# Patient Record
Sex: FEMALE | Race: OTHER | HISPANIC OR LATINO | Employment: OTHER | URBAN - METROPOLITAN AREA
[De-identification: names, ages, dates, MRNs, and addresses within clinical notes are randomized per-mention and may not be internally consistent; named-entity substitution may affect disease eponyms.]

---

## 2018-06-02 LAB
ABSOL LYMPHOCYTES (HISTORICAL): 2.2 K/UL (ref 0.5–4)
ALBUMIN SERPL BCP-MCNC: 4.1 G/DL (ref 3–5.2)
ALP SERPL-CCNC: 70 U/L (ref 43–122)
ALT SERPL W P-5'-P-CCNC: 28 U/L (ref 9–52)
ANION GAP SERPL CALCULATED.3IONS-SCNC: 10 MMOL/L (ref 5–14)
AST SERPL W P-5'-P-CCNC: 17 U/L (ref 14–36)
BASOPHILS # BLD AUTO: 0.1 K/UL (ref 0–0.1)
BASOPHILS # BLD AUTO: 1 % (ref 0–1)
BILIRUB SERPL-MCNC: 0.4 MG/DL
BILIRUBIN DIRECT (HISTORICAL): 0.2 MG/DL
BUN SERPL-MCNC: 11 MG/DL (ref 5–25)
CALCIUM SERPL-MCNC: 9.8 MG/DL (ref 8.4–10.2)
CHLORIDE SERPL-SCNC: 98 MEQ/L (ref 97–108)
CHOLEST SERPL-MCNC: 254 MG/DL
CHOLEST/HDLC SERPL: 6.7 {RATIO}
CO2 SERPL-SCNC: 31 MMOL/L (ref 22–30)
CREATINE, SERUM (HISTORICAL): 0.86 MG/DL (ref 0.6–1.2)
DEPRECATED RDW RBC AUTO: 16 %
EGFR (HISTORICAL): >60 ML/MIN/1.73 M2
EOSINOPHIL # BLD AUTO: 0.3 K/UL (ref 0–0.4)
EOSINOPHIL NFR BLD AUTO: 4 % (ref 0–6)
GLUCOSE FASTING (HISTORICAL): 120 MG/DL (ref 70–99)
HCT VFR BLD AUTO: 40.6 % (ref 36–46)
HDLC SERPL-MCNC: 38 MG/DL
HGB BLD-MCNC: 12.9 G/DL (ref 12–16)
LDL/HDL RATIO (HISTORICAL): 4.8
LDLC SERPL CALC-MCNC: 181 MG/DL
LYMPHOCYTES NFR BLD AUTO: 27 % (ref 25–45)
MAGNESIUM SERPL-MCNC: 1.7 MG/DL (ref 1.6–2.3)
MCH RBC QN AUTO: 26.4 PG (ref 26–34)
MCHC RBC AUTO-ENTMCNC: 31.8 % (ref 31–36)
MCV RBC AUTO: 83 FL (ref 80–100)
MONOCYTES # BLD AUTO: 0.5 K/UL (ref 0.2–0.9)
MONOCYTES NFR BLD AUTO: 7 % (ref 1–10)
NEUTROPHILS ABS COUNT (HISTORICAL): 5 K/UL (ref 1.8–7.8)
NEUTS SEG NFR BLD AUTO: 61 % (ref 45–65)
PLATELET # BLD AUTO: 387 K/MCL (ref 150–450)
POTASSIUM SERPL-SCNC: 5 MEQ/L (ref 3.6–5)
RBC # BLD AUTO: 4.89 M/MCL (ref 4–5.2)
SODIUM SERPL-SCNC: 139 MEQ/L (ref 137–147)
TOTAL PROTEIN (HISTORICAL): 7.6 G/DL (ref 5.9–8.4)
TRIGL SERPL-MCNC: 173 MG/DL
TSH SERPL DL<=0.05 MIU/L-ACNC: 2.44 UIU/ML (ref 0.47–4.68)
VIT B12 SERPL-MCNC: 535 PG/ML (ref 239–931)
VITAMIN D25-HYDROXY (HISTORICAL): 28 NG/ML (ref 30–100)
VLDLC SERPL CALC-MCNC: 35 MG/DL (ref 0–40)
WBC # BLD AUTO: 8.1 K/MCL (ref 4.5–11)

## 2018-06-03 LAB
EST. AVERAGE GLUCOSE BLD GHB EST-MCNC: 140 MG/DL
HBA1C MFR BLD HPLC: 6.5 %

## 2018-07-19 DIAGNOSIS — I10 BENIGN HYPERTENSION: ICD-10-CM

## 2018-07-19 DIAGNOSIS — J45.909 MODERATE ASTHMA, UNSPECIFIED WHETHER COMPLICATED, UNSPECIFIED WHETHER PERSISTENT: Primary | ICD-10-CM

## 2018-07-19 RX ORDER — BISOPROLOL FUMARATE 10 MG/1
10 TABLET ORAL EVERY 24 HOURS
COMMUNITY
Start: 2017-12-21 | End: 2018-07-19 | Stop reason: SDUPTHER

## 2018-07-19 RX ORDER — ALBUTEROL SULFATE 90 UG/1
2 AEROSOL, METERED RESPIRATORY (INHALATION) 2 TIMES DAILY PRN
COMMUNITY
Start: 2017-12-21 | End: 2018-07-19 | Stop reason: SDUPTHER

## 2018-07-20 RX ORDER — ALBUTEROL SULFATE 90 UG/1
2 AEROSOL, METERED RESPIRATORY (INHALATION) EVERY 4 HOURS PRN
Qty: 3 INHALER | Refills: 3 | Status: SHIPPED | OUTPATIENT
Start: 2018-07-20 | End: 2019-02-01 | Stop reason: SDUPTHER

## 2018-07-20 RX ORDER — BISOPROLOL FUMARATE 10 MG/1
10 TABLET ORAL EVERY 24 HOURS
Qty: 90 TABLET | Refills: 1 | Status: SHIPPED | OUTPATIENT
Start: 2018-07-20 | End: 2019-01-11 | Stop reason: SDUPTHER

## 2018-08-20 RX ORDER — ALPRAZOLAM 0.25 MG/1
TABLET ORAL
COMMUNITY
Start: 2018-05-03 | End: 2018-09-18 | Stop reason: SDUPTHER

## 2018-08-20 RX ORDER — ALBUTEROL SULFATE 1.25 MG/3ML
SOLUTION RESPIRATORY (INHALATION)
COMMUNITY
Start: 2017-12-21 | End: 2018-11-01 | Stop reason: SDUPTHER

## 2018-08-20 RX ORDER — EZETIMIBE 10 MG/1
TABLET ORAL EVERY 24 HOURS
COMMUNITY
Start: 2017-12-21 | End: 2018-08-23

## 2018-08-20 RX ORDER — NITROGLYCERIN 0.4 MG/1
TABLET SUBLINGUAL
COMMUNITY
Start: 2017-04-26 | End: 2018-08-23 | Stop reason: SDUPTHER

## 2018-08-20 RX ORDER — CHOLECALCIFEROL (VITAMIN D3) 1250 MCG
CAPSULE ORAL
COMMUNITY
Start: 2018-03-01 | End: 2018-09-18 | Stop reason: SDUPTHER

## 2018-08-20 RX ORDER — GLIPIZIDE 2.5 MG/1
TABLET, EXTENDED RELEASE ORAL
COMMUNITY
Start: 2018-03-01 | End: 2018-08-23 | Stop reason: ALTCHOICE

## 2018-08-20 RX ORDER — AMLODIPINE BESYLATE 10 MG/1
TABLET ORAL EVERY 12 HOURS
COMMUNITY
Start: 2017-12-21 | End: 2018-08-23 | Stop reason: SDUPTHER

## 2018-08-20 RX ORDER — CHLORTHALIDONE 25 MG/1
TABLET ORAL
COMMUNITY
Start: 2017-03-30 | End: 2020-09-28

## 2018-08-20 RX ORDER — CLOPIDOGREL BISULFATE 75 MG/1
TABLET ORAL EVERY 24 HOURS
COMMUNITY
Start: 2017-12-21 | End: 2019-01-11 | Stop reason: SDUPTHER

## 2018-08-20 RX ORDER — ROSUVASTATIN CALCIUM 10 MG/1
TABLET, COATED ORAL
COMMUNITY
Start: 2017-12-21 | End: 2018-08-23 | Stop reason: DRUGHIGH

## 2018-08-20 RX ORDER — PANTOPRAZOLE SODIUM 40 MG/1
TABLET, DELAYED RELEASE ORAL EVERY 12 HOURS
COMMUNITY
Start: 2018-03-01 | End: 2018-08-23 | Stop reason: ALTCHOICE

## 2018-08-23 ENCOUNTER — OFFICE VISIT (OUTPATIENT)
Dept: CARDIOLOGY CLINIC | Facility: CLINIC | Age: 73
End: 2018-08-23
Payer: COMMERCIAL

## 2018-08-23 VITALS
WEIGHT: 135.2 LBS | OXYGEN SATURATION: 96 % | BODY MASS INDEX: 24.88 KG/M2 | DIASTOLIC BLOOD PRESSURE: 70 MMHG | SYSTOLIC BLOOD PRESSURE: 160 MMHG | HEIGHT: 62 IN | HEART RATE: 55 BPM

## 2018-08-23 DIAGNOSIS — E78.00 PURE HYPERCHOLESTEROLEMIA: Primary | ICD-10-CM

## 2018-08-23 DIAGNOSIS — I25.10 CHRONIC CORONARY ARTERY DISEASE: ICD-10-CM

## 2018-08-23 DIAGNOSIS — Z95.5 HX OF HEART ARTERY STENT: ICD-10-CM

## 2018-08-23 DIAGNOSIS — Z95.1 HX OF CABG: ICD-10-CM

## 2018-08-23 DIAGNOSIS — I10 ESSENTIAL HYPERTENSION: ICD-10-CM

## 2018-08-23 PROCEDURE — 99214 OFFICE O/P EST MOD 30 MIN: CPT | Performed by: INTERNAL MEDICINE

## 2018-08-23 PROCEDURE — 93000 ELECTROCARDIOGRAM COMPLETE: CPT | Performed by: INTERNAL MEDICINE

## 2018-08-23 RX ORDER — ROSUVASTATIN CALCIUM 10 MG/1
40 TABLET, COATED ORAL DAILY
Qty: 30 TABLET | Refills: 5 | Status: SHIPPED | OUTPATIENT
Start: 2018-08-23 | End: 2018-10-08 | Stop reason: SDUPTHER

## 2018-08-23 RX ORDER — NITROGLYCERIN 0.4 MG/1
0.4 TABLET SUBLINGUAL
Qty: 90 TABLET | Refills: 2 | Status: SHIPPED | OUTPATIENT
Start: 2018-08-23 | End: 2019-11-15 | Stop reason: HOSPADM

## 2018-08-23 RX ORDER — OMEPRAZOLE 20 MG/1
20 CAPSULE, DELAYED RELEASE ORAL DAILY
COMMUNITY
End: 2018-09-18 | Stop reason: SDUPTHER

## 2018-08-23 RX ORDER — AMLODIPINE BESYLATE 10 MG/1
10 TABLET ORAL 2 TIMES DAILY
Qty: 60 TABLET | Refills: 5 | Status: SHIPPED | OUTPATIENT
Start: 2018-08-23 | End: 2018-09-19 | Stop reason: SDUPTHER

## 2018-08-23 RX ORDER — BISOPROLOL FUMARATE 10 MG/1
TABLET ORAL EVERY 24 HOURS
COMMUNITY
Start: 2017-12-21 | End: 2018-08-23 | Stop reason: SDUPTHER

## 2018-08-23 NOTE — PROGRESS NOTES
Cardiology Follow Up    Juan Mchugh  1945  [id number]  [address] [facility]  [address] [place] [id number] [phone]    1  Pure hypercholesterolemia  rosuvastatin (CRESTOR) 10 MG tablet    LDL cholesterol, direct    Triglycerides   2  Hx of CABG  nitroglycerin (NITROSTAT) 0 4 mg SL tablet   3  Hx stent of SVG to the RCA     4  Essential hypertension  POCT ECG    amLODIPine (NORVASC) 10 mg tablet   5  Chronic coronary artery disease         Patient was 1st seen on 05/30/2013 for preoperative evaluation for right total hip replacement  She had a history of coronary artery bypass surgery on December 6, 2010 with subsequent stenting of the saphenous vein graft to the right coronary artery 3 months later  Patient had a Persantine nuclear stress test which was normal with an LVEF of 72%  An echocardiogram demonstrated grade 1 diastolic dysfunction with mild LVH, aortic sclerosis and mild left atrial enlargement  Patient also had a history of hypertension, diabetes mellitus, hyperlipidemia and asthma  04/07/2017 echocardiogram: Normal LV systolic function, grade 1 diastolic dysfunction, mildly elevated left ventricular filling pressures, mild pulmonary hypertension 35 mmHg, right ventricular enlargement with hypokinesis  06/2/2017 FLP: Cholesterol 254, triglycerides 173, HDL 38, LDL calculated 181  Interval History:   Patient returns  She has no cardiac complaints  She denies chest discomfort, shortness of breath, palpitations or lightheadedness  She had to stop her Zetia due to a rash  Subsequently her cholesterol has risen significantly from a prior value of 175 to now 254 with increases in triglycerides from 119-173 and LDL from 101 to 181        Patient Active Problem List   Diagnosis    Asthma    Chronic coronary artery disease    Diabetes ([address] )    Osteoarthritis    Glaucoma    Hiatal hernia    Hyperlipidemia    Hypertension    Hx of CABG    Hx stent of SVG to the RCA     Past Medical History:   Diagnosis Date    Asthma     [facility] [address] [facility]     HTN (hypertension)     Hyperlipidemia     Hyponatremia      Social History     Social History    Marital status: Single     Spouse name: N/A    Number of children: N/A    Years of education: N/A     Occupational History    Not on file       Social History Main Topics    Smoking status: Former Smoker     Packs/day: 0 50     Years: 13 00     Types: Cigarettes     Quit date: 2000    Smokeless tobacco: Never Used      Comment: no passive smoke exposure    Alcohol use Yes      Comment: socially    Drug use: Unknown    Sexual activity: Not on file     Other Topics Concern    Not on file     Social History Narrative    No narrative on file      Family History   Problem Relation Age of Onset    Stroke Mother     Hypertension Mother     Heart disease Mother     Colon cancer Father     Heart attack Brother     Heart attack Brother      Past Surgical History:   Procedure Laterality Date    CARDIAC CATHETERIZATION  2010    CORONARY ARTERY BYPASS GRAFT  12/06/2010    with subsequent stent to the saphenous veingraft to the RCA 3 months later    KNEE ARTHROSCOPY      POLYPECTOMY      laryngeal    TOTAL HIP ARTHROPLASTY      TUBAL LIGATION         Current Outpatient Prescriptions:     albuterol (ACCUNEB) 1 25 MG/3ML nebulizer solution, inhale 6 milliliter by inhalation route 3- 4 times every day via nebulizer as needed, Disp: , Rfl:     albuterol (VENTOLIN HFA) 90 mcg/act inhaler, Inhale 2 puffs every 4 (four) hours as needed (every 4 to 6 hours as neede), Disp: 3 Inhaler, Rfl: 3    ALPRAZolam (XANAX) 0 25 mg tablet, take 1 Tablet by Oral route once a day, Disp: , Rfl:     amLODIPine (NORVASC) 10 mg tablet, Take 1 tablet (10 mg total) by mouth 2 (two) times a day, Disp: 60 tablet, Rfl: 5    bisoprolol (ZEBETA) 10 MG tablet, Take 1 tablet (10 mg total) by mouth every 24 hours, Disp: 90 tablet, Rfl: 1    chlorthalidone 25 mg tablet, take 1/2 tablet (or 12 5mg) by oral route  every day, Disp: , Rfl:     Cholecalciferol (VITAMIN D3) 55395 units CAPS, take 1 capsule by oral route  every week, Disp: , Rfl:     clopidogrel (PLAVIX) 75 mg tablet, every 24 hours, Disp: , Rfl:     glucose blood test strip, test by Misc  (Non-Drug; Combo Route) route twice a day, Disp: , Rfl:     ipratropium (ATROVENT HFA) 17 mcg/act inhaler, Every 6 hours, Disp: , Rfl:     nitroglycerin (NITROSTAT) 0 4 mg SL tablet, Place 1 tablet (0 4 mg total) under the tongue every 5 (five) minutes as needed for chest pain, Disp: 90 tablet, Rfl: 2    omeprazole (PriLOSEC) 20 mg delayed release capsule, Take 20 mg by mouth daily, Disp: , Rfl:     rosuvastatin (CRESTOR) 10 MG tablet, Take 4 tablets (40 mg total) by mouth daily, Disp: 30 tablet, Rfl: 5  Allergies   Allergen Reactions    Ezetimibe Rash    Ace Inhibitors     Lisinopril     Losartan     Olmesartan        Results for orders placed or performed in visit on 08/23/18   POCT ECG    Narrative    Sinus bradycardia at a rate of 55 beats per minute  Normal tracing           Lab Results   Component Value Date    CHOL 254 (H) 06/02/2018     Lab Results   Component Value Date    HDL 38 (L) 06/02/2018     Lab Results   Component Value Date    LDLCALC 181 (H) 06/02/2018     Lab Results   Component Value Date    TRIG 173 (H) 06/02/2018     No components found for: [place], [place]  Lab Results   Component Value Date    CALCIUM 9 8 06/02/2018     06/02/2018    K 5 0 06/02/2018    CO2 31 (H) 06/02/2018    CL 98 06/02/2018    BUN 11 06/02/2018     Lab Results   Component Value Date     06/02/2018    K 5 0 06/02/2018    CL 98 06/02/2018    CO2 31 (H) 06/02/2018    ANIONGAP 10 06/02/2018    BUN 11 06/02/2018    GLUF 120 (H) 06/02/2018    CALCIUM 9 8 06/02/2018    AST 17 06/02/2018    ALT 28 06/02/2018    ALKPHOS 70 06/02/2018    PROT 7 6 06/02/2018 BILITOT 0 4 06/02/2018     Lab Results   Component Value Date    WBC 8 1 06/02/2018    HGB 12 9 06/02/2018    HCT 40 6 06/02/2018    MCV 83 06/02/2018     06/02/2018     Lab Results   Component Value Date    DRP5MKUCBNTU 2 440 06/02/2018         Review of Systems:  Review of Systems   Constitutional: Negative  HENT: Negative  Respiratory: Negative for chest tightness and shortness of breath  Cardiovascular: Negative for chest pain and leg swelling  Gastrointestinal: Negative  Endocrine: Negative  Genitourinary: Negative  Musculoskeletal: Negative  Skin: Negative  Allergic/Immunologic: Negative  Neurological: Negative  Hematological: Negative  Psychiatric/Behavioral: Negative  Physical Exam:  /70 (BP Location: Left arm, Patient Position: Sitting, Cuff Size: Large)   Pulse 55   Ht 5' 2" (1 575 m)   Wt 61 3 kg (135 lb 3 2 oz)   SpO2 96%   BMI 24 73 kg/m²   Physical Exam   Constitutional: She is oriented to person, place, and time  She appears well-developed and well-nourished  HENT:   Head: Normocephalic and atraumatic  Eyes: Conjunctivae and EOM are normal  Pupils are equal, round, and reactive to light  Neck: Normal range of motion  Neck supple  No JVD present  No tracheal deviation present  No thyromegaly present  Cardiovascular: Normal rate, regular rhythm, normal heart sounds and intact distal pulses  Exam reveals no gallop and no friction rub  No murmur heard  Pulmonary/Chest: Effort normal  No respiratory distress  She has no rales  Abdominal: Soft  Bowel sounds are normal    Musculoskeletal: Normal range of motion  She exhibits no edema  Neurological: She is alert and oriented to person, place, and time  Skin: Skin is warm and dry  Psychiatric: She has a normal mood and affect  Her behavior is normal        Discussion/Summary:  1  Coronary artery disease status post CABG and stenting of the saphenous vein graft to the RCA  2  Hyperlipidemia, poorly controlled  3  Hypertension    Plan:  1  Increase Crestor to 40 mg daily  2  Return in 3 months  3  Obtain fasting direct LDL and triglycerides 1 or 2 weeks before return

## 2018-09-18 DIAGNOSIS — K21.9 GASTROESOPHAGEAL REFLUX DISEASE WITHOUT ESOPHAGITIS: ICD-10-CM

## 2018-09-18 DIAGNOSIS — E55.9 VITAMIN D DEFICIENCY: ICD-10-CM

## 2018-09-18 DIAGNOSIS — F41.9 ANXIETY: Primary | ICD-10-CM

## 2018-09-18 RX ORDER — OMEPRAZOLE 20 MG/1
20 CAPSULE, DELAYED RELEASE ORAL DAILY
Qty: 30 CAPSULE | Refills: 3 | Status: SHIPPED | OUTPATIENT
Start: 2018-09-18 | End: 2018-11-14 | Stop reason: SDUPTHER

## 2018-09-18 RX ORDER — ALPRAZOLAM 0.25 MG/1
0.25 TABLET ORAL DAILY PRN
Qty: 30 TABLET | Refills: 1 | Status: SHIPPED | OUTPATIENT
Start: 2018-09-18 | End: 2018-11-14 | Stop reason: SDUPTHER

## 2018-09-18 RX ORDER — CHOLECALCIFEROL (VITAMIN D3) 1250 MCG
1 CAPSULE ORAL WEEKLY
Qty: 4 CAPSULE | Refills: 1 | Status: SHIPPED | OUTPATIENT
Start: 2018-09-18 | End: 2019-07-12 | Stop reason: SDUPTHER

## 2018-09-19 DIAGNOSIS — I10 ESSENTIAL HYPERTENSION: ICD-10-CM

## 2018-09-19 RX ORDER — AMLODIPINE BESYLATE 10 MG/1
10 TABLET ORAL 2 TIMES DAILY
Qty: 180 TABLET | Refills: 3 | Status: SHIPPED | OUTPATIENT
Start: 2018-09-19 | End: 2019-07-12

## 2018-09-29 ENCOUNTER — TRANSCRIBE ORDERS (OUTPATIENT)
Dept: ADMINISTRATIVE | Facility: HOSPITAL | Age: 73
End: 2018-09-29

## 2018-09-29 ENCOUNTER — APPOINTMENT (OUTPATIENT)
Dept: LAB | Facility: HOSPITAL | Age: 73
End: 2018-09-29
Payer: COMMERCIAL

## 2018-09-29 DIAGNOSIS — E78.00 PURE HYPERCHOLESTEROLEMIA: ICD-10-CM

## 2018-09-29 LAB
LDLC SERPL DIRECT ASSAY-MCNC: 64.69 MG/DL (ref 0–100)
TRIGL SERPL-MCNC: 91 MG/DL

## 2018-09-29 PROCEDURE — 36415 COLL VENOUS BLD VENIPUNCTURE: CPT

## 2018-09-29 PROCEDURE — 84478 ASSAY OF TRIGLYCERIDES: CPT

## 2018-09-29 PROCEDURE — 83721 ASSAY OF BLOOD LIPOPROTEIN: CPT

## 2018-10-08 ENCOUNTER — OFFICE VISIT (OUTPATIENT)
Dept: FAMILY MEDICINE CLINIC | Facility: CLINIC | Age: 73
End: 2018-10-08
Payer: COMMERCIAL

## 2018-10-08 VITALS
HEIGHT: 62 IN | DIASTOLIC BLOOD PRESSURE: 84 MMHG | WEIGHT: 137.9 LBS | RESPIRATION RATE: 14 BRPM | TEMPERATURE: 98.7 F | BODY MASS INDEX: 25.38 KG/M2 | HEART RATE: 64 BPM | OXYGEN SATURATION: 96 % | SYSTOLIC BLOOD PRESSURE: 148 MMHG

## 2018-10-08 DIAGNOSIS — I10 ESSENTIAL HYPERTENSION: ICD-10-CM

## 2018-10-08 DIAGNOSIS — F41.9 ANXIETY: ICD-10-CM

## 2018-10-08 DIAGNOSIS — E78.49 OTHER HYPERLIPIDEMIA: Primary | ICD-10-CM

## 2018-10-08 DIAGNOSIS — Z23 NEED FOR VACCINATION: ICD-10-CM

## 2018-10-08 PROCEDURE — G0008 ADMIN INFLUENZA VIRUS VAC: HCPCS

## 2018-10-08 PROCEDURE — 1160F RVW MEDS BY RX/DR IN RCRD: CPT

## 2018-10-08 PROCEDURE — 90662 IIV NO PRSV INCREASED AG IM: CPT

## 2018-10-08 PROCEDURE — 1036F TOBACCO NON-USER: CPT | Performed by: INTERNAL MEDICINE

## 2018-10-08 PROCEDURE — 99214 OFFICE O/P EST MOD 30 MIN: CPT | Performed by: INTERNAL MEDICINE

## 2018-10-08 PROCEDURE — 4040F PNEUMOC VAC/ADMIN/RCVD: CPT

## 2018-10-08 PROCEDURE — 1160F RVW MEDS BY RX/DR IN RCRD: CPT | Performed by: INTERNAL MEDICINE

## 2018-10-08 RX ORDER — ROSUVASTATIN CALCIUM 20 MG/1
20 TABLET, COATED ORAL DAILY
Qty: 90 TABLET | Refills: 3 | Status: SHIPPED | OUTPATIENT
Start: 2018-10-08 | End: 2019-07-12

## 2018-10-08 RX ORDER — ROSUVASTATIN CALCIUM 40 MG/1
40 TABLET, COATED ORAL DAILY
COMMUNITY
End: 2018-10-08 | Stop reason: ALTCHOICE

## 2018-10-08 NOTE — PROGRESS NOTES
Assessment/Plan:         Diagnoses and all orders for this visit:    Other hyperlipidemia: Due to major side effects : will reduce Dose To ;  -     rosuvastatin (CRESTOR) 20 MG tablet; Take 1 tablet (20 mg total) by mouth daily  RTC in 2-3 mos w Bloodw ork  -     influenza vaccine, 3874-2000, high-dose, PF 0 5 mL, for patients 65 yr+ (FLUZONE HIGH-DOSE)    Essential hypertension: stable  Continue same  RTC in 3mos w blood w ork  -     Basic metabolic panel; Future  -     CBC and differential; Future  -     Lipid panel; Future  -     Hepatic function panel; Future  -     Urinalysis with reflex to microscopic  -     influenza vaccine, 5959-3845, high-dose, PF 0 5 mL, for patients 65 yr+ (FLUZONE HIGH-DOSE)    Anxiety; stable    Need for vaccination    Other orders  -     Discontinue: rosuvastatin (CRESTOR) 40 MG tablet; Take 40 mg by mouth daily        Subjective:      Patient ID: Amanda Echols is a 68 y o  female  [address] with H/O CAD,Hyperipidemia,HTN,  Is here for regular check up    Only Muscle ache Joint stiffness since dose of crestor increased    No recent blood work    Med list reviewed in detail    The following portions of the patient's history were reviewed and updated as appropriate: allergies, current medications, past family history, past medical history, past social history, past surgical history and problem list     Review of Systems   Constitutional: Negative for chills, fatigue and fever  HENT: Negative for congestion, facial swelling, sore throat, trouble swallowing and voice change  Eyes: Negative for pain, discharge and visual disturbance  Respiratory: Negative for cough, shortness of breath and wheezing  Cardiovascular: Negative for chest pain, palpitations and leg swelling  Gastrointestinal: Negative for abdominal pain, blood in stool, constipation, diarrhea and nausea  Endocrine: Negative for polydipsia, polyphagia and polyuria     Genitourinary: Negative for difficulty urinating, hematuria and urgency  Musculoskeletal: Positive for arthralgias and myalgias  Negative for joint swelling  Since cardiology raised the dose of Crestor to 40 mg she started with increased Muscle ache and joint pain and stiffness      Skin: Negative for rash  Neurological: Negative for dizziness, tremors, weakness and headaches  Hematological: Negative for adenopathy  Does not bruise/bleed easily  Psychiatric/Behavioral: Negative for dysphoric mood, sleep disturbance and suicidal ideas  Objective:      /84 (BP Location: Left arm, Patient Position: Sitting, Cuff Size: Standard)   Pulse 64   Temp 98 7 °F (37 1 °C) (Oral)   Resp 14   Ht 5' 2" (1 575 m)   Wt 62 6 kg (137 lb 14 4 oz)   SpO2 96%   BMI 25 22 kg/m²          Physical Exam   Constitutional: She is oriented to person, place, and time  She appears well-nourished  No distress  HENT:   Head: Normocephalic  Mouth/Throat: Oropharynx is clear and moist  No oropharyngeal exudate  Eyes: Pupils are equal, round, and reactive to light  Conjunctivae are normal  No scleral icterus  Neck: Neck supple  No thyromegaly present  Cardiovascular: Normal rate, regular rhythm and normal heart sounds  No murmur heard  Pulmonary/Chest: Effort normal and breath sounds normal  No respiratory distress  She has no wheezes  She has no rales  Abdominal: Soft  Bowel sounds are normal  She exhibits no distension  There is no tenderness  There is no rebound and no guarding  Musculoskeletal: She exhibits tenderness  She exhibits no edema  She uses a cane to support gait   Lymphadenopathy:     She has no cervical adenopathy  Neurological: She is alert and oriented to person, place, and time  No cranial nerve deficit  Coordination normal    Skin: No rash noted  No erythema  No pallor  Psychiatric: She has a normal mood and affect

## 2018-11-01 DIAGNOSIS — J44.9 CHRONIC OBSTRUCTIVE PULMONARY DISEASE, UNSPECIFIED COPD TYPE (HCC): Primary | ICD-10-CM

## 2018-11-01 RX ORDER — ALBUTEROL SULFATE 1.25 MG/3ML
1 SOLUTION RESPIRATORY (INHALATION) EVERY 6 HOURS PRN
Qty: 75 ML | Refills: 0 | Status: SHIPPED | OUTPATIENT
Start: 2018-11-01 | End: 2018-11-14 | Stop reason: SDUPTHER

## 2018-11-14 DIAGNOSIS — J44.9 CHRONIC OBSTRUCTIVE PULMONARY DISEASE, UNSPECIFIED COPD TYPE (HCC): ICD-10-CM

## 2018-11-14 DIAGNOSIS — K21.9 GASTROESOPHAGEAL REFLUX DISEASE WITHOUT ESOPHAGITIS: ICD-10-CM

## 2018-11-14 DIAGNOSIS — F41.9 ANXIETY: ICD-10-CM

## 2018-11-15 RX ORDER — ALBUTEROL SULFATE 1.25 MG/3ML
1 SOLUTION RESPIRATORY (INHALATION) EVERY 6 HOURS PRN
Qty: 75 ML | Refills: 1 | Status: SHIPPED | OUTPATIENT
Start: 2018-11-15 | End: 2019-01-11 | Stop reason: SDUPTHER

## 2018-11-15 RX ORDER — OMEPRAZOLE 20 MG/1
20 CAPSULE, DELAYED RELEASE ORAL 2 TIMES DAILY
Qty: 60 CAPSULE | Refills: 0 | Status: SHIPPED | OUTPATIENT
Start: 2018-11-15 | End: 2019-01-11 | Stop reason: SDUPTHER

## 2018-11-15 RX ORDER — ALPRAZOLAM 0.25 MG/1
0.25 TABLET ORAL DAILY PRN
Qty: 30 TABLET | Refills: 0 | Status: SHIPPED | OUTPATIENT
Start: 2018-11-15 | End: 2018-12-13 | Stop reason: SDUPTHER

## 2018-12-08 ENCOUNTER — TRANSCRIBE ORDERS (OUTPATIENT)
Dept: ADMINISTRATIVE | Facility: HOSPITAL | Age: 73
End: 2018-12-08

## 2018-12-08 ENCOUNTER — APPOINTMENT (OUTPATIENT)
Dept: LAB | Facility: HOSPITAL | Age: 73
End: 2018-12-08
Payer: COMMERCIAL

## 2018-12-08 DIAGNOSIS — I10 ESSENTIAL HYPERTENSION, MALIGNANT: Primary | ICD-10-CM

## 2018-12-08 DIAGNOSIS — I10 ESSENTIAL HYPERTENSION, MALIGNANT: ICD-10-CM

## 2018-12-08 DIAGNOSIS — E78.00 PURE HYPERCHOLESTEROLEMIA: Primary | ICD-10-CM

## 2018-12-08 DIAGNOSIS — E78.00 PURE HYPERCHOLESTEROLEMIA: ICD-10-CM

## 2018-12-08 LAB
ALBUMIN SERPL BCP-MCNC: 4.4 G/DL (ref 3–5.2)
ALP SERPL-CCNC: 61 U/L (ref 43–122)
ALT SERPL W P-5'-P-CCNC: 27 U/L (ref 9–52)
ANION GAP SERPL CALCULATED.3IONS-SCNC: 8 MMOL/L (ref 5–14)
AST SERPL W P-5'-P-CCNC: 17 U/L (ref 14–36)
BACTERIA UR QL AUTO: ABNORMAL /HPF
BASOPHILS # BLD AUTO: 0.1 THOUSANDS/ΜL (ref 0–0.1)
BASOPHILS NFR BLD AUTO: 2 % (ref 0–1)
BILIRUB DIRECT SERPL-MCNC: 0.1 MG/DL
BILIRUB SERPL-MCNC: 0.3 MG/DL
BILIRUB UR QL STRIP: NEGATIVE
BUN SERPL-MCNC: 14 MG/DL (ref 5–25)
CALCIUM SERPL-MCNC: 9.5 MG/DL (ref 8.4–10.2)
CHLORIDE SERPL-SCNC: 99 MMOL/L (ref 97–108)
CHOLEST SERPL-MCNC: 190 MG/DL
CLARITY UR: CLEAR
CO2 SERPL-SCNC: 33 MMOL/L (ref 22–30)
COLOR UR: ABNORMAL
CREAT SERPL-MCNC: 1.02 MG/DL (ref 0.6–1.2)
EOSINOPHIL # BLD AUTO: 0.5 THOUSAND/ΜL (ref 0–0.4)
EOSINOPHIL NFR BLD AUTO: 6 % (ref 0–6)
ERYTHROCYTE [DISTWIDTH] IN BLOOD BY AUTOMATED COUNT: 15.7 %
GFR SERPL CREATININE-BSD FRML MDRD: 55 ML/MIN/1.73SQ M
GLUCOSE P FAST SERPL-MCNC: 120 MG/DL (ref 70–99)
GLUCOSE UR STRIP-MCNC: NEGATIVE MG/DL
HCT VFR BLD AUTO: 40.1 % (ref 36–46)
HDLC SERPL-MCNC: 46 MG/DL (ref 40–59)
HGB BLD-MCNC: 12.6 G/DL (ref 12–16)
HGB UR QL STRIP.AUTO: 25
KETONES UR STRIP-MCNC: NEGATIVE MG/DL
LDLC SERPL CALC-MCNC: 119 MG/DL
LDLC SERPL DIRECT ASSAY-MCNC: 107.01 MG/DL (ref 0–100)
LEUKOCYTE ESTERASE UR QL STRIP: 500
LYMPHOCYTES # BLD AUTO: 2.5 THOUSANDS/ΜL (ref 0.5–4)
LYMPHOCYTES NFR BLD AUTO: 29 % (ref 25–45)
MCH RBC QN AUTO: 26.3 PG (ref 26–34)
MCHC RBC AUTO-ENTMCNC: 31.5 G/DL (ref 31–36)
MCV RBC AUTO: 84 FL (ref 80–100)
MONOCYTES # BLD AUTO: 0.7 THOUSAND/ΜL (ref 0.2–0.9)
MONOCYTES NFR BLD AUTO: 8 % (ref 1–10)
NEUTROPHILS # BLD AUTO: 4.9 THOUSANDS/ΜL (ref 1.8–7.8)
NEUTS SEG NFR BLD AUTO: 56 % (ref 45–65)
NITRITE UR QL STRIP: NEGATIVE
NON-SQ EPI CELLS URNS QL MICRO: ABNORMAL /HPF
NONHDLC SERPL-MCNC: 144 MG/DL
PH UR STRIP.AUTO: 6 [PH] (ref 4.5–8)
PLATELET # BLD AUTO: 292 THOUSANDS/UL (ref 150–450)
PMV BLD AUTO: 8.6 FL (ref 8.9–12.7)
POTASSIUM SERPL-SCNC: 4.7 MMOL/L (ref 3.6–5)
PROT SERPL-MCNC: 8 G/DL (ref 5.9–8.4)
PROT UR STRIP-MCNC: ABNORMAL MG/DL
RBC # BLD AUTO: 4.8 MILLION/UL (ref 4–5.2)
RBC #/AREA URNS AUTO: ABNORMAL /HPF
SODIUM SERPL-SCNC: 140 MMOL/L (ref 137–147)
SP GR UR STRIP.AUTO: 1.01 (ref 1–1.04)
TRIGL SERPL-MCNC: 127 MG/DL
UROBILINOGEN UA: NEGATIVE MG/DL
WBC # BLD AUTO: 8.8 THOUSAND/UL (ref 4.5–11)
WBC #/AREA URNS AUTO: ABNORMAL /HPF

## 2018-12-08 PROCEDURE — 80076 HEPATIC FUNCTION PANEL: CPT

## 2018-12-08 PROCEDURE — 81001 URINALYSIS AUTO W/SCOPE: CPT | Performed by: INTERNAL MEDICINE

## 2018-12-08 PROCEDURE — 80048 BASIC METABOLIC PNL TOTAL CA: CPT

## 2018-12-08 PROCEDURE — 85025 COMPLETE CBC W/AUTO DIFF WBC: CPT

## 2018-12-08 PROCEDURE — 83721 ASSAY OF BLOOD LIPOPROTEIN: CPT

## 2018-12-08 PROCEDURE — 80061 LIPID PANEL: CPT

## 2018-12-08 PROCEDURE — 36415 COLL VENOUS BLD VENIPUNCTURE: CPT

## 2018-12-13 DIAGNOSIS — F41.9 ANXIETY: ICD-10-CM

## 2018-12-14 RX ORDER — ALPRAZOLAM 0.25 MG/1
0.25 TABLET ORAL DAILY PRN
Qty: 30 TABLET | Refills: 1 | Status: SHIPPED | OUTPATIENT
Start: 2018-12-14 | End: 2019-02-20 | Stop reason: SDUPTHER

## 2018-12-20 ENCOUNTER — TELEPHONE (OUTPATIENT)
Dept: CARDIOLOGY CLINIC | Facility: CLINIC | Age: 73
End: 2018-12-20

## 2018-12-20 NOTE — TELEPHONE ENCOUNTER
Pt called as per Dr Cece Ivy    [facility] Cholesterol level too high for patient with previous heart surgery, recommended dietary counseling which she declined due to transportation issues  Mailed patient info on what to avoid and what to eat  She states she will watch diet more closely

## 2019-01-11 DIAGNOSIS — I10 BENIGN HYPERTENSION: ICD-10-CM

## 2019-01-11 DIAGNOSIS — I25.83 CORONARY ARTERY DISEASE DUE TO LIPID RICH PLAQUE: Primary | ICD-10-CM

## 2019-01-11 DIAGNOSIS — J44.9 CHRONIC OBSTRUCTIVE PULMONARY DISEASE, UNSPECIFIED COPD TYPE (HCC): ICD-10-CM

## 2019-01-11 DIAGNOSIS — J45.909 MODERATE ASTHMA, UNSPECIFIED WHETHER COMPLICATED, UNSPECIFIED WHETHER PERSISTENT: ICD-10-CM

## 2019-01-11 DIAGNOSIS — K21.9 GASTROESOPHAGEAL REFLUX DISEASE WITHOUT ESOPHAGITIS: ICD-10-CM

## 2019-01-11 DIAGNOSIS — I25.10 CORONARY ARTERY DISEASE DUE TO LIPID RICH PLAQUE: Primary | ICD-10-CM

## 2019-01-11 RX ORDER — BISOPROLOL FUMARATE 10 MG/1
10 TABLET ORAL EVERY 24 HOURS
Qty: 90 TABLET | Refills: 1 | Status: SHIPPED | OUTPATIENT
Start: 2019-01-11 | End: 2019-07-12 | Stop reason: SDUPTHER

## 2019-01-11 RX ORDER — ALBUTEROL SULFATE 1.25 MG/3ML
1 SOLUTION RESPIRATORY (INHALATION) EVERY 6 HOURS PRN
Qty: 75 ML | Refills: 1 | Status: SHIPPED | OUTPATIENT
Start: 2019-01-11 | End: 2019-02-01 | Stop reason: SDUPTHER

## 2019-01-11 RX ORDER — CLOPIDOGREL BISULFATE 75 MG/1
75 TABLET ORAL DAILY
Qty: 90 TABLET | Refills: 1 | Status: SHIPPED | OUTPATIENT
Start: 2019-01-11 | End: 2019-12-24 | Stop reason: SDUPTHER

## 2019-01-11 RX ORDER — OMEPRAZOLE 20 MG/1
20 CAPSULE, DELAYED RELEASE ORAL 2 TIMES DAILY
Qty: 180 CAPSULE | Refills: 1 | Status: SHIPPED | OUTPATIENT
Start: 2019-01-11 | End: 2019-07-12 | Stop reason: SDUPTHER

## 2019-02-01 ENCOUNTER — OFFICE VISIT (OUTPATIENT)
Dept: FAMILY MEDICINE CLINIC | Facility: CLINIC | Age: 74
End: 2019-02-01
Payer: COMMERCIAL

## 2019-02-01 VITALS
SYSTOLIC BLOOD PRESSURE: 130 MMHG | RESPIRATION RATE: 12 BRPM | WEIGHT: 136 LBS | HEIGHT: 62 IN | TEMPERATURE: 97.6 F | BODY MASS INDEX: 25.03 KG/M2 | HEART RATE: 58 BPM | DIASTOLIC BLOOD PRESSURE: 68 MMHG | OXYGEN SATURATION: 95 %

## 2019-02-01 DIAGNOSIS — E11.69 HYPERLIPIDEMIA ASSOCIATED WITH TYPE 2 DIABETES MELLITUS (HCC): ICD-10-CM

## 2019-02-01 DIAGNOSIS — J44.9 CHRONIC OBSTRUCTIVE PULMONARY DISEASE, UNSPECIFIED COPD TYPE (HCC): ICD-10-CM

## 2019-02-01 DIAGNOSIS — Z23 NEED FOR TDAP VACCINATION: ICD-10-CM

## 2019-02-01 DIAGNOSIS — R31.9 HEMATURIA, UNSPECIFIED TYPE: ICD-10-CM

## 2019-02-01 DIAGNOSIS — E78.5 HYPERLIPIDEMIA ASSOCIATED WITH TYPE 2 DIABETES MELLITUS (HCC): ICD-10-CM

## 2019-02-01 DIAGNOSIS — Z00.01 ENCOUNTER FOR GENERAL ADULT MEDICAL EXAMINATION WITH ABNORMAL FINDINGS: Primary | ICD-10-CM

## 2019-02-01 DIAGNOSIS — J45.909 MODERATE ASTHMA, UNSPECIFIED WHETHER COMPLICATED, UNSPECIFIED WHETHER PERSISTENT: ICD-10-CM

## 2019-02-01 DIAGNOSIS — E11.8 TYPE 2 DIABETES MELLITUS WITH COMPLICATION, WITHOUT LONG-TERM CURRENT USE OF INSULIN (HCC): ICD-10-CM

## 2019-02-01 PROCEDURE — 1160F RVW MEDS BY RX/DR IN RCRD: CPT | Performed by: INTERNAL MEDICINE

## 2019-02-01 PROCEDURE — 3008F BODY MASS INDEX DOCD: CPT | Performed by: INTERNAL MEDICINE

## 2019-02-01 PROCEDURE — 99214 OFFICE O/P EST MOD 30 MIN: CPT | Performed by: INTERNAL MEDICINE

## 2019-02-01 PROCEDURE — 90715 TDAP VACCINE 7 YRS/> IM: CPT

## 2019-02-01 PROCEDURE — 1036F TOBACCO NON-USER: CPT | Performed by: INTERNAL MEDICINE

## 2019-02-01 PROCEDURE — 1125F AMNT PAIN NOTED PAIN PRSNT: CPT | Performed by: INTERNAL MEDICINE

## 2019-02-01 PROCEDURE — 90471 IMMUNIZATION ADMIN: CPT

## 2019-02-01 PROCEDURE — G0439 PPPS, SUBSEQ VISIT: HCPCS | Performed by: INTERNAL MEDICINE

## 2019-02-01 PROCEDURE — 1170F FXNL STATUS ASSESSED: CPT | Performed by: INTERNAL MEDICINE

## 2019-02-01 RX ORDER — ALBUTEROL SULFATE 90 UG/1
2 AEROSOL, METERED RESPIRATORY (INHALATION) EVERY 4 HOURS PRN
Qty: 3 INHALER | Refills: 3 | Status: SHIPPED | OUTPATIENT
Start: 2019-02-01 | End: 2019-07-12 | Stop reason: SDUPTHER

## 2019-02-01 RX ORDER — ALBUTEROL SULFATE 1.25 MG/3ML
1 SOLUTION RESPIRATORY (INHALATION) EVERY 6 HOURS PRN
Qty: 75 ML | Refills: 1 | Status: SHIPPED | OUTPATIENT
Start: 2019-02-01 | End: 2019-03-01 | Stop reason: SDUPTHER

## 2019-02-01 NOTE — PROGRESS NOTES
Assessment and Plan:    Problem List Items Addressed This Visit     None        Health Maintenance Due   Topic Date Due    Hepatitis C Screening  1945    Depression Screening PHQ  1945    Medicare Annual Wellness Visit (AWV)  1945    MAMMOGRAM  1945    CRC Screening: Colonoscopy  1945    Diabetic Foot Exam  05/02/1955    DM Eye Exam  05/02/1955    URINE MICROALBUMIN  05/02/1955    DTaP,Tdap,and Td Vaccines (1 - Tdap) 05/02/1966    Fall Risk  05/02/2010    Urinary Incontinence Screening  05/02/2010         HPI:  Ethel Esposito is a 68 y o  female here for her Subsequent Wellness Visit      Patient Active Problem List   Diagnosis    Asthma    Chronic coronary artery disease    Diabetes [facility] [address] [facility]    Osteoarthritis    Glaucoma    Hiatal hernia    Hyperlipidemia    Hypertension    Hx of CABG    Hx stent of SVG to the RCA     Past Medical History:   Diagnosis Date    Arthritis     Asthma     Coronary artery disease     DM (diabetes mellitus) ([facility] [address] [facility]     HTN (hypertension)     Hyperlipidemia     Hyponatremia      Past Surgical History:   Procedure Laterality Date    CARDIAC CATHETERIZATION  2010    CORONARY ARTERY BYPASS GRAFT  12/06/2010    with subsequent stent to the saphenous veingraft to the RCA 3 months later    KNEE ARTHROSCOPY      POLYPECTOMY      laryngeal    TOTAL HIP ARTHROPLASTY      TUBAL LIGATION       Family History   Problem Relation Age of Onset    Stroke Mother     Hypertension Mother     Heart disease Mother     Colon cancer Father     Heart attack Brother     Heart attack Brother      History   Smoking Status    Former Smoker    Packs/day: 0 50    Years: 13 00    Types: Cigarettes    Quit date: 2000   Smokeless Tobacco    Never Used     Comment: no passive smoke exposure     History   Alcohol Use    Yes     Comment: socially      History   Drug use: Unknown       Current Outpatient Prescriptions   Medication Sig Dispense Refill  albuterol (ACCUNEB) 1 25 MG/3ML nebulizer solution Take 3 mL (1 25 mg total) by nebulization every 6 (six) hours as needed for wheezing 75 mL 1    albuterol (VENTOLIN HFA) 90 mcg/act inhaler Inhale 2 puffs every 4 (four) hours as needed (every 4 to 6 hours as neede) 3 Inhaler 3    ALPRAZolam (XANAX) 0 25 mg tablet Take 1 tablet (0 25 mg total) by mouth daily as needed for anxiety 30 tablet 1    amLODIPine (NORVASC) 10 mg tablet Take 1 tablet (10 mg total) by mouth 2 (two) times a day 180 tablet 3    bisoprolol (ZEBETA) 10 MG tablet Take 1 tablet (10 mg total) by mouth every 24 hours 90 tablet 1    chlorthalidone 25 mg tablet take 1/2 tablet (or 12 5mg) by oral route  every day      Cholecalciferol (VITAMIN D3) 37694 units CAPS Take 1 capsule (50,000 Units total) by mouth once a week 4 capsule 1    clopidogrel (PLAVIX) 75 mg tablet Take 1 tablet (75 mg total) by mouth daily 90 tablet 1    glucose blood test strip test by Misc  (Non-Drug; Combo Route) route twice a day      ipratropium (ATROVENT HFA) 17 mcg/act inhaler Every 6 hours      nitroglycerin (NITROSTAT) 0 4 mg SL tablet Place 1 tablet (0 4 mg total) under the tongue every 5 (five) minutes as needed for chest pain 90 tablet 2    omeprazole (PriLOSEC) 20 mg delayed release capsule Take 1 capsule (20 mg total) by mouth 2 (two) times a day 180 capsule 1    rosuvastatin (CRESTOR) 20 MG tablet Take 1 tablet (20 mg total) by mouth daily 90 tablet 3     No current facility-administered medications for this visit        Allergies   Allergen Reactions    Ezetimibe Rash    Ace Inhibitors     Lisinopril     Losartan     Morphine Hives     pt denies    Olmesartan      Immunization History   Administered Date(s) Administered    Influenza 10/20/2015, 09/27/2016, 09/28/2016, 11/09/2017    Influenza Split 10/15/2012, 10/29/2013, 09/26/2014    Influenza Split High Dose Preservative Free IM 09/27/2016, 11/09/2017    Influenza TIV (IM) 10/20/2015    Influenza, high dose seasonal 0 5 mL 10/08/2018    Pneumococcal Conjugate 13-Valent 11/09/2017    Pneumococcal Polysaccharide PPV23 11/01/2009, 10/20/2015       Patient Care Team:  Tres Rizvi MD as PCP - General (Internal Medicine)    Medicare Screening Tests and Risk Assessments:  Shahana Norris is here for her Subsequent Wellness visit  Health Risk Assessment:  Patient rates overall health as good  Patient feels that their physical health rating is Same  Eyesight was rated as Same  Hearing was rated as Same  Patient feels that their emotional and mental health rating is Same  Pain experienced by patient in the last 7 days has been Some  Patient's pain rating has been 2/10  Patient states that she has experienced no weight loss or gain in last 6 months  (Additional comments: Shoulder pain  Right )    Emotional/Mental Health:  Patient has been feeling nervous/anxious  PHQ-9 Depression Screening:    Frequency of the following problems over the past two weeks:      1  Little interest or pleasure in doing things: 0 - not at all      2  Feeling down, depressed, or hopeless: 0 - not at all  PHQ-2 Score: 0          Broken Bones/Falls: Fall Risk Assessment:    In the past year, patient has experienced: No history of falling in past year          Bladder/Bowel:  Patient has leaked urine accidently in the last six months  Patient reports no loss of bowel control  Immunizations:  Patient has had a flu vaccination within the last year  Patient has received a pneumonia shot  Patient has not received a shingles shot  Patient has not received tetanus/diphtheria shot  Home Safety:  Patient does not have trouble with stairs inside or outside of their home  Patient currently reports that there are no safety hazards present in home, working smoke alarms, no working carbon monoxide detectors        Preventative Screenings:   No breast cancer screening performed, no colon cancer screen completed, cholesterol screen completed, no glaucoma eye exam completed    Nutrition:  Current diet: Diabetic and Low Cholesterol with servings of the following:    Medications:  Patient is not currently taking any over-the-counter supplements  Patient is able to manage medications  Lifestyle Choices:  Patient reports no tobacco use  Patient has smoked or used tobacco in the past   Patient has stopped her tobacco use  Tobacco use quit date: 2000  Patient reports no alcohol use  Patient does not drive a vehicle  Patient wears seat belt  Current level of exercise of physical activity described by patient as: active  Activities of Daily Living:  Can get out of bed by his or her self, able to dress self, able to make own meals, able to do own shopping, able to bathe self, can do own laundry/housekeeping, can manage own money, pay bills and track expenses    Previous Hospitalizations:  Hospitalization or ED visit in past 12 months  Number of hospitalizations within the last year: 1-2        Advanced Directives:  Patient has not decided on power of   Patient has not completed advanced directive

## 2019-02-01 NOTE — PROGRESS NOTES
Assessment/Plan:         Diagnoses and all orders for this visit:    Encounter for general adult medical examination with abnormal findings : Done iN detail , Pt refused colonoscopy    Chronic obstructive pulmonary disease, unspecified COPD type ([place] [address] ): stable  Renew :  -     albuterol (ACCUNEB) 1 25 MG/3ML nebulizer solution; Take 3 mL (1 25 mg total) by nebulization every 6 (six) hours as needed for wheezing  -     albuterol (VENTOLIN HFA) 90 mcg/act inhaler; Inhale 2 puffs every 4 (four) hours as needed (every 4 to 6 hours as neede)   RTC in 3mos w Blood work     Type 2 diabetes mellitus with complication, without long-term current use of insulin ([place] [address] ): life Style mod  Continue same meds  RTC in 3mos w:  -     Basic metabolic panel; Future  -     Hemoglobin A1C; Future  -     CBC and differential; Future  -     Magnesium; Future  -     Protein, urine, 24 hour; Future  -     Lipid panel; Future  -     Hepatic function panel; Future    Hyperlipidemia associated with type 2 diabetes mellitus ([place] [address] ): life Style mod  RTC in 3mos w:    Hematuria, unspecified type: Cause ?? ;  -     Urinalysis with reflex to microscopic  -     Protein, urine, 24 hour; Future  -     US kidney and bladder; Future    Need for Tdap vaccination  -     TDAP VACCINE GREATER THAN OR EQUAL TO 8YO IM      Patient's shoes and socks removed  Right Foot/Ankle   Right Foot Inspection  Skin Exam: skin intact no ulcer                          Toe Exam: no swelling  Sensory   Vibration: diminished  Proprioception: diminished   Monofilament testing: diminished  Vascular    The right DP pulse is 1+  The right PT pulse is 1+  Left Foot/Ankle  Left Foot Inspection  Skin Exam: skin intact and dry skinno ulcer                         Toe Exam: no swelling                   Sensory   Vibration: diminished  Proprioception: diminished  Monofilament: diminished  Vascular    The left DP pulse is 1+  The left PT pulse is 1+     Assign Risk Category:  No deformity present; No loss of protective sensation; No weak pulses       Risk: 1    Subjective:      Patient ID: Jazz Elise is a 68 y o  female  68 Y O lady is here for AWV and Regular check Up, Recent Blood work and med list reviewed w pt in Detail    No new Symptoms    The following portions of the patient's history were reviewed and updated as appropriate: allergies, current medications, past family history, past medical history, past social history, past surgical history and problem list     Review of Systems   Constitutional: Negative for chills, fatigue and fever  HENT: Negative for congestion, facial swelling, sore throat, trouble swallowing and voice change  Eyes: Negative for pain, discharge and visual disturbance  Respiratory: Negative for cough, shortness of breath and wheezing  Cardiovascular: Negative for chest pain, palpitations and leg swelling  Gastrointestinal: Negative for abdominal pain, blood in stool, constipation, diarrhea and nausea  Endocrine: Negative for polydipsia, polyphagia and polyuria  Genitourinary: Negative for difficulty urinating, hematuria and urgency  Musculoskeletal: Negative for arthralgias and myalgias  Skin: Negative for rash  Neurological: Negative for dizziness, tremors, weakness and headaches  Hematological: Negative for adenopathy  Does not bruise/bleed easily  Psychiatric/Behavioral: Negative for dysphoric mood, sleep disturbance and suicidal ideas  Objective:      /68 (BP Location: Left arm, Patient Position: Sitting, Cuff Size: Standard)   Pulse 58   Temp 97 6 °F (36 4 °C) (Tympanic)   Resp 12   Ht 5' 2" (1 575 m)   Wt 61 7 kg (136 lb)   SpO2 95%   BMI 24 87 kg/m²          Physical Exam   Constitutional: She is oriented to person, place, and time  She appears well-nourished  No distress  HENT:   Head: Normocephalic  Mouth/Throat: Oropharynx is clear and moist  No oropharyngeal exudate     Eyes: Pupils are equal, round, and reactive to light  Conjunctivae are normal  No scleral icterus  Neck: Neck supple  No thyromegaly present  Cardiovascular: Normal rate, regular rhythm and normal heart sounds  Pulses are no weak pulses  No murmur heard  Pulses:       Dorsalis pedis pulses are 1+ on the right side, and 1+ on the left side  Posterior tibial pulses are 1+ on the right side, and 1+ on the left side  Pulmonary/Chest: Effort normal and breath sounds normal  No respiratory distress  She has no wheezes  She has no rales  Abdominal: Soft  Bowel sounds are normal  She exhibits no distension  There is no tenderness  There is no rebound and no guarding  Musculoskeletal: She exhibits tenderness  She exhibits no edema  Feet:   Right Foot:   Skin Integrity: Negative for ulcer  Left Foot:   Skin Integrity: Positive for dry skin  Negative for ulcer  Lymphadenopathy:     She has no cervical adenopathy  Neurological: She is alert and oriented to person, place, and time  No cranial nerve deficit  Skin: No erythema  Psychiatric: She has a normal mood and affect         Assessment and Plan:    Problem List Items Addressed This Visit     Asthma    Relevant Medications    albuterol (ACCUNEB) 1 25 MG/3ML nebulizer solution    albuterol (VENTOLIN HFA) 90 mcg/act inhaler    Diabetes ([facility])    Relevant Orders    Basic metabolic panel    Hemoglobin A1C    CBC and differential    Magnesium    Protein, urine, 24 hour    Lipid panel    Hepatic function panel      Other Visit Diagnoses     Encounter for general adult medical examination with abnormal findings    -  Primary    Chronic obstructive pulmonary disease, unspecified COPD type ([facility] )        Relevant Medications    albuterol (ACCUNEB) 1 25 MG/3ML nebulizer solution    albuterol (VENTOLIN HFA) 90 mcg/act inhaler    Hyperlipidemia associated with type 2 diabetes mellitus ([facility] )        Hematuria, unspecified type        Relevant Orders    Urinalysis with reflex to microscopic    Protein, urine, 24 hour    US kidney and bladder    Need for Tdap vaccination        Relevant Orders    TDAP VACCINE GREATER THAN OR EQUAL TO 8YO IM (Completed)        Health Maintenance Due   Topic Date Due    Hepatitis C Screening  1945    Medicare Annual Wellness Visit (AWV)  1945    CRC Screening: Colonoscopy  1945    Diabetic Foot Exam  05/02/1955    DM Eye Exam  05/02/1955    URINE MICROALBUMIN  05/02/1955    DTaP,Tdap,and Td Vaccines (1 - Tdap) 05/02/1966         HPI:  Stacey Leon is a 68 y o  female here for her Subsequent Wellness Visit      Patient Active Problem List   Diagnosis    Asthma    Chronic coronary artery disease    Diabetes ([facility] )    Osteoarthritis    Glaucoma    Hiatal hernia    Hyperlipidemia    Hypertension    Hx of CABG    Hx stent of SVG to the RCA     Past Medical History:   Diagnosis Date    Arthritis     Asthma     Coronary artery disease     DM (diabetes mellitus) ([facility] )     HTN (hypertension)     Hyperlipidemia     Hyponatremia      Past Surgical History:   Procedure Laterality Date    CARDIAC CATHETERIZATION  2010    CORONARY ARTERY BYPASS GRAFT  12/06/2010    with subsequent stent to the saphenous veingraft to the RCA 3 months later    KNEE ARTHROSCOPY      POLYPECTOMY      laryngeal    TOTAL HIP ARTHROPLASTY      TUBAL LIGATION       Family History   Problem Relation Age of Onset    Stroke Mother     Hypertension Mother     Heart disease Mother     Colon cancer Father     Heart attack Brother     Heart attack Brother      History   Smoking Status    Former Smoker    Packs/day: 0 50    Years: 13 00    Types: Cigarettes    Quit date: 2000   Smokeless Tobacco    Never Used     Comment: no passive smoke exposure     History   Alcohol Use    Yes     Comment: socially      History   Drug use: Unknown       Current Outpatient Prescriptions   Medication Sig Dispense Refill    albuterol (ACCUNEB) 1 25 MG/3ML nebulizer solution Take 3 mL (1 25 mg total) by nebulization every 6 (six) hours as needed for wheezing 75 mL 1    albuterol (VENTOLIN HFA) 90 mcg/act inhaler Inhale 2 puffs every 4 (four) hours as needed (every 4 to 6 hours as neede) 3 Inhaler 3    ALPRAZolam (XANAX) 0 25 mg tablet Take 1 tablet (0 25 mg total) by mouth daily as needed for anxiety 30 tablet 1    amLODIPine (NORVASC) 10 mg tablet Take 1 tablet (10 mg total) by mouth 2 (two) times a day 180 tablet 3    bisoprolol (ZEBETA) 10 MG tablet Take 1 tablet (10 mg total) by mouth every 24 hours 90 tablet 1    chlorthalidone 25 mg tablet take 1/2 tablet (or 12 5mg) by oral route  every day      Cholecalciferol (VITAMIN D3) 06696 units CAPS Take 1 capsule (50,000 Units total) by mouth once a week 4 capsule 1    clopidogrel (PLAVIX) 75 mg tablet Take 1 tablet (75 mg total) by mouth daily 90 tablet 1    glucose blood test strip test by Misc  (Non-Drug; Combo Route) route twice a day      ipratropium (ATROVENT HFA) 17 mcg/act inhaler Every 6 hours      nitroglycerin (NITROSTAT) 0 4 mg SL tablet Place 1 tablet (0 4 mg total) under the tongue every 5 (five) minutes as needed for chest pain 90 tablet 2    omeprazole (PriLOSEC) 20 mg delayed release capsule Take 1 capsule (20 mg total) by mouth 2 (two) times a day 180 capsule 1    rosuvastatin (CRESTOR) 20 MG tablet Take 1 tablet (20 mg total) by mouth daily 90 tablet 3     No current facility-administered medications for this visit        Allergies   Allergen Reactions    Ezetimibe Rash    Ace Inhibitors     Lisinopril     Losartan     Morphine Hives     pt denies    Olmesartan      Immunization History   Administered Date(s) Administered    Influenza 10/20/2015, 09/27/2016, 09/28/2016, 11/09/2017    Influenza Split 10/15/2012, 10/29/2013, 09/26/2014    Influenza Split High Dose Preservative Free IM 09/27/2016, 11/09/2017    Influenza TIV (IM) 10/20/2015    Influenza, high dose seasonal 0 5 mL 10/08/2018    Pneumococcal Conjugate 13-Valent 11/09/2017    Pneumococcal Polysaccharide PPV23 11/01/2009, 10/20/2015    Tdap 02/01/2019       Patient Care Team:  Delon Bosworth, MD as PCP - General (Internal Medicine)    Medicare Screening Tests and Risk Assessments:  Ignacio Sullivan is here for her Subsequent Wellness visit  Last Medicare Wellness visit information reviewed, patient interviewed and updates made to the record today  Broken Bones/Falls: Fall Risk Assessment:    In the past year, patient has experienced: visual disturbance    Preventative Screening/Counseling:      Cardiovascular:      General: Risks and Benefits Discussed          Diabetes:      General: Risks and Benefits Discussed          Colorectal Cancer:      General: Risks and Benefits Discussed          Breast Cancer:      General: Risks and Benefits Discussed          Cervical Cancer:      General: Screening Not Indicated          Osteoporosis:      General: Risks and Benefits Discussed          AAA:      General: Risks and Benefits Discussed          Glaucoma:      General: Risks and Benefits Discussed          HIV:      General: Screening Not Indicated          Hepatitis C:      General: Screening Not Indicated        Advanced Directives:   Patient has no living will for healthcare, does not have durable POA for healthcare, patient does not have an advanced directive  Information on ACP and/or AD not provided  No 5 wishes given  No end of life assessment reviewed with patient  Provider does not agree with end of life deisions  Other Preventative Counseling (Non-Medicare):   Fall Prevention and Increase physical activity

## 2019-02-20 DIAGNOSIS — F41.9 ANXIETY: ICD-10-CM

## 2019-02-21 RX ORDER — ALPRAZOLAM 0.25 MG/1
0.25 TABLET ORAL DAILY PRN
Qty: 30 TABLET | Refills: 1 | Status: SHIPPED | OUTPATIENT
Start: 2019-02-21 | End: 2019-04-23 | Stop reason: SDUPTHER

## 2019-03-01 DIAGNOSIS — J45.909 MODERATE ASTHMA, UNSPECIFIED WHETHER COMPLICATED, UNSPECIFIED WHETHER PERSISTENT: ICD-10-CM

## 2019-03-01 DIAGNOSIS — I25.10 CORONARY ARTERY DISEASE DUE TO LIPID RICH PLAQUE: ICD-10-CM

## 2019-03-01 DIAGNOSIS — I25.83 CORONARY ARTERY DISEASE DUE TO LIPID RICH PLAQUE: ICD-10-CM

## 2019-03-01 DIAGNOSIS — J01.90 ACUTE SINUSITIS, RECURRENCE NOT SPECIFIED, UNSPECIFIED LOCATION: Primary | ICD-10-CM

## 2019-03-01 DIAGNOSIS — J44.9 CHRONIC OBSTRUCTIVE PULMONARY DISEASE, UNSPECIFIED COPD TYPE (HCC): ICD-10-CM

## 2019-03-01 RX ORDER — GUAIFENESIN/DEXTROMETHORPHAN 100-10MG/5
5 SYRUP ORAL 3 TIMES DAILY PRN
Qty: 118 ML | Refills: 1 | Status: SHIPPED | OUTPATIENT
Start: 2019-03-01 | End: 2019-07-12

## 2019-03-01 RX ORDER — ALBUTEROL SULFATE 1.25 MG/3ML
1 SOLUTION RESPIRATORY (INHALATION) EVERY 6 HOURS PRN
Qty: 75 ML | Refills: 1 | Status: SHIPPED | OUTPATIENT
Start: 2019-03-01 | End: 2019-04-16 | Stop reason: SDUPTHER

## 2019-03-01 RX ORDER — AMOXICILLIN 500 MG/1
500 CAPSULE ORAL EVERY 8 HOURS SCHEDULED
Qty: 30 CAPSULE | Refills: 0 | Status: SHIPPED | OUTPATIENT
Start: 2019-03-01 | End: 2019-03-11

## 2019-04-16 DIAGNOSIS — J45.909 MODERATE ASTHMA, UNSPECIFIED WHETHER COMPLICATED, UNSPECIFIED WHETHER PERSISTENT: ICD-10-CM

## 2019-04-16 DIAGNOSIS — J44.9 CHRONIC OBSTRUCTIVE PULMONARY DISEASE, UNSPECIFIED COPD TYPE (HCC): ICD-10-CM

## 2019-04-16 RX ORDER — ALBUTEROL SULFATE 1.25 MG/3ML
1 SOLUTION RESPIRATORY (INHALATION) EVERY 6 HOURS PRN
Qty: 75 ML | Refills: 3 | Status: SHIPPED | OUTPATIENT
Start: 2019-04-16 | End: 2019-07-12 | Stop reason: SDUPTHER

## 2019-04-23 DIAGNOSIS — F41.9 ANXIETY: ICD-10-CM

## 2019-04-23 RX ORDER — ALPRAZOLAM 0.25 MG/1
0.25 TABLET ORAL DAILY PRN
Qty: 30 TABLET | Refills: 1 | Status: SHIPPED | OUTPATIENT
Start: 2019-04-23 | End: 2019-06-26 | Stop reason: SDUPTHER

## 2019-05-18 ENCOUNTER — APPOINTMENT (OUTPATIENT)
Dept: LAB | Facility: HOSPITAL | Age: 74
End: 2019-05-18
Payer: COMMERCIAL

## 2019-05-18 DIAGNOSIS — R31.9 HEMATURIA, UNSPECIFIED TYPE: ICD-10-CM

## 2019-05-18 DIAGNOSIS — E11.8 TYPE 2 DIABETES MELLITUS WITH COMPLICATION, WITHOUT LONG-TERM CURRENT USE OF INSULIN (HCC): ICD-10-CM

## 2019-05-18 LAB
ALBUMIN SERPL BCP-MCNC: 4.6 G/DL (ref 3–5.2)
ALP SERPL-CCNC: 61 U/L (ref 43–122)
ALT SERPL W P-5'-P-CCNC: 14 U/L (ref 9–52)
ANION GAP SERPL CALCULATED.3IONS-SCNC: 8 MMOL/L (ref 5–14)
AST SERPL W P-5'-P-CCNC: 21 U/L (ref 14–36)
BASOPHILS # BLD AUTO: 0.1 THOUSANDS/ΜL (ref 0–0.1)
BASOPHILS NFR BLD AUTO: 1 % (ref 0–1)
BILIRUB DIRECT SERPL-MCNC: 0 MG/DL
BILIRUB SERPL-MCNC: 0.3 MG/DL
BUN SERPL-MCNC: 15 MG/DL (ref 5–25)
CALCIUM SERPL-MCNC: 10 MG/DL (ref 8.4–10.2)
CHLORIDE SERPL-SCNC: 99 MMOL/L (ref 97–108)
CHOLEST SERPL-MCNC: 192 MG/DL
CO2 SERPL-SCNC: 32 MMOL/L (ref 22–30)
CREAT SERPL-MCNC: 0.7 MG/DL (ref 0.6–1.2)
EOSINOPHIL # BLD AUTO: 0.3 THOUSAND/ΜL (ref 0–0.4)
EOSINOPHIL NFR BLD AUTO: 5 % (ref 0–6)
ERYTHROCYTE [DISTWIDTH] IN BLOOD BY AUTOMATED COUNT: 15.6 %
EST. AVERAGE GLUCOSE BLD GHB EST-MCNC: 143 MG/DL
GFR SERPL CREATININE-BSD FRML MDRD: 86 ML/MIN/1.73SQ M
GLUCOSE P FAST SERPL-MCNC: 122 MG/DL (ref 70–99)
HBA1C MFR BLD: 6.6 % (ref 4.2–6.3)
HCT VFR BLD AUTO: 41.7 % (ref 36–46)
HDLC SERPL-MCNC: 44 MG/DL (ref 40–59)
HGB BLD-MCNC: 13.2 G/DL (ref 12–16)
LDLC SERPL CALC-MCNC: 118 MG/DL
LYMPHOCYTES # BLD AUTO: 2.2 THOUSANDS/ΜL (ref 0.5–4)
LYMPHOCYTES NFR BLD AUTO: 29 % (ref 25–45)
MAGNESIUM SERPL-MCNC: 1.8 MG/DL (ref 1.6–2.3)
MCH RBC QN AUTO: 25.8 PG (ref 26–34)
MCHC RBC AUTO-ENTMCNC: 31.7 G/DL (ref 31–36)
MCV RBC AUTO: 81 FL (ref 80–100)
MONOCYTES # BLD AUTO: 0.6 THOUSAND/ΜL (ref 0.2–0.9)
MONOCYTES NFR BLD AUTO: 8 % (ref 1–10)
NEUTROPHILS # BLD AUTO: 4.5 THOUSANDS/ΜL (ref 1.8–7.8)
NEUTS SEG NFR BLD AUTO: 59 % (ref 45–65)
NONHDLC SERPL-MCNC: 148 MG/DL
PLATELET # BLD AUTO: 293 THOUSANDS/UL (ref 150–450)
PMV BLD AUTO: 8.6 FL (ref 8.9–12.7)
POTASSIUM SERPL-SCNC: 4.3 MMOL/L (ref 3.6–5)
PROT SERPL-MCNC: 8.3 G/DL (ref 5.9–8.4)
RBC # BLD AUTO: 5.13 MILLION/UL (ref 4–5.2)
SODIUM SERPL-SCNC: 139 MMOL/L (ref 137–147)
TRIGL SERPL-MCNC: 151 MG/DL
WBC # BLD AUTO: 7.7 THOUSAND/UL (ref 4.5–11)

## 2019-05-18 PROCEDURE — 80048 BASIC METABOLIC PNL TOTAL CA: CPT

## 2019-05-18 PROCEDURE — 85025 COMPLETE CBC W/AUTO DIFF WBC: CPT

## 2019-05-18 PROCEDURE — 36415 COLL VENOUS BLD VENIPUNCTURE: CPT

## 2019-05-18 PROCEDURE — 80076 HEPATIC FUNCTION PANEL: CPT

## 2019-05-18 PROCEDURE — 80061 LIPID PANEL: CPT

## 2019-05-18 PROCEDURE — 83735 ASSAY OF MAGNESIUM: CPT

## 2019-05-18 PROCEDURE — 83036 HEMOGLOBIN GLYCOSYLATED A1C: CPT

## 2019-05-24 ENCOUNTER — HOSPITAL ENCOUNTER (OUTPATIENT)
Dept: ULTRASOUND IMAGING | Facility: HOSPITAL | Age: 74
Discharge: HOME | End: 2019-05-24
Payer: COMMERCIAL

## 2019-05-24 ENCOUNTER — APPOINTMENT (OUTPATIENT)
Dept: LAB | Facility: HOSPITAL | Age: 74
End: 2019-05-24
Payer: COMMERCIAL

## 2019-05-24 ENCOUNTER — TRANSCRIBE ORDERS (OUTPATIENT)
Dept: ADMINISTRATIVE | Facility: HOSPITAL | Age: 74
End: 2019-05-24

## 2019-05-24 DIAGNOSIS — R31.9 HEMATURIA SYNDROME: ICD-10-CM

## 2019-05-24 DIAGNOSIS — R31.9 HEMATURIA SYNDROME: Primary | ICD-10-CM

## 2019-05-24 DIAGNOSIS — R31.9 HEMATURIA, UNSPECIFIED TYPE: ICD-10-CM

## 2019-05-24 LAB
PROT 24H UR-MCNC: 210 MG/24 HRS (ref 40–150)
SPECIMEN VOL UR: 3500 ML

## 2019-05-24 PROCEDURE — 84156 ASSAY OF PROTEIN URINE: CPT

## 2019-05-24 PROCEDURE — 76770 US EXAM ABDO BACK WALL COMP: CPT

## 2019-06-26 DIAGNOSIS — F41.9 ANXIETY: ICD-10-CM

## 2019-06-27 RX ORDER — ALPRAZOLAM 0.25 MG/1
0.25 TABLET ORAL DAILY PRN
Qty: 30 TABLET | Refills: 1 | Status: SHIPPED | OUTPATIENT
Start: 2019-06-27 | End: 2019-07-12 | Stop reason: SDUPTHER

## 2019-07-12 ENCOUNTER — OFFICE VISIT (OUTPATIENT)
Dept: FAMILY MEDICINE CLINIC | Facility: CLINIC | Age: 74
End: 2019-07-12
Payer: COMMERCIAL

## 2019-07-12 VITALS
BODY MASS INDEX: 25.19 KG/M2 | HEIGHT: 62 IN | WEIGHT: 136.9 LBS | RESPIRATION RATE: 13 BRPM | DIASTOLIC BLOOD PRESSURE: 72 MMHG | TEMPERATURE: 98.1 F | SYSTOLIC BLOOD PRESSURE: 140 MMHG | HEART RATE: 62 BPM | OXYGEN SATURATION: 96 %

## 2019-07-12 DIAGNOSIS — IMO0002 TYPE II DIABETES MELLITUS WITH MANIFESTATIONS, UNCONTROLLED: ICD-10-CM

## 2019-07-12 DIAGNOSIS — E78.5 HYPERLIPIDEMIA ASSOCIATED WITH TYPE 2 DIABETES MELLITUS (HCC): ICD-10-CM

## 2019-07-12 DIAGNOSIS — E11.69 HYPERLIPIDEMIA ASSOCIATED WITH TYPE 2 DIABETES MELLITUS (HCC): ICD-10-CM

## 2019-07-12 DIAGNOSIS — F41.9 ANXIETY: ICD-10-CM

## 2019-07-12 DIAGNOSIS — Z12.11 COLON CANCER SCREENING: ICD-10-CM

## 2019-07-12 DIAGNOSIS — K21.9 GASTROESOPHAGEAL REFLUX DISEASE WITHOUT ESOPHAGITIS: ICD-10-CM

## 2019-07-12 DIAGNOSIS — Z01.00 DIABETIC EYE EXAM (HCC): ICD-10-CM

## 2019-07-12 DIAGNOSIS — E11.8 TYPE 2 DIABETES MELLITUS WITH COMPLICATION, WITHOUT LONG-TERM CURRENT USE OF INSULIN (HCC): Primary | ICD-10-CM

## 2019-07-12 DIAGNOSIS — J45.909 MODERATE ASTHMA, UNSPECIFIED WHETHER COMPLICATED, UNSPECIFIED WHETHER PERSISTENT: ICD-10-CM

## 2019-07-12 DIAGNOSIS — J44.9 CHRONIC OBSTRUCTIVE PULMONARY DISEASE, UNSPECIFIED COPD TYPE (HCC): ICD-10-CM

## 2019-07-12 DIAGNOSIS — I10 BENIGN HYPERTENSION: ICD-10-CM

## 2019-07-12 DIAGNOSIS — E11.9 DIABETIC EYE EXAM (HCC): ICD-10-CM

## 2019-07-12 DIAGNOSIS — E55.9 VITAMIN D DEFICIENCY: ICD-10-CM

## 2019-07-12 DIAGNOSIS — Z11.59 ENCOUNTER FOR HEPATITIS C SCREENING TEST FOR LOW RISK PATIENT: ICD-10-CM

## 2019-07-12 DIAGNOSIS — I10 ESSENTIAL HYPERTENSION: ICD-10-CM

## 2019-07-12 DIAGNOSIS — E66.9 OBESITY (BMI 30-39.9): ICD-10-CM

## 2019-07-12 PROCEDURE — 1036F TOBACCO NON-USER: CPT | Performed by: INTERNAL MEDICINE

## 2019-07-12 PROCEDURE — 99214 OFFICE O/P EST MOD 30 MIN: CPT | Performed by: INTERNAL MEDICINE

## 2019-07-12 PROCEDURE — 1160F RVW MEDS BY RX/DR IN RCRD: CPT | Performed by: INTERNAL MEDICINE

## 2019-07-12 PROCEDURE — 3008F BODY MASS INDEX DOCD: CPT | Performed by: INTERNAL MEDICINE

## 2019-07-12 RX ORDER — ALBUTEROL SULFATE 1.25 MG/3ML
1 SOLUTION RESPIRATORY (INHALATION) EVERY 6 HOURS PRN
Qty: 75 ML | Refills: 3 | Status: SHIPPED | OUTPATIENT
Start: 2019-07-12 | End: 2019-09-13 | Stop reason: SDUPTHER

## 2019-07-12 RX ORDER — AMLODIPINE BESYLATE 10 MG/1
10 TABLET ORAL 2 TIMES DAILY
Qty: 180 TABLET | Refills: 3 | Status: CANCELLED | OUTPATIENT
Start: 2019-07-12

## 2019-07-12 RX ORDER — CHOLECALCIFEROL (VITAMIN D3) 1250 MCG
1 CAPSULE ORAL WEEKLY
Qty: 4 CAPSULE | Refills: 1 | Status: SHIPPED | OUTPATIENT
Start: 2019-07-12 | End: 2020-07-17 | Stop reason: ALTCHOICE

## 2019-07-12 RX ORDER — ALPRAZOLAM 0.25 MG/1
0.25 TABLET ORAL DAILY PRN
Qty: 30 TABLET | Refills: 1 | Status: SHIPPED | OUTPATIENT
Start: 2019-07-12 | End: 2019-08-30 | Stop reason: SDUPTHER

## 2019-07-12 RX ORDER — AMLODIPINE BESYLATE 5 MG/1
5 TABLET ORAL DAILY
Qty: 90 TABLET | Refills: 3 | Status: SHIPPED | OUTPATIENT
Start: 2019-07-12 | End: 2019-11-22 | Stop reason: SDUPTHER

## 2019-07-12 RX ORDER — BISOPROLOL FUMARATE 10 MG/1
10 TABLET ORAL EVERY 24 HOURS
Qty: 90 TABLET | Refills: 1 | Status: SHIPPED | OUTPATIENT
Start: 2019-07-12 | End: 2019-12-03 | Stop reason: SDUPTHER

## 2019-07-12 RX ORDER — OMEPRAZOLE 20 MG/1
20 CAPSULE, DELAYED RELEASE ORAL 2 TIMES DAILY
Qty: 180 CAPSULE | Refills: 1 | Status: SHIPPED | OUTPATIENT
Start: 2019-07-12 | End: 2019-12-03 | Stop reason: SDUPTHER

## 2019-07-12 RX ORDER — ALBUTEROL SULFATE 90 UG/1
2 AEROSOL, METERED RESPIRATORY (INHALATION) EVERY 4 HOURS PRN
Qty: 3 INHALER | Refills: 3 | Status: ON HOLD | OUTPATIENT
Start: 2019-07-12 | End: 2019-11-15 | Stop reason: SDUPTHER

## 2019-07-12 NOTE — PATIENT INSTRUCTIONS
Low Fat Diet   AMBULATORY CARE:   A low-fat diet  is an eating plan that is low in total fat, unhealthy fat, and cholesterol  You may need to follow a low-fat diet if you have trouble digesting or absorbing fat  You may also need to follow this diet if you have high cholesterol  You can also lower your cholesterol by increasing the amount of fiber in your diet  Soluble fiber is a type of fiber that helps to decrease cholesterol levels  Different types of fat in food:   · Limit unhealthy fats  A diet that is high in cholesterol, saturated fat, and trans fat may cause unhealthy cholesterol levels  Unhealthy cholesterol levels increase your risk of heart disease  ¨ Cholesterol:  Limit intake of cholesterol to less than 200 mg per day  Cholesterol is found in meat, eggs, and dairy  ¨ Saturated fat:  Limit saturated fat to less than 7% of your total daily calories  Ask your dietitian how many calories you need each day  Saturated fat is found in butter, cheese, ice cream, whole milk, and palm oil  Saturated fat is also found in meat, such as beef, pork, chicken skin, and processed meats  Processed meats include sausage, hot dogs, and bologna  ¨ Trans fat:  Avoid trans fat as much as possible  Trans fat is used in fried and baked foods  Foods that say trans fat free on the label may still have up to 0 5 grams of trans fat per serving  · Include healthy fats  Replace foods that are high in saturated and trans fat with foods high in healthy fats  This may help to decrease high cholesterol levels  ¨ Monounsaturated fats: These are found in avocados, nuts, and vegetable oils, such as olive, canola, and sunflower oil  ¨ Polyunsaturated fats: These can be found in vegetable oils, such as soybean or corn oil  Omega-3 fats can help to decrease the risk of heart disease  Omega-3 fats are found in fish, such as salmon, herring, trout, and tuna   Omega-3 fats can also be found in plant foods, such as walnuts, flaxseed, soybeans, and canola oil    Foods to limit or avoid:   · Grains:      ¨ Snacks that are made with partially hydrogenated oils, such as chips, regular crackers, and butter-flavored popcorn    ¨ High-fat baked goods, such as biscuits, croissants, doughnuts, pies, cookies, and pastries    · Dairy:      ¨ Whole milk, 2% milk, and yogurt and ice cream made with whole milk    ¨ Half and half creamer, heavy cream, and whipping cream    ¨ Cheese, cream cheese, and sour cream    · Meats and proteins:      ¨ High-fat cuts of meat (T-bone steak, regular hamburger, and ribs)    ¨ Fried meat, poultry (turkey and chicken), and fish    ¨ Poultry (chicken and turkey) with skin    ¨ Cold cuts (salami or bologna), hot dogs, fraga, and sausage    ¨ Whole eggs and egg yolks    · Vegetables and fruits with added fat:      ¨ Fried vegetables or vegetables in butter or high-fat sauces, such as cream or cheese sauces    ¨ Fried fruit or fruit served with butter or cream    · Fats:      ¨ Butter, stick margarine, and shortening    ¨ Coconut, palm oil, and palm kernel oil  Foods to include:   · Grains:      ¨ Whole-grain breads, cereals, pasta, and brown rice    ¨ Low-fat crackers and pretzels    · Vegetables and fruits:      ¨ Fresh, frozen, or canned vegetables (no salt or low-sodium)    ¨ Fresh, frozen, dried, or canned fruit (canned in light syrup or fruit juice)    ¨ Avocado    · Low-fat dairy products:      ¨ Nonfat (skim) or 1% milk    ¨ Nonfat or low-fat cheese, yogurt, and cottage cheese    · Meats and proteins:      ¨ Chicken or turkey with no skin    ¨ Baked or broiled fish    ¨ Lean beef and pork (loin, round, extra lean hamburger)    ¨ Beans and peas, unsalted nuts, soy products    ¨ Egg whites and substitutes    ¨ Seeds and nuts    · Fats:      ¨ Unsaturated oil, such as canola, olive, peanut, soybean, or sunflower oil    ¨ Soft or liquid margarine and vegetable oil spread    ¨ Low-fat salad dressing  Other ways to decrease fat:   · Read food labels before you buy foods  Choose foods that have less than 30% of calories from fat  Choose low-fat or fat-free dairy products  Remember that fat free does not mean calorie free  These foods still contain calories, and too many calories can lead to weight gain  · Trim fat from meat and avoid fried food  Trim all visible fat from meat before you cook it  Remove the skin from poultry  Do not chapman meat, fish, or poultry  Bake, roast, boil, or broil these foods instead  Avoid fried foods  Eat a baked potato instead of [place] fries  Steam vegetables instead of sautéing them in butter  · Add less fat to foods  Use imitation fraga bits on salads and baked potatoes instead of regular fraga bits  Use fat-free or low-fat salad dressings instead of regular dressings  Use low-fat or nonfat butter-flavored topping instead of regular butter or margarine on popcorn and other foods  Ways to decrease fat in recipes:  Replace high-fat ingredients with low-fat or nonfat ones  This may cause baked goods to be drier than usual  You may need to use nonfat cooking spray on pans to prevent food from sticking  You also may need to change the amount of other ingredients, such as water, in the recipe  Try the following:  · Use low-fat or light margarine instead of regular margarine or shortening  · Use lean ground turkey breast or chicken, or lean ground beef (less than 5% fat) instead of hamburger  · Add 1 teaspoon of canola oil to 8 ounces of skim milk instead of using cream or half and half  · Use grated zucchini, carrots, or apples in breads instead of coconut  · Use blenderized, low-fat cottage cheese, plain tofu, or low-fat ricotta cheese instead of cream cheese  · Use 1 egg white and 1 teaspoon of canola oil, or use ¼ cup (2 ounces) of fat-free egg substitute instead of a whole egg       · Replace half of the oil that is called for in a recipe with applesauce when you bake  Use 3 tablespoons of cocoa powder and 1 tablespoon of canola oil instead of a square of baking chocolate  How to increase fiber:  Eat enough high-fiber foods to get 20 to 30 grams of fiber every day  Slowly increase your fiber intake to avoid stomach cramps, gas, and other problems  · Eat 3 ounces of whole-grain foods each day  An ounce is about 1 slice of bread  Eat whole-grain breads, such as whole-wheat bread  Whole wheat, whole-wheat flour, or other whole grains should be listed as the first ingredient on the food label  Replace white flour with whole-grain flour or use half of each in recipes  Whole-grain flour is heavier than white flour, so you may have to add more yeast or baking powder  · Eat a high-fiber cereal for breakfast   Oatmeal is a good source of soluble fiber  Look for cereals that have bran or fiber in the name  Choose whole-grain products, such as brown rice, barley, and whole-wheat pasta  · Eat more beans, peas, and lentils  For example, add beans to soups or salads  Eat at least 5 cups of fruits and vegetables each day  Eat fruits and vegetables with the peel because the peel is high in fiber  © 2017 [employer] [employer] Information is for End User's use only and may not be sold, redistributed or otherwise used for commercial purposes  All illustrations and images included in CareNotes® are the copyrighted property of [employer]  or [employer]  The above information is an  only  It is not intended as medical advice for individual conditions or treatments  Talk to your doctor, nurse or pharmacist before following any medical regimen to see if it is safe and effective for you  Heart Healthy Diet   AMBULATORY CARE:   A heart healthy diet  is an eating plan low in total fat, unhealthy fats, and sodium (salt)  A heart healthy diet helps decrease your risk for heart disease and stroke   Limit the amount of fat you eat to 25% to 35% of your total daily calories  Limit sodium to less than 2,300 mg each day  Healthy fats:  Healthy fats can help improve cholesterol levels  The risk for heart disease is decreased when cholesterol levels are normal  Choose healthy fats, such as the following:  · Unsaturated fat  is found in foods such as soybean, canola, olive, corn, and safflower oils  It is also found in soft tub margarine that is made with liquid vegetable oil  · Omega-3 fat  is found in certain fish, such as salmon, tuna, and trout, and in walnuts and flaxseed  Unhealthy fats:  Unhealthy fats can cause unhealthy cholesterol levels in your blood and increase your risk of heart disease  Limit unhealthy fats, such as the following:  · Cholesterol  is found in animal foods, such as eggs and lobster, and in dairy products made from whole milk  Limit cholesterol to less than 300 milligrams (mg) each day  You may need to limit cholesterol to 200 mg each day if you have heart disease  · Saturated fat  is found in meats, such as fraga and hamburger  It is also found in chicken or turkey skin, whole milk, and butter  Limit saturated fat to less than 7% of your total daily calories  Limit saturated fat to less than 6% if you have heart disease or are at increased risk for it  · Trans fat  is found in packaged foods, such as potato chips and cookies  It is also in hard margarine, some fried foods, and shortening  Avoid trans fats as much as possible    Heart healthy foods and drinks to include:  Ask your dietitian or healthcare provider how many servings to have from each of the following food groups:  · Grains:      ¨ Whole-wheat breads, cereals, and pastas, and brown rice    ¨ Low-fat, low-sodium crackers and chips    · Vegetables:      ¨ Broccoli, green beans, green peas, and spinach    ¨ Collards, kale, and lima beans    ¨ Carrots, sweet potatoes, tomatoes, and peppers    ¨ Canned vegetables with no salt added    · Fruits:      ¨ Bananas, peaches, pears, and pineapple    ¨ Grapes, raisins, and dates    ¨ Oranges, tangerines, grapefruit, orange juice, and grapefruit juice    ¨ Apricots, mangoes, melons, and papaya    ¨ Raspberries and strawberries    ¨ Canned fruit with no added sugar    · Low-fat dairy products:      ¨ Nonfat (skim) milk, 1% milk, and low-fat almond, cashew, or soy milks fortified with calcium    ¨ Low-fat cheese, regular or frozen yogurt, and cottage cheese    · Meats and proteins , such as lean cuts of beef and pork (loin, leg, round), skinless chicken and turkey, legumes, soy products, egg whites, and nuts  Foods and drinks to limit or avoid:  Ask your dietitian or healthcare provider about these and other foods that are high in unhealthy fat, sodium, and sugar:  · Snack or packaged foods , such as frozen dinners, cookies, macaroni and cheese, and cereals with more than 300 mg of sodium per serving    · Canned or dry mixes  for cakes, soups, sauces, or gravies    · Vegetables with added sodium , such as instant potatoes, vegetables with added sauces, or regular canned vegetables    · Other foods high in sodium , such as ketchup, barbecue sauce, salad dressing, pickles, olives, soy sauce, and miso    · High-fat dairy foods  such as whole or 2% milk, cream cheese, or sour cream, and cheeses     · High-fat protein foods  such as high-fat cuts of beef (T-bone steaks, ribs), chicken or turkey with skin, and organ meats, such as liver    · Cured or smoked meats , such as hot dogs, fraga, and sausage    · Unhealthy fats and oils , such as butter, stick margarine, shortening, and cooking oils such as coconut or palm oil    · Food and drinks high in sugar , such as soft drinks (soda), sports drinks, sweetened tea, candy, cake, cookies, pies, and doughnuts  Other diet guidelines to follow:   · Eat more foods containing omega-3 fats  Eat fish high in omega-3 fats at least 2 times a week  · Limit alcohol    Too much alcohol can damage your heart and raise your blood pressure  Women should limit alcohol to 1 drink a day  Men should limit alcohol to 2 drinks a day  A drink of alcohol is 12 ounces of beer, 5 ounces of wine, or 1½ ounces of liquor  · Choose low-sodium foods  High-sodium foods can lead to high blood pressure  Add little or no salt to food you prepare  Use herbs and spices in place of salt  · Eat more fiber  to help lower cholesterol levels  Eat at least 5 servings of fruits and vegetables each day  Eat 3 ounces of whole-grain foods each day  Legumes (beans) are also a good source of fiber  Lifestyle guidelines:   · Do not smoke  Nicotine and other chemicals in cigarettes and cigars can cause lung and heart damage  Ask your healthcare provider for information if you currently smoke and need help to quit  E-cigarettes or smokeless tobacco still contain nicotine  Talk to your healthcare provider before you use these products  · Exercise regularly  to help you maintain a healthy weight and improve your blood pressure and cholesterol levels  Ask your healthcare provider about the best exercise plan for you  Do not start an exercise program without asking your healthcare provider  Follow up with your healthcare provider as directed:  Write down your questions so you remember to ask them during your visits  © 2017 [address] [facility] Information is for End User's use only and may not be sold, redistributed or otherwise used for commercial purposes  All illustrations and images included in CareNotes® are the copyrighted property of [employer]  or [employer]  The above information is an  only  It is not intended as medical advice for individual conditions or treatments  Talk to your doctor, nurse or pharmacist before following any medical regimen to see if it is safe and effective for you  Calorie Counting Diet   WHAT YOU NEED TO KNOW:   What is a calorie counting diet?   It is a meal plan based on counting calories each day to reach a healthy body weight  You will need to eat fewer calories if you are trying to lose weight  Weight loss may decrease your risk for certain health problems or improve your health if you have health problems  Some of these health problems include heart disease, high blood pressure, and diabetes  What foods should I avoid? Your dietitian will tell you if you need to avoid certain foods based on your body weight and health condition  You may need to avoid high-fat foods if you are at risk for or have heart disease  You may need to eat fewer foods from the breads and starches food group if you have diabetes  How many calories are in foods? The following is a list of foods and drinks with the approximate number of calories in each  Check the food label to find the exact number of calories  A dietitian can tell you how many calories you should have from each food group each day    · Carbohydrate:      ¨ ½ of a 3-inch bagel, 1 slice of bread, or ½ of a hamburger bun or hot dog bun (80)    ¨ 1 (8-inch) flour tortilla or ½ cup of cooked rice (100)    ¨ 1 (6-inch) corn tortilla (80)    ¨ 1 (6-inch) pancake or 1 cup of bran flakes cereal (110)    ¨ ½ cup of cooked cereal (80)    ¨ ½ cup of cooked pasta (85)    ¨ 1 ounce of pretzels (100)    ¨ 3 cups of air-popped popcorn without butter or oil (80)    · Dairy:      ¨ 1 cup of skim or 1% milk (90)    ¨ 1 cup of 2% milk (120)    ¨ 1 cup of whole milk (160)    ¨ 1 cup of 2% chocolate milk (220)    ¨ 1 ounce of low-fat cheese with 3 grams of fat per ounce (70)    ¨ 1 ounce of cheddar cheese (114)    ¨ ½ cup of 1% fat cottage cheese (80)    ¨ 1 cup of plain or sugar-free, fat-free yogurt (90)    · Protein foods:      ¨ 3 ounces of fish (not breaded or fried) (95)    ¨ 3 ounces of breaded, fried fish (195)    ¨ ¾ cup of tuna canned in water (105)    ¨ 3 ounces of chicken breast without skin (105)    ¨ 1 fried chicken breast with skin (350)    ¨ ¼ cup of fat free egg substitute (40)    ¨ 1 large egg (75)    ¨ 3 ounces of lean beef or pork (165)    ¨ 3 ounces of fried pork chop or ham (185)    ¨ ½ cup of cooked dried beans, such as kidney, jose, lentils, or navy (115)    ¨ 3 ounces of bologna or lunch meat (225)    ¨ 2 links of breakfast sausage (140)    · Vegetables:      ¨ ½ cup of sliced mushrooms (10)    ¨ 1 cup of salad greens, such as lettuce, spinach, or lyle (15)    ¨ ½ cup of steamed asparagus (20)    ¨ ½ cup of cooked summer squash, zucchini squash, or green or wax beans (25)    ¨ 1 cup of broccoli or cauliflower florets, or 1 medium tomato (25)    ¨ 1 large raw carrot or ½ cup of cooked carrots (40)    ¨ ? of a medium cucumber or 1 stalk of celery (5)    ¨ 1 small baked potato (160)    ¨ 1 cup of breaded, fried vegetables (230)    · Fruit:      ¨ 1 (6-inch) banana (55)     ¨ ½ of a 4-inch grapefruit (55)    ¨ 15 grapes (60)    ¨ 1 medium orange or apple (70)    ¨ 1 large peach (65)    ¨ 1 cup of fresh pineapple chunks (75)    ¨ 1 cup of melon cubes (50)    ¨ 1¼ cups of whole strawberries (45)    ¨ ½ cup of fruit canned in juice (55)    ¨ ½ cup of fruit canned in heavy syrup (110)    ¨ ?  cup of raisins (130)    ¨ ½ cup of unsweetened fruit juice (60)    ¨ ½ cup of grape, cranberry, or prune juice (90)    · Fat:      ¨ 10 peanuts or 2 teaspoons of peanut butter (55)    ¨ 2 tablespoons of avocado or 1 tablespoon of regular salad dressing (45)    ¨ 2 slices of fraga (90)    ¨ 1 teaspoon of oil, such as safflower, canola, corn, or olive oil (45)    ¨ 2 teaspoons of low-fat margarine, or 1 tablespoon of low-fat mayonnaise (50)    ¨ 1 teaspoon of regular margarine (40)    ¨ 1 tablespoon of regular mayonnaise (135)    ¨ 1 tablespoon of cream cheese or 2 tablespoons of low-fat cream cheese (45)    ¨ 2 tablespoons of vegetable shortening (215)    · Dessert and sweets:      ¨ 8 animal crackers or 5 vanilla wafers (80)    ¨ 1 frozen fruit juice bar (80)    ¨ ½ cup of ice milk or low-fat frozen yogurt (90)    ¨ ½ cup of sherbet or sorbet (125)    ¨ ½ cup of sugar-free pudding or custard (60)    ¨ ½ cup of ice cream (140)    ¨ ½ cup of pudding or custard (175)    ¨ 1 (2-inch) square chocolate brownie (185)    · Combination foods:      ¨ Bean burrito made with an 8-inch tortilla, without cheese (275)    ¨ Chicken breast sandwich with lettuce and tomato (325)    ¨ 1 cup of chicken noodle soup (60)    ¨ 1 beef taco (175)    ¨ Regular hamburger with lettuce and tomato (310)    ¨ Regular cheeseburger with lettuce and tomato (410)     ¨ ¼ of a 12-inch cheese pizza (280)    ¨ Fried fish sandwich with lettuce and tomato (425)    ¨ Hot dog and bun (275)    ¨ 1½ cups of macaroni and cheese (310)    ¨ Taco salad with a fried tortilla shell (870)    · Low-calorie foods:      ¨ 1 tablespoon of ketchup or 1 tablespoon of fat free sour cream (15)    ¨ 1 teaspoon of mustard (5)    ¨ ¼ cup of salsa (20)    ¨ 1 large dill pickle (15)    ¨ 1 tablespoon of fat free salad dressing (10)    ¨ 2 teaspoons of low-sugar, light jam or jelly, or 1 tablespoon of sugar-free syrup (15)    ¨ 1 sugar-free popsicle (15)    ¨ 1 cup of club soda, seltzer water, or diet soda (0)  CARE AGREEMENT:   You have the right to help plan your care  Discuss treatment options with your caregivers to decide what care you want to receive  You always have the right to refuse treatment  The above information is an  only  It is not intended as medical advice for individual conditions or treatments  Talk to your doctor, nurse or pharmacist before following any medical regimen to see if it is safe and effective for you  © 2017 [address] [employer] Information is for End User's use only and may not be sold, redistributed or otherwise used for commercial purposes   All illustrations and images included in CareNotes® are the copyrighted property of [employer]  or [employer]

## 2019-07-12 NOTE — PROGRESS NOTES
Assessment/Plan:         Diagnoses and all orders for this visit:    Type 2 diabetes mellitus with complication, without long-term current use of insulin ([place] [address] );   -     Microalbumin / creatinine urine ratio    Colon cancer screening  -     Ambulatory referral to Colorectal Surgery; Future    Diabetic eye exam [facility])  -     Ambulatory referral to Ophthalmology; Future    Encounter for hepatitis C screening test for low risk patient  -     Hepatitis C antibody; Future  -     Occult Blood, Fecal Immunochemical; Future    Chronic obstructive pulmonary disease, unspecified COPD type ([place] [address] );  -     albuterol (ACCUNEB) 1 25 MG/3ML nebulizer solution; Take 3 mL (1 25 mg total) by nebulization every 6 (six) hours as needed for wheezing  -     albuterol (VENTOLIN HFA) 90 mcg/act inhaler; Inhale 2 puffs every 4 (four) hours as needed (every 4 to 6 hours as neede)      Gastroesophageal reflux disease without esophagitis; stable, renew :  -     omeprazole (PriLOSEC) 20 mg delayed release capsule; Take 1 capsule (20 mg total) by mouth 2 (two) times a day    Anxiety; Stable, renew :  -     ALPRAZolam (XANAX) 0 25 mg tablet; Take 1 tablet (0 25 mg total) by mouth daily as needed for anxiety    Essential hypertension; Reduce :  -     amLODIPine (NORVASC)  To 5 mg tablet; Take 1 tablet (5 mg total) by mouth daily  Renew :  -     bisoprolol (ZEBETA) 10 MG tablet; Take 1 tablet (10 mg total) by mouth every 24 hours    Vitamin D deficiency  -     Cholecalciferol (VITAMIN D3) 24452 units CAPS; Take 1 capsule (50,000 Units total) by mouth once a week    Type II diabetes mellitus with manifestations, uncontrolled ([place] [address] ); Life style Mod  TRY :  -     linaGLIPtin 5 MG TABS; Take 5 mg by mouth daily With Lunch/Food  RTC in 2-3 mos w :  -     Basic metabolic panel; Future  -     CBC and differential; Future  -     Hemoglobin A1C; Future    Hyperlipidemia associated with type 2 diabetes mellitus (HCC)  -     pitavastatin (LIVALO) 2 mg;  Take 1 tablet (2 mg total) by mouth daily with dinner Please stop Crestor  -     Lipid panel; Future  -     Hepatic function panel; Future    Obesity (BMI 30-39 9)  -     Occult Blood, Fecal Immunochemical; Future  -     TSH, 3rd generation; Future    Other orders  -     Cancel: amLODIPine (NORVASC) 10 mg tablet; Take 1 tablet (10 mg total) by mouth 2 (two) times a day      BMI Counseling: Body mass index is 25 04 kg/m²  Discussed the patient's BMI with her  The BMI is above average  BMI counseling and education was provided to the patient  Nutrition recommendations include reducing portion sizes and decreasing overall calorie intake  Patient's shoes and socks removed  Right Foot/Ankle   Right Foot Inspection  Skin Exam: skin intact and dry skin no ulcer                          Toe Exam: tendernessno swelling  Sensory   Vibration: diminished  Proprioception: diminished   Monofilament testing: diminished  Vascular    The right DP pulse is 1+  The right PT pulse is 1+  Left Foot/Ankle  Left Foot Inspection  Skin Exam: skin intact and dry skinno ulcer                         Toe Exam: tendernessno swelling                   Sensory   Vibration: diminished  Proprioception: diminished  Monofilament: diminished  Vascular    The left DP pulse is 1+  The left PT pulse is 1+  Assign Risk Category:  No deformity present; No loss of protective sensation; Weak pulses       Risk: 1    Subjective:      Patient ID: Britni Kitchen is a 76 y o  female  [address] is here for Regular check up, Recent blood work and med list reviewed w pt in Detail, she can not swallow the Norvasc 10 mg tab,  Also her [facility] gives her chest paina nd SOB,        The following portions of the patient's history were reviewed and updated as appropriate: allergies, current medications, past family history, past medical history, past social history, past surgical history and problem list     Review of Systems   Constitutional: Negative for chills, fatigue and fever  HENT: Negative for congestion, facial swelling, sore throat, trouble swallowing and voice change  Eyes: Negative for pain, discharge and visual disturbance  Respiratory: Negative for cough, shortness of breath and wheezing  Cardiovascular: Negative for chest pain, palpitations and leg swelling  Gastrointestinal: Negative for abdominal pain, blood in stool, constipation, diarrhea and nausea  Endocrine: Negative for polydipsia, polyphagia and polyuria  Genitourinary: Negative for difficulty urinating, hematuria and urgency  Musculoskeletal: Negative for arthralgias and myalgias  Skin: Negative for rash  Neurological: Negative for dizziness, tremors, weakness and headaches  Hematological: Negative for adenopathy  Does not bruise/bleed easily  Psychiatric/Behavioral: Negative for dysphoric mood, sleep disturbance and suicidal ideas  Objective:      /72 (BP Location: Left arm, Patient Position: Sitting, Cuff Size: Standard)   Pulse 62   Temp 98 1 °F (36 7 °C) (Oral)   Resp 13   Ht 5' 2" (1 575 m)   Wt 62 1 kg (136 lb 14 4 oz)   SpO2 96%   BMI 25 04 kg/m²          Physical Exam   Constitutional: She is oriented to person, place, and time  She appears well-nourished  No distress  HENT:   Head: Normocephalic  Mouth/Throat: Oropharynx is clear and moist  No oropharyngeal exudate  Eyes: Pupils are equal, round, and reactive to light  Conjunctivae are normal  No scleral icterus  Neck: Neck supple  No thyromegaly present  Cardiovascular: Normal rate and regular rhythm  Pulses are weak pulses  Murmur heard  Pulses:       Dorsalis pedis pulses are 1+ on the right side, and 1+ on the left side  Posterior tibial pulses are 1+ on the right side, and 1+ on the left side  Pulmonary/Chest: Effort normal  No respiratory distress  She has wheezes  She has no rales  Abdominal: Soft  Bowel sounds are normal  She exhibits no distension   There is no tenderness  There is no rebound and no guarding  Musculoskeletal: She exhibits tenderness  She exhibits no edema  Feet:   Right Foot:   Skin Integrity: Positive for dry skin  Negative for ulcer  Left Foot:   Skin Integrity: Positive for dry skin  Negative for ulcer  Lymphadenopathy:     She has no cervical adenopathy  Neurological: She is alert and oriented to person, place, and time  Pt uses a cane to support gait   Skin: No erythema  Psychiatric: She has a normal mood and affect  BMI Counseling: Body mass index is 25 04 kg/m²  Discussed the patient's BMI with her  The BMI is above average  BMI counseling and education was provided to the patient  Nutrition recommendations include reducing portion sizes and decreasing overall calorie intake

## 2019-08-30 ENCOUNTER — APPOINTMENT (OUTPATIENT)
Dept: LAB | Facility: HOSPITAL | Age: 74
End: 2019-08-30
Payer: COMMERCIAL

## 2019-08-30 DIAGNOSIS — J44.9 CHRONIC OBSTRUCTIVE PULMONARY DISEASE, UNSPECIFIED COPD TYPE (HCC): ICD-10-CM

## 2019-08-30 DIAGNOSIS — F41.9 ANXIETY: ICD-10-CM

## 2019-08-30 DIAGNOSIS — E66.9 OBESITY (BMI 30-39.9): ICD-10-CM

## 2019-08-30 DIAGNOSIS — I25.10 CORONARY ARTERY DISEASE DUE TO LIPID RICH PLAQUE: ICD-10-CM

## 2019-08-30 DIAGNOSIS — I25.83 CORONARY ARTERY DISEASE DUE TO LIPID RICH PLAQUE: ICD-10-CM

## 2019-08-30 DIAGNOSIS — E11.69 HYPERLIPIDEMIA ASSOCIATED WITH TYPE 2 DIABETES MELLITUS (HCC): ICD-10-CM

## 2019-08-30 DIAGNOSIS — IMO0002 TYPE II DIABETES MELLITUS WITH MANIFESTATIONS, UNCONTROLLED: ICD-10-CM

## 2019-08-30 DIAGNOSIS — J45.909 MODERATE ASTHMA, UNSPECIFIED WHETHER COMPLICATED, UNSPECIFIED WHETHER PERSISTENT: ICD-10-CM

## 2019-08-30 DIAGNOSIS — Z11.59 ENCOUNTER FOR HEPATITIS C SCREENING TEST FOR LOW RISK PATIENT: ICD-10-CM

## 2019-08-30 DIAGNOSIS — E78.5 HYPERLIPIDEMIA ASSOCIATED WITH TYPE 2 DIABETES MELLITUS (HCC): ICD-10-CM

## 2019-08-30 LAB
ALBUMIN SERPL BCP-MCNC: 4.7 G/DL (ref 3–5.2)
ALP SERPL-CCNC: 66 U/L (ref 43–122)
ALT SERPL W P-5'-P-CCNC: 17 U/L (ref 9–52)
ANION GAP SERPL CALCULATED.3IONS-SCNC: 9 MMOL/L (ref 5–14)
AST SERPL W P-5'-P-CCNC: 21 U/L (ref 14–36)
BACTERIA UR QL AUTO: ABNORMAL /HPF
BASOPHILS # BLD AUTO: 0.1 THOUSANDS/ΜL (ref 0–0.1)
BASOPHILS NFR BLD AUTO: 1 % (ref 0–1)
BILIRUB DIRECT SERPL-MCNC: 0 MG/DL
BILIRUB SERPL-MCNC: 0.3 MG/DL
BILIRUB UR QL STRIP: NEGATIVE
BUN SERPL-MCNC: 19 MG/DL (ref 5–25)
CALCIUM SERPL-MCNC: 10 MG/DL (ref 8.4–10.2)
CHLORIDE SERPL-SCNC: 96 MMOL/L (ref 97–108)
CHOLEST SERPL-MCNC: 278 MG/DL
CLARITY UR: CLEAR
CO2 SERPL-SCNC: 35 MMOL/L (ref 22–30)
COLOR UR: ABNORMAL
CREAT SERPL-MCNC: 0.92 MG/DL (ref 0.6–1.2)
CREAT UR-MCNC: 161 MG/DL
CREAT UR-MCNC: 161 MG/DL
EOSINOPHIL # BLD AUTO: 0.4 THOUSAND/ΜL (ref 0–0.4)
EOSINOPHIL NFR BLD AUTO: 4 % (ref 0–6)
ERYTHROCYTE [DISTWIDTH] IN BLOOD BY AUTOMATED COUNT: 15.9 %
GFR SERPL CREATININE-BSD FRML MDRD: 62 ML/MIN/1.73SQ M
GLUCOSE P FAST SERPL-MCNC: 125 MG/DL (ref 70–99)
GLUCOSE UR STRIP-MCNC: NEGATIVE MG/DL
HCT VFR BLD AUTO: 40 % (ref 36–46)
HDLC SERPL-MCNC: 41 MG/DL (ref 40–59)
HGB BLD-MCNC: 13.1 G/DL (ref 12–16)
HGB UR QL STRIP.AUTO: 25
HYALINE CASTS #/AREA URNS LPF: ABNORMAL /LPF
KETONES UR STRIP-MCNC: NEGATIVE MG/DL
LDLC SERPL CALC-MCNC: 198 MG/DL
LEUKOCYTE ESTERASE UR QL STRIP: 500
LYMPHOCYTES # BLD AUTO: 2.7 THOUSANDS/ΜL (ref 0.5–4)
LYMPHOCYTES NFR BLD AUTO: 30 % (ref 25–45)
MCH RBC QN AUTO: 26.7 PG (ref 26–34)
MCHC RBC AUTO-ENTMCNC: 32.7 G/DL (ref 31–36)
MCV RBC AUTO: 82 FL (ref 80–100)
MICROALBUMIN UR-MCNC: 78.6 MG/L (ref 0–20)
MICROALBUMIN/CREAT 24H UR: 49 MG/G CREATININE (ref 0–30)
MONOCYTES # BLD AUTO: 0.7 THOUSAND/ΜL (ref 0.2–0.9)
MONOCYTES NFR BLD AUTO: 7 % (ref 1–10)
NEUTROPHILS # BLD AUTO: 5.3 THOUSANDS/ΜL (ref 1.8–7.8)
NEUTS SEG NFR BLD AUTO: 58 % (ref 45–65)
NITRITE UR QL STRIP: NEGATIVE
NON-SQ EPI CELLS URNS QL MICRO: ABNORMAL /HPF
NONHDLC SERPL-MCNC: 237 MG/DL
PH UR STRIP.AUTO: 6 [PH]
PLATELET # BLD AUTO: 325 THOUSANDS/UL (ref 150–450)
PMV BLD AUTO: 8.9 FL (ref 8.9–12.7)
POTASSIUM SERPL-SCNC: 5 MMOL/L (ref 3.6–5)
PROT SERPL-MCNC: 8.6 G/DL (ref 5.9–8.4)
PROT UR STRIP-MCNC: ABNORMAL MG/DL
PROT UR-MCNC: 24 MG/DL
PROT/CREAT UR: 0.15 MG/G{CREAT} (ref 0–0.1)
RBC # BLD AUTO: 4.89 MILLION/UL (ref 4–5.2)
RBC #/AREA URNS AUTO: ABNORMAL /HPF
SODIUM SERPL-SCNC: 140 MMOL/L (ref 137–147)
SP GR UR STRIP.AUTO: 1.01 (ref 1–1.04)
TRIGL SERPL-MCNC: 193 MG/DL
TSH SERPL DL<=0.05 MIU/L-ACNC: 3.14 UIU/ML (ref 0.47–4.68)
URINE COMMENT: ABNORMAL
UROBILINOGEN UA: NEGATIVE MG/DL
WBC # BLD AUTO: 9.2 THOUSAND/UL (ref 4.5–11)
WBC #/AREA URNS AUTO: ABNORMAL /HPF

## 2019-08-30 PROCEDURE — 86803 HEPATITIS C AB TEST: CPT

## 2019-08-30 PROCEDURE — 82570 ASSAY OF URINE CREATININE: CPT | Performed by: INTERNAL MEDICINE

## 2019-08-30 PROCEDURE — 36415 COLL VENOUS BLD VENIPUNCTURE: CPT

## 2019-08-30 PROCEDURE — 81001 URINALYSIS AUTO W/SCOPE: CPT | Performed by: INTERNAL MEDICINE

## 2019-08-30 PROCEDURE — 85025 COMPLETE CBC W/AUTO DIFF WBC: CPT

## 2019-08-30 PROCEDURE — 80048 BASIC METABOLIC PNL TOTAL CA: CPT

## 2019-08-30 PROCEDURE — 80061 LIPID PANEL: CPT

## 2019-08-30 PROCEDURE — 80076 HEPATIC FUNCTION PANEL: CPT

## 2019-08-30 PROCEDURE — 84156 ASSAY OF PROTEIN URINE: CPT | Performed by: INTERNAL MEDICINE

## 2019-08-30 PROCEDURE — 84443 ASSAY THYROID STIM HORMONE: CPT

## 2019-08-30 PROCEDURE — 82043 UR ALBUMIN QUANTITATIVE: CPT | Performed by: INTERNAL MEDICINE

## 2019-08-30 RX ORDER — ALPRAZOLAM 0.25 MG/1
0.25 TABLET ORAL DAILY PRN
Qty: 30 TABLET | Refills: 1 | Status: SHIPPED | OUTPATIENT
Start: 2019-08-30 | End: 2019-11-08 | Stop reason: SDUPTHER

## 2019-08-31 LAB — HCV AB SER QL: NORMAL

## 2019-09-13 ENCOUNTER — OFFICE VISIT (OUTPATIENT)
Dept: FAMILY MEDICINE CLINIC | Facility: CLINIC | Age: 74
End: 2019-09-13
Payer: COMMERCIAL

## 2019-09-13 VITALS
RESPIRATION RATE: 13 BRPM | SYSTOLIC BLOOD PRESSURE: 136 MMHG | TEMPERATURE: 98.2 F | HEIGHT: 62 IN | HEART RATE: 64 BPM | OXYGEN SATURATION: 96 % | BODY MASS INDEX: 25.1 KG/M2 | WEIGHT: 136.4 LBS | DIASTOLIC BLOOD PRESSURE: 72 MMHG

## 2019-09-13 DIAGNOSIS — J45.909 MODERATE ASTHMA, UNSPECIFIED WHETHER COMPLICATED, UNSPECIFIED WHETHER PERSISTENT: ICD-10-CM

## 2019-09-13 DIAGNOSIS — E78.5 HYPERLIPIDEMIA ASSOCIATED WITH TYPE 2 DIABETES MELLITUS (HCC): ICD-10-CM

## 2019-09-13 DIAGNOSIS — E11.69 HYPERLIPIDEMIA ASSOCIATED WITH TYPE 2 DIABETES MELLITUS (HCC): ICD-10-CM

## 2019-09-13 DIAGNOSIS — Z12.11 SCREENING FOR COLON CANCER: ICD-10-CM

## 2019-09-13 DIAGNOSIS — R35.0 FREQUENCY OF URINATION: ICD-10-CM

## 2019-09-13 DIAGNOSIS — J44.9 CHRONIC OBSTRUCTIVE PULMONARY DISEASE, UNSPECIFIED COPD TYPE (HCC): ICD-10-CM

## 2019-09-13 DIAGNOSIS — IMO0002 TYPE II DIABETES MELLITUS WITH MANIFESTATIONS, UNCONTROLLED: ICD-10-CM

## 2019-09-13 DIAGNOSIS — R80.9 PROTEINURIA, UNSPECIFIED TYPE: Primary | ICD-10-CM

## 2019-09-13 PROCEDURE — 99214 OFFICE O/P EST MOD 30 MIN: CPT | Performed by: INTERNAL MEDICINE

## 2019-09-13 PROCEDURE — 90662 IIV NO PRSV INCREASED AG IM: CPT

## 2019-09-13 PROCEDURE — G0008 ADMIN INFLUENZA VIRUS VAC: HCPCS

## 2019-09-13 PROCEDURE — 3008F BODY MASS INDEX DOCD: CPT | Performed by: INTERNAL MEDICINE

## 2019-09-13 RX ORDER — ALBUTEROL SULFATE 1.25 MG/3ML
1 SOLUTION RESPIRATORY (INHALATION) EVERY 6 HOURS PRN
Qty: 75 ML | Refills: 5 | Status: ON HOLD | OUTPATIENT
Start: 2019-09-13 | End: 2019-11-15 | Stop reason: SDUPTHER

## 2019-09-13 RX ORDER — ROSUVASTATIN CALCIUM 10 MG/1
10 TABLET, COATED ORAL DAILY
Qty: 90 TABLET | Refills: 3 | Status: SHIPPED | OUTPATIENT
Start: 2019-09-13 | End: 2020-09-28 | Stop reason: SDUPTHER

## 2019-09-13 RX ORDER — NITROFURANTOIN 25; 75 MG/1; MG/1
100 CAPSULE ORAL DAILY
Qty: 30 CAPSULE | Refills: 1 | Status: SHIPPED | OUTPATIENT
Start: 2019-09-13 | End: 2019-11-15 | Stop reason: HOSPADM

## 2019-09-13 NOTE — PROGRESS NOTES
Assessment/Plan:         Diagnoses and all orders for this visit:    Proteinuria, unspecified type; ? ?Diabetic :  RTC in 3mos w :  -     US kidney and bladder; Future  -     Urinalysis with reflex to microscopic    Chronic obstructive pulmonary disease, unspecified COPD type ([facility] ); stable, Continue and Renew :  -     albuterol (ACCUNEB) 1 25 MG/3ML nebulizer solution; Take 3 mL (1 25 mg total) by nebulization every 6 (six) hours as needed for wheezing  -     Nebulizer  -     Nebulizer Supplies    Moderate asthma, unspecified whether complicated, unspecified whether persistent: as above     -     albuterol (ACCUNEB) 1 25 MG/3ML nebulizer solution; Take 3 mL (1 25 mg total) by nebulization every 6 (six) hours as needed for wheezing    Type II diabetes mellitus with manifestations, uncontrolled ([place] [address] ); Life Style mod  Continue same  RTC in 3mos w ;  -     glucose blood test strip; 1 each by Other route as needed (as needed) Use as instructed  -     Comprehensive metabolic panel; Future  -     Hemoglobin A1C; Future  -     influenza vaccine, 7772-8202, high-dose, PF 0 5 mL (FLUZONE HIGH-DOSE)    Screening for colon cancer  -     Occult Blood, Fecal Immunochemical; Future  -     CEA; Future    Hyperlipidemia associated with type 2 diabetes mellitus ([place] [address] ); Not well Controlled  Pt Could NOT tolerate generic Statins  Livalo was NOT effective  She used to do well on Brand Crestor  TRY ;  -     (CRESTOR) 10 MG tablet; Take 1 tablet (10 mg total) by mouth daily Please stop Livalo,   RTC in 3mos  w;  -     Comprehensive metabolic panel; Future  -     Lipid panel; Future  -     CK (with reflex to MB); Future  -     influenza vaccine, 9007-6335, high-dose, PF 0 5 mL (FLUZONE HIGH-DOSE)    Frequency of urination  -     US kidney and bladder; Future  -     Urinalysis with reflex to microscopic  -     nitrofurantoin (MACROBID) 100 mg capsule;  Take 1 capsule (100 mg total) by mouth daily With food/lunch        Subjective: Patient ID: Jose Callahan is a 76 y o  female  76 Y O lady is here for Regular check up, Recent blood work and Med List reviewed w  Pt in detail, no New Symptoms,    The following portions of the patient's history were reviewed and updated as appropriate: allergies, current medications, past family history, past medical history, past social history, past surgical history and problem list     Review of Systems   Constitutional: Negative for chills, fatigue and fever  HENT: Negative for congestion, facial swelling, sore throat, trouble swallowing and voice change  Eyes: Negative for pain, discharge and visual disturbance  Respiratory: Negative for cough, shortness of breath and wheezing  Cardiovascular: Negative for chest pain, palpitations and leg swelling  Gastrointestinal: Negative for abdominal pain, blood in stool, constipation, diarrhea and nausea  Endocrine: Negative for polydipsia, polyphagia and polyuria  Genitourinary: Positive for frequency  Negative for difficulty urinating, hematuria and urgency  Musculoskeletal: Positive for arthralgias  Negative for myalgias  Skin: Negative for rash  Neurological: Negative for dizziness, tremors, weakness and headaches  Hematological: Negative for adenopathy  Does not bruise/bleed easily  Psychiatric/Behavioral: Negative for dysphoric mood, sleep disturbance and suicidal ideas  Objective:      /72 (BP Location: Left arm, Patient Position: Sitting, Cuff Size: Standard)   Pulse 64   Temp 98 2 °F (36 8 °C) (Oral)   Resp 13   Ht 5' 2" (1 575 m)   Wt 61 9 kg (136 lb 6 4 oz)   SpO2 96%   BMI 24 95 kg/m²          Physical Exam   Constitutional: She is oriented to person, place, and time  She appears well-nourished  No distress  HENT:   Head: Normocephalic  Mouth/Throat: Oropharynx is clear and moist  No oropharyngeal exudate  Eyes: Pupils are equal, round, and reactive to light   Conjunctivae are normal  No scleral icterus  Neck: Neck supple  No thyromegaly present  Cardiovascular: Normal rate and regular rhythm  Murmur heard  Pulmonary/Chest: Effort normal and breath sounds normal  No respiratory distress  She has no wheezes  She has no rales  Abdominal: Soft  Bowel sounds are normal  She exhibits no distension  There is no tenderness  There is no rebound and no guarding  Musculoskeletal: She exhibits tenderness  She exhibits no edema  Lymphadenopathy:     She has no cervical adenopathy  Neurological: She is alert and oriented to person, place, and time  No cranial nerve deficit  Pt uses a cane to support gait   Skin: No erythema  Psychiatric: She has a normal mood and affect

## 2019-11-08 DIAGNOSIS — F41.9 ANXIETY: ICD-10-CM

## 2019-11-10 RX ORDER — ALPRAZOLAM 0.25 MG/1
0.25 TABLET ORAL DAILY PRN
Qty: 30 TABLET | Refills: 1 | Status: SHIPPED | OUTPATIENT
Start: 2019-11-10 | End: 2020-01-23 | Stop reason: SDUPTHER

## 2019-11-12 ENCOUNTER — APPOINTMENT (EMERGENCY)
Dept: RADIOLOGY | Facility: HOSPITAL | Age: 74
DRG: 190 | End: 2019-11-12
Payer: COMMERCIAL

## 2019-11-12 ENCOUNTER — HOSPITAL ENCOUNTER (INPATIENT)
Facility: HOSPITAL | Age: 74
LOS: 2 days | Discharge: HOME | DRG: 190 | End: 2019-11-15
Attending: EMERGENCY MEDICINE | Admitting: FAMILY MEDICINE
Payer: COMMERCIAL

## 2019-11-12 DIAGNOSIS — J45.909 MODERATE ASTHMA, UNSPECIFIED WHETHER COMPLICATED, UNSPECIFIED WHETHER PERSISTENT: ICD-10-CM

## 2019-11-12 DIAGNOSIS — R06.03 RESPIRATORY DISTRESS: ICD-10-CM

## 2019-11-12 DIAGNOSIS — J44.9 CHRONIC OBSTRUCTIVE PULMONARY DISEASE, UNSPECIFIED COPD TYPE (HCC): ICD-10-CM

## 2019-11-12 DIAGNOSIS — J18.9 COMMUNITY ACQUIRED PNEUMONIA, UNSPECIFIED LATERALITY: ICD-10-CM

## 2019-11-12 DIAGNOSIS — E11.65 TYPE 2 DIABETES MELLITUS WITH HYPERGLYCEMIA, WITHOUT LONG-TERM CURRENT USE OF INSULIN (HCC): ICD-10-CM

## 2019-11-12 DIAGNOSIS — J18.9 PNEUMONIA: Primary | ICD-10-CM

## 2019-11-12 LAB
ANION GAP SERPL CALCULATED.3IONS-SCNC: 8 MMOL/L (ref 5–14)
APTT PPP: 26 SECONDS (ref 25–32)
BUN SERPL-MCNC: 15 MG/DL (ref 5–25)
CALCIUM SERPL-MCNC: 9.7 MG/DL (ref 8.4–10.2)
CHLORIDE SERPL-SCNC: 102 MMOL/L (ref 97–108)
CO2 SERPL-SCNC: 31 MMOL/L (ref 22–30)
CREAT SERPL-MCNC: 0.69 MG/DL (ref 0.6–1.2)
EOSINOPHIL # BLD AUTO: 0.36 THOUSAND/UL (ref 0–0.4)
EOSINOPHIL NFR BLD MANUAL: 2 % (ref 0–6)
ERYTHROCYTE [DISTWIDTH] IN BLOOD BY AUTOMATED COUNT: 15.3 %
GFR SERPL CREATININE-BSD FRML MDRD: 86 ML/MIN/1.73SQ M
GLUCOSE SERPL-MCNC: 178 MG/DL (ref 70–99)
HCT VFR BLD AUTO: 40.8 % (ref 36–46)
HGB BLD-MCNC: 13 G/DL (ref 12–16)
INR PPP: 0.88 (ref 0.91–1.09)
LYMPHOCYTES # BLD AUTO: 1.43 THOUSAND/UL (ref 0.5–4)
LYMPHOCYTES # BLD AUTO: 8 % (ref 25–45)
MCH RBC QN AUTO: 26.4 PG (ref 26–34)
MCHC RBC AUTO-ENTMCNC: 31.8 G/DL (ref 31–36)
MCV RBC AUTO: 83 FL (ref 80–100)
MONOCYTES # BLD AUTO: 0.36 THOUSAND/UL (ref 0.2–0.9)
MONOCYTES NFR BLD AUTO: 2 % (ref 1–10)
NEUTS BAND NFR BLD MANUAL: 6 % (ref 0–8)
NEUTS SEG # BLD: 15.75 THOUSAND/UL (ref 1.8–7.8)
NEUTS SEG NFR BLD AUTO: 82 %
NT-PROBNP SERPL-MCNC: 133 PG/ML (ref 0–299)
PLATELET # BLD AUTO: 312 THOUSANDS/UL (ref 150–450)
PLATELET BLD QL SMEAR: ADEQUATE
PMV BLD AUTO: 8.1 FL (ref 8.9–12.7)
POTASSIUM SERPL-SCNC: 4.1 MMOL/L (ref 3.6–5)
PROTHROMBIN TIME: 9.8 SECONDS (ref 9.8–12)
RBC # BLD AUTO: 4.92 MILLION/UL (ref 4–5.2)
RBC MORPH BLD: NORMAL
SODIUM SERPL-SCNC: 141 MMOL/L (ref 137–147)
TOTAL CELLS COUNTED SPEC: 100
TROPONIN I SERPL-MCNC: 0.02 NG/ML (ref 0–0.03)
WBC # BLD AUTO: 17.9 THOUSAND/UL (ref 4.5–11)
WBC TOXIC VACUOLES BLD QL SMEAR: PRESENT

## 2019-11-12 PROCEDURE — 85730 THROMBOPLASTIN TIME PARTIAL: CPT | Performed by: EMERGENCY MEDICINE

## 2019-11-12 PROCEDURE — 99285 EMERGENCY DEPT VISIT HI MDM: CPT | Performed by: EMERGENCY MEDICINE

## 2019-11-12 PROCEDURE — 85610 PROTHROMBIN TIME: CPT | Performed by: EMERGENCY MEDICINE

## 2019-11-12 PROCEDURE — 80048 BASIC METABOLIC PNL TOTAL CA: CPT | Performed by: EMERGENCY MEDICINE

## 2019-11-12 PROCEDURE — 93005 ELECTROCARDIOGRAM TRACING: CPT

## 2019-11-12 PROCEDURE — 96361 HYDRATE IV INFUSION ADD-ON: CPT

## 2019-11-12 PROCEDURE — 71045 X-RAY EXAM CHEST 1 VIEW: CPT

## 2019-11-12 PROCEDURE — 83605 ASSAY OF LACTIC ACID: CPT | Performed by: EMERGENCY MEDICINE

## 2019-11-12 PROCEDURE — 85027 COMPLETE CBC AUTOMATED: CPT | Performed by: EMERGENCY MEDICINE

## 2019-11-12 PROCEDURE — 84484 ASSAY OF TROPONIN QUANT: CPT | Performed by: EMERGENCY MEDICINE

## 2019-11-12 PROCEDURE — 85007 BL SMEAR W/DIFF WBC COUNT: CPT | Performed by: EMERGENCY MEDICINE

## 2019-11-12 PROCEDURE — 83880 ASSAY OF NATRIURETIC PEPTIDE: CPT | Performed by: EMERGENCY MEDICINE

## 2019-11-12 PROCEDURE — 36415 COLL VENOUS BLD VENIPUNCTURE: CPT | Performed by: EMERGENCY MEDICINE

## 2019-11-12 PROCEDURE — 99285 EMERGENCY DEPT VISIT HI MDM: CPT

## 2019-11-12 PROCEDURE — 96374 THER/PROPH/DIAG INJ IV PUSH: CPT

## 2019-11-12 PROCEDURE — 94640 AIRWAY INHALATION TREATMENT: CPT

## 2019-11-12 RX ORDER — SODIUM CHLORIDE FOR INHALATION 0.9 %
3 VIAL, NEBULIZER (ML) INHALATION ONCE
Status: COMPLETED | OUTPATIENT
Start: 2019-11-12 | End: 2019-11-12

## 2019-11-12 RX ORDER — METHYLPREDNISOLONE SODIUM SUCCINATE 125 MG/2ML
125 INJECTION, POWDER, LYOPHILIZED, FOR SOLUTION INTRAMUSCULAR; INTRAVENOUS ONCE
Status: COMPLETED | OUTPATIENT
Start: 2019-11-12 | End: 2019-11-12

## 2019-11-12 RX ORDER — CEFEPIME HYDROCHLORIDE 2 G/50ML
2000 INJECTION, SOLUTION INTRAVENOUS ONCE
Status: COMPLETED | OUTPATIENT
Start: 2019-11-13 | End: 2019-11-13

## 2019-11-12 RX ADMIN — IPRATROPIUM BROMIDE 1 MG: 0.5 SOLUTION RESPIRATORY (INHALATION) at 23:26

## 2019-11-12 RX ADMIN — ISODIUM CHLORIDE 3 ML: 0.03 SOLUTION RESPIRATORY (INHALATION) at 23:26

## 2019-11-12 RX ADMIN — ALBUTEROL SULFATE 10 MG: 2.5 SOLUTION RESPIRATORY (INHALATION) at 23:26

## 2019-11-12 RX ADMIN — METHYLPREDNISOLONE SODIUM SUCCINATE 125 MG: 125 INJECTION, POWDER, FOR SOLUTION INTRAMUSCULAR; INTRAVENOUS at 23:25

## 2019-11-12 RX ADMIN — SODIUM CHLORIDE 1000 ML: 0.9 INJECTION, SOLUTION INTRAVENOUS at 23:25

## 2019-11-13 PROBLEM — J18.9 COMMUNITY ACQUIRED PNEUMONIA: Status: ACTIVE | Noted: 2019-11-13

## 2019-11-13 PROBLEM — A41.9 SEPSIS (HCC): Status: ACTIVE | Noted: 2019-11-13

## 2019-11-13 LAB
ANION GAP SERPL CALCULATED.3IONS-SCNC: 8 MMOL/L (ref 5–14)
BACTERIA SPT RESP CULT: ABNORMAL
BASOPHILS # BLD AUTO: 0 THOUSANDS/ΜL (ref 0–0.1)
BASOPHILS NFR BLD AUTO: 0 % (ref 0–1)
BUN SERPL-MCNC: 11 MG/DL (ref 5–25)
CALCIUM SERPL-MCNC: 8.9 MG/DL (ref 8.4–10.2)
CHLORIDE SERPL-SCNC: 106 MMOL/L (ref 97–108)
CO2 SERPL-SCNC: 27 MMOL/L (ref 22–30)
CREAT SERPL-MCNC: 0.56 MG/DL (ref 0.6–1.2)
EOSINOPHIL # BLD AUTO: 0 THOUSAND/ΜL (ref 0–0.4)
EOSINOPHIL NFR BLD AUTO: 0 % (ref 0–6)
ERYTHROCYTE [DISTWIDTH] IN BLOOD BY AUTOMATED COUNT: 15.7 %
GFR SERPL CREATININE-BSD FRML MDRD: 92 ML/MIN/1.73SQ M
GLUCOSE SERPL-MCNC: 166 MG/DL (ref 65–140)
GLUCOSE SERPL-MCNC: 192 MG/DL (ref 65–140)
GLUCOSE SERPL-MCNC: 197 MG/DL (ref 65–140)
GLUCOSE SERPL-MCNC: 203 MG/DL (ref 65–140)
GLUCOSE SERPL-MCNC: 206 MG/DL (ref 70–99)
GLUCOSE SERPL-MCNC: 317 MG/DL (ref 65–140)
GRAM STN SPEC: ABNORMAL
HCT VFR BLD AUTO: 38.5 % (ref 36–46)
HGB BLD-MCNC: 12 G/DL (ref 12–16)
L PNEUMO1 AG UR QL IA.RAPID: NEGATIVE
LACTATE SERPL-SCNC: 2 MMOL/L (ref 0.7–2)
LACTATE SERPL-SCNC: 2.2 MMOL/L (ref 0.7–2)
LACTATE SERPL-SCNC: 2.2 MMOL/L (ref 0.7–2)
LYMPHOCYTES # BLD AUTO: 0.7 THOUSANDS/ΜL (ref 0.5–4)
LYMPHOCYTES NFR BLD AUTO: 4 % (ref 25–45)
MAGNESIUM SERPL-MCNC: 1.8 MG/DL (ref 1.6–2.3)
MCH RBC QN AUTO: 26.3 PG (ref 26–34)
MCHC RBC AUTO-ENTMCNC: 31.2 G/DL (ref 31–36)
MCV RBC AUTO: 84 FL (ref 80–100)
MONOCYTES # BLD AUTO: 0.3 THOUSAND/ΜL (ref 0.2–0.9)
MONOCYTES NFR BLD AUTO: 1 % (ref 1–10)
NEUTROPHILS # BLD AUTO: 17.9 THOUSANDS/ΜL (ref 1.8–7.8)
NEUTS SEG NFR BLD AUTO: 95 % (ref 45–65)
PHOSPHATE SERPL-MCNC: 2.7 MG/DL (ref 2.5–4.8)
PLATELET # BLD AUTO: 294 THOUSANDS/UL (ref 150–450)
PMV BLD AUTO: 8.3 FL (ref 8.9–12.7)
POTASSIUM SERPL-SCNC: 4.2 MMOL/L (ref 3.6–5)
PROCALCITONIN SERPL-MCNC: <0.05 NG/ML
RBC # BLD AUTO: 4.56 MILLION/UL (ref 4–5.2)
S PNEUM AG UR QL: NEGATIVE
SODIUM SERPL-SCNC: 141 MMOL/L (ref 137–147)
WBC # BLD AUTO: 18.8 THOUSAND/UL (ref 4.5–11)

## 2019-11-13 PROCEDURE — 87449 NOS EACH ORGANISM AG IA: CPT | Performed by: NURSE PRACTITIONER

## 2019-11-13 PROCEDURE — 87040 BLOOD CULTURE FOR BACTERIA: CPT | Performed by: EMERGENCY MEDICINE

## 2019-11-13 PROCEDURE — 94664 DEMO&/EVAL PT USE INHALER: CPT

## 2019-11-13 PROCEDURE — 84100 ASSAY OF PHOSPHORUS: CPT | Performed by: NURSE PRACTITIONER

## 2019-11-13 PROCEDURE — 84145 PROCALCITONIN (PCT): CPT | Performed by: NURSE PRACTITIONER

## 2019-11-13 PROCEDURE — 94760 N-INVAS EAR/PLS OXIMETRY 1: CPT

## 2019-11-13 PROCEDURE — 83605 ASSAY OF LACTIC ACID: CPT | Performed by: NURSE PRACTITIONER

## 2019-11-13 PROCEDURE — 99223 1ST HOSP IP/OBS HIGH 75: CPT | Performed by: INTERNAL MEDICINE

## 2019-11-13 PROCEDURE — 83605 ASSAY OF LACTIC ACID: CPT | Performed by: EMERGENCY MEDICINE

## 2019-11-13 PROCEDURE — 87205 SMEAR GRAM STAIN: CPT | Performed by: INTERNAL MEDICINE

## 2019-11-13 PROCEDURE — 36415 COLL VENOUS BLD VENIPUNCTURE: CPT | Performed by: EMERGENCY MEDICINE

## 2019-11-13 PROCEDURE — 83735 ASSAY OF MAGNESIUM: CPT | Performed by: NURSE PRACTITIONER

## 2019-11-13 PROCEDURE — 82948 REAGENT STRIP/BLOOD GLUCOSE: CPT

## 2019-11-13 PROCEDURE — 87070 CULTURE OTHR SPECIMN AEROBIC: CPT | Performed by: INTERNAL MEDICINE

## 2019-11-13 PROCEDURE — 85025 COMPLETE CBC W/AUTO DIFF WBC: CPT | Performed by: NURSE PRACTITIONER

## 2019-11-13 PROCEDURE — 87205 SMEAR GRAM STAIN: CPT | Performed by: NURSE PRACTITIONER

## 2019-11-13 PROCEDURE — 80048 BASIC METABOLIC PNL TOTAL CA: CPT | Performed by: NURSE PRACTITIONER

## 2019-11-13 PROCEDURE — 94640 AIRWAY INHALATION TREATMENT: CPT

## 2019-11-13 RX ORDER — INSULIN GLARGINE 100 [IU]/ML
6 INJECTION, SOLUTION SUBCUTANEOUS
Status: DISCONTINUED | OUTPATIENT
Start: 2019-11-13 | End: 2019-11-15 | Stop reason: HOSPADM

## 2019-11-13 RX ORDER — CEFTRIAXONE 1 G/50ML
1000 INJECTION, SOLUTION INTRAVENOUS EVERY 24 HOURS
Status: DISCONTINUED | OUTPATIENT
Start: 2019-11-14 | End: 2019-11-15 | Stop reason: HOSPADM

## 2019-11-13 RX ORDER — CLOPIDOGREL BISULFATE 75 MG/1
75 TABLET ORAL DAILY
Status: DISCONTINUED | OUTPATIENT
Start: 2019-11-13 | End: 2019-11-15 | Stop reason: HOSPADM

## 2019-11-13 RX ORDER — AMLODIPINE BESYLATE 5 MG/1
5 TABLET ORAL DAILY
Status: DISCONTINUED | OUTPATIENT
Start: 2019-11-13 | End: 2019-11-15 | Stop reason: HOSPADM

## 2019-11-13 RX ORDER — PRAVASTATIN SODIUM 20 MG
20 TABLET ORAL
Status: DISCONTINUED | OUTPATIENT
Start: 2019-11-13 | End: 2019-11-15 | Stop reason: HOSPADM

## 2019-11-13 RX ORDER — LEVALBUTEROL 1.25 MG/.5ML
1.25 SOLUTION, CONCENTRATE RESPIRATORY (INHALATION)
Status: DISCONTINUED | OUTPATIENT
Start: 2019-11-13 | End: 2019-11-15 | Stop reason: HOSPADM

## 2019-11-13 RX ORDER — PANTOPRAZOLE SODIUM 20 MG/1
20 TABLET, DELAYED RELEASE ORAL
Status: DISCONTINUED | OUTPATIENT
Start: 2019-11-13 | End: 2019-11-15 | Stop reason: HOSPADM

## 2019-11-13 RX ORDER — ROSUVASTATIN CALCIUM 10 MG/1
10 TABLET, COATED ORAL DAILY
Status: DISCONTINUED | OUTPATIENT
Start: 2019-11-13 | End: 2019-11-13

## 2019-11-13 RX ORDER — AZITHROMYCIN 250 MG/1
500 TABLET, FILM COATED ORAL EVERY 24 HOURS
Status: DISCONTINUED | OUTPATIENT
Start: 2019-11-13 | End: 2019-11-15 | Stop reason: HOSPADM

## 2019-11-13 RX ORDER — ALPRAZOLAM 0.25 MG/1
0.25 TABLET ORAL
Status: DISCONTINUED | OUTPATIENT
Start: 2019-11-13 | End: 2019-11-15 | Stop reason: HOSPADM

## 2019-11-13 RX ORDER — BISOPROLOL FUMARATE 5 MG/1
10 TABLET ORAL EVERY 24 HOURS
Status: DISCONTINUED | OUTPATIENT
Start: 2019-11-13 | End: 2019-11-15 | Stop reason: HOSPADM

## 2019-11-13 RX ORDER — CHLORTHALIDONE 25 MG/1
12.5 TABLET ORAL DAILY
Status: DISCONTINUED | OUTPATIENT
Start: 2019-11-13 | End: 2019-11-15 | Stop reason: HOSPADM

## 2019-11-13 RX ORDER — LEVALBUTEROL 1.25 MG/.5ML
1.25 SOLUTION, CONCENTRATE RESPIRATORY (INHALATION) EVERY 8 HOURS PRN
Status: DISCONTINUED | OUTPATIENT
Start: 2019-11-13 | End: 2019-11-15 | Stop reason: HOSPADM

## 2019-11-13 RX ADMIN — LEVALBUTEROL 1.25 MG: 1.25 SOLUTION, CONCENTRATE RESPIRATORY (INHALATION) at 09:00

## 2019-11-13 RX ADMIN — PANTOPRAZOLE SODIUM 20 MG: 20 TABLET, DELAYED RELEASE ORAL at 05:01

## 2019-11-13 RX ADMIN — INSULIN LISPRO 3 UNITS: 100 INJECTION, SOLUTION INTRAVENOUS; SUBCUTANEOUS at 12:15

## 2019-11-13 RX ADMIN — INSULIN LISPRO 1 UNITS: 100 INJECTION, SOLUTION INTRAVENOUS; SUBCUTANEOUS at 09:23

## 2019-11-13 RX ADMIN — AZITHROMYCIN 500 MG: 250 TABLET, FILM COATED ORAL at 01:45

## 2019-11-13 RX ADMIN — CEFEPIME HYDROCHLORIDE 2000 MG: 2 INJECTION, SOLUTION INTRAVENOUS at 00:49

## 2019-11-13 RX ADMIN — IPRATROPIUM BROMIDE 0.5 MG: 0.5 SOLUTION RESPIRATORY (INHALATION) at 19:51

## 2019-11-13 RX ADMIN — INSULIN GLARGINE 6 UNITS: 100 INJECTION, SOLUTION SUBCUTANEOUS at 21:26

## 2019-11-13 RX ADMIN — IPRATROPIUM BROMIDE 0.5 MG: 0.5 SOLUTION RESPIRATORY (INHALATION) at 09:00

## 2019-11-13 RX ADMIN — INSULIN LISPRO 1 UNITS: 100 INJECTION, SOLUTION INTRAVENOUS; SUBCUTANEOUS at 21:27

## 2019-11-13 RX ADMIN — ENOXAPARIN SODIUM 30 MG: 30 INJECTION SUBCUTANEOUS at 09:18

## 2019-11-13 RX ADMIN — LEVALBUTEROL 1.25 MG: 1.25 SOLUTION, CONCENTRATE RESPIRATORY (INHALATION) at 19:51

## 2019-11-13 RX ADMIN — CLOPIDOGREL BISULFATE 75 MG: 75 TABLET ORAL at 09:17

## 2019-11-13 RX ADMIN — INSULIN LISPRO 1 UNITS: 100 INJECTION, SOLUTION INTRAVENOUS; SUBCUTANEOUS at 17:14

## 2019-11-13 RX ADMIN — CHLORTHALIDONE 12.5 MG: 25 TABLET ORAL at 09:17

## 2019-11-13 RX ADMIN — BISOPROLOL FUMARATE 10 MG: 5 TABLET ORAL at 01:45

## 2019-11-13 RX ADMIN — SODIUM CHLORIDE 1000 ML: 0.9 INJECTION, SOLUTION INTRAVENOUS at 00:46

## 2019-11-13 RX ADMIN — LEVALBUTEROL 1.25 MG: 1.25 SOLUTION, CONCENTRATE RESPIRATORY (INHALATION) at 14:08

## 2019-11-13 RX ADMIN — AMLODIPINE BESYLATE 5 MG: 5 TABLET ORAL at 09:17

## 2019-11-13 RX ADMIN — ALPRAZOLAM 0.25 MG: 0.25 TABLET ORAL at 22:51

## 2019-11-13 RX ADMIN — PRAVASTATIN SODIUM 20 MG: 20 TABLET ORAL at 17:14

## 2019-11-13 RX ADMIN — IPRATROPIUM BROMIDE 0.5 MG: 0.5 SOLUTION RESPIRATORY (INHALATION) at 14:07

## 2019-11-14 LAB
ATRIAL RATE: 103 BPM
BASOPHILS # BLD AUTO: 0.1 THOUSANDS/ΜL (ref 0–0.1)
BASOPHILS NFR BLD AUTO: 0 % (ref 0–1)
EOSINOPHIL # BLD AUTO: 0.3 THOUSAND/ΜL (ref 0–0.4)
EOSINOPHIL NFR BLD AUTO: 2 % (ref 0–6)
ERYTHROCYTE [DISTWIDTH] IN BLOOD BY AUTOMATED COUNT: 15.9 %
GLUCOSE SERPL-MCNC: 104 MG/DL (ref 65–140)
GLUCOSE SERPL-MCNC: 112 MG/DL (ref 65–140)
GLUCOSE SERPL-MCNC: 137 MG/DL (ref 65–140)
GLUCOSE SERPL-MCNC: 88 MG/DL (ref 65–140)
HCT VFR BLD AUTO: 35.2 % (ref 36–46)
HGB BLD-MCNC: 11.3 G/DL (ref 12–16)
LYMPHOCYTES # BLD AUTO: 1.8 THOUSANDS/ΜL (ref 0.5–4)
LYMPHOCYTES NFR BLD AUTO: 12 % (ref 25–45)
MCH RBC QN AUTO: 27 PG (ref 26–34)
MCHC RBC AUTO-ENTMCNC: 32.1 G/DL (ref 31–36)
MCV RBC AUTO: 84 FL (ref 80–100)
MONOCYTES # BLD AUTO: 0.9 THOUSAND/ΜL (ref 0.2–0.9)
MONOCYTES NFR BLD AUTO: 6 % (ref 1–10)
NEUTROPHILS # BLD AUTO: 11.5 THOUSANDS/ΜL (ref 1.8–7.8)
NEUTS SEG NFR BLD AUTO: 79 % (ref 45–65)
P AXIS: 64 DEGREES
PLATELET # BLD AUTO: 305 THOUSANDS/UL (ref 150–450)
PMV BLD AUTO: 9.1 FL (ref 8.9–12.7)
PR INTERVAL: 154 MS
QRS AXIS: 45 DEGREES
QRSD INTERVAL: 92 MS
QT INTERVAL: 328 MS
QTC INTERVAL: 429 MS
RBC # BLD AUTO: 4.2 MILLION/UL (ref 4–5.2)
T WAVE AXIS: -15 DEGREES
VENTRICULAR RATE: 103 BPM
WBC # BLD AUTO: 14.5 THOUSAND/UL (ref 4.5–11)

## 2019-11-14 PROCEDURE — 99232 SBSQ HOSP IP/OBS MODERATE 35: CPT | Performed by: INTERNAL MEDICINE

## 2019-11-14 PROCEDURE — 85025 COMPLETE CBC W/AUTO DIFF WBC: CPT | Performed by: INTERNAL MEDICINE

## 2019-11-14 PROCEDURE — 93010 ELECTROCARDIOGRAM REPORT: CPT | Performed by: INTERNAL MEDICINE

## 2019-11-14 PROCEDURE — 82948 REAGENT STRIP/BLOOD GLUCOSE: CPT

## 2019-11-14 PROCEDURE — 94640 AIRWAY INHALATION TREATMENT: CPT

## 2019-11-14 PROCEDURE — 94760 N-INVAS EAR/PLS OXIMETRY 1: CPT

## 2019-11-14 RX ADMIN — PRAVASTATIN SODIUM 20 MG: 20 TABLET ORAL at 17:14

## 2019-11-14 RX ADMIN — PANTOPRAZOLE SODIUM 20 MG: 20 TABLET, DELAYED RELEASE ORAL at 05:08

## 2019-11-14 RX ADMIN — LEVALBUTEROL 1.25 MG: 1.25 SOLUTION, CONCENTRATE RESPIRATORY (INHALATION) at 14:19

## 2019-11-14 RX ADMIN — LEVALBUTEROL 1.25 MG: 1.25 SOLUTION, CONCENTRATE RESPIRATORY (INHALATION) at 08:03

## 2019-11-14 RX ADMIN — AZITHROMYCIN 500 MG: 250 TABLET, FILM COATED ORAL at 20:07

## 2019-11-14 RX ADMIN — IPRATROPIUM BROMIDE 0.5 MG: 0.5 SOLUTION RESPIRATORY (INHALATION) at 19:49

## 2019-11-14 RX ADMIN — CLOPIDOGREL BISULFATE 75 MG: 75 TABLET ORAL at 09:07

## 2019-11-14 RX ADMIN — CHLORTHALIDONE 12.5 MG: 25 TABLET ORAL at 09:08

## 2019-11-14 RX ADMIN — AZITHROMYCIN 500 MG: 250 TABLET, FILM COATED ORAL at 00:32

## 2019-11-14 RX ADMIN — IPRATROPIUM BROMIDE 0.5 MG: 0.5 SOLUTION RESPIRATORY (INHALATION) at 14:19

## 2019-11-14 RX ADMIN — IPRATROPIUM BROMIDE 0.5 MG: 0.5 SOLUTION RESPIRATORY (INHALATION) at 08:03

## 2019-11-14 RX ADMIN — ALPRAZOLAM 0.25 MG: 0.25 TABLET ORAL at 21:14

## 2019-11-14 RX ADMIN — ENOXAPARIN SODIUM 30 MG: 30 INJECTION SUBCUTANEOUS at 09:07

## 2019-11-14 RX ADMIN — BISOPROLOL FUMARATE 10 MG: 5 TABLET ORAL at 00:32

## 2019-11-14 RX ADMIN — AMLODIPINE BESYLATE 5 MG: 5 TABLET ORAL at 09:07

## 2019-11-14 RX ADMIN — BISOPROLOL FUMARATE 10 MG: 5 TABLET ORAL at 20:07

## 2019-11-14 RX ADMIN — LEVALBUTEROL 1.25 MG: 1.25 SOLUTION, CONCENTRATE RESPIRATORY (INHALATION) at 19:49

## 2019-11-14 RX ADMIN — CEFTRIAXONE 1000 MG: 1 INJECTION, SOLUTION INTRAVENOUS at 11:20

## 2019-11-15 VITALS
HEART RATE: 56 BPM | DIASTOLIC BLOOD PRESSURE: 76 MMHG | OXYGEN SATURATION: 95 % | TEMPERATURE: 97.5 F | RESPIRATION RATE: 20 BRPM | SYSTOLIC BLOOD PRESSURE: 143 MMHG | BODY MASS INDEX: 25.97 KG/M2 | WEIGHT: 137.57 LBS | HEIGHT: 61 IN

## 2019-11-15 LAB
ANION GAP SERPL CALCULATED.3IONS-SCNC: 6 MMOL/L (ref 5–14)
BACTERIA SPT RESP CULT: ABNORMAL
BASOPHILS # BLD AUTO: 0 THOUSANDS/ΜL (ref 0–0.1)
BASOPHILS NFR BLD AUTO: 0 % (ref 0–1)
BUN SERPL-MCNC: 18 MG/DL (ref 5–25)
CALCIUM SERPL-MCNC: 9.4 MG/DL (ref 8.4–10.2)
CHLORIDE SERPL-SCNC: 96 MMOL/L (ref 97–108)
CO2 SERPL-SCNC: 34 MMOL/L (ref 22–30)
CREAT SERPL-MCNC: 0.69 MG/DL (ref 0.6–1.2)
EOSINOPHIL # BLD AUTO: 0.5 THOUSAND/ΜL (ref 0–0.4)
EOSINOPHIL NFR BLD AUTO: 5 % (ref 0–6)
ERYTHROCYTE [DISTWIDTH] IN BLOOD BY AUTOMATED COUNT: 15.5 %
GFR SERPL CREATININE-BSD FRML MDRD: 86 ML/MIN/1.73SQ M
GLUCOSE SERPL-MCNC: 105 MG/DL (ref 70–99)
GLUCOSE SERPL-MCNC: 116 MG/DL (ref 65–140)
GLUCOSE SERPL-MCNC: 98 MG/DL (ref 65–140)
GRAM STN SPEC: ABNORMAL
GRAM STN SPEC: ABNORMAL
HCT VFR BLD AUTO: 37.4 % (ref 36–46)
HGB BLD-MCNC: 12.2 G/DL (ref 12–16)
LYMPHOCYTES # BLD AUTO: 3.2 THOUSANDS/ΜL (ref 0.5–4)
LYMPHOCYTES NFR BLD AUTO: 33 % (ref 25–45)
MCH RBC QN AUTO: 26.9 PG (ref 26–34)
MCHC RBC AUTO-ENTMCNC: 32.7 G/DL (ref 31–36)
MCV RBC AUTO: 82 FL (ref 80–100)
MONOCYTES # BLD AUTO: 0.8 THOUSAND/ΜL (ref 0.2–0.9)
MONOCYTES NFR BLD AUTO: 8 % (ref 1–10)
NEUTROPHILS # BLD AUTO: 5.2 THOUSANDS/ΜL (ref 1.8–7.8)
NEUTS SEG NFR BLD AUTO: 54 % (ref 45–65)
PLATELET # BLD AUTO: 284 THOUSANDS/UL (ref 150–450)
PMV BLD AUTO: 8.5 FL (ref 8.9–12.7)
POTASSIUM SERPL-SCNC: 3.9 MMOL/L (ref 3.6–5)
RBC # BLD AUTO: 4.55 MILLION/UL (ref 4–5.2)
SODIUM SERPL-SCNC: 136 MMOL/L (ref 137–147)
WBC # BLD AUTO: 9.7 THOUSAND/UL (ref 4.5–11)

## 2019-11-15 PROCEDURE — 94760 N-INVAS EAR/PLS OXIMETRY 1: CPT

## 2019-11-15 PROCEDURE — 80048 BASIC METABOLIC PNL TOTAL CA: CPT | Performed by: INTERNAL MEDICINE

## 2019-11-15 PROCEDURE — 85025 COMPLETE CBC W/AUTO DIFF WBC: CPT | Performed by: INTERNAL MEDICINE

## 2019-11-15 PROCEDURE — 99239 HOSP IP/OBS DSCHRG MGMT >30: CPT | Performed by: FAMILY MEDICINE

## 2019-11-15 PROCEDURE — 82948 REAGENT STRIP/BLOOD GLUCOSE: CPT

## 2019-11-15 PROCEDURE — 94640 AIRWAY INHALATION TREATMENT: CPT

## 2019-11-15 RX ORDER — AZITHROMYCIN 250 MG/1
250 TABLET, FILM COATED ORAL EVERY 24 HOURS
Qty: 4 TABLET | Refills: 0 | Status: SHIPPED | OUTPATIENT
Start: 2019-11-15 | End: 2019-11-19

## 2019-11-15 RX ORDER — ALBUTEROL SULFATE 1.25 MG/3ML
1 SOLUTION RESPIRATORY (INHALATION) EVERY 6 HOURS PRN
Qty: 75 ML | Refills: 0 | Status: SHIPPED | OUTPATIENT
Start: 2019-11-15 | End: 2019-12-15

## 2019-11-15 RX ORDER — SODIUM CHLORIDE FOR INHALATION 0.9 %
VIAL, NEBULIZER (ML) INHALATION
Status: COMPLETED
Start: 2019-11-15 | End: 2019-11-15

## 2019-11-15 RX ORDER — ALBUTEROL SULFATE 90 UG/1
2 AEROSOL, METERED RESPIRATORY (INHALATION) EVERY 4 HOURS PRN
Qty: 3 INHALER | Refills: 0 | Status: SHIPPED | OUTPATIENT
Start: 2019-11-15 | End: 2019-12-15

## 2019-11-15 RX ORDER — GLIPIZIDE 5 MG/1
5 TABLET, FILM COATED, EXTENDED RELEASE ORAL DAILY
Qty: 30 TABLET | Refills: 0 | Status: SHIPPED | OUTPATIENT
Start: 2019-11-15 | End: 2019-12-24 | Stop reason: SDUPTHER

## 2019-11-15 RX ADMIN — LEVALBUTEROL 1.25 MG: 1.25 SOLUTION, CONCENTRATE RESPIRATORY (INHALATION) at 08:00

## 2019-11-15 RX ADMIN — CHLORTHALIDONE 12.5 MG: 25 TABLET ORAL at 08:12

## 2019-11-15 RX ADMIN — AMLODIPINE BESYLATE 5 MG: 5 TABLET ORAL at 08:12

## 2019-11-15 RX ADMIN — ENOXAPARIN SODIUM 30 MG: 30 INJECTION SUBCUTANEOUS at 08:12

## 2019-11-15 RX ADMIN — ISODIUM CHLORIDE 3 ML: 0.03 SOLUTION RESPIRATORY (INHALATION) at 03:02

## 2019-11-15 RX ADMIN — LEVALBUTEROL 1.25 MG: 1.25 SOLUTION, CONCENTRATE RESPIRATORY (INHALATION) at 03:02

## 2019-11-15 RX ADMIN — PANTOPRAZOLE SODIUM 20 MG: 20 TABLET, DELAYED RELEASE ORAL at 05:05

## 2019-11-15 RX ADMIN — CLOPIDOGREL BISULFATE 75 MG: 75 TABLET ORAL at 08:11

## 2019-11-15 RX ADMIN — IPRATROPIUM BROMIDE 0.5 MG: 0.5 SOLUTION RESPIRATORY (INHALATION) at 08:00

## 2019-11-18 ENCOUNTER — TRANSITIONAL CARE MANAGEMENT (OUTPATIENT)
Dept: FAMILY MEDICINE CLINIC | Facility: CLINIC | Age: 74
End: 2019-11-18

## 2019-11-18 LAB
BACTERIA BLD CULT: NORMAL
BACTERIA BLD CULT: NORMAL

## 2019-11-19 DIAGNOSIS — Z71.89 COMPLEX CARE COORDINATION: Primary | ICD-10-CM

## 2019-11-20 ENCOUNTER — PATIENT OUTREACH (OUTPATIENT)
Dept: FAMILY MEDICINE CLINIC | Facility: CLINIC | Age: 74
End: 2019-11-20

## 2019-11-20 NOTE — PROGRESS NOTES
First outreach attempt  Using a  ID # [id number] I left a message on patient's voicemail with my contact information

## 2019-11-21 ENCOUNTER — PATIENT OUTREACH (OUTPATIENT)
Dept: FAMILY MEDICINE CLINIC | Facility: CLINIC | Age: 74
End: 2019-11-21

## 2019-11-21 NOTE — PROGRESS NOTES
Second outreach attempt  Using a  ID # [id number] I introduced myself and explained care management  She gave consent  She is feeling "a bit better" since discharge  Her only issue is a "sore throat"  I reminded patient she has her [address] appointment with her PCP  She plans to attend  She has all of her medications and is taking them as directed  She does test her blood sugars at home and today it was 126  Assessment was started and I explained to patient I would call again after her appointment

## 2019-11-22 ENCOUNTER — OFFICE VISIT (OUTPATIENT)
Dept: FAMILY MEDICINE CLINIC | Facility: CLINIC | Age: 74
End: 2019-11-22
Payer: COMMERCIAL

## 2019-11-22 VITALS
HEIGHT: 61 IN | RESPIRATION RATE: 16 BRPM | OXYGEN SATURATION: 96 % | HEART RATE: 66 BPM | TEMPERATURE: 97.9 F | SYSTOLIC BLOOD PRESSURE: 138 MMHG | DIASTOLIC BLOOD PRESSURE: 82 MMHG | WEIGHT: 136.9 LBS | BODY MASS INDEX: 25.85 KG/M2

## 2019-11-22 DIAGNOSIS — I10 ESSENTIAL HYPERTENSION: ICD-10-CM

## 2019-11-22 DIAGNOSIS — J18.9 PNEUMONIA DUE TO INFECTIOUS ORGANISM, UNSPECIFIED LATERALITY, UNSPECIFIED PART OF LUNG: Primary | ICD-10-CM

## 2019-11-22 DIAGNOSIS — E11.69 HYPERLIPIDEMIA ASSOCIATED WITH TYPE 2 DIABETES MELLITUS (HCC): ICD-10-CM

## 2019-11-22 DIAGNOSIS — A41.9 SEPSIS, DUE TO UNSPECIFIED ORGANISM, UNSPECIFIED WHETHER ACUTE ORGAN DYSFUNCTION PRESENT (HCC): ICD-10-CM

## 2019-11-22 DIAGNOSIS — IMO0002 TYPE II DIABETES MELLITUS WITH MANIFESTATIONS, UNCONTROLLED: ICD-10-CM

## 2019-11-22 DIAGNOSIS — E78.5 HYPERLIPIDEMIA ASSOCIATED WITH TYPE 2 DIABETES MELLITUS (HCC): ICD-10-CM

## 2019-11-22 PROCEDURE — 1160F RVW MEDS BY RX/DR IN RCRD: CPT | Performed by: INTERNAL MEDICINE

## 2019-11-22 PROCEDURE — 1111F DSCHRG MED/CURRENT MED MERGE: CPT | Performed by: INTERNAL MEDICINE

## 2019-11-22 PROCEDURE — 99496 TRANSJ CARE MGMT HIGH F2F 7D: CPT | Performed by: INTERNAL MEDICINE

## 2019-11-22 RX ORDER — AMLODIPINE BESYLATE 5 MG/1
5 TABLET ORAL 2 TIMES DAILY
Qty: 180 TABLET | Refills: 3 | Status: SHIPPED | OUTPATIENT
Start: 2019-11-22 | End: 2020-09-28 | Stop reason: SDUPTHER

## 2019-11-22 NOTE — PROGRESS NOTES
Assessment/Plan:         Diagnoses and all orders for this visit:    Pneumonia due to infectious organism, unspecified laterality, unspecified part of lung : Improved Nicely  Finish up meds  RTC in 1mo w Blood work    Essential hypertension; Not well Controlled  Increase :  -     amLODIPine (NORVASC) 5 mg tablet; Take 1 tablet (5 mg total) by mouth  TO 2 (two) times a day  RTC in 1mo w Blood work  -     T4, free; Future  -     TSH, 3rd generation; Future  -     UA (URINE) with reflex to Scope    Type II diabetes mellitus with manifestations, uncontrolled ([facility] ); Improving Nicely  Continue same  RTC in 1mo w :  -     Comprehensive metabolic panel; Future  -     Hemoglobin A1C; Future  -     Magnesium; Future  -     CBC and differential; Future  -     T4, free; Future  -     TSH, 3rd generation; Future  -     UA (URINE) with reflex to Scope    Hyperlipidemia associated with type 2 diabetes mellitus ([facility] ): stable  Continue same  RTC in 1mo w :  -     Lipid panel; Future    Sepsis, due to unspecified organism, unspecified whether acute organ dysfunction present [facility]) : due to sepsis  Finish up meds  Improved Nicely  RTC in 1mo w Blood work        Subjective:      Patient ID: Nitish Jaquez is a 76 y o  female  [address] is here for [facility] Visit, she feels better, D/C summary and Med List reviewed w pt in Detail         The following portions of the patient's history were reviewed and updated as appropriate: allergies, current medications, past family history, past social history, past surgical history and problem list     Review of Systems   Constitutional: Negative for chills, fatigue and fever  HENT: Negative for congestion, facial swelling, sore throat, trouble swallowing and voice change  Eyes: Negative for pain, discharge and visual disturbance  Respiratory: Negative for cough, shortness of breath and wheezing  Cardiovascular: Negative for chest pain, palpitations and leg swelling  Gastrointestinal: Negative for abdominal pain, blood in stool, constipation, diarrhea and nausea  Endocrine: Negative for polydipsia, polyphagia and polyuria  Genitourinary: Negative for difficulty urinating, hematuria and urgency  Musculoskeletal: Negative for arthralgias and myalgias  Skin: Negative for rash  Neurological: Negative for dizziness, tremors, weakness and headaches  Hematological: Negative for adenopathy  Does not bruise/bleed easily  Psychiatric/Behavioral: Negative for dysphoric mood, sleep disturbance and suicidal ideas  Objective:      /82 (BP Location: Left arm, Patient Position: Sitting, Cuff Size: Standard)   Pulse 66   Temp 97 9 °F (36 6 °C) (Probe)   Resp 16   Ht 5' 1" (1 549 m)   Wt 62 1 kg (136 lb 14 4 oz)   SpO2 96%   BMI 25 87 kg/m²          Physical Exam   Constitutional: She is oriented to person, place, and time  She appears well-nourished  No distress  HENT:   Head: Normocephalic  Mouth/Throat: Oropharynx is clear and moist  No oropharyngeal exudate  Eyes: Pupils are equal, round, and reactive to light  Conjunctivae are normal  No scleral icterus  Neck: Neck supple  No thyromegaly present  Cardiovascular: Normal rate and regular rhythm  Murmur heard  Pulmonary/Chest: Effort normal and breath sounds normal  No respiratory distress  She has no wheezes  She has no rales  Abdominal: Soft  Bowel sounds are normal  She exhibits no distension  There is no tenderness  There is no rebound and no guarding  Musculoskeletal: She exhibits no edema or tenderness  Lymphadenopathy:     She has no cervical adenopathy  Neurological: She is alert and oriented to person, place, and time  No cranial nerve deficit  Pt uses a Cane to support Gait   Skin: No rash noted  No erythema  Psychiatric: She has a normal mood and affect

## 2019-11-27 ENCOUNTER — PATIENT OUTREACH (OUTPATIENT)
Dept: FAMILY MEDICINE CLINIC | Facility: CLINIC | Age: 74
End: 2019-11-27

## 2019-11-27 NOTE — PROGRESS NOTES
Outreach attempted  Using a  ID # [id number] I called patient and left a message on her voicemail with my contact information

## 2019-12-03 DIAGNOSIS — I10 BENIGN HYPERTENSION: ICD-10-CM

## 2019-12-03 DIAGNOSIS — K21.9 GASTROESOPHAGEAL REFLUX DISEASE WITHOUT ESOPHAGITIS: ICD-10-CM

## 2019-12-03 RX ORDER — OMEPRAZOLE 20 MG/1
CAPSULE, DELAYED RELEASE ORAL
Qty: 180 CAPSULE | Refills: 1 | Status: SHIPPED | OUTPATIENT
Start: 2019-12-03 | End: 2020-07-07

## 2019-12-03 RX ORDER — BISOPROLOL FUMARATE 10 MG/1
TABLET ORAL
Qty: 90 TABLET | Refills: 1 | Status: SHIPPED | OUTPATIENT
Start: 2019-12-03 | End: 2020-07-07

## 2019-12-04 ENCOUNTER — PATIENT OUTREACH (OUTPATIENT)
Dept: FAMILY MEDICINE CLINIC | Facility: CLINIC | Age: 74
End: 2019-12-04

## 2019-12-04 NOTE — PROGRESS NOTES
Using a  ID # [id number] I called patient and left a message with my contact information on her voicemail

## 2019-12-11 ENCOUNTER — PATIENT OUTREACH (OUTPATIENT)
Dept: FAMILY MEDICINE CLINIC | Facility: CLINIC | Age: 74
End: 2019-12-11

## 2019-12-11 NOTE — PROGRESS NOTES
Using a  ID #[id number] I  Called and a man answered and stated the patient was "busy" and I should call back  I asked him to tell the patient it was Sherryle Lais, outpatient care manager from [place] [address] and gave him my phone number

## 2019-12-11 NOTE — PROGRESS NOTES
Patient returned my call  She feels "good"  She denies fever or cough  She has all of her medications and is taking them as directed  She has my contact information and will call with any issues/concerns

## 2019-12-24 DIAGNOSIS — E11.65 TYPE 2 DIABETES MELLITUS WITH HYPERGLYCEMIA, WITHOUT LONG-TERM CURRENT USE OF INSULIN (HCC): ICD-10-CM

## 2019-12-24 DIAGNOSIS — I25.83 CORONARY ARTERY DISEASE DUE TO LIPID RICH PLAQUE: ICD-10-CM

## 2019-12-24 DIAGNOSIS — I25.10 CORONARY ARTERY DISEASE DUE TO LIPID RICH PLAQUE: ICD-10-CM

## 2019-12-24 RX ORDER — CLOPIDOGREL BISULFATE 75 MG
TABLET ORAL
Qty: 90 TABLET | Refills: 0 | Status: SHIPPED | OUTPATIENT
Start: 2019-12-24 | End: 2020-03-02 | Stop reason: SDUPTHER

## 2019-12-24 RX ORDER — GLIPIZIDE 5 MG/1
5 TABLET, FILM COATED, EXTENDED RELEASE ORAL DAILY
Qty: 30 TABLET | Refills: 2 | Status: SHIPPED | OUTPATIENT
Start: 2019-12-24 | End: 2020-03-13

## 2019-12-26 DIAGNOSIS — E11.65 TYPE 2 DIABETES MELLITUS WITH HYPERGLYCEMIA, WITHOUT LONG-TERM CURRENT USE OF INSULIN (HCC): ICD-10-CM

## 2019-12-26 RX ORDER — GLIPIZIDE 5 MG/1
5 TABLET, FILM COATED, EXTENDED RELEASE ORAL DAILY
Qty: 30 TABLET | Refills: 0 | Status: SHIPPED | OUTPATIENT
Start: 2019-12-26 | End: 2020-09-28

## 2019-12-27 ENCOUNTER — PATIENT OUTREACH (OUTPATIENT)
Dept: FAMILY MEDICINE CLINIC | Facility: CLINIC | Age: 74
End: 2019-12-27

## 2019-12-27 NOTE — PROGRESS NOTES
Outreach attempt  Using a  ID# [id number] I left a message on patient's voicemail with my contact information

## 2020-01-03 ENCOUNTER — PATIENT OUTREACH (OUTPATIENT)
Dept: FAMILY MEDICINE CLINIC | Facility: CLINIC | Age: 75
End: 2020-01-03

## 2020-01-03 NOTE — PROGRESS NOTES
Outreach attempt  Using a  ID # [id number] I called and left a message on patient's voicemail with my contact information

## 2020-01-10 ENCOUNTER — PATIENT OUTREACH (OUTPATIENT)
Dept: FAMILY MEDICINE CLINIC | Facility: CLINIC | Age: 75
End: 2020-01-10

## 2020-01-10 NOTE — PROGRESS NOTES
Called patient using a  ID [id number]  She feels much better  She did receive bot the Influenza and Pneumovax at her PCP office  She has all of her medications and takes them as directed  She has no issues or concerns at present  She has my contact information and will call if she needs assistance

## 2020-01-23 DIAGNOSIS — F41.9 ANXIETY: ICD-10-CM

## 2020-01-23 RX ORDER — ALPRAZOLAM 0.25 MG/1
0.25 TABLET ORAL DAILY PRN
Qty: 30 TABLET | Refills: 1 | Status: SHIPPED | OUTPATIENT
Start: 2020-01-23 | End: 2020-04-10 | Stop reason: SDUPTHER

## 2020-01-24 ENCOUNTER — PATIENT OUTREACH (OUTPATIENT)
Dept: FAMILY MEDICINE CLINIC | Facility: CLINIC | Age: 75
End: 2020-01-24

## 2020-01-24 NOTE — PROGRESS NOTES
Outreach using a [facility]  ID# [id number]  Patient is doing well  She takes her medications as directed  She has not been seen in the ED since 11/12/19  She keeps her appointments and has next PCP office visit scheduled for 3/13  Her blood sugar was 130 today  All goals achieved  Patient advised to call if she has any questions/concerns

## 2020-02-19 DIAGNOSIS — J44.9 CHRONIC OBSTRUCTIVE PULMONARY DISEASE, UNSPECIFIED COPD TYPE (HCC): Primary | ICD-10-CM

## 2020-02-19 RX ORDER — ALBUTEROL SULFATE 90 UG/1
2 AEROSOL, METERED RESPIRATORY (INHALATION) EVERY 6 HOURS PRN
Qty: 1 INHALER | Refills: 5 | Status: SHIPPED | OUTPATIENT
Start: 2020-02-19 | End: 2020-08-24 | Stop reason: SDUPTHER

## 2020-03-02 DIAGNOSIS — I25.83 CORONARY ARTERY DISEASE DUE TO LIPID RICH PLAQUE: ICD-10-CM

## 2020-03-02 DIAGNOSIS — I25.10 CORONARY ARTERY DISEASE DUE TO LIPID RICH PLAQUE: ICD-10-CM

## 2020-03-02 RX ORDER — CLOPIDOGREL BISULFATE 75 MG
75 TABLET ORAL DAILY
Qty: 90 TABLET | Refills: 0 | Status: SHIPPED | OUTPATIENT
Start: 2020-03-02 | End: 2020-06-01 | Stop reason: SDUPTHER

## 2020-03-13 ENCOUNTER — OFFICE VISIT (OUTPATIENT)
Dept: FAMILY MEDICINE CLINIC | Facility: CLINIC | Age: 75
End: 2020-03-13
Payer: COMMERCIAL

## 2020-03-13 VITALS
WEIGHT: 132.6 LBS | SYSTOLIC BLOOD PRESSURE: 140 MMHG | TEMPERATURE: 97.9 F | RESPIRATION RATE: 16 BRPM | OXYGEN SATURATION: 98 % | HEART RATE: 72 BPM | DIASTOLIC BLOOD PRESSURE: 78 MMHG | BODY MASS INDEX: 25.04 KG/M2 | HEIGHT: 61 IN

## 2020-03-13 DIAGNOSIS — J45.909 MODERATE ASTHMA, UNSPECIFIED WHETHER COMPLICATED, UNSPECIFIED WHETHER PERSISTENT: ICD-10-CM

## 2020-03-13 DIAGNOSIS — E78.5 HYPERLIPIDEMIA ASSOCIATED WITH TYPE 2 DIABETES MELLITUS (HCC): ICD-10-CM

## 2020-03-13 DIAGNOSIS — Z01.00 DIABETIC EYE EXAM (HCC): ICD-10-CM

## 2020-03-13 DIAGNOSIS — E11.69 HYPERLIPIDEMIA ASSOCIATED WITH TYPE 2 DIABETES MELLITUS (HCC): ICD-10-CM

## 2020-03-13 DIAGNOSIS — Z00.01 ENCOUNTER FOR GENERAL ADULT MEDICAL EXAMINATION WITH ABNORMAL FINDINGS: Primary | ICD-10-CM

## 2020-03-13 DIAGNOSIS — J44.1 ACUTE EXACERBATION OF COPD WITH ASTHMA (HCC): ICD-10-CM

## 2020-03-13 DIAGNOSIS — E11.9 DIABETIC EYE EXAM (HCC): ICD-10-CM

## 2020-03-13 DIAGNOSIS — J18.9 COMMUNITY ACQUIRED PNEUMONIA, UNSPECIFIED LATERALITY: ICD-10-CM

## 2020-03-13 DIAGNOSIS — IMO0002 TYPE II DIABETES MELLITUS WITH MANIFESTATIONS, UNCONTROLLED: ICD-10-CM

## 2020-03-13 DIAGNOSIS — E11.65 TYPE 2 DIABETES MELLITUS WITH HYPERGLYCEMIA, WITHOUT LONG-TERM CURRENT USE OF INSULIN (HCC): ICD-10-CM

## 2020-03-13 DIAGNOSIS — J45.901 ACUTE EXACERBATION OF COPD WITH ASTHMA (HCC): ICD-10-CM

## 2020-03-13 DIAGNOSIS — Z12.31 SCREENING MAMMOGRAM FOR HIGH-RISK PATIENT: ICD-10-CM

## 2020-03-13 LAB
NON VENT ROOM AIR: 150 %
NON VENT ROOM AIR: 225 %
SL AMB POCT HEMOGLOBIN AIC: 6.5 (ref ?–6.5)

## 2020-03-13 PROCEDURE — 3077F SYST BP >= 140 MM HG: CPT | Performed by: INTERNAL MEDICINE

## 2020-03-13 PROCEDURE — 3044F HG A1C LEVEL LT 7.0%: CPT | Performed by: INTERNAL MEDICINE

## 2020-03-13 PROCEDURE — 1036F TOBACCO NON-USER: CPT | Performed by: INTERNAL MEDICINE

## 2020-03-13 PROCEDURE — 1101F PT FALLS ASSESS-DOCD LE1/YR: CPT | Performed by: INTERNAL MEDICINE

## 2020-03-13 PROCEDURE — 4040F PNEUMOC VAC/ADMIN/RCVD: CPT | Performed by: INTERNAL MEDICINE

## 2020-03-13 PROCEDURE — 99214 OFFICE O/P EST MOD 30 MIN: CPT | Performed by: INTERNAL MEDICINE

## 2020-03-13 PROCEDURE — 1125F AMNT PAIN NOTED PAIN PRSNT: CPT | Performed by: INTERNAL MEDICINE

## 2020-03-13 PROCEDURE — 3288F FALL RISK ASSESSMENT DOCD: CPT | Performed by: INTERNAL MEDICINE

## 2020-03-13 PROCEDURE — 83036 HEMOGLOBIN GLYCOSYLATED A1C: CPT | Performed by: INTERNAL MEDICINE

## 2020-03-13 PROCEDURE — G0439 PPPS, SUBSEQ VISIT: HCPCS | Performed by: INTERNAL MEDICINE

## 2020-03-13 PROCEDURE — 3078F DIAST BP <80 MM HG: CPT | Performed by: INTERNAL MEDICINE

## 2020-03-13 PROCEDURE — 1170F FXNL STATUS ASSESSED: CPT | Performed by: INTERNAL MEDICINE

## 2020-03-13 PROCEDURE — 3008F BODY MASS INDEX DOCD: CPT | Performed by: INTERNAL MEDICINE

## 2020-03-13 PROCEDURE — 1160F RVW MEDS BY RX/DR IN RCRD: CPT | Performed by: INTERNAL MEDICINE

## 2020-03-13 RX ORDER — ALBUTEROL SULFATE 1.25 MG/3ML
SOLUTION RESPIRATORY (INHALATION)
COMMUNITY
Start: 2020-02-25 | End: 2020-04-22

## 2020-03-13 RX ORDER — ALBUTEROL SULFATE 2.5 MG/3ML
2.5 SOLUTION RESPIRATORY (INHALATION) ONCE
Status: COMPLETED | OUTPATIENT
Start: 2020-03-13 | End: 2020-03-13

## 2020-03-13 RX ORDER — AMOXICILLIN AND CLAVULANATE POTASSIUM 875; 125 MG/1; MG/1
1 TABLET, FILM COATED ORAL EVERY 12 HOURS SCHEDULED
Qty: 14 TABLET | Refills: 0 | Status: SHIPPED | OUTPATIENT
Start: 2020-03-13 | End: 2020-03-20

## 2020-03-13 RX ORDER — BENZONATATE 100 MG/1
100 CAPSULE ORAL 3 TIMES DAILY PRN
Qty: 30 CAPSULE | Refills: 0 | Status: SHIPPED | OUTPATIENT
Start: 2020-03-13 | End: 2020-06-23

## 2020-03-13 RX ORDER — PREDNISONE 10 MG/1
TABLET ORAL
Qty: 20 TABLET | Refills: 0 | Status: SHIPPED | OUTPATIENT
Start: 2020-03-13 | End: 2020-06-23

## 2020-03-13 RX ADMIN — ALBUTEROL SULFATE 2.5 MG: 2.5 SOLUTION RESPIRATORY (INHALATION) at 12:14

## 2020-03-13 RX ADMIN — Medication 0.5 MG: at 12:14

## 2020-03-13 NOTE — PROGRESS NOTES
Assessment and Plan:     Problem List Items Addressed This Visit        Endocrine    Type 2 diabetes mellitus with hyperglycemia, without long-term current use of insulin ([address] ) - Primary      Other Visit Diagnoses     Screening mammogram for high-risk patient        Diabetic eye exam [facility])               Preventive health issues were discussed with patient, and age appropriate screening tests were ordered as noted in patient's After Visit Summary  Personalized health advice and appropriate referrals for health education or preventive services given if needed, as noted in patient's After Visit Summary       History of Present Illness:     Patient presents for Medicare Annual Wellness visit    Patient Care Team:  Joaquín Galloway MD as PCP - General (Internal Medicine)     Problem List:     Patient Active Problem List   Diagnosis    Asthma    Chronic coronary artery disease    Type 2 diabetes mellitus with hyperglycemia, without long-term current use of insulin ([facility] )    Osteoarthritis    Glaucoma    Hiatal hernia    Hyperlipidemia    Hypertension    Hx of CABG    Hx stent of SVG to the RCA    Community acquired pneumonia    Sepsis [facility])      Past Medical and Surgical History:     Past Medical History:   Diagnosis Date    Arthritis     Asthma     Community acquired pneumonia 11/13/2019    Coronary artery disease     DM (diabetes mellitus) ([address] )     HTN (hypertension)     Hyperlipidemia     Hyponatremia      Past Surgical History:   Procedure Laterality Date    CARDIAC CATHETERIZATION  2010    CORONARY ARTERY BYPASS GRAFT  12/06/2010    with subsequent stent to the saphenous veingraft to the RCA 3 months later    KNEE ARTHROSCOPY      POLYPECTOMY      laryngeal    TOTAL HIP ARTHROPLASTY      TUBAL LIGATION        Family History:     Family History   Problem Relation Age of Onset    Stroke Mother     Hypertension Mother     Heart disease Mother     Colon cancer Father     Heart attack Brother  Heart attack Brother       Social History:     E-Cigarette/Vaping    E-Cigarette Use Never User      E-Cigarette/Vaping Substances    Nicotine No     THC No     CBD No     Flavoring No      Social History     Socioeconomic History    Marital status: Single     Spouse name: None    Number of children: 5    Years of education: None    Highest education level: None   Occupational History    Occupation: retired   Social Needs    Financial resource strain: Not hard at all   [place] insecurity:     Worry: Never true     Inability: Never true    Transportation needs:     Medical: No     Non-medical: No   Tobacco Use    Smoking status: Former Smoker     Packs/day: 0 50     Years: 13 00     Pack years: 6 50     Types: Cigarettes     Last attempt to quit: 2000     Years since quittin 2    Smokeless tobacco: Never Used    Tobacco comment: no passive smoke exposure   Substance and Sexual Activity    Alcohol use: Yes     Frequency: 2-4 times a month     Drinks per session: 1 or 2     Comment: socially    Drug use: Never    Sexual activity: Not Currently   Lifestyle    Physical activity:     Days per week: Patient refused     Minutes per session: Patient refused    Stress: Not at all   Relationships    Social connections:     Talks on phone: Patient refused     Gets together: Patient refused     Attends Alevism service: Patient refused     Active member of club or organization: Patient refused     Attends meetings of clubs or organizations: Patient refused     Relationship status: Patient refused    Intimate partner violence:     Fear of current or ex partner: No     Emotionally abused: No     Physically abused: No     Forced sexual activity: No   Other Topics Concern    None   Social History Narrative    None      Medications and Allergies:     Current Outpatient Medications   Medication Sig Dispense Refill    albuterol (ACCUNEB) 1 25 MG/3ML nebulizer solution       albuterol (PROVENTIL HFA,VENTOLIN HFA) 90 mcg/act inhaler Inhale 2 puffs every 6 (six) hours as needed for wheezing or shortness of breath 1 Inhaler 5    ALPRAZolam (XANAX) 0 25 mg tablet Take 1 tablet (0 25 mg total) by mouth daily as needed for anxiety 30 tablet 1    amLODIPine (NORVASC) 5 mg tablet Take 1 tablet (5 mg total) by mouth 2 (two) times a day 180 tablet 3    bisoprolol (ZEBETA) 10 MG tablet TAKE ONE TABLET BY MOUTH EVERY DAY 90 tablet 1    chlorthalidone 25 mg tablet take 1/2 tablet (or 12 5mg) by oral route  every day      Cholecalciferol (VITAMIN D3) 13684 units CAPS Take 1 capsule (50,000 Units total) by mouth once a week 4 capsule 1    glipiZIDE (GLUCOTROL XL) 5 mg 24 hr tablet Take 1 tablet (5 mg total) by mouth daily 30 tablet 2    glipiZIDE (GLUCOTROL XL) 5 mg 24 hr tablet Take 1 tablet (5 mg total) by mouth daily 30 tablet 0    glucose blood test strip 1 each by Other route as needed (as needed) Use as instructed 100 each 5    linaGLIPtin 5 MG TABS Take 5 mg by mouth daily With Lunch/Food 90 tablet 3    omeprazole (PriLOSEC) 20 mg delayed release capsule TAKE ONE CAPSULE BY MOUTH 2 TIMES A  capsule 1    PLAVIX 75 MG tablet Take 1 tablet (75 mg total) by mouth daily 90 tablet 0    rosuvastatin (CRESTOR) 10 MG tablet Take 1 tablet (10 mg total) by mouth daily Please stop Livalo,     90 tablet 3    ipratropium (ATROVENT) 0 02 % nebulizer solution Take 1 vial (0 5 mg total) by nebulization 3 (three) times a day 225 mL 0     No current facility-administered medications for this visit        Allergies   Allergen Reactions    Ezetimibe Rash    Ace Inhibitors     Lipitor [Atorvastatin]      Able to tolerate Crestor (brand name) per patient    Lisinopril     Losartan     Morphine Hives     pt denies    Olmesartan       Immunizations:     Immunization History   Administered Date(s) Administered    INFLUENZA 10/20/2015, 09/27/2016, 09/28/2016, 11/09/2017    Influenza Split 10/15/2012, 10/29/2013, 09/26/2014    Influenza Split High Dose Preservative Free IM 09/27/2016, 11/09/2017    Influenza TIV (IM) 10/20/2015    Influenza, high dose seasonal 0 5 mL 10/08/2018, 09/13/2019    Pneumococcal Conjugate 13-Valent 11/09/2017    Pneumococcal Polysaccharide PPV23 11/01/2009, 10/20/2015    Tdap 02/01/2019      Health Maintenance:         Topic Date Due    MAMMOGRAM  1945    CRC Screening: Colonoscopy  1945    Hepatitis C Screening  Completed     There are no preventive care reminders to display for this patient  Medicare Health Risk Assessment:     BP (!) 186/70 (BP Location: Left arm, Patient Position: Sitting, Cuff Size: Standard)   Pulse 72   Temp 97 9 °F (36 6 °C) (Probe)   Resp 16   Ht 5' 1" (1 549 m)   Wt 60 1 kg (132 lb 9 6 oz)   SpO2 98%   BMI 25 05 kg/m²      Darby Cancino is here for her Subsequent Wellness visit  Last Medicare Wellness visit information reviewed, patient interviewed, no change since last AWV  Health Risk Assessment:   Patient rates overall health as good  Patient feels that their physical health rating is much better  Eyesight was rated as same  Hearing was rated as same  Patient feels that their emotional and mental health rating is much better  Pain experienced in the last 7 days has been none  Patient states that she has experienced no weight loss or gain in last 6 months  Depression Screening:   PHQ-2 Score: 0      Fall Risk Screening: In the past year, patient has experienced: no history of falling in past year      Urinary Incontinence Screening:   Patient has not leaked urine accidently in the last six months  Home Safety:  Patient does not have trouble with stairs inside or outside of their home  Patient has working smoke alarms and has working carbon monoxide detector  Home safety hazards include: none  Nutrition:   Current diet is Regular  Medications:   Patient is currently taking over-the-counter supplements   OTC medications include: see medication list  Patient is able to manage medications  Activities of Daily Living (ADLs)/Instrumental Activities of Daily Living (IADLs):   Walk and transfer into and out of bed and chair?: Yes  Dress and groom yourself?: Yes    Bathe or shower yourself?: Yes    Feed yourself? Yes  Do your laundry/housekeeping?: Yes  Manage your money, pay your bills and track your expenses?: Yes  Make your own meals?: Yes    Do your own shopping?: Yes    Previous Hospitalizations:   Any hospitalizations or ED visits within the last 12 months?: Yes    How many hospitalizations have you had in the last year?: 1-2    Advance Care Planning:   Living will: No    Durable POA for healthcare: No    Advanced directive: No    Five wishes given: Yes      Cognitive Screening:   Provider or family/friend/caregiver concerned regarding cognition?: No    PREVENTIVE SCREENINGS      Cardiovascular Screening:    General: Screening Not Indicated, History Lipid Disorder and Risks and Benefits Discussed      Diabetes Screening:     General: Screening Not Indicated, History Diabetes and Risks and Benefits Discussed      Colorectal Cancer Screening:     General: Risks and Benefits Discussed      Breast Cancer Screening:     General: Risks and Benefits Discussed      Cervical Cancer Screening:    General: Screening Not Indicated and Risks and Benefits Discussed      Osteoporosis Screening:    General: Risks and Benefits Discussed      Abdominal Aortic Aneurysm (AAA) Screening:        General: Risks and Benefits Discussed      Lung Cancer Screening:     General: Screening Not Indicated and Risks and Benefits Discussed      Hepatitis C Screening:    General: Screening Current and Risks and Benefits Discussed    Other Counseling Topics:   Car/seat belt/driving safety and calcium and vitamin D intake and regular weightbearing exercise         Rizwana Chilel MD

## 2020-03-13 NOTE — PROGRESS NOTES
Assessment/Plan:         Diagnoses and all orders for this visit:    Encounter for general adult medical examination with abnormal findings : Done in detail     -     POCT hemoglobin A1c    Screening mammogram for high-risk patient  -     Mammo screening bilateral w cad; Future    Diabetic eye exam [facility])  -     Ambulatory referral to Ophthalmology; Future    Moderate asthma, unspecified whether complicated, unspecified whether persistent; renew :  -     ipratropium (ATROVENT) 0 02 % nebulizer solution; Take 1 vial (0 5 mg total) by nebulization 3 (three) times a day      Type II diabetes mellitus with manifestations, uncontrolled ([facility] ); Life style mod  Continue Glipizide, and Tradjenta  RTc in 1mo w Blood work    Hyperlipidemia associated with type 2 diabetes mellitus ([facility] ); Continue Crestor  RTC in 1mo w blood work    Acute exacerbation of COPD with asthma ([facility] ); Bed Rest, Increase Po Water  Start :  -     albuterol inhalation solution 2 5 mg  -     ipratropium (ATROVENT) 0 02 % inhalation solution 0 5 mg  -     POCT peak flow  -     XR chest pa & lateral; Future  -     amoxicillin-clavulanate (AUGMENTIN) 875-125 mg per tablet; Take 1 tablet by mouth every 12 (twelve) hours for 7 days With food/Meals  -     benzonatate (TESSALON PERLES) 100 mg capsule; Take 1 capsule (100 mg total) by mouth 3 (three) times a day as needed for cough With meals  -     predniSONE 10 mg tablet; Take 3 tabs daily with breakfast for 3 days then Take 2 tabs daily for 3 Days then take one tab daily for 3 days then stop  Amador Negrete RTC in 2 weeks  Other orders  -     albuterol (ACCUNEB) 1 25 MG/3ML nebulizer solution        Subjective:      Patient ID: Echo Trevino is a 76 y o  female  [address] is here for AWV and Regular check up and feeling sick for last 5-7 days, No recent blood work, med List reviewed w  Pt in detail          The following portions of the patient's history were reviewed and updated as appropriate: allergies, current medications, past family history, past medical history, past social history, past surgical history and problem list     Review of Systems   Constitutional: Negative for chills, fatigue and fever  HENT: Positive for congestion, postnasal drip, sinus pressure and sore throat  Negative for facial swelling, trouble swallowing and voice change  Eyes: Negative for pain, discharge and visual disturbance  Respiratory: Positive for cough and wheezing  Negative for shortness of breath  Cardiovascular: Negative for chest pain, palpitations and leg swelling  Gastrointestinal: Negative for abdominal pain, blood in stool, constipation, diarrhea and nausea  Endocrine: Negative for polydipsia, polyphagia and polyuria  Genitourinary: Negative for difficulty urinating, hematuria and urgency  Musculoskeletal: Negative for arthralgias and myalgias  Skin: Negative for rash  Neurological: Negative for dizziness, tremors, weakness and headaches  Hematological: Negative for adenopathy  Does not bruise/bleed easily  Psychiatric/Behavioral: Negative for dysphoric mood, sleep disturbance and suicidal ideas  Objective:      /78 (BP Location: Left arm, Patient Position: Sitting, Cuff Size: Standard)   Pulse 72   Temp 97 9 °F (36 6 °C) (Probe)   Resp 16   Ht 5' 1" (1 549 m)   Wt 60 1 kg (132 lb 9 6 oz)   SpO2 98%   BMI 25 05 kg/m²          Physical Exam   Constitutional: She is oriented to person, place, and time  She appears well-nourished  No distress  HENT:   Head: Normocephalic  Mouth/Throat: No oropharyngeal exudate  Acute URI   Eyes: Pupils are equal, round, and reactive to light  Conjunctivae are normal  No scleral icterus  Neck: Neck supple  No thyromegaly present  Cardiovascular: Normal rate and regular rhythm  Murmur heard  Pulmonary/Chest: Effort normal  No respiratory distress  She has wheezes  She has no rales  Abdominal: Soft   Bowel sounds are normal  She exhibits no distension  There is no tenderness  There is no rebound and no guarding  Musculoskeletal: She exhibits no edema or tenderness  Lymphadenopathy:     She has no cervical adenopathy  Neurological: She is alert and oriented to person, place, and time  Pt uses a cane to support gait   Skin: No rash noted  No erythema  Psychiatric: She has a normal mood and affect

## 2020-03-13 NOTE — PATIENT INSTRUCTIONS
Medicare Preventive Visit Patient Instructions  Thank you for completing your Welcome to Medicare Visit or Medicare Annual Wellness Visit today  Your next wellness visit will be due in one year (3/13/2021)  The screening/preventive services that you may require over the next 5-10 years are detailed below  Some tests may not apply to you based off risk factors and/or age  Screening tests ordered at today's visit but not completed yet may show as past due  Also, please note that scanned in results may not display below  Preventive Screenings:  Service Recommendations Previous Testing/Comments   Colorectal Cancer Screening  * Colonoscopy    * Fecal Occult Blood Test (FOBT)/Fecal Immunochemical Test (FIT)  * Fecal DNA/Cologuard Test  * Flexible Sigmoidoscopy Age: 54-65 years old   Colonoscopy: every 10 years (may be performed more frequently if at higher risk)  OR  FOBT/FIT: every 1 year  OR  Cologuard: every 3 years  OR  Sigmoidoscopy: every 5 years  Screening may be recommended earlier than age 48 if at higher risk for colorectal cancer  Also, an individualized decision between you and your healthcare provider will decide whether screening between the ages of 74-80 would be appropriate  Colonoscopy: Not on file  FOBT/FIT: Not on file  Cologuard: Not on file  Sigmoidoscopy: Not on file         Breast Cancer Screening Age: 36 years old  Frequency: every 1-2 years  Not required if history of left and right mastectomy Mammogram: Not on file       Cervical Cancer Screening Between the ages of 21-29, pap smear recommended once every 3 years  Between the ages of 33-67, can perform pap smear with HPV co-testing every 5 years     Recommendations may differ for women with a history of total hysterectomy, cervical cancer, or abnormal pap smears in past  Pap Smear: Not on file    Screening Not Indicated   Hepatitis C Screening Once for adults born between [facility]  More frequently in patients at high risk for Hepatitis C Hep C Antibody: 08/30/2019    Screening Current   Diabetes Screening 1-2 times per year if you're at risk for diabetes or have pre-diabetes Fasting glucose: 125 mg/dL   A1C: 6 6 %    Screening Not Indicated  History Diabetes   Cholesterol Screening Once every 5 years if you don't have a lipid disorder  May order more often based on risk factors  Lipid panel: 08/30/2019    Screening Not Indicated  History Lipid Disorder     Other Preventive Screenings Covered by Medicare:  1  Abdominal Aortic Aneurysm (AAA) Screening: covered once if your at risk  You're considered to be at risk if you have a family history of AAA  2  Lung Cancer Screening: covers low dose CT scan once per year if you meet all of the following conditions: (1) Age 50-69; (2) No signs or symptoms of lung cancer; (3) Current smoker or have quit smoking within the last 15 years; (4) You have a tobacco smoking history of at least 30 pack years (packs per day multiplied by number of years you smoked); (5) You get a written order from a healthcare provider  3  Glaucoma Screening: covered annually if you're considered high risk: (1) You have diabetes OR (2) Family history of glaucoma OR (3)  aged 48 and older OR (3)  American aged 72 and older  3  Osteoporosis Screening: covered every 2 years if you meet one of the following conditions: (1) You're estrogen deficient and at risk for osteoporosis based off medical history and other findings; (2) Have a vertebral abnormality; (3) On glucocorticoid therapy for more than 3 months; (4) Have primary hyperparathyroidism; (5) On osteoporosis medications and need to assess response to drug therapy  · Last bone density test (DXA Scan): Not on file  5  HIV Screening: covered annually if you're between the age of 12-76  Also covered annually if you are younger than 13 and older than 72 with risk factors for HIV infection   For pregnant patients, it is covered up to 3 times per pregnancy  Immunizations:  Immunization Recommendations   Influenza Vaccine Annual influenza vaccination during flu season is recommended for all persons aged >= 6 months who do not have contraindications   Pneumococcal Vaccine (Prevnar and Pneumovax)  * Prevnar = PCV13  * Pneumovax = PPSV23   Adults 25-60 years old: 1-3 doses may be recommended based on certain risk factors  Adults 72 years old: Prevnar (PCV13) vaccine recommended followed by Pneumovax (PPSV23) vaccine  If already received PPSV23 since turning 65, then PCV13 recommended at least one year after PPSV23 dose  Hepatitis B Vaccine 3 dose series if at intermediate or high risk (ex: diabetes, end stage renal disease, liver disease)   Tetanus (Td) Vaccine - COST NOT COVERED BY MEDICARE PART B Following completion of primary series, a booster dose should be given every 10 years to maintain immunity against tetanus  Td may also be given as tetanus wound prophylaxis  Tdap Vaccine - COST NOT COVERED BY MEDICARE PART B Recommended at least once for all adults  For pregnant patients, recommended with each pregnancy  Shingles Vaccine (Shingrix) - COST NOT COVERED BY MEDICARE PART B  2 shot series recommended in those aged 48 and above     Health Maintenance Due:      Topic Date Due    MAMMOGRAM  1945    CRC Screening: Colonoscopy  1945    Hepatitis C Screening  Completed     Immunizations Due:  There are no preventive care reminders to display for this patient  Advance Directives   What are advance directives? Advance directives are legal documents that state your wishes and plans for medical care  These plans are made ahead of time in case you lose your ability to make decisions for yourself  Advance directives can apply to any medical decision, such as the treatments you want, and if you want to donate organs  What are the types of advance directives?   There are many types of advance directives, and each state has rules about how to use them  You may choose a combination of any of the following:  · Living will: This is a written record of the treatment you want  You can also choose which treatments you do not want, which to limit, and which to stop at a certain time  This includes surgery, medicine, IV fluid, and tube feedings  · Durable power of  for healthcare [facility]): This is a written record that states who you want to make healthcare choices for you when you are unable to make them for yourself  This person, called a proxy, is usually a family member or a friend  You may choose more than 1 proxy  · Do not resuscitate (DNR) order:  A DNR order is used in case your heart stops beating or you stop breathing  It is a request not to have certain forms of treatment, such as CPR  A DNR order may be included in other types of advance directives  · Medical directive: This covers the care that you want if you are in a coma, near death, or unable to make decisions for yourself  You can list the treatments you want for each condition  Treatment may include pain medicine, surgery, blood transfusions, dialysis, IV or tube feedings, and a ventilator (breathing machine)  · Values history: This document has questions about your views, beliefs, and how you feel and think about life  This information can help others choose the care that you would choose  Why are advance directives important? An advance directive helps you control your care  Although spoken wishes may be used, it is better to have your wishes written down  Spoken wishes can be misunderstood, or not followed  Treatments may be given even if you do not want them  An advance directive may make it easier for your family to make difficult choices about your care  Weight Management   Why it is important to manage your weight:  Being overweight increases your risk of health conditions such as heart disease, high blood pressure, type 2 diabetes, and certain types of cancer   It can also increase your risk for osteoarthritis, sleep apnea, and other respiratory problems  Aim for a slow, steady weight loss  Even a small amount of weight loss can lower your risk of health problems  How to lose weight safely:  A safe and healthy way to lose weight is to eat fewer calories and get regular exercise  You can lose up about 1 pound a week by decreasing the number of calories you eat by 500 calories each day  Healthy meal plan for weight management:  A healthy meal plan includes a variety of foods, contains fewer calories, and helps you stay healthy  A healthy meal plan includes the following:  · Eat whole-grain foods more often  A healthy meal plan should contain fiber  Fiber is the part of grains, fruits, and vegetables that is not broken down by your body  Whole-grain foods are healthy and provide extra fiber in your diet  Some examples of whole-grain foods are whole-wheat breads and pastas, oatmeal, brown rice, and bulgur  · Eat a variety of vegetables every day  Include dark, leafy greens such as spinach, kale, layla greens, and mustard greens  Eat yellow and orange vegetables such as carrots, sweet potatoes, and winter squash  · Eat a variety of fruits every day  Choose fresh or canned fruit (canned in its own juice or light syrup) instead of juice  Fruit juice has very little or no fiber  · Eat low-fat dairy foods  Drink fat-free (skim) milk or 1% milk  Eat fat-free yogurt and low-fat cottage cheese  Try low-fat cheeses such as mozzarella and other reduced-fat cheeses  · Choose meat and other protein foods that are low in fat  Choose beans or other legumes such as split peas or lentils  Choose fish, skinless poultry (chicken or turkey), or lean cuts of red meat (beef or pork)  Before you cook meat or poultry, cut off any visible fat  · Use less fat and oil  Try baking foods instead of frying them   Add less fat, such as margarine, sour cream, regular salad dressing and mayonnaise to foods  Eat fewer high-fat foods  Some examples of high-fat foods include french fries, doughnuts, ice cream, and cakes  · Eat fewer sweets  Limit foods and drinks that are high in sugar  This includes candy, cookies, regular soda, and sweetened drinks  Exercise:  Exercise at least 30 minutes per day on most days of the week  Some examples of exercise include walking, biking, dancing, and swimming  You can also fit in more physical activity by taking the stairs instead of the elevator or parking farther away from stores  Ask your healthcare provider about the best exercise plan for you  © Copyright [employer] 2018 Information is for End User's use only and may not be sold, redistributed or otherwise used for commercial purposes   All illustrations and images included in CareNotes® are the copyrighted property of [employer]  or [address] [place] [address]

## 2020-04-10 DIAGNOSIS — F41.9 ANXIETY: ICD-10-CM

## 2020-04-10 RX ORDER — ALPRAZOLAM 0.25 MG/1
0.25 TABLET ORAL DAILY PRN
Qty: 30 TABLET | Refills: 1 | OUTPATIENT
Start: 2020-04-10

## 2020-04-10 RX ORDER — ALPRAZOLAM 0.25 MG/1
0.25 TABLET ORAL DAILY PRN
Qty: 30 TABLET | Refills: 1 | Status: SHIPPED | OUTPATIENT
Start: 2020-04-10 | End: 2020-06-11 | Stop reason: SDUPTHER

## 2020-04-22 DIAGNOSIS — J45.909 MODERATE ASTHMA, UNSPECIFIED WHETHER COMPLICATED, UNSPECIFIED WHETHER PERSISTENT: Primary | ICD-10-CM

## 2020-04-22 RX ORDER — ALBUTEROL SULFATE 1.25 MG/3ML
SOLUTION RESPIRATORY (INHALATION)
Qty: 225 ML | Refills: 0 | Status: SHIPPED | OUTPATIENT
Start: 2020-04-22 | End: 2020-06-01 | Stop reason: SDUPTHER

## 2020-06-01 DIAGNOSIS — I25.83 CORONARY ARTERY DISEASE DUE TO LIPID RICH PLAQUE: ICD-10-CM

## 2020-06-01 DIAGNOSIS — I25.10 CORONARY ARTERY DISEASE DUE TO LIPID RICH PLAQUE: ICD-10-CM

## 2020-06-01 DIAGNOSIS — J45.909 MODERATE ASTHMA, UNSPECIFIED WHETHER COMPLICATED, UNSPECIFIED WHETHER PERSISTENT: ICD-10-CM

## 2020-06-01 RX ORDER — ALBUTEROL SULFATE 1.25 MG/3ML
3 SOLUTION RESPIRATORY (INHALATION) EVERY 6 HOURS PRN
Qty: 225 ML | Refills: 5 | Status: SHIPPED | OUTPATIENT
Start: 2020-06-01 | End: 2020-06-11 | Stop reason: SDUPTHER

## 2020-06-01 RX ORDER — CLOPIDOGREL BISULFATE 75 MG
75 TABLET ORAL DAILY
Qty: 90 TABLET | Refills: 3 | Status: SHIPPED | OUTPATIENT
Start: 2020-06-01 | End: 2020-09-28 | Stop reason: SDUPTHER

## 2020-06-11 DIAGNOSIS — F41.9 ANXIETY: ICD-10-CM

## 2020-06-11 DIAGNOSIS — J45.909 MODERATE ASTHMA, UNSPECIFIED WHETHER COMPLICATED, UNSPECIFIED WHETHER PERSISTENT: ICD-10-CM

## 2020-06-11 RX ORDER — ALBUTEROL SULFATE 1.25 MG/3ML
3 SOLUTION RESPIRATORY (INHALATION) EVERY 6 HOURS PRN
Qty: 225 ML | Refills: 5 | Status: SHIPPED | OUTPATIENT
Start: 2020-06-11 | End: 2020-09-28 | Stop reason: SDUPTHER

## 2020-06-11 RX ORDER — ALPRAZOLAM 0.25 MG/1
0.25 TABLET ORAL DAILY PRN
Qty: 30 TABLET | Refills: 1 | Status: SHIPPED | OUTPATIENT
Start: 2020-06-11 | End: 2020-08-24 | Stop reason: SDUPTHER

## 2020-06-23 ENCOUNTER — TELEPHONE (OUTPATIENT)
Dept: FAMILY MEDICINE CLINIC | Facility: CLINIC | Age: 75
End: 2020-06-23

## 2020-06-23 DIAGNOSIS — K04.7 TOOTH ABSCESS: Primary | ICD-10-CM

## 2020-06-23 RX ORDER — CLINDAMYCIN HYDROCHLORIDE 300 MG/1
300 CAPSULE ORAL 2 TIMES DAILY WITH MEALS
Qty: 14 CAPSULE | Refills: 0 | Status: SHIPPED | OUTPATIENT
Start: 2020-06-23 | End: 2020-06-30

## 2020-07-07 DIAGNOSIS — I10 BENIGN HYPERTENSION: ICD-10-CM

## 2020-07-07 DIAGNOSIS — K21.9 GASTROESOPHAGEAL REFLUX DISEASE WITHOUT ESOPHAGITIS: ICD-10-CM

## 2020-07-07 RX ORDER — OMEPRAZOLE 20 MG/1
CAPSULE, DELAYED RELEASE ORAL
Qty: 180 CAPSULE | Refills: 0 | Status: SHIPPED | OUTPATIENT
Start: 2020-07-07 | End: 2020-09-28

## 2020-07-07 RX ORDER — BISOPROLOL FUMARATE 10 MG/1
TABLET ORAL
Qty: 90 TABLET | Refills: 0 | Status: SHIPPED | OUTPATIENT
Start: 2020-07-07 | End: 2020-09-28 | Stop reason: SDUPTHER

## 2020-07-17 ENCOUNTER — OFFICE VISIT (OUTPATIENT)
Dept: FAMILY MEDICINE CLINIC | Facility: CLINIC | Age: 75
End: 2020-07-17
Payer: COMMERCIAL

## 2020-07-17 VITALS
SYSTOLIC BLOOD PRESSURE: 140 MMHG | TEMPERATURE: 98 F | WEIGHT: 136.2 LBS | DIASTOLIC BLOOD PRESSURE: 80 MMHG | OXYGEN SATURATION: 98 % | HEIGHT: 61 IN | BODY MASS INDEX: 25.71 KG/M2 | HEART RATE: 66 BPM | RESPIRATION RATE: 20 BRPM

## 2020-07-17 DIAGNOSIS — Z12.11 SCREEN FOR COLON CANCER: ICD-10-CM

## 2020-07-17 DIAGNOSIS — E11.69 HYPERLIPIDEMIA ASSOCIATED WITH TYPE 2 DIABETES MELLITUS (HCC): ICD-10-CM

## 2020-07-17 DIAGNOSIS — K04.7 TOOTH ABSCESS: ICD-10-CM

## 2020-07-17 DIAGNOSIS — E11.65 TYPE 2 DIABETES MELLITUS WITH HYPERGLYCEMIA, WITHOUT LONG-TERM CURRENT USE OF INSULIN (HCC): ICD-10-CM

## 2020-07-17 DIAGNOSIS — I25.83 CORONARY ARTERY DISEASE DUE TO LIPID RICH PLAQUE: ICD-10-CM

## 2020-07-17 DIAGNOSIS — I25.10 CORONARY ARTERY DISEASE DUE TO LIPID RICH PLAQUE: ICD-10-CM

## 2020-07-17 DIAGNOSIS — E78.5 HYPERLIPIDEMIA ASSOCIATED WITH TYPE 2 DIABETES MELLITUS (HCC): ICD-10-CM

## 2020-07-17 DIAGNOSIS — IMO0002 TYPE II DIABETES MELLITUS WITH MANIFESTATIONS, UNCONTROLLED: Primary | ICD-10-CM

## 2020-07-17 PROCEDURE — 3079F DIAST BP 80-89 MM HG: CPT | Performed by: INTERNAL MEDICINE

## 2020-07-17 PROCEDURE — 4040F PNEUMOC VAC/ADMIN/RCVD: CPT | Performed by: INTERNAL MEDICINE

## 2020-07-17 PROCEDURE — 1036F TOBACCO NON-USER: CPT | Performed by: INTERNAL MEDICINE

## 2020-07-17 PROCEDURE — 3077F SYST BP >= 140 MM HG: CPT | Performed by: INTERNAL MEDICINE

## 2020-07-17 PROCEDURE — 1160F RVW MEDS BY RX/DR IN RCRD: CPT | Performed by: INTERNAL MEDICINE

## 2020-07-17 PROCEDURE — 3008F BODY MASS INDEX DOCD: CPT | Performed by: INTERNAL MEDICINE

## 2020-07-17 PROCEDURE — 99214 OFFICE O/P EST MOD 30 MIN: CPT | Performed by: INTERNAL MEDICINE

## 2020-07-17 PROCEDURE — 3044F HG A1C LEVEL LT 7.0%: CPT | Performed by: INTERNAL MEDICINE

## 2020-07-17 RX ORDER — LANCETS 28 GAUGE
EACH MISCELLANEOUS
Qty: 100 EACH | Refills: 6 | Status: SHIPPED | OUTPATIENT
Start: 2020-07-17 | End: 2020-09-28

## 2020-07-17 RX ORDER — CLINDAMYCIN HYDROCHLORIDE 300 MG/1
300 CAPSULE ORAL 2 TIMES DAILY WITH MEALS
Qty: 20 CAPSULE | Refills: 0 | Status: SHIPPED | OUTPATIENT
Start: 2020-07-17 | End: 2020-07-27

## 2020-07-17 RX ORDER — DIFLUNISAL 500 MG/1
500 TABLET, FILM COATED ORAL 2 TIMES DAILY
Qty: 30 TABLET | Refills: 0 | Status: SHIPPED | OUTPATIENT
Start: 2020-07-17 | End: 2020-09-28

## 2020-07-17 NOTE — PATIENT INSTRUCTIONS
Low Fat Diet   AMBULATORY CARE:   A low-fat diet  is an eating plan that is low in total fat, unhealthy fat, and cholesterol  You may need to follow a low-fat diet if you have trouble digesting or absorbing fat  You may also need to follow this diet if you have high cholesterol  You can also lower your cholesterol by increasing the amount of fiber in your diet  Soluble fiber is a type of fiber that helps to decrease cholesterol levels  Different types of fat in food:   · Limit unhealthy fats  A diet that is high in cholesterol, saturated fat, and trans fat may cause unhealthy cholesterol levels  Unhealthy cholesterol levels increase your risk of heart disease  ¨ Cholesterol:  Limit intake of cholesterol to less than 200 mg per day  Cholesterol is found in meat, eggs, and dairy  ¨ Saturated fat:  Limit saturated fat to less than 7% of your total daily calories  Ask your dietitian how many calories you need each day  Saturated fat is found in butter, cheese, ice cream, whole milk, and palm oil  Saturated fat is also found in meat, such as beef, pork, chicken skin, and processed meats  Processed meats include sausage, hot dogs, and bologna  ¨ Trans fat:  Avoid trans fat as much as possible  Trans fat is used in fried and baked foods  Foods that say trans fat free on the label may still have up to 0 5 grams of trans fat per serving  · Include healthy fats  Replace foods that are high in saturated and trans fat with foods high in healthy fats  This may help to decrease high cholesterol levels  ¨ Monounsaturated fats: These are found in avocados, nuts, and vegetable oils, such as olive, canola, and sunflower oil  ¨ Polyunsaturated fats: These can be found in vegetable oils, such as soybean or corn oil  Omega-3 fats can help to decrease the risk of heart disease  Omega-3 fats are found in fish, such as salmon, herring, trout, and tuna   Omega-3 fats can also be found in plant foods, such as walnuts, flaxseed, soybeans, and canola oil    Foods to limit or avoid:   · Grains:      ¨ Snacks that are made with partially hydrogenated oils, such as chips, regular crackers, and butter-flavored popcorn    ¨ High-fat baked goods, such as biscuits, croissants, doughnuts, pies, cookies, and pastries    · Dairy:      ¨ Whole milk, 2% milk, and yogurt and ice cream made with whole milk    ¨ Half and half creamer, heavy cream, and whipping cream    ¨ Cheese, cream cheese, and sour cream    · Meats and proteins:      ¨ High-fat cuts of meat (T-bone steak, regular hamburger, and ribs)    ¨ Fried meat, poultry (turkey and chicken), and fish    ¨ Poultry (chicken and turkey) with skin    ¨ Cold cuts (salami or bologna), hot dogs, fraga, and sausage    ¨ Whole eggs and egg yolks    · Vegetables and fruits with added fat:      ¨ Fried vegetables or vegetables in butter or high-fat sauces, such as cream or cheese sauces    ¨ Fried fruit or fruit served with butter or cream    · Fats:      ¨ Butter, stick margarine, and shortening    ¨ Coconut, palm oil, and palm kernel oil  Foods to include:   · Grains:      ¨ Whole-grain breads, cereals, pasta, and brown rice    ¨ Low-fat crackers and pretzels    · Vegetables and fruits:      ¨ Fresh, frozen, or canned vegetables (no salt or low-sodium)    ¨ Fresh, frozen, dried, or canned fruit (canned in light syrup or fruit juice)    ¨ Avocado    · Low-fat dairy products:      ¨ Nonfat (skim) or 1% milk    ¨ Nonfat or low-fat cheese, yogurt, and cottage cheese    · Meats and proteins:      ¨ Chicken or turkey with no skin    ¨ Baked or broiled fish    ¨ Lean beef and pork (loin, round, extra lean hamburger)    ¨ Beans and peas, unsalted nuts, soy products    ¨ Egg whites and substitutes    ¨ Seeds and nuts    · Fats:      ¨ Unsaturated oil, such as canola, olive, peanut, soybean, or sunflower oil    ¨ Soft or liquid margarine and vegetable oil spread    ¨ Low-fat salad dressing  Other ways to decrease fat:   · Read food labels before you buy foods  Choose foods that have less than 30% of calories from fat  Choose low-fat or fat-free dairy products  Remember that fat free does not mean calorie free  These foods still contain calories, and too many calories can lead to weight gain  · Trim fat from meat and avoid fried food  Trim all visible fat from meat before you cook it  Remove the skin from poultry  Do not chapman meat, fish, or poultry  Bake, roast, boil, or broil these foods instead  Avoid fried foods  Eat a baked potato instead of [place] fries  Steam vegetables instead of sautéing them in butter  · Add less fat to foods  Use imitation fraga bits on salads and baked potatoes instead of regular fraga bits  Use fat-free or low-fat salad dressings instead of regular dressings  Use low-fat or nonfat butter-flavored topping instead of regular butter or margarine on popcorn and other foods  Ways to decrease fat in recipes:  Replace high-fat ingredients with low-fat or nonfat ones  This may cause baked goods to be drier than usual  You may need to use nonfat cooking spray on pans to prevent food from sticking  You also may need to change the amount of other ingredients, such as water, in the recipe  Try the following:  · Use low-fat or light margarine instead of regular margarine or shortening  · Use lean ground turkey breast or chicken, or lean ground beef (less than 5% fat) instead of hamburger  · Add 1 teaspoon of canola oil to 8 ounces of skim milk instead of using cream or half and half  · Use grated zucchini, carrots, or apples in breads instead of coconut  · Use blenderized, low-fat cottage cheese, plain tofu, or low-fat ricotta cheese instead of cream cheese  · Use 1 egg white and 1 teaspoon of canola oil, or use ¼ cup (2 ounces) of fat-free egg substitute instead of a whole egg       · Replace half of the oil that is called for in a recipe with applesauce when you bake  Use 3 tablespoons of cocoa powder and 1 tablespoon of canola oil instead of a square of baking chocolate  How to increase fiber:  Eat enough high-fiber foods to get 20 to 30 grams of fiber every day  Slowly increase your fiber intake to avoid stomach cramps, gas, and other problems  · Eat 3 ounces of whole-grain foods each day  An ounce is about 1 slice of bread  Eat whole-grain breads, such as whole-wheat bread  Whole wheat, whole-wheat flour, or other whole grains should be listed as the first ingredient on the food label  Replace white flour with whole-grain flour or use half of each in recipes  Whole-grain flour is heavier than white flour, so you may have to add more yeast or baking powder  · Eat a high-fiber cereal for breakfast   Oatmeal is a good source of soluble fiber  Look for cereals that have bran or fiber in the name  Choose whole-grain products, such as brown rice, barley, and whole-wheat pasta  · Eat more beans, peas, and lentils  For example, add beans to soups or salads  Eat at least 5 cups of fruits and vegetables each day  Eat fruits and vegetables with the peel because the peel is high in fiber  © 2017 [address] [employer] Information is for End User's use only and may not be sold, redistributed or otherwise used for commercial purposes  All illustrations and images included in CareNotes® are the copyrighted property of [employer]  or [employer]  The above information is an  only  It is not intended as medical advice for individual conditions or treatments  Talk to your doctor, nurse or pharmacist before following any medical regimen to see if it is safe and effective for you  Heart Healthy Diet   AMBULATORY CARE:   A heart healthy diet  is an eating plan low in total fat, unhealthy fats, and sodium (salt)  A heart healthy diet helps decrease your risk for heart disease and stroke   Limit the amount of fat you eat to 25% to 35% of your total daily calories  Limit sodium to less than 2,300 mg each day  Healthy fats:  Healthy fats can help improve cholesterol levels  The risk for heart disease is decreased when cholesterol levels are normal  Choose healthy fats, such as the following:  · Unsaturated fat  is found in foods such as soybean, canola, olive, corn, and safflower oils  It is also found in soft tub margarine that is made with liquid vegetable oil  · Omega-3 fat  is found in certain fish, such as salmon, tuna, and trout, and in walnuts and flaxseed  Unhealthy fats:  Unhealthy fats can cause unhealthy cholesterol levels in your blood and increase your risk of heart disease  Limit unhealthy fats, such as the following:  · Cholesterol  is found in animal foods, such as eggs and lobster, and in dairy products made from whole milk  Limit cholesterol to less than 300 milligrams (mg) each day  You may need to limit cholesterol to 200 mg each day if you have heart disease  · Saturated fat  is found in meats, such as fraga and hamburger  It is also found in chicken or turkey skin, whole milk, and butter  Limit saturated fat to less than 7% of your total daily calories  Limit saturated fat to less than 6% if you have heart disease or are at increased risk for it  · Trans fat  is found in packaged foods, such as potato chips and cookies  It is also in hard margarine, some fried foods, and shortening  Avoid trans fats as much as possible    Heart healthy foods and drinks to include:  Ask your dietitian or healthcare provider how many servings to have from each of the following food groups:  · Grains:      ¨ Whole-wheat breads, cereals, and pastas, and brown rice    ¨ Low-fat, low-sodium crackers and chips    · Vegetables:      ¨ Broccoli, green beans, green peas, and spinach    ¨ Collards, kale, and lima beans    ¨ Carrots, sweet potatoes, tomatoes, and peppers    ¨ Canned vegetables with no salt added    · Fruits:      ¨ Bananas, peaches, pears, and pineapple    ¨ Grapes, raisins, and dates    ¨ Oranges, tangerines, grapefruit, orange juice, and grapefruit juice    ¨ Apricots, mangoes, melons, and papaya    ¨ Raspberries and strawberries    ¨ Canned fruit with no added sugar    · Low-fat dairy products:      ¨ Nonfat (skim) milk, 1% milk, and low-fat almond, cashew, or soy milks fortified with calcium    ¨ Low-fat cheese, regular or frozen yogurt, and cottage cheese    · Meats and proteins , such as lean cuts of beef and pork (loin, leg, round), skinless chicken and turkey, legumes, soy products, egg whites, and nuts  Foods and drinks to limit or avoid:  Ask your dietitian or healthcare provider about these and other foods that are high in unhealthy fat, sodium, and sugar:  · Snack or packaged foods , such as frozen dinners, cookies, macaroni and cheese, and cereals with more than 300 mg of sodium per serving    · Canned or dry mixes  for cakes, soups, sauces, or gravies    · Vegetables with added sodium , such as instant potatoes, vegetables with added sauces, or regular canned vegetables    · Other foods high in sodium , such as ketchup, barbecue sauce, salad dressing, pickles, olives, soy sauce, and miso    · High-fat dairy foods  such as whole or 2% milk, cream cheese, or sour cream, and cheeses     · High-fat protein foods  such as high-fat cuts of beef (T-bone steaks, ribs), chicken or turkey with skin, and organ meats, such as liver    · Cured or smoked meats , such as hot dogs, fraga, and sausage    · Unhealthy fats and oils , such as butter, stick margarine, shortening, and cooking oils such as coconut or palm oil    · Food and drinks high in sugar , such as soft drinks (soda), sports drinks, sweetened tea, candy, cake, cookies, pies, and doughnuts  Other diet guidelines to follow:   · Eat more foods containing omega-3 fats  Eat fish high in omega-3 fats at least 2 times a week  · Limit alcohol    Too much alcohol can damage your heart and raise your blood pressure  Women should limit alcohol to 1 drink a day  Men should limit alcohol to 2 drinks a day  A drink of alcohol is 12 ounces of beer, 5 ounces of wine, or 1½ ounces of liquor  · Choose low-sodium foods  High-sodium foods can lead to high blood pressure  Add little or no salt to food you prepare  Use herbs and spices in place of salt  · Eat more fiber  to help lower cholesterol levels  Eat at least 5 servings of fruits and vegetables each day  Eat 3 ounces of whole-grain foods each day  Legumes (beans) are also a good source of fiber  Lifestyle guidelines:   · Do not smoke  Nicotine and other chemicals in cigarettes and cigars can cause lung and heart damage  Ask your healthcare provider for information if you currently smoke and need help to quit  E-cigarettes or smokeless tobacco still contain nicotine  Talk to your healthcare provider before you use these products  · Exercise regularly  to help you maintain a healthy weight and improve your blood pressure and cholesterol levels  Ask your healthcare provider about the best exercise plan for you  Do not start an exercise program without asking your healthcare provider  Follow up with your healthcare provider as directed:  Write down your questions so you remember to ask them during your visits  © 2017 [address] [facility] Information is for End User's use only and may not be sold, redistributed or otherwise used for commercial purposes  All illustrations and images included in CareNotes® are the copyrighted property of [employer] , [employer]  or [employer]  The above information is an  only  It is not intended as medical advice for individual conditions or treatments  Talk to your doctor, nurse or pharmacist before following any medical regimen to see if it is safe and effective for you  Calorie Counting Diet   WHAT YOU NEED TO KNOW:   What is a calorie counting diet?   It is a meal plan based on counting calories each day to reach a healthy body weight  You will need to eat fewer calories if you are trying to lose weight  Weight loss may decrease your risk for certain health problems or improve your health if you have health problems  Some of these health problems include heart disease, high blood pressure, and diabetes  What foods should I avoid? Your dietitian will tell you if you need to avoid certain foods based on your body weight and health condition  You may need to avoid high-fat foods if you are at risk for or have heart disease  You may need to eat fewer foods from the breads and starches food group if you have diabetes  How many calories are in foods? The following is a list of foods and drinks with the approximate number of calories in each  Check the food label to find the exact number of calories  A dietitian can tell you how many calories you should have from each food group each day    · Carbohydrate:      ¨ ½ of a 3-inch bagel, 1 slice of bread, or ½ of a hamburger bun or hot dog bun (80)    ¨ 1 (8-inch) flour tortilla or ½ cup of cooked rice (100)    ¨ 1 (6-inch) corn tortilla (80)    ¨ 1 (6-inch) pancake or 1 cup of bran flakes cereal (110)    ¨ ½ cup of cooked cereal (80)    ¨ ½ cup of cooked pasta (85)    ¨ 1 ounce of pretzels (100)    ¨ 3 cups of air-popped popcorn without butter or oil (80)    · Dairy:      ¨ 1 cup of skim or 1% milk (90)    ¨ 1 cup of 2% milk (120)    ¨ 1 cup of whole milk (160)    ¨ 1 cup of 2% chocolate milk (220)    ¨ 1 ounce of low-fat cheese with 3 grams of fat per ounce (70)    ¨ 1 ounce of cheddar cheese (114)    ¨ ½ cup of 1% fat cottage cheese (80)    ¨ 1 cup of plain or sugar-free, fat-free yogurt (90)    · Protein foods:      ¨ 3 ounces of fish (not breaded or fried) (95)    ¨ 3 ounces of breaded, fried fish (195)    ¨ ¾ cup of tuna canned in water (105)    ¨ 3 ounces of chicken breast without skin (105)    ¨ 1 fried chicken breast with skin (350)    ¨ ¼ cup of fat free egg substitute (40)    ¨ 1 large egg (75)    ¨ 3 ounces of lean beef or pork (165)    ¨ 3 ounces of fried pork chop or ham (185)    ¨ ½ cup of cooked dried beans, such as kidney, jose, lentils, or navy (115)    ¨ 3 ounces of bologna or lunch meat (225)    ¨ 2 links of breakfast sausage (140)    · Vegetables:      ¨ ½ cup of sliced mushrooms (10)    ¨ 1 cup of salad greens, such as lettuce, spinach, or lyle (15)    ¨ ½ cup of steamed asparagus (20)    ¨ ½ cup of cooked summer squash, zucchini squash, or green or wax beans (25)    ¨ 1 cup of broccoli or cauliflower florets, or 1 medium tomato (25)    ¨ 1 large raw carrot or ½ cup of cooked carrots (40)    ¨ ? of a medium cucumber or 1 stalk of celery (5)    ¨ 1 small baked potato (160)    ¨ 1 cup of breaded, fried vegetables (230)    · Fruit:      ¨ 1 (6-inch) banana (55)     ¨ ½ of a 4-inch grapefruit (55)    ¨ 15 grapes (60)    ¨ 1 medium orange or apple (70)    ¨ 1 large peach (65)    ¨ 1 cup of fresh pineapple chunks (75)    ¨ 1 cup of melon cubes (50)    ¨ 1¼ cups of whole strawberries (45)    ¨ ½ cup of fruit canned in juice (55)    ¨ ½ cup of fruit canned in heavy syrup (110)    ¨ ?  cup of raisins (130)    ¨ ½ cup of unsweetened fruit juice (60)    ¨ ½ cup of grape, cranberry, or prune juice (90)    · Fat:      ¨ 10 peanuts or 2 teaspoons of peanut butter (55)    ¨ 2 tablespoons of avocado or 1 tablespoon of regular salad dressing (45)    ¨ 2 slices of fraga (90)    ¨ 1 teaspoon of oil, such as safflower, canola, corn, or olive oil (45)    ¨ 2 teaspoons of low-fat margarine, or 1 tablespoon of low-fat mayonnaise (50)    ¨ 1 teaspoon of regular margarine (40)    ¨ 1 tablespoon of regular mayonnaise (135)    ¨ 1 tablespoon of cream cheese or 2 tablespoons of low-fat cream cheese (45)    ¨ 2 tablespoons of vegetable shortening (215)    · Dessert and sweets:      ¨ 8 animal crackers or 5 vanilla wafers (80)    ¨ 1 frozen fruit juice bar (80)    ¨ ½ cup of ice milk or low-fat frozen yogurt (90)    ¨ ½ cup of sherbet or sorbet (125)    ¨ ½ cup of sugar-free pudding or custard (60)    ¨ ½ cup of ice cream (140)    ¨ ½ cup of pudding or custard (175)    ¨ 1 (2-inch) square chocolate brownie (185)    · Combination foods:      ¨ Bean burrito made with an 8-inch tortilla, without cheese (275)    ¨ Chicken breast sandwich with lettuce and tomato (325)    ¨ 1 cup of chicken noodle soup (60)    ¨ 1 beef taco (175)    ¨ Regular hamburger with lettuce and tomato (310)    ¨ Regular cheeseburger with lettuce and tomato (410)     ¨ ¼ of a 12-inch cheese pizza (280)    ¨ Fried fish sandwich with lettuce and tomato (425)    ¨ Hot dog and bun (275)    ¨ 1½ cups of macaroni and cheese (310)    ¨ Taco salad with a fried tortilla shell (870)    · Low-calorie foods:      ¨ 1 tablespoon of ketchup or 1 tablespoon of fat free sour cream (15)    ¨ 1 teaspoon of mustard (5)    ¨ ¼ cup of salsa (20)    ¨ 1 large dill pickle (15)    ¨ 1 tablespoon of fat free salad dressing (10)    ¨ 2 teaspoons of low-sugar, light jam or jelly, or 1 tablespoon of sugar-free syrup (15)    ¨ 1 sugar-free popsicle (15)    ¨ 1 cup of club soda, seltzer water, or diet soda (0)  CARE AGREEMENT:   You have the right to help plan your care  Discuss treatment options with your caregivers to decide what care you want to receive  You always have the right to refuse treatment  The above information is an  only  It is not intended as medical advice for individual conditions or treatments  Talk to your doctor, nurse or pharmacist before following any medical regimen to see if it is safe and effective for you  © 2017 [address] [employer] Information is for End User's use only and may not be sold, redistributed or otherwise used for commercial purposes   All illustrations and images included in CareNotes® are the copyrighted property of [employer]  or [employer]

## 2020-07-17 NOTE — PROGRESS NOTES
Assessment/Plan:         Diagnoses and all orders for this visit:    Type II diabetes mellitus with manifestations, uncontrolled (HCC);continue Glipizide and Tradjenta  RTC in 3mos w :  -     Comprehensive metabolic panel; Future  -     CBC and differential; Future  -     Hemoglobin A1C; Future  -     Magnesium; Future  -     Lipid panel; Future  -     Thyroid Antibodies Panel; Future  -     T4, free; Future  -     TSH, 3rd generation; Future  -     Vitamin B12; Future  -     Vitamin D 25 hydroxy; Future  -     UA (URINE) with reflex to Scope    -     Lancets (FREESTYLE) lancets; Test one time daily    Tooth abscess; Try :  -     clindamycin (CLEOCIN) 300 MG capsule; Take 1 capsule (300 mg total) by mouth 2 (two) times a day with meals for 10 days  -     diflunisal (DOLOBID) 500 mg tablet; Take 1 tablet (500 mg total) by mouth 2 (two) times a day With food/Meals  Dental Consult    Coronary artery disease due to lipid rich plaque; stable  Continue same  RTC in 3mos w;  -     Vitamin B12; Future  -     Vitamin D 25 hydroxy; Future  Fu w cardiology    Hyperlipidemia associated with type 2 diabetes mellitus ([address] ); continue crestor 10 mg  rtc in 3 mos w ;  -     Vitamin B12; Future  -     Vitamin D 25 hydroxy; Future    Screen for colon cancer  -     Cologuard; Future    Patient's shoes and socks removed  Right Foot/Ankle   Right Foot Inspection  Skin Exam: skin normal and skin intact no dry skin, no warmth, no callus, no erythema, no maceration, no abnormal color, no pre-ulcer, no ulcer and no callus                          Toe Exam: no swelling and no tenderness  Sensory   Vibration: diminished  Proprioception: diminished   Monofilament testing: diminished  Vascular    The right DP pulse is 1+  The right PT pulse is 1+       Left Foot/Ankle  Left Foot Inspection  Skin Exam: skin normal and skin intactno dry skin, no warmth, no erythema, no maceration, normal color, no pre-ulcer, no ulcer and no callus Toe Exam: no swelling and no tenderness                   Sensory   Vibration: diminished  Proprioception: diminished  Monofilament: diminished  Vascular    The left DP pulse is 1+  The left PT pulse is 1+  Assign Risk Category:  No deformity present; No loss of protective sensation; Weak pulses       Risk: 1        Subjective:      Patient ID: Marilyn Sims is a 76 y o  female  76 Y O lady is here for Regular Check up, she feels ok, Except for her Dental pain, No recent Blood work, med List reviewed w pt in detail    The following portions of the patient's history were reviewed and updated as appropriate: allergies, current medications, past family history, past medical history, past social history, past surgical history and problem list     Review of Systems   Constitutional: Negative for chills, fatigue and fever  HENT: Positive for dental problem  Negative for congestion, facial swelling, sore throat, trouble swallowing and voice change  Eyes: Negative for pain, discharge and visual disturbance  Respiratory: Negative for cough, shortness of breath and wheezing  Cardiovascular: Negative for chest pain, palpitations and leg swelling  Gastrointestinal: Negative for abdominal pain, blood in stool, constipation, diarrhea and nausea  Endocrine: Negative for polydipsia, polyphagia and polyuria  Genitourinary: Negative for difficulty urinating, hematuria and urgency  Musculoskeletal: Negative for arthralgias and myalgias  Skin: Negative for rash  Neurological: Negative for dizziness, tremors, weakness and headaches  Hematological: Negative for adenopathy  Does not bruise/bleed easily  Psychiatric/Behavioral: Negative for dysphoric mood, sleep disturbance and suicidal ideas           Objective:      /80 (BP Location: Left arm, Patient Position: Sitting, Cuff Size: Adult)   Pulse 66   Temp 98 °F (36 7 °C) (Tympanic)   Resp 20   Ht 5' 0 98" (1 549 m)   Wt 61 8 kg (136 lb 3 2 oz)   SpO2 98%   BMI 25 75 kg/m²          Physical Exam   Constitutional: She is oriented to person, place, and time  She appears well-nourished  No distress  HENT:   Head: Normocephalic  Mouth/Throat: Oropharynx is clear and moist  No oropharyngeal exudate  Eyes: Pupils are equal, round, and reactive to light  Conjunctivae are normal  No scleral icterus  Neck: Neck supple  No thyromegaly present  Cardiovascular: Normal rate and regular rhythm  Pulses are weak pulses  Murmur heard  Pulses:       Dorsalis pedis pulses are 1+ on the right side, and 1+ on the left side  Posterior tibial pulses are 1+ on the right side, and 1+ on the left side  Pulmonary/Chest: Effort normal and breath sounds normal  No respiratory distress  She has no wheezes  She has no rales  Abdominal: Soft  Bowel sounds are normal  She exhibits no distension  There is no tenderness  There is no rebound and no guarding  Musculoskeletal: She exhibits no edema or tenderness  Feet:   Right Foot:   Skin Integrity: Negative for ulcer, skin breakdown, erythema, warmth, callus or dry skin  Left Foot:   Skin Integrity: Negative for ulcer, skin breakdown, erythema, warmth, callus or dry skin  Lymphadenopathy:     She has no cervical adenopathy  Neurological: She is alert and oriented to person, place, and time  No cranial nerve deficit  Pt uses a cane to support gait   Skin: No rash noted  No erythema  Psychiatric: She has a normal mood and affect  BMI Counseling: Body mass index is 25 75 kg/m²  The BMI is above normal  Nutrition recommendations include reducing portion sizes and decreasing overall calorie intake

## 2020-07-24 DIAGNOSIS — IMO0002 TYPE II DIABETES MELLITUS WITH MANIFESTATIONS, UNCONTROLLED: ICD-10-CM

## 2020-07-24 RX ORDER — LINAGLIPTIN 5 MG/1
TABLET, FILM COATED ORAL
Qty: 90 TABLET | Refills: 3 | Status: SHIPPED | OUTPATIENT
Start: 2020-07-24 | End: 2020-09-28

## 2020-08-24 DIAGNOSIS — F41.9 ANXIETY: ICD-10-CM

## 2020-08-24 DIAGNOSIS — IMO0002 TYPE II DIABETES MELLITUS WITH MANIFESTATIONS, UNCONTROLLED: ICD-10-CM

## 2020-08-24 DIAGNOSIS — J44.9 CHRONIC OBSTRUCTIVE PULMONARY DISEASE, UNSPECIFIED COPD TYPE (HCC): ICD-10-CM

## 2020-08-24 RX ORDER — ALBUTEROL SULFATE 90 UG/1
2 AEROSOL, METERED RESPIRATORY (INHALATION) EVERY 6 HOURS PRN
Qty: 1 INHALER | Refills: 5 | Status: SHIPPED | OUTPATIENT
Start: 2020-08-24 | End: 2020-09-28 | Stop reason: SDUPTHER

## 2020-08-24 RX ORDER — ALPRAZOLAM 0.25 MG/1
0.25 TABLET ORAL DAILY PRN
Qty: 30 TABLET | Refills: 1 | Status: SHIPPED | OUTPATIENT
Start: 2020-08-24 | End: 2020-10-29 | Stop reason: SDUPTHER

## 2020-09-09 ENCOUNTER — APPOINTMENT (OUTPATIENT)
Dept: LAB | Facility: HOSPITAL | Age: 75
End: 2020-09-09
Payer: COMMERCIAL

## 2020-09-09 ENCOUNTER — TRANSCRIBE ORDERS (OUTPATIENT)
Dept: ADMINISTRATIVE | Facility: HOSPITAL | Age: 75
End: 2020-09-09

## 2020-09-09 DIAGNOSIS — I25.83 CORONARY ARTERY DISEASE DUE TO LIPID RICH PLAQUE: ICD-10-CM

## 2020-09-09 DIAGNOSIS — I25.10 CORONARY ARTERY DISEASE DUE TO LIPID RICH PLAQUE: ICD-10-CM

## 2020-09-09 DIAGNOSIS — E11.69 HYPERLIPIDEMIA ASSOCIATED WITH TYPE 2 DIABETES MELLITUS (HCC): ICD-10-CM

## 2020-09-09 DIAGNOSIS — IMO0002 TYPE II DIABETES MELLITUS WITH MANIFESTATIONS, UNCONTROLLED: ICD-10-CM

## 2020-09-09 DIAGNOSIS — E78.5 HYPERLIPIDEMIA ASSOCIATED WITH TYPE 2 DIABETES MELLITUS (HCC): ICD-10-CM

## 2020-09-09 LAB
25(OH)D3 SERPL-MCNC: 16.2 NG/ML (ref 30–100)
ALBUMIN SERPL BCP-MCNC: 4.5 G/DL (ref 3–5.2)
ALP SERPL-CCNC: 58 U/L (ref 43–122)
ALT SERPL W P-5'-P-CCNC: 17 U/L (ref 9–52)
ANION GAP SERPL CALCULATED.3IONS-SCNC: 7 MMOL/L (ref 5–14)
AST SERPL W P-5'-P-CCNC: 23 U/L (ref 14–36)
BASOPHILS # BLD AUTO: 0 THOUSANDS/ΜL (ref 0–0.1)
BASOPHILS NFR BLD AUTO: 1 % (ref 0–1)
BILIRUB SERPL-MCNC: 0.3 MG/DL
BUN SERPL-MCNC: 12 MG/DL (ref 5–25)
CALCIUM SERPL-MCNC: 9.8 MG/DL (ref 8.4–10.2)
CHLORIDE SERPL-SCNC: 94 MMOL/L (ref 97–108)
CHOLEST SERPL-MCNC: 203 MG/DL
CO2 SERPL-SCNC: 31 MMOL/L (ref 22–30)
CREAT SERPL-MCNC: 0.71 MG/DL (ref 0.6–1.2)
EOSINOPHIL # BLD AUTO: 0.2 THOUSAND/ΜL (ref 0–0.4)
EOSINOPHIL NFR BLD AUTO: 4 % (ref 0–6)
ERYTHROCYTE [DISTWIDTH] IN BLOOD BY AUTOMATED COUNT: 14.8 %
GFR SERPL CREATININE-BSD FRML MDRD: 84 ML/MIN/1.73SQ M
GLUCOSE P FAST SERPL-MCNC: 111 MG/DL (ref 70–99)
HCT VFR BLD AUTO: 39.9 % (ref 36–46)
HDLC SERPL-MCNC: 50 MG/DL
HGB BLD-MCNC: 13.1 G/DL (ref 12–16)
LDLC SERPL CALC-MCNC: 108 MG/DL
LYMPHOCYTES # BLD AUTO: 1.8 THOUSANDS/ΜL (ref 0.5–4)
LYMPHOCYTES NFR BLD AUTO: 28 % (ref 25–45)
MAGNESIUM SERPL-MCNC: 1.9 MG/DL (ref 1.6–2.3)
MCH RBC QN AUTO: 27.5 PG (ref 26–34)
MCHC RBC AUTO-ENTMCNC: 32.9 G/DL (ref 31–36)
MCV RBC AUTO: 84 FL (ref 80–100)
MONOCYTES # BLD AUTO: 0.5 THOUSAND/ΜL (ref 0.2–0.9)
MONOCYTES NFR BLD AUTO: 8 % (ref 1–10)
NEUTROPHILS # BLD AUTO: 3.7 THOUSANDS/ΜL (ref 1.8–7.8)
NEUTS SEG NFR BLD AUTO: 59 % (ref 45–65)
NONHDLC SERPL-MCNC: 153 MG/DL
PLATELET # BLD AUTO: 305 THOUSANDS/UL (ref 150–450)
PMV BLD AUTO: 8.1 FL (ref 8.9–12.7)
POTASSIUM SERPL-SCNC: 4.7 MMOL/L (ref 3.6–5)
PROT SERPL-MCNC: 8.1 G/DL (ref 5.9–8.4)
RBC # BLD AUTO: 4.76 MILLION/UL (ref 4–5.2)
SODIUM SERPL-SCNC: 132 MMOL/L (ref 137–147)
T4 FREE SERPL-MCNC: 0.98 NG/DL (ref 0.76–1.46)
TRIGL SERPL-MCNC: 224 MG/DL
TSH SERPL DL<=0.05 MIU/L-ACNC: 1.87 UIU/ML (ref 0.47–4.68)
VIT B12 SERPL-MCNC: 190 PG/ML (ref 100–900)
WBC # BLD AUTO: 6.2 THOUSAND/UL (ref 4.5–11)

## 2020-09-09 PROCEDURE — 82306 VITAMIN D 25 HYDROXY: CPT

## 2020-09-09 PROCEDURE — 36415 COLL VENOUS BLD VENIPUNCTURE: CPT

## 2020-09-09 PROCEDURE — 84439 ASSAY OF FREE THYROXINE: CPT

## 2020-09-09 PROCEDURE — 85025 COMPLETE CBC W/AUTO DIFF WBC: CPT

## 2020-09-09 PROCEDURE — 86800 THYROGLOBULIN ANTIBODY: CPT

## 2020-09-09 PROCEDURE — 83735 ASSAY OF MAGNESIUM: CPT

## 2020-09-09 PROCEDURE — 84443 ASSAY THYROID STIM HORMONE: CPT

## 2020-09-09 PROCEDURE — 80053 COMPREHEN METABOLIC PANEL: CPT

## 2020-09-09 PROCEDURE — 80061 LIPID PANEL: CPT

## 2020-09-09 PROCEDURE — 86376 MICROSOMAL ANTIBODY EACH: CPT

## 2020-09-09 PROCEDURE — 82607 VITAMIN B-12: CPT

## 2020-09-10 LAB
THYROGLOB AB SERPL-ACNC: 237.8 IU/ML (ref 0–0.9)
THYROPEROXIDASE AB SERPL-ACNC: >600 IU/ML (ref 0–34)

## 2020-09-28 ENCOUNTER — CONSULT (OUTPATIENT)
Dept: FAMILY MEDICINE CLINIC | Facility: CLINIC | Age: 75
End: 2020-09-28
Payer: COMMERCIAL

## 2020-09-28 VITALS
SYSTOLIC BLOOD PRESSURE: 138 MMHG | WEIGHT: 136.9 LBS | RESPIRATION RATE: 14 BRPM | HEART RATE: 70 BPM | DIASTOLIC BLOOD PRESSURE: 78 MMHG | BODY MASS INDEX: 26.88 KG/M2 | TEMPERATURE: 98.4 F | OXYGEN SATURATION: 98 % | HEIGHT: 60 IN

## 2020-09-28 DIAGNOSIS — I25.83 CORONARY ARTERY DISEASE DUE TO LIPID RICH PLAQUE: ICD-10-CM

## 2020-09-28 DIAGNOSIS — E11.69 HYPERLIPIDEMIA ASSOCIATED WITH TYPE 2 DIABETES MELLITUS (HCC): Primary | ICD-10-CM

## 2020-09-28 DIAGNOSIS — I25.10 CORONARY ARTERY DISEASE DUE TO LIPID RICH PLAQUE: ICD-10-CM

## 2020-09-28 DIAGNOSIS — I10 ESSENTIAL HYPERTENSION: ICD-10-CM

## 2020-09-28 DIAGNOSIS — J44.9 CHRONIC OBSTRUCTIVE PULMONARY DISEASE, UNSPECIFIED COPD TYPE (HCC): ICD-10-CM

## 2020-09-28 DIAGNOSIS — I10 BENIGN HYPERTENSION: ICD-10-CM

## 2020-09-28 DIAGNOSIS — E78.5 HYPERLIPIDEMIA ASSOCIATED WITH TYPE 2 DIABETES MELLITUS (HCC): Primary | ICD-10-CM

## 2020-09-28 DIAGNOSIS — Z12.31 SCREENING MAMMOGRAM, ENCOUNTER FOR: ICD-10-CM

## 2020-09-28 DIAGNOSIS — E11.65 TYPE 2 DIABETES MELLITUS WITH HYPERGLYCEMIA, WITHOUT LONG-TERM CURRENT USE OF INSULIN (HCC): ICD-10-CM

## 2020-09-28 DIAGNOSIS — Z23 NEED FOR INFLUENZA VACCINATION: ICD-10-CM

## 2020-09-28 DIAGNOSIS — E11.8 TYPE II DIABETES MELLITUS WITH MANIFESTATIONS (HCC): ICD-10-CM

## 2020-09-28 DIAGNOSIS — Z96.643 HISTORY OF BILATERAL HIP REPLACEMENTS: ICD-10-CM

## 2020-09-28 DIAGNOSIS — J45.909 MODERATE ASTHMA, UNSPECIFIED WHETHER COMPLICATED, UNSPECIFIED WHETHER PERSISTENT: ICD-10-CM

## 2020-09-28 LAB
SL AMB POCT GLUCOSE BLD: 93
SL AMB POCT HEMOGLOBIN AIC: 6.1 (ref ?–6.5)

## 2020-09-28 PROCEDURE — 82948 REAGENT STRIP/BLOOD GLUCOSE: CPT | Performed by: INTERNAL MEDICINE

## 2020-09-28 PROCEDURE — 83036 HEMOGLOBIN GLYCOSYLATED A1C: CPT | Performed by: INTERNAL MEDICINE

## 2020-09-28 PROCEDURE — 93000 ELECTROCARDIOGRAM COMPLETE: CPT | Performed by: INTERNAL MEDICINE

## 2020-09-28 PROCEDURE — 99214 OFFICE O/P EST MOD 30 MIN: CPT | Performed by: INTERNAL MEDICINE

## 2020-09-28 PROCEDURE — 3044F HG A1C LEVEL LT 7.0%: CPT | Performed by: INTERNAL MEDICINE

## 2020-09-28 PROCEDURE — 1160F RVW MEDS BY RX/DR IN RCRD: CPT | Performed by: INTERNAL MEDICINE

## 2020-09-28 PROCEDURE — 90662 IIV NO PRSV INCREASED AG IM: CPT

## 2020-09-28 PROCEDURE — 1036F TOBACCO NON-USER: CPT | Performed by: INTERNAL MEDICINE

## 2020-09-28 PROCEDURE — 3075F SYST BP GE 130 - 139MM HG: CPT | Performed by: INTERNAL MEDICINE

## 2020-09-28 PROCEDURE — 3078F DIAST BP <80 MM HG: CPT | Performed by: INTERNAL MEDICINE

## 2020-09-28 PROCEDURE — G0008 ADMIN INFLUENZA VIRUS VAC: HCPCS

## 2020-09-28 RX ORDER — ROSUVASTATIN CALCIUM 10 MG/1
10 TABLET, COATED ORAL DAILY
Qty: 90 TABLET | Refills: 3 | Status: SHIPPED | OUTPATIENT
Start: 2020-09-28 | End: 2021-04-09 | Stop reason: SDUPTHER

## 2020-09-28 RX ORDER — AMOXICILLIN 500 MG/1
CAPSULE ORAL
Qty: 8 CAPSULE | Refills: 0 | Status: SHIPPED | OUTPATIENT
Start: 2020-09-28 | End: 2020-09-30

## 2020-09-28 RX ORDER — CLOPIDOGREL BISULFATE 75 MG
75 TABLET ORAL DAILY
Qty: 90 TABLET | Refills: 3 | Status: SHIPPED | OUTPATIENT
Start: 2020-09-28 | End: 2021-04-09 | Stop reason: SDUPTHER

## 2020-09-28 RX ORDER — ALBUTEROL SULFATE 1.25 MG/3ML
3 SOLUTION RESPIRATORY (INHALATION) EVERY 6 HOURS PRN
Qty: 225 ML | Refills: 5 | Status: SHIPPED | OUTPATIENT
Start: 2020-09-28 | End: 2021-04-09 | Stop reason: SDUPTHER

## 2020-09-28 RX ORDER — BISOPROLOL FUMARATE 10 MG/1
10 TABLET ORAL DAILY
Qty: 90 TABLET | Refills: 3 | Status: SHIPPED | OUTPATIENT
Start: 2020-09-28 | End: 2021-04-09 | Stop reason: SDUPTHER

## 2020-09-28 RX ORDER — ALBUTEROL SULFATE 90 UG/1
2 AEROSOL, METERED RESPIRATORY (INHALATION) EVERY 6 HOURS PRN
Qty: 1 INHALER | Refills: 5 | Status: SHIPPED | OUTPATIENT
Start: 2020-09-28 | End: 2021-03-18 | Stop reason: SDUPTHER

## 2020-09-28 RX ORDER — AMLODIPINE BESYLATE 5 MG/1
5 TABLET ORAL 2 TIMES DAILY
Qty: 180 TABLET | Refills: 3 | Status: SHIPPED | OUTPATIENT
Start: 2020-09-28 | End: 2021-04-09 | Stop reason: SDUPTHER

## 2020-09-28 NOTE — PROGRESS NOTES
Assessment/Plan:         Diagnoses and all orders for this visit:    Hyperlipidemia associated with type 2 diabetes mellitus ([facility] ); Continue :  -     rosuvastatin (Crestor) 10 MG tablet; Take 1 tablet (10 mg total) by mouth daily Please stop Livalo,   RTC in 3mos w blood work    Need for influenza vaccination  -     influenza vaccine, high-dose, PF 0 7 mL (FLUZONE HIGH-DOSE)  Pt is Not taking any DM meds for few weeks,   -     Microalbumin / creatinine urine ratio  -     POCT hemoglobin A1c  -     POCT blood glucose    Coronary artery disease due to lipid rich plaque; stable  Continue same  FU w cardiology  Renew :  -     Plavix 75 MG tablet; Take 1 tablet (75 mg total) by mouth daily    Benign hypertension; continue :  -     bisoprolol (ZEBETA) 10 MG tablet; Take 1 tablet (10 mg total) by mouth daily    -     amLODIPine (NORVASC) 5 mg tablet; Take 1 tablet (5 mg total) by mouth 2 (two) times a day  -     POCT ECG    Chronic obstructive pulmonary disease, unspecified COPD type (HCC)  -     albuterol (PROVENTIL HFA,VENTOLIN HFA) 90 mcg/act inhaler; Inhale 2 puffs every 6 (six) hours as needed for wheezing or shortness of breath    Moderate asthma, unspecified whether complicated, unspecified whether persistent  -     albuterol (ACCUNEB) 1 25 MG/3ML nebulizer solution; Take 3 mL by nebulization every 6 (six) hours as needed for wheezing    History of bilateral hip replacements  -     amoxicillin (AMOXIL) 500 mg capsule; Take 2 Gram , one hour before the Dental Procedure     -     Comprehensive metabolic panel; Future  -     CBC and differential; Future    Screening mammogram, encounter for  -     Mammo screening bilateral w 3d & cad; Future    Type II diabetes mellitus with manifestations ([facility] ); Life style Mod  RTC in 3mos w :  -     Comprehensive metabolic panel; Future  -     CBC and differential; Future        Subjective:      Patient ID: Theopolis Eye is a 76 y o  female      76 Y O lady is here for [place] for Dental W/U  She feels ok, recent Blood work and med List reviewed w pt in detail    The following portions of the patient's history were reviewed and updated as appropriate: allergies, current medications, past family history, past medical history, past surgical history and problem list     Review of Systems   Constitutional: Negative for chills, fatigue and fever  HENT: Negative for congestion, facial swelling, sore throat, trouble swallowing and voice change  Eyes: Negative for pain, discharge and visual disturbance  Respiratory: Negative for cough, shortness of breath and wheezing  Cardiovascular: Negative for chest pain, palpitations and leg swelling  Gastrointestinal: Negative for abdominal pain, blood in stool, constipation, diarrhea and nausea  Endocrine: Negative for polydipsia, polyphagia and polyuria  Genitourinary: Negative for difficulty urinating, hematuria and urgency  Musculoskeletal: Negative for arthralgias and myalgias  Skin: Negative for rash  Neurological: Negative for dizziness, tremors, weakness and headaches  Hematological: Negative for adenopathy  Does not bruise/bleed easily  Psychiatric/Behavioral: Negative for dysphoric mood, sleep disturbance and suicidal ideas  Objective:      /78 (BP Location: Right arm, Patient Position: Sitting, Cuff Size: Standard)   Pulse 70   Temp 98 4 °F (36 9 °C) (Probe)   Resp 14   Ht 5' (1 524 m)   Wt 62 1 kg (136 lb 14 4 oz)   SpO2 98%   BMI 26 74 kg/m²          Physical Exam  Constitutional:       General: She is not in acute distress  HENT:      Head: Normocephalic  Mouth/Throat:      Pharynx: No oropharyngeal exudate  Eyes:      General: No scleral icterus  Conjunctiva/sclera: Conjunctivae normal       Pupils: Pupils are equal, round, and reactive to light  Neck:      Musculoskeletal: Neck supple  Thyroid: No thyromegaly     Cardiovascular:      Rate and Rhythm: Normal rate and regular rhythm  Heart sounds: Normal heart sounds  No murmur  Pulmonary:      Effort: Pulmonary effort is normal  No respiratory distress  Breath sounds: Normal breath sounds  No wheezing or rales  Abdominal:      General: Bowel sounds are normal  There is no distension  Palpations: Abdomen is soft  Tenderness: There is no abdominal tenderness  There is no guarding or rebound  Musculoskeletal:         General: Tenderness present  Lymphadenopathy:      Cervical: No cervical adenopathy  Skin:     Coloration: Skin is not pale  Findings: No rash  Neurological:      Mental Status: She is alert and oriented to person, place, and time        Comments: Pt uses a walker to support gait

## 2020-09-28 NOTE — LETTER
September 28, 2020     Patient: Kasie Merritt   YOB: 1945   Date of Visit: 9/28/2020       To Whom it May Concern:    Kasie Merritt is under my professional care  She was seen in my office on 9/28/2020  She is cleared for her tooth extraction to be preformed  by Left Hand Oral Surgery  She does require the need to stop her Plavix  One week prior to her procedure  and she will also require the need of Antibiotic Prophylaxis before her surgery procedure due to her previous hip surgery  If you have any questions or concerns, please don't hesitate to call           Sincerely,          Mejia Lowry MD        CC: No Recipients

## 2020-10-21 ENCOUNTER — APPOINTMENT (OUTPATIENT)
Dept: LAB | Facility: HOSPITAL | Age: 75
End: 2020-10-21
Payer: COMMERCIAL

## 2020-10-21 DIAGNOSIS — Z96.643 HISTORY OF BILATERAL HIP REPLACEMENTS: ICD-10-CM

## 2020-10-21 DIAGNOSIS — E11.8 TYPE II DIABETES MELLITUS WITH MANIFESTATIONS (HCC): ICD-10-CM

## 2020-10-21 LAB
ALBUMIN SERPL BCP-MCNC: 4.4 G/DL (ref 3–5.2)
ALP SERPL-CCNC: 67 U/L (ref 43–122)
ALT SERPL W P-5'-P-CCNC: 11 U/L (ref 9–52)
ANION GAP SERPL CALCULATED.3IONS-SCNC: 7 MMOL/L (ref 5–14)
AST SERPL W P-5'-P-CCNC: 24 U/L (ref 14–36)
BASOPHILS # BLD AUTO: 0.1 THOUSANDS/ΜL (ref 0–0.1)
BASOPHILS NFR BLD AUTO: 1 % (ref 0–1)
BILIRUB SERPL-MCNC: 0.4 MG/DL
BUN SERPL-MCNC: 13 MG/DL (ref 5–25)
CALCIUM SERPL-MCNC: 9.4 MG/DL (ref 8.4–10.2)
CHLORIDE SERPL-SCNC: 97 MMOL/L (ref 97–108)
CO2 SERPL-SCNC: 31 MMOL/L (ref 22–30)
CREAT SERPL-MCNC: 0.7 MG/DL (ref 0.6–1.2)
EOSINOPHIL # BLD AUTO: 0.4 THOUSAND/ΜL (ref 0–0.4)
EOSINOPHIL NFR BLD AUTO: 5 % (ref 0–6)
ERYTHROCYTE [DISTWIDTH] IN BLOOD BY AUTOMATED COUNT: 14.3 %
GFR SERPL CREATININE-BSD FRML MDRD: 85 ML/MIN/1.73SQ M
GLUCOSE P FAST SERPL-MCNC: 114 MG/DL (ref 70–99)
HCT VFR BLD AUTO: 38.8 % (ref 36–46)
HGB BLD-MCNC: 12.8 G/DL (ref 12–16)
LYMPHOCYTES # BLD AUTO: 2.1 THOUSANDS/ΜL (ref 0.5–4)
LYMPHOCYTES NFR BLD AUTO: 29 % (ref 25–45)
MCH RBC QN AUTO: 27.9 PG (ref 26–34)
MCHC RBC AUTO-ENTMCNC: 33.1 G/DL (ref 31–36)
MCV RBC AUTO: 84 FL (ref 80–100)
MONOCYTES # BLD AUTO: 0.6 THOUSAND/ΜL (ref 0.2–0.9)
MONOCYTES NFR BLD AUTO: 8 % (ref 1–10)
NEUTROPHILS # BLD AUTO: 4.1 THOUSANDS/ΜL (ref 1.8–7.8)
NEUTS SEG NFR BLD AUTO: 58 % (ref 45–65)
PLATELET # BLD AUTO: 277 THOUSANDS/UL (ref 150–450)
PMV BLD AUTO: 7.9 FL (ref 8.9–12.7)
POTASSIUM SERPL-SCNC: 4.6 MMOL/L (ref 3.6–5)
PROT SERPL-MCNC: 8.1 G/DL (ref 5.9–8.4)
RBC # BLD AUTO: 4.6 MILLION/UL (ref 4–5.2)
SODIUM SERPL-SCNC: 135 MMOL/L (ref 137–147)
WBC # BLD AUTO: 7.2 THOUSAND/UL (ref 4.5–11)

## 2020-10-21 PROCEDURE — 80053 COMPREHEN METABOLIC PANEL: CPT

## 2020-10-21 PROCEDURE — 36415 COLL VENOUS BLD VENIPUNCTURE: CPT

## 2020-10-21 PROCEDURE — 85025 COMPLETE CBC W/AUTO DIFF WBC: CPT

## 2020-10-23 ENCOUNTER — OFFICE VISIT (OUTPATIENT)
Dept: FAMILY MEDICINE CLINIC | Facility: CLINIC | Age: 75
End: 2020-10-23
Payer: COMMERCIAL

## 2020-10-23 VITALS
SYSTOLIC BLOOD PRESSURE: 140 MMHG | HEART RATE: 71 BPM | BODY MASS INDEX: 27.09 KG/M2 | DIASTOLIC BLOOD PRESSURE: 80 MMHG | HEIGHT: 60 IN | RESPIRATION RATE: 14 BRPM | OXYGEN SATURATION: 95 % | TEMPERATURE: 97.6 F | WEIGHT: 138 LBS

## 2020-10-23 DIAGNOSIS — J44.1 ACUTE EXACERBATION OF CHRONIC OBSTRUCTIVE PULMONARY DISEASE (COPD) (HCC): Primary | ICD-10-CM

## 2020-10-23 DIAGNOSIS — R06.02 SOB (SHORTNESS OF BREATH): ICD-10-CM

## 2020-10-23 PROCEDURE — 99213 OFFICE O/P EST LOW 20 MIN: CPT | Performed by: INTERNAL MEDICINE

## 2020-10-23 PROCEDURE — 94150 VITAL CAPACITY TEST: CPT | Performed by: INTERNAL MEDICINE

## 2020-10-23 RX ORDER — AMOXICILLIN 500 MG/1
500 CAPSULE ORAL EVERY 8 HOURS SCHEDULED
Qty: 30 CAPSULE | Refills: 0 | Status: SHIPPED | OUTPATIENT
Start: 2020-10-23 | End: 2020-11-02

## 2020-10-23 RX ORDER — BENZONATATE 100 MG/1
100 CAPSULE ORAL 3 TIMES DAILY PRN
Qty: 30 CAPSULE | Refills: 0 | Status: SHIPPED | OUTPATIENT
Start: 2020-10-23 | End: 2021-01-22 | Stop reason: SDUPTHER

## 2020-10-23 RX ORDER — PREDNISONE 10 MG/1
TABLET ORAL
Qty: 20 TABLET | Refills: 0 | Status: SHIPPED | OUTPATIENT
Start: 2020-10-23 | End: 2021-01-22 | Stop reason: SDUPTHER

## 2020-10-29 DIAGNOSIS — F41.9 ANXIETY: ICD-10-CM

## 2020-10-29 RX ORDER — ALPRAZOLAM 0.25 MG/1
0.25 TABLET ORAL DAILY PRN
Qty: 30 TABLET | Refills: 1 | Status: SHIPPED | OUTPATIENT
Start: 2020-10-29 | End: 2021-01-05 | Stop reason: SDUPTHER

## 2020-12-30 DIAGNOSIS — F41.9 ANXIETY: ICD-10-CM

## 2020-12-30 RX ORDER — ALPRAZOLAM 0.25 MG/1
0.25 TABLET ORAL DAILY PRN
Qty: 30 TABLET | Refills: 1 | OUTPATIENT
Start: 2020-12-30

## 2021-01-05 DIAGNOSIS — F41.9 ANXIETY: ICD-10-CM

## 2021-01-05 RX ORDER — ALPRAZOLAM 0.25 MG/1
0.25 TABLET ORAL DAILY PRN
Qty: 30 TABLET | Refills: 1 | Status: SHIPPED | OUTPATIENT
Start: 2021-01-05 | End: 2021-03-11 | Stop reason: SDUPTHER

## 2021-01-22 ENCOUNTER — OFFICE VISIT (OUTPATIENT)
Dept: FAMILY MEDICINE CLINIC | Facility: CLINIC | Age: 76
End: 2021-01-22
Payer: COMMERCIAL

## 2021-01-22 VITALS
WEIGHT: 135 LBS | OXYGEN SATURATION: 97 % | BODY MASS INDEX: 26.5 KG/M2 | HEART RATE: 79 BPM | SYSTOLIC BLOOD PRESSURE: 148 MMHG | HEIGHT: 60 IN | DIASTOLIC BLOOD PRESSURE: 76 MMHG | RESPIRATION RATE: 18 BRPM | TEMPERATURE: 98.3 F

## 2021-01-22 DIAGNOSIS — J44.1 ACUTE EXACERBATION OF CHRONIC OBSTRUCTIVE PULMONARY DISEASE (COPD) (HCC): Primary | ICD-10-CM

## 2021-01-22 DIAGNOSIS — R11.0 NAUSEA: ICD-10-CM

## 2021-01-22 DIAGNOSIS — E11.8 TYPE II DIABETES MELLITUS WITH MANIFESTATIONS (HCC): ICD-10-CM

## 2021-01-22 DIAGNOSIS — Z20.822 COVID-19 RULED OUT: ICD-10-CM

## 2021-01-22 LAB — NON VENT ROOM AIR: 90 %

## 2021-01-22 PROCEDURE — 1036F TOBACCO NON-USER: CPT | Performed by: INTERNAL MEDICINE

## 2021-01-22 PROCEDURE — 96372 THER/PROPH/DIAG INJ SC/IM: CPT | Performed by: INTERNAL MEDICINE

## 2021-01-22 PROCEDURE — 3078F DIAST BP <80 MM HG: CPT | Performed by: INTERNAL MEDICINE

## 2021-01-22 PROCEDURE — 94150 VITAL CAPACITY TEST: CPT | Performed by: INTERNAL MEDICINE

## 2021-01-22 PROCEDURE — 1160F RVW MEDS BY RX/DR IN RCRD: CPT | Performed by: INTERNAL MEDICINE

## 2021-01-22 PROCEDURE — 99214 OFFICE O/P EST MOD 30 MIN: CPT | Performed by: INTERNAL MEDICINE

## 2021-01-22 PROCEDURE — 3077F SYST BP >= 140 MM HG: CPT | Performed by: INTERNAL MEDICINE

## 2021-01-22 RX ORDER — METHYLPREDNISOLONE SODIUM SUCCINATE 125 MG/2ML
125 INJECTION, POWDER, LYOPHILIZED, FOR SOLUTION INTRAMUSCULAR; INTRAVENOUS ONCE
Status: COMPLETED | OUTPATIENT
Start: 2021-01-22 | End: 2021-01-22

## 2021-01-22 RX ORDER — ALBUTEROL SULFATE 2.5 MG/3ML
2.5 SOLUTION RESPIRATORY (INHALATION) ONCE
Status: COMPLETED | OUTPATIENT
Start: 2021-01-22 | End: 2021-01-22

## 2021-01-22 RX ORDER — BENZONATATE 100 MG/1
100 CAPSULE ORAL 3 TIMES DAILY PRN
Qty: 30 CAPSULE | Refills: 0 | Status: SHIPPED | OUTPATIENT
Start: 2021-01-22 | End: 2021-02-26

## 2021-01-22 RX ORDER — PREDNISONE 10 MG/1
TABLET ORAL
Qty: 20 TABLET | Refills: 0 | Status: SHIPPED | OUTPATIENT
Start: 2021-01-22 | End: 2021-02-26

## 2021-01-22 RX ORDER — AMOXICILLIN AND CLAVULANATE POTASSIUM 875; 125 MG/1; MG/1
1 TABLET, FILM COATED ORAL EVERY 12 HOURS SCHEDULED
Qty: 14 TABLET | Refills: 0 | Status: SHIPPED | OUTPATIENT
Start: 2021-01-22 | End: 2021-01-29

## 2021-01-22 RX ADMIN — METHYLPREDNISOLONE SODIUM SUCCINATE 125 MG: 125 INJECTION, POWDER, LYOPHILIZED, FOR SOLUTION INTRAMUSCULAR; INTRAVENOUS at 11:57

## 2021-01-22 RX ADMIN — ALBUTEROL SULFATE 2.5 MG: 2.5 SOLUTION RESPIRATORY (INHALATION) at 12:13

## 2021-01-22 RX ADMIN — Medication 0.5 MG: at 12:13

## 2021-01-22 NOTE — PROGRESS NOTES
Assessment/Plan:         Diagnoses and all orders for this visit:    Acute exacerbation of chronic obstructive pulmonary disease (COPD) ([facility] );  -     POCT peak flow  -     methylPREDNISolone sodium succinate (Solu-MEDROL) injection 125 mg  -     amoxicillin-clavulanate (AUGMENTIN) 875-125 mg per tablet; Take 1 tablet by mouth every 12 (twelve) hours for 7 days With food/Meals  -     predniSONE 10 mg tablet; Take 3 tabs daily with breakfast for 3 days then Take  Two tabs daily for 3 Days then take one tab daily for 3 days then stop     -     benzonatate (TESSALON PERLES) 100 mg capsule; Take 1 capsule (100 mg total) by mouth 3 (three) times a day as needed for cough With food/Meals  -     albuterol inhalation solution 2 5 mg  -     ipratropium (ATROVENT) 0 02 % inhalation solution 0 5 mg  -     XR chest pa & lateral; STAT  -     Novel Coronavirus (COVID-19), PCR [facility] Collected at [facility]; Future  -     Air  ([employer]icks Air Purifier/HEPA) (Device) MISC; Use as directed  RTC in 1 week    Type II diabetes mellitus with manifestations (HCC)    Nausea  -     trimethobenzamide (TIGAN) IM injection 200 mg    COVID-19 ruled out  -     Novel Coronavirus (COVID-19), PCR [facility] Collected at [facility]; Future        Subjective:      Patient ID: Maverick Lew is a 76 y o  female  76 Y O lady is here for Regular check up, and increased symptoms for few days, recent Blood work and med list reviewed w pt    The following portions of the patient's history were reviewed and updated as appropriate: allergies, current medications, past family history, past social history, past surgical history and problem list     Review of Systems   Constitutional: Positive for fatigue  Negative for chills and fever  HENT: Positive for congestion, postnasal drip, sinus pressure and sinus pain  Negative for facial swelling, sore throat, trouble swallowing and voice change      Eyes: Negative for pain, discharge and visual disturbance  Respiratory: Positive for cough, shortness of breath and wheezing  Cardiovascular: Negative for chest pain, palpitations and leg swelling  Gastrointestinal: Positive for nausea and vomiting  Negative for abdominal pain, blood in stool, constipation and diarrhea  Endocrine: Negative for polydipsia, polyphagia and polyuria  Genitourinary: Negative for difficulty urinating, hematuria and urgency  Musculoskeletal: Negative for arthralgias and myalgias  Skin: Negative for rash  Neurological: Negative for dizziness, tremors, weakness and headaches  Hematological: Negative for adenopathy  Does not bruise/bleed easily  Psychiatric/Behavioral: Negative for dysphoric mood, sleep disturbance and suicidal ideas  Objective:      /76 (BP Location: Left arm, Patient Position: Sitting, Cuff Size: Standard)   Pulse 79   Temp 98 3 °F (36 8 °C) (Temporal)   Resp 18   Ht 5' (1 524 m)   Wt 61 2 kg (135 lb)   SpO2 97%   BMI 26 37 kg/m²          Physical Exam  Constitutional:       General: She is not in acute distress  HENT:      Head: Normocephalic  Mouth/Throat:      Pharynx: No oropharyngeal exudate  Eyes:      General: No scleral icterus  Conjunctiva/sclera: Conjunctivae normal       Pupils: Pupils are equal, round, and reactive to light  Neck:      Musculoskeletal: Neck supple  Thyroid: No thyromegaly  Cardiovascular:      Rate and Rhythm: Normal rate and regular rhythm  Heart sounds: Normal heart sounds  No murmur  Pulmonary:      Effort: Pulmonary effort is normal  No respiratory distress  Breath sounds: Wheezing present  No rales  Abdominal:      General: Bowel sounds are normal  There is no distension  Palpations: Abdomen is soft  Tenderness: There is no abdominal tenderness  There is no guarding or rebound  Musculoskeletal:         General: No tenderness     Lymphadenopathy:      Cervical: No cervical adenopathy  Skin:     Coloration: Skin is not pale  Neurological:      Mental Status: She is alert and oriented to person, place, and time  Sensory: No sensory deficit        Comments: Pt uses a walker to support gait

## 2021-01-23 DIAGNOSIS — J44.1 ACUTE EXACERBATION OF CHRONIC OBSTRUCTIVE PULMONARY DISEASE (COPD) (HCC): ICD-10-CM

## 2021-01-23 DIAGNOSIS — Z20.822 COVID-19 RULED OUT: ICD-10-CM

## 2021-01-23 PROCEDURE — U0005 INFEC AGEN DETEC AMPLI PROBE: HCPCS | Performed by: INTERNAL MEDICINE

## 2021-01-23 PROCEDURE — U0003 INFECTIOUS AGENT DETECTION BY NUCLEIC ACID (DNA OR RNA); SEVERE ACUTE RESPIRATORY SYNDROME CORONAVIRUS 2 (SARS-COV-2) (CORONAVIRUS DISEASE [COVID-19]), AMPLIFIED PROBE TECHNIQUE, MAKING USE OF HIGH THROUGHPUT TECHNOLOGIES AS DESCRIBED BY CMS-2020-01-R: HCPCS | Performed by: INTERNAL MEDICINE

## 2021-01-24 LAB — SARS-COV-2 RNA RESP QL NAA+PROBE: NEGATIVE

## 2021-01-25 ENCOUNTER — HOSPITAL ENCOUNTER (OUTPATIENT)
Dept: RADIOLOGY | Facility: HOSPITAL | Age: 76
Discharge: HOME | End: 2021-01-25
Payer: COMMERCIAL

## 2021-01-25 DIAGNOSIS — J44.1 ACUTE EXACERBATION OF CHRONIC OBSTRUCTIVE PULMONARY DISEASE (COPD) (HCC): ICD-10-CM

## 2021-01-25 PROCEDURE — 71046 X-RAY EXAM CHEST 2 VIEWS: CPT

## 2021-02-17 ENCOUNTER — TELEPHONE (OUTPATIENT)
Dept: FAMILY MEDICINE CLINIC | Facility: CLINIC | Age: 76
End: 2021-02-17

## 2021-02-17 NOTE — TELEPHONE ENCOUNTER
Patient can't come in this Friday for a follow up, she re scheduled to next Friday, the 26th  She still has coughing, and she was not able to take the Prednisone or the Tesselon Pearles because it hurts her stomach  Do you want to give her something else? Please advise

## 2021-02-26 ENCOUNTER — OFFICE VISIT (OUTPATIENT)
Dept: FAMILY MEDICINE CLINIC | Facility: CLINIC | Age: 76
End: 2021-02-26
Payer: COMMERCIAL

## 2021-02-26 VITALS
DIASTOLIC BLOOD PRESSURE: 80 MMHG | HEART RATE: 67 BPM | HEIGHT: 60 IN | TEMPERATURE: 98.4 F | OXYGEN SATURATION: 96 % | RESPIRATION RATE: 16 BRPM | WEIGHT: 135 LBS | SYSTOLIC BLOOD PRESSURE: 140 MMHG | BODY MASS INDEX: 26.5 KG/M2

## 2021-02-26 DIAGNOSIS — J44.1 ACUTE EXACERBATION OF CHRONIC OBSTRUCTIVE PULMONARY DISEASE (COPD) (HCC): ICD-10-CM

## 2021-02-26 DIAGNOSIS — K21.9 GASTROESOPHAGEAL REFLUX DISEASE WITHOUT ESOPHAGITIS: ICD-10-CM

## 2021-02-26 DIAGNOSIS — E78.5 HYPERLIPIDEMIA ASSOCIATED WITH TYPE 2 DIABETES MELLITUS (HCC): ICD-10-CM

## 2021-02-26 DIAGNOSIS — E11.69 HYPERLIPIDEMIA ASSOCIATED WITH TYPE 2 DIABETES MELLITUS (HCC): ICD-10-CM

## 2021-02-26 DIAGNOSIS — E11.8 TYPE II DIABETES MELLITUS WITH MANIFESTATIONS (HCC): Primary | ICD-10-CM

## 2021-02-26 PROCEDURE — 1036F TOBACCO NON-USER: CPT | Performed by: INTERNAL MEDICINE

## 2021-02-26 PROCEDURE — 99214 OFFICE O/P EST MOD 30 MIN: CPT | Performed by: INTERNAL MEDICINE

## 2021-02-26 PROCEDURE — 1160F RVW MEDS BY RX/DR IN RCRD: CPT | Performed by: INTERNAL MEDICINE

## 2021-02-26 RX ORDER — OMEPRAZOLE 20 MG/1
20 CAPSULE, DELAYED RELEASE ORAL DAILY
Qty: 180 CAPSULE | Refills: 3 | Status: SHIPPED | OUTPATIENT
Start: 2021-02-26 | End: 2022-02-02 | Stop reason: SDUPTHER

## 2021-02-26 NOTE — PROGRESS NOTES
Assessment/Plan:         Diagnoses and all orders for this visit:    Type II diabetes mellitus with manifestations ([facility] [address] ); life style mod  Continue same  Renew :  -     Lancets  RTC in 3mos w :  -     Comprehensive metabolic panel; Future  -     Magnesium; Future  -     Hemoglobin A1C; Future  -     UA (URINE) with reflex to Scope; Future    Acute exacerbation of chronic obstructive pulmonary disease (COPD) ([facility] [address] [address] ); Improved nicely  -     Nebulizer  -     Nebulizer Supplies    Hyperlipidemia associated with type 2 diabetes mellitus (HCC);continue Crestor  RTC in 3mos w ;  -     Lipid panel; Future    Gastroesophageal reflux disease without esophagitis  -     omeprazole (PriLOSEC) 20 mg delayed release capsule; Take 1 capsule (20 mg total) by mouth daily        Subjective:      Patient ID: Megan Armstrong is a 76 y o  female  [address] is here for Regular check up, she feels much better, no new symptoms, recent Blood work and med list reviewed    The following portions of the patient's history were reviewed and updated as appropriate: allergies, current medications, past family history, past social history, past surgical history and problem list     Review of Systems   Constitutional: Negative for chills, fatigue and fever  HENT: Negative for congestion, facial swelling, sore throat, trouble swallowing and voice change  Eyes: Negative for pain, discharge and visual disturbance  Respiratory: Negative for cough, shortness of breath and wheezing  Cardiovascular: Negative for chest pain, palpitations and leg swelling  Gastrointestinal: Negative for abdominal pain, blood in stool, constipation, diarrhea and nausea  Endocrine: Negative for polydipsia, polyphagia and polyuria  Genitourinary: Negative for difficulty urinating, hematuria and urgency  Musculoskeletal: Negative for arthralgias and myalgias  Skin: Negative for rash     Neurological: Negative for dizziness, tremors, weakness and headaches  Hematological: Negative for adenopathy  Does not bruise/bleed easily  Psychiatric/Behavioral: Negative for dysphoric mood, sleep disturbance and suicidal ideas  Objective:      /80 (BP Location: Left arm, Patient Position: Sitting, Cuff Size: Standard)   Pulse 67   Temp 98 4 °F (36 9 °C) (Tympanic)   Resp 16   Ht 5' (1 524 m)   Wt 61 2 kg (135 lb)   LMP  (LMP Unknown)   SpO2 96%   BMI 26 37 kg/m²          Physical Exam  Constitutional:       General: She is not in acute distress  HENT:      Head: Normocephalic  Mouth/Throat:      Pharynx: No oropharyngeal exudate  Eyes:      General: No scleral icterus  Conjunctiva/sclera: Conjunctivae normal       Pupils: Pupils are equal, round, and reactive to light  Neck:      Musculoskeletal: Neck supple  Thyroid: No thyromegaly  Cardiovascular:      Rate and Rhythm: Normal rate and regular rhythm  Heart sounds: Murmur present  Pulmonary:      Effort: Pulmonary effort is normal  No respiratory distress  Breath sounds: Normal breath sounds  No wheezing or rales  Abdominal:      General: Bowel sounds are normal  There is no distension  Palpations: Abdomen is soft  Tenderness: There is no abdominal tenderness  There is no guarding or rebound  Musculoskeletal:         General: Tenderness present  Lymphadenopathy:      Cervical: No cervical adenopathy  Skin:     Coloration: Skin is not pale  Findings: No rash  Neurological:      Mental Status: She is alert and oriented to person, place, and time  Sensory: No sensory deficit  Motor: No weakness  BMI Counseling: Body mass index is 26 37 kg/m²  The BMI is above normal  Nutrition recommendations include reducing portion sizes and decreasing overall calorie intake

## 2021-02-26 NOTE — PATIENT INSTRUCTIONS
Low Fat Diet   AMBULATORY CARE:   A low-fat diet  is an eating plan that is low in total fat, unhealthy fat, and cholesterol  You may need to follow a low-fat diet if you have trouble digesting or absorbing fat  You may also need to follow this diet if you have high cholesterol  You can also lower your cholesterol by increasing the amount of fiber in your diet  Soluble fiber is a type of fiber that helps to decrease cholesterol levels  Different types of fat in food:   · Limit unhealthy fats  A diet that is high in cholesterol, saturated fat, and trans fat may cause unhealthy cholesterol levels  Unhealthy cholesterol levels increase your risk of heart disease  ? Cholesterol:  Limit intake of cholesterol to less than 200 mg per day  Cholesterol is found in meat, eggs, and dairy  ? Saturated fat:  Limit saturated fat to less than 7% of your total daily calories  Ask your dietitian how many calories you need each day  Saturated fat is found in butter, cheese, ice cream, whole milk, and palm oil  Saturated fat is also found in meat, such as beef, pork, chicken skin, and processed meats  Processed meats include sausage, hot dogs, and bologna  ? Trans fat:  Avoid trans fat as much as possible  Trans fat is used in fried and baked foods  Foods that say trans fat free on the label may still have up to 0 5 grams of trans fat per serving  · Include healthy fats  Replace foods that are high in saturated and trans fat with foods high in healthy fats  This may help to decrease high cholesterol levels  ? Monounsaturated fats: These are found in avocados, nuts, and vegetable oils, such as olive, canola, and sunflower oil  ? Polyunsaturated fats: These can be found in vegetable oils, such as soybean or corn oil  Omega-3 fats can help to decrease the risk of heart disease  Omega-3 fats are found in fish, such as salmon, herring, trout, and tuna   Omega-3 fats can also be found in plant foods, such as walnuts, flaxseed, soybeans, and canola oil  Foods to limit or avoid:   · Grains:      ? Snacks that are made with partially hydrogenated oils, such as chips, regular crackers, and butter-flavored popcorn    ? High-fat baked goods, such as biscuits, croissants, doughnuts, pies, cookies, and pastries    · Dairy:      ? Whole milk, 2% milk, and yogurt and ice cream made with whole milk    ? Half and half creamer, heavy cream, and whipping cream    ? Cheese, cream cheese, and sour cream    · Meats and proteins:      ? High-fat cuts of meat (T-bone steak, regular hamburger, and ribs)    ? Fried meat, poultry (turkey and chicken), and fish    ? Poultry (chicken and turkey) with skin    ? Cold cuts (salami or bologna), hot dogs, fraga, and sausage    ? Whole eggs and egg yolks    · Vegetables and fruits with added fat:      ? Fried vegetables or vegetables in butter or high-fat sauces, such as cream or cheese sauces    ? Fried fruit or fruit served with butter or cream    · Fats:      ? Butter, stick margarine, and shortening    ? Coconut, palm oil, and palm kernel oil    Foods to include:   · Grains:      ? Whole-grain breads, cereals, pasta, and brown rice    ? Low-fat crackers and pretzels    · Vegetables and fruits:      ? Fresh, frozen, or canned vegetables (no salt or low-sodium)    ? Fresh, frozen, dried, or canned fruit (canned in light syrup or fruit juice)    ? Avocado    · Low-fat dairy products:      ? Nonfat (skim) or 1% milk    ? Nonfat or low-fat cheese, yogurt, and cottage cheese    · Meats and proteins:      ? Chicken or turkey with no skin    ? Baked or broiled fish    ? Lean beef and pork (loin, round, extra lean hamburger)    ? Beans and peas, unsalted nuts, soy products    ? Egg whites and substitutes    ? Seeds and nuts    · Fats:      ? Unsaturated oil, such as canola, olive, peanut, soybean, or sunflower oil    ? Soft or liquid margarine and vegetable oil spread    ?  Low-fat salad dressing    Other ways to decrease fat:   · Read food labels before you buy foods  Choose foods that have less than 30% of calories from fat  Choose low-fat or fat-free dairy products  Remember that fat free does not mean calorie free  These foods still contain calories, and too many calories can lead to weight gain  · Trim fat from meat and avoid fried food  Trim all visible fat from meat before you cook it  Remove the skin from poultry  Do not chapman meat, fish, or poultry  Bake, roast, boil, or broil these foods instead  Avoid fried foods  Eat a baked potato instead of [place] fries  Steam vegetables instead of sautéing them in butter  · Add less fat to foods  Use imitation fraga bits on salads and baked potatoes instead of regular fraga bits  Use fat-free or low-fat salad dressings instead of regular dressings  Use low-fat or nonfat butter-flavored topping instead of regular butter or margarine on popcorn and other foods  Ways to decrease fat in recipes:  Replace high-fat ingredients with low-fat or nonfat ones  This may cause baked goods to be drier than usual  You may need to use nonfat cooking spray on pans to prevent food from sticking  You also may need to change the amount of other ingredients, such as water, in the recipe  Try the following:  · Use low-fat or light margarine instead of regular margarine or shortening  · Use lean ground turkey breast or chicken, or lean ground beef (less than 5% fat) instead of hamburger  · Add 1 teaspoon of canola oil to 8 ounces of skim milk instead of using cream or half and half  · Use grated zucchini, carrots, or apples in breads instead of coconut  · Use blenderized, low-fat cottage cheese, plain tofu, or low-fat ricotta cheese instead of cream cheese  · Use 1 egg white and 1 teaspoon of canola oil, or use ¼ cup (2 ounces) of fat-free egg substitute instead of a whole egg       · Replace half of the oil that is called for in a recipe with applesauce when you bake  Use 3 tablespoons of cocoa powder and 1 tablespoon of canola oil instead of a square of baking chocolate  How to increase fiber:  Eat enough high-fiber foods to get 20 to 30 grams of fiber every day  Slowly increase your fiber intake to avoid stomach cramps, gas, and other problems  · Eat 3 ounces of whole-grain foods each day  An ounce is about 1 slice of bread  Eat whole-grain breads, such as whole-wheat bread  Whole wheat, whole-wheat flour, or other whole grains should be listed as the first ingredient on the food label  Replace white flour with whole-grain flour or use half of each in recipes  Whole-grain flour is heavier than white flour, so you may have to add more yeast or baking powder  · Eat a high-fiber cereal for breakfast   Oatmeal is a good source of soluble fiber  Look for cereals that have bran or fiber in the name  Choose whole-grain products, such as brown rice, barley, and whole-wheat pasta  · Eat more beans, peas, and lentils  For example, add beans to soups or salads  Eat at least 5 cups of fruits and vegetables each day  Eat fruits and vegetables with the peel because the peel is high in fiber  © Copyright [address] Information is for End User's use only and may not be sold, redistributed or otherwise used for commercial purposes  All illustrations and images included in CareNotes® are the copyrighted property of A D A M , Inc  or [address] [place] [address]  The above information is an  only  It is not intended as medical advice for individual conditions or treatments  Talk to your doctor, nurse or pharmacist before following any medical regimen to see if it is safe and effective for you  Heart Healthy Diet   AMBULATORY CARE:   A heart healthy diet  is an eating plan low in unhealthy fats and sodium (salt)  The plan is high in healthy fats and fiber   A heart healthy diet helps improve your cholesterol levels and lowers your risk for heart disease and stroke  A dietitian will teach you how to read and understand food labels  Heart healthy diet guidelines to follow:   · Choose foods that contain healthy fats  ? Unsaturated fats  include monounsaturated and polyunsaturated fats  Unsaturated fat is found in foods such as soybean, canola, olive, corn, and safflower oils  It is also found in soft tub margarine that is made with liquid vegetable oil  ? Omega-3 fat  is found in certain fish, such as salmon, tuna, and trout, and in walnuts and flaxseed  Eat fish high in omega-3 fats at least 2 times a week  · Get 20 to 30 grams of fiber each day  Fruits, vegetables, whole-grain foods, and legumes (cooked beans) are good sources of fiber  · Limit or do not have unhealthy fats  ? Cholesterol  is found in animal foods, such as eggs and lobster, and in dairy products made from whole milk  Limit cholesterol to less than 200 mg each day  ? Saturated fat  is found in meats, such as fraga and hamburger  It is also found in chicken or turkey skin, whole milk, and butter  Limit saturated fat to less than 7% of your total daily calories  ? Trans fat  is found in packaged foods, such as potato chips and cookies  It is also in hard margarine, some fried foods, and shortening  Do not eat foods that contain trans fats  · Limit sodium as directed  You may be told to limit sodium to 2,000 to 2,300 mg each day  Choose low-sodium or no-salt-added foods  Add little or no salt to food you prepare  Use herbs and spices in place of salt  Include the following in your heart healthy plan:  Ask your dietitian or healthcare provider how many servings to have from each of the following food groups:  · Grains:      ? Whole-wheat breads, cereals, and pastas, and brown rice    ? Low-fat, low-sodium crackers and chips    · Vegetables:      ? Broccoli, green beans, green peas, and spinach    ? Collards, kale, and lima beans    ?  Carrots, sweet potatoes, tomatoes, and peppers    ? Canned vegetables with no salt added    · Fruits:      ? Bananas, peaches, pears, and pineapple    ? Grapes, raisins, and dates    ? Oranges, tangerines, grapefruit, orange juice, and grapefruit juice    ? Apricots, mangoes, melons, and papaya    ? Raspberries and strawberries    ? Canned fruit with no added sugar    · Low-fat dairy:      ? Nonfat (skim) milk, 1% milk, and low-fat almond, cashew, or soy milks fortified with calcium    ? Low-fat cheese, regular or frozen yogurt, and cottage cheese    · Meats and proteins:      ? Lean cuts of beef and pork (loin, leg, round), skinless chicken and turkey    ? Legumes, soy products, egg whites, or nuts    Limit or do not include the following in your heart healthy plan:   · Unhealthy fats and oils:      ? Whole or 2% milk, cream cheese, sour cream, or cheese    ? High-fat cuts of beef (T-bone steaks, ribs), chicken or turkey with skin, and organ meats such as liver    ? Butter, stick margarine, shortening, and cooking oils such as coconut or palm oil    · Foods and liquids high in sodium:      ? Packaged foods, such as frozen dinners, cookies, macaroni and cheese, and cereals with more than 300 mg of sodium per serving    ? Vegetables with added sodium, such as instant potatoes, vegetables with added sauces, or regular canned vegetables    ? Cured or smoked meats, such as hot dogs, fraga, and sausage    ? High-sodium ketchup, barbecue sauce, salad dressing, pickles, olives, soy sauce, or miso    · Foods and liquids high in sugar:      ? Candy, cake, cookies, pies, or doughnuts    ? Soft drinks (soda), sports drinks, or sweetened tea    ? Canned or dry mixes for cakes, soups, sauces, or gravies    Other healthy heart guidelines:   · Do not smoke  Nicotine and other chemicals in cigarettes and cigars can cause lung and heart damage  Ask your healthcare provider for information if you currently smoke and need help to quit  E-cigarettes or smokeless tobacco still contain nicotine  Talk to your healthcare provider before you use these products  · Limit or do not drink alcohol as directed  Alcohol can damage your heart and raise your blood pressure  Your healthcare provider may give you specific daily and weekly limits  The general recommended limit is 1 drink a day for women 21 or older and for men 72 or older  Do not have more than 3 drinks in a day or 7 in a week  The recommended limit is 2 drinks a day for men 24to 59years of age  Do not have more than 4 drinks in a day or 14 in a week  A drink of alcohol is 12 ounces of beer, 5 ounces of wine, or 1½ ounces of liquor  · Exercise regularly  Exercise can help you maintain a healthy weight and improve your blood pressure and cholesterol levels  Regular exercise can also decrease your risk for heart problems  Ask your healthcare provider about the best exercise plan for you  Do not start an exercise program without asking your healthcare provider  Follow up with your doctor or cardiologist as directed:  Write down your questions so you remember to ask them during your visits  © Copyright [address] [place] [address] Drive Information is for End User's use only and may not be sold, redistributed or otherwise used for commercial purposes  All illustrations and images included in CareNotes® are the copyrighted property of A D A M , Inc  or [address] [place] [address]  The above information is an  only  It is not intended as medical advice for individual conditions or treatments  Talk to your doctor, nurse or pharmacist before following any medical regimen to see if it is safe and effective for you  Calorie Counting Diet   WHAT YOU NEED TO KNOW:   What is a calorie counting diet? It is a meal plan based on counting calories each day to reach a healthy body weight  You will need to eat fewer calories if you are trying to lose weight   Weight loss may decrease your risk for certain health problems or improve your health if you have health problems  Some of these health problems include heart disease, high blood pressure, and diabetes  What foods should I avoid? Your dietitian will tell you if you need to avoid certain foods based on your body weight and health condition  You may need to avoid high-fat foods if you are at risk for or have heart disease  You may need to eat fewer foods from the breads and starches food group if you have diabetes  How many calories are in foods? The following is a list of foods and drinks with the approximate number of calories in each  Check the food label to find the exact number of calories  A dietitian can tell you how many calories you should have from each food group each day  · Carbohydrate:      ? ½ of a 3-inch bagel, 1 slice of bread, or ½ of a hamburger bun or hot dog bun (80)    ? 1 (8-inch) flour tortilla or ½ cup of cooked rice (100)    ? 1 (6-inch) corn tortilla (80)    ? 1 (6-inch) pancake or 1 cup of bran flakes cereal (110)    ? ½ cup of cooked cereal (80)    ? ½ cup of cooked pasta (85)    ? 1 ounce of pretzels (100)    ? 3 cups of air-popped popcorn without butter or oil (80)    · Dairy:      ? 1 cup of skim or 1% milk (90)    ? 1 cup of 2% milk (120)    ? 1 cup of whole milk (160)    ? 1 cup of 2% chocolate milk (220)    ? 1 ounce of low-fat cheese with 3 grams of fat per ounce (70)    ? 1 ounce of cheddar cheese (114)    ? ½ cup of 1% fat cottage cheese (80)    ? 1 cup of plain or sugar-free, fat-free yogurt (90)    · Protein foods:      ? 3 ounces of fish (not breaded or fried) (95)    ? 3 ounces of breaded, fried fish (195)    ? ¾ cup of tuna canned in water (105)    ? 3 ounces of chicken breast without skin (105)    ? 1 fried chicken breast with skin (350)    ? ¼ cup of fat free egg substitute (40)    ? 1 large egg (75)    ? 3 ounces of lean beef or pork (165)    ? 3 ounces of fried pork chop or ham (185)    ?  ½ cup of cooked dried beans, such as kidney, jose, lentils, or navy (115)    ? 3 ounces of bologna or lunch meat (225)    ? 2 links of breakfast sausage (140)    · Vegetables:      ? ½ cup of sliced mushrooms (10)    ? 1 cup of salad greens, such as lettuce, spinach, or lyle (15)    ? ½ cup of steamed asparagus (20)    ? ½ cup of cooked summer squash, zucchini squash, or green or wax beans (25)    ? 1 cup of broccoli or cauliflower florets, or 1 medium tomato (25)    ? 1 large raw carrot or ½ cup of cooked carrots (40)    ? ? of a medium cucumber or 1 stalk of celery (5)    ? 1 small baked potato (160)    ? 1 cup of breaded, fried vegetables (230)    · Fruit:      ? 1 (6-inch) banana (55)     ? ½ of a 4-inch grapefruit (55)    ? 15 grapes (60)    ? 1 medium orange or apple (70)    ? 1 large peach (65)    ? 1 cup of fresh pineapple chunks (75)    ? 1 cup of melon cubes (50)    ? 1¼ cups of whole strawberries (45)    ? ½ cup of fruit canned in juice (55)    ? ½ cup of fruit canned in heavy syrup (110)    ? ? cup of raisins (130)    ? ½ cup of unsweetened fruit juice (60)    ? ½ cup of grape, cranberry, or prune juice (90)    · Fat:      ? 10 peanuts or 2 teaspoons of peanut butter (55)    ? 2 tablespoons of avocado or 1 tablespoon of regular salad dressing (45)    ? 2 slices of fraga (90)    ? 1 teaspoon of oil, such as safflower, canola, corn, or olive oil (45)    ? 2 teaspoons of low-fat margarine, or 1 tablespoon of low-fat mayonnaise (50)    ? 1 teaspoon of regular margarine (40)    ? 1 tablespoon of regular mayonnaise (135)    ? 1 tablespoon of cream cheese or 2 tablespoons of low-fat cream cheese (45)    ? 2 tablespoons of vegetable shortening (215)    · Dessert and sweets:      ? 8 animal crackers or 5 vanilla wafers (80)    ? 1 frozen fruit juice bar (80)    ? ½ cup of ice milk or low-fat frozen yogurt (90)    ? ½ cup of sherbet or sorbet (125)    ? ½ cup of sugar-free pudding or custard (60)    ?  ½ cup of ice cream (140)    ? ½ cup of pudding or custard (175)    ? 1 (2-inch) square chocolate brownie (185)    · Combination foods:      ? Bean burrito made with an 8-inch tortilla, without cheese (275)    ? Chicken breast sandwich with lettuce and tomato (325)    ? 1 cup of chicken noodle soup (60)    ? 1 beef taco (175)    ? Regular hamburger with lettuce and tomato (310)    ? Regular cheeseburger with lettuce and tomato (410)     ? ¼ of a 12-inch cheese pizza (280)    ? Fried fish sandwich with lettuce and tomato (425)    ? Hot dog and bun (275)    ? 1½ cups of macaroni and cheese (310)    ? Taco salad with a fried tortilla shell (870)    · Low-calorie foods:      ? 1 tablespoon of ketchup or 1 tablespoon of fat free sour cream (15)    ? 1 teaspoon of mustard (5)    ? ¼ cup of salsa (20)    ? 1 large dill pickle (15)    ? 1 tablespoon of fat free salad dressing (10)    ? 2 teaspoons of low-sugar, light jam or jelly, or 1 tablespoon of sugar-free syrup (15)    ? 1 sugar-free popsicle (15)    ? 1 cup of club soda, seltzer water, or diet soda (0)    CARE AGREEMENT:   You have the right to help plan your care  Discuss treatment options with your healthcare provider to decide what care you want to receive  You always have the right to refuse treatment  The above information is an  only  It is not intended as medical advice for individual conditions or treatments  Talk to your doctor, nurse or pharmacist before following any medical regimen to see if it is safe and effective for you  © Copyright [address] Information is for End User's use only and may not be sold, redistributed or otherwise used for commercial purposes   All illustrations and images included in CareNotes® are the copyrighted property of A D A M , Inc  or [facility] [employer]

## 2021-03-11 DIAGNOSIS — F41.9 ANXIETY: ICD-10-CM

## 2021-03-11 RX ORDER — ALPRAZOLAM 0.25 MG/1
0.25 TABLET ORAL DAILY PRN
Qty: 30 TABLET | Refills: 1 | Status: SHIPPED | OUTPATIENT
Start: 2021-03-11 | End: 2021-05-10 | Stop reason: SDUPTHER

## 2021-03-18 DIAGNOSIS — I25.83 CORONARY ARTERY DISEASE DUE TO LIPID RICH PLAQUE: Primary | ICD-10-CM

## 2021-03-18 DIAGNOSIS — I25.10 CORONARY ARTERY DISEASE DUE TO LIPID RICH PLAQUE: Primary | ICD-10-CM

## 2021-03-18 DIAGNOSIS — J44.9 CHRONIC OBSTRUCTIVE PULMONARY DISEASE, UNSPECIFIED COPD TYPE (HCC): ICD-10-CM

## 2021-03-18 RX ORDER — ALBUTEROL SULFATE 90 UG/1
2 AEROSOL, METERED RESPIRATORY (INHALATION) EVERY 6 HOURS PRN
Qty: 1 INHALER | Refills: 5 | Status: SHIPPED | OUTPATIENT
Start: 2021-03-18 | End: 2021-04-09 | Stop reason: SDUPTHER

## 2021-03-18 RX ORDER — NITROGLYCERIN 0.4 MG/1
0.4 TABLET SUBLINGUAL
Qty: 30 TABLET | Refills: 5 | Status: SHIPPED | OUTPATIENT
Start: 2021-03-18

## 2021-03-21 ENCOUNTER — IMMUNIZATIONS (OUTPATIENT)
Dept: FAMILY MEDICINE CLINIC | Facility: HOSPITAL | Age: 76
End: 2021-03-21

## 2021-03-21 DIAGNOSIS — Z23 ENCOUNTER FOR IMMUNIZATION: Primary | ICD-10-CM

## 2021-03-21 PROCEDURE — 0001A SARS-COV-2 / COVID-19 MRNA VACCINE (PFIZER-BIONTECH) 30 MCG: CPT

## 2021-03-21 PROCEDURE — 91300 SARS-COV-2 / COVID-19 MRNA VACCINE (PFIZER-BIONTECH) 30 MCG: CPT

## 2021-04-09 ENCOUNTER — OFFICE VISIT (OUTPATIENT)
Dept: FAMILY MEDICINE CLINIC | Facility: CLINIC | Age: 76
End: 2021-04-09
Payer: COMMERCIAL

## 2021-04-09 VITALS
TEMPERATURE: 97.4 F | HEART RATE: 98 BPM | BODY MASS INDEX: 26.7 KG/M2 | SYSTOLIC BLOOD PRESSURE: 150 MMHG | WEIGHT: 136 LBS | DIASTOLIC BLOOD PRESSURE: 84 MMHG | OXYGEN SATURATION: 97 % | RESPIRATION RATE: 16 BRPM | HEIGHT: 60 IN

## 2021-04-09 DIAGNOSIS — E78.5 HYPERLIPIDEMIA ASSOCIATED WITH TYPE 2 DIABETES MELLITUS (HCC): ICD-10-CM

## 2021-04-09 DIAGNOSIS — I25.83 CORONARY ARTERY DISEASE DUE TO LIPID RICH PLAQUE: ICD-10-CM

## 2021-04-09 DIAGNOSIS — J44.9 CHRONIC OBSTRUCTIVE PULMONARY DISEASE, UNSPECIFIED COPD TYPE (HCC): ICD-10-CM

## 2021-04-09 DIAGNOSIS — J45.909 MODERATE ASTHMA, UNSPECIFIED WHETHER COMPLICATED, UNSPECIFIED WHETHER PERSISTENT: ICD-10-CM

## 2021-04-09 DIAGNOSIS — E11.69 HYPERLIPIDEMIA ASSOCIATED WITH TYPE 2 DIABETES MELLITUS (HCC): ICD-10-CM

## 2021-04-09 DIAGNOSIS — I10 BENIGN HYPERTENSION: ICD-10-CM

## 2021-04-09 DIAGNOSIS — I25.10 CORONARY ARTERY DISEASE DUE TO LIPID RICH PLAQUE: ICD-10-CM

## 2021-04-09 DIAGNOSIS — I10 ESSENTIAL HYPERTENSION: ICD-10-CM

## 2021-04-09 DIAGNOSIS — Z00.01 ENCOUNTER FOR GENERAL ADULT MEDICAL EXAMINATION WITH ABNORMAL FINDINGS: Primary | ICD-10-CM

## 2021-04-09 PROCEDURE — 99214 OFFICE O/P EST MOD 30 MIN: CPT | Performed by: INTERNAL MEDICINE

## 2021-04-09 PROCEDURE — G0439 PPPS, SUBSEQ VISIT: HCPCS | Performed by: INTERNAL MEDICINE

## 2021-04-09 PROCEDURE — 1036F TOBACCO NON-USER: CPT | Performed by: INTERNAL MEDICINE

## 2021-04-09 PROCEDURE — 3077F SYST BP >= 140 MM HG: CPT | Performed by: INTERNAL MEDICINE

## 2021-04-09 PROCEDURE — 3288F FALL RISK ASSESSMENT DOCD: CPT | Performed by: INTERNAL MEDICINE

## 2021-04-09 PROCEDURE — 3725F SCREEN DEPRESSION PERFORMED: CPT | Performed by: INTERNAL MEDICINE

## 2021-04-09 PROCEDURE — 1125F AMNT PAIN NOTED PAIN PRSNT: CPT | Performed by: INTERNAL MEDICINE

## 2021-04-09 PROCEDURE — 1160F RVW MEDS BY RX/DR IN RCRD: CPT | Performed by: INTERNAL MEDICINE

## 2021-04-09 PROCEDURE — 3079F DIAST BP 80-89 MM HG: CPT | Performed by: INTERNAL MEDICINE

## 2021-04-09 PROCEDURE — 1170F FXNL STATUS ASSESSED: CPT | Performed by: INTERNAL MEDICINE

## 2021-04-09 RX ORDER — ROSUVASTATIN CALCIUM 10 MG/1
10 TABLET, COATED ORAL DAILY
Qty: 90 TABLET | Refills: 3 | Status: SHIPPED | OUTPATIENT
Start: 2021-04-09 | End: 2021-07-16 | Stop reason: SDUPTHER

## 2021-04-09 RX ORDER — ALBUTEROL SULFATE 90 UG/1
2 AEROSOL, METERED RESPIRATORY (INHALATION) EVERY 6 HOURS PRN
Qty: 1 INHALER | Refills: 5 | Status: SHIPPED | OUTPATIENT
Start: 2021-04-09 | End: 2021-07-16

## 2021-04-09 RX ORDER — BISOPROLOL FUMARATE 10 MG/1
10 TABLET ORAL DAILY
Qty: 90 TABLET | Refills: 3 | Status: SHIPPED | OUTPATIENT
Start: 2021-04-09 | End: 2021-07-16 | Stop reason: SDUPTHER

## 2021-04-09 RX ORDER — AMLODIPINE BESYLATE 5 MG/1
5 TABLET ORAL 2 TIMES DAILY
Qty: 180 TABLET | Refills: 3 | Status: SHIPPED | OUTPATIENT
Start: 2021-04-09 | End: 2021-07-16 | Stop reason: SDUPTHER

## 2021-04-09 RX ORDER — ALBUTEROL SULFATE 1.25 MG/3ML
3 SOLUTION RESPIRATORY (INHALATION) EVERY 6 HOURS PRN
Qty: 225 ML | Refills: 5 | Status: SHIPPED | OUTPATIENT
Start: 2021-04-09 | End: 2021-12-20 | Stop reason: SDUPTHER

## 2021-04-09 RX ORDER — CLOPIDOGREL BISULFATE 75 MG
75 TABLET ORAL DAILY
Qty: 90 TABLET | Refills: 3 | Status: SHIPPED | OUTPATIENT
Start: 2021-04-09 | End: 2021-07-16 | Stop reason: SDUPTHER

## 2021-04-09 NOTE — PROGRESS NOTES
Assessment/Plan:         Diagnoses and all orders for this visit:    Encounter for general adult medical examination with abnormal findings; done in Detail    Hyperlipidemia associated with type 2 diabetes mellitus ([place] [id number] ); renew :  -     rosuvastatin (Crestor) 10 MG tablet; Take 1 tablet (10 mg total) by mouth daily Please stop Livalo,   RTC in 3mos w Blood  Work    Coronary artery disease due to lipid rich plaque; continue same  FU w cardiology  Renew :  -     Plavix 75 MG tablet; Take 1 tablet (75 mg total) by mouth daily    Renew :  -     bisoprolol (ZEBETA) 10 MG tablet; Take 1 tablet (10 mg total) by mouth daily    Essential hypertension; Continue same  Renew :  -     amLODIPine (NORVASC) 5 mg tablet; Take 1 tablet (5 mg total) by mouth 2 (two) times a day  RTC in 3mos w Blood work    Chronic obstructive pulmonary disease, unspecified COPD type ([place] [address] )  -     albuterol (PROVENTIL HFA,VENTOLIN HFA) 90 mcg/act inhaler; Inhale 2 puffs every 6 (six) hours as needed for wheezing or shortness of breath    Moderate asthma, unspecified whether complicated, unspecified whether persistent  -     albuterol (ACCUNEB) 1 25 MG/3ML nebulizer solution; Take 3 mL by nebulization every 6 (six) hours as needed for wheezing        Subjective:      Patient ID: Stacey Leon is a 76 y o  female  [address] is here for AWV and Regular check up, she feels the same, Recent Blood work and med list reviewed    The following portions of the patient's history were reviewed and updated as appropriate: allergies, current medications, past family history, past medical history, past social history, past surgical history and problem list     Review of Systems   Constitutional: Negative for chills, fatigue and fever  HENT: Negative for congestion, facial swelling, sore throat, trouble swallowing and voice change  Eyes: Negative for pain, discharge and visual disturbance     Respiratory: Negative for cough, shortness of breath and wheezing  Cardiovascular: Negative for chest pain, palpitations and leg swelling  Gastrointestinal: Negative for abdominal pain, blood in stool, constipation, diarrhea and nausea  Endocrine: Negative for polydipsia, polyphagia and polyuria  Genitourinary: Negative for difficulty urinating, hematuria and urgency  Musculoskeletal: Negative for arthralgias and myalgias  Skin: Negative for rash  Neurological: Negative for dizziness, tremors, weakness and headaches  Hematological: Negative for adenopathy  Does not bruise/bleed easily  Psychiatric/Behavioral: Negative for dysphoric mood, sleep disturbance and suicidal ideas  Objective:      /84 (BP Location: Left arm, Patient Position: Sitting, Cuff Size: Standard)   Pulse 98   Temp (!) 97 4 °F (36 3 °C) (Tympanic)   Resp 16   Ht 5' (1 524 m)   Wt 61 7 kg (136 lb)   LMP  (LMP Unknown)   SpO2 97%   BMI 26 56 kg/m²          Physical Exam  Constitutional:       General: She is not in acute distress  HENT:      Head: Normocephalic  Mouth/Throat:      Pharynx: No oropharyngeal exudate  Eyes:      General: No scleral icterus  Conjunctiva/sclera: Conjunctivae normal       Pupils: Pupils are equal, round, and reactive to light  Neck:      Musculoskeletal: Neck supple  Thyroid: No thyromegaly  Cardiovascular:      Rate and Rhythm: Normal rate and regular rhythm  Heart sounds: Normal heart sounds  No murmur  Pulmonary:      Effort: Pulmonary effort is normal  No respiratory distress  Breath sounds: Normal breath sounds  No wheezing or rales  Abdominal:      General: Bowel sounds are normal  There is no distension  Palpations: Abdomen is soft  Tenderness: There is no abdominal tenderness  There is no guarding or rebound  Musculoskeletal:         General: Tenderness present  Lymphadenopathy:      Cervical: No cervical adenopathy  Skin:     Coloration: Skin is not pale        Findings: No rash    Neurological:      Mental Status: She is alert and oriented to person, place, and time  Sensory: No sensory deficit        Comments: Pt uses a Walker to support gait

## 2021-04-09 NOTE — PROGRESS NOTES
Assessment and Plan:     Problem List Items Addressed This Visit     None           Preventive health issues were discussed with patient, and age appropriate screening tests were ordered as noted in patient's After Visit Summary  Personalized health advice and appropriate referrals for health education or preventive services given if needed, as noted in patient's After Visit Summary       History of Present Illness:     Patient presents for Medicare Annual Wellness visit    Patient Care Team:  Taj Lucas MD as PCP - General (Internal Medicine)     Problem List:     Patient Active Problem List   Diagnosis    Asthma    Chronic coronary artery disease    Type 2 diabetes mellitus with hyperglycemia, without long-term current use of insulin ([facility] )    Osteoarthritis    Glaucoma    Hiatal hernia    Hyperlipidemia    Hypertension    Hx of CABG    Hx stent of SVG to the RCA    Community acquired pneumonia    Sepsis [facility])      Past Medical and Surgical History:     Past Medical History:   Diagnosis Date    Arthritis     Asthma     Community acquired pneumonia 11/13/2019    Coronary artery disease     DM [facility]diabetes mellitus) ([facility] )     HTN (hypertension)     Hyperlipidemia     Hyponatremia      Past Surgical History:   Procedure Laterality Date    CARDIAC CATHETERIZATION  2010    CORONARY ARTERY BYPASS GRAFT  12/06/2010    with subsequent stent to the saphenous veingraft to the RCA 3 months later    KNEE ARTHROSCOPY      POLYPECTOMY      laryngeal    TOTAL HIP ARTHROPLASTY      TUBAL LIGATION        Family History:     Family History   Problem Relation Age of Onset    Stroke Mother     Hypertension Mother     Heart disease Mother     Colon cancer Father     Heart attack Brother     Heart attack Brother       Social History:     E-Cigarette/Vaping    E-Cigarette Use Never User      E-Cigarette/Vaping Substances    Nicotine No     THC No     CBD No     Flavoring No      Social History Socioeconomic History    Marital status: Single     Spouse name: Not on file    Number of children: 11    Years of education: Not on file    Highest education level: Not on file   Occupational History    Occupation: retired   Social Needs    Financial resource strain: Not hard at all   [place]-Shital insecurity     Worry: Never true     Inability: Never true   hoohbe needs     Medical: No     Non-medical: No   Tobacco Use    Smoking status: Former Smoker     Packs/day: 0 50     Years: 13 00     Pack years: 6 50     Types: Cigarettes     Quit date:      Years since quittin 2    Smokeless tobacco: Never Used    Tobacco comment: no passive smoke exposure   Substance and Sexual Activity    Alcohol use: Yes     Frequency: 2-4 times a month     Drinks per session: 1 or 2     Comment: socially    Drug use: Never    Sexual activity: Not Currently   Lifestyle    Physical activity     Days per week: Patient refused     Minutes per session: Patient refused    Stress: Not at all   Relationships    Social connections     Talks on phone: Patient refused     Gets together: Patient refused     Attends Judaism service: Patient refused     Active member of club or organization: Patient refused     Attends meetings of clubs or organizations: Patient refused     Relationship status: Patient refused    Intimate partner violence     Fear of current or ex partner: No     Emotionally abused: No     Physically abused: No     Forced sexual activity: No   Other Topics Concern    Not on file   Social History Narrative    Not on file      Medications and Allergies:     Current Outpatient Medications   Medication Sig Dispense Refill    Air  (Vicks Air Purifier/HEPA) (Device) MISC Use as directed 1 each 0    albuterol (ACCUNEB) 1 25 MG/3ML nebulizer solution Take 3 mL by nebulization every 6 (six) hours as needed for wheezing 225 mL 5    albuterol (PROVENTIL HFA,VENTOLIN HFA) 90 mcg/act inhaler Inhale 2 puffs every 6 (six) hours as needed for wheezing or shortness of breath 1 Inhaler 5    ALPRAZolam (XANAX) 0 25 mg tablet Take 1 tablet (0 25 mg total) by mouth daily as needed for anxiety 30 tablet 1    amLODIPine (NORVASC) 5 mg tablet Take 1 tablet (5 mg total) by mouth 2 (two) times a day 180 tablet 3    bisoprolol (ZEBETA) 10 MG tablet Take 1 tablet (10 mg total) by mouth daily 90 tablet 3    glucose blood test strip 1 each by Other route as needed (as needed) Use as instructed 100 each 5    nitroglycerin (NITROSTAT) 0 4 mg SL tablet Place 1 tablet (0 4 mg total) under the tongue every 5 (five) minutes as needed for chest pain 30 tablet 5    omeprazole (PriLOSEC) 20 mg delayed release capsule Take 1 capsule (20 mg total) by mouth daily 180 capsule 3    Plavix 75 MG tablet Take 1 tablet (75 mg total) by mouth daily 90 tablet 3    rosuvastatin (Crestor) 10 MG tablet Take 1 tablet (10 mg total) by mouth daily Please stop Livalo,     90 tablet 3     Current Facility-Administered Medications   Medication Dose Route Frequency Provider Last Rate Last Admin    trimethobenzamide (TIGAN) IM injection 200 mg  200 mg Intramuscular Q6H PRN Tres Rizvi MD   200 mg at 01/22/21 1227     Allergies   Allergen Reactions    Ezetimibe Rash    Ace Inhibitors     Lipitor [Atorvastatin]      Able to tolerate Crestor (brand name) per patient    Lisinopril     Losartan     Morphine Hives     pt denies    Olmesartan       Immunizations:     Immunization History   Administered Date(s) Administered    INFLUENZA 10/20/2015, 09/27/2016, 09/28/2016, 11/09/2017    Influenza Split 10/15/2012, 10/29/2013, 09/26/2014    Influenza Split High Dose Preservative Free IM 09/27/2016, 11/09/2017    Influenza, high dose seasonal 0 7 mL 10/08/2018, 09/13/2019, 09/28/2020    Influenza, seasonal, injectable 10/20/2015    Pneumococcal Conjugate 13-Valent 11/09/2017    Pneumococcal Polysaccharide PPV23 11/01/2009, 10/20/2015    SARS-CoV-2 / COVID-19 mRNA IM ([employer]) 03/21/2021    Tdap 02/01/2019      Health Maintenance:         Topic Date Due    Colorectal Cancer Screening  Never done    Hepatitis C Screening  Completed         Topic Date Due    COVID-19 Vaccine (2 - Pfizer 2-dose series) 04/11/2021      Medicare Health Risk Assessment:     LMP  (LMP Unknown)      Ignacio Sullivan is here for her Subsequent Wellness visit  Last Medicare Wellness visit information reviewed, patient interviewed, no change since last AWV  Health Risk Assessment:   Patient rates overall health as fair  Patient feels that their physical health rating is slightly worse  Patient is satisfied with their life  Eyesight was rated as same  Hearing was rated as same  Patient feels that their emotional and mental health rating is same  Patients states they are never, rarely angry  Patient states they are sometimes unusually tired/fatigued  Pain experienced in the last 7 days has been some  Patient's pain rating has been 7/10  Patient states that she has experienced no weight loss or gain in last 6 months  Depression Screening:   PHQ-2 Score: 0      Fall Risk Screening: In the past year, patient has experienced: no history of falling in past year      Urinary Incontinence Screening:   Patient has not leaked urine accidently in the last six months  Home Safety:  Patient has trouble with stairs inside or outside of their home  Patient has working smoke alarms and has working carbon monoxide detector  Home safety hazards include: none  Nutrition:   Current diet is Regular  Medications:   Patient is not currently taking any over-the-counter supplements  Patient is able to manage medications  Activities of Daily Living (ADLs)/Instrumental Activities of Daily Living (IADLs):   Walk and transfer into and out of bed and chair?: Yes  Dress and groom yourself?: Yes    Bathe or shower yourself?: Yes    Feed yourself?  Yes  Do your laundry/housekeeping?: Yes  Manage your money, pay your bills and track your expenses?: Yes  Make your own meals?: Yes    Do your own shopping?: Yes    Previous Hospitalizations:   Any hospitalizations or ED visits within the last 12 months?: No      Advance Care Planning:   Living will: No    Durable POA for healthcare: No    Advanced directive counseling given: No    Five wishes given: No    End of Life Decisions reviewed with patient: No      PREVENTIVE SCREENINGS      Cardiovascular Screening:    General: Screening Not Indicated, History Lipid Disorder and Risks and Benefits Discussed      Diabetes Screening:     General: Screening Not Indicated, History Diabetes and Risks and Benefits Discussed      Colorectal Cancer Screening:     General: Risks and Benefits Discussed      Breast Cancer Screening:     General: Risks and Benefits Discussed      Cervical Cancer Screening:    General: Screening Not Indicated and Risks and Benefits Discussed      Osteoporosis Screening:    General: Risks and Benefits Discussed      Abdominal Aortic Aneurysm (AAA) Screening:        General: Risks and Benefits Discussed      Lung Cancer Screening:     General: Screening Not Indicated and Risks and Benefits Discussed      Hepatitis C Screening:    General: Screening Current and Risks and Benefits Discussed    Screening, Brief Intervention, and Referral to Treatment (SBIRT)    Screening      AUDIT-C Screenin) How often did you have a drink containing alcohol in the past year? 2 to 4 times a month  2) How many drinks did you have on a typical day when you were drinking in the past year?  1 to 2    Single Item Drug Screening:  How often have you used an illegal drug (including marijuana) or a prescription medication for non-medical reasons in the past year? never    Single Item Drug Screen Score: 0  Interpretation: Negative screen for possible drug use disorder    Other Counseling Topics:   Skin self-exam, sunscreen and calcium and vitamin D intake and regular weightbearing exercise         Fredi Lim MD

## 2021-04-09 NOTE — PATIENT INSTRUCTIONS
Physical Therapy Treatment     ASSESSMENT:   Patient seen on 6th floor nursing unit.  Patient presents below baseline which was modified independent with mobility. For safe return to prior living situation the patient needs to be modified independent to supervision for overall mobility. Pt lives with wife Kelsey in one story house with 3 steps with railing to enter via back door. Family newly installed a ramp to access front door, and will be widening sidewalk to accommodate his 4WW. Pt states he has a newly dispensed electric W/C that he has not had a chance to use yet. Pt was receiving home PT/OT with [facility], after 3/25-4/4 IRP stay at [facility]. Admitted with gross hematuria, DM. History of dementia, CVA, recurrent TIA (L sided weakness, slurred speech now resolved) and several falls.    Today's treatment focused on progression of bed mobility, transfer/gait training, and reinforcement of safety with 4ww.  The patient is demonstrating fair progress as evidenced by fatigue.  At this time the patient continues to demonstrate impairments in activity tolerance, balance, safety awareness and strength which is limiting the performance of all functional mobility. Patient is currently functioning at supervision to min assist for overall mobility. Ambulated 150' with 4ww; distance limited fatigue and weakness. Shuffling gait throughout and forward flexed posture; cueing for upright posture and to increase foot clearance however pt unable to correct. Provided family teaching with pt's wife; recommend to use gait belt and provide contact guard assist for safety/stability during ambulation.     Further skilled physical therapy services are reasonable and necessary to address the above impairments and performance deficits. Anticipate safe discharge home with home therapy in order to maximize strength and mobility. Recommend assist for transfers/gait d/t risk of falling; family able to assist.     PT Identified Barriers to  Medicare Preventive Visit Patient Instructions  Thank you for completing your Welcome to Medicare Visit or Medicare Annual Wellness Visit today  Your next wellness visit will be due in one year (4/10/2022)  The screening/preventive services that you may require over the next 5-10 years are detailed below  Some tests may not apply to you based off risk factors and/or age  Screening tests ordered at today's visit but not completed yet may show as past due  Also, please note that scanned in results may not display below  Preventive Screenings:  Service Recommendations Previous Testing/Comments   Colorectal Cancer Screening  * Colonoscopy    * Fecal Occult Blood Test (FOBT)/Fecal Immunochemical Test (FIT)  * Fecal DNA/Cologuard Test  * Flexible Sigmoidoscopy Age: 54-65 years old   Colonoscopy: every 10 years (may be performed more frequently if at higher risk)  OR  FOBT/FIT: every 1 year  OR  Cologuard: every 3 years  OR  Sigmoidoscopy: every 5 years  Screening may be recommended earlier than age 48 if at higher risk for colorectal cancer  Also, an individualized decision between you and your healthcare provider will decide whether screening between the ages of 74-80 would be appropriate  Colonoscopy: Not on file  FOBT/FIT: Not on file  Cologuard: Not on file  Sigmoidoscopy: Not on file          Breast Cancer Screening Age: 36 years old  Frequency: every 1-2 years  Not required if history of left and right mastectomy Mammogram: Not on file        Cervical Cancer Screening Between the ages of 21-29, pap smear recommended once every 3 years  Between the ages of 33-67, can perform pap smear with HPV co-testing every 5 years     Recommendations may differ for women with a history of total hysterectomy, cervical cancer, or abnormal pap smears in past  Pap Smear: Not on file    Screening Not Indicated   Hepatitis C Screening Once for adults born between [facility]  More frequently in patients at high risk for Discharge: decreased activity tolerance, decreased balance       PLAN AND RECOMMENDATIONS:   Objective Measures Impacting Discharge Recommendation:   No objective measures completed this session    Recommendations for Discharge:  Recommendation for Discharge: PT: Home therapy      Plan:   Continue skilled PT, including the following Treatment/Interventions: Functional transfer training;Patient/Family training;Bed mobility;Gait training;Stairs retraining;Safety Education (05/05/19 1500)      ,   Frequency Comments: M-F, (H/HT), HARRISON, AE (05/13/19 1130)     Treatment Plan for Next Session: progress transfers/gait with 4ww (in room); reinforce walker safety; progress bed mobility; family teaching with wife              DIAGNOSIS:   1. Hematuria, gross    2. Gross hematuria    3. Acute urinary retention    4. Benign prostatic hyperplasia with nocturia    5. Coronary artery disease involving native coronary artery of native heart without angina pectoris    6. Essential hypertension, benign    7. Type 2 diabetes, controlled, with neuropathy (CMS/[facility])    8. Gait abnormality           PRECAUTIONS:   Precautions  Other Precautions: admitted with gross hematuria, DM  Precautions Comments: hx dementia, CVA, recurrent TIA       EDUCATION:   On this date, the patient and patient's spouse was educated on bed mobility, transfers and ambulation.  The response to education was: Verbalizes understanding and Needs reinforcement.         Discussed with Patient the need for adequate help at home upon discharge.  Patient currently has sufficient help at their previous living situation.  Please see above for discharge recommendations.     SUBJECTIVE:   Subjective: agreeable to therapy and walking in halls (05/13/19 1130)       OBJECTIVE:   Bed Mobility:    Bed Mobility  Supine to Sit: Supervision (Supv) (05/13/19 1130)  Bed Mobility Comments: supv for safety; increase time/effort to lift trunk from elevated bed (05/13/19 1130)  Hepatitis C Hep C Antibody: 08/30/2019    Screening Current   Diabetes Screening 1-2 times per year if you're at risk for diabetes or have pre-diabetes Fasting glucose: 114 mg/dL   A1C: 6 1    Screening Not Indicated  History Diabetes   Cholesterol Screening Once every 5 years if you don't have a lipid disorder  May order more often based on risk factors  Lipid panel: 09/09/2020    Screening Not Indicated  History Lipid Disorder     Other Preventive Screenings Covered by Medicare:  1  Abdominal Aortic Aneurysm (AAA) Screening: covered once if your at risk  You're considered to be at risk if you have a family history of AAA  2  Lung Cancer Screening: covers low dose CT scan once per year if you meet all of the following conditions: (1) Age 50-69; (2) No signs or symptoms of lung cancer; (3) Current smoker or have quit smoking within the last 15 years; (4) You have a tobacco smoking history of at least 30 pack years (packs per day multiplied by number of years you smoked); (5) You get a written order from a healthcare provider  3  Glaucoma Screening: covered annually if you're considered high risk: (1) You have diabetes OR (2) Family history of glaucoma OR (3)  aged 48 and older OR (3)  American aged 72 and older  3  Osteoporosis Screening: covered every 2 years if you meet one of the following conditions: (1) You're estrogen deficient and at risk for osteoporosis based off medical history and other findings; (2) Have a vertebral abnormality; (3) On glucocorticoid therapy for more than 3 months; (4) Have primary hyperparathyroidism; (5) On osteoporosis medications and need to assess response to drug therapy  · Last bone density test (DXA Scan): Not on file  5  HIV Screening: covered annually if you're between the age of 12-76  Also covered annually if you are younger than 13 and older than 72 with risk factors for HIV infection   For pregnant patients, it is covered up to 3 times per     Transfers:    Transfers  Sit to Stand: Supervision (Supv);Touching/Steadying Assistance (05/13/19 1130)  Stand to Sit: Supervision (Supv);Touching/Steadying Assistance (05/13/19 1130)  Stand Pivot Transfers: Minimal Assist (Min) (05/13/19 1130)  Assistive Device/: 4-wheeled walker;1 Person;Gait Belt (05/13/19 1130)  Transfer Comments 1: CGA for safety; no significant instabiilty (05/13/19 1130)  Transfer Comments 2: cueing for brake management (05/13/19 1130)      Gait:    Gait  Gait Assistance: Minimal Assist (Min) (05/13/19 1130)  Assistive Device/: 4-wheeled walker;1 Person;Gait Belt (05/13/19 1130)  Ambulation Distance (Feet): 150 Feet (05/13/19 1130)  Gait Comments 1: 150' with 4ww; distance limited by fatigue and weakness (05/13/19 1130)  Gait Comments 2: shuffling gait throughout and forward flexed posture; cueing to increase foot clearance and take bigger steps; pt unable to correct (05/13/19 1130)  Gait Comments 3: cueing for proximity to walker (05/13/19 1130)       Stairs:          EQUIPMENT   PT/OT Mobility Equipment for Discharge: owns 4WW, 2WW, has a new electric W/C (05/06/19 1901)  PT/OT ADL Equipment for Discharge: NONE (05/06/19 1310)     GOALS:   Short Term Goals to Be Reviewed On: 05/12/19 (05/05/19 1500)  Short Term Goals = Discharge Goals: Yes (05/05/19 1500)  Goal Agreement: Patient agrees with goals and treatment plan (05/05/19 1500)  Bed Mobility Discharge Goal: modified I (05/05/19 1500)  Transfer Discharge Goal: modified I (05/05/19 1500)  Ambulation Discharge Goal: modified I ambulation with 4WW 150 feet (05/05/19 1500)  Stairs Discharge Goal: min A to manage 3 stairs with single rail, otherwise can also acces house via ramp (05/05/19 1500)       BILLING INFORMATION:   Total Treatment Time: PT Time Spent: 30 minutes   Timed Treatment Minutes:          pregnancy  Immunizations:  Immunization Recommendations   Influenza Vaccine Annual influenza vaccination during flu season is recommended for all persons aged >= 6 months who do not have contraindications   Pneumococcal Vaccine (Prevnar and Pneumovax)  * Prevnar = PCV13  * Pneumovax = PPSV23   Adults 25-60 years old: 1-3 doses may be recommended based on certain risk factors  Adults 72 years old: Prevnar (PCV13) vaccine recommended followed by Pneumovax (PPSV23) vaccine  If already received PPSV23 since turning 65, then PCV13 recommended at least one year after PPSV23 dose  Hepatitis B Vaccine 3 dose series if at intermediate or high risk (ex: diabetes, end stage renal disease, liver disease)   Tetanus (Td) Vaccine - COST NOT COVERED BY MEDICARE PART B Following completion of primary series, a booster dose should be given every 10 years to maintain immunity against tetanus  Td may also be given as tetanus wound prophylaxis  Tdap Vaccine - COST NOT COVERED BY MEDICARE PART B Recommended at least once for all adults  For pregnant patients, recommended with each pregnancy  Shingles Vaccine (Shingrix) - COST NOT COVERED BY MEDICARE PART B  2 shot series recommended in those aged 48 and above     Health Maintenance Due:      Topic Date Due    Colorectal Cancer Screening  Never done    Hepatitis C Screening  Completed     Immunizations Due:      Topic Date Due    COVID-19 Vaccine (2 - Pfizer 2-dose series) 04/11/2021     Advance Directives   What are advance directives? Advance directives are legal documents that state your wishes and plans for medical care  These plans are made ahead of time in case you lose your ability to make decisions for yourself  Advance directives can apply to any medical decision, such as the treatments you want, and if you want to donate organs  What are the types of advance directives? There are many types of advance directives, and each state has rules about how to use them  You may choose a combination of any of the following:  · Living will: This is a written record of the treatment you want  You can also choose which treatments you do not want, which to limit, and which to stop at a certain time  This includes surgery, medicine, IV fluid, and tube feedings  · Durable power of  for healthcare [facility]): This is a written record that states who you want to make healthcare choices for you when you are unable to make them for yourself  This person, called a proxy, is usually a family member or a friend  You may choose more than 1 proxy  · Do not resuscitate (DNR) order:  A DNR order is used in case your heart stops beating or you stop breathing  It is a request not to have certain forms of treatment, such as CPR  A DNR order may be included in other types of advance directives  · Medical directive: This covers the care that you want if you are in a coma, near death, or unable to make decisions for yourself  You can list the treatments you want for each condition  Treatment may include pain medicine, surgery, blood transfusions, dialysis, IV or tube feedings, and a ventilator (breathing machine)  · Values history: This document has questions about your views, beliefs, and how you feel and think about life  This information can help others choose the care that you would choose  Why are advance directives important? An advance directive helps you control your care  Although spoken wishes may be used, it is better to have your wishes written down  Spoken wishes can be misunderstood, or not followed  Treatments may be given even if you do not want them  An advance directive may make it easier for your family to make difficult choices about your care  Weight Management   Why it is important to manage your weight:  Being overweight increases your risk of health conditions such as heart disease, high blood pressure, type 2 diabetes, and certain types of cancer   It can also increase your risk for osteoarthritis, sleep apnea, and other respiratory problems  Aim for a slow, steady weight loss  Even a small amount of weight loss can lower your risk of health problems  How to lose weight safely:  A safe and healthy way to lose weight is to eat fewer calories and get regular exercise  You can lose up about 1 pound a week by decreasing the number of calories you eat by 500 calories each day  Healthy meal plan for weight management:  A healthy meal plan includes a variety of foods, contains fewer calories, and helps you stay healthy  A healthy meal plan includes the following:  · Eat whole-grain foods more often  A healthy meal plan should contain fiber  Fiber is the part of grains, fruits, and vegetables that is not broken down by your body  Whole-grain foods are healthy and provide extra fiber in your diet  Some examples of whole-grain foods are whole-wheat breads and pastas, oatmeal, brown rice, and bulgur  · Eat a variety of vegetables every day  Include dark, leafy greens such as spinach, kale, layla greens, and mustard greens  Eat yellow and orange vegetables such as carrots, sweet potatoes, and winter squash  · Eat a variety of fruits every day  Choose fresh or canned fruit (canned in its own juice or light syrup) instead of juice  Fruit juice has very little or no fiber  · Eat low-fat dairy foods  Drink fat-free (skim) milk or 1% milk  Eat fat-free yogurt and low-fat cottage cheese  Try low-fat cheeses such as mozzarella and other reduced-fat cheeses  · Choose meat and other protein foods that are low in fat  Choose beans or other legumes such as split peas or lentils  Choose fish, skinless poultry (chicken or turkey), or lean cuts of red meat (beef or pork)  Before you cook meat or poultry, cut off any visible fat  · Use less fat and oil  Try baking foods instead of frying them   Add less fat, such as margarine, sour cream, regular salad dressing and mayonnaise to foods  Eat fewer high-fat foods  Some examples of high-fat foods include french fries, doughnuts, ice cream, and cakes  · Eat fewer sweets  Limit foods and drinks that are high in sugar  This includes candy, cookies, regular soda, and sweetened drinks  Exercise:  Exercise at least 30 minutes per day on most days of the week  Some examples of exercise include walking, biking, dancing, and swimming  You can also fit in more physical activity by taking the stairs instead of the elevator or parking farther away from stores  Ask your healthcare provider about the best exercise plan for you  © Copyright [employer] 2018 Information is for End User's use only and may not be sold, redistributed or otherwise used for commercial purposes   All illustrations and images included in CareNotes® are the copyrighted property of [employer] A M , Inc  or [address] [place], [place] [address]

## 2021-04-11 ENCOUNTER — IMMUNIZATIONS (OUTPATIENT)
Dept: FAMILY MEDICINE CLINIC | Facility: HOSPITAL | Age: 76
End: 2021-04-11

## 2021-04-11 DIAGNOSIS — Z23 ENCOUNTER FOR IMMUNIZATION: Primary | ICD-10-CM

## 2021-04-11 PROCEDURE — 0002A SARS-COV-2 / COVID-19 MRNA VACCINE (PFIZER-BIONTECH) 30 MCG: CPT

## 2021-04-11 PROCEDURE — 91300 SARS-COV-2 / COVID-19 MRNA VACCINE (PFIZER-BIONTECH) 30 MCG: CPT

## 2021-05-10 DIAGNOSIS — F41.9 ANXIETY: ICD-10-CM

## 2021-05-10 RX ORDER — ALPRAZOLAM 0.25 MG/1
0.25 TABLET ORAL DAILY PRN
Qty: 30 TABLET | Refills: 1 | Status: SHIPPED | OUTPATIENT
Start: 2021-05-10 | End: 2021-07-12 | Stop reason: SDUPTHER

## 2021-06-03 ENCOUNTER — VBI (OUTPATIENT)
Dept: ADMINISTRATIVE | Facility: OTHER | Age: 76
End: 2021-06-03

## 2021-07-07 DIAGNOSIS — F41.9 ANXIETY: ICD-10-CM

## 2021-07-07 RX ORDER — ALPRAZOLAM 0.25 MG/1
0.25 TABLET ORAL DAILY PRN
Qty: 30 TABLET | Refills: 1 | OUTPATIENT
Start: 2021-07-07

## 2021-07-12 DIAGNOSIS — F41.9 ANXIETY: ICD-10-CM

## 2021-07-12 RX ORDER — ALPRAZOLAM 0.25 MG/1
0.25 TABLET ORAL DAILY PRN
Qty: 30 TABLET | Refills: 1 | Status: SHIPPED | OUTPATIENT
Start: 2021-07-12 | End: 2021-07-16

## 2021-07-12 RX ORDER — ALPRAZOLAM 0.25 MG/1
0.25 TABLET ORAL DAILY PRN
Qty: 30 TABLET | Refills: 1 | OUTPATIENT
Start: 2021-07-12

## 2021-07-16 ENCOUNTER — OFFICE VISIT (OUTPATIENT)
Dept: FAMILY MEDICINE CLINIC | Facility: CLINIC | Age: 76
End: 2021-07-16
Payer: COMMERCIAL

## 2021-07-16 VITALS
DIASTOLIC BLOOD PRESSURE: 82 MMHG | RESPIRATION RATE: 16 BRPM | HEIGHT: 60 IN | HEART RATE: 81 BPM | OXYGEN SATURATION: 93 % | BODY MASS INDEX: 26.39 KG/M2 | SYSTOLIC BLOOD PRESSURE: 120 MMHG | WEIGHT: 134.4 LBS | TEMPERATURE: 98.3 F

## 2021-07-16 DIAGNOSIS — E53.8 LOW VITAMIN B12 LEVEL: ICD-10-CM

## 2021-07-16 DIAGNOSIS — E11.69 HYPERLIPIDEMIA ASSOCIATED WITH TYPE 2 DIABETES MELLITUS (HCC): Primary | ICD-10-CM

## 2021-07-16 DIAGNOSIS — E78.5 HYPERLIPIDEMIA ASSOCIATED WITH TYPE 2 DIABETES MELLITUS (HCC): Primary | ICD-10-CM

## 2021-07-16 DIAGNOSIS — M81.8 IDIOPATHIC OSTEOPOROSIS: ICD-10-CM

## 2021-07-16 DIAGNOSIS — F41.9 ANXIETY: ICD-10-CM

## 2021-07-16 DIAGNOSIS — I10 BENIGN HYPERTENSION: ICD-10-CM

## 2021-07-16 DIAGNOSIS — I10 ESSENTIAL HYPERTENSION: ICD-10-CM

## 2021-07-16 DIAGNOSIS — Z12.11 SCREEN FOR COLON CANCER: ICD-10-CM

## 2021-07-16 DIAGNOSIS — I25.83 CORONARY ARTERY DISEASE DUE TO LIPID RICH PLAQUE: ICD-10-CM

## 2021-07-16 DIAGNOSIS — I25.10 CORONARY ARTERY DISEASE DUE TO LIPID RICH PLAQUE: ICD-10-CM

## 2021-07-16 DIAGNOSIS — E11.65 TYPE 2 DIABETES MELLITUS WITH HYPERGLYCEMIA, WITHOUT LONG-TERM CURRENT USE OF INSULIN (HCC): ICD-10-CM

## 2021-07-16 DIAGNOSIS — Z12.31 SCREENING MAMMOGRAM, ENCOUNTER FOR: ICD-10-CM

## 2021-07-16 DIAGNOSIS — R73.09 ELEVATED HEMOGLOBIN A1C: ICD-10-CM

## 2021-07-16 LAB — SL AMB POCT HEMOGLOBIN AIC: 6 (ref ?–6.5)

## 2021-07-16 PROCEDURE — 83036 HEMOGLOBIN GLYCOSYLATED A1C: CPT | Performed by: INTERNAL MEDICINE

## 2021-07-16 PROCEDURE — 93000 ELECTROCARDIOGRAM COMPLETE: CPT | Performed by: INTERNAL MEDICINE

## 2021-07-16 PROCEDURE — 99214 OFFICE O/P EST MOD 30 MIN: CPT | Performed by: INTERNAL MEDICINE

## 2021-07-16 RX ORDER — CLOPIDOGREL BISULFATE 75 MG
75 TABLET ORAL DAILY
Qty: 90 TABLET | Refills: 3 | Status: SHIPPED | OUTPATIENT
Start: 2021-07-16 | End: 2022-04-08 | Stop reason: SDUPTHER

## 2021-07-16 RX ORDER — ALPRAZOLAM 0.5 MG/1
0.5 TABLET ORAL
Qty: 30 TABLET | Refills: 1 | Status: SHIPPED | OUTPATIENT
Start: 2021-07-16 | End: 2021-09-29 | Stop reason: SDUPTHER

## 2021-07-16 RX ORDER — BISOPROLOL FUMARATE 10 MG/1
10 TABLET ORAL DAILY
Qty: 90 TABLET | Refills: 3 | Status: SHIPPED | OUTPATIENT
Start: 2021-07-16 | End: 2021-09-30 | Stop reason: SDUPTHER

## 2021-07-16 RX ORDER — AMLODIPINE BESYLATE 5 MG/1
5 TABLET ORAL 2 TIMES DAILY
Qty: 180 TABLET | Refills: 3 | Status: SHIPPED | OUTPATIENT
Start: 2021-07-16 | End: 2022-07-08 | Stop reason: SDUPTHER

## 2021-07-16 RX ORDER — ROSUVASTATIN CALCIUM 10 MG/1
10 TABLET, COATED ORAL DAILY
Qty: 90 TABLET | Refills: 3 | Status: SHIPPED | OUTPATIENT
Start: 2021-07-16 | End: 2021-10-29

## 2021-07-16 NOTE — PROGRESS NOTES
Assessment/Plan:         Diagnoses and all orders for this visit:    Hyperlipidemia associated with type 2 diabetes mellitus ([facility] [address] ); continue :  -     rosuvastatin (Crestor) 10 MG tablet; Take 1 tablet (10 mg total) by mouth daily ,   RTC in 3mos w :  -     Echo complete with contrast if indicated; Future  -     UA (URINE) with reflex to Scope; Future  -     Lipid panel; Future  -     TSH, 3rd generation; Future    -     Ambulatory referral to Ophthalmology; Future    Coronary artery disease due to lipid rich plaque; stable  Continue ;  -     Plavix 75 MG tablet; Take 1 tablet (75 mg total) by mouth daily  -     POCT ECG  -     POCT hemoglobin A1c  RTC in 3mos w :  -     Echo complete with contrast if indicated; Future  -     UA (URINE) with reflex to Scope; Future  -     TSH, 3rd generation; Future    Renew :  -     bisoprolol (ZEBETA) 10 MG tablet; Take 1 tablet (10 mg total) by mouth daily    Essential hypertension; continue and Renew :  -     amLODIPine (NORVASC) 5 mg tablet; Take 1 tablet (5 mg total) by mouth 2 (two) times a day  -     POCT ECG  -     POCT hemoglobin A1c  RTC in 3 mos  w:  -     Echo complete with contrast if indicated; Future  -     TSH, 3rd generation; Future    Elevated hemoglobin A1c; Life style mod  -     POCT ECG  -     POCT hemoglobin A1c  -     UA (URINE) with reflex to Scope; Future  -     Comprehensive metabolic panel; Future  -     CBC and differential; Future  -     Magnesium; Future  -     TSH, 3rd generation; Future    Anxiety; Increase;  -     ALPRAZolam (XANAX)  TO 0 5 mg tablet; Take 1 tablet (0 5 mg total) by mouth daily at bedtime as needed for anxiety    Low vitamin B12 level  -     Vitamin B12; Future    Idiopathic osteoporosis  -     Vitamin D 25 hydroxy; Future  -     DXA bone density spine hip and pelvis; Future    Screening mammogram, encounter for  -     Mammo screening bilateral w 3d & cad; Future    Screen for colon cancer  -     Cologuard;  Future Subjective:      Patient ID: Jose Enrique Venegas is a 68 y o  female  [address] [place] [address] is here for Regular check up, she feels ok except for increased anxiety, stress, no recent blood  Work , Med list reviewed w pt,       The following portions of the patient's history were reviewed and updated as appropriate: allergies, current medications, past family history, past social history, past surgical history and problem list     Review of Systems   Constitutional: Negative for chills, fatigue and fever  HENT: Negative for congestion, facial swelling, sore throat, trouble swallowing and voice change  Eyes: Negative for pain, discharge and visual disturbance  Respiratory: Negative for cough, shortness of breath and wheezing  Cardiovascular: Negative for chest pain, palpitations and leg swelling  Gastrointestinal: Negative for abdominal pain, blood in stool, constipation, diarrhea and nausea  Endocrine: Negative for polydipsia, polyphagia and polyuria  Genitourinary: Negative for difficulty urinating, hematuria and urgency  Musculoskeletal: Negative for arthralgias and myalgias  Skin: Negative for rash  Neurological: Negative for dizziness, tremors, weakness and headaches  Hematological: Negative for adenopathy  Does not bruise/bleed easily  Psychiatric/Behavioral: Negative for dysphoric mood, sleep disturbance and suicidal ideas  The patient is nervous/anxious  Objective:      /82 (BP Location: Left arm, Patient Position: Sitting, Cuff Size: Standard)   Pulse 81   Temp 98 3 °F (36 8 °C) (Tympanic)   Resp 16   Ht 5' (1 524 m)   Wt 61 kg (134 lb 6 4 oz)   LMP  (LMP Unknown)   SpO2 93%   BMI 26 25 kg/m²          Physical Exam  Constitutional:       General: She is not in acute distress  HENT:      Head: Normocephalic  Mouth/Throat:      Pharynx: No oropharyngeal exudate  Eyes:      General: No scleral icterus       Conjunctiva/sclera: Conjunctivae normal       Pupils: Pupils are equal, round, and reactive to light  Neck:      Thyroid: No thyromegaly  Cardiovascular:      Rate and Rhythm: Normal rate and regular rhythm  Heart sounds: Murmur heard  Pulmonary:      Effort: Pulmonary effort is normal  No respiratory distress  Breath sounds: Normal breath sounds  No wheezing or rales  Abdominal:      General: Bowel sounds are normal  There is no distension  Palpations: Abdomen is soft  Tenderness: There is no abdominal tenderness  There is no guarding or rebound  Musculoskeletal:         General: Tenderness present  Cervical back: Neck supple  Lymphadenopathy:      Cervical: No cervical adenopathy  Skin:     Coloration: Skin is not pale  Findings: No rash  Neurological:      Mental Status: She is alert and oriented to person, place, and time  Sensory: No sensory deficit  Motor: No weakness        Comments: Pt uses a Cane to support gait

## 2021-09-29 ENCOUNTER — TELEPHONE (OUTPATIENT)
Dept: FAMILY MEDICINE CLINIC | Facility: CLINIC | Age: 76
End: 2021-09-29

## 2021-09-29 DIAGNOSIS — F41.9 ANXIETY: ICD-10-CM

## 2021-09-29 RX ORDER — ALPRAZOLAM 0.5 MG/1
0.5 TABLET ORAL
Qty: 30 TABLET | Refills: 1 | Status: SHIPPED | OUTPATIENT
Start: 2021-09-29 | End: 2022-03-04 | Stop reason: SDUPTHER

## 2021-09-30 DIAGNOSIS — IMO0002 TYPE II DIABETES MELLITUS WITH MANIFESTATIONS, UNCONTROLLED: ICD-10-CM

## 2021-09-30 DIAGNOSIS — I10 BENIGN HYPERTENSION: ICD-10-CM

## 2021-09-30 RX ORDER — BISOPROLOL FUMARATE 10 MG/1
10 TABLET ORAL DAILY
Qty: 90 TABLET | Refills: 3 | Status: SHIPPED | OUTPATIENT
Start: 2021-09-30 | End: 2022-07-08 | Stop reason: SDUPTHER

## 2021-10-08 ENCOUNTER — APPOINTMENT (OUTPATIENT)
Dept: LAB | Facility: HOSPITAL | Age: 76
End: 2021-10-08
Payer: COMMERCIAL

## 2021-10-08 DIAGNOSIS — E78.5 HYPERLIPIDEMIA ASSOCIATED WITH TYPE 2 DIABETES MELLITUS (HCC): ICD-10-CM

## 2021-10-08 DIAGNOSIS — R73.09 ELEVATED HEMOGLOBIN A1C: ICD-10-CM

## 2021-10-08 DIAGNOSIS — E11.69 HYPERLIPIDEMIA ASSOCIATED WITH TYPE 2 DIABETES MELLITUS (HCC): ICD-10-CM

## 2021-10-08 DIAGNOSIS — I25.83 CORONARY ARTERY DISEASE DUE TO LIPID RICH PLAQUE: ICD-10-CM

## 2021-10-08 DIAGNOSIS — E53.8 LOW VITAMIN B12 LEVEL: ICD-10-CM

## 2021-10-08 DIAGNOSIS — I10 ESSENTIAL HYPERTENSION: ICD-10-CM

## 2021-10-08 DIAGNOSIS — I25.10 CORONARY ARTERY DISEASE DUE TO LIPID RICH PLAQUE: ICD-10-CM

## 2021-10-08 DIAGNOSIS — M81.8 IDIOPATHIC OSTEOPOROSIS: ICD-10-CM

## 2021-10-08 LAB
25(OH)D3 SERPL-MCNC: 18 NG/ML (ref 30–100)
ALBUMIN SERPL BCP-MCNC: 4.4 G/DL (ref 3–5.2)
ALP SERPL-CCNC: 78 U/L (ref 43–122)
ALT SERPL W P-5'-P-CCNC: 27 U/L
ANION GAP SERPL CALCULATED.3IONS-SCNC: 7 MMOL/L (ref 5–14)
AST SERPL W P-5'-P-CCNC: 29 U/L (ref 14–36)
BASOPHILS # BLD AUTO: 0.1 THOUSANDS/ΜL (ref 0–0.1)
BASOPHILS NFR BLD AUTO: 1 % (ref 0–1)
BILIRUB SERPL-MCNC: 0.48 MG/DL
BUN SERPL-MCNC: 8 MG/DL (ref 5–25)
CALCIUM SERPL-MCNC: 9.6 MG/DL (ref 8.4–10.2)
CHLORIDE SERPL-SCNC: 96 MMOL/L (ref 97–108)
CHOLEST SERPL-MCNC: 305 MG/DL
CO2 SERPL-SCNC: 29 MMOL/L (ref 22–30)
CREAT SERPL-MCNC: 0.67 MG/DL (ref 0.6–1.2)
EOSINOPHIL # BLD AUTO: 0.3 THOUSAND/ΜL (ref 0–0.4)
EOSINOPHIL NFR BLD AUTO: 4 % (ref 0–6)
ERYTHROCYTE [DISTWIDTH] IN BLOOD BY AUTOMATED COUNT: 14.8 %
GFR SERPL CREATININE-BSD FRML MDRD: 86 ML/MIN/1.73SQ M
GLUCOSE P FAST SERPL-MCNC: 117 MG/DL (ref 70–99)
HCT VFR BLD AUTO: 37.8 % (ref 36–46)
HDLC SERPL-MCNC: 51 MG/DL
HGB BLD-MCNC: 12.3 G/DL (ref 12–16)
LDLC SERPL CALC-MCNC: 225 MG/DL
LYMPHOCYTES # BLD AUTO: 2.2 THOUSANDS/ΜL (ref 0.5–4)
LYMPHOCYTES NFR BLD AUTO: 30 % (ref 25–45)
MAGNESIUM SERPL-MCNC: 1.7 MG/DL (ref 1.6–2.3)
MCH RBC QN AUTO: 27.4 PG (ref 26–34)
MCHC RBC AUTO-ENTMCNC: 32.5 G/DL (ref 31–36)
MCV RBC AUTO: 84 FL (ref 80–100)
MONOCYTES # BLD AUTO: 0.6 THOUSAND/ΜL (ref 0.2–0.9)
MONOCYTES NFR BLD AUTO: 8 % (ref 1–10)
NEUTROPHILS # BLD AUTO: 4.3 THOUSANDS/ΜL (ref 1.8–7.8)
NEUTS SEG NFR BLD AUTO: 58 % (ref 45–65)
NONHDLC SERPL-MCNC: 254 MG/DL
PLATELET # BLD AUTO: 326 THOUSANDS/UL (ref 150–450)
PMV BLD AUTO: 7.7 FL (ref 8.9–12.7)
POTASSIUM SERPL-SCNC: 4.3 MMOL/L (ref 3.6–5)
PROT SERPL-MCNC: 7.8 G/DL (ref 5.9–8.4)
RBC # BLD AUTO: 4.5 MILLION/UL (ref 4–5.2)
SODIUM SERPL-SCNC: 132 MMOL/L (ref 137–147)
TRIGL SERPL-MCNC: 146 MG/DL
TSH SERPL DL<=0.05 MIU/L-ACNC: 2.49 UIU/ML (ref 0.47–4.68)
VIT B12 SERPL-MCNC: 208 PG/ML (ref 100–900)
WBC # BLD AUTO: 7.4 THOUSAND/UL (ref 4.5–11)

## 2021-10-08 PROCEDURE — 85025 COMPLETE CBC W/AUTO DIFF WBC: CPT

## 2021-10-08 PROCEDURE — 82306 VITAMIN D 25 HYDROXY: CPT

## 2021-10-08 PROCEDURE — 80061 LIPID PANEL: CPT

## 2021-10-08 PROCEDURE — 80053 COMPREHEN METABOLIC PANEL: CPT

## 2021-10-08 PROCEDURE — 83735 ASSAY OF MAGNESIUM: CPT

## 2021-10-08 PROCEDURE — 36415 COLL VENOUS BLD VENIPUNCTURE: CPT

## 2021-10-08 PROCEDURE — 84443 ASSAY THYROID STIM HORMONE: CPT

## 2021-10-08 PROCEDURE — 82607 VITAMIN B-12: CPT

## 2021-10-29 ENCOUNTER — OFFICE VISIT (OUTPATIENT)
Dept: FAMILY MEDICINE CLINIC | Facility: CLINIC | Age: 76
End: 2021-10-29
Payer: COMMERCIAL

## 2021-10-29 VITALS
TEMPERATURE: 98.2 F | SYSTOLIC BLOOD PRESSURE: 130 MMHG | HEIGHT: 60 IN | RESPIRATION RATE: 14 BRPM | OXYGEN SATURATION: 96 % | BODY MASS INDEX: 26.5 KG/M2 | DIASTOLIC BLOOD PRESSURE: 78 MMHG | WEIGHT: 135 LBS | HEART RATE: 70 BPM

## 2021-10-29 DIAGNOSIS — R79.89 LOW VITAMIN D LEVEL: ICD-10-CM

## 2021-10-29 DIAGNOSIS — I10 ESSENTIAL HYPERTENSION: ICD-10-CM

## 2021-10-29 DIAGNOSIS — Z23 NEED FOR INFLUENZA VACCINATION: Primary | ICD-10-CM

## 2021-10-29 DIAGNOSIS — E11.69 HYPERLIPIDEMIA ASSOCIATED WITH TYPE 2 DIABETES MELLITUS (HCC): ICD-10-CM

## 2021-10-29 DIAGNOSIS — E78.5 HYPERLIPIDEMIA ASSOCIATED WITH TYPE 2 DIABETES MELLITUS (HCC): ICD-10-CM

## 2021-10-29 DIAGNOSIS — E53.8 LOW VITAMIN B12 LEVEL: ICD-10-CM

## 2021-10-29 DIAGNOSIS — IMO0002 TYPE II DIABETES MELLITUS WITH MANIFESTATIONS, UNCONTROLLED: ICD-10-CM

## 2021-10-29 PROCEDURE — 90662 IIV NO PRSV INCREASED AG IM: CPT

## 2021-10-29 PROCEDURE — 96372 THER/PROPH/DIAG INJ SC/IM: CPT | Performed by: INTERNAL MEDICINE

## 2021-10-29 PROCEDURE — G0008 ADMIN INFLUENZA VIRUS VAC: HCPCS

## 2021-10-29 PROCEDURE — 99214 OFFICE O/P EST MOD 30 MIN: CPT | Performed by: INTERNAL MEDICINE

## 2021-10-29 RX ORDER — OMEGA-3-ACID ETHYL ESTERS 1 G/1
1 CAPSULE, LIQUID FILLED ORAL 2 TIMES DAILY
Qty: 180 CAPSULE | Refills: 3 | Status: SHIPPED | OUTPATIENT
Start: 2021-10-29 | End: 2022-07-08 | Stop reason: SDUPTHER

## 2021-10-29 RX ORDER — ERGOCALCIFEROL 1.25 MG/1
50000 CAPSULE ORAL WEEKLY
Qty: 13 CAPSULE | Refills: 3 | Status: SHIPPED | OUTPATIENT
Start: 2021-10-29 | End: 2022-07-08 | Stop reason: SDUPTHER

## 2021-10-29 RX ORDER — AMPICILLIN TRIHYDRATE 250 MG
600 CAPSULE ORAL 2 TIMES DAILY WITH MEALS
Qty: 200 CAPSULE | Refills: 3 | Status: SHIPPED | OUTPATIENT
Start: 2021-10-29 | End: 2022-07-08 | Stop reason: SDUPTHER

## 2021-10-29 RX ORDER — LANOLIN ALCOHOL/MO/W.PET/CERES
1000 CREAM (GRAM) TOPICAL DAILY
Qty: 90 TABLET | Refills: 3 | Status: SHIPPED | OUTPATIENT
Start: 2021-10-29 | End: 2022-07-08 | Stop reason: SDUPTHER

## 2021-10-29 RX ORDER — CYANOCOBALAMIN 1000 UG/ML
1000 INJECTION INTRAMUSCULAR; SUBCUTANEOUS
Status: SHIPPED | OUTPATIENT
Start: 2021-10-29

## 2021-10-29 RX ADMIN — CYANOCOBALAMIN 1000 MCG: 1000 INJECTION INTRAMUSCULAR; SUBCUTANEOUS at 12:17

## 2021-11-10 ENCOUNTER — VBI (OUTPATIENT)
Dept: ADMINISTRATIVE | Facility: OTHER | Age: 76
End: 2021-11-10

## 2021-12-20 DIAGNOSIS — J45.909 MODERATE ASTHMA, UNSPECIFIED WHETHER COMPLICATED, UNSPECIFIED WHETHER PERSISTENT: ICD-10-CM

## 2021-12-20 RX ORDER — ALBUTEROL SULFATE 1.25 MG/3ML
3 SOLUTION RESPIRATORY (INHALATION) EVERY 6 HOURS PRN
Qty: 225 ML | Refills: 5 | Status: SHIPPED | OUTPATIENT
Start: 2021-12-20 | End: 2022-03-04 | Stop reason: SDUPTHER

## 2022-01-27 DIAGNOSIS — J44.9 CHRONIC OBSTRUCTIVE PULMONARY DISEASE, UNSPECIFIED COPD TYPE (HCC): ICD-10-CM

## 2022-01-27 RX ORDER — ALBUTEROL SULFATE 90 UG/1
AEROSOL, METERED RESPIRATORY (INHALATION)
Qty: 18 G | Refills: 0 | Status: SHIPPED | OUTPATIENT
Start: 2022-01-27 | End: 2022-03-04 | Stop reason: SDUPTHER

## 2022-02-02 DIAGNOSIS — K21.9 GASTROESOPHAGEAL REFLUX DISEASE WITHOUT ESOPHAGITIS: ICD-10-CM

## 2022-02-02 RX ORDER — OMEPRAZOLE 20 MG/1
20 CAPSULE, DELAYED RELEASE ORAL DAILY
Qty: 180 CAPSULE | Refills: 3 | Status: SHIPPED | OUTPATIENT
Start: 2022-02-02 | End: 2022-07-08 | Stop reason: SDUPTHER

## 2022-02-02 NOTE — TELEPHONE ENCOUNTER
Patient is also asking if you can change her nebulizer solution because the pharmacy does not have it at this time  Please advise

## 2022-03-01 DIAGNOSIS — J45.909 MODERATE ASTHMA, UNSPECIFIED WHETHER COMPLICATED, UNSPECIFIED WHETHER PERSISTENT: ICD-10-CM

## 2022-03-01 DIAGNOSIS — F41.9 ANXIETY: ICD-10-CM

## 2022-03-01 DIAGNOSIS — J44.9 CHRONIC OBSTRUCTIVE PULMONARY DISEASE, UNSPECIFIED COPD TYPE (HCC): ICD-10-CM

## 2022-03-01 RX ORDER — ALPRAZOLAM 0.5 MG/1
0.5 TABLET ORAL
Qty: 30 TABLET | Refills: 1 | OUTPATIENT
Start: 2022-03-01

## 2022-03-01 RX ORDER — ALBUTEROL SULFATE 90 UG/1
AEROSOL, METERED RESPIRATORY (INHALATION)
Qty: 18 G | Refills: 0 | OUTPATIENT
Start: 2022-03-01

## 2022-03-01 RX ORDER — ALBUTEROL SULFATE 1.25 MG/3ML
3 SOLUTION RESPIRATORY (INHALATION) EVERY 6 HOURS PRN
Qty: 225 ML | Refills: 5 | OUTPATIENT
Start: 2022-03-01

## 2022-03-04 DIAGNOSIS — J45.909 MODERATE ASTHMA, UNSPECIFIED WHETHER COMPLICATED, UNSPECIFIED WHETHER PERSISTENT: ICD-10-CM

## 2022-03-04 DIAGNOSIS — F41.9 ANXIETY: ICD-10-CM

## 2022-03-04 DIAGNOSIS — J44.9 CHRONIC OBSTRUCTIVE PULMONARY DISEASE, UNSPECIFIED COPD TYPE (HCC): ICD-10-CM

## 2022-03-04 RX ORDER — ALPRAZOLAM 0.5 MG/1
0.5 TABLET ORAL
Qty: 30 TABLET | Refills: 1 | Status: SHIPPED | OUTPATIENT
Start: 2022-03-04 | End: 2022-07-13

## 2022-03-04 RX ORDER — ALBUTEROL SULFATE 90 UG/1
2 AEROSOL, METERED RESPIRATORY (INHALATION) EVERY 6 HOURS PRN
Qty: 18 G | Refills: 3 | Status: SHIPPED | OUTPATIENT
Start: 2022-03-04 | End: 2022-06-27

## 2022-03-04 RX ORDER — ALBUTEROL SULFATE 1.25 MG/3ML
3 SOLUTION RESPIRATORY (INHALATION) EVERY 6 HOURS PRN
Qty: 225 ML | Refills: 5 | Status: SHIPPED | OUTPATIENT
Start: 2022-03-04

## 2022-03-04 NOTE — TELEPHONE ENCOUNTER
Phone call from pt, refill Albuterol solution, Albuterol inhaler & Alprazolam  Call to [phone] [facility]

## 2022-04-08 ENCOUNTER — OFFICE VISIT (OUTPATIENT)
Dept: FAMILY MEDICINE CLINIC | Facility: CLINIC | Age: 77
End: 2022-04-08
Payer: MEDICARE

## 2022-04-08 VITALS
BODY MASS INDEX: 26.86 KG/M2 | HEART RATE: 68 BPM | RESPIRATION RATE: 14 BRPM | OXYGEN SATURATION: 96 % | DIASTOLIC BLOOD PRESSURE: 90 MMHG | SYSTOLIC BLOOD PRESSURE: 158 MMHG | HEIGHT: 60 IN | TEMPERATURE: 98.6 F | WEIGHT: 136.8 LBS

## 2022-04-08 DIAGNOSIS — J44.9 CHRONIC OBSTRUCTIVE PULMONARY DISEASE, UNSPECIFIED COPD TYPE (HCC): ICD-10-CM

## 2022-04-08 DIAGNOSIS — R73.09 ELEVATED HEMOGLOBIN A1C: ICD-10-CM

## 2022-04-08 DIAGNOSIS — N81.10 BLADDER PROLAPSE, FEMALE, ACQUIRED: Primary | ICD-10-CM

## 2022-04-08 DIAGNOSIS — E11.69 HYPERLIPIDEMIA ASSOCIATED WITH TYPE 2 DIABETES MELLITUS (HCC): ICD-10-CM

## 2022-04-08 DIAGNOSIS — E78.5 HYPERLIPIDEMIA ASSOCIATED WITH TYPE 2 DIABETES MELLITUS (HCC): ICD-10-CM

## 2022-04-08 DIAGNOSIS — I25.10 CORONARY ARTERY DISEASE DUE TO LIPID RICH PLAQUE: ICD-10-CM

## 2022-04-08 DIAGNOSIS — I25.83 CORONARY ARTERY DISEASE DUE TO LIPID RICH PLAQUE: ICD-10-CM

## 2022-04-08 PROCEDURE — 99214 OFFICE O/P EST MOD 30 MIN: CPT | Performed by: INTERNAL MEDICINE

## 2022-04-08 RX ORDER — CLOPIDOGREL BISULFATE 75 MG
75 TABLET ORAL DAILY
Qty: 90 TABLET | Refills: 3 | Status: SHIPPED | OUTPATIENT
Start: 2022-04-08 | End: 2022-07-08 | Stop reason: SDUPTHER

## 2022-04-08 NOTE — PROGRESS NOTES
Assessment/Plan:         Diagnoses and all orders for this visit:    Bladder prolapse, female, acquired;  -     Ambulatory Referral to Urogynecology; ASAP    Coronary artery disease due to lipid rich plaque; Pt Could NOT Tolerate Generic ;  -     Plavix 75 MG tablet; Take 1 tablet (75 mg total) by mouth daily    Chronic obstructive pulmonary disease, unspecified COPD type ([facility] [address] ); stable  Continue same    Hyperlipidemia associated with type 2 diabetes mellitus ([facility] [address] ); continue Red Yeast  RTC in 3 mos w Blood  work  -     UA (URINE) with reflex to Scope; Future    Elevated hemoglobin A1c; life style Mod  RTC in 3 mos w Blood work  -     UA (URINE) with reflex to Scope; Future        Subjective:      Patient ID: Destin Mckeon is a 68 y o  female  [address] [facility] is here for Regular check Up, she feels ok, No recent Blood work, med list reviewed w Pt    The following portions of the patient's history were reviewed and updated as appropriate: allergies, past family history, past medical history, past social history, past surgical history and problem list     Review of Systems   Constitutional: Negative for chills, fatigue and fever  HENT: Negative for congestion, facial swelling, sore throat, trouble swallowing and voice change  Eyes: Negative for pain, discharge and visual disturbance  Respiratory: Negative for cough, shortness of breath and wheezing  Cardiovascular: Negative for chest pain, palpitations and leg swelling  Gastrointestinal: Negative for abdominal pain, blood in stool, constipation, diarrhea and nausea  Endocrine: Negative for polydipsia, polyphagia and polyuria  Genitourinary: Positive for difficulty urinating and frequency  Negative for hematuria and urgency  Musculoskeletal: Negative for arthralgias and myalgias  Skin: Negative for rash  Neurological: Negative for dizziness, tremors, weakness and headaches  Hematological: Negative for adenopathy   Does not bruise/bleed easily  Psychiatric/Behavioral: Negative for dysphoric mood, sleep disturbance and suicidal ideas  Objective:      /90 (BP Location: Left arm, Patient Position: Sitting, Cuff Size: Standard)   Pulse 68   Temp 98 6 °F (37 °C)   Resp 14   Ht 5' (1 524 m)   Wt 62 1 kg (136 lb 12 8 oz)   LMP  (LMP Unknown)   SpO2 96%   BMI 26 72 kg/m²          Physical Exam  Constitutional:       General: She is not in acute distress  HENT:      Head: Normocephalic  Mouth/Throat:      Pharynx: No oropharyngeal exudate  Eyes:      General: No scleral icterus  Conjunctiva/sclera: Conjunctivae normal       Pupils: Pupils are equal, round, and reactive to light  Neck:      Thyroid: No thyromegaly  Cardiovascular:      Rate and Rhythm: Normal rate and regular rhythm  Heart sounds: Normal heart sounds  No murmur heard  Pulmonary:      Effort: Pulmonary effort is normal  No respiratory distress  Breath sounds: Normal breath sounds  No wheezing or rales  Abdominal:      General: Bowel sounds are normal  There is no distension  Palpations: Abdomen is soft  Tenderness: There is no abdominal tenderness  There is no guarding or rebound  Musculoskeletal:         General: No tenderness  Cervical back: Neck supple  Lymphadenopathy:      Cervical: No cervical adenopathy  Skin:     Coloration: Skin is not pale  Findings: No rash  Neurological:      Mental Status: She is alert and oriented to person, place, and time  Sensory: No sensory deficit  Motor: No weakness        Comments: Pt uses a walker to support gait

## 2022-06-17 ENCOUNTER — APPOINTMENT (OUTPATIENT)
Dept: LAB | Facility: HOSPITAL | Age: 77
End: 2022-06-17
Payer: MEDICARE

## 2022-06-17 DIAGNOSIS — I10 ESSENTIAL HYPERTENSION: ICD-10-CM

## 2022-06-17 DIAGNOSIS — E11.69 HYPERLIPIDEMIA ASSOCIATED WITH TYPE 2 DIABETES MELLITUS (HCC): ICD-10-CM

## 2022-06-17 DIAGNOSIS — IMO0002 TYPE II DIABETES MELLITUS WITH MANIFESTATIONS, UNCONTROLLED: ICD-10-CM

## 2022-06-17 DIAGNOSIS — E78.5 HYPERLIPIDEMIA ASSOCIATED WITH TYPE 2 DIABETES MELLITUS (HCC): ICD-10-CM

## 2022-06-17 LAB
ALBUMIN SERPL BCP-MCNC: 4.6 G/DL (ref 3–5.2)
ALP SERPL-CCNC: 72 U/L (ref 43–122)
ALT SERPL W P-5'-P-CCNC: 30 U/L
ANION GAP SERPL CALCULATED.3IONS-SCNC: 10 MMOL/L (ref 5–14)
AST SERPL W P-5'-P-CCNC: 32 U/L (ref 14–36)
BACTERIA UR QL AUTO: NORMAL /HPF
BASOPHILS # BLD AUTO: 0.08 THOUSANDS/ΜL (ref 0–0.1)
BASOPHILS NFR BLD AUTO: 1 % (ref 0–1)
BILIRUB SERPL-MCNC: 0.4 MG/DL
BILIRUB UR QL STRIP: NEGATIVE
BUN SERPL-MCNC: 9 MG/DL (ref 5–25)
CALCIUM SERPL-MCNC: 9.1 MG/DL (ref 8.4–10.2)
CHLORIDE SERPL-SCNC: 97 MMOL/L (ref 97–108)
CHOLEST SERPL-MCNC: 300 MG/DL
CLARITY UR: CLEAR
CO2 SERPL-SCNC: 30 MMOL/L (ref 22–30)
COLOR UR: ABNORMAL
CREAT SERPL-MCNC: 0.65 MG/DL (ref 0.6–1.2)
EOSINOPHIL # BLD AUTO: 0.33 THOUSAND/ΜL (ref 0–0.61)
EOSINOPHIL NFR BLD AUTO: 4 % (ref 0–6)
ERYTHROCYTE [DISTWIDTH] IN BLOOD BY AUTOMATED COUNT: 13.7 % (ref 11.6–15.1)
EST. AVERAGE GLUCOSE BLD GHB EST-MCNC: 134 MG/DL
GFR SERPL CREATININE-BSD FRML MDRD: 85 ML/MIN/1.73SQ M
GLUCOSE P FAST SERPL-MCNC: 126 MG/DL (ref 70–99)
GLUCOSE UR STRIP-MCNC: NEGATIVE MG/DL
HBA1C MFR BLD: 6.3 %
HCT VFR BLD AUTO: 43 % (ref 34.8–46.1)
HDLC SERPL-MCNC: 51 MG/DL
HGB BLD-MCNC: 13.1 G/DL (ref 11.5–15.4)
HGB UR QL STRIP.AUTO: 25
IMM GRANULOCYTES # BLD AUTO: 0.04 THOUSAND/UL (ref 0–0.2)
IMM GRANULOCYTES NFR BLD AUTO: 0 % (ref 0–2)
KETONES UR STRIP-MCNC: NEGATIVE MG/DL
LDLC SERPL CALC-MCNC: 217 MG/DL
LEUKOCYTE ESTERASE UR QL STRIP: 100
LYMPHOCYTES # BLD AUTO: 2.61 THOUSANDS/ΜL (ref 0.6–4.47)
LYMPHOCYTES NFR BLD AUTO: 28 % (ref 14–44)
MAGNESIUM SERPL-MCNC: 1.4 MG/DL (ref 1.6–2.3)
MCH RBC QN AUTO: 26.6 PG (ref 26.8–34.3)
MCHC RBC AUTO-ENTMCNC: 30.5 G/DL (ref 31.4–37.4)
MCV RBC AUTO: 87 FL (ref 82–98)
MONOCYTES # BLD AUTO: 0.79 THOUSAND/ΜL (ref 0.17–1.22)
MONOCYTES NFR BLD AUTO: 9 % (ref 4–12)
NEUTROPHILS # BLD AUTO: 5.41 THOUSANDS/ΜL (ref 1.85–7.62)
NEUTS SEG NFR BLD AUTO: 58 % (ref 43–75)
NITRITE UR QL STRIP: NEGATIVE
NON-SQ EPI CELLS URNS QL MICRO: NORMAL /HPF
NONHDLC SERPL-MCNC: 249 MG/DL
NRBC BLD AUTO-RTO: 0 /100 WBCS
PH UR STRIP.AUTO: 7 [PH]
PLATELET # BLD AUTO: 317 THOUSANDS/UL (ref 149–390)
PMV BLD AUTO: 9.4 FL (ref 8.9–12.7)
POTASSIUM SERPL-SCNC: 4.3 MMOL/L (ref 3.6–5)
PROT SERPL-MCNC: 8.2 G/DL (ref 5.9–8.4)
PROT UR STRIP-MCNC: NEGATIVE MG/DL
RBC # BLD AUTO: 4.93 MILLION/UL (ref 3.81–5.12)
RBC #/AREA URNS AUTO: NORMAL /HPF
SODIUM SERPL-SCNC: 137 MMOL/L (ref 137–147)
SP GR UR STRIP.AUTO: 1.01 (ref 1–1.04)
TRIGL SERPL-MCNC: 162 MG/DL
UROBILINOGEN UA: NEGATIVE MG/DL
WBC # BLD AUTO: 9.26 THOUSAND/UL (ref 4.31–10.16)
WBC #/AREA URNS AUTO: NORMAL /HPF

## 2022-06-17 PROCEDURE — 80053 COMPREHEN METABOLIC PANEL: CPT

## 2022-06-17 PROCEDURE — 81001 URINALYSIS AUTO W/SCOPE: CPT

## 2022-06-17 PROCEDURE — 85025 COMPLETE CBC W/AUTO DIFF WBC: CPT

## 2022-06-17 PROCEDURE — 83036 HEMOGLOBIN GLYCOSYLATED A1C: CPT

## 2022-06-17 PROCEDURE — 83735 ASSAY OF MAGNESIUM: CPT

## 2022-06-17 PROCEDURE — 80061 LIPID PANEL: CPT

## 2022-06-17 PROCEDURE — 36415 COLL VENOUS BLD VENIPUNCTURE: CPT

## 2022-06-27 DIAGNOSIS — J44.9 CHRONIC OBSTRUCTIVE PULMONARY DISEASE, UNSPECIFIED COPD TYPE (HCC): ICD-10-CM

## 2022-06-27 RX ORDER — ALBUTEROL SULFATE 90 UG/1
AEROSOL, METERED RESPIRATORY (INHALATION)
Qty: 18 G | Refills: 0 | Status: SHIPPED | OUTPATIENT
Start: 2022-06-27

## 2022-07-08 ENCOUNTER — OFFICE VISIT (OUTPATIENT)
Dept: FAMILY MEDICINE CLINIC | Facility: CLINIC | Age: 77
End: 2022-07-08
Payer: MEDICARE

## 2022-07-08 VITALS
WEIGHT: 133 LBS | TEMPERATURE: 98 F | HEART RATE: 72 BPM | OXYGEN SATURATION: 93 % | DIASTOLIC BLOOD PRESSURE: 80 MMHG | RESPIRATION RATE: 14 BRPM | HEIGHT: 60 IN | SYSTOLIC BLOOD PRESSURE: 162 MMHG | BODY MASS INDEX: 26.11 KG/M2

## 2022-07-08 DIAGNOSIS — E78.5 HYPERLIPIDEMIA ASSOCIATED WITH TYPE 2 DIABETES MELLITUS (HCC): ICD-10-CM

## 2022-07-08 DIAGNOSIS — I10 BENIGN HYPERTENSION: ICD-10-CM

## 2022-07-08 DIAGNOSIS — Z00.01 ENCOUNTER FOR GENERAL ADULT MEDICAL EXAMINATION WITH ABNORMAL FINDINGS: Primary | ICD-10-CM

## 2022-07-08 DIAGNOSIS — E11.8 TYPE II DIABETES MELLITUS WITH MANIFESTATIONS (HCC): ICD-10-CM

## 2022-07-08 DIAGNOSIS — E53.8 LOW VITAMIN B12 LEVEL: ICD-10-CM

## 2022-07-08 DIAGNOSIS — E11.69 HYPERLIPIDEMIA ASSOCIATED WITH TYPE 2 DIABETES MELLITUS (HCC): ICD-10-CM

## 2022-07-08 DIAGNOSIS — I25.10 CORONARY ARTERY DISEASE DUE TO LIPID RICH PLAQUE: ICD-10-CM

## 2022-07-08 DIAGNOSIS — R79.89 LOW VITAMIN D LEVEL: ICD-10-CM

## 2022-07-08 DIAGNOSIS — I25.83 CORONARY ARTERY DISEASE DUE TO LIPID RICH PLAQUE: ICD-10-CM

## 2022-07-08 DIAGNOSIS — E83.42 HYPOMAGNESEMIA: ICD-10-CM

## 2022-07-08 DIAGNOSIS — I10 ESSENTIAL HYPERTENSION: ICD-10-CM

## 2022-07-08 DIAGNOSIS — K21.9 GASTROESOPHAGEAL REFLUX DISEASE WITHOUT ESOPHAGITIS: ICD-10-CM

## 2022-07-08 PROCEDURE — 99214 OFFICE O/P EST MOD 30 MIN: CPT | Performed by: INTERNAL MEDICINE

## 2022-07-08 PROCEDURE — G0439 PPPS, SUBSEQ VISIT: HCPCS | Performed by: INTERNAL MEDICINE

## 2022-07-08 RX ORDER — MAGNESIUM L-LACTATE 84 MG
84 TABLET, EXTENDED RELEASE ORAL DAILY
Qty: 90 TABLET | Refills: 3 | Status: SHIPPED | OUTPATIENT
Start: 2022-07-08

## 2022-07-08 RX ORDER — ROSUVASTATIN CALCIUM 5 MG/1
5 TABLET, COATED ORAL DAILY
Qty: 90 TABLET | Refills: 3 | Status: SHIPPED | OUTPATIENT
Start: 2022-07-08

## 2022-07-08 RX ORDER — LANOLIN ALCOHOL/MO/W.PET/CERES
1000 CREAM (GRAM) TOPICAL DAILY
Qty: 90 TABLET | Refills: 3 | Status: SHIPPED | OUTPATIENT
Start: 2022-07-08

## 2022-07-08 RX ORDER — ERGOCALCIFEROL 1.25 MG/1
50000 CAPSULE ORAL WEEKLY
Qty: 13 CAPSULE | Refills: 3 | Status: SHIPPED | OUTPATIENT
Start: 2022-07-08

## 2022-07-08 RX ORDER — CLOPIDOGREL BISULFATE 75 MG
75 TABLET ORAL DAILY
Qty: 90 TABLET | Refills: 3 | Status: SHIPPED | OUTPATIENT
Start: 2022-07-08

## 2022-07-08 RX ORDER — BISOPROLOL FUMARATE 10 MG/1
10 TABLET, FILM COATED ORAL DAILY
Qty: 90 TABLET | Refills: 3 | Status: SHIPPED | OUTPATIENT
Start: 2022-07-08 | End: 2022-09-23 | Stop reason: SDUPTHER

## 2022-07-08 RX ORDER — AMPICILLIN TRIHYDRATE 250 MG
600 CAPSULE ORAL 2 TIMES DAILY WITH MEALS
Qty: 200 CAPSULE | Refills: 3 | Status: SHIPPED | OUTPATIENT
Start: 2022-07-08

## 2022-07-08 RX ORDER — AMLODIPINE BESYLATE 5 MG/1
5 TABLET ORAL 2 TIMES DAILY
Qty: 180 TABLET | Refills: 3 | Status: SHIPPED | OUTPATIENT
Start: 2022-07-08

## 2022-07-08 RX ORDER — OMEGA-3-ACID ETHYL ESTERS 1 G/1
1 CAPSULE, LIQUID FILLED ORAL 2 TIMES DAILY
Qty: 180 CAPSULE | Refills: 3 | Status: SHIPPED | OUTPATIENT
Start: 2022-07-08

## 2022-07-08 RX ORDER — OMEPRAZOLE 20 MG/1
20 CAPSULE, DELAYED RELEASE ORAL DAILY
Qty: 180 CAPSULE | Refills: 3 | Status: SHIPPED | OUTPATIENT
Start: 2022-07-08 | End: 2022-07-11

## 2022-07-08 NOTE — PATIENT INSTRUCTIONS
Medicare Preventive Visit Patient Instructions  Thank you for completing your Welcome to Medicare Visit or Medicare Annual Wellness Visit today  Your next wellness visit will be due in one year (7/9/2023)  The screening/preventive services that you may require over the next 5-10 years are detailed below  Some tests may not apply to you based off risk factors and/or age  Screening tests ordered at today's visit but not completed yet may show as past due  Also, please note that scanned in results may not display below  Preventive Screenings:  Service Recommendations Previous Testing/Comments   Colorectal Cancer Screening  * Colonoscopy    * Fecal Occult Blood Test (FOBT)/Fecal Immunochemical Test (FIT)  * Fecal DNA/Cologuard Test  * Flexible Sigmoidoscopy Age: 54-65 years old   Colonoscopy: every 10 years (may be performed more frequently if at higher risk)  OR  FOBT/FIT: every 1 year  OR  Cologuard: every 3 years  OR  Sigmoidoscopy: every 5 years  Screening may be recommended earlier than age 48 if at higher risk for colorectal cancer  Also, an individualized decision between you and your healthcare provider will decide whether screening between the ages of 74-80 would be appropriate  Colonoscopy: Not on file  FOBT/FIT: Not on file  Cologuard: Not on file  Sigmoidoscopy: Not on file    Risks and Benefits Discussed     Breast Cancer Screening Age: 36 years old  Frequency: every 1-2 years  Not required if history of left and right mastectomy Mammogram: Not on file    Risks and Benefits Discussed   Cervical Cancer Screening Between the ages of 21-29, pap smear recommended once every 3 years  Between the ages of 33-67, can perform pap smear with HPV co-testing every 5 years     Recommendations may differ for women with a history of total hysterectomy, cervical cancer, or abnormal pap smears in past  Pap Smear: Not on file    Screening Not Indicated   Hepatitis C Screening Once for adults born between 1945 and 1965  More frequently in patients at high risk for Hepatitis C Hep C Antibody: 08/30/2019    Screening Current   Diabetes Screening 1-2 times per year if you're at risk for diabetes or have pre-diabetes Fasting glucose: 126 mg/dL   A1C: 6 3 %    Screening Not Indicated  History Diabetes   Cholesterol Screening Once every 5 years if you don't have a lipid disorder  May order more often based on risk factors  Lipid panel: 06/17/2022    Screening Not Indicated  History Lipid Disorder     Other Preventive Screenings Covered by Medicare:  1  Abdominal Aortic Aneurysm (AAA) Screening: covered once if your at risk  You're considered to be at risk if you have a family history of AAA  2  Lung Cancer Screening: covers low dose CT scan once per year if you meet all of the following conditions: (1) Age 50-69; (2) No signs or symptoms of lung cancer; (3) Current smoker or have quit smoking within the last 15 years; (4) You have a tobacco smoking history of at least 30 pack years (packs per day multiplied by number of years you smoked); (5) You get a written order from a healthcare provider  3  Glaucoma Screening: covered annually if you're considered high risk: (1) You have diabetes OR (2) Family history of glaucoma OR (3)  aged 48 and older OR (3)  American aged 72 and older  3  Osteoporosis Screening: covered every 2 years if you meet one of the following conditions: (1) You're estrogen deficient and at risk for osteoporosis based off medical history and other findings; (2) Have a vertebral abnormality; (3) On glucocorticoid therapy for more than 3 months; (4) Have primary hyperparathyroidism; (5) On osteoporosis medications and need to assess response to drug therapy  · Last bone density test (DXA Scan): Not on file  5  HIV Screening: covered annually if you're between the age of 12-76  Also covered annually if you are younger than 13 and older than 72 with risk factors for HIV infection   For pregnant patients, it is covered up to 3 times per pregnancy  Immunizations:  Immunization Recommendations   Influenza Vaccine Annual influenza vaccination during flu season is recommended for all persons aged >= 6 months who do not have contraindications   Pneumococcal Vaccine (Prevnar and Pneumovax)  * Prevnar = PCV13  * Pneumovax = PPSV23   Adults 25-60 years old: 1-3 doses may be recommended based on certain risk factors  Adults 72 years old: Prevnar (PCV13) vaccine recommended followed by Pneumovax (PPSV23) vaccine  If already received PPSV23 since turning 65, then PCV13 recommended at least one year after PPSV23 dose  Hepatitis B Vaccine 3 dose series if at intermediate or high risk (ex: diabetes, end stage renal disease, liver disease)   Tetanus (Td) Vaccine - COST NOT COVERED BY MEDICARE PART B Following completion of primary series, a booster dose should be given every 10 years to maintain immunity against tetanus  Td may also be given as tetanus wound prophylaxis  Tdap Vaccine - COST NOT COVERED BY MEDICARE PART B Recommended at least once for all adults  For pregnant patients, recommended with each pregnancy  Shingles Vaccine (Shingrix) - COST NOT COVERED BY MEDICARE PART B  2 shot series recommended in those aged 48 and above     Health Maintenance Due:      Topic Date Due    Breast Cancer Screening: Mammogram  Never done    Hepatitis C Screening  Completed     Immunizations Due:      Topic Date Due    COVID-19 Vaccine (3 - Booster for Pfizer series) 09/11/2021    Influenza Vaccine (1) 09/01/2022     Advance Directives   What are advance directives? Advance directives are legal documents that state your wishes and plans for medical care  These plans are made ahead of time in case you lose your ability to make decisions for yourself  Advance directives can apply to any medical decision, such as the treatments you want, and if you want to donate organs     What are the types of advance directives? There are many types of advance directives, and each state has rules about how to use them  You may choose a combination of any of the following:  · Living will: This is a written record of the treatment you want  You can also choose which treatments you do not want, which to limit, and which to stop at a certain time  This includes surgery, medicine, IV fluid, and tube feedings  · Durable power of  for healthcare [facility]): This is a written record that states who you want to make healthcare choices for you when you are unable to make them for yourself  This person, called a proxy, is usually a family member or a friend  You may choose more than 1 proxy  · Do not resuscitate (DNR) order:  A DNR order is used in case your heart stops beating or you stop breathing  It is a request not to have certain forms of treatment, such as CPR  A DNR order may be included in other types of advance directives  · Medical directive: This covers the care that you want if you are in a coma, near death, or unable to make decisions for yourself  You can list the treatments you want for each condition  Treatment may include pain medicine, surgery, blood transfusions, dialysis, IV or tube feedings, and a ventilator (breathing machine)  · Values history: This document has questions about your views, beliefs, and how you feel and think about life  This information can help others choose the care that you would choose  Why are advance directives important? An advance directive helps you control your care  Although spoken wishes may be used, it is better to have your wishes written down  Spoken wishes can be misunderstood, or not followed  Treatments may be given even if you do not want them  An advance directive may make it easier for your family to make difficult choices about your care     Weight Management   Why it is important to manage your weight:  Being overweight increases your risk of health conditions such as heart disease, high blood pressure, type 2 diabetes, and certain types of cancer  It can also increase your risk for osteoarthritis, sleep apnea, and other respiratory problems  Aim for a slow, steady weight loss  Even a small amount of weight loss can lower your risk of health problems  How to lose weight safely:  A safe and healthy way to lose weight is to eat fewer calories and get regular exercise  You can lose up about 1 pound a week by decreasing the number of calories you eat by 500 calories each day  Healthy meal plan for weight management:  A healthy meal plan includes a variety of foods, contains fewer calories, and helps you stay healthy  A healthy meal plan includes the following:  · Eat whole-grain foods more often  A healthy meal plan should contain fiber  Fiber is the part of grains, fruits, and vegetables that is not broken down by your body  Whole-grain foods are healthy and provide extra fiber in your diet  Some examples of whole-grain foods are whole-wheat breads and pastas, oatmeal, brown rice, and bulgur  · Eat a variety of vegetables every day  Include dark, leafy greens such as spinach, kale, layla greens, and mustard greens  Eat yellow and orange vegetables such as carrots, sweet potatoes, and winter squash  · Eat a variety of fruits every day  Choose fresh or canned fruit (canned in its own juice or light syrup) instead of juice  Fruit juice has very little or no fiber  · Eat low-fat dairy foods  Drink fat-free (skim) milk or 1% milk  Eat fat-free yogurt and low-fat cottage cheese  Try low-fat cheeses such as mozzarella and other reduced-fat cheeses  · Choose meat and other protein foods that are low in fat  Choose beans or other legumes such as split peas or lentils  Choose fish, skinless poultry (chicken or turkey), or lean cuts of red meat (beef or pork)  Before you cook meat or poultry, cut off any visible fat  · Use less fat and oil    Try baking foods instead of frying them  Add less fat, such as margarine, sour cream, regular salad dressing and mayonnaise to foods  Eat fewer high-fat foods  Some examples of high-fat foods include french fries, doughnuts, ice cream, and cakes  · Eat fewer sweets  Limit foods and drinks that are high in sugar  This includes candy, cookies, regular soda, and sweetened drinks  Exercise:  Exercise at least 30 minutes per day on most days of the week  Some examples of exercise include walking, biking, dancing, and swimming  You can also fit in more physical activity by taking the stairs instead of the elevator or parking farther away from stores  Ask your healthcare provider about the best exercise plan for you  © Copyright [employer] 2018 Information is for End User's use only and may not be sold, redistributed or otherwise used for commercial purposes   All illustrations and images included in CareNotes® are the copyrighted property of A D A M , Inc  or [address] [place] [address]

## 2022-07-08 NOTE — PROGRESS NOTES
Assessment/Plan:         Diagnoses and all orders for this visit:    Encounter for general adult medical examination with abnormal findings; done in Detail    Essential hypertension; continue and Renew ;  -     amLODIPine (NORVASC) 5 mg tablet; Take 1 tablet (5 mg total) by mouth 2 (two) times a day  RTC in 3 mos w Blood  work  -     Vitamin B12; Future  -     Vitamin D 25 hydroxy; Future  Renew ;  -     bisoprolol (ZEBETA) 10 MG tablet; Take 1 tablet (10 mg total) by mouth daily    Hyperlipidemia associated with type 2 diabetes mellitus (HCC)  -     co-enzyme Q-10 30 MG capsule; Take 1 capsule (30 mg total) by mouth 2 (two) times a day  -     omega-3-acid ethyl esters (LOVAZA) 1 g capsule; Take 1 capsule (1 g total) by mouth 2 (two) times a day  -     Red Yeast Rice 600 MG CAPS; Take 1 capsule (600 mg total) by mouth 2 (two) times a day with meals  Re Start :  -     rosuvastatin (CRESTOR) 5 mg tablet; Take 1 tablet (5 mg total) by mouth daily  RTC in 3 mos w :  -     Lipid panel; Future  -     CK (with reflex to MB); Future  -     Vitamin B12; Future  -     Vitamin D 25 hydroxy; Future    Low vitamin D level  -     ergocalciferol (VITAMIN D2) 50,000 units; Take 1 capsule (50,000 Units total) by mouth once a week  -     Vitamin B12; Future  -     Vitamin D 25 hydroxy; Future    Gastroesophageal reflux disease without esophagitis  -     omeprazole (PriLOSEC) 20 mg delayed release capsule; Take 1 capsule (20 mg total) by mouth daily    Coronary artery disease due to lipid rich plaque  -     Plavix 75 MG tablet; Take 1 tablet (75 mg total) by mouth daily    Low vitamin B12 level  -     vitamin B-12 (VITAMIN B-12) 1,000 mcg tablet; Take 1 tablet (1,000 mcg total) by mouth daily  -     Vitamin B12; Future  -     Vitamin D 25 hydroxy; Future    Type II diabetes mellitus with manifestations (HCC)  -     UA (URINE) with reflex to Scope; Future  -     Comprehensive metabolic panel;  Future  -     Vitamin B12; Future  - Vitamin D 25 hydroxy; Future    Hypomagnesemia  -     magnesium (MAGTAB) 84 MG (7MEQ) TBCR; Take 1 tablet (84 mg total) by mouth daily  -     Magnesium; Future        Subjective:      Patient ID: Ethel Esposito is a 68 y o  female  [address] is here for AWV and Regular check up, she feels ok, recent blood work and med list reviewed w Pt in detail    The following portions of the patient's history were reviewed and updated as appropriate: allergies, past family history, past medical history, past social history, past surgical history and problem list     Review of Systems   Constitutional: Negative for chills, fatigue and fever  HENT: Negative for congestion, facial swelling, sore throat, trouble swallowing and voice change  Eyes: Negative for pain, discharge and visual disturbance  Respiratory: Negative for cough, shortness of breath and wheezing  Cardiovascular: Negative for chest pain, palpitations and leg swelling  Gastrointestinal: Negative for abdominal pain, blood in stool, constipation, diarrhea and nausea  Endocrine: Negative for polydipsia, polyphagia and polyuria  Genitourinary: Negative for difficulty urinating, hematuria and urgency  Musculoskeletal: Positive for arthralgias  Negative for myalgias  Skin: Negative for rash  Neurological: Negative for dizziness, tremors, weakness and headaches  Hematological: Negative for adenopathy  Does not bruise/bleed easily  Psychiatric/Behavioral: Negative for dysphoric mood, sleep disturbance and suicidal ideas  Objective:      /80 (BP Location: Left arm, Patient Position: Sitting, Cuff Size: Standard)   Pulse 72   Temp 98 °F (36 7 °C)   Resp 14   Ht 5' (1 524 m)   Wt 60 3 kg (133 lb)   LMP  (LMP Unknown)   SpO2 93%   BMI 25 97 kg/m²          Physical Exam  Constitutional:       General: She is not in acute distress  HENT:      Head: Normocephalic        Mouth/Throat:      Pharynx: No oropharyngeal exudate  Eyes:      General: No scleral icterus  Conjunctiva/sclera: Conjunctivae normal       Pupils: Pupils are equal, round, and reactive to light  Neck:      Thyroid: No thyromegaly  Cardiovascular:      Rate and Rhythm: Normal rate and regular rhythm  Heart sounds: Normal heart sounds  No murmur heard  Pulmonary:      Effort: Pulmonary effort is normal  No respiratory distress  Breath sounds: Normal breath sounds  No wheezing or rales  Abdominal:      General: Bowel sounds are normal  There is no distension  Palpations: Abdomen is soft  Tenderness: There is no abdominal tenderness  There is no guarding or rebound  Musculoskeletal:         General: Tenderness present  Cervical back: Neck supple  Lymphadenopathy:      Cervical: No cervical adenopathy  Skin:     Coloration: Skin is not pale  Findings: No rash  Neurological:      Mental Status: She is alert and oriented to person, place, and time  Sensory: Sensory deficit present  Motor: No weakness        Comments: Pt uses a walker to support gait

## 2022-07-08 NOTE — PROGRESS NOTES
Assessment and Plan:     Problem List Items Addressed This Visit    None          Preventive health issues were discussed with patient, and age appropriate screening tests were ordered as noted in patient's After Visit Summary  Personalized health advice and appropriate referrals for health education or preventive services given if needed, as noted in patient's After Visit Summary       History of Present Illness:     Patient presents for a Medicare Wellness Visit    HPI   Patient Care Team:  Eddy Cancino MD as PCP - General (Internal Medicine)     Review of Systems:     Review of Systems     Problem List:     Patient Active Problem List   Diagnosis    Asthma    Chronic coronary artery disease    Osteoarthritis    Glaucoma    Hiatal hernia    Hyperlipidemia    Hypertension    Hx of CABG    Hx stent of SVG to the RCA    Community acquired pneumonia    Sepsis [facility]    [facility], [facility]      Past Medical and Surgical History:     Past Medical History:   Diagnosis Date    Arthritis     Asthma     Community acquired pneumonia 11/13/2019    Coronary artery disease     DM (diabetes mellitus) ([facility]     HTN (hypertension)     Hyperlipidemia     Hyponatremia      Past Surgical History:   Procedure Laterality Date    CARDIAC CATHETERIZATION  2010    CORONARY ARTERY BYPASS GRAFT  12/06/2010    with subsequent stent to the saphenous veingraft to the RCA 3 months later    KNEE ARTHROSCOPY      POLYPECTOMY      laryngeal    TOTAL HIP ARTHROPLASTY      TUBAL LIGATION        Family History:     Family History   Problem Relation Age of Onset    Stroke Mother     Hypertension Mother     Heart disease Mother     Colon cancer Father     Heart attack Brother     Heart attack Brother       Social History:     Social History     Socioeconomic History    Marital status: Single     Spouse name: Not on file    Number of children: 5    Years of education: Not on file    Highest education level: Not on file   Occupational History    Occupation: retired   Tobacco Use    Smoking status: Former Smoker     Packs/day: 0 50     Years: 13 00     Pack years: 6 50     Types: Cigarettes     Quit date:      Years since quittin 5    Smokeless tobacco: Never Used    Tobacco comment: no passive smoke exposure   Vaping Use    Vaping Use: Never used   Substance and Sexual Activity    Alcohol use: Yes     Comment: socially    Drug use: Never    Sexual activity: Not Currently   Other Topics Concern    Not on file   Social History Narrative    Not on file     Social Determinants of Health     Financial Resource Strain: Not on file   Food Insecurity: Not on file   Transportation Needs: Not on file   Physical Activity: Not on file   Stress: Not on file   Social Connections: Not on file   Intimate Partner Violence: Not on file   Housing Stability: Not on file      Medications and Allergies:     Current Outpatient Medications   Medication Sig Dispense Refill    Air  (Vicks Air Purifier/HEPA) (Device) MISC Use as directed 1 each 0    albuterol (ACCUNEB) 1 25 MG/3ML nebulizer solution Take 3 mL by nebulization every 6 (six) hours as needed for wheezing 225 mL 5    albuterol (PROVENTIL HFA,VENTOLIN HFA) 90 mcg/act inhaler INHALE 2 PUFFS EVERY 6 HOURS AS NEEDED FOR WHEEZING 18 g 0    ALPRAZolam (XANAX) 0 5 mg tablet Take 1 tablet (0 5 mg total) by mouth daily at bedtime as needed for anxiety 30 tablet 1    amLODIPine (NORVASC) 5 mg tablet Take 1 tablet (5 mg total) by mouth 2 (two) times a day 180 tablet 3    bisoprolol (ZEBETA) 10 MG tablet Take 1 tablet (10 mg total) by mouth daily 90 tablet 3    co-enzyme Q-10 30 MG capsule Take 1 capsule (30 mg total) by mouth 2 (two) times a day 200 capsule 3    ergocalciferol (VITAMIN D2) 50,000 units Take 1 capsule (50,000 Units total) by mouth once a week 13 capsule 3    glucose blood test strip Use 1 each as needed (as needed) Use as instructed 100 each 5    nitroglycerin (NITROSTAT) 0 4 mg SL tablet Place 1 tablet (0 4 mg total) under the tongue every 5 (five) minutes as needed for chest pain 30 tablet 5    omega-3-acid ethyl esters (LOVAZA) 1 g capsule Take 1 capsule (1 g total) by mouth 2 (two) times a day 180 capsule 3    omeprazole (PriLOSEC) 20 mg delayed release capsule Take 1 capsule (20 mg total) by mouth daily 180 capsule 3    Plavix 75 MG tablet Take 1 tablet (75 mg total) by mouth daily 90 tablet 3    Red Yeast Rice 600 MG CAPS Take 1 capsule (600 mg total) by mouth 2 (two) times a day with meals 200 capsule 3    vitamin B-12 (VITAMIN B-12) 1,000 mcg tablet Take 1 tablet (1,000 mcg total) by mouth daily 90 tablet 3     Current Facility-Administered Medications   Medication Dose Route Frequency Provider Last Rate Last Admin    cyanocobalamin injection 1,000 mcg  1,000 mcg Intramuscular Q30 Days Ramsey Gtz MD   1,000 mcg at 10/29/21 1217    trimethobenzamide (TIGAN) IM injection 200 mg  200 mg Intramuscular Q6H PRN Eddy Cancino MD   200 mg at 01/22/21 1227     Allergies   Allergen Reactions    Ezetimibe Rash    Ace Inhibitors     Lipitor [Atorvastatin]      Able to tolerate Crestor (brand name) per patient    Lisinopril     Losartan     Morphine Hives     pt denies    Olmesartan     Statins Arthralgia      Immunizations:     Immunization History   Administered Date(s) Administered    COVID-19 PFIZER VACCINE 0 3 ML IM 03/21/2021, 04/11/2021    INFLUENZA 10/20/2015, 09/27/2016, 09/28/2016, 11/09/2017, 10/29/2021    Influenza Split 10/15/2012, 10/29/2013, 09/26/2014    Influenza Split High Dose Preservative Free IM 09/27/2016, 11/09/2017    Influenza, high dose seasonal 0 7 mL 10/08/2018, 09/13/2019, 09/28/2020, 10/29/2021    Influenza, seasonal, injectable 10/20/2015    Pneumococcal Conjugate 13-Valent 11/09/2017    Pneumococcal Polysaccharide PPV23 11/01/2009, 10/20/2015    Tdap 02/01/2019 Health Maintenance:         Topic Date Due    Breast Cancer Screening: Mammogram  Never done    Hepatitis C Screening  Completed         Topic Date Due    COVID-19 Vaccine (3 - Booster for Pfizer series) 09/11/2021    Influenza Vaccine (1) 09/01/2022      Medicare Screening Tests and Risk Assessments:     Jose Cisneros is here for her Subsequent Wellness visit  Last Medicare Wellness visit information reviewed, patient interviewed, no change since last AWV  Health Risk Assessment:   Patient rates overall health as fair  Patient feels that their physical health rating is slightly worse  Patient is satisfied with their life  Eyesight was rated as same  Hearing was rated as same  Patient feels that their emotional and mental health rating is same  Patients states they are never, rarely angry  Patient states they are sometimes unusually tired/fatigued  Pain experienced in the last 7 days has been some  Patient's pain rating has been 7/10  Patient states that she has experienced no weight loss or gain in last 6 months  Depression Screening:   PHQ-2 Score: 0      Fall Risk Screening: In the past year, patient has experienced: no history of falling in past year      Urinary Incontinence Screening:   Patient has not leaked urine accidently in the last six months  Home Safety:  Patient has trouble with stairs inside or outside of their home  Patient has working smoke alarms and has working carbon monoxide detector  Home safety hazards include: none  Nutrition:   Current diet is Regular  Medications:   Patient is not currently taking any over-the-counter supplements  Patient is able to manage medications  Activities of Daily Living (ADLs)/Instrumental Activities of Daily Living (IADLs):   Walk and transfer into and out of bed and chair?: Yes  Dress and groom yourself?: Yes    Bathe or shower yourself?: Yes    Feed yourself?  Yes  Do your laundry/housekeeping?: Yes  Manage your money, pay your bills and track your expenses?: Yes  Make your own meals?: Yes    Do your own shopping?: Yes    Previous Hospitalizations:   Any hospitalizations or ED visits within the last 12 months?: No      Advance Care Planning:   Living will: No    Durable POA for healthcare: No    Advanced directive counseling given: No    Five wishes given: No    End of Life Decisions reviewed with patient: No      PREVENTIVE SCREENINGS      Cardiovascular Screening:    General: Screening Not Indicated, History Lipid Disorder and Risks and Benefits Discussed      Diabetes Screening:     General: Screening Not Indicated, History Diabetes, Risks and Benefits Discussed and Screening Current      Colorectal Cancer Screening:     General: Risks and Benefits Discussed      Breast Cancer Screening:     General: Risks and Benefits Discussed      Cervical Cancer Screening:    General: Screening Not Indicated and Risks and Benefits Discussed      Osteoporosis Screening:    General: Screening Not Indicated, History Osteoporosis and Risks and Benefits Discussed      Abdominal Aortic Aneurysm (AAA) Screening:        General: Risks and Benefits Discussed      Lung Cancer Screening:     General: Screening Not Indicated and Risks and Benefits Discussed      Hepatitis C Screening:    General: Screening Current and Risks and Benefits Discussed    Screening, Brief Intervention, and Referral to Treatment (SBIRT)    Screening      Single Item Drug Screening:  How often have you used an illegal drug (including marijuana) or a prescription medication for non-medical reasons in the past year? never    Single Item Drug Screen Score: 0  Interpretation: Negative screen for possible drug use disorder    Other Counseling Topics:   Car/seat belt/driving safety, skin self-exam, sunscreen and calcium and vitamin D intake and regular weightbearing exercise       No exam data present     Physical Exam:     LMP  (LMP Unknown)     Physical Exam     Marla Friedman MD

## 2022-07-11 ENCOUNTER — TELEPHONE (OUTPATIENT)
Dept: FAMILY MEDICINE CLINIC | Facility: CLINIC | Age: 77
End: 2022-07-11

## 2022-07-11 DIAGNOSIS — K21.9 GASTROESOPHAGEAL REFLUX DISEASE WITHOUT ESOPHAGITIS: Primary | ICD-10-CM

## 2022-07-11 RX ORDER — PANTOPRAZOLE SODIUM 20 MG/1
20 TABLET, DELAYED RELEASE ORAL
Qty: 30 TABLET | Refills: 3 | Status: SHIPPED | OUTPATIENT
Start: 2022-07-11

## 2022-07-11 NOTE — TELEPHONE ENCOUNTER
pts pharmacy called stating that there is a severe interaction with the plavix and the omeprazole and they need an approval for the release of both medications   Please advise - as

## 2022-07-12 DIAGNOSIS — F41.9 ANXIETY: ICD-10-CM

## 2022-07-13 RX ORDER — ALPRAZOLAM 0.5 MG/1
TABLET ORAL
Qty: 30 TABLET | Refills: 0 | Status: SHIPPED | OUTPATIENT
Start: 2022-07-13 | End: 2022-09-08 | Stop reason: SDUPTHER

## 2022-08-11 DIAGNOSIS — J44.9 CHRONIC OBSTRUCTIVE PULMONARY DISEASE, UNSPECIFIED COPD TYPE (HCC): ICD-10-CM

## 2022-08-11 RX ORDER — ALBUTEROL SULFATE 90 UG/1
2 AEROSOL, METERED RESPIRATORY (INHALATION) EVERY 6 HOURS PRN
Qty: 18 G | Refills: 5 | Status: SHIPPED | OUTPATIENT
Start: 2022-08-11

## 2022-08-17 ENCOUNTER — TELEPHONE (OUTPATIENT)
Dept: FAMILY MEDICINE CLINIC | Facility: CLINIC | Age: 77
End: 2022-08-17

## 2022-08-17 NOTE — TELEPHONE ENCOUNTER
Pt called stating that she needs a new rx for a new nebulizer machine because hers broke   Please advise - as

## 2022-08-18 DIAGNOSIS — J44.9 CHRONIC OBSTRUCTIVE PULMONARY DISEASE, UNSPECIFIED COPD TYPE (HCC): Primary | ICD-10-CM

## 2022-08-19 DIAGNOSIS — J45.20 MILD INTERMITTENT ASTHMA WITHOUT COMPLICATION: Primary | ICD-10-CM

## 2022-09-08 DIAGNOSIS — F41.9 ANXIETY: ICD-10-CM

## 2022-09-08 RX ORDER — ALPRAZOLAM 0.5 MG/1
0.5 TABLET ORAL
Qty: 30 TABLET | Refills: 3 | Status: SHIPPED | OUTPATIENT
Start: 2022-09-08

## 2022-09-23 DIAGNOSIS — I10 BENIGN HYPERTENSION: ICD-10-CM

## 2022-09-23 RX ORDER — BISOPROLOL FUMARATE 10 MG/1
10 TABLET, FILM COATED ORAL DAILY
Qty: 90 TABLET | Refills: 3 | Status: SHIPPED | OUTPATIENT
Start: 2022-09-23

## 2022-10-26 ENCOUNTER — TELEPHONE (OUTPATIENT)
Dept: FAMILY MEDICINE CLINIC | Facility: CLINIC | Age: 77
End: 2022-10-26

## 2022-10-26 DIAGNOSIS — J06.9 ACUTE URI: Primary | ICD-10-CM

## 2022-10-26 RX ORDER — AMOXICILLIN AND CLAVULANATE POTASSIUM 875; 125 MG/1; MG/1
1 TABLET, FILM COATED ORAL EVERY 12 HOURS SCHEDULED
Qty: 14 TABLET | Refills: 0 | Status: SHIPPED | OUTPATIENT
Start: 2022-10-26 | End: 2022-11-02

## 2022-10-26 RX ORDER — BENZONATATE 100 MG/1
100 CAPSULE ORAL 3 TIMES DAILY PRN
Qty: 30 CAPSULE | Refills: 0 | Status: SHIPPED | OUTPATIENT
Start: 2022-10-26

## 2022-10-26 NOTE — TELEPHONE ENCOUNTER
Pt is requesting medication be sent to the pharmacy  She has not been feeling well for 2 days  Her symptoms are cough, congestion and sore throat  Please advise

## 2022-11-10 DIAGNOSIS — K21.9 GASTROESOPHAGEAL REFLUX DISEASE WITHOUT ESOPHAGITIS: ICD-10-CM

## 2022-11-10 RX ORDER — PANTOPRAZOLE SODIUM 20 MG/1
TABLET, DELAYED RELEASE ORAL
Qty: 30 TABLET | Refills: 0 | Status: SHIPPED | OUTPATIENT
Start: 2022-11-10

## 2022-12-12 ENCOUNTER — OFFICE VISIT (OUTPATIENT)
Dept: FAMILY MEDICINE CLINIC | Facility: CLINIC | Age: 77
End: 2022-12-12

## 2022-12-12 VITALS
DIASTOLIC BLOOD PRESSURE: 88 MMHG | RESPIRATION RATE: 16 BRPM | HEART RATE: 74 BPM | TEMPERATURE: 98.9 F | OXYGEN SATURATION: 97 % | SYSTOLIC BLOOD PRESSURE: 158 MMHG | BODY MASS INDEX: 25.97 KG/M2 | HEIGHT: 60 IN

## 2022-12-12 DIAGNOSIS — Z12.31 ENCOUNTER FOR SCREENING MAMMOGRAM FOR BREAST CANCER: Primary | ICD-10-CM

## 2022-12-12 DIAGNOSIS — E83.42 HYPOMAGNESEMIA: ICD-10-CM

## 2022-12-12 DIAGNOSIS — I25.10 CORONARY ARTERY DISEASE DUE TO LIPID RICH PLAQUE: ICD-10-CM

## 2022-12-12 DIAGNOSIS — R79.89 LOW VITAMIN D LEVEL: ICD-10-CM

## 2022-12-12 DIAGNOSIS — I25.83 CORONARY ARTERY DISEASE DUE TO LIPID RICH PLAQUE: ICD-10-CM

## 2022-12-12 DIAGNOSIS — E78.5 HYPERLIPIDEMIA ASSOCIATED WITH TYPE 2 DIABETES MELLITUS (HCC): ICD-10-CM

## 2022-12-12 DIAGNOSIS — Z13.820 SCREENING FOR OSTEOPOROSIS: ICD-10-CM

## 2022-12-12 DIAGNOSIS — Z23 FLU VACCINE NEED: ICD-10-CM

## 2022-12-12 DIAGNOSIS — I10 BENIGN HYPERTENSION: ICD-10-CM

## 2022-12-12 DIAGNOSIS — R31.9 HEMATURIA, UNSPECIFIED TYPE: ICD-10-CM

## 2022-12-12 DIAGNOSIS — I10 ESSENTIAL HYPERTENSION: ICD-10-CM

## 2022-12-12 DIAGNOSIS — E11.69 HYPERLIPIDEMIA ASSOCIATED WITH TYPE 2 DIABETES MELLITUS (HCC): ICD-10-CM

## 2022-12-12 DIAGNOSIS — K21.9 GASTROESOPHAGEAL REFLUX DISEASE WITHOUT ESOPHAGITIS: ICD-10-CM

## 2022-12-12 RX ORDER — CLOPIDOGREL BISULFATE 75 MG
75 TABLET ORAL DAILY
Qty: 90 TABLET | Refills: 3 | Status: SHIPPED | OUTPATIENT
Start: 2022-12-12

## 2022-12-12 RX ORDER — ERGOCALCIFEROL 1.25 MG/1
50000 CAPSULE ORAL WEEKLY
Qty: 13 CAPSULE | Refills: 3 | Status: SHIPPED | OUTPATIENT
Start: 2022-12-12

## 2022-12-12 RX ORDER — PANTOPRAZOLE SODIUM 20 MG/1
20 TABLET, DELAYED RELEASE ORAL DAILY
Qty: 90 TABLET | Refills: 3 | Status: SHIPPED | OUTPATIENT
Start: 2022-12-12

## 2022-12-12 RX ORDER — ROSUVASTATIN CALCIUM 5 MG/1
5 TABLET, COATED ORAL DAILY
Qty: 90 TABLET | Refills: 3 | Status: SHIPPED | OUTPATIENT
Start: 2022-12-12

## 2022-12-12 RX ORDER — MAGNESIUM L-LACTATE 84 MG
84 TABLET, EXTENDED RELEASE ORAL DAILY
Qty: 90 TABLET | Refills: 3 | Status: SHIPPED | OUTPATIENT
Start: 2022-12-12

## 2022-12-12 RX ORDER — AMLODIPINE BESYLATE 5 MG/1
5 TABLET ORAL 2 TIMES DAILY
Qty: 180 TABLET | Refills: 3 | Status: SHIPPED | OUTPATIENT
Start: 2022-12-12

## 2022-12-12 RX ORDER — BISOPROLOL FUMARATE 10 MG/1
10 TABLET, FILM COATED ORAL DAILY
Qty: 90 TABLET | Refills: 3 | Status: SHIPPED | OUTPATIENT
Start: 2022-12-12

## 2022-12-12 NOTE — PROGRESS NOTES
Name: Devika Palma      : 1945      MRN: [id number]  Encounter Provider: Evelin Acosta MD  Encounter Date: 2022   Encounter department: [address] [place] [address]     1  Encounter for screening mammogram for breast cancer  -     Mammo screening bilateral w 3d & cad; Future; Expected date: 2022    2  Screening for osteoporosis  -     DXA bone density spine hip and pelvis; Future; Expected date: 2022    3  Essential hypertension  -     amLODIPine (NORVASC) 5 mg tablet; Take 1 tablet (5 mg total) by mouth 2 (two) times a day    4  Benign hypertension  -     bisoprolol (ZEBETA) 10 MG tablet; Take 1 tablet (10 mg total) by mouth daily    5  Low vitamin D level  -     ergocalciferol (VITAMIN D2) 50,000 units; Take 1 capsule (50,000 Units total) by mouth once a week    6  Hypomagnesemia  -     magnesium (MAGTAB) 84 MG (7MEQ) TBCR; Take 1 tablet (84 mg total) by mouth daily    7  Gastroesophageal reflux disease without esophagitis  -     pantoprazole (PROTONIX) 20 mg tablet; Take 1 tablet (20 mg total) by mouth daily    8  Coronary artery disease due to lipid rich plaque  -     Plavix 75 MG tablet; Take 1 tablet (75 mg total) by mouth daily    9  Hyperlipidemia associated with type 2 diabetes mellitus (HCC)  -     rosuvastatin (CRESTOR) 5 mg tablet; Take 1 tablet (5 mg total) by mouth daily    10  Flu vaccine need  -     influenza vaccine, high-dose, PF 0 7 mL (FLUZONE HIGH-DOSE)    11  Hematuria, unspecified type  -     US kidney and bladder with pvr; Future; Expected date: 2022    rtc in 3 mos w blood work       Subjective      [address] is here for regular check up, she feels ok, no recent blood work, med list reviewed w pt    Review of Systems   Constitutional: Negative for chills, fatigue and fever  HENT: Negative for congestion, facial swelling, sore throat, trouble swallowing and voice change      Eyes: Negative for pain, discharge and visual disturbance  Respiratory: Negative for cough, shortness of breath and wheezing  Cardiovascular: Negative for chest pain, palpitations and leg swelling  Gastrointestinal: Negative for abdominal pain, blood in stool, constipation, diarrhea and nausea  Endocrine: Negative for polydipsia, polyphagia and polyuria  Genitourinary: Negative for difficulty urinating, hematuria and urgency  Musculoskeletal: Negative for arthralgias and myalgias  Skin: Negative for rash  Neurological: Negative for dizziness, tremors, weakness and headaches  Hematological: Negative for adenopathy  Does not bruise/bleed easily  Psychiatric/Behavioral: Negative for dysphoric mood, sleep disturbance and suicidal ideas         Current Outpatient Medications on File Prior to Visit   Medication Sig   • Air  (Vicks Air Purifier/HEPA) (Device) MISC Use as directed   • albuterol (ACCUNEB) 1 25 MG/3ML nebulizer solution Take 3 mL by nebulization every 6 (six) hours as needed for wheezing   • albuterol (PROVENTIL HFA,VENTOLIN HFA) 90 mcg/act inhaler Inhale 2 puffs every 6 (six) hours as needed for wheezing   • ALPRAZolam (XANAX) 0 5 mg tablet Take 1 tablet (0 5 mg total) by mouth daily at bedtime as needed for anxiety   • benzonatate (TESSALON PERLES) 100 mg capsule Take 1 capsule (100 mg total) by mouth 3 (three) times a day as needed for cough With food/meals   • co-enzyme Q-10 30 MG capsule Take 1 capsule (30 mg total) by mouth 2 (two) times a day   • glucose blood test strip Use 1 each as needed (as needed) Use as instructed   • nitroglycerin (NITROSTAT) 0 4 mg SL tablet Place 1 tablet (0 4 mg total) under the tongue every 5 (five) minutes as needed for chest pain   • omega-3-acid ethyl esters (LOVAZA) 1 g capsule Take 1 capsule (1 g total) by mouth 2 (two) times a day   • Red Yeast Rice 600 MG CAPS Take 1 capsule (600 mg total) by mouth 2 (two) times a day with meals   • vitamin B-12 (VITAMIN B-12) 1,000 mcg tablet Take 1 tablet (1,000 mcg total) by mouth daily   • [DISCONTINUED] amLODIPine (NORVASC) 5 mg tablet Take 1 tablet (5 mg total) by mouth 2 (two) times a day   • [DISCONTINUED] bisoprolol (ZEBETA) 10 MG tablet Take 1 tablet (10 mg total) by mouth daily   • [DISCONTINUED] ergocalciferol (VITAMIN D2) 50,000 units Take 1 capsule (50,000 Units total) by mouth once a week   • [DISCONTINUED] magnesium (MAGTAB) 84 MG (7MEQ) TBCR Take 1 tablet (84 mg total) by mouth daily   • [DISCONTINUED] pantoprazole (PROTONIX) 20 mg tablet TAKE ONE TABLET BY MOUTH EVERY DAY BEFORE BREAKFAST   • [DISCONTINUED] Plavix 75 MG tablet Take 1 tablet (75 mg total) by mouth daily   • [DISCONTINUED] rosuvastatin (CRESTOR) 5 mg tablet Take 1 tablet (5 mg total) by mouth daily       Objective     /88 (BP Location: Left arm, Patient Position: Sitting, Cuff Size: Standard)   Pulse 74   Temp 98 9 °F (37 2 °C)   Resp 16   Ht 5' (1 524 m)   LMP  (LMP Unknown)   SpO2 97%   BMI 25 97 kg/m²     Physical Exam  Constitutional:       General: She is not in acute distress  HENT:      Head: Normocephalic  Mouth/Throat:      Pharynx: No oropharyngeal exudate  Eyes:      General: No scleral icterus  Conjunctiva/sclera: Conjunctivae normal       Pupils: Pupils are equal, round, and reactive to light  Neck:      Thyroid: No thyromegaly  Cardiovascular:      Rate and Rhythm: Normal rate and regular rhythm  Heart sounds: Normal heart sounds  No murmur heard  Pulmonary:      Effort: Pulmonary effort is normal  No respiratory distress  Breath sounds: Normal breath sounds  No wheezing or rales  Abdominal:      General: Bowel sounds are normal  There is no distension  Palpations: Abdomen is soft  Tenderness: There is no abdominal tenderness  There is no guarding or rebound  Musculoskeletal:         General: No tenderness  Cervical back: Neck supple  Lymphadenopathy:      Cervical: No cervical adenopathy  Skin:     Coloration: Skin is not pale  Findings: No rash  Neurological:      Mental Status: She is alert and oriented to person, place, and time  Sensory: No sensory deficit  Motor: No weakness         Niki Claudio MD

## 2022-12-14 DIAGNOSIS — E11.8 DIABETIC COMPLICATION (HCC): ICD-10-CM

## 2022-12-14 DIAGNOSIS — J44.9 CHRONIC OBSTRUCTIVE PULMONARY DISEASE, UNSPECIFIED COPD TYPE (HCC): ICD-10-CM

## 2022-12-14 RX ORDER — ALBUTEROL SULFATE 90 UG/1
2 AEROSOL, METERED RESPIRATORY (INHALATION) EVERY 6 HOURS PRN
Qty: 18 G | Refills: 5 | Status: SHIPPED | OUTPATIENT
Start: 2022-12-14

## 2022-12-30 ENCOUNTER — HOSPITAL ENCOUNTER (EMERGENCY)
Facility: HOSPITAL | Age: 77
Discharge: HOME | End: 2022-12-30
Attending: EMERGENCY MEDICINE

## 2022-12-30 VITALS
RESPIRATION RATE: 22 BRPM | SYSTOLIC BLOOD PRESSURE: 142 MMHG | HEART RATE: 68 BPM | TEMPERATURE: 98 F | OXYGEN SATURATION: 97 % | DIASTOLIC BLOOD PRESSURE: 65 MMHG

## 2022-12-30 DIAGNOSIS — R51.9 HEADACHE: ICD-10-CM

## 2022-12-30 DIAGNOSIS — R73.9 HYPERGLYCEMIA: ICD-10-CM

## 2022-12-30 DIAGNOSIS — R55 NEAR SYNCOPE: Primary | ICD-10-CM

## 2022-12-30 DIAGNOSIS — E83.42 HYPOMAGNESEMIA: ICD-10-CM

## 2022-12-30 LAB
2HR DELTA HS TROPONIN: 0 NG/L
ALBUMIN SERPL BCP-MCNC: 3.6 G/DL (ref 3.5–5)
ALP SERPL-CCNC: 77 U/L (ref 46–116)
ALT SERPL W P-5'-P-CCNC: 23 U/L (ref 12–78)
ANION GAP SERPL CALCULATED.3IONS-SCNC: 7 MMOL/L (ref 4–13)
AST SERPL W P-5'-P-CCNC: 17 U/L (ref 5–45)
ATRIAL RATE: 70 BPM
ATRIAL RATE: 73 BPM
BASOPHILS # BLD AUTO: 0.08 THOUSANDS/ÂΜL (ref 0–0.1)
BASOPHILS NFR BLD AUTO: 1 % (ref 0–1)
BILIRUB SERPL-MCNC: 0.36 MG/DL (ref 0.2–1)
BUN SERPL-MCNC: 5 MG/DL (ref 5–25)
CALCIUM SERPL-MCNC: 9.4 MG/DL (ref 8.3–10.1)
CARDIAC TROPONIN I PNL SERPL HS: 6 NG/L
CARDIAC TROPONIN I PNL SERPL HS: 6 NG/L
CHLORIDE SERPL-SCNC: 99 MMOL/L (ref 96–108)
CO2 SERPL-SCNC: 32 MMOL/L (ref 21–32)
CREAT SERPL-MCNC: 0.81 MG/DL (ref 0.6–1.3)
EOSINOPHIL # BLD AUTO: 0.51 THOUSAND/ÂΜL (ref 0–0.61)
EOSINOPHIL NFR BLD AUTO: 5 % (ref 0–6)
ERYTHROCYTE [DISTWIDTH] IN BLOOD BY AUTOMATED COUNT: 14.8 % (ref 11.6–15.1)
FLUAV RNA RESP QL NAA+PROBE: NEGATIVE
FLUBV RNA RESP QL NAA+PROBE: NEGATIVE
GFR SERPL CREATININE-BSD FRML MDRD: 70 ML/MIN/1.73SQ M
GLUCOSE SERPL-MCNC: 184 MG/DL (ref 65–140)
HCT VFR BLD AUTO: 43.1 % (ref 34.8–46.1)
HGB BLD-MCNC: 13.1 G/DL (ref 11.5–15.4)
IMM GRANULOCYTES # BLD AUTO: 0.04 THOUSAND/UL (ref 0–0.2)
IMM GRANULOCYTES NFR BLD AUTO: 0 % (ref 0–2)
LYMPHOCYTES # BLD AUTO: 4.46 THOUSANDS/ÂΜL (ref 0.6–4.47)
LYMPHOCYTES NFR BLD AUTO: 39 % (ref 14–44)
MAGNESIUM SERPL-MCNC: 1.4 MG/DL (ref 1.6–2.6)
MCH RBC QN AUTO: 26.5 PG (ref 26.8–34.3)
MCHC RBC AUTO-ENTMCNC: 30.4 G/DL (ref 31.4–37.4)
MCV RBC AUTO: 87 FL (ref 82–98)
MONOCYTES # BLD AUTO: 0.87 THOUSAND/ÂΜL (ref 0.17–1.22)
MONOCYTES NFR BLD AUTO: 8 % (ref 4–12)
NEUTROPHILS # BLD AUTO: 5.45 THOUSANDS/ÂΜL (ref 1.85–7.62)
NEUTS SEG NFR BLD AUTO: 47 % (ref 43–75)
NRBC BLD AUTO-RTO: 0 /100 WBCS
P AXIS: 70 DEGREES
P AXIS: 72 DEGREES
PLATELET # BLD AUTO: 380 THOUSANDS/UL (ref 149–390)
PMV BLD AUTO: 10 FL (ref 8.9–12.7)
POTASSIUM SERPL-SCNC: 4.1 MMOL/L (ref 3.5–5.3)
PR INTERVAL: 148 MS
PR INTERVAL: 150 MS
PROT SERPL-MCNC: 8.1 G/DL (ref 6.4–8.4)
QRS AXIS: 60 DEGREES
QRS AXIS: 63 DEGREES
QRSD INTERVAL: 106 MS
QRSD INTERVAL: 108 MS
QT INTERVAL: 398 MS
QT INTERVAL: 432 MS
QTC INTERVAL: 438 MS
QTC INTERVAL: 466 MS
RBC # BLD AUTO: 4.94 MILLION/UL (ref 3.81–5.12)
RSV RNA RESP QL NAA+PROBE: NEGATIVE
SARS-COV-2 RNA RESP QL NAA+PROBE: NEGATIVE
SODIUM SERPL-SCNC: 138 MMOL/L (ref 135–147)
T WAVE AXIS: 53 DEGREES
T WAVE AXIS: 56 DEGREES
VENTRICULAR RATE: 70 BPM
VENTRICULAR RATE: 73 BPM
WBC # BLD AUTO: 11.41 THOUSAND/UL (ref 4.31–10.16)

## 2022-12-30 RX ORDER — ONDANSETRON 2 MG/ML
4 INJECTION INTRAMUSCULAR; INTRAVENOUS ONCE
Status: DISCONTINUED | OUTPATIENT
Start: 2022-12-30 | End: 2022-12-30

## 2022-12-30 RX ORDER — ONDANSETRON 2 MG/ML
1 INJECTION INTRAMUSCULAR; INTRAVENOUS ONCE
Status: COMPLETED | OUTPATIENT
Start: 2022-12-30 | End: 2022-12-30

## 2022-12-30 RX ORDER — KETOROLAC TROMETHAMINE 30 MG/ML
15 INJECTION, SOLUTION INTRAMUSCULAR; INTRAVENOUS ONCE
Status: COMPLETED | OUTPATIENT
Start: 2022-12-30 | End: 2022-12-30

## 2022-12-30 RX ORDER — ACETAMINOPHEN 325 MG/1
650 TABLET ORAL ONCE
Status: COMPLETED | OUTPATIENT
Start: 2022-12-30 | End: 2022-12-30

## 2022-12-30 RX ADMIN — KETOROLAC TROMETHAMINE 15 MG: 30 INJECTION, SOLUTION INTRAMUSCULAR; INTRAVENOUS at 05:48

## 2022-12-30 RX ADMIN — SODIUM CHLORIDE 500 ML: 0.9 INJECTION, SOLUTION INTRAVENOUS at 05:49

## 2022-12-30 RX ADMIN — ACETAMINOPHEN 650 MG: 325 TABLET, FILM COATED ORAL at 05:49

## 2022-12-30 NOTE — ED CARE HANDOFF
Emergency Department Sign Out Note        Sign out and transfer of care from [facility]  See Separate Emergency Department note  The patient, Fermin Cervantes, was evaluated by the previous provider for near syncopal episode  Workup Completed:  CBC  CMP  Magnesium  EKG    ED Course / Workup Pending (followup): Troponin  Covid/flu/rsv    0600 - Per sign out, patient with a near syncopal episode last night  Has been complaining of headache for 2 days  Last night, she got up and used the bathroom but felt nauseous and felt like she was going to pass out so she sat down  She was given Zofran and symptoms resolved  Waiting for troponin and covid/flu/rsv      0642 hs TnI 0hr: 6 - Will check delta    0711 FLU/RSV/COVID - if FLU/RSV clinically relevant    0912 Will ambulate  1045    Patient able to ambulate without difficulty or having symptoms  Will discharge  Patient agreeable  The management plan was discussed in detail with the patient at bedside and all questions were answered  Prior to discharge, we provided both verbal and written instructions  We discussed with the patient the signs and symptoms for which to return to the emergency department  All questions were answered and patient was comfortable with the plan of care and discharged to home  Instructed the patient to follow up with the primary care provider and/or specialist provided and their written instructions  The patient verbalized understanding of our discussion and plan of care, and agrees to return to the Emergency Department for concerns and progression of illness  At discharge, I instructed the patient to:  -follow up with pcp  -rest and drink plenty of fluids  -return to the ER if symptoms worsened or new symptoms arose  Patient agreed to this plan and was stable at time of discharge                                ED Course as of 12/30/22 1056   Fri Dec 30, 2022   0600 Per sign out, patient with a near syncopal episode last night  Has been complaining of headache for 2 days  Last night, she got up and used the bathroom but felt nauseous and felt like she was going to pass out so she sat down  She was given Zofran and symptoms resolved   Waiting for troponin and covid/flu/rsv     0642 hs TnI 0hr: 6  Will check delta   0711 FLU/RSV/COVID - if FLU/RSV clinically relevant   0912 Will ambulate     ECG 12 Lead Documentation Only    Date/Time: 12/30/2022 7:54 AM  Performed by: Cindi Valle PA-C  Authorized by: Cindi Valle PA-C     Indications / Diagnosis:  Delta  ECG reviewed by me, the ED Provider: yes (Also reviewed by Dr Tae Chilel)    Patient location:  ED  Previous ECG:     Previous ECG:  Compared to current    Similarity:  No change  Interpretation:     Interpretation: normal    Rate:     ECG rate:  70    ECG rate assessment: normal    Rhythm:     Rhythm: sinus rhythm    Ectopy:     Ectopy: none    QRS:     QRS intervals:  Normal  ST segments:     ST segments:  Normal  T waves:     T waves: inverted      Inverted:  AVR and V1      MDM  Number of Diagnoses or Management Options  Headache: new and requires workup  Hyperglycemia: new and requires workup  Hypomagnesemia: new and requires workup  Near syncope: new and requires workup  Diagnosis management comments:          Amount and/or Complexity of Data Reviewed  Clinical lab tests: reviewed  Tests in the radiology section of CPT®: reviewed  Review and summarize past medical records: yes  Discuss the patient with other providers: yes  Independent visualization of images, tracings, or specimens: yes            Disposition  Final diagnoses:   Near syncope   Hypomagnesemia   Hyperglycemia   Headache     Time reflects when diagnosis was documented in both MDM as applicable and the Disposition within this note     Time User Action Codes Description Comment    12/30/2022  9:37 AM Kee BROCK Add [R55] Near syncope     12/30/2022  9:38 AM Kee BROCK Add [E83 42] Hypomagnesemia     12/30/2022  9:38 AM Dayan BROCK Add [R73 9] Hyperglycemia     12/30/2022  9:38 AM Dayan BROCK Add [R51 9] Headache       ED Disposition     ED Disposition   Discharge    Condition   Stable    Date/Time   Fri Dec 30, 2022 10:49 AM    Comment   Héctor Yvonne discharge to home/self care  Follow-up Information     Follow up With Specialties Details Why Olga Khan MD Internal Medicine Schedule an appointment as soon as possible for a visit  As needed [address] [place] [address] [address]  [phone]          Patient's Medications   Discharge Prescriptions    No medications on file     No discharge procedures on file         ED Provider  Electronically Signed by     Grace Gutierrez PA-C  12/30/22 0166

## 2022-12-30 NOTE — ED PROVIDER NOTES
History  Chief Complaint   Patient presents with   • Syncope     Arrives EMS for c/o headache, generalized back ache, and syncopal episode when getting up to go to the bathroom  Per EMS, patient diaphoretic and vomiting immediately after episode  4mg zofran given pta  Glucose 202 per ems  Per EMS EKG, sinus rhythm with WPW, patient reports no prior history  Patient is a 59-year-old female with a past medical history significant for asthma, CAD, DM, HTN and HLD presenting to the ED via EMS for a near syncopal episode  Patient states she began with a generalized headache 1 day ago with no known inciting event  Reports she has been taking acetaminophen for the headache which does reduce her pain symptoms  States that throughout the day her headache has been "moving around "  She denies sudden onset of a headache, maximum intensity at onset, worst headache of her life and focal deficits  Reports her neighbors above her "jumping around" seems to worsen the headache  Denies confusion, speech changes, vision changes, visual loss, dysphagia, hearing changes, gait disturbances, balance issues and focal deficits  States last night she went to bed and woke up to use bathroom  States she was able to urinate without issue however upon standing she had a feeling of nausea and began to dry heave  She denies actually having an episode of emesis  Reports while dry heaving she felt like she was going to pass out however denies syncope  Reports her SO was holding her up but because she felt like passing out so she lowered herself to the floor  Denies falling and head strike  Reports she did splash some water on her face as she was feeling nauseous  EMS was called  On EMS arrival patient was given Zofran which significantly helped her nausea  States she was having some right flank pain yesterday but has since resolved  No neck or back pain at this time  Reports prior to 1 day ago she has otherwise been feeling well  Specifically denies fever, chills, cough, congestion, sore throat, chest pain, shortness of breath, palpitations, diaphoresis, abdominal pain, diarrhea, urinary complaints, rash, confusion, weakness and any other complaint  Prior to Admission Medications   Prescriptions Last Dose Informant Patient Reported? Taking?    ALPRAZolam (XANAX) 0 5 mg tablet   No No   Sig: Take 1 tablet (0 5 mg total) by mouth daily at bedtime as needed for anxiety   Air  (Vicks Air Purifier/HEPA) (Device) MISC   No No   Sig: Use as directed   Plavix 75 MG tablet   No No   Sig: Take 1 tablet (75 mg total) by mouth daily   Red Yeast Rice 600 MG CAPS   No No   Sig: Take 1 capsule (600 mg total) by mouth 2 (two) times a day with meals   albuterol (ACCUNEB) 1 25 MG/3ML nebulizer solution   No No   Sig: Take 3 mL by nebulization every 6 (six) hours as needed for wheezing   albuterol (PROVENTIL HFA,VENTOLIN HFA) 90 mcg/act inhaler   No No   Sig: Inhale 2 puffs every 6 (six) hours as needed for wheezing   amLODIPine (NORVASC) 5 mg tablet   No No   Sig: Take 1 tablet (5 mg total) by mouth 2 (two) times a day   benzonatate (TESSALON PERLES) 100 mg capsule   No No   Sig: Take 1 capsule (100 mg total) by mouth 3 (three) times a day as needed for cough With food/meals   bisoprolol (ZEBETA) 10 MG tablet   No No   Sig: Take 1 tablet (10 mg total) by mouth daily   co-enzyme Q-10 30 MG capsule   No No   Sig: Take 1 capsule (30 mg total) by mouth 2 (two) times a day   ergocalciferol (VITAMIN D2) 50,000 units   No No   Sig: Take 1 capsule (50,000 Units total) by mouth once a week   glucose blood test strip   No No   Sig: Use 1 each as needed (as needed) Use as instructed   magnesium (MAGTAB) 84 MG (7MEQ) TBCR   No No   Sig: Take 1 tablet (84 mg total) by mouth daily   nitroglycerin (NITROSTAT) 0 4 mg SL tablet   No No   Sig: Place 1 tablet (0 4 mg total) under the tongue every 5 (five) minutes as needed for chest pain   omega-3-acid ethyl esters (LOVAZA) 1 g capsule   No No   Sig: Take 1 capsule (1 g total) by mouth 2 (two) times a day   pantoprazole (PROTONIX) 20 mg tablet   No No   Sig: Take 1 tablet (20 mg total) by mouth daily   rosuvastatin (CRESTOR) 5 mg tablet   No No   Sig: Take 1 tablet (5 mg total) by mouth daily   vitamin B-12 (VITAMIN B-12) 1,000 mcg tablet   No No   Sig: Take 1 tablet (1,000 mcg total) by mouth daily      Facility-Administered Medications Last Administration Doses Remaining   cyanocobalamin injection 1,000 mcg 10/29/2021 12:17 PM    trimethobenzamide (TIGAN) IM injection 200 mg 2021 12:27 PM           Past Medical History:   Diagnosis Date   • Arthritis    • Asthma    • Community acquired pneumonia 2019   • Coronary artery disease    • DM (diabetes mellitus) ([facility] [address] )    • HTN (hypertension)    • Hyperlipidemia    • Hyponatremia        Past Surgical History:   Procedure Laterality Date   • CARDIAC CATHETERIZATION     • CORONARY ARTERY BYPASS GRAFT  2010    with subsequent stent to the saphenous veingraft to the RCA 3 months later   • KNEE ARTHROSCOPY     • POLYPECTOMY      laryngeal   • TOTAL HIP ARTHROPLASTY     • TUBAL LIGATION         Family History   Problem Relation Age of Onset   • Stroke Mother    • Hypertension Mother    • Heart disease Mother    • Colon cancer Father    • Heart attack Brother    • Heart attack Brother      I have reviewed and agree with the history as documented      E-Cigarette/Vaping   • E-Cigarette Use Never User      E-Cigarette/Vaping Substances   • Nicotine No    • THC No    • CBD No    • Flavoring No      Social History     Tobacco Use   • Smoking status: Former     Packs/day: 0 50     Years: 13 00     Pack years: 6 50     Types: Cigarettes     Quit date:      Years since quittin 0   • Smokeless tobacco: Never   • Tobacco comments:     no passive smoke exposure   Vaping Use   • Vaping Use: Never used   Substance Use Topics   • Alcohol use: Yes     Comment: socially   • Drug use: Never       Review of Systems   Constitutional: Negative for chills, fatigue and fever  HENT: Negative for ear pain and sore throat  Eyes: Negative for pain and visual disturbance  Respiratory: Negative for cough and shortness of breath  Cardiovascular: Negative for chest pain and palpitations  Gastrointestinal: Positive for nausea  Negative for abdominal pain and vomiting  Genitourinary: Negative for dysuria and hematuria  Musculoskeletal: Negative for arthralgias and back pain  Skin: Negative for color change and rash  Neurological: Positive for light-headedness and headaches  Negative for seizures, syncope, speech difficulty and weakness  Psychiatric/Behavioral: Negative for confusion  All other systems reviewed and are negative  Physical Exam  Physical Exam  Vitals and nursing note reviewed  Constitutional:       General: She is not in acute distress  Appearance: She is well-developed  She is not ill-appearing or toxic-appearing  HENT:      Head: Normocephalic and atraumatic  Mouth/Throat:      Mouth: Mucous membranes are moist       Pharynx: Oropharynx is clear  Eyes:      Conjunctiva/sclera: Conjunctivae normal    Neck:      Meningeal: Brudzinski's sign and Kernig's sign absent  Cardiovascular:      Rate and Rhythm: Normal rate and regular rhythm  Heart sounds: No murmur heard  Pulmonary:      Effort: Pulmonary effort is normal  No respiratory distress  Breath sounds: Normal breath sounds  Abdominal:      Palpations: Abdomen is soft  Tenderness: There is no abdominal tenderness  Musculoskeletal:         General: No swelling  Cervical back: Neck supple  No rigidity  Comments: Normal muscle tone and moving all extremities without issue  Sensation and motor intact in b/l face, b/l UE and b/l LE  Strength 5/5 in b/l face, b/l UE and b/l LE   Lymphadenopathy:      Cervical: No cervical adenopathy     Skin:     General: Skin is warm and dry  Capillary Refill: Capillary refill takes less than 2 seconds  Neurological:      General: No focal deficit present  Mental Status: She is alert and oriented to person, place, and time  GCS: GCS eye subscore is 4  GCS verbal subscore is 5  GCS motor subscore is 6  Cranial Nerves: Cranial nerves 2-12 are intact  Sensory: Sensation is intact  Motor: Motor function is intact  Coordination: Coordination is intact  Gait: Gait is intact        Deep Tendon Reflexes: Reflexes normal    Psychiatric:         Mood and Affect: Mood normal          Vital Signs  ED Triage Vitals [12/30/22 0501]   Temperature Pulse Respirations Blood Pressure SpO2   97 7 °F (36 5 °C) 64 18 142/69 96 %      Temp Source Heart Rate Source Patient Position - Orthostatic VS BP Location FiO2 (%)   Oral Monitor Lying Right arm --      Pain Score       10 - Worst Possible Pain           Vitals:    12/30/22 0552 12/30/22 0555 12/30/22 0758 12/30/22 1039   BP: (!) 175/79 (!) 173/80 167/70 142/65   Pulse: 71 73 67 68   Patient Position - Orthostatic VS: Sitting - Orthostatic VS Standing - Orthostatic VS Lying          Visual Acuity  Visual Acuity    Flowsheet Row Most Recent Value   L Pupil Size (mm) 2   R Pupil Size (mm) 2          ED Medications  Medications   ondansetron (FOR EMS ONLY) (ZOFRAN) 4 mg/2 mL injection 4 mg (0 mg Does not apply Given to EMS 12/30/22 0508)   acetaminophen (TYLENOL) tablet 650 mg (650 mg Oral Given 12/30/22 0549)   ketorolac (TORADOL) injection 15 mg (15 mg Intravenous Given 12/30/22 0548)   sodium chloride 0 9 % bolus 500 mL (0 mL Intravenous Stopped 12/30/22 0757)       Diagnostic Studies  Results Reviewed     Procedure Component Value Units Date/Time    HS Troponin I 2hr [[id number]]  (Normal) Collected: 12/30/22 0757    Lab Status: Final result Specimen: Blood from Arm, Left Updated: 12/30/22 0834     hs TnI 2hr 6 ng/L      Delta 2hr hsTnI 0 ng/L     FLU/RSV/COVID - if FLU/RSV clinically relevant [[id number]]  (Normal) Collected: 12/30/22 0604    Lab Status: Final result Specimen: Nares from Nose Updated: 12/30/22 0649     SARS-CoV-2 Negative     INFLUENZA A PCR Negative     INFLUENZA B PCR Negative     RSV PCR Negative    Narrative:      FOR PEDIATRIC PATIENTS - copy/paste COVID Guidelines URL to browser: [email or web address]    SARS-CoV-2 assay is a Nucleic Acid Amplification assay intended for the  qualitative detection of nucleic acid from SARS-CoV-2 in nasopharyngeal  swabs  Results are for the presumptive identification of SARS-CoV-2 RNA  Positive results are indicative of infection with SARS-CoV-2, the virus  causing COVID-19, but do not rule out bacterial infection or co-infection  with other viruses  Laboratories within the [place] and its  territories are required to report all positive results to the appropriate  public health authorities  Negative results do not preclude SARS-CoV-2  infection and should not be used as the sole basis for treatment or other  patient management decisions  Negative results must be combined with  clinical observations, patient history, and epidemiological information  This test has not been FDA cleared or approved  This test has been authorized by FDA under an Emergency Use Authorization  (EUA)  This test is only authorized for the duration of time the  declaration that circumstances exist justifying the authorization of the  emergency use of an in vitro diagnostic tests for detection of SARS-CoV-2  virus and/or diagnosis of COVID-19 infection under section 564(b)(1) of  the Act, 21 U  S C  671XPH-9(D)(6), unless the authorization is terminated  or revoked sooner  The test has been validated but independent review by FDA  and CLIA is pending  Test performed using [employer]: This RT-PCR assay targets N2,  a region unique to SARS-CoV-2   A conserved region in the E-gene was chosen  for pan-Sarbecovirus detection which includes SARS-CoV-2  According to CMS-2020-01-R, this platform meets the definition of high-throughput technology      HS Troponin 0hr (reflex protocol) [[id number]]  (Normal) Collected: 12/30/22 0546    Lab Status: Final result Specimen: Blood from Arm, Left Updated: 12/30/22 0625     hs TnI 0hr 6 ng/L     Comprehensive metabolic panel [[id number]]  (Abnormal) Collected: 12/30/22 0546    Lab Status: Final result Specimen: Blood from Arm, Left Updated: 12/30/22 0617     Sodium 138 mmol/L      Potassium 4 1 mmol/L      Chloride 99 mmol/L      CO2 32 mmol/L      ANION GAP 7 mmol/L      BUN 5 mg/dL      Creatinine 0 81 mg/dL      Glucose 184 mg/dL      Calcium 9 4 mg/dL      AST 17 U/L      ALT 23 U/L      Alkaline Phosphatase 77 U/L      Total Protein 8 1 g/dL      Albumin 3 6 g/dL      Total Bilirubin 0 36 mg/dL      eGFR 70 ml/min/1 73sq m     Narrative:      [place] guidelines for Chronic Kidney Disease (CKD):   •  Stage 1 with normal or high GFR (GFR > 90 mL/min/1 73 square meters)  •  Stage 2 Mild CKD (GFR = 60-89 mL/min/1 73 square meters)  •  Stage 3A Moderate CKD (GFR = 45-59 mL/min/1 73 square meters)  •  Stage 3B Moderate CKD (GFR = 30-44 mL/min/1 73 square meters)  •  Stage 4 Severe CKD (GFR = 15-29 mL/min/1 73 square meters)  •  Stage 5 End Stage CKD (GFR <15 mL/min/1 73 square meters)  Note: GFR calculation is accurate only with a steady state creatinine    Magnesium [[id number]]  (Abnormal) Collected: 12/30/22 0546    Lab Status: Final result Specimen: Blood from Arm, Left Updated: 12/30/22 0617     Magnesium 1 4 mg/dL     CBC and differential [[id number]]  (Abnormal) Collected: 12/30/22 0546    Lab Status: Final result Specimen: Blood from Arm, Left Updated: 12/30/22 0558     WBC 11 41 Thousand/uL      RBC 4 94 Million/uL      Hemoglobin 13 1 g/dL      Hematocrit 43 1 %      MCV 87 fL      MCH 26 5 pg      MCHC 30 4 g/dL RDW 14 8 %      MPV 10 0 fL      Platelets 797 Thousands/uL      nRBC 0 /100 WBCs      Neutrophils Relative 47 %      Immat GRANS % 0 %      Lymphocytes Relative 39 %      Monocytes Relative 8 %      Eosinophils Relative 5 %      Basophils Relative 1 %      Neutrophils Absolute 5 45 Thousands/µL      Immature Grans Absolute 0 04 Thousand/uL      Lymphocytes Absolute 4 46 Thousands/µL      Monocytes Absolute 0 87 Thousand/µL      Eosinophils Absolute 0 51 Thousand/µL      Basophils Absolute 0 08 Thousands/µL                  No orders to display              Procedures  ECG 12 Lead Documentation Only    Date/Time: 12/30/2022 5:09 AM  Performed by: Benita Hinson PA-C  Authorized by: Marah Santana PA-C     ECG reviewed by me, the ED Provider: yes    Patient location:  ED  Rate:     ECG rate:  73    ECG rate assessment: normal    Rhythm:     Rhythm: sinus rhythm    Ectopy:     Ectopy: none    QRS:     QRS axis:  Normal    QRS intervals:  Normal  Conduction:     Conduction: normal    ST segments:     ST segments:  Normal  T waves:     T waves: normal               ED Course  ED Course as of 12/31/22 1628   Fri Dec 30, 2022   0612 Hemoglobin: 13 1   0612 WBC(!): 11 41   0612 Signed out to KEYSHAWN TEMPLE pending labs, re-evaluation and disposition  SBIRT 22yo+    Flowsheet Row Most Recent Value   SBIRT (25 yo +)    In order to provide better care to our patients, we are screening all of our patients for alcohol and drug use  Would it be okay to ask you these screening questions?  No Filed at: 12/30/2022 0507                    MDM  Number of Diagnoses or Management Options     Amount and/or Complexity of Data Reviewed  Clinical lab tests: ordered and reviewed  Discuss the patient with other providers: yes    Risk of Complications, Morbidity, and/or Mortality  General comments: Differential diagnosis including but not limited to: vasovagal syncope, cardiac arrhythmia, MI, ACS, metabolic abnormality, anemia or dehydration  Doubt intracranial process  HA was gradual onset  No f/c/s  No neck stiffness  No focal neurological symptoms  No temporal artery pain/tenderness  No vision changes  Headaches are not increasing in severity or frequency  Not worse in the AM  No head trauma  Doubt IIH  Patient is well appearing and neurologically intact  Headache was not acute or maximal at onset  Do not suspect SAH, temporal arteritis, meningitis, encephalitis, CO poisoning, acute angle closure glaucoma, dural venous sinus thrombosis as cause of headache  Do not feel that further imaging or workup (including LP) are warranted at this time  Will obtain CBC, CMP, EKG, troponin, magnesium, and orthostatic VS   Will treat patient's headache with acetaminophen, Toradol and fluids  Disposition pending labs, EKG and reevaluation  Disposition  Final diagnoses:   Near syncope   Hypomagnesemia   Hyperglycemia   Headache     Time reflects when diagnosis was documented in both MDM as applicable and the Disposition within this note     Time User Action Codes Description Comment    12/30/2022  9:37 AM Татьяна [place] A Add [R55] Near syncope     12/30/2022  9:38 AM Татьяна [place] A Add [E83 42] Hypomagnesemia     12/30/2022  9:38 AM Татьяна [place] A Add [R73 9] Hyperglycemia     12/30/2022  9:38 AM Татьяна [place] A Add [R51 9] Headache       ED Disposition     ED Disposition   Discharge    Condition   Stable    Date/Time   Fri Dec 30, 2022 10:49 AM    Comment   Effie Pardo discharge to home/self care                 Follow-up Information     Follow up With Specialties Details Why Lino Wagoner MD Internal Medicine Schedule an appointment as soon as possible for a visit  As needed [address] [place] [address] [place]   [phone]            Discharge Medication List as of 12/30/2022 10:49 AM      CONTINUE these medications which have NOT CHANGED    Details   Air  ([employer] Air Purifier/HEPA) (Device) MISC Use as directed, Normal      albuterol (ACCUNEB) 1 25 MG/3ML nebulizer solution Take 3 mL by nebulization every 6 (six) hours as needed for wheezing, Starting Fri 3/4/2022, Normal      albuterol (PROVENTIL HFA,VENTOLIN HFA) 90 mcg/act inhaler Inhale 2 puffs every 6 (six) hours as needed for wheezing, Starting Wed 12/14/2022, Normal      ALPRAZolam (XANAX) 0 5 mg tablet Take 1 tablet (0 5 mg total) by mouth daily at bedtime as needed for anxiety, Starting Thu 9/8/2022, Normal      amLODIPine (NORVASC) 5 mg tablet Take 1 tablet (5 mg total) by mouth 2 (two) times a day, Starting Mon 12/12/2022, Normal      benzonatate (TESSALON PERLES) 100 mg capsule Take 1 capsule (100 mg total) by mouth 3 (three) times a day as needed for cough With food/meals, Starting Wed 10/26/2022, Normal      bisoprolol (ZEBETA) 10 MG tablet Take 1 tablet (10 mg total) by mouth daily, Starting Mon 12/12/2022, Normal      co-enzyme Q-10 30 MG capsule Take 1 capsule (30 mg total) by mouth 2 (two) times a day, Starting Fri 7/8/2022, Normal      ergocalciferol (VITAMIN D2) 50,000 units Take 1 capsule (50,000 Units total) by mouth once a week, Starting Mon 12/12/2022, Normal      glucose blood test strip Use 1 each as needed (as needed) Use as instructed, Starting Wed 12/14/2022, Normal      magnesium (MAGTAB) 84 MG (7MEQ) TBCR Take 1 tablet (84 mg total) by mouth daily, Starting Mon 12/12/2022, Normal      nitroglycerin (NITROSTAT) 0 4 mg SL tablet Place 1 tablet (0 4 mg total) under the tongue every 5 (five) minutes as needed for chest pain, Starting Thu 3/18/2021, Normal      omega-3-acid ethyl esters (LOVAZA) 1 g capsule Take 1 capsule (1 g total) by mouth 2 (two) times a day, Starting Fri 7/8/2022, Normal      pantoprazole (PROTONIX) 20 mg tablet Take 1 tablet (20 mg total) by mouth daily, Starting Mon 12/12/2022, Normal      Plavix 75 MG tablet Take 1 tablet (75 mg total) by mouth daily, Starting Mon 12/12/2022, Normal      Red Yeast Rice 600 MG CAPS Take 1 capsule (600 mg total) by mouth 2 (two) times a day with meals, Starting Fri 7/8/2022, Normal      rosuvastatin (CRESTOR) 5 mg tablet Take 1 tablet (5 mg total) by mouth daily, Starting Mon 12/12/2022, Normal      vitamin B-12 (VITAMIN B-12) 1,000 mcg tablet Take 1 tablet (1,000 mcg total) by mouth daily, Starting Fri 7/8/2022, Normal             No discharge procedures on file      PDMP Review       Value Time User    PDMP Reviewed  Yes 9/8/2022  4:01 PM Anne Bynum MD          ED Provider  Electronically Signed by           Kylee Braxton PA-C  12/31/22 0164

## 2022-12-30 NOTE — ED NOTES
Patient ambulated with cane, feels safe for ambulation at home  Lives with boyfriend and feels comfortable going home       Nicolás Harmon RN  12/30/22 [address], RN  12/30/22 8594

## 2023-03-06 ENCOUNTER — APPOINTMENT (EMERGENCY)
Dept: CT IMAGING | Facility: HOSPITAL | Age: 78
End: 2023-03-06

## 2023-03-06 ENCOUNTER — HOSPITAL ENCOUNTER (INPATIENT)
Facility: HOSPITAL | Age: 78
LOS: 4 days | Discharge: HOME HEALTH | End: 2023-03-10
Attending: EMERGENCY MEDICINE | Admitting: INTERNAL MEDICINE

## 2023-03-06 DIAGNOSIS — N13.30 HYDROURETERONEPHROSIS: ICD-10-CM

## 2023-03-06 DIAGNOSIS — R10.9 FLANK PAIN: Primary | ICD-10-CM

## 2023-03-06 DIAGNOSIS — R41.89 COGNITIVE DECLINE: ICD-10-CM

## 2023-03-06 DIAGNOSIS — N39.0 UTI (URINARY TRACT INFECTION): ICD-10-CM

## 2023-03-06 DIAGNOSIS — N13.30 HYDRONEPHROSIS, BILATERAL: ICD-10-CM

## 2023-03-06 DIAGNOSIS — J44.9 CHRONIC OBSTRUCTIVE PULMONARY DISEASE, UNSPECIFIED COPD TYPE (HCC): ICD-10-CM

## 2023-03-06 DIAGNOSIS — E83.42 HYPOMAGNESEMIA: ICD-10-CM

## 2023-03-06 DIAGNOSIS — A41.9 SEPSIS, DUE TO UNSPECIFIED ORGANISM, UNSPECIFIED WHETHER ACUTE ORGAN DYSFUNCTION PRESENT (HCC): ICD-10-CM

## 2023-03-06 DIAGNOSIS — E87.1 HYPONATREMIA: ICD-10-CM

## 2023-03-06 DIAGNOSIS — N81.10 PROLAPSE OF VAGINAL WALL: ICD-10-CM

## 2023-03-06 LAB
ALBUMIN SERPL BCP-MCNC: 4.4 G/DL (ref 3.5–5)
ALP SERPL-CCNC: 77 U/L (ref 34–104)
ALT SERPL W P-5'-P-CCNC: 12 U/L (ref 7–52)
ANION GAP SERPL CALCULATED.3IONS-SCNC: 8 MMOL/L (ref 4–13)
APTT PPP: 32 SECONDS (ref 23–37)
AST SERPL W P-5'-P-CCNC: 13 U/L (ref 13–39)
BACTERIA UR QL AUTO: ABNORMAL /HPF
BASOPHILS # BLD AUTO: 0.04 THOUSANDS/ÂΜL (ref 0–0.1)
BASOPHILS NFR BLD AUTO: 0 % (ref 0–1)
BILIRUB SERPL-MCNC: 0.57 MG/DL (ref 0.2–1)
BILIRUB UR QL STRIP: NEGATIVE
BUN SERPL-MCNC: 9 MG/DL (ref 5–25)
CALCIUM SERPL-MCNC: 9.6 MG/DL (ref 8.4–10.2)
CHLORIDE SERPL-SCNC: 90 MMOL/L (ref 96–108)
CLARITY UR: CLEAR
CO2 SERPL-SCNC: 30 MMOL/L (ref 21–32)
COLOR UR: ABNORMAL
CREAT SERPL-MCNC: 0.7 MG/DL (ref 0.6–1.3)
EOSINOPHIL # BLD AUTO: 0.13 THOUSAND/ÂΜL (ref 0–0.61)
EOSINOPHIL NFR BLD AUTO: 1 % (ref 0–6)
ERYTHROCYTE [DISTWIDTH] IN BLOOD BY AUTOMATED COUNT: 13.7 % (ref 11.6–15.1)
FLUAV RNA RESP QL NAA+PROBE: NEGATIVE
FLUBV RNA RESP QL NAA+PROBE: NEGATIVE
GFR SERPL CREATININE-BSD FRML MDRD: 83 ML/MIN/1.73SQ M
GLUCOSE SERPL-MCNC: 113 MG/DL (ref 65–140)
GLUCOSE UR STRIP-MCNC: NEGATIVE MG/DL
HCT VFR BLD AUTO: 41.4 % (ref 34.8–46.1)
HGB BLD-MCNC: 13.4 G/DL (ref 11.5–15.4)
HGB UR QL STRIP.AUTO: ABNORMAL
IMM GRANULOCYTES # BLD AUTO: 0.05 THOUSAND/UL (ref 0–0.2)
IMM GRANULOCYTES NFR BLD AUTO: 0 % (ref 0–2)
INR PPP: 1 (ref 0.84–1.19)
KETONES UR STRIP-MCNC: NEGATIVE MG/DL
LACTATE SERPL-SCNC: 1.3 MMOL/L (ref 0.5–2)
LEUKOCYTE ESTERASE UR QL STRIP: ABNORMAL
LIPASE SERPL-CCNC: 26 U/L (ref 11–82)
LYMPHOCYTES # BLD AUTO: 1.43 THOUSANDS/ÂΜL (ref 0.6–4.47)
LYMPHOCYTES NFR BLD AUTO: 11 % (ref 14–44)
MAGNESIUM SERPL-MCNC: 1.3 MG/DL (ref 1.9–2.7)
MCH RBC QN AUTO: 27.4 PG (ref 26.8–34.3)
MCHC RBC AUTO-ENTMCNC: 32.4 G/DL (ref 31.4–37.4)
MCV RBC AUTO: 85 FL (ref 82–98)
MONOCYTES # BLD AUTO: 0.87 THOUSAND/ÂΜL (ref 0.17–1.22)
MONOCYTES NFR BLD AUTO: 6 % (ref 4–12)
NEUTROPHILS # BLD AUTO: 11 THOUSANDS/ÂΜL (ref 1.85–7.62)
NEUTS SEG NFR BLD AUTO: 82 % (ref 43–75)
NITRITE UR QL STRIP: NEGATIVE
NON-SQ EPI CELLS URNS QL MICRO: ABNORMAL /HPF
NRBC BLD AUTO-RTO: 0 /100 WBCS
PH UR STRIP.AUTO: 6.5 [PH]
PLATELET # BLD AUTO: 295 THOUSANDS/UL (ref 149–390)
PMV BLD AUTO: 9.3 FL (ref 8.9–12.7)
POTASSIUM SERPL-SCNC: 4.4 MMOL/L (ref 3.5–5.3)
PROCALCITONIN SERPL-MCNC: 0.83 NG/ML
PROT SERPL-MCNC: 8.4 G/DL (ref 6.4–8.4)
PROT UR STRIP-MCNC: ABNORMAL MG/DL
PROTHROMBIN TIME: 13.2 SECONDS (ref 11.6–14.5)
RBC # BLD AUTO: 4.89 MILLION/UL (ref 3.81–5.12)
RBC #/AREA URNS AUTO: ABNORMAL /HPF
RSV RNA RESP QL NAA+PROBE: NEGATIVE
SARS-COV-2 RNA RESP QL NAA+PROBE: NEGATIVE
SODIUM SERPL-SCNC: 128 MMOL/L (ref 135–147)
SP GR UR STRIP.AUTO: <=1.005 (ref 1–1.03)
UROBILINOGEN UR QL STRIP.AUTO: 0.2 E.U./DL
WBC # BLD AUTO: 13.52 THOUSAND/UL (ref 4.31–10.16)
WBC #/AREA URNS AUTO: ABNORMAL /HPF

## 2023-03-06 RX ORDER — MAGNESIUM SULFATE HEPTAHYDRATE 40 MG/ML
2 INJECTION, SOLUTION INTRAVENOUS ONCE
Status: COMPLETED | OUTPATIENT
Start: 2023-03-06 | End: 2023-03-06

## 2023-03-06 RX ORDER — ALBUTEROL SULFATE 90 UG/1
2 AEROSOL, METERED RESPIRATORY (INHALATION) EVERY 4 HOURS PRN
Status: DISCONTINUED | OUTPATIENT
Start: 2023-03-06 | End: 2023-03-10 | Stop reason: HOSPADM

## 2023-03-06 RX ORDER — ONDANSETRON 2 MG/ML
4 INJECTION INTRAMUSCULAR; INTRAVENOUS EVERY 6 HOURS PRN
Status: DISCONTINUED | OUTPATIENT
Start: 2023-03-06 | End: 2023-03-10 | Stop reason: HOSPADM

## 2023-03-06 RX ORDER — PANTOPRAZOLE SODIUM 20 MG/1
20 TABLET, DELAYED RELEASE ORAL
Status: DISCONTINUED | OUTPATIENT
Start: 2023-03-07 | End: 2023-03-10 | Stop reason: HOSPADM

## 2023-03-06 RX ORDER — ALPRAZOLAM 0.5 MG/1
0.5 TABLET ORAL
Status: DISCONTINUED | OUTPATIENT
Start: 2023-03-06 | End: 2023-03-10 | Stop reason: HOSPADM

## 2023-03-06 RX ORDER — FENTANYL CITRATE 50 UG/ML
50 INJECTION, SOLUTION INTRAMUSCULAR; INTRAVENOUS ONCE
Status: COMPLETED | OUTPATIENT
Start: 2023-03-06 | End: 2023-03-06

## 2023-03-06 RX ORDER — PRAVASTATIN SODIUM 40 MG
40 TABLET ORAL
Status: DISCONTINUED | OUTPATIENT
Start: 2023-03-07 | End: 2023-03-10 | Stop reason: HOSPADM

## 2023-03-06 RX ORDER — CLOPIDOGREL BISULFATE 75 MG/1
75 TABLET ORAL DAILY
Status: DISCONTINUED | OUTPATIENT
Start: 2023-03-07 | End: 2023-03-10 | Stop reason: HOSPADM

## 2023-03-06 RX ORDER — BISOPROLOL FUMARATE 5 MG/1
10 TABLET, FILM COATED ORAL DAILY
Status: DISCONTINUED | OUTPATIENT
Start: 2023-03-07 | End: 2023-03-10 | Stop reason: HOSPADM

## 2023-03-06 RX ORDER — CEFTRIAXONE 1 G/50ML
1000 INJECTION, SOLUTION INTRAVENOUS EVERY 24 HOURS
Status: DISCONTINUED | OUTPATIENT
Start: 2023-03-07 | End: 2023-03-10 | Stop reason: HOSPADM

## 2023-03-06 RX ORDER — ACETAMINOPHEN 325 MG/1
650 TABLET ORAL EVERY 6 HOURS PRN
Status: DISCONTINUED | OUTPATIENT
Start: 2023-03-06 | End: 2023-03-10 | Stop reason: HOSPADM

## 2023-03-06 RX ORDER — HEPARIN SODIUM 5000 [USP'U]/ML
5000 INJECTION, SOLUTION INTRAVENOUS; SUBCUTANEOUS EVERY 8 HOURS SCHEDULED
Status: DISCONTINUED | OUTPATIENT
Start: 2023-03-06 | End: 2023-03-10 | Stop reason: HOSPADM

## 2023-03-06 RX ORDER — AMLODIPINE BESYLATE 5 MG/1
5 TABLET ORAL 2 TIMES DAILY
Status: DISCONTINUED | OUTPATIENT
Start: 2023-03-07 | End: 2023-03-10 | Stop reason: HOSPADM

## 2023-03-06 RX ORDER — ONDANSETRON 2 MG/ML
4 INJECTION INTRAMUSCULAR; INTRAVENOUS ONCE
Status: COMPLETED | OUTPATIENT
Start: 2023-03-06 | End: 2023-03-06

## 2023-03-06 RX ORDER — CEFTRIAXONE 2 G/50ML
2000 INJECTION, SOLUTION INTRAVENOUS ONCE
Status: COMPLETED | OUTPATIENT
Start: 2023-03-06 | End: 2023-03-06

## 2023-03-06 RX ORDER — CEFTRIAXONE 1 G/50ML
1000 INJECTION, SOLUTION INTRAVENOUS EVERY 24 HOURS
Status: CANCELLED | OUTPATIENT
Start: 2023-03-07

## 2023-03-06 RX ADMIN — SODIUM CHLORIDE 1000 ML: 0.9 INJECTION, SOLUTION INTRAVENOUS at 17:37

## 2023-03-06 RX ADMIN — MAGNESIUM SULFATE HEPTAHYDRATE 2 G: 40 INJECTION, SOLUTION INTRAVENOUS at 19:54

## 2023-03-06 RX ADMIN — CEFTRIAXONE 2000 MG: 2 INJECTION, SOLUTION INTRAVENOUS at 21:29

## 2023-03-06 RX ADMIN — FENTANYL CITRATE 50 MCG: 50 INJECTION INTRAMUSCULAR; INTRAVENOUS at 17:41

## 2023-03-06 RX ADMIN — ONDANSETRON HYDROCHLORIDE 4 MG: 2 SOLUTION INTRAMUSCULAR; INTRAVENOUS at 17:39

## 2023-03-06 RX ADMIN — SODIUM CHLORIDE 500 ML: 0.9 INJECTION, SOLUTION INTRAVENOUS at 23:51

## 2023-03-06 RX ADMIN — IOHEXOL 100 ML: 350 INJECTION, SOLUTION INTRAVENOUS at 18:19

## 2023-03-06 RX ADMIN — ALBUTEROL SULFATE 2 PUFF: 90 AEROSOL, METERED RESPIRATORY (INHALATION) at 23:51

## 2023-03-06 NOTE — ED PROVIDER NOTES
History  Chief Complaint   Patient presents with   • Back Pain     Patient arrives via ems coming from home c/o back pain and left flank pain that started yesterday, painful urination, patient some blood in urine, denies vomiting or diarrhea, patient states tylenol pta     Patient is a 66-year-old female with a history of asthma, coronary artery disease, hypertension, hyperlipidemia, diabetes coming in today for left flank pain and left abdominal pain  She states that started yesterday into today  She had no trauma or falls  No history of lumbar spine issues  She denies any saddle paresthesias, radiculopathy into the lower extremities  She states that she has been having more burning or discomfort with urination as well as pain into the left flank and left lower abdomen  Denies Fevers, chills, nausea, vomiting, diarrhea  She took Tylenol with relief of pain yesterday but persisted  He states that she has been taking her medications as prescribed without any changes  She denies any chest pain, palpitations, syncope, shortness of breath      History provided by:  EMS personnel and patient   used: No    Back Pain  Location:  Lumbar spine  Quality:  Aching, burning and stabbing  Radiates to: to LLQ    Pain severity:  Moderate  Onset quality:  Gradual  Timing:  Intermittent  Progression:  Waxing and waning  Chronicity:  New  Context: not emotional stress, not falling, not jumping from heights, not lifting heavy objects, not MCA, not MVA, not occupational injury, not pedestrian accident, not physical stress, not recent illness, not recent injury and not twisting    Relieved by:  Nothing  Worsened by:  Nothing  Ineffective treatments:  OTC medications  Associated symptoms: abdominal pain and dysuria    Associated symptoms: no abdominal swelling, no bladder incontinence, no bowel incontinence, no chest pain, no fever, no headaches, no leg pain, no numbness, no paresthesias, no pelvic pain, no perianal numbness, no tingling, no weakness and no weight loss    Risk factors: hx of osteoporosis and menopause    Risk factors: no hx of cancer, no lack of exercise, not obese, not pregnant, no recent surgery, no steroid use and no vascular disease        Prior to Admission Medications   Prescriptions Last Dose Informant Patient Reported? Taking?    ALPRAZolam (XANAX) 0 5 mg tablet   No Yes   Sig: Take 1 tablet (0 5 mg total) by mouth daily at bedtime as needed for anxiety   Air  (Vicks Air Purifier/HEPA) (Device) MISC   No Yes   Sig: Use as directed   Plavix 75 MG tablet   No Yes   Sig: Take 1 tablet (75 mg total) by mouth daily   Red Yeast Rice 600 MG CAPS   No Yes   Sig: Take 1 capsule (600 mg total) by mouth 2 (two) times a day with meals   albuterol (ACCUNEB) 1 25 MG/3ML nebulizer solution   No Yes   Sig: Take 3 mL by nebulization every 6 (six) hours as needed for wheezing   albuterol (PROVENTIL HFA,VENTOLIN HFA) 90 mcg/act inhaler   No Yes   Sig: Inhale 2 puffs every 6 (six) hours as needed for wheezing   amLODIPine (NORVASC) 5 mg tablet   No Yes   Sig: Take 1 tablet (5 mg total) by mouth 2 (two) times a day   benzonatate (TESSALON PERLES) 100 mg capsule   No Yes   Sig: Take 1 capsule (100 mg total) by mouth 3 (three) times a day as needed for cough With food/meals   bisoprolol (ZEBETA) 10 MG tablet   No Yes   Sig: Take 1 tablet (10 mg total) by mouth daily   co-enzyme Q-10 30 MG capsule   No Yes   Sig: Take 1 capsule (30 mg total) by mouth 2 (two) times a day   ergocalciferol (VITAMIN D2) 50,000 units   No Yes   Sig: Take 1 capsule (50,000 Units total) by mouth once a week   glucose blood test strip   No Yes   Sig: Use 1 each as needed (as needed) Use as instructed   magnesium (MAGTAB) 84 MG (7MEQ) TBCR   No Yes   Sig: Take 1 tablet (84 mg total) by mouth daily   nitroglycerin (NITROSTAT) 0 4 mg SL tablet   No Yes   Sig: Place 1 tablet (0 4 mg total) under the tongue every 5 (five) minutes as needed for chest pain   omega-3-acid ethyl esters (LOVAZA) 1 g capsule   No Yes   Sig: Take 1 capsule (1 g total) by mouth 2 (two) times a day   pantoprazole (PROTONIX) 20 mg tablet   No Yes   Sig: Take 1 tablet (20 mg total) by mouth daily   rosuvastatin (CRESTOR) 5 mg tablet   No Yes   Sig: Take 1 tablet (5 mg total) by mouth daily   vitamin B-12 (VITAMIN B-12) 1,000 mcg tablet   No Yes   Sig: Take 1 tablet (1,000 mcg total) by mouth daily      Facility-Administered Medications Last Administration Doses Remaining   cyanocobalamin injection 1,000 mcg 10/29/2021 12:17 PM    trimethobenzamide (TIGAN) IM injection 200 mg 2021 12:27 PM           Past Medical History:   Diagnosis Date   • Arthritis    • Asthma    • Community acquired pneumonia 2019   • Coronary artery disease    • DM (diabetes mellitus) ([facility] [address] )    • HTN (hypertension)    • Hydronephrosis, bilateral 3/6/2023   • Hyperlipidemia    • Hyponatremia        Past Surgical History:   Procedure Laterality Date   • CARDIAC CATHETERIZATION     • CORONARY ARTERY BYPASS GRAFT  2010    with subsequent stent to the saphenous veingraft to the RCA 3 months later   • KNEE ARTHROSCOPY     • POLYPECTOMY      laryngeal   • TOTAL HIP ARTHROPLASTY     • TUBAL LIGATION         Family History   Problem Relation Age of Onset   • Stroke Mother    • Hypertension Mother    • Heart disease Mother    • Colon cancer Father    • Heart attack Brother    • Heart attack Brother      I have reviewed and agree with the history as documented      E-Cigarette/Vaping   • E-Cigarette Use Never User      E-Cigarette/Vaping Substances   • Nicotine No    • THC No    • CBD No    • Flavoring No      Social History     Tobacco Use   • Smoking status: Former     Packs/day: 0 50     Years: 13 00     Pack years: 6 50     Types: Cigarettes     Quit date:      Years since quittin    • Smokeless tobacco: Never   • Tobacco comments:     no passive smoke exposure   Vaping Use   • Vaping Use: Never used   Substance Use Topics   • Alcohol use: Yes     Comment: socially   • Drug use: Never       Review of Systems   Constitutional: Negative  Negative for chills, fever and weight loss  HENT: Negative  Negative for ear pain and sore throat  Eyes: Negative  Negative for pain and visual disturbance  Respiratory: Negative  Negative for cough and shortness of breath  Cardiovascular: Negative  Negative for chest pain and palpitations  Gastrointestinal: Positive for abdominal pain  Negative for bowel incontinence and vomiting  Genitourinary: Positive for dysuria  Negative for bladder incontinence, hematuria and pelvic pain  Musculoskeletal: Positive for back pain  Negative for arthralgias  Skin: Negative for color change and rash  Neurological: Negative  Negative for tingling, seizures, syncope, weakness, numbness, headaches and paresthesias  Hematological: Negative  Psychiatric/Behavioral: Negative  All other systems reviewed and are negative  Physical Exam  Physical Exam  Vitals and nursing note reviewed  Constitutional:       General: She is not in acute distress  Appearance: She is well-developed  HENT:      Head: Normocephalic and atraumatic  Comments: Patient maintaining airway and secretions  No stridor   No brawniness under tongue  Nose: Nose normal       Mouth/Throat:      Mouth: Mucous membranes are dry  Eyes:      Extraocular Movements: Extraocular movements intact  Conjunctiva/sclera: Conjunctivae normal       Pupils: Pupils are equal, round, and reactive to light  Cardiovascular:      Rate and Rhythm: Regular rhythm  Tachycardia present  Pulses:           Radial pulses are 2+ on the right side and 2+ on the left side  Dorsalis pedis pulses are 2+ on the right side and 2+ on the left side  Heart sounds: Normal heart sounds, S1 normal and S2 normal  No murmur heard    Pulmonary:      Effort: Pulmonary effort is normal  No respiratory distress  Breath sounds: Normal breath sounds  Abdominal:      General: Abdomen is protuberant  Bowel sounds are normal       Palpations: Abdomen is soft  Tenderness: There is abdominal tenderness in the suprapubic area, left upper quadrant and left lower quadrant  There is left CVA tenderness  There is no right CVA tenderness, guarding or rebound  Negative signs include Troncoso's sign  Musculoskeletal:         General: No swelling  Cervical back: Neck supple  Back:       Right lower leg: No edema  Left lower leg: No edema  Skin:     General: Skin is warm and dry  Capillary Refill: Capillary refill takes less than 2 seconds  Neurological:      General: No focal deficit present  Mental Status: She is alert and oriented to person, place, and time  GCS: GCS eye subscore is 4  GCS verbal subscore is 5  GCS motor subscore is 6  Cranial Nerves: Cranial nerves 2-12 are intact  Sensory: Sensation is intact  Motor: Motor function is intact  Coordination: Coordination is intact  Comments: No slurred speech  No facial asymmetry  Psychiatric:         Mood and Affect: Mood normal          Behavior: Behavior normal          Thought Content:  Thought content normal          Judgment: Judgment normal          Vital Signs  ED Triage Vitals [03/06/23 1715]   Temperature Pulse Respirations Blood Pressure SpO2   (!) 97 1 °F (36 2 °C) (!) 106 19 (!) 238/114 97 %      Temp Source Heart Rate Source Patient Position - Orthostatic VS BP Location FiO2 (%)   Oral Monitor Lying Right arm --      Pain Score       8           Vitals:    03/06/23 1715 03/06/23 1931 03/06/23 2120   BP: (!) 238/114 120/58 139/61   Pulse: (!) 106 (!) 110 90   Patient Position - Orthostatic VS: Lying Lying Lying         Visual Acuity      ED Medications  Medications   pantoprazole (PROTONIX) EC tablet 20 mg (has no administration in time range)   ALPRAZolam Evaristo Pacheco) tablet 0 5 mg (has no administration in time range)   sodium chloride 0 9 % bolus 1,000 mL (0 mL Intravenous Stopped 3/6/23 1837)   fentanyl citrate (PF) 100 MCG/2ML 50 mcg (50 mcg Intravenous Given 3/6/23 1741)   ondansetron (ZOFRAN) injection 4 mg (4 mg Intravenous Given 3/6/23 1739)   iohexol (OMNIPAQUE) 350 MG/ML injection (SINGLE-DOSE) 100 mL (100 mL Intravenous Given 3/6/23 1819)   magnesium sulfate 2 g/50 mL IVPB (premix) 2 g (0 g Intravenous Stopped 3/6/23 2120)   cefTRIAXone (ROCEPHIN) IVPB (premix in dextrose) 2,000 mg 50 mL (2,000 mg Intravenous New Bag 3/6/23 2129)       Diagnostic Studies  Results Reviewed     Procedure Component Value Units Date/Time    Procalcitonin [[id number]]  (Abnormal) Collected: 03/06/23 2132    Lab Status: Final result Specimen: Blood from Arm, Right Updated: 03/06/23 2207     Procalcitonin 0 83 ng/ml     FLU/RSV/COVID - if FLU/RSV clinically relevant [[id number]]  (Normal) Collected: 03/06/23 2111    Lab Status: Final result Specimen: Nares from Nose Updated: 03/06/23 2155     SARS-CoV-2 Negative     INFLUENZA A PCR Negative     INFLUENZA B PCR Negative     RSV PCR Negative    Narrative:      FOR PEDIATRIC PATIENTS - copy/paste COVID Guidelines URL to browser: [email or web address]    SARS-CoV-2 assay is a Nucleic Acid Amplification assay intended for the  qualitative detection of nucleic acid from SARS-CoV-2 in nasopharyngeal  swabs  Results are for the presumptive identification of SARS-CoV-2 RNA  Positive results are indicative of infection with SARS-CoV-2, the virus  causing COVID-19, but do not rule out bacterial infection or co-infection  with other viruses  Laboratories within the [place] and its  territories are required to report all positive results to the appropriate  public health authorities   Negative results do not preclude SARS-CoV-2  infection and should not be used as the sole basis for treatment or other  patient management decisions  Negative results must be combined with  clinical observations, patient history, and epidemiological information  This test has not been FDA cleared or approved  This test has been authorized by FDA under an Emergency Use Authorization  (EUA)  This test is only authorized for the duration of time the  declaration that circumstances exist justifying the authorization of the  emergency use of an in vitro diagnostic tests for detection of SARS-CoV-2  virus and/or diagnosis of COVID-19 infection under section 564(b)(1) of  the Act, 21 U  S C  651WTQ-7(U)(5), unless the authorization is terminated  or revoked sooner  The test has been validated but independent review by FDA  and CLIA is pending  Test performed using [employer] GeneXpert: This RT-PCR assay targets N2,  a region unique to SARS-CoV-2  A conserved region in the E-gene was chosen  for pan-Sarbecovirus detection which includes SARS-CoV-2  According to CMS-2020-01-R, this platform meets the definition of high-throughput technology  Urine Microscopic [[id number]]  (Abnormal) Collected: 03/06/23 1836    Lab Status: Final result Specimen: Urine, Clean Catch Updated: 03/06/23 1935     RBC, UA 0-1 /hpf      WBC, UA 30-50 /hpf      Epithelial Cells Occasional /hpf      Bacteria, UA Moderate /hpf     Urine culture [[id number]] Collected: 03/06/23 1836    Lab Status:  In process Specimen: Urine, Clean Catch Updated: 03/06/23 1935    UA w Reflex to Microscopic w Reflex to Culture [[id number]]  (Abnormal) Collected: 03/06/23 1836    Lab Status: Final result Specimen: Urine, Clean Catch Updated: 03/06/23 1913     Color, UA Light Yellow     Clarity, UA Clear     Specific Gravity, UA <=1 005     pH, UA 6 5     Leukocytes, UA Moderate     Nitrite, UA Negative     Protein, UA 30 (1+) mg/dl      Glucose, UA Negative mg/dl      Ketones, UA Negative mg/dl      Urobilinogen, UA 0 2 E U /dl      Bilirubin, UA Negative     Occult Blood, UA Moderate    Comprehensive metabolic panel [[id number]]  (Abnormal) Collected: 03/06/23 1738    Lab Status: Final result Specimen: Blood from Arm, Right Updated: 03/06/23 1808     Sodium 128 mmol/L      Potassium 4 4 mmol/L      Chloride 90 mmol/L      CO2 30 mmol/L      ANION GAP 8 mmol/L      BUN 9 mg/dL      Creatinine 0 70 mg/dL      Glucose 113 mg/dL      Calcium 9 6 mg/dL      AST 13 U/L      ALT 12 U/L      Alkaline Phosphatase 77 U/L      Total Protein 8 4 g/dL      Albumin 4 4 g/dL      Total Bilirubin 0 57 mg/dL      eGFR 83 ml/min/1 73sq m     Narrative:      [place] guidelines for Chronic Kidney Disease (CKD):   •  Stage 1 with normal or high GFR (GFR > 90 mL/min/1 73 square meters)  •  Stage 2 Mild CKD (GFR = 60-89 mL/min/1 73 square meters)  •  Stage 3A Moderate CKD (GFR = 45-59 mL/min/1 73 square meters)  •  Stage 3B Moderate CKD (GFR = 30-44 mL/min/1 73 square meters)  •  Stage 4 Severe CKD (GFR = 15-29 mL/min/1 73 square meters)  •  Stage 5 End Stage CKD (GFR <15 mL/min/1 73 square meters)  Note: GFR calculation is accurate only with a steady state creatinine    Magnesium [[id number]]  (Abnormal) Collected: 03/06/23 1738    Lab Status: Final result Specimen: Blood from Arm, Right Updated: 03/06/23 1808     Magnesium 1 3 mg/dL     Lipase [[id number]]  (Normal) Collected: 03/06/23 1738    Lab Status: Final result Specimen: Blood from Arm, Right Updated: 03/06/23 1808     Lipase 26 u/L     Lactic acid [[id number]]  (Normal) Collected: 03/06/23 1738    Lab Status: Final result Specimen: Blood from Arm, Right Updated: 03/06/23 1807     LACTIC ACID 1 3 mmol/L     Narrative:      Result may be elevated if tourniquet was used during collection      Protime-INR [[id number]]  (Normal) Collected: 03/06/23 1738    Lab Status: Final result Specimen: Blood from Arm, Right Updated: 03/06/23 1806     Protime 13 2 seconds      INR 1 00    APTT [[id number]]  (Normal) Collected: 03/06/23 1738    Lab Status: Final result Specimen: Blood from Arm, Right Updated: 03/06/23 1806     PTT 32 seconds     CBC and differential [[id number]]  (Abnormal) Collected: 03/06/23 1738    Lab Status: Final result Specimen: Blood from Arm, Right Updated: 03/06/23 1749     WBC 13 52 Thousand/uL      RBC 4 89 Million/uL      Hemoglobin 13 4 g/dL      Hematocrit 41 4 %      MCV 85 fL      MCH 27 4 pg      MCHC 32 4 g/dL      RDW 13 7 %      MPV 9 3 fL      Platelets 317 Thousands/uL      nRBC 0 /100 WBCs      Neutrophils Relative 82 %      Immat GRANS % 0 %      Lymphocytes Relative 11 %      Monocytes Relative 6 %      Eosinophils Relative 1 %      Basophils Relative 0 %      Neutrophils Absolute 11 00 Thousands/µL      Immature Grans Absolute 0 05 Thousand/uL      Lymphocytes Absolute 1 43 Thousands/µL      Monocytes Absolute 0 87 Thousand/µL      Eosinophils Absolute 0 13 Thousand/µL      Basophils Absolute 0 04 Thousands/µL                  CT abdomen pelvis with contrast   Final Result by Wayne Jo DO (03/06 1952)      Bilateral severe hydroureteronephrosis without evidence of obstructing stone  Enhancement of the ureters bilaterally  Findings may represent recently passed stone versus infection  Follow-up with urology is recommended to rule out underlying neoplasm  Marked distention of the urinary bladder which may be related to above process  Workstation performed: [id number]                    Procedures  Procedures         ED Course  ED Course as of 03/06/23 2226   Mon Mar 06, 2023   1714 Patient t is a 78-year-old female coming in today with nontraumatic left back pain that radiates to the left lower quadrant started yesterday into today  exam she is hypertensive and tachycardic but has reasonable pain to the left flank  Will start medical work up - labs, CT/UA and pain medications      Disclosure: Voice to text software was used in the preparation of this document and could have resulted in translational errors       Occasional wrong word or "sound a like" substitutions may have occurred due to the inherent limitations of voice recognition software   Read the chart carefully and recognize, using context, where substitutions have occurred         1836 Since labs revealed leukocytosis having lactic acidosis  Sodium is low as well as magnesium  Will repeat vital signs she is receiving normal saline   1855 Patient resting in bed  She states she has had low sodium in the past however has a sodium of 128 and a mag of 1 3  She still remains tachycardic but denies any chest pain or shortness of breath  Pending CT and urine   1928 Pending CT scan results  Leukocytes in urine as well as blood  1936 Patient with urinary infection  Urine culture sent  No old urine cultures here at [facility]  First dose of Rocephin while waiting on CT results  Patient remains tachycardic however blood pressure improved   1955 CT shows bilateral hydroureteronephrosis  She does have a urine infection  We will plan for admission   2027 Discussed with Manuel Varghese  We had a detailed discussion of the patient's condition and case,  including need for admission  Accepts to his/her service  Bed request/bridging orders placed  Medical Decision Making      Differential diagnosis includes but not limited to:  Appendicitis, viral syndrome, constipation, AMI, NSTEMI, pneumonia, pneuothorax, gerd, gastritis,  mesenteric ischemia, mesenteric adenitis, pancreatitis, cholecystitis, choledocholithiasis, hepatitis, bowel obstruction, ileus, gastroenteritis, colitis, malignancy, AAA, perforation, toxicologic poisoning, renal infarct, acute kidney injury, splenic infarct, splenic injury, nephrolithiasis, UTI, muscular strain, intra-abdominal hematoma, hernia   DDx including but not limited to: sciatica, herniated disc, arthritis, spinal stenosis, strain, sprain, fracture, cauda equina syndrome, epidural abscess, AAA  Amount and/or Complexity of Data Reviewed  External Data Reviewed: radiology  Details: no CT's at outside hospital  Labs: ordered  Decision-making details documented in ED Course  Radiology: ordered  Decision-making details documented in ED Course  Risk  Prescription drug management  Disposition  Final diagnoses:   Flank pain   Hypomagnesemia   Hyponatremia   Hydroureteronephrosis     Time reflects when diagnosis was documented in both MDM as applicable and the Disposition within this note     Time User Action Codes Description Comment    3/6/2023  6:50 PM BendElly glover L Add [R10 9] Flank pain     3/6/2023  6:50 PM BendockGrega Neat Add [E83 42] Hypomagnesemia     3/6/2023  6:50 PM Bendock, Jessica L Add [E87 1] Hyponatremia     3/6/2023  8:12 PM Bendock, Jessica L Add [N13 30] Hydroureteronephrosis     3/6/2023  8:47 PM Delabre, Doneta Sicard Add [N39 0] UTI (urinary tract infection)     3/6/2023  8:47 PM Cem Mckenna Add [N13 30] Hydronephrosis, bilateral       ED Disposition     ED Disposition   Admit    Condition   Stable    Date/Time   Mon Mar 6, 2023  8:26 PM    Comment   Case was discussed with Amairani and the patient's admission status was agreed to be Admission Status: inpatient status to the service of Dr Melissa Clarke   Follow-up Information    None         Patient's Medications   Discharge Prescriptions    No medications on file       No discharge procedures on file      PDMP Review       Value Time User    PDMP Reviewed  Yes 9/8/2022  4:01 PM Jessica Chavira MD          ED Provider  Electronically Signed by           Sondra Campbell DO  03/06/23 3766

## 2023-03-07 PROBLEM — N39.0 SEPSIS SECONDARY TO UTI (HCC): Status: ACTIVE | Noted: 2019-11-13

## 2023-03-07 PROBLEM — N81.4 CYSTOCELE WITH PROLAPSE: Status: ACTIVE | Noted: 2023-03-06

## 2023-03-07 LAB
ANION GAP SERPL CALCULATED.3IONS-SCNC: 6 MMOL/L (ref 4–13)
BASOPHILS # BLD AUTO: 0.05 THOUSANDS/ÂΜL (ref 0–0.1)
BASOPHILS NFR BLD AUTO: 0 % (ref 0–1)
BUN SERPL-MCNC: 10 MG/DL (ref 5–25)
CALCIUM SERPL-MCNC: 8.4 MG/DL (ref 8.4–10.2)
CHLORIDE SERPL-SCNC: 99 MMOL/L (ref 96–108)
CO2 SERPL-SCNC: 27 MMOL/L (ref 21–32)
CREAT SERPL-MCNC: 0.72 MG/DL (ref 0.6–1.3)
EOSINOPHIL # BLD AUTO: 0.02 THOUSAND/ÂΜL (ref 0–0.61)
EOSINOPHIL NFR BLD AUTO: 0 % (ref 0–6)
ERYTHROCYTE [DISTWIDTH] IN BLOOD BY AUTOMATED COUNT: 13.9 % (ref 11.6–15.1)
GFR SERPL CREATININE-BSD FRML MDRD: 81 ML/MIN/1.73SQ M
GLUCOSE SERPL-MCNC: 128 MG/DL (ref 65–140)
HCT VFR BLD AUTO: 33 % (ref 34.8–46.1)
HGB BLD-MCNC: 10.7 G/DL (ref 11.5–15.4)
IMM GRANULOCYTES # BLD AUTO: 0.1 THOUSAND/UL (ref 0–0.2)
IMM GRANULOCYTES NFR BLD AUTO: 1 % (ref 0–2)
LYMPHOCYTES # BLD AUTO: 1.34 THOUSANDS/ÂΜL (ref 0.6–4.47)
LYMPHOCYTES NFR BLD AUTO: 9 % (ref 14–44)
MAGNESIUM SERPL-MCNC: 1.9 MG/DL (ref 1.9–2.7)
MCH RBC QN AUTO: 27.5 PG (ref 26.8–34.3)
MCHC RBC AUTO-ENTMCNC: 32.4 G/DL (ref 31.4–37.4)
MCV RBC AUTO: 85 FL (ref 82–98)
MONOCYTES # BLD AUTO: 1.49 THOUSAND/ÂΜL (ref 0.17–1.22)
MONOCYTES NFR BLD AUTO: 10 % (ref 4–12)
NEUTROPHILS # BLD AUTO: 12.44 THOUSANDS/ÂΜL (ref 1.85–7.62)
NEUTS SEG NFR BLD AUTO: 80 % (ref 43–75)
NRBC BLD AUTO-RTO: 0 /100 WBCS
PLATELET # BLD AUTO: 262 THOUSANDS/UL (ref 149–390)
PMV BLD AUTO: 9.3 FL (ref 8.9–12.7)
POTASSIUM SERPL-SCNC: 3.9 MMOL/L (ref 3.5–5.3)
RBC # BLD AUTO: 3.89 MILLION/UL (ref 3.81–5.12)
SODIUM SERPL-SCNC: 132 MMOL/L (ref 135–147)
WBC # BLD AUTO: 15.44 THOUSAND/UL (ref 4.31–10.16)

## 2023-03-07 RX ORDER — LEVALBUTEROL 1.25 MG/.5ML
1.25 SOLUTION, CONCENTRATE RESPIRATORY (INHALATION)
Status: DISCONTINUED | OUTPATIENT
Start: 2023-03-07 | End: 2023-03-10 | Stop reason: HOSPADM

## 2023-03-07 RX ORDER — SODIUM CHLORIDE FOR INHALATION 0.9 %
3 VIAL, NEBULIZER (ML) INHALATION
Status: DISCONTINUED | OUTPATIENT
Start: 2023-03-07 | End: 2023-03-10 | Stop reason: HOSPADM

## 2023-03-07 RX ADMIN — ALBUTEROL SULFATE 2 PUFF: 90 AEROSOL, METERED RESPIRATORY (INHALATION) at 10:56

## 2023-03-07 RX ADMIN — ISODIUM CHLORIDE 3 ML: 0.03 SOLUTION RESPIRATORY (INHALATION) at 12:38

## 2023-03-07 RX ADMIN — PRAVASTATIN SODIUM 40 MG: 40 TABLET ORAL at 17:34

## 2023-03-07 RX ADMIN — ALBUTEROL SULFATE 2 PUFF: 90 AEROSOL, METERED RESPIRATORY (INHALATION) at 05:53

## 2023-03-07 RX ADMIN — BISOPROLOL FUMARATE 10 MG: 5 TABLET ORAL at 10:55

## 2023-03-07 RX ADMIN — LEVALBUTEROL HYDROCHLORIDE 1.25 MG: 1.25 SOLUTION, CONCENTRATE RESPIRATORY (INHALATION) at 12:38

## 2023-03-07 RX ADMIN — CEFTRIAXONE 1000 MG: 1 INJECTION, SOLUTION INTRAVENOUS at 22:08

## 2023-03-07 RX ADMIN — HEPARIN SODIUM 5000 UNITS: 5000 INJECTION INTRAVENOUS; SUBCUTANEOUS at 05:53

## 2023-03-07 RX ADMIN — HEPARIN SODIUM 5000 UNITS: 5000 INJECTION INTRAVENOUS; SUBCUTANEOUS at 22:10

## 2023-03-07 RX ADMIN — ALBUTEROL SULFATE 2 PUFF: 90 AEROSOL, METERED RESPIRATORY (INHALATION) at 22:09

## 2023-03-07 RX ADMIN — LEVALBUTEROL HYDROCHLORIDE 1.25 MG: 1.25 SOLUTION, CONCENTRATE RESPIRATORY (INHALATION) at 19:50

## 2023-03-07 RX ADMIN — PANTOPRAZOLE SODIUM 20 MG: 20 TABLET, DELAYED RELEASE ORAL at 06:00

## 2023-03-07 RX ADMIN — HEPARIN SODIUM 5000 UNITS: 5000 INJECTION INTRAVENOUS; SUBCUTANEOUS at 15:00

## 2023-03-07 RX ADMIN — HEPARIN SODIUM 5000 UNITS: 5000 INJECTION INTRAVENOUS; SUBCUTANEOUS at 00:35

## 2023-03-07 RX ADMIN — ALBUTEROL SULFATE 2 PUFF: 90 AEROSOL, METERED RESPIRATORY (INHALATION) at 15:56

## 2023-03-07 RX ADMIN — CLOPIDOGREL BISULFATE 75 MG: 75 TABLET ORAL at 09:17

## 2023-03-07 RX ADMIN — AMLODIPINE BESYLATE 5 MG: 5 TABLET ORAL at 09:17

## 2023-03-07 RX ADMIN — AMLODIPINE BESYLATE 5 MG: 5 TABLET ORAL at 17:34

## 2023-03-07 RX ADMIN — ISODIUM CHLORIDE 3 ML: 0.03 SOLUTION RESPIRATORY (INHALATION) at 19:50

## 2023-03-07 NOTE — ASSESSMENT & PLAN NOTE
· Presented with flank pain s/t severe bilateral hydronephrosis result of bladder outlet obstruction caused by significant pelvic organ prolapse  · She is now s/p Mayo catheter insertion  · She is now s/p vaginal Pessary insertion  · Her symptoms completely resolved quickly  · Hydronephrosis completely resolved on repeat US this morning  · Urine output volume excellent, clear yellow via Mayo  ·   · Present to office Friday for voiding trial  See GYN for q3 month Pessary visit  · She declined referral for urogynecology or any surgical specialist for her pelvic organ prolapse and cystocele management  Wishes to manage conservatively with pessary    · Discharge home

## 2023-03-07 NOTE — ED NOTES
This RN went to place burgos catheter  Pt with a large prolapsed uterus  Burgos catheter able to be placed with assistance of PERICO Sow made aware of large prolapsed uterus       Janet Mar RN  03/06/23 9142

## 2023-03-07 NOTE — CASE MANAGEMENT
Case Management Assessment & Discharge Planning Note    Patient name May Maria  Location [facility] 68 2 /South 2 Sis Horvath* MRN [id number]  : 1945 Date 3/7/2023       Current Admission Date: 3/6/2023  Current Admission Diagnosis:Sepsis [facility])   Patient Active Problem List    Diagnosis Date Noted   • Hyponatremia 2023   • Hypomagnesemia 2023   • Hydronephrosis, bilateral 2023   • UTI (urinary tract infection) 2023   • Prolapse of vaginal wall 2023   • Chronic obstructive pulmonary disease, unspecified COPD type ([facility] [address] ) 2022   • Community acquired pneumonia 2019   • Sepsis ([facility] [address] ) 2019   • Hx of CABG 2018   • Hx stent of SVG to the RCA 2018   • Asthma 2014   • Chronic coronary artery disease 2014   • Hiatal hernia 2014   • Osteoarthritis 2014   • Glaucoma 2013   • Hyperlipidemia 2012   • Hypertension 2012      LOS (days): 1  Geometric Mean LOS (GMLOS) (days): 3 50  Days to GMLOS:2 9     OBJECTIVE:    Risk of Unplanned Readmission Score: 13 15         Current admission status: Inpatient       Preferred Pharmacy:   [address] [place] [address],[address], [address]  [place] [address]  Phone: [phone] Fax: [phone]    Primary Care Provider: Holly Keenan MD    Primary Insurance: [address] MEDICARE [facility] REP  Secondary Insurance: [address]:  [address] Proxies    There are no active Health Care Proxies on file         Advance Directives  Does patient have a [address]?: No  Does patient have Advance Directives?: No  Primary Contact: Albino Pandey (Son)   [phone]         Readmission Root Cause  30 Day Readmission: No    Patient Information  Admitted from[de-identified] Home  Mental Status: Alert  Assessment information provided by[de-identified] Sha Altamirano Gisellamaida (Son)   [phone])  Primary Caregiver: Self  Support Systems: Spouse/significant other, Family members  [place] of Residence: [address] do you live in?: [place] [address] entry access options   Select all that apply : Stairs  Number of steps to enter home : 5  Type of Current Residence: Apartment  Floor Level: 1  Upon entering residence, is there a bedroom on the main floor (no further steps)?: Yes  Upon entering residence, is there a bathroom on the main floor (no further steps)?: Yes  In the last 12 months, was there a time when you were not able to pay the mortgage or rent on time?: No  Homeless/housing insecurity resource given?: N/A  Living Arrangements: Lives w/ Spouse/significant other  Is patient a ?: No    Activities of Daily Living Prior to Admission  Functional Status: Independent  Completes ADLs independently?: Yes  Ambulates independently?: Yes  Does patient use assisted devices?: Yes  Assisted Devices (DME) used: Straight Cane  Does patient currently own DME?: Yes  What DME does the patient currently own?: Straight Cane  Does patient have a history of Outpatient Therapy (PT/OT)?: No  Does the patient have a history of Short-Term Rehab?: No  Does patient have a history of HHC?: No  Does patient currently have [facility]?: No         Patient Information Continued  Income Source: SSI/SSD  Within the past 12 months, you worried that your food would run out before you got the money to buy more : Never true  Within the past 12 months, the food you bought just didn't last and you didn't have money to get more : Never true  Food insecurity resource given?: N/A  Does patient receive dialysis treatments?: No  Does patient have a history of substance abuse?: No  Does patient have a history of Mental Health Diagnosis?: No         Means of Transportation  Means of Transport to Appts[de-identified] Family transport  In the past 12 months, has lack of transportation kept you from medical appointments or from getting medications?: No  In the past 12 months, has lack of transportation kept you from meetings, work, or from getting things needed for daily living?: No  Was application for public transport provided?: N/A        DISCHARGE DETAILS:    Discharge planning discussed with[de-identified] DannieAlbino (Son)   [phone]        CM contacted family/caregiver?: Yes  Were Treatment Team discharge recommendations reviewed with patient/caregiver?: Yes          Contacts  Patient Contacts: Reid Alexis (Son)   [phone]  Relationship to Patient[de-identified] Family  Contact Method: Phone  Reason/Outcome: Discharge [address]         Is the [place] interested in [place] [address] at discharge?: No    DME Referral Provided  Referral made for DME?: No    Other Referral/Resources/Interventions Provided:  Interventions: None Indicated         Treatment Team Recommendation: Home  Discharge Destination Plan[de-identified] Home  Transport at Discharge : Automobile (family will  upon d/c)

## 2023-03-07 NOTE — CONSULTS
Consultation - Gynecology  Chasity Lopez 68 y o  female MRN: [id number]  Unit/Bed#: [address] 205[address]02 Encounter: [id number]      Inpatient consult to Obstetrics / Gynecology  Consult performed by: Patsy Joya MD  Consult ordered by: Kiki Tomas PA-C            Chief Complaint   Patient presents with   • Back Pain     Patient arrives via ems coming from home c/o back pain and left flank pain that started yesterday, painful urination, patient some blood in urine, denies vomiting or diarrhea, patient states tylenol pta       Assessment/Plan      Cystocele with prolapse  Assessment & Plan  Large, reducible cystocele appreciated on exam - increased burgos output appreciated after reduction   Expressed desire to avoid surgical management   Will attempt Gellhorn pessary placement this afternoon   Recommend outpatient follow up with urogynocology after discharge     Chronic obstructive pulmonary disease, unspecified COPD type ([facility] )  Assessment & Plan  Management per primary team     * Sepsis [facility])  Assessment & Plan  Management per primary team          History of Present Illness:  Physician Requesting Consult: Jack Goodman DO  Reason for Consult / Principal Problem: Cystocele  HPI: Chasity Lopez is a 68 y o  female who is currently admitted with sepsis thought to be of urinary origin secondary to urinary retention caused by vaginal prolapse  Elle Goon reports that she has had 6 vaginal deliveries and that she has felt a bulge sensation "for years"  She reports that her prolapse occasionally rubs on her close and bleeds  She reports minimal discomfort related to her prolapse but does notice that when she attempts to use the bathroom she is unable to urinate  She denies any changes in bowel movements  Reports that she does sometimes splint to allow for urination and self reduces her prolapse every morning in the shower   She states that she previously was supposed to follow up to discuss possible surgery but had to have bilateral hip replacements and was unable to follow up afterwards  Rolando Cruz states that she would like to avoid surgery if possible and is open to trying a pessary  Historical Information   Past Medical History:   Diagnosis Date   • Arthritis    • Asthma    • Community acquired pneumonia 2019   • Coronary artery disease    • DM (diabetes mellitus) ([facility] [address] )    • HTN (hypertension)    • Hydronephrosis, bilateral 3/6/2023   • Hyperlipidemia    • Hyponatremia      Past Surgical History:   Procedure Laterality Date   • CARDIAC CATHETERIZATION     • CORONARY ARTERY BYPASS GRAFT  2010    with subsequent stent to the saphenous veingraft to the RCA 3 months later   • KNEE ARTHROSCOPY     • POLYPECTOMY      laryngeal   • TOTAL HIP ARTHROPLASTY     • TUBAL LIGATION       OB History   No obstetric history on file  Family History   Problem Relation Age of Onset   • Stroke Mother    • Hypertension Mother    • Heart disease Mother    • Colon cancer Father    • Heart attack Brother    • Heart attack Brother      Social History   Social History     Substance and Sexual Activity   Alcohol Use Yes    Comment: socially     Social History     Substance and Sexual Activity   Drug Use Never     Social History     Tobacco Use   Smoking Status Former   • Packs/day: 0 50   • Years: 13    • Pack years: 6 50   • Types: Cigarettes   • Quit date:    • Years since quittin 1   Smokeless Tobacco Never   Tobacco Comments    no passive smoke exposure     Allergies   Allergen Reactions   • Ezetimibe Rash   • Ace Inhibitors    • Lipitor [Atorvastatin]      Able to tolerate Crestor (brand name) per patient   • Lisinopril    • Losartan    • Morphine Hives     pt denies   • Olmesartan    • Statins Arthralgia       Objective   Vitals: Blood pressure 118/50, pulse 92, temperature 98 5 °F (36 9 °C), temperature source Oral, resp   rate 20, height 5' 1" (1 549 m), weight 57 9 kg (127 lb 10 3 oz), SpO2 95 %  Body mass index is 24 12 kg/m²  Invasive Devices     Peripheral Intravenous Line  Duration           Peripheral IV 03/06/23 Right Antecubital <1 day          Drain  Duration           Urethral Catheter Latex <1 day                Physical Exam  Constitutional:       General: She is not in acute distress  Appearance: Normal appearance  HENT:      Head: Normocephalic and atraumatic  Cardiovascular:      Rate and Rhythm: Normal rate  Pulmonary:      Effort: Pulmonary effort is normal    Abdominal:      Palpations: Abdomen is soft  Genitourinary:     Comments: Large cystocele easily reduced with minimal pressure, increase in urine output noted immediately following reduction   Small component of apical prolapse appreciated   Skin:     General: Skin is warm and dry  Neurological:      General: No focal deficit present  Mental Status: She is alert     Psychiatric:         Mood and Affect: Mood normal          Lab Results:   Recent Results (from the past 24 hour(s))   CBC and differential    Collection Time: 03/06/23  5:38 PM   Result Value Ref Range    WBC 13 52 (H) 4 31 - 10 16 Thousand/uL    RBC 4 89 3 81 - 5 12 Million/uL    Hemoglobin 13 4 11 5 - 15 4 g/dL    Hematocrit 41 4 34 8 - 46 1 %    MCV 85 82 - 98 fL    MCH 27 4 26 8 - 34 3 pg    MCHC 32 4 31 4 - 37 4 g/dL    RDW 13 7 11 6 - 15 1 %    MPV 9 3 8 9 - 12 7 fL    Platelets 006 581 - 763 Thousands/uL    nRBC 0 /100 WBCs    Neutrophils Relative 82 (H) 43 - 75 %    Immat GRANS % 0 0 - 2 %    Lymphocytes Relative 11 (L) 14 - 44 %    Monocytes Relative 6 4 - 12 %    Eosinophils Relative 1 0 - 6 %    Basophils Relative 0 0 - 1 %    Neutrophils Absolute 11 00 (H) 1 85 - 7 62 Thousands/µL    Immature Grans Absolute 0 05 0 00 - 0 20 Thousand/uL    Lymphocytes Absolute 1 43 0 60 - 4 47 Thousands/µL    Monocytes Absolute 0 87 0 17 - 1 22 Thousand/µL    Eosinophils Absolute 0 13 0 00 - 0 61 Thousand/µL    Basophils Absolute 0 04 0 00 - 0 10 Thousands/µL   Protime-INR    Collection Time: 03/06/23  5:38 PM   Result Value Ref Range    Protime 13 2 11 6 - 14 5 seconds    INR 1 00 0 84 - 1 19   APTT    Collection Time: 03/06/23  5:38 PM   Result Value Ref Range    PTT 32 23 - 37 seconds   Comprehensive metabolic panel    Collection Time: 03/06/23  5:38 PM   Result Value Ref Range    Sodium 128 (L) 135 - 147 mmol/L    Potassium 4 4 3 5 - 5 3 mmol/L    Chloride 90 (L) 96 - 108 mmol/L    CO2 30 21 - 32 mmol/L    ANION GAP 8 4 - 13 mmol/L    BUN 9 5 - 25 mg/dL    Creatinine 0 70 0 60 - 1 30 mg/dL    Glucose 113 65 - 140 mg/dL    Calcium 9 6 8 4 - 10 2 mg/dL    AST 13 13 - 39 U/L    ALT 12 7 - 52 U/L    Alkaline Phosphatase 77 34 - 104 U/L    Total Protein 8 4 6 4 - 8 4 g/dL    Albumin 4 4 3 5 - 5 0 g/dL    Total Bilirubin 0 57 0 20 - 1 00 mg/dL    eGFR 83 ml/min/1 73sq m   Lactic acid    Collection Time: 03/06/23  5:38 PM   Result Value Ref Range    LACTIC ACID 1 3 0 5 - 2 0 mmol/L   Magnesium    Collection Time: 03/06/23  5:38 PM   Result Value Ref Range    Magnesium 1 3 (L) 1 9 - 2 7 mg/dL   Lipase    Collection Time: 03/06/23  5:38 PM   Result Value Ref Range    Lipase 26 11 - 82 u/L   UA w Reflex to Microscopic w Reflex to Culture    Collection Time: 03/06/23  6:36 PM    Specimen: Urine, Clean Catch   Result Value Ref Range    Color, UA Light Yellow     Clarity, UA Clear     Specific Gravity, UA <=1 005 1 003 - 1 030    pH, UA 6 5 4 5, 5 0, 5 5, 6 0, 6 5, 7 0, 7 5, 8 0    Leukocytes, UA Moderate (A) Negative    Nitrite, UA Negative Negative    Protein, UA 30 (1+) (A) Negative mg/dl    Glucose, UA Negative Negative mg/dl    Ketones, UA Negative Negative mg/dl    Urobilinogen, UA 0 2 0 2, 1 0 E U /dl E U /dl    Bilirubin, UA Negative Negative    Occult Blood, UA Moderate (A) Negative   Urine Microscopic    Collection Time: 03/06/23  6:36 PM   Result Value Ref Range    RBC, UA 0-1 (A) None Seen, 2-4 /hpf    WBC, UA 30-50 (A) None Seen, 2-4, 5-60 /hpf Epithelial Cells Occasional None Seen, Occasional /hpf    Bacteria, UA Moderate (A) None Seen, Occasional /hpf   FLU/RSV/COVID - if FLU/RSV clinically relevant    Collection Time: 03/06/23  9:11 PM    Specimen: Nose; Nares   Result Value Ref Range    SARS-CoV-2 Negative Negative    INFLUENZA A PCR Negative Negative    INFLUENZA B PCR Negative Negative    RSV PCR Negative Negative   Procalcitonin    Collection Time: 03/06/23  9:32 PM   Result Value Ref Range    Procalcitonin 0 83 (H) <=0 25 ng/ml   Basic metabolic panel    Collection Time: 03/07/23  5:46 AM   Result Value Ref Range    Sodium 132 (L) 135 - 147 mmol/L    Potassium 3 9 3 5 - 5 3 mmol/L    Chloride 99 96 - 108 mmol/L    CO2 27 21 - 32 mmol/L    ANION GAP 6 4 - 13 mmol/L    BUN 10 5 - 25 mg/dL    Creatinine 0 72 0 60 - 1 30 mg/dL    Glucose 128 65 - 140 mg/dL    Calcium 8 4 8 4 - 10 2 mg/dL    eGFR 81 ml/min/1 73sq m   CBC and differential    Collection Time: 03/07/23  5:46 AM   Result Value Ref Range    WBC 15 44 (H) 4 31 - 10 16 Thousand/uL    RBC 3 89 3 81 - 5 12 Million/uL    Hemoglobin 10 7 (L) 11 5 - 15 4 g/dL    Hematocrit 33 0 (L) 34 8 - 46 1 %    MCV 85 82 - 98 fL    MCH 27 5 26 8 - 34 3 pg    MCHC 32 4 31 4 - 37 4 g/dL    RDW 13 9 11 6 - 15 1 %    MPV 9 3 8 9 - 12 7 fL    Platelets 191 691 - 564 Thousands/uL    nRBC 0 /100 WBCs    Neutrophils Relative 80 (H) 43 - 75 %    Immat GRANS % 1 0 - 2 %    Lymphocytes Relative 9 (L) 14 - 44 %    Monocytes Relative 10 4 - 12 %    Eosinophils Relative 0 0 - 6 %    Basophils Relative 0 0 - 1 %    Neutrophils Absolute 12 44 (H) 1 85 - 7 62 Thousands/µL    Immature Grans Absolute 0 10 0 00 - 0 20 Thousand/uL    Lymphocytes Absolute 1 34 0 60 - 4 47 Thousands/µL    Monocytes Absolute 1 49 (H) 0 17 - 1 22 Thousand/µL    Eosinophils Absolute 0 02 0 00 - 0 61 Thousand/µL    Basophils Absolute 0 05 0 00 - 0 10 Thousands/µL   Magnesium    Collection Time: 03/07/23  5:46 AM   Result Value Ref Range    Magnesium 1 9 1 9 - 2 7 mg/dL       Natalia Lucero MD  3/7/2023  12:03 PM

## 2023-03-07 NOTE — ASSESSMENT & PLAN NOTE
Pt w/worsening frequency/burning and pain in groin/left worse than right flank for last few days w/low grade temps at home and s/sx of sepsis in ed  ua w/ 50 WBCs per hpf and moderate bacteria  -Rocephin 2 g in ED we will continue with Rocephin 1 g every 24 hours follow-up ucx

## 2023-03-07 NOTE — ASSESSMENT & PLAN NOTE
· Prolapse likely causing bladder distention with hydronephrosis  · Has been seen by gynecology and pessary has been placed today

## 2023-03-07 NOTE — ASSESSMENT & PLAN NOTE
Large, reducible cystocele appreciated on initial exam - increased burgos output appreciated after reduction   Expressed desire to avoid surgical management   S/p Gellhorn Pessary Placement, size 6 on 3/7  Will plan for follow up with gyn after discharge

## 2023-03-07 NOTE — UTILIZATION REVIEW
NOTIFICATION OF INPATIENT ADMISSION   AUTHORIZATION REQUEST   SERVICING FACILITY:   [address] [place] [address], [address]   Tax ID: [id number]  NPI: [id number] ATTENDING PROVIDER:  Attending Name and NPI#: Lino Garcia [[id number]]  Address: [address] [place] [address], [address] [facility]  Phone: [phone]     ADMISSION INFORMATION:  Place of Service: Inpatient [address] [facility]  [address]  Place of Service Code: 21  Inpatient Admission Date/Time: 3/6/23  8:27 PM  Discharge Date/Time: No discharge date for patient encounter  Admitting Diagnosis Code/Description:  Hypomagnesemia [E83 42]  Hydroureteronephrosis [N13 30]  Hyponatremia [E87 1]  UTI (urinary tract infection) [N39 0]  Prolapse of vaginal wall [N81 10]  Flank pain [R10 9]  Hydronephrosis, bilateral [N13 30]  Sepsis, due to unspecified organism, unspecified whether acute organ dysfunction present [facility]) [A41 9]     UTILIZATION REVIEW CONTACT:  Elvia Gardiner Utilization   Network Utilization Review Department  Phone: [phone]  Fax: [phone]  Email: [email or web address]  Contact for approvals/pending authorizations, clinical reviews, and discharge  PHYSICIAN ADVISORY SERVICES:  Medical Necessity Denial & Snek-wg-Ykmp Review  Phone: [phone]  Fax: [phone]  Email: [email or web address]

## 2023-03-07 NOTE — ASSESSMENT & PLAN NOTE
· Hyponatremia improved with IV fluids      Results from last 7 days   Lab Units 03/07/23  0546 03/06/23  1738   SODIUM mmol/L 132* 128*

## 2023-03-07 NOTE — PROGRESS NOTES
[address] [facility]  Progress Note - Sasha Felt 1945, 68 y o  female MRN: [id number]  Unit/Bed#: [facility] -[facility] Encounter: [id number]  Primary Care Provider: Ryan Wilkes MD   Date and time admitted to hospital: 3/6/2023  5:06 PM    * Sepsis secondary to UTI [facility])  Assessment & Plan  Presentation to hospital for flank pain found to have genital prolapse, urinary retention with bilateral hydronephrosis  · Sepsis secondary to UTI: Continue ceftriaxone  Follow-up on urine cultures  Cystocele with prolapse  Assessment & Plan  · Prolapse likely causing bladder distention with hydronephrosis  · Has been seen by gynecology and pessary has been placed today    Hydronephrosis, bilateral  Assessment & Plan  · Severe bilateral hydronephrosis may have been secondary to urinary retention from prolapse  · Urinary catheter has been placed  · Urology checking ultrasound tomorrow to ensure resolution    Hyponatremia  Assessment & Plan  · Hyponatremia improved with IV fluids  Results from last 7 days   Lab Units 03/07/23  0546 03/06/23  1738   SODIUM mmol/L 132* 128*       Chronic obstructive pulmonary disease, unspecified COPD type ([facility] [address] )  Assessment & Plan  · Specified COPD/asthma with wheezing  · Ordered some neb treatments    Hypertension  Assessment & Plan  · Continue amlodipine and bisoprolol    Chronic coronary artery disease  Assessment & Plan  · CAD with history of CABG  · No chest pain  Continue clopidogrel      VTE Pharmacologic Prophylaxis: VTE Score: 6 High Risk (Score >/= 5) - Pharmacological DVT Prophylaxis Ordered: heparin  Sequential Compression Devices Ordered  Patient Centered Rounds: I have performed bedside rounds with nursing staff today  Discussions with Specialists or Other Care Team Provider: Case management and neurology    Education and Discussions with Family / Patient: Attempted to update  (son) via phone  Left voicemail       Time Spent for Care: This time was spent on one or more of the following: performing physical exam; counseling and coordination of care; obtaining or reviewing history; documenting in the medical record; reviewing/ordering tests, medications or procedures; communicating with other healthcare professionals and discussing with patient's family/caregivers  Current Length of Stay: 1 day(s)  Current Patient Status: Inpatient   Certification Statement: The patient will continue to require additional inpatient hospital stay due to Urosepsis  Discharge Plan: Anticipate discharge in 24-48 hrs to discharge location to be determined pending rehab evaluations  Code Status: Level 1 - Full Code      Subjective:   Patient seen and examined  No new complaints  Feeling a little bit better  Since evaluation pessary has been placed    Objective:   Vitals: Blood pressure (!) 114/44, pulse 74, temperature 99 8 °F (37 7 °C), resp  rate 18, height 5' 1" (1 549 m), weight 57 9 kg (127 lb 10 3 oz), SpO2 100 %  Intake/Output Summary (Last 24 hours) at 3/7/2023 1616  Last data filed at 3/7/2023 0551  Gross per 24 hour   Intake 1050 ml   Output 800 ml   Net 250 ml       Physical Exam  Vitals reviewed  Constitutional:       General: She is not in acute distress  Appearance: Normal appearance  HENT:      Head: Atraumatic  Cardiovascular:      Rate and Rhythm: Regular rhythm  Heart sounds: Normal heart sounds  Pulmonary:      Breath sounds: Decreased breath sounds and wheezing present  Abdominal:      General: Bowel sounds are normal       Palpations: Abdomen is soft  Tenderness: There is no guarding or rebound  Musculoskeletal:         General: No swelling  Skin:     General: Skin is warm  Neurological:      Mental Status: She is alert     Psychiatric:         Mood and Affect: Mood normal        Additional Data:   Labs:  Results from last 7 days   Lab Units 03/07/23  0546 03/06/23  1738   WBC Thousand/uL 15 44* 13 52* HEMOGLOBIN g/dL 10 7* 13 4   PLATELETS Thousands/uL 262 295   MCV fL 85 85   INR   --  1 00     Results from last 7 days   Lab Units 03/07/23  0546 03/06/23  1738   SODIUM mmol/L 132* 128*   POTASSIUM mmol/L 3 9 4 4   CHLORIDE mmol/L 99 90*   CO2 mmol/L 27 30   ANION GAP mmol/L 6 8   BUN mg/dL 10 9   CREATININE mg/dL 0 72 0 70   CALCIUM mg/dL 8 4 9 6   ALBUMIN g/dL  --  4 4   TOTAL BILIRUBIN mg/dL  --  0 57   ALK PHOS U/L  --  77   ALT U/L  --  12   AST U/L  --  13   EGFR ml/min/1 73sq m 81 83   GLUCOSE RANDOM mg/dL 128 113     Results from last 7 days   Lab Units 03/07/23  0546 03/06/23  1738   MAGNESIUM mg/dL 1 9 1 3*                  Results from last 7 days   Lab Units 03/06/23  2132 03/06/23  1738   LACTIC ACID mmol/L  --  1 3   PROCALCITONIN ng/ml 0 83*  --                  * I Have Reviewed All Lab Data Listed Above  Cultures:   Results from last 7 days   Lab Units 03/06/23  2111   INFLUENZA A PCR  Negative       Results from last 7 days   Lab Units 03/06/23 2111   SARS-COV-2  Negative   INFLUENZA A PCR  Negative   INFLUENZA B PCR  Negative   RSV PCR  Negative           Lines/Drains:  Invasive Devices     Peripheral Intravenous Line  Duration           Peripheral IV 03/06/23 Right Antecubital <1 day          Drain  Duration           Urethral Catheter Latex <1 day              Telemetry:      Imaging:  Imaging Reports Reviewed Today Include:   CT abdomen pelvis with contrast    Result Date: 3/6/2023  Impression: Bilateral severe hydroureteronephrosis without evidence of obstructing stone  Enhancement of the ureters bilaterally  Findings may represent recently passed stone versus infection  Follow-up with urology is recommended to rule out underlying neoplasm  Marked distention of the urinary bladder which may be related to above process   Workstation performed: [id number]       Scheduled Meds:  Current Facility-Administered Medications   Medication Dose Route Frequency Provider Last Rate   • acetaminophen  650 mg Oral Q6H PRN Amairani Villela PA-C     • albuterol  2 puff Inhalation Q4H PRN Amairani Villela PA-C     • ALPRAZolam  0 5 mg Oral HS PRN Amairani Villela PA-C     • amLODIPine  5 mg Oral BID Amairani Villela PA-C     • bisoprolol  10 mg Oral Daily Amairani Villela PA-C     • cefTRIAXone  1,000 mg Intravenous Q24H Amairani Villela PA-C     • clopidogrel  75 mg Oral Daily Amairani Villela PA-C     • heparin (porcine)  5,000 Units Subcutaneous [facility] Amairani Villela PA-C     • levalbuterol  1 25 mg Nebulization TID Isabella Acosta DO     • ondansetron  4 mg Intravenous Q6H PRN Amairani Villela PA-C     • pantoprazole  20 mg Oral Daily Before Breakfast Amairani Villela PA-C     • pravastatin  40 mg Oral Daily With Erin Whitaker KEYSHAWN Villela     • sodium chloride  3 mL Nebulization TID Isabella Acosta DO         Today, Patient Was Seen By: Isabella Acosta DO    ** Please Note: Dictation voice to text software may have been used in the creation of this document   **

## 2023-03-07 NOTE — PLAN OF CARE
Problem: Prexisting or High Potential for Compromised Skin Integrity  Goal: Skin integrity is maintained or improved  Description: INTERVENTIONS:  - Identify patients at risk for skin breakdown  - Assess and monitor skin integrity  - Assess and monitor nutrition and hydration status  - Monitor labs   - Assess for incontinence   - Turn and reposition patient  - Assist with mobility/ambulation  - Relieve pressure over bony prominences  - Avoid friction and shearing  - Provide appropriate hygiene as needed including keeping skin clean and dry  - Evaluate need for skin moisturizer/barrier cream  - Collaborate with interdisciplinary team   - Patient/family teaching  - Consider wound care consult   Outcome: Progressing     Problem: MOBILITY - ADULT  Goal: Maintain or return to baseline ADL function  Description: INTERVENTIONS:  -  Assess patient's ability to carry out ADLs; assess patient's baseline for ADL function and identify physical deficits which impact ability to perform ADLs (bathing, care of mouth/teeth, toileting, grooming, dressing, etc )  - Assess/evaluate cause of self-care deficits   - Assess range of motion  - Assess patient's mobility; develop plan if impaired  - Assess patient's need for assistive devices and provide as appropriate  - Encourage maximum independence but intervene and supervise when necessary  - Involve family in performance of ADLs  - Assess for home care needs following discharge   - Consider OT consult to assist with ADL evaluation and planning for discharge  - Provide patient education as appropriate  Outcome: Progressing  Goal: Maintains/Returns to pre admission functional level  Description: INTERVENTIONS:  - Perform BMAT or MOVE assessment daily    - Set and communicate daily mobility goal to care team and patient/family/caregiver  - Collaborate with rehabilitation services on mobility goals if consulted  - Perform Range of Motion  times a day    - Reposition patient every hours   - Dangle patient  times a day  - Stand patient  times a day  - Ambulate patient  times a day  - Out of bed to chair  times a day   - Out of bed for meals  times a day  - Out of bed for toileting  - Record patient progress and toleration of activity level   Outcome: Progressing     Problem: PAIN - ADULT  Goal: Verbalizes/displays adequate comfort level or baseline comfort level  Description: Interventions:  - Encourage patient to monitor pain and request assistance  - Assess pain using appropriate pain scale  - Administer analgesics based on type and severity of pain and evaluate response  - Implement non-pharmacological measures as appropriate and evaluate response  - Consider cultural and social influences on pain and pain management  - Notify physician/advanced practitioner if interventions unsuccessful or patient reports new pain  Outcome: Progressing     Problem: INFECTION - ADULT  Goal: Absence or prevention of progression during hospitalization  Description: INTERVENTIONS:  - Assess and monitor for signs and symptoms of infection  - Monitor lab/diagnostic results  - Monitor all insertion sites, i e  indwelling lines, tubes, and drains  - Monitor endotracheal if appropriate and nasal secretions for changes in amount and color  - [place] appropriate cooling/warming therapies per order  - Administer medications as ordered  - Instruct and encourage patient and family to use good hand hygiene technique  - Identify and instruct in appropriate isolation precautions for identified infection/condition  Outcome: Progressing     Problem: SAFETY ADULT  Goal: Patient will remain free of falls  Description: INTERVENTIONS:  - Educate patient/family on patient safety including physical limitations  - Instruct patient to call for assistance with activity   - Consult OT/PT to assist with strengthening/mobility   - Keep Call bell within reach  - Keep bed low and locked with side rails adjusted as appropriate  - Keep care items and personal belongings within reach  - Initiate and maintain comfort rounds  - Make Fall Risk Sign visible to staff  - Offer Toileting every  Hours, in advance of need  - Initiate/Maintain alarm  - Obtain necessary fall risk management equipment:   - Apply yellow socks and bracelet for high fall risk patients  - Consider moving patient to room near nurses station  Outcome: Progressing     Problem: DISCHARGE PLANNING  Goal: Discharge to home or other facility with appropriate resources  Description: INTERVENTIONS:  - Identify barriers to discharge w/patient and caregiver  - Arrange for needed discharge resources and transportation as appropriate  - Identify discharge learning needs (meds, wound care, etc )  - Arrange for interpretive services to assist at discharge as needed  - Refer to Case Management Department for coordinating discharge planning if the patient needs post-hospital services based on physician/advanced practitioner order or complex needs related to functional status, cognitive ability, or social support system  Outcome: Progressing     Problem: Knowledge Deficit  Goal: Patient/family/caregiver demonstrates understanding of disease process, treatment plan, medications, and discharge instructions  Description: Complete learning assessment and assess knowledge base    Interventions:  - Provide teaching at level of understanding  - Provide teaching via preferred learning methods  Outcome: Progressing     Problem: RESPIRATORY - ADULT  Goal: Achieves optimal ventilation and oxygenation  Description: INTERVENTIONS:  - Assess for changes in respiratory status  - Assess for changes in mentation and behavior  - Position to facilitate oxygenation and minimize respiratory effort  - Oxygen administered by appropriate delivery if ordered  - Initiate smoking cessation education as indicated  - Encourage broncho-pulmonary hygiene including cough, deep breathe, Incentive Spirometry  - Assess the need for suctioning and aspirate as needed  - Assess and instruct to report SOB or any respiratory difficulty  - Respiratory Therapy support as indicated  Outcome: Progressing     Problem: GENITOURINARY - ADULT  Goal: Maintains or returns to baseline urinary function  Description: INTERVENTIONS:  - Assess urinary function  - Encourage oral fluids to ensure adequate hydration if ordered  - Administer IV fluids as ordered to ensure adequate hydration  - Administer ordered medications as needed  - Offer frequent toileting  - Follow urinary retention protocol if ordered  Outcome: Progressing  Goal: Absence of urinary retention  Description: INTERVENTIONS:  - Assess patient’s ability to void and empty bladder  - Monitor I/O  - Bladder scan as needed  - Discuss with physician/AP medications to alleviate retention as needed  - Discuss catheterization for long term situations as appropriate  Outcome: Progressing  Goal: Urinary catheter remains patent  Description: INTERVENTIONS:  - Assess patency of urinary catheter  - If patient has a chronic burgos, consider changing catheter if non-functioning  - Follow guidelines for intermittent irrigation of non-functioning urinary catheter  Outcome: Progressing     Problem: METABOLIC, FLUID AND ELECTROLYTES - ADULT  Goal: Electrolytes maintained within normal limits  Description: INTERVENTIONS:  - Monitor labs and assess patient for signs and symptoms of electrolyte imbalances  - Administer electrolyte replacement as ordered  - Monitor response to electrolyte replacements, including repeat lab results as appropriate  - Instruct patient on fluid and nutrition as appropriate  Outcome: Progressing     Problem: SKIN/TISSUE INTEGRITY - ADULT  Goal: Skin Integrity remains intact(Skin Breakdown Prevention)  Description: Assess:  -Perform Kosta assessment every   -Clean and moisturize skin every   -Inspect skin when repositioning, toileting, and assisting with ADLS  -Assess under medical devices such as  every   -Assess extremities for adequate circulation and sensation     Bed Management:  -Have minimal linens on bed & keep smooth, unwrinkled  -Change linens as needed when moist or perspiring  -Avoid sitting or lying in one position for more than  hours while in bed  -Keep HOB at degrees     Toileting:  -Offer bedside commode  -Assess for incontinence every   -Use incontinent care products after each incontinent episode such as     Activity:  -Mobilize patient  times a day  -Encourage activity and walks on unit  -Encourage or provide ROM exercises   -Turn and reposition patient every  Hours  -Use appropriate equipment to lift or move patient in bed  -Instruct/ Assist with weight shifting every  when out of bed in chair  -Consider limitation of chair time  hour intervals    Skin Care:  -Avoid use of baby powder, tape, friction and shearing, hot water or constrictive clothing  -Relieve pressure over bony prominences using   -Do not massage red bony areas    Next Steps:  -Teach patient strategies to minimize risks such as    -Consider consults to  interdisciplinary teams such as  Outcome: Progressing

## 2023-03-07 NOTE — UTILIZATION REVIEW
Initial Clinical Review    Admission: Date/Time/Statement:   Admission Orders (From admission, onward)     Ordered        03/06/23 2027  INPATIENT ADMISSION  Once                      Orders Placed This Encounter   Procedures   • INPATIENT ADMISSION     Standing Status:   Standing     Number of Occurrences:   1     Order Specific Question:   Level of Care     Answer:   Med Surg [16]     Order Specific Question:   Estimated length of stay     Answer:   More than 2 Midnights     Order Specific Question:   Certification     Answer:   I certify that inpatient services are medically necessary for this patient for a duration of greater than two midnights  See H&P and MD Progress Notes for additional information about the patient's course of treatment  ED Arrival Information     Expected   -    Arrival   3/6/2023 17:06    Acuity   Urgent            Means of arrival   Ambulance    Escorted by   [place] ([address])    Service   Hospitalist    Admission type   Emergency            Arrival complaint   flank pain            Chief Complaint   Patient presents with   • Back Pain     Patient arrives via ems coming from home c/o back pain and left flank pain that started yesterday, painful urination, patient some blood in urine, denies vomiting or diarrhea, patient states tylenol pta       Initial Presentation: 68 y o  female who presented by EMS to [facility] ED  Inpatient admission for evaluation and treatment of sepsis  PMHx: asthma, CAD, T2DM, HTN, HLD  Presented w/ abdominal pain, frequency hematuria, b/l flank pain w/ L worse than R  On exam, wheezing, L CVA tenderness  WBC 13 52  CT shows b/l severe hydroureteronephrosis  Plan: IVF, IV ABX, Trend labs, replete electrolytes as needed; I&O, continue PTA meds  Urology, OB/GYN consulted  Date: 03/07/23   Day 2: Reports pain improved,  On exam, suprapubic tenderness but decreased, wheezing   Continue IV ABX, IVF, Trend labs, replete electrolytes as needed; continue PTA meds  Supportive care  Maintain burgos  Urology: Severe b/l hydronephrosis, bladder distention w/ extension into perineum  Burgos placed w/ resolution of pain  Burgos remains in place draining clear yellow urine  Repeat US tomorrow to re-eval for hydronephrosis  Follow urine culture  GYN: Pt w/ large, cystocele on exam, small component of apical prolapse appreciated  Easily reduced w/ minimal pressure  After reduction, burgos had increased output  Pt desires to avoid surgical management, will try for Gellhorn pessary placement  ED Triage Vitals [03/06/23 1715]   Temperature Pulse Respirations Blood Pressure SpO2   (!) 97 1 °F (36 2 °C) (!) 106 19 (!) 238/114 97 %      Temp Source Heart Rate Source Patient Position - Orthostatic VS BP Location FiO2 (%)   Oral Monitor Lying Right arm --      Pain Score       8          Wt Readings from Last 1 Encounters:   03/06/23 57 9 kg (127 lb 10 3 oz)     Additional Vital Signs:   Date/Time Temp Pulse Resp BP MAP (mmHg) SpO2 Calculated FIO2 (%) - Nasal Cannula Nasal Cannula O2 Flow Rate (L/min) O2 Device   03/07/23 08:22:29 98 5 °F (36 9 °C) 92 20 118/50 73 95 % -- -- --   03/07/23 03:06:54 99 1 °F (37 3 °C) 97 20 142/66 91 97 % 28 2 L/min Nasal cannula   03/06/23 2300 -- 86 16 128/60 87 96 % 24 1 L/min None (Room air)   03/06/23 2127 98 3 °F (36 8 °C) -- -- -- -- -- -- -- --   03/06/23 2120 -- 90 18 139/61 88 96 % 32 3 L/min Nasal cannula   03/06/23 1931 -- 110 Abnormal  16 120/58 -- 95 % 32 3 L/min Nasal cannula     Pertinent Labs/Diagnostic Test Results:   CT abdomen pelvis with contrast   Final Result by Nagi Perez DO (03/06 1952)      Bilateral severe hydroureteronephrosis without evidence of obstructing stone  Enhancement of the ureters bilaterally  Findings may represent recently passed stone versus infection  Follow-up with urology is recommended to rule out underlying neoplasm        Marked distention of the urinary bladder which may be related to above process              Workstation performed: [id number]           Results from last 7 days   Lab Units 03/06/23  2111   SARS-COV-2  Negative     Results from last 7 days   Lab Units 03/07/23  0546 03/06/23  1738   WBC Thousand/uL 15 44* 13 52*   HEMOGLOBIN g/dL 10 7* 13 4   HEMATOCRIT % 33 0* 41 4   PLATELETS Thousands/uL 262 295   NEUTROS ABS Thousands/µL 12 44* 11 00*         Results from last 7 days   Lab Units 03/07/23  0546 03/06/23  1738   SODIUM mmol/L 132* 128*   POTASSIUM mmol/L 3 9 4 4   CHLORIDE mmol/L 99 90*   CO2 mmol/L 27 30   ANION GAP mmol/L 6 8   BUN mg/dL 10 9   CREATININE mg/dL 0 72 0 70   EGFR ml/min/1 73sq m 81 83   CALCIUM mg/dL 8 4 9 6   MAGNESIUM mg/dL 1 9 1 3*     Results from last 7 days   Lab Units 03/06/23  1738   AST U/L 13   ALT U/L 12   ALK PHOS U/L 77   TOTAL PROTEIN g/dL 8 4   ALBUMIN g/dL 4 4   TOTAL BILIRUBIN mg/dL 0 57         Results from last 7 days   Lab Units 03/07/23  0546 03/06/23  1738   GLUCOSE RANDOM mg/dL 128 113     Results from last 7 days   Lab Units 03/06/23  1738   PROTIME seconds 13 2   INR  1 00   PTT seconds 32         Results from last 7 days   Lab Units 03/06/23  2132   PROCALCITONIN ng/ml 0 83*     Results from last 7 days   Lab Units 03/06/23  1738   LACTIC ACID mmol/L 1 3     Results from last 7 days   Lab Units 03/06/23  1738   LIPASE u/L 26     Results from last 7 days   Lab Units 03/06/23  1836   CLARITY UA  Clear   COLOR UA  Light Yellow   SPEC GRAV UA  <=1 005   PH UA  6 5   GLUCOSE UA mg/dl Negative   KETONES UA mg/dl Negative   BLOOD UA  Moderate*   PROTEIN UA mg/dl 30 (1+)*   NITRITE UA  Negative   BILIRUBIN UA  Negative   UROBILINOGEN UA E U /dl 0 2   LEUKOCYTES UA  Moderate*   WBC UA /hpf 30-50*   RBC UA /hpf 0-1*   BACTERIA UA /hpf Moderate*   EPITHELIAL CELLS WET PREP /hpf Occasional     Results from last 7 days   Lab Units 03/06/23  2111   INFLUENZA A PCR  Negative   INFLUENZA B PCR  Negative   RSV PCR  Negative         ED Treatment:   Medication Administration from 03/06/2023 1706 to 03/07/2023 0257       Date/Time Order Dose Route Action Comments     03/06/2023 1737 EST sodium chloride 0 9 % bolus 1,000 mL 1,000 mL Intravenous New Bag --     03/06/2023 1741 EST fentanyl citrate (PF) 100 MCG/2ML 50 mcg 50 mcg Intravenous Given --     03/06/2023 1739 EST ondansetron (ZOFRAN) injection 4 mg 4 mg Intravenous Given --     03/06/2023 1819 EST iohexol (OMNIPAQUE) 350 MG/ML injection (SINGLE-DOSE) 100 mL 100 mL Intravenous Given --     03/06/2023 1954 EST magnesium sulfate 2 g/50 mL IVPB (premix) 2 g 2 g Intravenous New Bag --     03/06/2023 2129 EST cefTRIAXone (ROCEPHIN) IVPB (premix in dextrose) 2,000 mg 50 mL 2,000 mg Intravenous New Bag --     03/07/2023 0035 EST heparin (porcine) subcutaneous injection 5,000 Units 5,000 Units Subcutaneous Given --     03/06/2023 2351 EST sodium chloride 0 9 % bolus 500 mL 500 mL Intravenous New Bag --     03/06/2023 2351 EST albuterol (PROVENTIL HFA,VENTOLIN HFA) inhaler 2 puff 2 puff Inhalation Given --        Past Medical History:   Diagnosis Date   • Arthritis    • Asthma    • Community acquired pneumonia 11/13/2019   • Coronary artery disease    • DM (diabetes mellitus) ([place] [address] )    • HTN (hypertension)    • Hydronephrosis, bilateral 3/6/2023   • Hyperlipidemia    • Hyponatremia      Present on Admission:  • Chronic coronary artery disease  • Sepsis (HCC)  • Chronic obstructive pulmonary disease, unspecified COPD type ([place] [address] )      Admitting Diagnosis: Hypomagnesemia [E83 42]  Hydroureteronephrosis [N13 30]  Hyponatremia [E87 1]  UTI (urinary tract infection) [N39 0]  Prolapse of vaginal wall [N81 10]  Flank pain [R10 9]  Hydronephrosis, bilateral [N13 30]  Sepsis, due to unspecified organism, unspecified whether acute organ dysfunction present ([place] [address] ) [A41 9]  Age/Sex: 68 y o  female  Admission Orders:  Regular Diet  I&O  SCDs      Scheduled Medications:  amLODIPine, 5 mg, Oral, BID  bisoprolol, 10 mg, Oral, Daily  cefTRIAXone, 1,000 mg, Intravenous, Q24H  clopidogrel, 75 mg, Oral, Daily  heparin (porcine), 5,000 Units, Subcutaneous, Q8H KENIA  pantoprazole, 20 mg, Oral, Daily Before Breakfast  pravastatin, 40 mg, Oral, Daily With Dinner  sodium chloride, 500 mL, Intravenous, Once    Continuous IV Infusions: none    PRN Meds:  acetaminophen, 650 mg, Oral, Q6H PRN  albuterol, 2 puff, Inhalation, Q4H PRN; 3/7 x1  ALPRAZolam, 0 5 mg, Oral, HS PRN  ondansetron, 4 mg, Intravenous, Q6H PRN        IP CONSULT TO UROLOGY  IP CONSULT TO OB GYN    Network Utilization Review Department  ATTENTION: Please call with any questions or concerns to [phone] and carefully listen to the prompts so that you are directed to the right person  All voicemails are confidential   Selina Pitts all requests for admission clinical reviews, approved or denied determinations and any other requests to dedicated fax number below belonging to the campus where the patient is receiving treatment   List of dedicated fax numbers for the Facilities:  [address] DENIALS (Administrative/Medical Necessity) [phone]   [address] [place] [address] (Maternity/NICU/Pediatrics) [phone]   [address] [phone]   [facility] [address] [phone]   [address] [place] [address] Cody Ridley [facility] 28 [phone]   [facility] Stacy Navarro [facility] 134 815 [facility] [phone]

## 2023-03-07 NOTE — ASSESSMENT & PLAN NOTE
Extruding past labia majora possibly 2* cystocele given b/l severe hydro and concern for bladder distention w/bladder outlet  obstruction suspected  -ct a/p unremarkable but limited by beam artifact from hip prostheses  -d/w ob/gyn will consult for evaluation

## 2023-03-07 NOTE — ASSESSMENT & PLAN NOTE
Presentation to hospital for flank pain found to have genital prolapse, urinary retention with bilateral hydronephrosis  · Sepsis secondary to UTI: Continue ceftriaxone  Follow-up on urine cultures

## 2023-03-07 NOTE — CONSULTS
Consult - Urology   Divya Situ 1945, 68 y o  female MRN: [id number]    [facility] Encounter: [id number]    Hydronephrosis, bilateral  Assessment & Plan  · Severe bilateral hydronephrosis on CT  Bladder distention with extension into perineum appreciated as well  · Burgos catheter placed, flank/abdominal pain resolved  · Currently patent draining clear yellow urine  · Maintain burgos catheter  · Repeat US ordered for tomorrow to re-evaluate hydronephrosis following catheter insertion  · UA with leukocytes, WBCs, and bacteria  Enhancement of bilateral ureters on CT    · Urine culture pending  · Empirically on IV Rocephin  · Patient currently afebrile and VSS  Leukocytosis of 15 44  Creatinine stable at 0 72  Hgb 10 7  · Urology will continue to follow  Subjective:   Patient is a 68year old female who presented to the ED yesterday with reports of bilateral flank pain, left >right, painful urination, and gross hematuria  Patient was afebrile upon presentation but was tachycardic with elevated BP of 238/114  CT revealed severe bilateral hydronephrosis without obstructing stone  Enhancement of ureters bilaterally representing infection vs recently passed stone  Previous US from May 2019 negative for hydronephrosis  Patient denies any previous history of nephrolithiasis  Marked distention of bladder with extension of bladder into the perineum  Burgos catheter was placed for 200 mL return  UA with leukocytes, WBCs, and bacteria  Urine culture pending  She reports resolution of flank pain following catheter insertion  She reports difficulty voiding for the past few years but new onset flank pain in the past week or so  She denies any previous history of gross hematuria prior to this episode  Burgos catheter is currently patent draining clear yellow urine  Review of Systems   Constitutional: Negative for chills and fever  HENT: Negative  Respiratory: Negative  Cardiovascular: Negative  Gastrointestinal: Negative for abdominal pain (resolved), nausea and vomiting  Genitourinary: Positive for difficulty urinating  Negative for dysuria, flank pain (resolved), frequency, hematuria (resolved) and urgency  Musculoskeletal: Negative  Skin: Negative  Neurological: Negative  Objective:  Vitals: Blood pressure 118/50, pulse 92, temperature 98 5 °F (36 9 °C), temperature source Oral, resp  rate 20, height 5' 1" (1 549 m), weight 57 9 kg (127 lb 10 3 oz), SpO2 95 %  ,Body mass index is 24 12 kg/m²  Physical Exam  Vitals reviewed  Constitutional:       General: She is not in acute distress  Appearance: Normal appearance  She is not ill-appearing, toxic-appearing or diaphoretic  HENT:      Head: Normocephalic and atraumatic  Eyes:      Conjunctiva/sclera: Conjunctivae normal    Cardiovascular:      Rate and Rhythm: Normal rate  Pulmonary:      Effort: Pulmonary effort is normal  No respiratory distress  Abdominal:      General: There is no distension  Palpations: Abdomen is soft  Tenderness: There is no abdominal tenderness  There is no right CVA tenderness, left CVA tenderness, guarding or rebound  Genitourinary:     Comments: Mayo catheter patent draining clear yellow urine  Musculoskeletal:         General: Normal range of motion  Cervical back: Normal range of motion  Skin:     General: Skin is warm and dry  Neurological:      General: No focal deficit present  Mental Status: She is alert and oriented to person, place, and time  Psychiatric:         Mood and Affect: Mood normal          Behavior: Behavior normal          Thought Content:  Thought content normal          Judgment: Judgment normal        Imaging:  3/6/23: CT ABDOMEN AND PELVIS WITH IV CONTRAST     INDICATION:   LLQ abdominal pain  Abdominal pain, acute, nonlocalized  left flank pain/ dysuria      COMPARISON:  None      TECHNIQUE:  CT examination of the abdomen and pelvis was performed  Axial, sagittal, and coronal 2D reformatted images were created from the source data and submitted for interpretation      Radiation dose length product (DLP) for this visit:  583 mGy-cm   This examination, like all CT scans performed in the [facility], was performed utilizing techniques to minimize radiation dose exposure, including the use of iterative reconstruction and automated exposure control      IV Contrast:  100 mL of iohexol (OMNIPAQUE)  Enteric Contrast:  Enteric contrast was not administered      FINDINGS:     ABDOMEN     LOWER CHEST:  Large hiatal hernia      LIVER/BILIARY TREE:  Liver is diffusely decreased in density consistent with fatty change  No CT evidence of suspicious hepatic mass  Normal hepatic contours  No biliary dilatation      GALLBLADDER:  There are gallstone(s) within the gallbladder, without pericholecystic inflammatory changes      SPLEEN:  Unremarkable      PANCREAS:  Unremarkable      ADRENAL GLANDS:  Unremarkable      KIDNEYS/URETERS:  Bilateral severe hydroureteronephrosis without evidence of obstructing stone  Enhancement of the ureters bilaterally  Moderate bilateral perinephric stranding  Small hypodense lesions in the kidneys, too small to characterize      STOMACH AND BOWEL:  There is colonic diverticulosis without evidence of acute diverticulitis      APPENDIX:  No findings to suggest appendicitis      ABDOMINOPELVIC CAVITY:  No ascites  No pneumoperitoneum  No lymphadenopathy      VESSELS:  Atherosclerotic changes are present  No evidence of aneurysm      PELVIS    Limited evaluation of the pelvic organs due to streak artifact from bilateral hip arthroplasties        REPRODUCTIVE ORGANS:  Unremarkable for patient's age      URINARY BLADDER:  Marked distention of the urinary bladder with extension of the urinary bladder into the perineum     ABDOMINAL WALL/INGUINAL REGIONS:  Fat-containing umbilical wall hernia     OSSEOUS STRUCTURES:  Bilateral hip arthroplasties      IMPRESSION:     Bilateral severe hydroureteronephrosis without evidence of obstructing stone  Enhancement of the ureters bilaterally  Findings may represent recently passed stone versus infection  Follow-up with urology is recommended to rule out underlying neoplasm      Marked distention of the urinary bladder which may be related to above process         Workstation performed: [id number]    Labs:  Recent Labs     03/06/23  1738 03/07/23  0546   WBC 13 52* 15 44*     Recent Labs     03/06/23  1738 03/07/23  0546   HGB 13 4 10 7*       Recent Labs     03/06/23  1738 03/07/23  0546   CREATININE 0 70 0 72       Microbiology:  Component      Latest Ref Rng & Units 3/6/2023   Color, UA       Light Yellow   Clarity, UA       Clear   SL AMB SPECIFIC GRAVITY-URINE      1 003 - 1 030 <=1 005   pH, UA      4 5, 5 0, 5 5, 6 0, 6 5, 7 0, 7 5, 8 0 6 5   Leukocytes, UA      Negative Moderate (A)   Nitrite, UA      Negative Negative   POCT URINE PROTEIN      Negative mg/dl 30 (1+) (A)   Glucose, UA      Negative mg/dl Negative   Ketones, UA      Negative mg/dl Negative   Bilirubin, UA      Negative Negative   Blood, UA      Negative Moderate (A)   SL AMB POCT UROBILINOGEN      0 2, 1 0 E U /dl E U /dl 0 2     Component      Latest Ref Rng & Units 3/6/2023   RBC, UA      None Seen, 2-4 /hpf 0-1 (A)   WBC, UA      None Seen, 2-4, 5-60 /hpf 30-50 (A)   Epithelial Cells      None Seen, Occasional /hpf Occasional   Bacteria, UA      None Seen, Occasional /hpf Moderate (A)     Urine culture pending       History:  Social History     Socioeconomic History   • Marital status: Single     Spouse name: Not on file   • Number of children: 5   • Years of education: Not on file   • Highest education level: Not on file   Occupational History   • Occupation: retired   Tobacco Use   • Smoking status: Former     Packs/day: 0 50     Years: 13 00     Pack years: 6 50     Types: Cigarettes     Quit date:      Years since quittin    • Smokeless tobacco: Never   • Tobacco comments:     no passive smoke exposure   Vaping Use   • Vaping Use: Never used   Substance and Sexual Activity   • Alcohol use: Yes     Comment: socially   • Drug use: Never   • Sexual activity: Not Currently   Other Topics Concern   • Not on file   Social History Narrative   • Not on file     Social Determinants of Health     Financial Resource Strain: Not on file   Food Insecurity: Not on file   Transportation Needs: Not on file   Physical Activity: Not on file   Stress: Not on file   Social Connections: Not on file   Intimate Partner Violence: Not on file   Housing Stability: Not on file       Past Medical History:   Diagnosis Date   • Arthritis    • Asthma    • Community acquired pneumonia 2019   • Coronary artery disease    • DM (diabetes mellitus) ([facility] [address] )    • HTN (hypertension)    • Hydronephrosis, bilateral 3/6/2023   • Hyperlipidemia    • Hyponatremia      Past Surgical History:   Procedure Laterality Date   • CARDIAC CATHETERIZATION     • CORONARY ARTERY BYPASS GRAFT  2010    with subsequent stent to the saphenous veingraft to the RCA 3 months later   • KNEE ARTHROSCOPY     • POLYPECTOMY      laryngeal   • TOTAL HIP ARTHROPLASTY     • TUBAL LIGATION       Family History   Problem Relation Age of Onset   • Stroke Mother    • Hypertension Mother    • Heart disease Mother    • Colon cancer Father    • Heart attack Brother    • Heart attack Brother        Juan Manuel [place], [place]  Date: 3/7/2023 Time: 11:35 AM

## 2023-03-07 NOTE — APP STUDENT NOTE
CLAIRE STUDENT  Inpatient Progress Note for TRAINING ONLY  Not Part of Legal Medical Record       Progress Note - Fermin Ahr 68 y o  female MRN: [id number]    Unit/Bed#: [place] 2 -02 Encounter: [id number]      Assessment/Plan:  1  Sepsis - resolved   · Presented with tachycardia, leukocytosis, back/left flank pain, dysuria, and hematuria  · Procalcitonin 3/8 elevated to 1 68  · UA with leukocytes, 30-50 WBC, and moderate bacteria  · WBC today decreased to 11 82  · Urine culture with >100,000 cfu/ml Gram Negative Rusty  · Given first dose of Rocephin 2g in ED with 1L NS bolus  Mayo placed in ED  · Continue IV Rocephin 1g q24 hours  Blood cultures pending    2  UTI  · Presented with back/left flank pain, dysuria, and hematuria  · UA with leukocytes, 30-50 WBC, and moderate bacteria  WBC today decreased to 11 82  · Urine culture with >100,000 cfu/ml Gram Negative Rusty  · Given first dose of Rocephin 2g in ED  Mayo catheter placed in ED  No gross hematuria today  · Patient with improved symptoms  · Continue IV Rocephin 1g q24 hours  3  Hypertensive Urgency - resolved  · Presented with BP of 238/114  · Home regimen for HTN includes amlodipine 5mg PO BID and bisoprolol 10 mg daily  · BP has now decreased and is well controlled  Denies HA, vision changes, dizziness, chest pain  · Continue home regimen    4  Hematuria - resolved  · Patient with reports of hematuria prior to admission  On Plavix  · Mayo catheter placed in ED, no gross hematuria noted today   · UA with moderate occult blood and 0-1 RBCs  · Continue to monitor for gross hematuria and monitor Hgb for blood loss    5  Cystocele with prolapse  · Vaginal wall extruding past labia majora  · CT AP 3/6 with unremarkable reproductive organs, although limited view due to beam artifact from bilateral hip prostheses  · Pessary placed by OB/GYN yesterday, patient reports relief of symptoms    6   Bilateral hydronephrosis  · Patient notes occasional incomplete emptying, possibly secondary to cystocele  · CT AP 3/6 with bilateral severe hydroureteronephrosis without evidence of obstructing stone  Enhancement of ureters bilaterally  Marked distension of urinary bladder  · No evidence of acute kidney injury   · Burgos catheter placed in ED with good urine output  · US kidneys/bladder today with no hydronephrosis and bladder decompressed by burgos catheter  · Urology consulted, appreciate recommendations  7  Hypomagnesemia - resolved  · Improved to 1 9 yesterday  · Continue to monitor with AM magnesium  · Replete as needed    8  Hyponatremia  · Improved to 135 today  · Received 1L NS bolus in ED  · Continue to monitor with AM BMP    9  Chronic obstructive pulmonary disease  · Patient with history COPD and chronic MARIE  · Baseline no O2 use at home, now on 2L NC O2  Home regimen with nebs and albuterol inhalers  · Continue levalbuterol neb TID, NS neb TID, and PRN albuterol inhaler  Wean oxygen as able    10  Chronic coronary artery disease   · Continue pravastatin and plavix      Subjective:   Patient lying comfortably in bed in NAD  Reports she feels much better and her pain has improved  She still has mild suprapubic tenderness  She is still on 2L NC O2  Is tolerating her diet well  States she would like to go home  Denies chest pain, cough, current SOB, fever, chills, abdominal pain, dysuria, N/V/D  No acute events overnight  Objective:     Vitals: Blood pressure 134/55, pulse 85, temperature 98 2 °F (36 8 °C), resp  rate 18, height 5' 1" (1 549 m), weight 57 9 kg (127 lb 10 3 oz), SpO2 97 %  ,Body mass index is 24 12 kg/m²  Intake/Output Summary (Last 24 hours) at 3/8/2023 1018  Last data filed at 3/8/2023 0523  Gross per 24 hour   Intake --   Output 1500 ml   Net -1500 ml       Physical Exam: General appearance: alert and oriented, in no acute distress  Head: Normocephalic, without obvious abnormality, atraumatic  Eyes: conjunctivae/corneas clear   PERRL, EOM's intact  Fundi benign  Throat: lips, mucosa, and tongue normal; teeth and gums normal  Lungs: CTA b/l, without wheezes/ rales/rhonchi  Heart: regular rate and rhythm, S1, S2 normal, no murmur, click, rub or gallop  Abdomen: Soft, non-distended  Mild suprapubic TTP  Extremities: extremities normal, warm and well-perfused; no cyanosis, clubbing, or edema  Skin: Skin color, texture, turgor normal  No rashes or lesions     Invasive Devices     Peripheral Intravenous Line  Duration           Peripheral IV 03/06/23 Right Antecubital <1 day          Drain  Duration           Urethral Catheter Latex <1 day                Lab, Imaging and other studies: I have personally reviewed pertinent reports      VTE Pharmacologic Prophylaxis: Heparin  VTE Mechanical Prophylaxis: sequential compression device

## 2023-03-07 NOTE — PROGRESS NOTES
Size 6 Gellhorn pessary placed at this time  Patient tolerated placement well  Will round in AM to reassess fit/comfort  Will need follow up outpatient in 3 months for pessary maintenance         Raquel Pryor MD  Obstetrics & Gynecology PGY-2  3/7/2023  3:56 PM

## 2023-03-07 NOTE — H&P
Yoshi Cooney 1945, 68 y o  female MRN: [id number]  Unit/Bed#: ED-11 Encounter: [id number]  Primary Care Provider: Lavonne Hess MD   Date and time admitted to hospital: 3/6/2023  5:06 PM    * Sepsis [facility])  Assessment & Plan  poa by tachycardia/leucocytosis likely 2* uti  -No lactic acidosis on admission  -S/p 1L NS in ed and rocephin  -monitor cbc vs bcx ucx, check procalcitonin  -continue iv rocephin    UTI (urinary tract infection)  Assessment & Plan  Pt w/worsening frequency/burning and pain in groin/left worse than right flank for last few days w/low grade temps at home and s/sx of sepsis in ed  ua w/ 50 WBCs per hpf and moderate bacteria  -Rocephin 2 g in ED we will continue with Rocephin 1 g every 24 hours follow-up ucx    Hydronephrosis, bilateral  Assessment & Plan  Severe on ct a/p w/iv contrast noting as well markedly distended bladder    Pt has been noting incomplete emptying, thankfully renal fn preserved   -etiology likely due to vaginal wall prolapse unclear if contributed to cystocele or uterine prolapse but extending past labia majora on physical exam, but does have hx of hematuria in plavix and former smoker status  -s/p burgos cath in ed  -consult urology, monitor I/os, monitor renal indices    Prolapse of vaginal wall  Assessment & Plan  Extruding past labia majora possibly 2* cystocele given b/l severe hydro and concern for bladder distention w/bladder outlet  obstruction suspected  -ct a/p unremarkable but limited by beam artifact from hip prostheses  -d/w ob/gyn will consult for evaluation    Hypomagnesemia  Assessment & Plan  repleted in ed repeat in am    Hyponatremia  Assessment & Plan  Mild, gentle ivf with severe hyponatremia    Chronic coronary artery disease  Assessment & Plan  Status post CABG  No chest pain  Continue beta-blocker statin Plavix    VTE Prophylaxis: Heparin  / sequential compression device   Code Status: fc  POLST: There is no POLST form on file for this patient (pre-hospital)  Discussion with family:     Anticipated Length of Stay:  Patient will be admitted on an Inpatient basis with an anticipated length of stay of  Greater than 2 midnights  Justification for Hospital Stay: uti 2* sepsis    Total Time for Visit, including Counseling / Coordination of Care: 45 minutes  Greater than 50% of this total time spent on direct patient counseling and coordination of care  Chief Complaint:   abd pain frequency/urgency    History of Present Illness:    Rodriguez Hess is a 68 y o  female who presents with PMH of CAD status post CABG, COPD hypertension, diet-controlled diabetes, coming to the hospital for abdominal pain frequency hematuria and bilateral left worse than right flank pain  patient [place]  Conversation occurred between myself and the patient in [place]  She notes that over the last several days she has been having increased frequency and burning sensation as well as pain both in her groin near her bladder and on her left flank more than her right flank  She notes the pain worsened on the day of admission with associated low-grade temperatures in the 99's the evening prior to admission the day of admission  She notes appetites been intact thankfully  She does feel malaise and fatigue with this  No chest pain or shortness of breath diarrhea  She notes she does have chronic prolapse but is fearful to try and reduce it to help aid in emptying her bladder often feels that her bladder is incompletely emptying  In ed she was evaluated by routine labs when pt was noted to be tachycardiac and concern for sepsis was raised  Ct a/p w/iv contrast demonstrated severe b/l hydronephrosis w/markedly distended bladder  Mayo cath was successfully introduced in ed w/good urine output and ua was concerning for uti    We will admit for sepsis 2* uti    Review of Systems:    Review of Systems Constitutional: Negative for chills and fever  Respiratory: Negative for shortness of breath  Cardiovascular: Negative for chest pain  Gastrointestinal: Positive for abdominal pain  Negative for constipation, diarrhea, nausea and vomiting  Genitourinary: Positive for difficulty urinating (incomplete emptying), dysuria and flank pain  All other systems reviewed and are negative  Past Medical and Surgical History:     Past Medical History:   Diagnosis Date   • Arthritis    • Asthma    • Community acquired pneumonia 11/13/2019   • Coronary artery disease    • DM (diabetes mellitus) ([facility] [address] )    • HTN (hypertension)    • Hydronephrosis, bilateral 3/6/2023   • Hyperlipidemia    • Hyponatremia        Past Surgical History:   Procedure Laterality Date   • CARDIAC CATHETERIZATION  2010   • CORONARY ARTERY BYPASS GRAFT  12/06/2010    with subsequent stent to the saphenous veingraft to the RCA 3 months later   • KNEE ARTHROSCOPY     • POLYPECTOMY      laryngeal   • TOTAL HIP ARTHROPLASTY     • TUBAL LIGATION         Meds/Allergies:    Prior to Admission medications    Medication Sig Start Date End Date Taking?  Authorizing Provider   Air  (Vicks Air Purifier/HEPA) (Device) MISC Use as directed 1/22/21  Yes Rick Jack MD   albuterol (ACCUNEB) 1 25 MG/3ML nebulizer solution Take 3 mL by nebulization every 6 (six) hours as needed for wheezing 3/4/22  Yes Rick Jack MD   albuterol (PROVENTIL HFA,VENTOLIN HFA) 90 mcg/act inhaler Inhale 2 puffs every 6 (six) hours as needed for wheezing 12/14/22  Yes Rick Jack MD   ALPRAZolam Zafar Barefoot) 0 5 mg tablet Take 1 tablet (0 5 mg total) by mouth daily at bedtime as needed for anxiety 9/8/22  Yes Rick Jack MD   amLODIPine (NORVASC) 5 mg tablet Take 1 tablet (5 mg total) by mouth 2 (two) times a day 12/12/22  Yes Rick Jack MD   benzonatate (TESSALON PERLES) 100 mg capsule Take 1 capsule (100 mg total) by mouth 3 (three) times a day as needed for cough With food/meals 10/26/22  Yes Kristy Keenan MD   bisoprolol (ZEBETA) 10 MG tablet Take 1 tablet (10 mg total) by mouth daily 12/12/22  Yes Kristy Keenan MD   co-enzyme Q-10 30 MG capsule Take 1 capsule (30 mg total) by mouth 2 (two) times a day 7/8/22  Yes Kristy Keenan MD   ergocalciferol (VITAMIN D2) 50,000 units Take 1 capsule (50,000 Units total) by mouth once a week 12/12/22  Yes Kristy Keenan MD   glucose blood test strip Use 1 each as needed (as needed) Use as instructed 12/14/22  Yes Kristy Keenan MD   magnesium (MAGTAB) 84 MG (7MEQ) TBCR Take 1 tablet (84 mg total) by mouth daily 12/12/22  Yes Kristy Keenan MD   nitroglycerin (NITROSTAT) 0 4 mg SL tablet Place 1 tablet (0 4 mg total) under the tongue every 5 (five) minutes as needed for chest pain 3/18/21  Yes Kristy Keenan MD   omega-3-acid ethyl esters (LOVAZA) 1 g capsule Take 1 capsule (1 g total) by mouth 2 (two) times a day 7/8/22  Yes Kristy Keenan MD   pantoprazole (PROTONIX) 20 mg tablet Take 1 tablet (20 mg total) by mouth daily 12/12/22  Yes Kristy Keenan MD   Plavix 75 MG tablet Take 1 tablet (75 mg total) by mouth daily 12/12/22  Yes Kristy Keenan MD   Red Yeast Rice 600 MG CAPS Take 1 capsule (600 mg total) by mouth 2 (two) times a day with meals 7/8/22  Yes Kristy Keenan MD   rosuvastatin (CRESTOR) 5 mg tablet Take 1 tablet (5 mg total) by mouth daily 12/12/22  Yes Kristy Keenan MD   vitamin B-12 (VITAMIN B-12) 1,000 mcg tablet Take 1 tablet (1,000 mcg total) by mouth daily 7/8/22  Yes Kristy Keenan MD     I have reviewed home medications with patient personally  Allergies:    Allergies   Allergen Reactions   • Ezetimibe Rash   • Ace Inhibitors    • Lipitor [Atorvastatin]      Able to tolerate Crestor (brand name) per patient   • Lisinopril    • Losartan    • Morphine Hives     pt denies   • Olmesartan    • Statins Arthralgia       Social History:     Marital Status: Single   Occupation:   Patient Pre-hospital Living Situation:   Patient Pre-hospital Level of Mobility:   Patient Pre-hospital Diet Restrictions:   Substance Use History:   Social History     Substance and Sexual Activity   Alcohol Use Yes    Comment: socially     Social History     Tobacco Use   Smoking Status Former   • Packs/day: 0 50   • Years: 13 00   • Pack years: 6 50   • Types: Cigarettes   • Quit date:    • Years since quittin    Smokeless Tobacco Never   Tobacco Comments    no passive smoke exposure     Social History     Substance and Sexual Activity   Drug Use Never       Family History:    Family History   Problem Relation Age of Onset   • Stroke Mother    • Hypertension Mother    • Heart disease Mother    • Colon cancer Father    • Heart attack Brother    • Heart attack Brother        Physical Exam:     Vitals:   Blood Pressure: 139/61 (23)  Pulse: 90 (23)  Temperature: 98 3 °F (36 8 °C) (23)  Temp Source: Oral (23)  Respirations: 18 (23)  SpO2: 96 % (23)    Physical Exam  Vitals reviewed  Constitutional:       General: She is not in acute distress  Appearance: She is not ill-appearing, toxic-appearing or diaphoretic  HENT:      Head: Normocephalic and atraumatic  Right Ear: External ear normal       Left Ear: External ear normal       Nose: Nose normal    Eyes:      Extraocular Movements: Extraocular movements intact  Cardiovascular:      Rate and Rhythm: Normal rate and regular rhythm  Heart sounds: No murmur heard  No friction rub  No gallop  Pulmonary:      Effort: No respiratory distress  Breath sounds: No stridor  Wheezing (end expiratory wheezing) present  No rhonchi or rales  Abdominal:      General: There is no distension  Palpations: Abdomen is soft  There is no mass  Tenderness: There is no abdominal tenderness  There is left CVA tenderness  There is no guarding or rebound  Hernia: No hernia is present  Musculoskeletal:      Right lower leg: No edema  Left lower leg: No edema  Skin:     General: Skin is warm and dry  Neurological:      Mental Status: She is alert  Mental status is at baseline  Psychiatric:         Mood and Affect: Mood normal            Additional Data:     Lab Results: I have personally reviewed pertinent reports  Results from last 7 days   Lab Units 03/06/23  1738   WBC Thousand/uL 13 52*   HEMOGLOBIN g/dL 13 4   HEMATOCRIT % 41 4   PLATELETS Thousands/uL 295   NEUTROS PCT % 82*   LYMPHS PCT % 11*   MONOS PCT % 6   EOS PCT % 1     Results from last 7 days   Lab Units 03/06/23  1738   SODIUM mmol/L 128*   POTASSIUM mmol/L 4 4   CHLORIDE mmol/L 90*   CO2 mmol/L 30   BUN mg/dL 9   CREATININE mg/dL 0 70   ANION GAP mmol/L 8   CALCIUM mg/dL 9 6   ALBUMIN g/dL 4 4   TOTAL BILIRUBIN mg/dL 0 57   ALK PHOS U/L 77   ALT U/L 12   AST U/L 13   GLUCOSE RANDOM mg/dL 113     Results from last 7 days   Lab Units 03/06/23  1738   INR  1 00             Results from last 7 days   Lab Units 03/06/23  2132 03/06/23  1738   LACTIC ACID mmol/L  --  1 3   PROCALCITONIN ng/ml 0 83*  --        Imaging: I have personally reviewed pertinent reports  CT abdomen pelvis with contrast   Final Result by Andrew Corona DO (03/06 1952)      Bilateral severe hydroureteronephrosis without evidence of obstructing stone  Enhancement of the ureters bilaterally  Findings may represent recently passed stone versus infection  Follow-up with urology is recommended to rule out underlying neoplasm  Marked distention of the urinary bladder which may be related to above process  Workstation performed: [id number]             EKG, Pathology, and Other Studies Reviewed on Admission:   · EKG:     Allscripts / Epic Records Reviewed: Yes     ** Please Note: This note has been constructed using a voice recognition system   **

## 2023-03-07 NOTE — ASSESSMENT & PLAN NOTE
poa by tachycardia/leucocytosis likely 2* uti  -No lactic acidosis on admission  -S/p 1L NS in ed and rocephin  -monitor cbc vs bcx ucx, check procalcitonin  -continue iv rocephin

## 2023-03-07 NOTE — PLAN OF CARE
Problem: Prexisting or High Potential for Compromised Skin Integrity  Goal: Skin integrity is maintained or improved  Description: INTERVENTIONS:  - Identify patients at risk for skin breakdown  - Assess and monitor skin integrity  - Assess and monitor nutrition and hydration status  - Monitor labs   - Assess for incontinence   - Turn and reposition patient  - Assist with mobility/ambulation  - Relieve pressure over bony prominences  - Avoid friction and shearing  - Provide appropriate hygiene as needed including keeping skin clean and dry  - Evaluate need for skin moisturizer/barrier cream  - Collaborate with interdisciplinary team   - Patient/family teaching  - Consider wound care consult   Outcome: Progressing     Problem: MOBILITY - ADULT  Goal: Maintain or return to baseline ADL function  Description: INTERVENTIONS:  -  Assess patient's ability to carry out ADLs; assess patient's baseline for ADL function and identify physical deficits which impact ability to perform ADLs (bathing, care of mouth/teeth, toileting, grooming, dressing, etc )  - Assess/evaluate cause of self-care deficits   - Assess range of motion  - Assess patient's mobility; develop plan if impaired  - Assess patient's need for assistive devices and provide as appropriate  - Encourage maximum independence but intervene and supervise when necessary  - Involve family in performance of ADLs  - Assess for home care needs following discharge   - Consider OT consult to assist with ADL evaluation and planning for discharge  - Provide patient education as appropriate  Outcome: Progressing  Goal: Maintains/Returns to pre admission functional level  Description: INTERVENTIONS:  - Perform BMAT or MOVE assessment daily    - Set and communicate daily mobility goal to care team and patient/family/caregiver  - Collaborate with rehabilitation services on mobility goals if consulted  - Perform Range of Motion  times a day    - Reposition patient every hours   - Dangle patient times a day  - Stand patient  times a day  - Ambulate patient  times a day  - Out of bed to chair  times a day   - Out of bed for meals ay  - Out of bed for toileting  - Record patient progress and toleration of activity level   Outcome: Progressing     Problem: INFECTION - ADULT  Goal: Absence or prevention of progression during hospitalization  Description: INTERVENTIONS:  - Assess and monitor for signs and symptoms of infection  - Monitor lab/diagnostic results  - Monitor all insertion sites, i e  indwelling lines, tubes, and drains  - Monitor endotracheal if appropriate and nasal secretions for changes in amount and color  - Geff appropriate cooling/warming therapies per order  - Administer medications as ordered  - Instruct and encourage patient and family to use good hand hygiene technique  - Identify and instruct in appropriate isolation precautions for identified infection/condition  Outcome: Progressing     Problem: DISCHARGE PLANNING  Goal: Discharge to home or other facility with appropriate resources  Description: INTERVENTIONS:  - Identify barriers to discharge w/patient and caregiver  - Arrange for needed discharge resources and transportation as appropriate  - Identify discharge learning needs (meds, wound care, etc )  - Arrange for interpretive services to assist at discharge as needed  - Refer to Case Management Department for coordinating discharge planning if the patient needs post-hospital services based on physician/advanced practitioner order or complex needs related to functional status, cognitive ability, or social support system  Outcome: Progressing     Problem: Knowledge Deficit  Goal: Patient/family/caregiver demonstrates understanding of disease process, treatment plan, medications, and discharge instructions  Description: Complete learning assessment and assess knowledge base    Interventions:  - Provide teaching at level of understanding  - Provide teaching via preferred learning methods  Outcome: Progressing     Problem: RESPIRATORY - ADULT  Goal: Achieves optimal ventilation and oxygenation  Description: INTERVENTIONS:  - Assess for changes in respiratory status  - Assess for changes in mentation and behavior  - Position to facilitate oxygenation and minimize respiratory effort  - Oxygen administered by appropriate delivery if ordered  - Initiate smoking cessation education as indicated  - Encourage broncho-pulmonary hygiene including cough, deep breathe, Incentive Spirometry  - Assess the need for suctioning and aspirate as needed  - Assess and instruct to report SOB or any respiratory difficulty  - Respiratory Therapy support as indicated  Outcome: Progressing

## 2023-03-07 NOTE — ASSESSMENT & PLAN NOTE
Severe on ct a/p w/iv contrast noting as well markedly distended bladder    Pt has been noting incomplete emptying, thankfully renal fn preserved   -etiology likely due to vaginal wall prolapse unclear if contributed to cystocele or uterine prolapse but extending past labia majora on physical exam, but does have hx of hematuria in plavix and former smoker status  -s/p burgos cath in ed  -consult urology, monitor I/os, monitor renal indices

## 2023-03-08 ENCOUNTER — TELEPHONE (OUTPATIENT)
Dept: OBGYN CLINIC | Facility: CLINIC | Age: 78
End: 2023-03-08

## 2023-03-08 ENCOUNTER — APPOINTMENT (INPATIENT)
Dept: ULTRASOUND IMAGING | Facility: HOSPITAL | Age: 78
End: 2023-03-08

## 2023-03-08 ENCOUNTER — TELEPHONE (OUTPATIENT)
Dept: OTHER | Facility: HOSPITAL | Age: 78
End: 2023-03-08

## 2023-03-08 LAB
ALBUMIN SERPL BCP-MCNC: 3.3 G/DL (ref 3.5–5)
ALP SERPL-CCNC: 54 U/L (ref 34–104)
ALT SERPL W P-5'-P-CCNC: 12 U/L (ref 7–52)
ANION GAP SERPL CALCULATED.3IONS-SCNC: 7 MMOL/L (ref 4–13)
AST SERPL W P-5'-P-CCNC: 22 U/L (ref 13–39)
BACTERIA UR CULT: ABNORMAL
BACTERIA UR CULT: ABNORMAL
BASOPHILS # BLD AUTO: 0.04 THOUSANDS/ÂΜL (ref 0–0.1)
BASOPHILS NFR BLD AUTO: 0 % (ref 0–1)
BILIRUB SERPL-MCNC: 0.3 MG/DL (ref 0.2–1)
BUN SERPL-MCNC: 11 MG/DL (ref 5–25)
CALCIUM ALBUM COR SERPL-MCNC: 9.4 MG/DL (ref 8.3–10.1)
CALCIUM SERPL-MCNC: 8.8 MG/DL (ref 8.4–10.2)
CHLORIDE SERPL-SCNC: 99 MMOL/L (ref 96–108)
CO2 SERPL-SCNC: 29 MMOL/L (ref 21–32)
CREAT SERPL-MCNC: 0.72 MG/DL (ref 0.6–1.3)
EOSINOPHIL # BLD AUTO: 0.11 THOUSAND/ÂΜL (ref 0–0.61)
EOSINOPHIL NFR BLD AUTO: 1 % (ref 0–6)
ERYTHROCYTE [DISTWIDTH] IN BLOOD BY AUTOMATED COUNT: 14 % (ref 11.6–15.1)
ERYTHROCYTE [DISTWIDTH] IN BLOOD BY AUTOMATED COUNT: 14.2 % (ref 11.6–15.1)
GFR SERPL CREATININE-BSD FRML MDRD: 81 ML/MIN/1.73SQ M
GLUCOSE SERPL-MCNC: 118 MG/DL (ref 65–140)
HCT VFR BLD AUTO: 33.6 % (ref 34.8–46.1)
HCT VFR BLD AUTO: 34.2 % (ref 34.8–46.1)
HGB BLD-MCNC: 10.3 G/DL (ref 11.5–15.4)
HGB BLD-MCNC: 10.5 G/DL (ref 11.5–15.4)
IMM GRANULOCYTES # BLD AUTO: 0.06 THOUSAND/UL (ref 0–0.2)
IMM GRANULOCYTES NFR BLD AUTO: 0 % (ref 0–2)
LACTATE SERPL-SCNC: 0.6 MMOL/L (ref 0.5–2)
LYMPHOCYTES # BLD AUTO: 2.44 THOUSANDS/ÂΜL (ref 0.6–4.47)
LYMPHOCYTES NFR BLD AUTO: 17 % (ref 14–44)
MCH RBC QN AUTO: 26.6 PG (ref 26.8–34.3)
MCH RBC QN AUTO: 26.8 PG (ref 26.8–34.3)
MCHC RBC AUTO-ENTMCNC: 30.7 G/DL (ref 31.4–37.4)
MCHC RBC AUTO-ENTMCNC: 30.7 G/DL (ref 31.4–37.4)
MCV RBC AUTO: 87 FL (ref 82–98)
MCV RBC AUTO: 87 FL (ref 82–98)
MONOCYTES # BLD AUTO: 1.35 THOUSAND/ÂΜL (ref 0.17–1.22)
MONOCYTES NFR BLD AUTO: 10 % (ref 4–12)
NEUTROPHILS # BLD AUTO: 10.05 THOUSANDS/ÂΜL (ref 1.85–7.62)
NEUTS SEG NFR BLD AUTO: 72 % (ref 43–75)
NRBC BLD AUTO-RTO: 0 /100 WBCS
PLATELET # BLD AUTO: 275 THOUSANDS/UL (ref 149–390)
PLATELET # BLD AUTO: 307 THOUSANDS/UL (ref 149–390)
PMV BLD AUTO: 10 FL (ref 8.9–12.7)
PMV BLD AUTO: 9.5 FL (ref 8.9–12.7)
POTASSIUM SERPL-SCNC: 4 MMOL/L (ref 3.5–5.3)
PROCALCITONIN SERPL-MCNC: 1.68 NG/ML
PROT SERPL-MCNC: 6.3 G/DL (ref 6.4–8.4)
RBC # BLD AUTO: 3.85 MILLION/UL (ref 3.81–5.12)
RBC # BLD AUTO: 3.94 MILLION/UL (ref 3.81–5.12)
SODIUM SERPL-SCNC: 135 MMOL/L (ref 135–147)
WBC # BLD AUTO: 11.82 THOUSAND/UL (ref 4.31–10.16)
WBC # BLD AUTO: 14.05 THOUSAND/UL (ref 4.31–10.16)

## 2023-03-08 RX ORDER — LEVALBUTEROL INHALATION SOLUTION 0.63 MG/3ML
0.63 SOLUTION RESPIRATORY (INHALATION) EVERY 8 HOURS PRN
Status: DISCONTINUED | OUTPATIENT
Start: 2023-03-08 | End: 2023-03-10 | Stop reason: HOSPADM

## 2023-03-08 RX ORDER — OLANZAPINE 10 MG/1
5 INJECTION, POWDER, LYOPHILIZED, FOR SOLUTION INTRAMUSCULAR EVERY 6 HOURS PRN
Status: DISCONTINUED | OUTPATIENT
Start: 2023-03-08 | End: 2023-03-10 | Stop reason: HOSPADM

## 2023-03-08 RX ORDER — PREDNISONE 20 MG/1
40 TABLET ORAL DAILY
Status: DISCONTINUED | OUTPATIENT
Start: 2023-03-08 | End: 2023-03-09

## 2023-03-08 RX ADMIN — HEPARIN SODIUM 5000 UNITS: 5000 INJECTION INTRAVENOUS; SUBCUTANEOUS at 13:01

## 2023-03-08 RX ADMIN — BISOPROLOL FUMARATE 10 MG: 5 TABLET ORAL at 08:06

## 2023-03-08 RX ADMIN — AMLODIPINE BESYLATE 5 MG: 5 TABLET ORAL at 18:00

## 2023-03-08 RX ADMIN — ISODIUM CHLORIDE 3 ML: 0.03 SOLUTION RESPIRATORY (INHALATION) at 07:27

## 2023-03-08 RX ADMIN — PANTOPRAZOLE SODIUM 20 MG: 20 TABLET, DELAYED RELEASE ORAL at 05:15

## 2023-03-08 RX ADMIN — ALBUTEROL SULFATE 2 PUFF: 90 AEROSOL, METERED RESPIRATORY (INHALATION) at 02:10

## 2023-03-08 RX ADMIN — LEVALBUTEROL HYDROCHLORIDE 0.63 MG: 0.63 SOLUTION RESPIRATORY (INHALATION) at 03:52

## 2023-03-08 RX ADMIN — ISODIUM CHLORIDE 3 ML: 0.03 SOLUTION RESPIRATORY (INHALATION) at 19:48

## 2023-03-08 RX ADMIN — CEFTRIAXONE 1000 MG: 1 INJECTION, SOLUTION INTRAVENOUS at 22:57

## 2023-03-08 RX ADMIN — AMLODIPINE BESYLATE 5 MG: 5 TABLET ORAL at 08:07

## 2023-03-08 RX ADMIN — PREDNISONE 40 MG: 20 TABLET ORAL at 13:00

## 2023-03-08 RX ADMIN — LEVALBUTEROL HYDROCHLORIDE 1.25 MG: 1.25 SOLUTION, CONCENTRATE RESPIRATORY (INHALATION) at 07:27

## 2023-03-08 RX ADMIN — HEPARIN SODIUM 5000 UNITS: 5000 INJECTION INTRAVENOUS; SUBCUTANEOUS at 05:15

## 2023-03-08 RX ADMIN — HEPARIN SODIUM 5000 UNITS: 5000 INJECTION INTRAVENOUS; SUBCUTANEOUS at 23:00

## 2023-03-08 RX ADMIN — PRAVASTATIN SODIUM 40 MG: 40 TABLET ORAL at 17:34

## 2023-03-08 RX ADMIN — CLOPIDOGREL BISULFATE 75 MG: 75 TABLET ORAL at 08:07

## 2023-03-08 RX ADMIN — LEVALBUTEROL HYDROCHLORIDE 1.25 MG: 1.25 SOLUTION, CONCENTRATE RESPIRATORY (INHALATION) at 19:48

## 2023-03-08 NOTE — TELEPHONE ENCOUNTER
Called PT and left voice message to contact our office to be scheduled for [facility] with Dr Shanell Donahue on April 6, 2023 per Dr Higginbotham Fore

## 2023-03-08 NOTE — TELEPHONE ENCOUNTER
----- Message from Winston Girard MD sent at 3/7/2023  5:11 PM EST -----  Regarding: pessary check  Good afternoon,   This patient Genesis Ivory speaks [place]  She had a pessary placed in the hospital on 03/07/23 and should be seen in the office for pessary check in the next 1 month  This does NOT need to be a urogyn visit  If you can find space on Dr Candelario Garcia schedule on Thursday April 6, please schedule her then, thank you!  You can make the appt now and it will appear on the patient's AVS when she is discharged from the hospital    Thank you,   Dr Richelle Abarca

## 2023-03-08 NOTE — NURSING NOTE
Patient received first dose of Prednisone at 1300; shortly after began refusing medical tx  Acute onset of delirium this afternoon including paranoia that staff is talking about her or trying to harm her  Patient is now refusing to take medications because she "cannot trust anyone in this hospital " Nurse attempted to redirect and reorient patient but was not successful  Patient attempting to ambulate on her own and noncompliant with chair alarm and also removed denis monitored several times  MD made aware, n/o for Zyprexa PRN as needed for agitation

## 2023-03-08 NOTE — OCCUPATIONAL THERAPY NOTE
Occupational Therapy Evaluation     Patient Name: Genesis MENDOZA Date: 3/8/2023  Problem List  Principal Problem:    Sepsis secondary to UTI [facility])  Active Problems:    Chronic coronary artery disease    Hypertension    Chronic obstructive pulmonary disease, unspecified COPD type ([place] [address] )    Hyponatremia    Hypomagnesemia    Hydronephrosis, bilateral    UTI (urinary tract infection)    Cystocele with prolapse    Past Medical History  Past Medical History:   Diagnosis Date    Arthritis     Asthma     Community acquired pneumonia 11/13/2019    Coronary artery disease     DM (diabetes mellitus) ([place] [address] )     HTN (hypertension)     Hydronephrosis, bilateral 3/6/2023    Hyperlipidemia     Hyponatremia      Past Surgical History  Past Surgical History:   Procedure Laterality Date    CARDIAC CATHETERIZATION  2010    CORONARY ARTERY BYPASS GRAFT  12/06/2010    with subsequent stent to the saphenous veingraft to the RCA 3 months later    KNEE ARTHROSCOPY      POLYPECTOMY      laryngeal    TOTAL HIP ARTHROPLASTY      TUBAL LIGATION          03/08/23 0908   OT Last Visit   OT Visit Date 03/08/23   Note Type   Note type Evaluation   Pain Assessment   Pain Assessment Tool FLACC   Pain Location/Orientation Location: Head  (intermittent headache)   Pain Rating: FLACC (Rest) - Face 0   Pain Rating: FLACC (Rest) - Legs 0   Pain Rating: FLACC (Rest) - Activity 0   Pain Rating: FLACC (Rest) - Cry 1   Pain Rating: FLACC (Rest) - Consolability 0   Score: FLACC (Rest) 1   Pain Rating: FLACC (Activity) - Face 0   Pain Rating: FLACC (Activity) - Legs 0   Pain Rating: FLACC (Activity) - Activity 0   Pain Rating: FLACC (Activity) - Cry 1   Pain Rating: FLACC (Activity) - Consolability 0   Score: FLACC (Activity) 1   Restrictions/Precautions   Weight Bearing Precautions Per Order No   Other Precautions Chair Alarm; Bed Alarm;O2;Fall Risk   Home Living   Type of Home House  (w/ 2 units; lives on 1st floor unit)   Home Layout One level;Performs ADLs on one level; Able to live on main level with bedroom/bathroom;Stairs to enter with rails   Bathroom Shower/Tub Walk-in shower   Bathroom Toilet Standard   Bathroom Equipment Grab bars in shower;Grab bars around toilet   Home Equipment Cane;Walker   Prior Function   Level of [place] Independent with ADLs; Independent with functional mobility   Lives With Other (Comment)  (roommate)   Receives Help From Family  (daughter)   IADLs Independent with meal prep; Independent with medication management; Family/Friend/Other provides transportation  (Daughter provides transportation)   Falls in the last 6 months 1 to 4   Lifestyle   Autonomy Pt lives with a roommate in a house, remains on main level with 5 CARRI c HR  Reports (I) with ADLs and functional mobility, use of SPC  (-)   Reciprocal Relationships Daughter   Intrinsic Gratification Cooking   Subjective   Subjective "I'm moving just like I move at home"   ADL   Where Assessed Chair   Eating Assistance 7  Independent   Grooming Assistance 5  Supervision/Setup   [address] 5  Supervision/Setup   LB Bathing Assistance 4  Minimal Assistance   UB Dressing Assistance 5  Supervision/Setup   LB Dressing Assistance 4  [facility] to Stand 4  Minimal assistance   Additional items Assist x 1; Armrests; Increased time required;Verbal cues  [facility])   Stand to Sit 4  Minimal assistance   Additional items Assist x 1; Armrests; Increased time required;Verbal cues  [facility])   Additional Comments (S)  2L donned at beginning of session and pt saturating 98-99%; decreased to RA, with pt desaturating to 82% while obtaining PLOF  Increased to 2L - with functional mobility, pt desats to 89% on 2L and increased to 3L with pt able to recove above 90   Decreased to 2L while seated in chair, pt saturating at 98%; BPs: pre-activity - 136/61, post-activity - 134/55   Functional Mobility   Functional Mobility 4  Minimal assistance   Additional Comments Ax1   Additional items SPC   Balance   Static Sitting Fair   Dynamic Sitting Fair -   Static Standing Fair -   Dynamic Standing Poor +   Ambulatory Poor +   Activity Tolerance   Activity Tolerance Patient tolerated treatment well;Patient limited by fatigue;Treatment limited secondary to medical complications (Comment)  (increase in O2 need)   Medical Staff Made Aware PT [facility]   RUE Assessment   RUE Assessment WFL   RUE Strength   R Shoulder Flexion 4-/5   R Shoulder Extension 4-/5   R Elbow Flexion 4/5   R Elbow Extension 4/5   LUE Assessment   LUE Assessment WFL   LUE Strength   L Shoulder Flexion 4-/5   L Shoulder Extension 4-/5   L Elbow Flexion 4/5   L Elbow Extension 4/5   Hand Function   Gross Motor Coordination Functional   Fine Motor Coordination Functional   Sensation   Light Touch No apparent deficits   Proprioception   Proprioception No apparent deficits   Vision-Basic Assessment   Current Vision Wears glasses all the time   Vision - Complex Assessment   Ocular Range of Motion Intact   Acuity Able to read clock/calendar on wall without difficulty; Able to read employee name badge without difficulty   Perception   Inattention/Neglect Appears intact   Cognition   Overall Cognitive Status [facility]   Arousal/Participation Alert; Responsive; Cooperative   Attention Attends with cues to redirect   Orientation Level Oriented X4   Memory Decreased recall of precautions   Following Commands Follows one step commands without difficulty   Comments pleasant and cooperative; decreased insight into deficits   Assessment   Limitation Decreased ADL status; Decreased UE strength;Decreased Safe judgement during ADL;Decreased endurance;Decreased self-care trans;Decreased high-level ADLs   Prognosis Fair   Assessment Patient is a 68y o  year old female seen for OT eval s/p admit to [facility] on 3/6/2023 with sepsis secondary to UTI, hyponatremia, hypomagnesemia, b/l hydronephrosis, cystocele with prolapse  Comorbidities affecting pt’s functional performance include a significant PMH of: CAD, HTN, COPD, asthma, OA, glaucoma, hernia, HLD, hx of CABG, hx of stent of SVG to the RCA, community acquired pneumonia  Patient with active OT orders and activity orders for Up with assistance  Personal factors affecting pt at time of IE include: difficulty performing ADLs and IADLs, difficulty with functional mobility/transfers  Prior to admission, patient was (I) with ADLs, requires assistance with IADLs  Upon evaluation, patient’s functional status as follows: eating: independent, grooming: SBA, UB bathing: SBA, LB bathing: MIN A , UB dressing: SBA, LB dressing: MIN A , toileting: MIN A ; functional transfers: MIN A , bed mobility: DNT, functional mobility: MIN A , sitting/standing tolerance: ~4 min with unilateral UE support- due to the following deficits impacting occupational performance: decreased B UE strength, decreased static/dynamic sitting/standing balance, decreased activity tolerance, decreased safety awareness, and increased pain  Patient does not wear O2 at home, although admits sometimes her oxygen is low at home; 2L donned at beginning of session; refer to flowsheet  Patient would benefit from continued skilled OT therapy while in acute setting to address deficits as defined above and maximize (I) with ADLs and functional mobility  Occupational performance areas to address include: grooming, bathing, toileting, UB/LB dressing, functional mobility, household maintenance, and medication management  Based on the aforementioned OT evaluation, functional performance deficits, and assessments, pt has been identified as a high complexity evaluation      Co treatment with PT secondary to complex medical condition of pt, possible A of 2 required to achieve and maintain transitional movements, requiring the need of skilled therapeutic intervention of 2 therapists to achieve delivery of services  Goals   Patient Goals to return home   LTG Time Frame 10-14   Plan   Treatment Interventions ADL retraining;Functional transfer training;UE strengthening/ROM; Endurance training;Patient/family training;Equipment evaluation/education; Neuromuscular reeducation; Compensatory technique education;Continued evaluation; Energy conservation; Activityengagement   Goal Expiration Date 03/22/23   OT Treatment Day 0   OT Frequency 3-5x/wk   Recommendation   OT Discharge Recommendation Home with home health rehabilitation  (increased family support)   Additional Comments  The patient's raw score on the AM-PAC Daily Activity Inpatient Short Form is 18  A raw score of less than 19 suggests the patient may benefit from discharge to home with [facility] OT/PT and increased family support  Please refer to the recommendation of the Occupational Therapist for safe discharge planning  AM-PAC Daily Activity Inpatient   Lower Body Dressing 2   Bathing 3   Toileting 3   Upper Body Dressing 3   Grooming 3   Eating 4   Daily Activity Raw Score 18   Daily Activity Standardized Score (Calc for Raw Score >=11) 38 66   AM-[facility] Applied Cognition Inpatient   Following a Speech/Presentation 4   Understanding Ordinary Conversation 4   Taking Medications 3   Remembering Where Things Are Placed or Put Away 3   Remembering List of 4-5 Errands 3   Taking Care of Complicated Tasks 3   Applied Cognition Raw Score 20   Applied Cognition Standardized Score 41 76     Occupational Therapy goals: In 7-14 days:     1- Patient will verbalize and demonstrate use of energy conservation/deep breathing technique and work simplification skills during functional activity with no verbal cues       2- Patient will verbalize and demonstrate good body mechanics and joint protection techniques during ADLs/IADLs with no verbal cues     3- Pt will complete bed mobility at a Mod I level w/ G balance/safety demonstrated to decrease caregiver assistance required 4- Patient will increase OOB/ sitting tolerance to 2-4 hours per day for increased participation in self care and leisure tasks with no s/s of exertion  5-Patient will increase standing tolerance time to 5 minutes with unilateral UE support to complete sink level ADLs@ mod I level      5- Patient will increase sitting tolerance at edge of bed to 20 minutes to complete UB ADLs @ set up assist level       6- Pt will improve functional transfers to Mod I on/off all surfaces using DME as needed w/ G balance/safety     7- Patient will complete UB ADLs with Mary utilizing appropriate DME/AE PRN     8- Patient will complete LB ADLs with Mary utilizing appropriate DME/AE PRN     9- Patient will complete toileting hygiene with Mary with G hygiene/thoroughness utilizing appropriate DME/AE PRN     10- Pt will improve functional mobility during ADL/IADL/leisure tasks to Mod I using DME as needed w/ G balance/safety      11- Pt will be attentive 100% of the time during ongoing cognitive assessment w/ G participation to assist w/ safe d/c planning/recommendations     12- Pt will participate in simulated IADL management task to increase independence to Mod I w/ G safety and endurance     13- Pt will increase BUE strength by 1MM grade via AROM/AAROM/PROM exercises to increase independence in ADLs and transfers       AL Chopra/L

## 2023-03-08 NOTE — TELEPHONE ENCOUNTER
Patient remains admitted at this time   Will follow for discharge, then contact to arrange void trial

## 2023-03-08 NOTE — PLAN OF CARE
Problem: PHYSICAL THERAPY ADULT  Goal: Performs mobility at highest level of function for planned discharge setting  See evaluation for individualized goals  Description: Treatment/Interventions: LE strengthening/ROM, Functional transfer training, Elevations, Therapeutic exercise, Endurance training, Patient/family training, Bed mobility, Gait training, Spoke to nursing, OT          See flowsheet documentation for full assessment, interventions and recommendations  Note: Prognosis: Good  Problem List: Decreased strength, Decreased endurance, Impaired balance, Decreased mobility, Decreased safety awareness, Impaired judgement  Assessment: Pt 68 y o  female presented to [address] [place] [address] on 03/06/2023 w/ abdominal pain and left flank pain, urinary frequency w/ hematuria  Pt admitted for Sepsis secondary to UTI ([facility] [address] ) tachycardia w/ HTN, hematuria, hyponatremia, hypomagnesemia, vaginal prolapse and COPD  CT diagnosed B/L hydronephrosis w/out stone  Large cystocele w/ prolapse tx w/ s/p placement of gellhorn pessary 3/7/2023  PT eval and tx order placed  PTA, pt was independent w/ all functional mobility w/ SPC, has 5 CARRI and lives w/ roommate in 1 level home  Upon evaluation, pt exhibits weakness, impaired balance and decreased endurance as noted in flow sheet  Bed mobility not observed as pt sitting EOB prior to IE  Pt reported intermittent headache during session  Pt demonstrated transfers w/ Min A x 1 requiring verbal cuing for proper mechanics and safety  Pt was able to ambulate using Single [employer] w/ Min A x 1   Observable gait deviations as noted in flowsheet  At beginning of session, pt asymptomatic w/ SpO2 82% and /61 seated in bedside chair  Pt intially ambulated w/ 2L NC w/ SpO2 89% increased to 3L NC  After ambulation, pt remained asymptomatic w/ SpO2 98% on 2L NC and /55  During ambulation, no gross LOB and no complaint of lightheadedness, dizzyness, or SOB   The above mentioned impairments limit pt's ability for independent functional mobility hence will benefit from skilled PT during hospital stay to improve function  Pt is appropriate for restorative therapy to progress towards baseline while receiving acute care services  The patient's AM-PAC Basic Mobility Inpatient Short Form Raw Score is 17  A Raw score of greater than 16 suggests the patient may benefit from discharge to home  Please also refer to the recommendation of the Physical Therapist for safe discharge planning  PT will recommend home with home health rehabilitation upon d/c pending increased support from acute care once medically managed to improve functional mobility to baseline  Education provided to pt regarding importance of PT  Continue to encourage mobilization w/ nursing including OOB for meals as tolerated to prevent further functional decline  Nursing notified  Pt tolerated session well  Pt at end of session, in stable condition, seated in bedside chair with all needs within reach  Co-eval with OT necessary for pt's best interest and medical complexity  Barriers to Discharge: Decreased caregiver support     PT Discharge Recommendation: Home with home health rehabilitation (w/ inc family support)    See flowsheet documentation for full assessment

## 2023-03-08 NOTE — ASSESSMENT & PLAN NOTE
· COPD/asthma with wheezing and exacerbation  · Ordered some neb treatments with improvement but still decreased breath sounds    · Will add prednisone

## 2023-03-08 NOTE — PROGRESS NOTES
[address] [place] [address]  Progress Note - Luiz English 1945, 68 y o  female MRN: [id number]  Unit/Bed#: [address] 2 -02 Encounter: [id number]  Primary Care Provider: Ry Garces MD   Date and time admitted to hospital: 3/6/2023  5:06 PM    * Sepsis secondary to UTI [facility])  Assessment & Plan  Presentation to hospital for flank pain found to have genital prolapse, urinary retention with bilateral hydronephrosis  · Sepsis secondary to UTI: Continue ceftriaxone  · Follow-up on final urine cultures  Cystocele with prolapse  Assessment & Plan  · Prolapse likely causing bladder distention with hydronephrosis  · Has been seen by gynecology and pessary has been placed     Hydronephrosis, bilateral  Assessment & Plan  · Severe bilateral hydronephrosis secondary to urinary retention from prolapse  · Urinary catheter has been placed  · Urology checked ultrasound which demonstrates resolution    Hypomagnesemia  Assessment & Plan  · Has been repleted    Hyponatremia  Assessment & Plan  · Hyponatremia resolved with IV fluids  Results from last 7 days   Lab Units 03/08/23  0444 03/07/23  0546 03/06/23  1738   SODIUM mmol/L 135 132* 128*       Chronic obstructive pulmonary disease, unspecified COPD type ([facility] [address] )  Assessment & Plan  · COPD/asthma with wheezing and exacerbation  · Ordered some neb treatments with improvement but still decreased breath sounds  · Will add prednisone    Hypertension  Assessment & Plan  · Continue amlodipine and bisoprolol    Chronic coronary artery disease  Assessment & Plan  · CAD with history of CABG  · No chest pain  Continue clopidogrel      VTE Pharmacologic Prophylaxis: VTE Score: 6 High Risk (Score >/= 5) - Pharmacological DVT Prophylaxis Ordered: heparin  Sequential Compression Devices Ordered  Patient Centered Rounds: I have performed bedside rounds with nursing staff today    Discussions with Specialists or Other Care Team Provider: Case management    Education and Discussions with Family / Patient: Updated  (son) via phone  Time Spent for Care: This time was spent on one or more of the following: performing physical exam; counseling and coordination of care; obtaining or reviewing history; documenting in the medical record; reviewing/ordering tests, medications or procedures; communicating with other healthcare professionals and discussing with patient's family/caregivers  Current Length of Stay: 2 day(s)  Current Patient Status: Inpatient   Certification Statement: The patient will continue to require additional inpatient hospital stay due to COPD exacerbation and awaiting urine cultures  Discharge Plan: Anticipate discharge tomorrow to home with home services  Code Status: Level 1 - Full Code      Subjective:   Patient seen and examined  No new complaints  Asking about going home  No further wheezing    Objective:   Vitals: Blood pressure 134/55, pulse 85, temperature 98 2 °F (36 8 °C), resp  rate 18, height 5' 1" (1 549 m), weight 57 9 kg (127 lb 10 3 oz), SpO2 97 %  Intake/Output Summary (Last 24 hours) at 3/8/2023 1210  Last data filed at 3/8/2023 0523  Gross per 24 hour   Intake --   Output 1500 ml   Net -1500 ml       Physical Exam  Vitals reviewed  Constitutional:       General: She is not in acute distress  Appearance: Normal appearance  HENT:      Head: Atraumatic  Cardiovascular:      Rate and Rhythm: Regular rhythm  Heart sounds: Normal heart sounds  Pulmonary:      Breath sounds: Decreased breath sounds present  No wheezing  Abdominal:      General: Bowel sounds are normal       Palpations: Abdomen is soft  Tenderness: There is no guarding or rebound  Musculoskeletal:         General: No swelling  Skin:     General: Skin is warm  Neurological:      Mental Status: She is alert  Cranial Nerves: No cranial nerve deficit  Motor: No weakness     Psychiatric:         Mood and Affect: Mood normal        Additional Data:   Labs:  Results from last 7 days   Lab Units 03/08/23  0444 03/08/23  0040 03/07/23  0546 03/06/23  1738   WBC Thousand/uL 11 82* 14 05* 15 44* 13 52*   HEMOGLOBIN g/dL 10 3* 10 5* 10 7* 13 4   PLATELETS Thousands/uL 275 307 262 295   MCV fL 87 87 85 85   INR   --   --   --  1 00     Results from last 7 days   Lab Units 03/08/23  0444 03/07/23  0546 03/06/23  1738   SODIUM mmol/L 135 132* 128*   POTASSIUM mmol/L 4 0 3 9 4 4   CHLORIDE mmol/L 99 99 90*   CO2 mmol/L 29 27 30   ANION GAP mmol/L 7 6 8   BUN mg/dL 11 10 9   CREATININE mg/dL 0 72 0 72 0 70   CALCIUM mg/dL 8 8 8 4 9 6   ALBUMIN g/dL 3 3*  --  4 4   TOTAL BILIRUBIN mg/dL 0 30  --  0 57   ALK PHOS U/L 54  --  77   ALT U/L 12  --  12   AST U/L 22  --  13   EGFR ml/min/1 73sq m 81 81 83   GLUCOSE RANDOM mg/dL 118 128 113     Results from last 7 days   Lab Units 03/07/23  0546 03/06/23  1738   MAGNESIUM mg/dL 1 9 1 3*                  Results from last 7 days   Lab Units 03/08/23  0040 03/06/23 2132 03/06/23  1738   LACTIC ACID mmol/L 0 6  --  1 3   PROCALCITONIN ng/ml 1 68*   < >  --     < > = values in this interval not displayed  * I Have Reviewed All Lab Data Listed Above  Cultures:   Results from last 7 days   Lab Units 03/08/23  0045 03/06/23 2111 03/06/23  1836   BLOOD CULTURE  Received in Microbiology Lab  Culture in Progress  Received in Microbiology Lab  Culture in Progress    --   --    URINE CULTURE   --   --  >100,000 cfu/ml Gram Negative Rusty*   INFLUENZA A PCR   --  Negative  --        Results from last 7 days   Lab Units 03/06/23 2111   SARS-COV-2  Negative   INFLUENZA A PCR  Negative   INFLUENZA B PCR  Negative   RSV PCR  Negative           Lines/Drains:  Invasive Devices     Peripheral Intravenous Line  Duration           Peripheral IV 03/06/23 Right Antecubital 1 day          Drain  Duration           Urethral Catheter Latex 1 day              Telemetry: Imaging:  Imaging Reports Reviewed Today Include:   US kidney and bladder    Result Date: 3/8/2023  Impression: Unremarkable study  Workstation performed: [id number]           Scheduled Meds:  Current Facility-Administered Medications   Medication Dose Route Frequency Provider Last Rate   • acetaminophen  650 mg Oral Q6H PRN Amairani [place]illela PA-C     • albuterol  2 puff Inhalation Q4H PRN Amairani Villela PA-C     • ALPRAZolam  0 5 mg Oral HS PRN Amairani Villela PA-C     • amLODIPine  5 mg Oral BID Amairani Villela PA-C     • bisoprolol  10 mg Oral Daily Amairani Villela PA-C     • cefTRIAXone  1,000 mg Intravenous Q24H Amairani Villela PA-C 1,000 mg (03/07/23 2208)   • clopidogrel  75 mg Oral Daily Amairani Villela PA-C     • heparin (porcine)  5,000 Units Subcutaneous Q8H [address] Amairani Villela PA-C     • levalbuterol  0 63 mg Nebulization Q8H PRN Amairani Villela PA-C     • levalbuterol  1 25 mg Nebulization TID Agapito De La Fuente DO     • ondansetron  4 mg Intravenous Q6H PRN Amairani Villela PA-C     • pantoprazole  20 mg Oral Daily Before Breakfast Amairani Villela PA-C     • pravastatin  40 mg Oral Daily With [employer] KEYSHAWN Villela     • sodium chloride  3 mL Nebulization TID Harry Gloria DO         Today, Patient Was Seen By: Harry Gloria DO    ** Please Note: Dictation voice to text software may have been used in the creation of this document   **

## 2023-03-08 NOTE — TELEPHONE ENCOUNTER
Hospital followup seen in consult for bilateral hydronephrosis s/t goss and pelvic organ prolapse  Had burgos placed then vaginal pessary insertion  Hydro and pain resolved  She can be scheduled later this week for voiding trial  She will followup with gyn after that for pessary mgmt

## 2023-03-08 NOTE — ASSESSMENT & PLAN NOTE
· Severe bilateral hydronephrosis secondary to urinary retention from prolapse  · Urinary catheter has been placed  · Urology checked ultrasound which demonstrates resolution

## 2023-03-08 NOTE — ASSESSMENT & PLAN NOTE
Presentation to hospital for flank pain found to have genital prolapse, urinary retention with bilateral hydronephrosis  · Sepsis secondary to UTI: Continue ceftriaxone  · Follow-up on final urine cultures

## 2023-03-08 NOTE — ASSESSMENT & PLAN NOTE
· Hyponatremia resolved with IV fluids      Results from last 7 days   Lab Units 03/08/23  0444 03/07/23  0546 03/06/23  1738   SODIUM mmol/L 135 132* 128*

## 2023-03-08 NOTE — ASSESSMENT & PLAN NOTE
· Prolapse likely causing bladder distention with hydronephrosis  · Has been seen by gynecology and pessary has been placed

## 2023-03-08 NOTE — PROGRESS NOTES
Gyn Progress Note   Poly Oneill 68 y o  female MRN: [id number]  Unit/Bed#: [facility] 2 -02 Encounter: [id number]      A/P: Poly Oneill is a 68 y o  female who is currently admitted with sepsis thought to be of urinary origin secondary to urinary retention caused by vaginal prolapse, now s/p Gellhorn pessary placement on 3/7  Cystocele with prolapse  Assessment & Plan  Large, reducible cystocele appreciated on initial exam - increased burgos output appreciated after reduction   Expressed desire to avoid surgical management   S/p Gellhorn Pessary Placement, size 6 on 3/7  Will plan for follow up with gyn after discharge     Chronic obstructive pulmonary disease, unspecified COPD type [facility])  Assessment & Plan  Management per primary team     * Sepsis secondary to UTI [facility])  Assessment & Plan  Management per primary team     Patient has no complaints  No new events over night  She reports no pain related to her pessary  She denies vaginal bleeding  BP 97/51   Pulse 87   Temp 97 7 °F (36 5 °C)   Resp 19   Ht 5' 1" (1 549 m)   Wt 57 9 kg (127 lb 10 3 oz)   LMP  (LMP Unknown)   SpO2 95%   BMI 24 12 kg/m²     Lab Results   Component Value Date    WBC 14 05 (H) 03/08/2023    HGB 10 5 (L) 03/08/2023    HCT 34 2 (L) 03/08/2023    MCV 87 03/08/2023     03/08/2023       Lab Results   Component Value Date    CALCIUM 8 4 03/07/2023     06/02/2018    K 3 9 03/07/2023    CO2 27 03/07/2023    CL 99 03/07/2023    BUN 10 03/07/2023    CREATININE 0 72 03/07/2023       Lab Results   Component Value Date/Time    POCGLU 116 11/15/2019 11:30 AM    POCGLU 98 11/15/2019 06:01 AM    POCGLU 104 11/14/2019 08:15 PM    POCGLU 112 11/14/2019 03:44 PM    POCGLU 88 11/14/2019 11:04 AM       I/O last 3 completed shifts: In: 1050 [IV Piggyback:1050]  Out: 1250 [Urine:1250]  I/O this shift:  In: -   Out: 1050 [Urine:1050]      Physical Exam  Constitutional:       General: She is not in acute distress  Appearance: Normal appearance  Genitourinary:      Genitourinary Comments: No bulge appreciated at the vaginal introitus    Cardiovascular:      Rate and Rhythm: Normal rate  Pulmonary:      Effort: Pulmonary effort is normal    Neurological:      General: No focal deficit present  Mental Status: She is alert  Skin:     General: Skin is warm and dry     Psychiatric:         Mood and Affect: Mood normal          Bethany Betts MD  Obstetrics & Gynecology PGY-2  3/8/2023  5:56 AM

## 2023-03-08 NOTE — PHYSICAL THERAPY NOTE
PT EVALUATION    Pt  Name: Patrick Perez  Pt  Age: 68 y o  MRN: [id number]  LENGTH OF STAY: 2      Admitting Diagnoses:   Hypomagnesemia [E83 42]  Hydroureteronephrosis [N13 30]  Hyponatremia [E87 1]  UTI (urinary tract infection) [N39 0]  Prolapse of vaginal wall [N81 10]  Flank pain [R10 9]  Hydronephrosis, bilateral [N13 30]  Sepsis, due to unspecified organism, unspecified whether acute organ dysfunction present [facility]) [A41 9]    Past Medical History:   Diagnosis Date    Arthritis     Asthma     Community acquired pneumonia 11/13/2019    Coronary artery disease     DM (diabetes mellitus) ([facility] [address] )     HTN (hypertension)     Hydronephrosis, bilateral 3/6/2023    Hyperlipidemia     Hyponatremia        Past Surgical History:   Procedure Laterality Date    CARDIAC CATHETERIZATION  2010    CORONARY ARTERY BYPASS GRAFT  12/06/2010    with subsequent stent to the saphenous veingraft to the RCA 3 months later    KNEE ARTHROSCOPY      POLYPECTOMY      laryngeal    TOTAL HIP ARTHROPLASTY      TUBAL LIGATION         Imaging Studies:  US kidney and bladder   Final Result by Sharif Gannon MD (03/08 1001)      Unremarkable study  Workstation performed: [id number]         CT abdomen pelvis with contrast   Final Result by Gege Glao DO (03/06 1952)      Bilateral severe hydroureteronephrosis without evidence of obstructing stone  Enhancement of the ureters bilaterally  Findings may represent recently passed stone versus infection  Follow-up with urology is recommended to rule out underlying neoplasm  Marked distention of the urinary bladder which may be related to above process  Workstation performed: [id number]                03/08/23 0844   PT Last Visit   PT Visit Date 03/08/23   Note Type   Note type Evaluation  (Simultaneous filing  User may not have seen previous data )   Pain Assessment   Pain Assessment Tool 0-10  (Simultaneous filing   User may not have seen previous data ) Pain Score No Pain   Pain Location/Orientation   (intermittent headache)   Restrictions/Precautions   Weight Bearing Precautions Per Order No   Other Precautions Chair Alarm; Bed Alarm;O2;Fall Risk  (2L O2 NC)   Home Living   Type of Home House  (w/ 2 units; lives on 1st floor unit)   Home Layout One level;Performs ADLs on one level; Able to live on main level with bedroom/bathroom;Stairs to enter with rails   Bathroom Shower/Tub Walk-in shower   Bathroom Toilet Standard   Bathroom Equipment Grab bars in shower;Grab bars around toilet   [address] Cane;Walker   Additional Comments 5 CARRI w/ rail   Prior Function   Level of [place] Independent with ADLs; Independent with functional mobility   Lives With Other (Comment)  (roommate)   Receives Help From Family  (daughter lives around the corner)   Falls in the last 6 months 1 to 4  (1, syncope, reason for admission)   Comments (-) ; daughter provides transportation; [facility]   General   Additional Pertinent History hyponatremia, COPD, HTN, chronic CAD, hx CABG; hx B/L hip replacement over 10 yrs ago   Family/Caregiver Present No   Cognition   Overall Cognitive Status WFL   Arousal/Participation Alert   Orientation Level Oriented X4   Following Commands Follows one step commands without difficulty   Comments pt pleasant and cooperative   Subjective   Subjective pt agreeable to therapy   RUE Assessment   RUE Assessment   (see OT eval)   LUE Assessment   LUE Assessment   (see OT eval)   RLE Assessment   RLE Assessment WFL  (grossly 4-/5)   LLE Assessment   LLE Assessment WFL  (grossly 4-/5)   Coordination   Sensation WFL   Bed Mobility   Additional Comments pt sitting bedside chair at beginning and end of session   Transfers   Sit to Stand 4  Minimal assistance   Additional items Assist x 1; Armrests; Increased time required;Verbal cues  (w/ SPC)   Stand to Sit 4  Minimal assistance   Additional items Assist x 1;Armrests; Increased time required;Verbal cues  (from [facility])   Additional Comments V/c for hand placement and safety techniques; SpO2 82% room air; O2 provided prior to initiating ambulation; /61 seated   Ambulation/Elevation   Gait pattern Forward Flexion; Short stride; Excessively slow;Decreased hip extension;Decreased heel strike;Decreased toe off; Wide ELMER; Decreased foot clearance   Gait Assistance 4  Minimal assist   Additional items Assist x 1;Verbal cues; Tactile cues   Assistive Device [facility]   Distance 40'   Ambulation/Elevation Additional Comments SpO2 89% on 3L NC during ambulation  Sitting post amb SpO2 98% 2 L at rest w/ /55  During ambulation, no gross LOB and no complaints of dizziness, lightheadedness or SOB  Balance   Static Sitting Fair   Dynamic Sitting Fair -   Static Standing Fair -   Dynamic Standing Poor +   Ambulatory Poor +   Endurance Deficit   Endurance Deficit Yes   Endurance Deficit Description pt reported some fatigue after ambulating   Activity Tolerance   Activity Tolerance Patient limited by fatigue   Medical Staff Made Aware LETY Urrutia   Nurse Made Aware PERICO Benavides   Assessment   Prognosis Good   Problem List Decreased strength;Decreased endurance; Impaired balance;Decreased mobility; Decreased safety awareness; Impaired judgement   Assessment Pt 68 y o  female presented to [address] [place] [address] on 03/06/2023 w/ abdominal pain and left flank pain, urinary frequency w/ hematuria  Pt admitted for Sepsis secondary to UTI ([facility] [address] ) tachycardia w/ HTN, hematuria, hyponatremia, hypomagnesemia, vaginal prolapse and COPD  CT diagnosed B/L hydronephrosis w/out stone  Large cystocele w/ prolapse tx w/ s/p placement of gellhorn pessary 3/7/2023  PT eval and tx order placed  PTA, pt was independent w/ all functional mobility w/ SPC, has 5 CARRI and lives w/ roommate in 1 level home  Upon evaluation, pt exhibits weakness, impaired balance and decreased endurance as noted in flow sheet   Bed mobility not observed as pt sitting EOB prior to IE  Pt reported intermittent headache during session  Pt demonstrated transfers w/ Min A x 1 requiring verbal cuing for proper mechanics and safety  Pt was able to ambulate using [employer] w/ Min A x 1   Observable gait deviations as noted in flowsheet  At beginning of session, pt asymptomatic w/ SpO2 82% and /61 seated in bedside chair  Pt intially ambulated w/ 2L NC w/ SpO2 89% increased to 3L NC  After ambulation, pt remained asymptomatic w/ SpO2 98% on 2L NC and /55  During ambulation, no gross LOB and no complaint of lightheadedness, dizzyness, or SOB  The above mentioned impairments limit pt's ability for independent functional mobility hence will benefit from skilled PT during hospital stay to improve function  Pt is appropriate for restorative therapy to progress towards baseline while receiving acute care services  The patient's AM-PAC Basic Mobility Inpatient Short Form Raw Score is 17  A Raw score of greater than 16 suggests the patient may benefit from discharge to home  Please also refer to the recommendation of the Physical Therapist for safe discharge planning  PT will recommend home with home health rehabilitation upon d/c pending increased support from acute care once medically managed to improve functional mobility to baseline  Education provided to pt regarding importance of PT  Continue to encourage mobilization w/ nursing including OOB for meals as tolerated to prevent further functional decline  Nursing notified  Pt tolerated session well  Pt at end of session, in stable condition, seated in bedside chair with all needs within reach  Co-eval with OT necessary for pt's best interest and medical complexity     Barriers to Discharge Decreased caregiver support   Goals   Patient Goals to go home   STG Expiration Date 03/18/23   Short Term Goal #1 Within 10 days, to progress towards baseline, improve safety, and decrease caregiver burden, pt to: 1  Improve strength by at least 1 grade in all ROM   2  Improve balance by at least 1 grade  3  Improve bed mobility to independent  4  Improve assistance level to Supervision w/ transfers  5  Increase ambulating distance to >150ft with appropriate AD w/ supervision  6  Demonstrate safe stair navigation w/ appropriate AD w/ Min A x 1  7  Pt/ family education  PT Treatment Day 0   Plan   Treatment/Interventions LE strengthening/ROM; Functional transfer training;Elevations; Therapeutic exercise; Endurance training;Patient/family training;Bed mobility;Gait training;Spoke to nursing;OT   PT Frequency 3-5x/wk   Recommendation   PT Discharge Recommendation Home with home health rehabilitation  (w/ inc family support)   AM-PAC Basic Mobility Inpatient   Turning in Flat Bed Without Bedrails 3   Lying on Back to Sitting on Edge of Flat Bed Without Bedrails 3   Moving Bed to Chair 3   Standing Up From Chair Using Arms 3   Walk in Room 3   Climb 3-5 Stairs With Railing 2   Basic Mobility Inpatient Raw Score 17   Basic Mobility Standardized Score 39 67   Highest Level Of Mobility   JH-HLM Goal 5: Stand one or more mins   JH-HLM Achieved 7: Walk 25 feet or more   End of Consult   Patient Position at End of Consult Seated edge of bed;Bed/Chair alarm activated; All needs within reach   End of Consult Comments pt at end of session in stable condition, seated in bedside chair, alarm activated, w/ all needs within reach   Hx/personal factors: co-morbidities, inaccessible home, dec caregiver support, advanced age, use of AD, h/o of falls, fall risk, and O2  Examination: dec mobility, dec balance, dec endurance, dec amb, risk for falls  Clinical: unpredictable (ongoing medical status, abnormal lab values, and risk for falls)  Complexity: high    Genevia Fothergill PTS

## 2023-03-08 NOTE — CASE MANAGEMENT
Case Management Discharge Planning Note    Patient name Dyan Lundberg  Location [place] [address] 2 /[address] [employer]* MRN [id number]  : 1945 Date 3/8/2023       Current Admission Date: 3/6/2023  Current Admission Diagnosis:Sepsis secondary to UTI [facility])   Patient Active Problem List    Diagnosis Date Noted   • Hyponatremia 2023   • Hypomagnesemia 2023   • Hydronephrosis, bilateral 2023   • UTI (urinary tract infection) 2023   • Cystocele with prolapse 2023   • Chronic obstructive pulmonary disease, unspecified COPD type ([facility] ) 2022   • Community acquired pneumonia 2019   • Sepsis secondary to UTI ([facility] ) 2019   • Hx of CABG 2018   • Hx stent of SVG to the RCA 2018   • Asthma 2014   • Chronic coronary artery disease 2014   • Hiatal hernia 2014   • Osteoarthritis 2014   • Glaucoma 2013   • Hyperlipidemia 2012   • Hypertension 2012      LOS (days): 2  Geometric Mean LOS (GMLOS) (days): 3 50  Days to GMLOS:1 7     OBJECTIVE:  Risk of Unplanned Readmission Score: 13 83         Current admission status: Inpatient   Preferred Pharmacy:   [address] [place] [address],[address], [address]  [place] [address]  Phone: [phone] Fax: [phone]    Primary Care Provider: Fariha Mckenzie MD    Primary Insurance: [facility] MEDICARE [facility] REP  Secondary Insurance: [facility] DETAILS:    Discharge planning discussed with[de-identified] Patient  Freedom of Choice: Yes  Comments - Freedom of Choice: Pt reports she would like to think about the [facility] rec but is agreeable to referrals being sent to assess her options      [facility] [address]         Is the patient interested in [facility] at discharge?: Yes  [address] requested[de-identified] Nursing, Occupational Therapy, Physical [address] Name[de-identified] Other  [address] Provider[de-identified] PCP  [address] Services Needed[de-identified] Strengthening/Theraputic Exercises to Improve Function, Evaluate Functional Status and Safety, Gait/ADL Training  Homebound Criteria Met[de-identified] Requires the Assistance of Another Person for Safe Ambulation or to Leave the Home  Supporting Clincal Findings[de-identified] Fatigues Easliy in [employer], Limited Endurance    Other Referral/Resources/Interventions Provided:  Interventions: C  Referral Comments: [facility] referrals placed via [employer]  Additional Comments: Patient reviewed during care coordination rounds with Dr Gunnar Bender who informed that pt will likely be medically stable for discharge in 24-48 hours pending urine cultures

## 2023-03-08 NOTE — PLAN OF CARE
Problem: Prexisting or High Potential for Compromised Skin Integrity  Goal: Skin integrity is maintained or improved  Description: INTERVENTIONS:  - Identify patients at risk for skin breakdown  - Assess and monitor skin integrity  - Assess and monitor nutrition and hydration status  - Monitor labs   - Assess for incontinence   - Turn and reposition patient  - Assist with mobility/ambulation  - Relieve pressure over bony prominences  - Avoid friction and shearing  - Provide appropriate hygiene as needed including keeping skin clean and dry  - Evaluate need for skin moisturizer/barrier cream  - Collaborate with interdisciplinary team   - Patient/family teaching  - Consider wound care consult   Outcome: Progressing     Problem: MOBILITY - ADULT  Goal: Maintain or return to baseline ADL function  Description: INTERVENTIONS:  -  Assess patient's ability to carry out ADLs; assess patient's baseline for ADL function and identify physical deficits which impact ability to perform ADLs (bathing, care of mouth/teeth, toileting, grooming, dressing, etc )  - Assess/evaluate cause of self-care deficits   - Assess range of motion  - Assess patient's mobility; develop plan if impaired  - Assess patient's need for assistive devices and provide as appropriate  - Encourage maximum independence but intervene and supervise when necessary  - Involve family in performance of ADLs  - Assess for home care needs following discharge   - Consider OT consult to assist with ADL evaluation and planning for discharge  - Provide patient education as appropriate  Outcome: Progressing  Goal: Maintains/Returns to pre admission functional level  Description: INTERVENTIONS:  - Perform BMAT or MOVE assessment daily    - Set and communicate daily mobility goal to care team and patient/family/caregiver  - Collaborate with rehabilitation services on mobility goals if consulted  - Perform Range of Motion 4 times a day    - Reposition patient every 2 hours   - Dangle patient 3 times a day  - Stand patient 3 times a day  - Ambulate patient 3 times a day  - Out of bed to chair 3 times a day   - Out of bed for meals 3 times a day  - Out of bed for toileting  - Record patient progress and toleration of activity level   Outcome: Progressing     Problem: PAIN - ADULT  Goal: Verbalizes/displays adequate comfort level or baseline comfort level  Description: Interventions:  - Encourage patient to monitor pain and request assistance  - Assess pain using appropriate pain scale  - Administer analgesics based on type and severity of pain and evaluate response  - Implement non-pharmacological measures as appropriate and evaluate response  - Consider cultural and social influences on pain and pain management  - Notify physician/advanced practitioner if interventions unsuccessful or patient reports new pain  Outcome: Progressing     Problem: INFECTION - ADULT  Goal: Absence or prevention of progression during hospitalization  Description: INTERVENTIONS:  - Assess and monitor for signs and symptoms of infection  - Monitor lab/diagnostic results  - Monitor all insertion sites, i e  indwelling lines, tubes, and drains  - Monitor endotracheal if appropriate and nasal secretions for changes in amount and color  - [place] appropriate cooling/warming therapies per order  - Administer medications as ordered  - Instruct and encourage patient and family to use good hand hygiene technique  - Identify and instruct in appropriate isolation precautions for identified infection/condition  Outcome: Progressing     Problem: SAFETY ADULT  Goal: Patient will remain free of falls  Description: INTERVENTIONS:  - Educate patient/family on patient safety including physical limitations  - Instruct patient to call for assistance with activity   - Consult OT/PT to assist with strengthening/mobility   - Keep Call bell within reach  - Keep bed low and locked with side rails adjusted as appropriate  - Keep care items and personal belongings within reach  - Initiate and maintain comfort rounds  - Make Fall Risk Sign visible to staff  - Offer Toileting every 2  Hours, in advance of need  - Initiate/Maintain bed alarm  - Obtain necessary fall risk management equipment: alarms   - Apply yellow socks and bracelet for high fall risk patients  - Consider moving patient to room near nurses station  Outcome: Progressing     Problem: DISCHARGE PLANNING  Goal: Discharge to home or other facility with appropriate resources  Description: INTERVENTIONS:  - Identify barriers to discharge w/patient and caregiver  - Arrange for needed discharge resources and transportation as appropriate  - Identify discharge learning needs (meds, wound care, etc )  - Arrange for interpretive services to assist at discharge as needed  - Refer to Case Management Department for coordinating discharge planning if the patient needs post-hospital services based on physician/advanced practitioner order or complex needs related to functional status, cognitive ability, or social support system  Outcome: Progressing     Problem: Knowledge Deficit  Goal: Patient/family/caregiver demonstrates understanding of disease process, treatment plan, medications, and discharge instructions  Description: Complete learning assessment and assess knowledge base    Interventions:  - Provide teaching at level of understanding  - Provide teaching via preferred learning methods  Outcome: Progressing     Problem: RESPIRATORY - ADULT  Goal: Achieves optimal ventilation and oxygenation  Description: INTERVENTIONS:  - Assess for changes in respiratory status  - Assess for changes in mentation and behavior  - Position to facilitate oxygenation and minimize respiratory effort  - Oxygen administered by appropriate delivery if ordered  - Initiate smoking cessation education as indicated  - Encourage broncho-pulmonary hygiene including cough, deep breathe, Incentive Spirometry  - Assess the need for suctioning and aspirate as needed  - Assess and instruct to report SOB or any respiratory difficulty  - Respiratory Therapy support as indicated  Outcome: Progressing     Problem: GENITOURINARY - ADULT  Goal: Maintains or returns to baseline urinary function  Description: INTERVENTIONS:  - Assess urinary function  - Encourage oral fluids to ensure adequate hydration if ordered  - Administer IV fluids as ordered to ensure adequate hydration  - Administer ordered medications as needed  - Offer frequent toileting  - Follow urinary retention protocol if ordered  Outcome: Progressing  Goal: Absence of urinary retention  Description: INTERVENTIONS:  - Assess patient’s ability to void and empty bladder  - Monitor I/O  - Bladder scan as needed  - Discuss with physician/AP medications to alleviate retention as needed  - Discuss catheterization for long term situations as appropriate  Outcome: Progressing  Goal: Urinary catheter remains patent  Description: INTERVENTIONS:  - Assess patency of urinary catheter  - If patient has a chronic burgos, consider changing catheter if non-functioning  - Follow guidelines for intermittent irrigation of non-functioning urinary catheter  Outcome: Progressing     Problem: METABOLIC, FLUID AND ELECTROLYTES - ADULT  Goal: Electrolytes maintained within normal limits  Description: INTERVENTIONS:  - Monitor labs and assess patient for signs and symptoms of electrolyte imbalances  - Administer electrolyte replacement as ordered  - Monitor response to electrolyte replacements, including repeat lab results as appropriate  - Instruct patient on fluid and nutrition as appropriate  Outcome: Progressing     Problem: SKIN/TISSUE INTEGRITY - ADULT  Goal: Skin Integrity remains intact(Skin Breakdown Prevention)  Description: Assess:  -Perform Kosta assessment every shift   -Clean and moisturize skin every shift   -Inspect skin when repositioning, toileting, and assisting with ADLS  -Assess under medical devices such as iv every shift   -Assess extremities for adequate circulation and sensation     Bed Management:  -Have minimal linens on bed & keep smooth, unwrinkled  -Change linens as needed when moist or perspiring  -Avoid sitting or lying in one position for more than 2 hours while in bed  -Keep HOB at 30 degrees     Toileting:  -Offer bedside commode  -Assess for incontinence every shift   -Use incontinent care products after each incontinent episode such as foam cleanser     Activity:  -Mobilize patient 3 times a day  -Encourage activity and walks on unit  -Encourage or provide ROM exercises   -Turn and reposition patient every 2 Hours  -Use appropriate equipment to lift or move patient in bed  -Instruct/ Assist with weight shifting every 2 hours  when out of bed in chair  -Consider limitation of chair time 2 hour intervals    Skin Care:  -Avoid use of baby powder, tape, friction and shearing, hot water or constrictive clothing  -Relieve pressure over bony prominences using pillows   -Do not massage red bony areas    Next Steps:  -Teach patient strategies to minimize risks such as wounds   -Consider consults to  interdisciplinary teams such as wound care   Outcome: Progressing

## 2023-03-08 NOTE — PLAN OF CARE
Problem: Prexisting or High Potential for Compromised Skin Integrity  Goal: Skin integrity is maintained or improved  Description: INTERVENTIONS:  - Identify patients at risk for skin breakdown  - Assess and monitor skin integrity  - Assess and monitor nutrition and hydration status  - Monitor labs   - Assess for incontinence   - Turn and reposition patient  - Assist with mobility/ambulation  - Relieve pressure over bony prominences  - Avoid friction and shearing  - Provide appropriate hygiene as needed including keeping skin clean and dry  - Evaluate need for skin moisturizer/barrier cream  - Collaborate with interdisciplinary team   - Patient/family teaching  - Consider wound care consult   Outcome: Progressing     Problem: MOBILITY - ADULT  Goal: Maintain or return to baseline ADL function  Description: INTERVENTIONS:  -  Assess patient's ability to carry out ADLs; assess patient's baseline for ADL function and identify physical deficits which impact ability to perform ADLs (bathing, care of mouth/teeth, toileting, grooming, dressing, etc )  - Assess/evaluate cause of self-care deficits   - Assess range of motion  - Assess patient's mobility; develop plan if impaired  - Assess patient's need for assistive devices and provide as appropriate  - Encourage maximum independence but intervene and supervise when necessary  - Involve family in performance of ADLs  - Assess for home care needs following discharge   - Consider OT consult to assist with ADL evaluation and planning for discharge  - Provide patient education as appropriate  Outcome: Progressing  Goal: Maintains/Returns to pre admission functional level  Description: INTERVENTIONS:  - Perform BMAT or MOVE assessment daily    - Set and communicate daily mobility goal to care team and patient/family/caregiver  - Collaborate with rehabilitation services on mobility goals if consulted  - Perform Range of Motion 4 times a day    - Reposition patient every 2 hours   - Dangle patient 3 times a day  - Stand patient 3 times a day  - Ambulate patient 3 times a day  - Out of bed to chair 3 times a day   - Out of bed for meals 3 times a day  - Out of bed for toileting  - Record patient progress and toleration of activity level   Outcome: Progressing     Problem: PAIN - ADULT  Goal: Verbalizes/displays adequate comfort level or baseline comfort level  Description: Interventions:  - Encourage patient to monitor pain and request assistance  - Assess pain using appropriate pain scale  - Administer analgesics based on type and severity of pain and evaluate response  - Implement non-pharmacological measures as appropriate and evaluate response  - Consider cultural and social influences on pain and pain management  - Notify physician/advanced practitioner if interventions unsuccessful or patient reports new pain  Outcome: Progressing     Problem: INFECTION - ADULT  Goal: Absence or prevention of progression during hospitalization  Description: INTERVENTIONS:  - Assess and monitor for signs and symptoms of infection  - Monitor lab/diagnostic results  - Monitor all insertion sites, i e  indwelling lines, tubes, and drains  - Monitor endotracheal if appropriate and nasal secretions for changes in amount and color  - [place] appropriate cooling/warming therapies per order  - Administer medications as ordered  - Instruct and encourage patient and family to use good hand hygiene technique  - Identify and instruct in appropriate isolation precautions for identified infection/condition  Outcome: Progressing     Problem: SAFETY ADULT  Goal: Patient will remain free of falls  Description: INTERVENTIONS:  - Educate patient/family on patient safety including physical limitations  - Instruct patient to call for assistance with activity   - Consult OT/PT to assist with strengthening/mobility   - Keep Call bell within reach  - Keep bed low and locked with side rails adjusted as appropriate  - Keep care items and personal belongings within reach  - Initiate and maintain comfort rounds  - Make Fall Risk Sign visible to staff  - Offer Toileting every 2  Hours, in advance of need  - Initiate/Maintain bed alarm  - Obtain necessary fall risk management equipment: alarms   - Apply yellow socks and bracelet for high fall risk patients  - Consider moving patient to room near nurses station  Outcome: Progressing     Problem: DISCHARGE PLANNING  Goal: Discharge to home or other facility with appropriate resources  Description: INTERVENTIONS:  - Identify barriers to discharge w/patient and caregiver  - Arrange for needed discharge resources and transportation as appropriate  - Identify discharge learning needs (meds, wound care, etc )  - Arrange for interpretive services to assist at discharge as needed  - Refer to Case Management Department for coordinating discharge planning if the patient needs post-hospital services based on physician/advanced practitioner order or complex needs related to functional status, cognitive ability, or social support system  Outcome: Progressing     Problem: Knowledge Deficit  Goal: Patient/family/caregiver demonstrates understanding of disease process, treatment plan, medications, and discharge instructions  Description: Complete learning assessment and assess knowledge base    Interventions:  - Provide teaching at level of understanding  - Provide teaching via preferred learning methods  Outcome: Progressing     Problem: RESPIRATORY - ADULT  Goal: Achieves optimal ventilation and oxygenation  Description: INTERVENTIONS:  - Assess for changes in respiratory status  - Assess for changes in mentation and behavior  - Position to facilitate oxygenation and minimize respiratory effort  - Oxygen administered by appropriate delivery if ordered  - Initiate smoking cessation education as indicated  - Encourage broncho-pulmonary hygiene including cough, deep breathe, Incentive Spirometry  - Assess the need for suctioning and aspirate as needed  - Assess and instruct to report SOB or any respiratory difficulty  - Respiratory Therapy support as indicated  Outcome: Progressing     Problem: GENITOURINARY - ADULT  Goal: Maintains or returns to baseline urinary function  Description: INTERVENTIONS:  - Assess urinary function  - Encourage oral fluids to ensure adequate hydration if ordered  - Administer IV fluids as ordered to ensure adequate hydration  - Administer ordered medications as needed  - Offer frequent toileting  - Follow urinary retention protocol if ordered  Outcome: Progressing  Goal: Absence of urinary retention  Description: INTERVENTIONS:  - Assess patient’s ability to void and empty bladder  - Monitor I/O  - Bladder scan as needed  - Discuss with physician/AP medications to alleviate retention as needed  - Discuss catheterization for long term situations as appropriate  Outcome: Progressing  Goal: Urinary catheter remains patent  Description: INTERVENTIONS:  - Assess patency of urinary catheter  - If patient has a chronic burgos, consider changing catheter if non-functioning  - Follow guidelines for intermittent irrigation of non-functioning urinary catheter  Outcome: Progressing     Problem: METABOLIC, FLUID AND ELECTROLYTES - ADULT  Goal: Electrolytes maintained within normal limits  Description: INTERVENTIONS:  - Monitor labs and assess patient for signs and symptoms of electrolyte imbalances  - Administer electrolyte replacement as ordered  - Monitor response to electrolyte replacements, including repeat lab results as appropriate  - Instruct patient on fluid and nutrition as appropriate  Outcome: Progressing     Problem: SKIN/TISSUE INTEGRITY - ADULT  Goal: Skin Integrity remains intact(Skin Breakdown Prevention)  Description: Assess:  -Perform Kosta assessment every shift   -Clean and moisturize skin every shift   -Inspect skin when repositioning, toileting, and assisting with ADLS  -Assess under medical devices such as iv every shift   -Assess extremities for adequate circulation and sensation     Bed Management:  -Have minimal linens on bed & keep smooth, unwrinkled  -Change linens as needed when moist or perspiring  -Avoid sitting or lying in one position for more than 2 hours while in bed  -Keep HOB at 30 degrees     Toileting:  -Offer bedside commode  -Assess for incontinence every shift   -Use incontinent care products after each incontinent episode such as foam cleanser     Activity:  -Mobilize patient 3 times a day  -Encourage activity and walks on unit  -Encourage or provide ROM exercises   -Turn and reposition patient every 2 Hours  -Use appropriate equipment to lift or move patient in bed  -Instruct/ Assist with weight shifting every 2 hours  when out of bed in chair  -Consider limitation of chair time 2 hour intervals    Skin Care:  -Avoid use of baby powder, tape, friction and shearing, hot water or constrictive clothing  -Relieve pressure over bony prominences using pillows   -Do not massage red bony areas    Next Steps:  -Teach patient strategies to minimize risks such as wounds   -Consider consults to  interdisciplinary teams such as wound care   Outcome: Progressing

## 2023-03-08 NOTE — PLAN OF CARE
Problem: OCCUPATIONAL THERAPY ADULT  Goal: Performs self-care activities at highest level of function for planned discharge setting  See evaluation for individualized goals  Description: Treatment Interventions: ADL retraining, Functional transfer training, UE strengthening/ROM, Endurance training, Patient/family training, Equipment evaluation/education, Neuromuscular reeducation, Compensatory technique education, Continued evaluation, Energy conservation, Activityengagement          See flowsheet documentation for full assessment, interventions and recommendations  Note: Limitation: Decreased ADL status, Decreased UE strength, Decreased Safe judgement during ADL, Decreased endurance, Decreased self-care trans, Decreased high-level ADLs  Prognosis: Fair  Assessment: Patient is a 68y o  year old female seen for OT eval s/p [facility] with sepsis secondary to UTI, hyponatremia, hypomagnesemia, b/l hydronephrosis, cystocele with prolapse  Comorbidities affecting pt’s functional performance include a significant PMH of: CAD, HTN, COPD, asthma, OA, glaucoma, hernia, HLD, hx of CABG, hx of stent of SVG to the RCA, community acquired pneumonia  Patient with active OT orders and activity orders for Up with assistance  Personal factors affecting pt at time of IE include: difficulty performing ADLs and IADLs, difficulty with functional mobility/transfers  Prior to admission, patient was (I) with ADLs, requires assistance with IADLs   Upon evaluation, patient’s functional status as follows: eating: independent, grooming: SBA, UB bathing: SBA, LB bathing: MIN A , UB dressing: SBA, LB dressing: MIN A , toileting: MIN A ; functional transfers: MIN A , bed mobility: DNT, functional mobility: MIN A , sitting/standing tolerance: ~4 min with unilateral UE support- due to the following deficits impacting occupational performance: decreased B UE strength, decreased static/dynamic sitting/standing balance, decreased activity tolerance, decreased safety awareness, and increased pain  Patient does not wear O2 at home, although admits sometimes her oxygen is low at home; 2L donned at beginning of session; refer to flowsheet  Patient would benefit from continued skilled OT therapy while in acute setting to address deficits as defined above and maximize (I) with ADLs and functional mobility  Occupational performance areas to address include: grooming, bathing, toileting, UB/LB dressing, functional mobility, household maintenance, and medication management  Based on the aforementioned OT evaluation, functional performance deficits, and assessments, pt has been identified as a high complexity evaluation  Co treatment with PT secondary to complex medical condition of pt, possible A of 2 required to achieve and maintain transitional movements, requiring the need of skilled therapeutic intervention of 2 therapists to achieve delivery of services       OT Discharge Recommendation: Home with home health rehabilitation (increased family support)

## 2023-03-08 NOTE — PROGRESS NOTES
Progress Note - Urology  Sierra Harrington 1945, 68 y o  female MRN: [id number]    Unit/Bed#: [place] [facility] 2 -02 Encounter: [id number]      Hydronephrosis, bilateral  Assessment & Plan  · Presented with flank pain s/t severe bilateral hydronephrosis result of bladder outlet obstruction caused by significant pelvic organ prolapse  · She is now s/p Burgos catheter insertion  · She is now s/p vaginal Pessary insertion  · Her symptoms completely resolved quickly  · Hydronephrosis completely resolved on repeat US this morning  · Urine output volume excellent, clear yellow via Burgos  ·   · Present to office Friday for voiding trial  See GYN for q3 month Pessary visit  · She declined referral for urogynecology or any surgical specialist for her pelvic organ prolapse and cystocele management  Wishes to manage conservatively with pessary  · Discharge home    Urology will sign off but remain available for any further inpatient needs  Please feel free to contact the provider currently covering the Urology [facility] role for this campus with questions or concerns  Subjective: Pain free since last night  Had burgos and pessary placed yesterday  She discussed and wishes to avoid any surgical intervention with GYN team and wishes to have pessary longterm  Urine draining well  No fevers  Wishes to go home  Review of Systems   Constitutional: Negative for activity change, appetite change, chills, fever and unexpected weight change  HENT: Negative  Respiratory: Negative  Negative for shortness of breath  Cardiovascular: Negative  Negative for chest pain  Gastrointestinal: Negative for abdominal pain, diarrhea, nausea and vomiting  Endocrine: Negative  Genitourinary: Negative for decreased urine volume, difficulty urinating, dysuria, flank pain, frequency, hematuria, pelvic pain and urgency  Musculoskeletal: Negative for back pain and gait problem  Skin: Negative  Allergic/Immunologic: Negative  Neurological: Negative  Hematological: Negative for adenopathy  Does not bruise/bleed easily  Objective:  Vitals: Blood pressure 134/55, pulse 85, temperature 98 2 °F (36 8 °C), resp  rate 18, height 5' 1" (1 549 m), weight 57 9 kg (127 lb 10 3 oz), SpO2 97 %  ,Body mass index is 24 12 kg/m²  Intake/Output Summary (Last 24 hours) at 3/8/2023 1041  Last data filed at 3/8/2023 3063  Gross per 24 hour   Intake --   Output 1500 ml   Net -1500 ml     Invasive Devices     Peripheral Intravenous Line  Duration           Peripheral IV 03/06/23 Right Antecubital 1 day          Drain  Duration           Urethral Catheter Latex 1 day                Physical Exam  Vitals and nursing note reviewed  Constitutional:       General: She is not in acute distress  Appearance: She is well-developed  She is not diaphoretic  HENT:      Head: Normocephalic and atraumatic  Pulmonary:      Effort: Pulmonary effort is normal    Abdominal:      General: There is no distension  Palpations: There is no mass  Tenderness: There is no right CVA tenderness or left CVA tenderness  Genitourinary:     Comments: Mayo per urethra draining clear yellow urine  Skin:     General: Skin is warm and dry  Neurological:      Mental Status: She is alert and oriented to person, place, and time        Gait: Gait normal    Psychiatric:         Speech: Speech normal          Behavior: Behavior normal          Labs:  Recent Labs     03/06/23 1738 03/07/23  0546 03/08/23  0040 03/08/23  0444   WBC 13 52* 15 44* 14 05* 11 82*     Recent Labs     03/06/23 1738 03/07/23  0546 03/08/23  0040 03/08/23  0444   HGB 13 4 10 7* 10 5* 10 3*       Recent Labs     03/06/23 1738 03/07/23  0546 03/08/23  0444   CREATININE 0 70 0 72 0 72       History:    Past Medical History:   Diagnosis Date   • Arthritis    • Asthma    • Community acquired pneumonia 11/13/2019   • Coronary artery disease    • DM (diabetes mellitus) (HCC)    • HTN (hypertension)    • Hydronephrosis, bilateral 3/6/2023   • Hyperlipidemia    • Hyponatremia      Past Surgical History:   Procedure Laterality Date   • CARDIAC CATHETERIZATION     • CORONARY ARTERY BYPASS GRAFT  2010    with subsequent stent to the saphenous veingraft to the RCA 3 months later   • KNEE ARTHROSCOPY     • POLYPECTOMY      laryngeal   • TOTAL HIP ARTHROPLASTY     • TUBAL LIGATION       Family History   Problem Relation Age of Onset   • Stroke Mother    • Hypertension Mother    • Heart disease Mother    • Colon cancer Father    • Heart attack Brother    • Heart attack Brother      Social History     Socioeconomic History   • Marital status: Single     Spouse name: Not on file   • Number of children: 5   • Years of education: Not on file   • Highest education level: Not on file   Occupational History   • Occupation: retired   Tobacco Use   • Smoking status: Former     Packs/day: 0 50     Years: 13 00     Pack years: 6 50     Types: Cigarettes     Quit date:      Years since quittin 1   • Smokeless tobacco: Never   • Tobacco comments:     no passive smoke exposure   Vaping Use   • Vaping Use: Never used   Substance and Sexual Activity   • Alcohol use: Yes     Comment: socially   • Drug use: Never   • Sexual activity: Not Currently   Other Topics Concern   • Not on file   Social History Narrative   • Not on file     Social Determinants of Health     Financial Resource Strain: Not on file   Food Insecurity: No Food Insecurity   • Worried About Running Out of Food in the Last Year: Never true   • Ran Out of Food in the Last Year: Never true   Transportation Needs: No Transportation Needs   • Lack of Transportation (Medical): No   • Lack of Transportation (Non-Medical):  No   Physical Activity: Not on file   Stress: Not on file   Social Connections: Not on file   Intimate Partner Violence: Not on file   Housing Stability: Unknown   • Unable to Pay for Housing in the Last Year: No   • Number of Places Lived in the Last Year: Not on file   • Unstable Housing in the Last Year: Not on file         [facility]  Date: 3/8/2023 Time: 10:41 AM

## 2023-03-09 ENCOUNTER — APPOINTMENT (INPATIENT)
Dept: RADIOLOGY | Facility: HOSPITAL | Age: 78
End: 2023-03-09

## 2023-03-09 LAB
ALBUMIN SERPL BCP-MCNC: 3.7 G/DL (ref 3.5–5)
ALP SERPL-CCNC: 61 U/L (ref 34–104)
ALT SERPL W P-5'-P-CCNC: 20 U/L (ref 7–52)
ANION GAP SERPL CALCULATED.3IONS-SCNC: 8 MMOL/L (ref 4–13)
AST SERPL W P-5'-P-CCNC: 36 U/L (ref 13–39)
BILIRUB SERPL-MCNC: 0.3 MG/DL (ref 0.2–1)
BUN SERPL-MCNC: 13 MG/DL (ref 5–25)
CALCIUM SERPL-MCNC: 9.7 MG/DL (ref 8.4–10.2)
CHLORIDE SERPL-SCNC: 96 MMOL/L (ref 96–108)
CO2 SERPL-SCNC: 30 MMOL/L (ref 21–32)
CREAT SERPL-MCNC: 0.52 MG/DL (ref 0.6–1.3)
ERYTHROCYTE [DISTWIDTH] IN BLOOD BY AUTOMATED COUNT: 13.8 % (ref 11.6–15.1)
GFR SERPL CREATININE-BSD FRML MDRD: 92 ML/MIN/1.73SQ M
GLUCOSE SERPL-MCNC: 166 MG/DL (ref 65–140)
HCT VFR BLD AUTO: 35.6 % (ref 34.8–46.1)
HGB BLD-MCNC: 11.3 G/DL (ref 11.5–15.4)
MCH RBC QN AUTO: 27.4 PG (ref 26.8–34.3)
MCHC RBC AUTO-ENTMCNC: 31.7 G/DL (ref 31.4–37.4)
MCV RBC AUTO: 86 FL (ref 82–98)
PLATELET # BLD AUTO: 328 THOUSANDS/UL (ref 149–390)
PMV BLD AUTO: 10.2 FL (ref 8.9–12.7)
POTASSIUM SERPL-SCNC: 4.7 MMOL/L (ref 3.5–5.3)
PROT SERPL-MCNC: 7.5 G/DL (ref 6.4–8.4)
RBC # BLD AUTO: 4.13 MILLION/UL (ref 3.81–5.12)
SODIUM SERPL-SCNC: 134 MMOL/L (ref 135–147)
WBC # BLD AUTO: 10.25 THOUSAND/UL (ref 4.31–10.16)

## 2023-03-09 PROCEDURE — 0UHG7GZ INSERTION OF PESSARY INTO VAGINA, VIA NATURAL OR ARTIFICIAL OPENING: ICD-10-PCS | Performed by: INTERNAL MEDICINE

## 2023-03-09 RX ADMIN — LEVALBUTEROL HYDROCHLORIDE 1.25 MG: 1.25 SOLUTION, CONCENTRATE RESPIRATORY (INHALATION) at 19:51

## 2023-03-09 RX ADMIN — ISODIUM CHLORIDE 3 ML: 0.03 SOLUTION RESPIRATORY (INHALATION) at 19:51

## 2023-03-09 RX ADMIN — CLOPIDOGREL BISULFATE 75 MG: 75 TABLET ORAL at 09:21

## 2023-03-09 RX ADMIN — PREDNISONE 40 MG: 20 TABLET ORAL at 09:21

## 2023-03-09 RX ADMIN — ACETAMINOPHEN 325MG 650 MG: 325 TABLET ORAL at 03:44

## 2023-03-09 RX ADMIN — LEVALBUTEROL HYDROCHLORIDE 1.25 MG: 1.25 SOLUTION, CONCENTRATE RESPIRATORY (INHALATION) at 13:20

## 2023-03-09 RX ADMIN — LEVALBUTEROL HYDROCHLORIDE 1.25 MG: 1.25 SOLUTION, CONCENTRATE RESPIRATORY (INHALATION) at 07:05

## 2023-03-09 RX ADMIN — PANTOPRAZOLE SODIUM 20 MG: 20 TABLET, DELAYED RELEASE ORAL at 05:47

## 2023-03-09 RX ADMIN — CEFTRIAXONE 1000 MG: 1 INJECTION, SOLUTION INTRAVENOUS at 22:41

## 2023-03-09 RX ADMIN — PRAVASTATIN SODIUM 40 MG: 40 TABLET ORAL at 17:32

## 2023-03-09 RX ADMIN — AMLODIPINE BESYLATE 5 MG: 5 TABLET ORAL at 09:21

## 2023-03-09 RX ADMIN — AMLODIPINE BESYLATE 5 MG: 5 TABLET ORAL at 17:32

## 2023-03-09 RX ADMIN — HEPARIN SODIUM 5000 UNITS: 5000 INJECTION INTRAVENOUS; SUBCUTANEOUS at 05:47

## 2023-03-09 RX ADMIN — ISODIUM CHLORIDE 3 ML: 0.03 SOLUTION RESPIRATORY (INHALATION) at 07:05

## 2023-03-09 RX ADMIN — ISODIUM CHLORIDE 3 ML: 0.03 SOLUTION RESPIRATORY (INHALATION) at 13:20

## 2023-03-09 RX ADMIN — ALBUTEROL SULFATE 2 PUFF: 90 AEROSOL, METERED RESPIRATORY (INHALATION) at 02:47

## 2023-03-09 RX ADMIN — BISOPROLOL FUMARATE 10 MG: 5 TABLET ORAL at 09:21

## 2023-03-09 NOTE — PROGRESS NOTES
[address] [place] [address]  Progress Note - Molly Sepulveda 1945, 68 y o  female MRN: [id number]  Unit/Bed#: [place] [address] -02 Encounter: [id number]  Primary Care Provider: Justin Rapp MD   Date and time admitted to hospital: 3/6/2023  5:06 PM    * Sepsis secondary to UTI [facility])  Assessment & Plan  Presentation to hospital for flank pain found to have genital prolapse, urinary retention with bilateral hydronephrosis  · Sepsis secondary to UTI: Urine culture demonstrating E  coli with multiple sensitivities  · Continue ceftriaxone and will discharge with cephalosporin    Cystocele with prolapse  Assessment & Plan  · Prolapse likely causing bladder distention with hydronephrosis  · Has been seen by gynecology and pessary has been placed     Hydronephrosis, bilateral  Assessment & Plan  · Severe bilateral hydronephrosis secondary to urinary retention from prolapse  · Urinary catheter has been placed  · Urology checked ultrasound which demonstrates resolution    Hypomagnesemia  Assessment & Plan  · Has been repleted    Hyponatremia  Assessment & Plan  · Hyponatremia resolved with IV fluids  Results from last 7 days   Lab Units 03/09/23  0441 03/08/23  0444 03/07/23  0546 03/06/23  1738   SODIUM mmol/L 134* 135 132* 128*       Chronic obstructive pulmonary disease, unspecified COPD type (HCC)  Assessment & Plan  · COPD/asthma with wheezing and exacerbation  · Ordered some neb treatments with improvement   · CXR: No lung consolidation  · Desaturation study: Requires 4 L with exertion but room air at rest   · Received 2 doses of prednisone but seems to be having paranoia/delirium with this and will be discontinued  Charting was reviewed and she has had prednisone in the past without adverse reaction  Hypertension  Assessment & Plan  · Continue amlodipine and bisoprolol    Chronic coronary artery disease  Assessment & Plan  · CAD with history of CABG  · No chest pain    Continue clopidogrel      VTE Pharmacologic Prophylaxis: VTE Score: 6 High Risk (Score >/= 5) - Pharmacological DVT Prophylaxis Ordered: heparin  Sequential Compression Devices Ordered  Patient Centered Rounds: I have performed bedside rounds with nursing staff today  Discussions with Specialists or Other Care Team Provider: Case management    Education and Discussions with Family / Patient: Updated  (son) via phone  Time Spent for Care: This time was spent on one or more of the following: performing physical exam; counseling and coordination of care; obtaining or reviewing history; documenting in the medical record; reviewing/ordering tests, medications or procedures; communicating with other healthcare professionals and discussing with patient's family/caregivers  Current Length of Stay: 3 day(s)  Current Patient Status: Inpatient   Certification Statement: The patient will continue to require additional inpatient hospital stay due to Delirium  Discharge Plan: Anticipate discharge tomorrow to home with home services  Code Status: Level 1 - Full Code      Subjective:   Patient seen and examined during morning rounds  Was fine this morning until receiving prednisone then became delirious  Denies any chest pain    Objective:   Vitals: Blood pressure 145/77, pulse 90, temperature 97 5 °F (36 4 °C), temperature source Oral, resp  rate 12, height 5' 1" (1 549 m), weight 57 9 kg (127 lb 10 3 oz), SpO2 97 %  Intake/Output Summary (Last 24 hours) at 3/9/2023 1312  Last data filed at 3/9/2023 0350  Gross per 24 hour   Intake --   Output 1300 ml   Net -1300 ml       Physical Exam  Vitals reviewed  Constitutional:       General: She is not in acute distress  Appearance: Normal appearance  HENT:      Head: Atraumatic  Cardiovascular:      Rate and Rhythm: Regular rhythm  Heart sounds: Normal heart sounds  Pulmonary:      Breath sounds: Decreased breath sounds present  No wheezing  Abdominal:      General: Bowel sounds are normal       Palpations: Abdomen is soft  Tenderness: There is no guarding or rebound  Musculoskeletal:         General: No swelling  Skin:     General: Skin is warm  Neurological:      Mental Status: She is alert  Additional Data:   Labs:  Results from last 7 days   Lab Units 03/09/23 0441 03/08/23 0444 03/08/23  0040 03/07/23  0546 03/06/23  1738   WBC Thousand/uL 10 25* 11 82* 14 05*   < > 13 52*   HEMOGLOBIN g/dL 11 3* 10 3* 10 5*   < > 13 4   PLATELETS Thousands/uL 328 275 307   < > 295   MCV fL 86 87 87   < > 85   INR   --   --   --   --  1 00    < > = values in this interval not displayed  Results from last 7 days   Lab Units 03/09/23 0441 03/08/23 0444 03/07/23  0546 03/06/23  1738   SODIUM mmol/L 134* 135 132* 128*   POTASSIUM mmol/L 4 7 4 0 3 9 4 4   CHLORIDE mmol/L 96 99 99 90*   CO2 mmol/L 30 29 27 30   ANION GAP mmol/L 8 7 6 8   BUN mg/dL 13 11 10 9   CREATININE mg/dL 0 52* 0 72 0 72 0 70   CALCIUM mg/dL 9 7 8 8 8 4 9 6   ALBUMIN g/dL 3 7 3 3*  --  4 4   TOTAL BILIRUBIN mg/dL 0 30 0 30  --  0 57   ALK PHOS U/L 61 54  --  77   ALT U/L 20 12  --  12   AST U/L 36 22  --  13   EGFR ml/min/1 73sq m 92 81 81 83   GLUCOSE RANDOM mg/dL 166* 118 128 113     Results from last 7 days   Lab Units 03/07/23  0546 03/06/23  1738   MAGNESIUM mg/dL 1 9 1 3*                  Results from last 7 days   Lab Units 03/08/23  0040 03/06/23  2132 03/06/23  1738   LACTIC ACID mmol/L 0 6  --  1 3   PROCALCITONIN ng/ml 1 68*   < >  --     < > = values in this interval not displayed  * I Have Reviewed All Lab Data Listed Above  Cultures:   Results from last 7 days   Lab Units 03/08/23  0045 03/06/23  2111 03/06/23  1836   BLOOD CULTURE  No Growth at 24 hrs    No Growth at 24 hrs   --   --    URINE CULTURE   --   --  >100,000 cfu/ml Escherichia coli*  60,000-69,000 cfu/ml   INFLUENZA A PCR   --  Negative  --        Results from last 7 days Lab Units 03/06/23 2111   SARS-COV-2  Negative   INFLUENZA A PCR  Negative   INFLUENZA B PCR  Negative   RSV PCR  Negative           Lines/Drains:  Invasive Devices     Peripheral Intravenous Line  Duration           Peripheral IV 03/09/23 Dorsal (posterior); Right Forearm <1 day          Drain  Duration           Urethral Catheter Latex 2 days              Telemetry:      Imaging:  Imaging Reports Reviewed Today Include:   XR chest pa & lateral    Result Date: 3/9/2023  Impression: Possible trace bilateral pleural effusions  No active lung consolidation  The study was marked in [facility] for immediate notification  Workstation performed: [id number]     US kidney and bladder    Result Date: 3/8/2023  Impression: Unremarkable study  Workstation performed: [id number]     CT abdomen pelvis with contrast    Result Date: 3/6/2023  Impression: Bilateral severe hydroureteronephrosis without evidence of obstructing stone  Enhancement of the ureters bilaterally  Findings may represent recently passed stone versus infection  Follow-up with urology is recommended to rule out underlying neoplasm  Marked distention of the urinary bladder which may be related to above process   Workstation performed: [id number]       Scheduled Meds:  Current Facility-Administered Medications   Medication Dose Route Frequency Provider Last Rate   • acetaminophen  650 mg Oral Q6H PRN Amairani Villela PA-C     • albuterol  2 puff Inhalation Q4H PRN Amairani Villela PA-C     • ALPRAZolam  0 5 mg Oral HS PRN Amairani Villela PA-C     • amLODIPine  5 mg Oral BID Amairani Villela PA-C     • bisoprolol  10 mg Oral Daily Amairani Villela PA-C     • cefTRIAXone  1,000 mg Intravenous Q24H Amairani Villela PA-C 1,000 mg (03/08/23 9180)   • clopidogrel  75 mg Oral Daily Amairani Villela PA-C     • heparin (porcine)  5,000 Units Subcutaneous [facility] Amairani Villela PA-C     • levalbuterol  0 63 mg Nebulization Q8H PRN Joaquín Hairston PA-C     • levalbuterol  1 25 mg Nebulization TID Evi Rivero DO     • OLANZapine  5 mg Intramuscular Q6H PRN Evi Rivero DO     • ondansetron  4 mg Intravenous Q6H PRN Amairani Villela PA-C     • pantoprazole  20 mg Oral Daily Before Breakfast Amairani Villela PA-C     • pravastatin  40 mg Oral Daily With Big Kalyan Villela PA-C     • predniSONE  40 mg Oral Daily Agapito De La Fuente DO     • sodium chloride  3 mL Nebulization TID Evi Rivero DO         Today, Patient Was Seen By: Evi Rivero DO    ** Please Note: Dictation voice to text software may have been used in the creation of this document   **

## 2023-03-09 NOTE — RESPIRATORY THERAPY NOTE
Home Oxygen Qualifying Test     Patient name: Sierra Harrington        : 1945   Date of Test:  2023  Diagnosis:    Home Oxygen Test:    **Medicare Guidelines require item(s) 1-5 on all ambulatory patients or 1 and 2 on non-ambulatory patients  1  Baseline SPO2 on Room Air at rest  90%   a  If <= 88% on Room Air add O2 via NC to obtain SpO2 >=88%  If LPM needed, document LPM  needed to reach =>88%    2  SPO2 during exertion on Room Air 86%  a  During exertion monitor SPO2  If SPO2 increases >=89%, do not add supplemental oxygen    3  SPO2 on Oxygen at Rest N/A     4  SPO2 during exertion on Oxygen 93% at 4 LPM    5  Test performed during exertion activity  Patient ambulated in room for six minutes with assistance of walker  [x]  Supplemental Home Oxygen is indicated  []  Client does not qualify for home oxygen      Respiratory Additional Notes-     Rachel Rivers, RT

## 2023-03-09 NOTE — PLAN OF CARE
Problem: Prexisting or High Potential for Compromised Skin Integrity  Goal: Skin integrity is maintained or improved  Description: INTERVENTIONS:  - Identify patients at risk for skin breakdown  - Assess and monitor skin integrity  - Assess and monitor nutrition and hydration status  - Monitor labs   - Assess for incontinence   - Turn and reposition patient  - Assist with mobility/ambulation  - Relieve pressure over bony prominences  - Avoid friction and shearing  - Provide appropriate hygiene as needed including keeping skin clean and dry  - Evaluate need for skin moisturizer/barrier cream  - Collaborate with interdisciplinary team   - Patient/family teaching  - Consider wound care consult   Outcome: Progressing     Problem: MOBILITY - ADULT  Goal: Maintain or return to baseline ADL function  Description: INTERVENTIONS:  -  Assess patient's ability to carry out ADLs; assess patient's baseline for ADL function and identify physical deficits which impact ability to perform ADLs (bathing, care of mouth/teeth, toileting, grooming, dressing, etc )  - Assess/evaluate cause of self-care deficits   - Assess range of motion  - Assess patient's mobility; develop plan if impaired  - Assess patient's need for assistive devices and provide as appropriate  - Encourage maximum independence but intervene and supervise when necessary  - Involve family in performance of ADLs  - Assess for home care needs following discharge   - Consider OT consult to assist with ADL evaluation and planning for discharge  - Provide patient education as appropriate  Outcome: Progressing  Goal: Maintains/Returns to pre admission functional level  Description: INTERVENTIONS:  - Perform BMAT or MOVE assessment daily    - Set and communicate daily mobility goal to care team and patient/family/caregiver  - Collaborate with rehabilitation services on mobility goals if consulted  - Perform Range of Motion 4 times a day    - Reposition patient every 2 hours   - Dangle patient 3 times a day  - Stand patient 3 times a day  - Ambulate patient 3 times a day  - Out of bed to chair 3 times a day   - Out of bed for meals 3 times a day  - Out of bed for toileting  - Record patient progress and toleration of activity level   Outcome: Progressing     Problem: PAIN - ADULT  Goal: Verbalizes/displays adequate comfort level or baseline comfort level  Description: Interventions:  - Encourage patient to monitor pain and request assistance  - Assess pain using appropriate pain scale  - Administer analgesics based on type and severity of pain and evaluate response  - Implement non-pharmacological measures as appropriate and evaluate response  - Consider cultural and social influences on pain and pain management  - Notify physician/advanced practitioner if interventions unsuccessful or patient reports new pain  Outcome: Progressing     Problem: INFECTION - ADULT  Goal: Absence or prevention of progression during hospitalization  Description: INTERVENTIONS:  - Assess and monitor for signs and symptoms of infection  - Monitor lab/diagnostic results  - Monitor all insertion sites, i e  indwelling lines, tubes, and drains  - Monitor endotracheal if appropriate and nasal secretions for changes in amount and color  - [place] appropriate cooling/warming therapies per order  - Administer medications as ordered  - Instruct and encourage patient and family to use good hand hygiene technique  - Identify and instruct in appropriate isolation precautions for identified infection/condition  Outcome: Progressing     Problem: SAFETY ADULT  Goal: Patient will remain free of falls  Description: INTERVENTIONS:  - Educate patient/family on patient safety including physical limitations  - Instruct patient to call for assistance with activity   - Consult OT/PT to assist with strengthening/mobility   - Keep Call bell within reach  - Keep bed low and locked with side rails adjusted as appropriate  - Keep care items and personal belongings within reach  - Initiate and maintain comfort rounds  - Make Fall Risk Sign visible to staff  - Offer Toileting every 2 Hours, in advance of need  - Initiate/Maintain bed alarm  - Obtain necessary fall risk management equipment: alarms   - Apply yellow socks and bracelet for high fall risk patients  - Consider moving patient to room near nurses station  Outcome: Progressing     Problem: DISCHARGE PLANNING  Goal: Discharge to home or other facility with appropriate resources  Description: INTERVENTIONS:  - Identify barriers to discharge w/patient and caregiver  - Arrange for needed discharge resources and transportation as appropriate  - Identify discharge learning needs (meds, wound care, etc )  - Arrange for interpretive services to assist at discharge as needed  - Refer to Case Management Department for coordinating discharge planning if the patient needs post-hospital services based on physician/advanced practitioner order or complex needs related to functional status, cognitive ability, or social support system  Outcome: Progressing     Problem: Knowledge Deficit  Goal: Patient/family/caregiver demonstrates understanding of disease process, treatment plan, medications, and discharge instructions  Description: Complete learning assessment and assess knowledge base    Interventions:  - Provide teaching at level of understanding  - Provide teaching via preferred learning methods  Outcome: Progressing     Problem: RESPIRATORY - ADULT  Goal: Achieves optimal ventilation and oxygenation  Description: INTERVENTIONS:  - Assess for changes in respiratory status  - Assess for changes in mentation and behavior  - Position to facilitate oxygenation and minimize respiratory effort  - Oxygen administered by appropriate delivery if ordered  - Initiate smoking cessation education as indicated  - Encourage broncho-pulmonary hygiene including cough, deep breathe, Incentive Spirometry  - Assess the need for suctioning and aspirate as needed  - Assess and instruct to report SOB or any respiratory difficulty  - Respiratory Therapy support as indicated  Outcome: Progressing     Problem: GENITOURINARY - ADULT  Goal: Maintains or returns to baseline urinary function  Description: INTERVENTIONS:  - Assess urinary function  - Encourage oral fluids to ensure adequate hydration if ordered  - Administer IV fluids as ordered to ensure adequate hydration  - Administer ordered medications as needed  - Offer frequent toileting  - Follow urinary retention protocol if ordered  Outcome: Progressing  Goal: Absence of urinary retention  Description: INTERVENTIONS:  - Assess patient’s ability to void and empty bladder  - Monitor I/O  - Bladder scan as needed  - Discuss with physician/AP medications to alleviate retention as needed  - Discuss catheterization for long term situations as appropriate  Outcome: Progressing  Goal: Urinary catheter remains patent  Description: INTERVENTIONS:  - Assess patency of urinary catheter  - If patient has a chronic burgos, consider changing catheter if non-functioning  - Follow guidelines for intermittent irrigation of non-functioning urinary catheter  Outcome: Progressing     Problem: METABOLIC, FLUID AND ELECTROLYTES - ADULT  Goal: Electrolytes maintained within normal limits  Description: INTERVENTIONS:  - Monitor labs and assess patient for signs and symptoms of electrolyte imbalances  - Administer electrolyte replacement as ordered  - Monitor response to electrolyte replacements, including repeat lab results as appropriate  - Instruct patient on fluid and nutrition as appropriate  Outcome: Progressing     Problem: SKIN/TISSUE INTEGRITY - ADULT  Goal: Skin Integrity remains intact(Skin Breakdown Prevention)  Description: Assess:  -Perform Kosta assessment every shift   -Clean and moisturize skin every shift   -Inspect skin when repositioning, toileting, and assisting with ADLS  -Assess under medical devices such as iv every shift   -Assess extremities for adequate circulation and sensation     Bed Management:  -Have minimal linens on bed & keep smooth, unwrinkled  -Change linens as needed when moist or perspiring  -Avoid sitting or lying in one position for more than 2  hours while in bed  -Keep HOB at 30 degrees     Toileting:  -Offer bedside commode  -Assess for incontinence every shift   -Use incontinent care products after each incontinent episode such as foam cleanser     Activity:  -Mobilize patient 3 times a day  -Encourage activity and walks on unit  -Encourage or provide ROM exercises   -Turn and reposition patient every 2 Hours  -Use appropriate equipment to lift or move patient in bed  -Instruct/ Assist with weight shifting every 2 hours  when out of bed in chair  -Consider limitation of chair time 2 hour intervals    Skin Care:  -Avoid use of baby powder, tape, friction and shearing, hot water or constrictive clothing  -Relieve pressure over bony prominences using pillows   -Do not massage red bony areas    Next Steps:  -Teach patient strategies to minimize risks such as wounds    -Consider consults to  interdisciplinary teams such as wound care   Outcome: Progressing

## 2023-03-09 NOTE — ASSESSMENT & PLAN NOTE
Presentation to hospital for flank pain found to have genital prolapse, urinary retention with bilateral hydronephrosis  · Sepsis secondary to UTI: Urine culture demonstrating E  coli with multiple sensitivities  · Continue ceftriaxone and will discharge with cephalosporin

## 2023-03-09 NOTE — PLAN OF CARE
Problem: Prexisting or High Potential for Compromised Skin Integrity  Goal: Skin integrity is maintained or improved  Description: INTERVENTIONS:  - Identify patients at risk for skin breakdown  - Assess and monitor skin integrity  - Assess and monitor nutrition and hydration status  - Monitor labs   - Assess for incontinence   - Turn and reposition patient  - Assist with mobility/ambulation  - Relieve pressure over bony prominences  - Avoid friction and shearing  - Provide appropriate hygiene as needed including keeping skin clean and dry  - Evaluate need for skin moisturizer/barrier cream  - Collaborate with interdisciplinary team   - Patient/family teaching  - Consider wound care consult   Outcome: Progressing     Problem: MOBILITY - ADULT  Goal: Maintain or return to baseline ADL function  Description: INTERVENTIONS:  -  Assess patient's ability to carry out ADLs; assess patient's baseline for ADL function and identify physical deficits which impact ability to perform ADLs (bathing, care of mouth/teeth, toileting, grooming, dressing, etc )  - Assess/evaluate cause of self-care deficits   - Assess range of motion  - Assess patient's mobility; develop plan if impaired  - Assess patient's need for assistive devices and provide as appropriate  - Encourage maximum independence but intervene and supervise when necessary  - Involve family in performance of ADLs  - Assess for home care needs following discharge   - Consider OT consult to assist with ADL evaluation and planning for discharge  - Provide patient education as appropriate  Outcome: Progressing  Goal: Maintains/Returns to pre admission functional level  Description: INTERVENTIONS:  - Perform BMAT or MOVE assessment daily    - Set and communicate daily mobility goal to care team and patient/family/caregiver     - Collaborate with rehabilitation services on mobility goals if consulted  - Out of bed for toileting  - Record patient progress and toleration of activity level   Outcome: Progressing     Problem: PAIN - ADULT  Goal: Verbalizes/displays adequate comfort level or baseline comfort level  Description: Interventions:  - Encourage patient to monitor pain and request assistance  - Assess pain using appropriate pain scale  - Administer analgesics based on type and severity of pain and evaluate response  - Implement non-pharmacological measures as appropriate and evaluate response  - Consider cultural and social influences on pain and pain management  - Notify physician/advanced practitioner if interventions unsuccessful or patient reports new pain  Outcome: Progressing     Problem: INFECTION - ADULT  Goal: Absence or prevention of progression during hospitalization  Description: INTERVENTIONS:  - Assess and monitor for signs and symptoms of infection  - Monitor lab/diagnostic results  - Monitor all insertion sites, i e  indwelling lines, tubes, and drains  - Monitor endotracheal if appropriate and nasal secretions for changes in amount and color  - [place] appropriate cooling/warming therapies per order  - Administer medications as ordered  - Instruct and encourage patient and family to use good hand hygiene technique  - Identify and instruct in appropriate isolation precautions for identified infection/condition  Outcome: Progressing     Problem: SAFETY ADULT  Goal: Patient will remain free of falls  Description: INTERVENTIONS:  - Educate patient/family on patient safety including physical limitations  - Instruct patient to call for assistance with activity   - Consult OT/PT to assist with strengthening/mobility   - Keep Call bell within reach  - Keep bed low and locked with side rails adjusted as appropriate  - Keep care items and personal belongings within reach  - Initiate and maintain comfort rounds  - Make Fall Risk Sign visible to staff  - Offer Toileting every 2 hours  - Initiate/Maintain bed alarm  - Obtain necessary fall risk management equipment  - Apply yellow socks and bracelet for high fall risk patients  - Consider moving patient to room near nurses station  Outcome: Progressing     Problem: DISCHARGE PLANNING  Goal: Discharge to home or other facility with appropriate resources  Description: INTERVENTIONS:  - Identify barriers to discharge w/patient and caregiver  - Arrange for needed discharge resources and transportation as appropriate  - Identify discharge learning needs (meds, wound care, etc )  - Arrange for interpretive services to assist at discharge as needed  - Refer to Case Management Department for coordinating discharge planning if the patient needs post-hospital services based on physician/advanced practitioner order or complex needs related to functional status, cognitive ability, or social support system  Outcome: Progressing     Problem: RESPIRATORY - ADULT  Goal: Achieves optimal ventilation and oxygenation  Description: INTERVENTIONS:  - Assess for changes in respiratory status  - Assess for changes in mentation and behavior  - Position to facilitate oxygenation and minimize respiratory effort  - Oxygen administered by appropriate delivery if ordered  - Initiate smoking cessation education as indicated  - Encourage broncho-pulmonary hygiene including cough, deep breathe, Incentive Spirometry  - Assess the need for suctioning and aspirate as needed  - Assess and instruct to report SOB or any respiratory difficulty  - Respiratory Therapy support as indicated  Outcome: Progressing     Problem: GENITOURINARY - ADULT  Goal: Maintains or returns to baseline urinary function  Description: INTERVENTIONS:  - Assess urinary function  - Encourage oral fluids to ensure adequate hydration if ordered  - Administer IV fluids as ordered to ensure adequate hydration  - Administer ordered medications as needed  - Offer frequent toileting  - Follow urinary retention protocol if ordered  Outcome: Progressing  Goal: Absence of urinary retention  Description: INTERVENTIONS:  - Assess patient’s ability to void and empty bladder  - Monitor I/O  - Bladder scan as needed  - Discuss with physician/AP medications to alleviate retention as needed  - Discuss catheterization for long term situations as appropriate  Outcome: Progressing  Goal: Urinary catheter remains patent  Description: INTERVENTIONS:  - Assess patency of urinary catheter  - If patient has a chronic burgos, consider changing catheter if non-functioning  - Follow guidelines for intermittent irrigation of non-functioning urinary catheter  Outcome: Progressing     Problem: METABOLIC, FLUID AND ELECTROLYTES - ADULT  Goal: Electrolytes maintained within normal limits  Description: INTERVENTIONS:  - Monitor labs and assess patient for signs and symptoms of electrolyte imbalances  - Administer electrolyte replacement as ordered  - Monitor response to electrolyte replacements, including repeat lab results as appropriate  - Instruct patient on fluid and nutrition as appropriate  Outcome: Progressing     Problem: SKIN/TISSUE INTEGRITY - ADULT  Goal: Skin Integrity remains intact(Skin Breakdown Prevention)  Description: Assess:  -Perform K[place] assessment every shift  -Clean and moisturize skin every shift  -Inspect skin when repositioning, toileting, and assisting with ADLS  -Assess under medical devices   -Assess extremities for adequate circulation and sensation     Bed Management:  -Have minimal linens on bed & keep smooth, unwrinkled  -Change linens as needed when moist or perspiring  -Avoid sitting or lying in one position while in bed    Toileting:  -Offer bedside commode  -Assess for incontinence   -Use incontinent care products after each incontinent episode     Activity:  -Encourage activity and walks on unit  -Encourage or provide ROM exercises   -Turn and reposition patient every 2 Hours, if needed  -Use appropriate equipment to lift or move patient in bed  -Instruct/ Assist with weight shifting when out of bed in chair    Skin Care:  -Avoid use of baby powder, tape, friction and shearing, hot water or constrictive clothing  -Relieve pressure over bony prominences   -Do not massage red bony areas    Next Steps:  -Teach patient strategies to minimize risks   -Consider consults to  interdisciplinary teams   Outcome: Progressing

## 2023-03-09 NOTE — CASE MANAGEMENT
Case Management Discharge Planning Note    Patient name Sierra Harrington  Location [facility] 2 /South 2 [facility] Gregg* MRN [id number]  : 1945 Date 3/9/2023       Current Admission Date: 3/6/2023  Current Admission Diagnosis:Sepsis secondary to UTI [facility])   Patient Active Problem List    Diagnosis Date Noted   • Hyponatremia 2023   • Hypomagnesemia 2023   • Hydronephrosis, bilateral 2023   • UTI (urinary tract infection) 2023   • Cystocele with prolapse 2023   • [facility] pulmonary disease, [facility] 2022   • Community acquired pneumonia 2019   • Sepsis secondary to UTI ([facility] ) 2019   • Hx of CABG 2018   • Hx stent of SVG to the RCA 2018   • Asthma 2014   • Chronic coronary artery disease 2014   • Hiatal hernia 2014   • Osteoarthritis 2014   • Glaucoma 2013   • Hyperlipidemia 2012   • Hypertension 2012      LOS (days): 3  Geometric Mean LOS (GMLOS) (days): 3 50  Days to GMLOS:0 8     OBJECTIVE:  Risk of Unplanned Readmission Score: 17 69         Current admission status: Inpatient   Preferred Pharmacy:   [address] [place] [address],[address], [address]  [place] [address]  Phone: [phone] Fax: [phone]    Primary Care Provider: Rosalba Schneider MD    Primary Insurance: [address] [place] [address],[address] [facility]  Secondary Insurance: [address]:   m/w the pt to discuss her discharge plan via [place] jose Catherine ([id number])  Pt qualifies for 4L of home O2 on exertion  CM discussed this with the pt  Pt stated an understanding  CM ordered home O2 via [place]  Concentrator to be delivered to pt's home tomorrow/day of discharge  PT/OT evaluated the pt and recommended home PT/OT  Pt is currently refusing VNA stating she does not want strangers in her home  CM called pt's son Jeronimo Grigsby provided update    Explained the need for home PT/OT/RN  [place] asked if we can readdress this with the pt tomorrow as she might changed her mind  CM will follow-up with the pt and [facility] tomorrow regarding VNA  [facility] was also notified of the need for home O2  IMM was completed with the pt                                                                                     IMM Given (Date):: 03/09/23  IMM Given to[de-identified] Patient  Family notified[de-identified] Son

## 2023-03-09 NOTE — ASSESSMENT & PLAN NOTE
· COPD/asthma with wheezing and exacerbation  · Ordered some neb treatments with improvement   · CXR: No lung consolidation  · Desaturation study: Requires 4 L with exertion but room air at rest   · Received 2 doses of prednisone but seems to be having paranoia/delirium with this and will be discontinued  Charting was reviewed and she has had prednisone in the past without adverse reaction

## 2023-03-10 ENCOUNTER — HOME HEALTH ADMISSION (OUTPATIENT)
Dept: HOME HEALTH SERVICES | Facility: HOME HEALTHCARE | Age: 78
End: 2023-03-10

## 2023-03-10 VITALS
TEMPERATURE: 97.5 F | RESPIRATION RATE: 14 BRPM | WEIGHT: 127.65 LBS | SYSTOLIC BLOOD PRESSURE: 144 MMHG | DIASTOLIC BLOOD PRESSURE: 64 MMHG | HEIGHT: 61 IN | OXYGEN SATURATION: 96 % | BODY MASS INDEX: 24.1 KG/M2 | HEART RATE: 76 BPM

## 2023-03-10 PROBLEM — R41.89 COGNITIVE DECLINE: Status: ACTIVE | Noted: 2023-03-10

## 2023-03-10 LAB
ALBUMIN SERPL BCP-MCNC: 3.8 G/DL (ref 3.5–5)
ALP SERPL-CCNC: 61 U/L (ref 34–104)
ALT SERPL W P-5'-P-CCNC: 21 U/L (ref 7–52)
ANION GAP SERPL CALCULATED.3IONS-SCNC: 11 MMOL/L (ref 4–13)
AST SERPL W P-5'-P-CCNC: 29 U/L (ref 13–39)
BILIRUB SERPL-MCNC: 0.25 MG/DL (ref 0.2–1)
BUN SERPL-MCNC: 11 MG/DL (ref 5–25)
CALCIUM SERPL-MCNC: 9.8 MG/DL (ref 8.4–10.2)
CHLORIDE SERPL-SCNC: 94 MMOL/L (ref 96–108)
CO2 SERPL-SCNC: 30 MMOL/L (ref 21–32)
CREAT SERPL-MCNC: 0.57 MG/DL (ref 0.6–1.3)
DME PARACHUTE DELIVERY DATE ACTUAL: NORMAL
DME PARACHUTE DELIVERY DATE EXPECTED: NORMAL
DME PARACHUTE DELIVERY DATE REQUESTED: NORMAL
DME PARACHUTE ITEM DESCRIPTION: NORMAL
DME PARACHUTE ORDER STATUS: NORMAL
DME PARACHUTE SUPPLIER NAME: NORMAL
DME PARACHUTE SUPPLIER PHONE: NORMAL
ERYTHROCYTE [DISTWIDTH] IN BLOOD BY AUTOMATED COUNT: 13.5 % (ref 11.6–15.1)
GFR SERPL CREATININE-BSD FRML MDRD: 89 ML/MIN/1.73SQ M
GLUCOSE SERPL-MCNC: 127 MG/DL (ref 65–140)
HCT VFR BLD AUTO: 39 % (ref 34.8–46.1)
HGB BLD-MCNC: 12.3 G/DL (ref 11.5–15.4)
MCH RBC QN AUTO: 26.7 PG (ref 26.8–34.3)
MCHC RBC AUTO-ENTMCNC: 31.5 G/DL (ref 31.4–37.4)
MCV RBC AUTO: 85 FL (ref 82–98)
PLATELET # BLD AUTO: 452 THOUSANDS/UL (ref 149–390)
PMV BLD AUTO: 9.6 FL (ref 8.9–12.7)
POTASSIUM SERPL-SCNC: 3.7 MMOL/L (ref 3.5–5.3)
PROT SERPL-MCNC: 7.7 G/DL (ref 6.4–8.4)
RBC # BLD AUTO: 4.6 MILLION/UL (ref 3.81–5.12)
SODIUM SERPL-SCNC: 135 MMOL/L (ref 135–147)
WBC # BLD AUTO: 14.49 THOUSAND/UL (ref 4.31–10.16)

## 2023-03-10 RX ORDER — CEFUROXIME AXETIL 500 MG/1
500 TABLET ORAL EVERY 12 HOURS SCHEDULED
Qty: 6 TABLET | Refills: 0 | Status: SHIPPED | OUTPATIENT
Start: 2023-03-10 | End: 2023-03-13

## 2023-03-10 RX ADMIN — ALBUTEROL SULFATE 2 PUFF: 90 AEROSOL, METERED RESPIRATORY (INHALATION) at 00:30

## 2023-03-10 RX ADMIN — CLOPIDOGREL BISULFATE 75 MG: 75 TABLET ORAL at 08:49

## 2023-03-10 RX ADMIN — PRAVASTATIN SODIUM 40 MG: 40 TABLET ORAL at 17:31

## 2023-03-10 RX ADMIN — BISOPROLOL FUMARATE 10 MG: 5 TABLET ORAL at 08:49

## 2023-03-10 RX ADMIN — AMLODIPINE BESYLATE 5 MG: 5 TABLET ORAL at 17:31

## 2023-03-10 RX ADMIN — ISODIUM CHLORIDE 3 ML: 0.03 SOLUTION RESPIRATORY (INHALATION) at 07:25

## 2023-03-10 RX ADMIN — AMLODIPINE BESYLATE 5 MG: 5 TABLET ORAL at 08:50

## 2023-03-10 RX ADMIN — ONDANSETRON HYDROCHLORIDE 4 MG: 2 SOLUTION INTRAMUSCULAR; INTRAVENOUS at 01:58

## 2023-03-10 RX ADMIN — LEVALBUTEROL HYDROCHLORIDE 1.25 MG: 1.25 SOLUTION, CONCENTRATE RESPIRATORY (INHALATION) at 07:25

## 2023-03-10 RX ADMIN — PANTOPRAZOLE SODIUM 20 MG: 20 TABLET, DELAYED RELEASE ORAL at 05:56

## 2023-03-10 RX ADMIN — HEPARIN SODIUM 5000 UNITS: 5000 INJECTION INTRAVENOUS; SUBCUTANEOUS at 05:56

## 2023-03-10 NOTE — ASSESSMENT & PLAN NOTE
· Family noticing worsening cognitive decline  · Patient had delirium during hospitalization which may have been secondary to prednisone however she has tolerated this in the past   No further episodes off of prednisone  · Ambulatory referral placed for [facility] to evaluate and do formal cognitive evaluation

## 2023-03-10 NOTE — ASSESSMENT & PLAN NOTE
Presentation to hospital for flank pain found to have genital prolapse, urinary retention with bilateral hydronephrosis  · Sepsis secondary to UTI: Urine culture demonstrating E  coli with multiple sensitivities  · During hospitalization received 4 days of ceftriaxone and will discharge with cefuroxime for 3 more days to complete 7-day course of antibiotics

## 2023-03-10 NOTE — CASE MANAGEMENT
Case Management Discharge Planning Note    Patient name Kumar Storey  Location [facility] 2 /South 2 Aviva Perez* MRN [id number]  : 1945 Date 3/10/2023       Current Admission Date: 3/6/2023  Current Admission Diagnosis:Sepsis secondary to UTI [facility])   Patient Active Problem List    Diagnosis Date Noted   • Hyponatremia 2023   • Hypomagnesemia 2023   • Hydronephrosis, bilateral 2023   • UTI (urinary tract infection) 2023   • Cystocele with prolapse 2023   • Chronic obstructive pulmonary disease, unspecified COPD type [facility] ) 2022   • Community acquired pneumonia 2019   • Sepsis secondary to UTI ([facility] ) 2019   • Hx of CABG 2018   • Hx stent of SVG to the RCA 2018   • Asthma 2014   • Chronic coronary artery disease 2014   • Hiatal hernia 2014   • Osteoarthritis 2014   • Glaucoma 2013   • Hyperlipidemia 2012   • Hypertension 2012      LOS (days): 4  Geometric Mean LOS (GMLOS) (days): 3 50  Days to GMLOS:-0 1     OBJECTIVE:  Risk of Unplanned Readmission Score: 15 15         Current admission status: Inpatient   Preferred Pharmacy:   [address] [place] [address],[address], [address]  [place] [address]  Phone: [phone] Fax: [phone]    Primary Care Provider: Damián Vazquez MD    Primary Insurance: [address] [facility] RE[facility]  Secondary Insurance: [facility] DETAILS:    Additional Comments: Per [place] comments, they need family to call to arrange delivery of 02 to the home  CM spoke with pt's son who reports he will call to arrange delivery  He also reports pt is agreeable to VNA and would like to move forward with services from  VNA  SL VNA reserved via [place] and AVS updated to reflect same  CM department to follow for confirmation of 02 delivery

## 2023-03-10 NOTE — NURSING NOTE
Patient discharged 3/10/2023 6:25 PM  AVS and discharge instructions reviewed with patient's family  [place] education given to family as VNA to see patient in next 24-48 hours  Family informed to  patient's prescription  Patient taken to exit by PCA to be driven home by family      Samantha Kirk 3/10/2023 6:27 PM

## 2023-03-10 NOTE — DISCHARGE SUMMARY
[address] [facility]  Discharge- Genice Scarce 1945, 68 y o  female MRN: [id number]  Unit/Bed#: [facility] 68 2 -01 Encounter: [id number]  Primary Care Provider: Kalpesh Aburto MD   Date and time admitted to hospital: 3/6/2023  5:06 PM    Admitting Provider:  Isabella Acosta DO  Discharge Provider:  Isabella Acosta DO  Admission Date: 3/6/2023       Discharge Date: 03/10/23   LOS: 4  Primary Care Physician at Discharge: Kalpesh Aburto [phone]    DISCHARGE DIAGNOSES  * Sepsis secondary to UTI [facility])  Assessment & Plan  Presentation to hospital for flank pain found to have genital prolapse, urinary retention with bilateral hydronephrosis  · Sepsis secondary to UTI: Urine culture demonstrating E  coli with multiple sensitivities  · During hospitalization received 4 days of ceftriaxone and will discharge with cefuroxime for 3 more days to complete 7-day course of antibiotics    Cognitive decline  Assessment & Plan  · Family noticing worsening cognitive decline  · Patient had delirium during hospitalization which may have been secondary to prednisone however she has tolerated this in the past   No further episodes off of prednisone  · Ambulatory referral placed for Senior care to evaluate and do formal cognitive evaluation  Cystocele with prolapse  Assessment & Plan  · Prolapse likely causing bladder distention with hydronephrosis  · Has been seen by gynecology and pessary has been placed     Hydronephrosis, bilateral  Assessment & Plan  · Severe bilateral hydronephrosis secondary to urinary retention from prolapse  · Urinary catheter has been placed  · Urology checked ultrasound which demonstrates resolution    Hypomagnesemia  Assessment & Plan  · Has been repleted    Hyponatremia  Assessment & Plan  · Hyponatremia resolved with IV fluids      Results from last 7 days   Lab Units 03/10/23  0443 03/09/23  0441 03/08/23  0444 03/07/23  0546   SODIUM mmol/L 135 134* 135 132*       Chronic obstructive pulmonary disease, unspecified COPD type ([facility] [address] )  Assessment & Plan  · COPD/asthma with wheezing and exacerbation  · Ordered some neb treatments with improvement   · CXR: No lung consolidation  · Desaturation study: Requires 4 L with exertion but room air at rest   · Received 2 doses of prednisone but seems to be having paranoia/delirium with this and will be discontinued  · Ambulatory referral placed for outpatient pulmonology evaluation  Hypertension  Assessment & Plan  · Continue amlodipine and bisoprolol    Chronic coronary artery disease  Assessment & Plan  · CAD with history of CABG  · No chest pain  Continue clopidogrel    REASON FOR ADMISSION  Back Pain (Patient arrives via ems coming from home c/o back pain and left flank pain that started yesterday, painful urination, patient some blood in urine, denies vomiting or diarrhea, patient states tylenol pta)    HOSPITAL COURSE:  Lopez Whittington is a 68 y o  female with a history of COPD/asthma hypertension and CAD who presented to the hospital with back pain and dysuria  She was found to have chronic prolapse and sensation of incomplete emptying of bladder  She was found to have bilateral hydroureteronephrosis and underwent Mayo catheter placement  She was on ceftriaxone for UTI  She was seen by gynecology and pessary was placed  Urology recommended continuation of Mayo catheter until outpatient follow-up  She did have exacerbation of asthma/COPD which she was given prednisone x2 and after each dose had some delirium  She did tolerate this in the past so her delirium may be attributed to underlying cognitive decline  Her mentation is back at baseline at time of discharge  She is stable on room air but does need 4 L of oxygen with exertion and is being sent home with home oxygen  Ambulatory referral for Senior care, urology, pulmonology, and appointment with gynecology has been made      The case was discussed with son on telephone on day of discharge  Please see problem list listed above  CONSULTING PROVIDERS   IP CONSULT TO UROLOGY  IP CONSULT TO OB GYN    PROCEDURES PERFORMED  * No surgery found *    RADIOLOGY RESULTS  XR chest pa & lateral    Result Date: 3/9/2023  Impression: Possible trace bilateral pleural effusions  No active lung consolidation  The study was marked in [facility] for immediate notification  Workstation performed: [id number]     US kidney and bladder    Result Date: 3/8/2023  Impression: Unremarkable study  Workstation performed: [id number]     CT abdomen pelvis with contrast    Result Date: 3/6/2023  Impression: Bilateral severe hydroureteronephrosis without evidence of obstructing stone  Enhancement of the ureters bilaterally  Findings may represent recently passed stone versus infection  Follow-up with urology is recommended to rule out underlying neoplasm  Marked distention of the urinary bladder which may be related to above process   Workstation performed: [id number]       LABS  Results from last 7 days   Lab Units 03/10/23  0443 03/09/23  0441 03/08/23  0444 03/08/23  0040 03/07/23  0546 03/06/23  1738   WBC Thousand/uL 14 49* 10 25* 11 82* 14 05* 15 44* 13 52*   HEMOGLOBIN g/dL 12 3 11 3* 10 3* 10 5* 10 7* 13 4   HEMATOCRIT % 39 0 35 6 33 6* 34 2* 33 0* 41 4   MCV fL 85 86 87 87 85 85   PLATELETS Thousands/uL 452* 328 275 307 262 295   INR   --   --   --   --   --  1 00     Results from last 7 days   Lab Units 03/10/23  0443 03/09/23  0441 03/08/23  0444 03/07/23  0546 03/06/23  1738   SODIUM mmol/L 135 134* 135 132* 128*   POTASSIUM mmol/L 3 7 4 7 4 0 3 9 4 4   CHLORIDE mmol/L 94* 96 99 99 90*   CO2 mmol/L 30 30 29 27 30   BUN mg/dL 11 13 11 10 9   CREATININE mg/dL 0 57* 0 52* 0 72 0 72 0 70   CALCIUM mg/dL 9 8 9 7 8 8 8 4 9 6   ALBUMIN g/dL 3 8 3 7 3 3*  --  4 4   TOTAL BILIRUBIN mg/dL 0 25 0 30 0 30  --  0 57   ALK PHOS U/L 61 61 54  --  77   ALT U/L 21 20 12  --  12   AST U/L 29 36 22  --  13 EGFR ml/min/1 73sq m 89 92 81 81 83   GLUCOSE RANDOM mg/dL 127 166* 118 128 113                                  Results from last 7 days   Lab Units 03/08/23  0040 03/06/23 2132 03/06/23  1738   LACTIC ACID mmol/L 0 6  --  1 3   PROCALCITONIN ng/ml 1 68*   < >  --     < > = values in this interval not displayed  Cultures:   Results from last 7 days   Lab Units 03/06/23  1836   COLOR UA  Light Yellow   CLARITY UA  Clear   SPEC GRAV UA  <=1 005   PH UA  6 5   LEUKOCYTES UA  Moderate*   NITRITE UA  Negative   GLUCOSE UA mg/dl Negative   KETONES UA mg/dl Negative   BILIRUBIN UA  Negative   BLOOD UA  Moderate*      Results from last 7 days   Lab Units 03/06/23  1836   RBC UA /hpf 0-1*   WBC UA /hpf 30-50*   EPITHELIAL CELLS WET PREP /hpf Occasional   BACTERIA UA /hpf Moderate*      Results from last 7 days   Lab Units 03/08/23  0045 03/06/23 2111 03/06/23  1836   BLOOD CULTURE  No Growth at 48 hrs  No Growth at 48 hrs   --   --    URINE CULTURE   --   --  >100,000 cfu/ml Escherichia coli*  60,000-69,000 cfu/ml   INFLUENZA A PCR   --  Negative  --      Results from last 7 days   Lab Units 03/06/23 2111   SARS-COV-2  Negative   INFLUENZA A PCR  Negative   INFLUENZA B PCR  Negative   RSV PCR  Negative             DISCHARGE DAY VISIT AND PHYSICAL EXAM:  Subjective: Patient seen and examined during morning rounds  No new complaints  Vitals:   Blood Pressure: 159/78 (03/10/23 0750)  Pulse: 82 (03/10/23 0750)  Temperature: (!) 97 4 °F (36 3 °C) (03/10/23 0750)  Temp Source: Oral (03/09/23 0919)  Respirations: 15 (03/10/23 0750)  Height: 5' 1" (154 9 cm) (03/06/23 2300)  Weight - Scale: 57 9 kg (127 lb 10 3 oz) (03/06/23 2300)  SpO2: 98 % (03/10/23 0750)    Physical Exam  Vitals reviewed  Constitutional:       General: She is not in acute distress  Appearance: Normal appearance  HENT:      Head: Atraumatic  Eyes:      General: No scleral icterus    Cardiovascular:      Rate and Rhythm: Regular rhythm  Pulmonary:      Breath sounds: Decreased breath sounds present  No wheezing  Abdominal:      General: Bowel sounds are normal       Palpations: Abdomen is soft  Tenderness: There is no guarding or rebound  Musculoskeletal:         General: No swelling  Skin:     General: Skin is warm  Neurological:      Mental Status: She is alert  Mental status is at baseline  Psychiatric:         Mood and Affect: Mood normal        Planned Re-admission: No  Discharge Disposition: Home with [facility]    Test Results Pending at Discharge:   Pending Labs     Order Current Status    Blood culture Preliminary result    Blood culture Preliminary result      Incidental findings:     Medications   · Discharge Medication List: See after visit summary for reconciled discharge medications  Diet restrictions: Regular diet  Activity restrictions: No strenuous activity  Discharge Condition: stable    Outpatient Follow-Up and Discharge Instructions  See after visit summary section titled Discharge Instructions for information provided to patient and family  Code Status: Level 1 - Full Code  Discharge Statement   I spent 35 minutes discharging the patient  This time was spent on the day of discharge  Greater than 50% of total time was spent with the patient and / or family counseling and / or coordination of care  ** Please Note: This note has been constructed using a voice recognition system   **

## 2023-03-10 NOTE — ASSESSMENT & PLAN NOTE
· COPD/asthma with wheezing and exacerbation  · Ordered some neb treatments with improvement   · CXR: No lung consolidation  · Desaturation study: Requires 4 L with exertion but room air at rest   · Received 2 doses of prednisone but seems to be having paranoia/delirium with this and will be discontinued  · Ambulatory referral placed for outpatient pulmonology evaluation

## 2023-03-10 NOTE — PLAN OF CARE
Problem: Prexisting or High Potential for Compromised Skin Integrity  Goal: Skin integrity is maintained or improved  Description: INTERVENTIONS:  - Identify patients at risk for skin breakdown  - Assess and monitor skin integrity  - Assess and monitor nutrition and hydration status  - Monitor labs   - Assess for incontinence   - Turn and reposition patient  - Assist with mobility/ambulation  - Relieve pressure over bony prominences  - Avoid friction and shearing  - Provide appropriate hygiene as needed including keeping skin clean and dry  - Evaluate need for skin moisturizer/barrier cream  - Collaborate with interdisciplinary team   - Patient/family teaching  - Consider wound care consult   Outcome: Progressing     Problem: MOBILITY - ADULT  Goal: Maintain or return to baseline ADL function  Description: INTERVENTIONS:  -  Assess patient's ability to carry out ADLs; assess patient's baseline for ADL function and identify physical deficits which impact ability to perform ADLs (bathing, care of mouth/teeth, toileting, grooming, dressing, etc )  - Assess/evaluate cause of self-care deficits   - Assess range of motion  - Assess patient's mobility; develop plan if impaired  - Assess patient's need for assistive devices and provide as appropriate  - Encourage maximum independence but intervene and supervise when necessary  - Involve family in performance of ADLs  - Assess for home care needs following discharge   - Consider OT consult to assist with ADL evaluation and planning for discharge  - Provide patient education as appropriate  Outcome: Progressing  Goal: Maintains/Returns to pre admission functional level  Description: INTERVENTIONS:  - Perform BMAT or MOVE assessment daily    - Set and communicate daily mobility goal to care team and patient/family/caregiver     - Collaborate with rehabilitation services on mobility goals if consulted  - Out of bed for toileting  - Record patient progress and toleration of activity level   Outcome: Progressing     Problem: PAIN - ADULT  Goal: Verbalizes/displays adequate comfort level or baseline comfort level  Description: Interventions:  - Encourage patient to monitor pain and request assistance  - Assess pain using appropriate pain scale  - Administer analgesics based on type and severity of pain and evaluate response  - Implement non-pharmacological measures as appropriate and evaluate response  - Consider cultural and social influences on pain and pain management  - Notify physician/advanced practitioner if interventions unsuccessful or patient reports new pain  Outcome: Progressing     Problem: INFECTION - ADULT  Goal: Absence or prevention of progression during hospitalization  Description: INTERVENTIONS:  - Assess and monitor for signs and symptoms of infection  - Monitor lab/diagnostic results  - Monitor all insertion sites, i e  indwelling lines, tubes, and drains  - Monitor endotracheal if appropriate and nasal secretions for changes in amount and color  - [place] appropriate cooling/warming therapies per order  - Administer medications as ordered  - Instruct and encourage patient and family to use good hand hygiene technique  - Identify and instruct in appropriate isolation precautions for identified infection/condition  Outcome: Progressing     Problem: SAFETY ADULT  Goal: Patient will remain free of falls  Description: INTERVENTIONS:  - Educate patient/family on patient safety including physical limitations  - Instruct patient to call for assistance with activity   - Consult OT/PT to assist with strengthening/mobility   - Keep Call bell within reach  - Keep bed low and locked with side rails adjusted as appropriate  - Keep care items and personal belongings within reach  - Initiate and maintain comfort rounds  - Make Fall Risk Sign visible to staff  - Offer Toileting every 2 hours  - Initiate/Maintain bed alarm  - Obtain necessary fall risk management equipment  - Apply yellow socks and bracelet for high fall risk patients  - Consider moving patient to room near nurses station  Outcome: Progressing     Problem: DISCHARGE PLANNING  Goal: Discharge to home or other facility with appropriate resources  Description: INTERVENTIONS:  - Identify barriers to discharge w/patient and caregiver  - Arrange for needed discharge resources and transportation as appropriate  - Identify discharge learning needs (meds, wound care, etc )  - Arrange for interpretive services to assist at discharge as needed  - Refer to Case Management Department for coordinating discharge planning if the patient needs post-hospital services based on physician/advanced practitioner order or complex needs related to functional status, cognitive ability, or social support system  Outcome: Progressing     Problem: Knowledge Deficit  Goal: Patient/family/caregiver demonstrates understanding of disease process, treatment plan, medications, and discharge instructions  Description: Complete learning assessment and assess knowledge base    Interventions:  - Provide teaching at level of understanding  - Provide teaching via preferred learning methods  Outcome: Progressing     Problem: RESPIRATORY - ADULT  Goal: Achieves optimal ventilation and oxygenation  Description: INTERVENTIONS:  - Assess for changes in respiratory status  - Assess for changes in mentation and behavior  - Position to facilitate oxygenation and minimize respiratory effort  - Oxygen administered by appropriate delivery if ordered  - Initiate smoking cessation education as indicated  - Encourage broncho-pulmonary hygiene including cough, deep breathe, Incentive Spirometry  - Assess the need for suctioning and aspirate as needed  - Assess and instruct to report SOB or any respiratory difficulty  - Respiratory Therapy support as indicated  Outcome: Progressing     Problem: GENITOURINARY - ADULT  Goal: Maintains or returns to baseline urinary function  Description: INTERVENTIONS:  - Assess urinary function  - Encourage oral fluids to ensure adequate hydration if ordered  - Administer IV fluids as ordered to ensure adequate hydration  - Administer ordered medications as needed  - Offer frequent toileting  - Follow urinary retention protocol if ordered  Outcome: Progressing  Goal: Absence of urinary retention  Description: INTERVENTIONS:  - Assess patient’s ability to void and empty bladder  - Monitor I/O  - Bladder scan as needed  - Discuss with physician/AP medications to alleviate retention as needed  - Discuss catheterization for long term situations as appropriate  Outcome: Progressing  Goal: Urinary catheter remains patent  Description: INTERVENTIONS:  - Assess patency of urinary catheter  - If patient has a chronic burgos, consider changing catheter if non-functioning  - Follow guidelines for intermittent irrigation of non-functioning urinary catheter  Outcome: Progressing     Problem: METABOLIC, FLUID AND ELECTROLYTES - ADULT  Goal: Electrolytes maintained within normal limits  Description: INTERVENTIONS:  - Monitor labs and assess patient for signs and symptoms of electrolyte imbalances  - Administer electrolyte replacement as ordered  - Monitor response to electrolyte replacements, including repeat lab results as appropriate  - Instruct patient on fluid and nutrition as appropriate  Outcome: Progressing     Problem: SKIN/TISSUE INTEGRITY - ADULT  Goal: Skin Integrity remains intact(Skin Breakdown Prevention)  Description: Assess:  -Perform Kosta assessment every shift  -Clean and moisturize skin every shift  -Inspect skin when repositioning, toileting, and assisting with ADLS  -Assess under medical devices   -Assess extremities for adequate circulation and sensation     Bed Management:  -Have minimal linens on bed & keep smooth, unwrinkled  -Change linens as needed when moist or perspiring  -Avoid sitting or lying in one position while in bed    Toileting:  -Offer bedside commode  -Assess for incontinence   -Use incontinent care products after each incontinent episode     Activity:  -Encourage activity and walks on unit  -Encourage or provide ROM exercises   -Turn and reposition patient every 2 Hours, if needed  -Use appropriate equipment to lift or move patient in bed  -Instruct/ Assist with weight shifting when out of bed in chair    Skin Care:  -Avoid use of baby powder, tape, friction and shearing, hot water or constrictive clothing  -Relieve pressure over bony prominences   -Do not massage red bony areas    Next Steps:  -Teach patient strategies to minimize risks   -Consider consults to  interdisciplinary teams   Outcome: Progressing

## 2023-03-10 NOTE — PLAN OF CARE
Problem: Prexisting or High Potential for Compromised Skin Integrity  Goal: Skin integrity is maintained or improved  Description: INTERVENTIONS:  - Identify patients at risk for skin breakdown  - Assess and monitor skin integrity  - Assess and monitor nutrition and hydration status  - Monitor labs   - Assess for incontinence   - Turn and reposition patient  - Assist with mobility/ambulation  - Relieve pressure over bony prominences  - Avoid friction and shearing  - Provide appropriate hygiene as needed including keeping skin clean and dry  - Evaluate need for skin moisturizer/barrier cream  - Collaborate with interdisciplinary team   - Patient/family teaching  - Consider wound care consult   Outcome: Progressing     Problem: MOBILITY - ADULT  Goal: Maintain or return to baseline ADL function  Description: INTERVENTIONS:  -  Assess patient's ability to carry out ADLs; assess patient's baseline for ADL function and identify physical deficits which impact ability to perform ADLs (bathing, care of mouth/teeth, toileting, grooming, dressing, etc )  - Assess/evaluate cause of self-care deficits   - Assess range of motion  - Assess patient's mobility; develop plan if impaired  - Assess patient's need for assistive devices and provide as appropriate  - Encourage maximum independence but intervene and supervise when necessary  - Involve family in performance of ADLs  - Assess for home care needs following discharge   - Consider OT consult to assist with ADL evaluation and planning for discharge  - Provide patient education as appropriate  Outcome: Progressing  Goal: Maintains/Returns to pre admission functional level  Description: INTERVENTIONS:  - Perform BMAT or MOVE assessment daily    - Set and communicate daily mobility goal to care team and patient/family/caregiver     - Collaborate with rehabilitation services on mobility goals if consulted  - Out of bed for toileting  - Record patient progress and toleration of activity level   Outcome: Progressing     Problem: PAIN - ADULT  Goal: Verbalizes/displays adequate comfort level or baseline comfort level  Description: Interventions:  - Encourage patient to monitor pain and request assistance  - Assess pain using appropriate pain scale  - Administer analgesics based on type and severity of pain and evaluate response  - Implement non-pharmacological measures as appropriate and evaluate response  - Consider cultural and social influences on pain and pain management  - Notify physician/advanced practitioner if interventions unsuccessful or patient reports new pain  Outcome: Progressing     Problem: INFECTION - ADULT  Goal: Absence or prevention of progression during hospitalization  Description: INTERVENTIONS:  - Assess and monitor for signs and symptoms of infection  - Monitor lab/diagnostic results  - Monitor all insertion sites, i e  indwelling lines, tubes, and drains  - Monitor endotracheal if appropriate and nasal secretions for changes in amount and color  - [place] appropriate cooling/warming therapies per order  - Administer medications as ordered  - Instruct and encourage patient and family to use good hand hygiene technique  - Identify and instruct in appropriate isolation precautions for identified infection/condition  Outcome: Progressing     Problem: SAFETY ADULT  Goal: Patient will remain free of falls  Description: INTERVENTIONS:  - Educate patient/family on patient safety including physical limitations  - Instruct patient to call for assistance with activity   - Consult OT/PT to assist with strengthening/mobility   - Keep Call bell within reach  - Keep bed low and locked with side rails adjusted as appropriate  - Keep care items and personal belongings within reach  - Initiate and maintain comfort rounds  - Make Fall Risk Sign visible to staff  - Offer Toileting every 2 hours  - Initiate/Maintain bed alarm  - Obtain necessary fall risk management equipment  - Apply yellow socks and bracelet for high fall risk patients  - Consider moving patient to room near nurses station  Outcome: Progressing     Problem: DISCHARGE PLANNING  Goal: Discharge to home or other facility with appropriate resources  Description: INTERVENTIONS:  - Identify barriers to discharge w/patient and caregiver  - Arrange for needed discharge resources and transportation as appropriate  - Identify discharge learning needs (meds, wound care, etc )  - Arrange for interpretive services to assist at discharge as needed  - Refer to Case Management Department for coordinating discharge planning if the patient needs post-hospital services based on physician/advanced practitioner order or complex needs related to functional status, cognitive ability, or social support system  Outcome: Progressing     Problem: Knowledge Deficit  Goal: Patient/family/caregiver demonstrates understanding of disease process, treatment plan, medications, and discharge instructions  Description: Complete learning assessment and assess knowledge base    Interventions:  - Provide teaching at level of understanding  - Provide teaching via preferred learning methods  Outcome: Progressing     Problem: RESPIRATORY - ADULT  Goal: Achieves optimal ventilation and oxygenation  Description: INTERVENTIONS:  - Assess for changes in respiratory status  - Assess for changes in mentation and behavior  - Position to facilitate oxygenation and minimize respiratory effort  - Oxygen administered by appropriate delivery if ordered  - Initiate smoking cessation education as indicated  - Encourage broncho-pulmonary hygiene including cough, deep breathe, Incentive Spirometry  - Assess the need for suctioning and aspirate as needed  - Assess and instruct to report SOB or any respiratory difficulty  - Respiratory Therapy support as indicated  Outcome: Progressing     Problem: GENITOURINARY - ADULT  Goal: Maintains or returns to baseline urinary function  Description: INTERVENTIONS:  - Assess urinary function  - Encourage oral fluids to ensure adequate hydration if ordered  - Administer IV fluids as ordered to ensure adequate hydration  - Administer ordered medications as needed  - Offer frequent toileting  - Follow urinary retention protocol if ordered  Outcome: Progressing  Goal: Absence of urinary retention  Description: INTERVENTIONS:  - Assess patient’s ability to void and empty bladder  - Monitor I/O  - Bladder scan as needed  - Discuss with physician/AP medications to alleviate retention as needed  - Discuss catheterization for long term situations as appropriate  Outcome: Progressing  Goal: Urinary catheter remains patent  Description: INTERVENTIONS:  - Assess patency of urinary catheter  - If patient has a chronic burgos, consider changing catheter if non-functioning  - Follow guidelines for intermittent irrigation of non-functioning urinary catheter  Outcome: Progressing     Problem: METABOLIC, FLUID AND ELECTROLYTES - ADULT  Goal: Electrolytes maintained within normal limits  Description: INTERVENTIONS:  - Monitor labs and assess patient for signs and symptoms of electrolyte imbalances  - Administer electrolyte replacement as ordered  - Monitor response to electrolyte replacements, including repeat lab results as appropriate  - Instruct patient on fluid and nutrition as appropriate  Outcome: Progressing     Problem: SKIN/TISSUE INTEGRITY - ADULT  Goal: Skin Integrity remains intact(Skin Breakdown Prevention)  Description: Assess:  -Perform Kosta assessment every shift  -Clean and moisturize skin every shift  -Inspect skin when repositioning, toileting, and assisting with ADLS  -Assess under medical devices   -Assess extremities for adequate circulation and sensation     Bed Management:  -Have minimal linens on bed & keep smooth, unwrinkled  -Change linens as needed when moist or perspiring  -Avoid sitting or lying in one position while in bed    Toileting:  -Offer bedside commode  -Assess for incontinence   -Use incontinent care products after each incontinent episode     Activity:  -Encourage activity and walks on unit  -Encourage or provide ROM exercises   -Turn and reposition patient every 2 Hours, if needed  -Use appropriate equipment to lift or move patient in bed  -Instruct/ Assist with weight shifting when out of bed in chair    Skin Care:  -Avoid use of baby powder, tape, friction and shearing, hot water or constrictive clothing  -Relieve pressure over bony prominences   -Do not massage red bony areas    Next Steps:  -Teach patient strategies to minimize risks   -Consider consults to  interdisciplinary teams   Outcome: Progressing

## 2023-03-10 NOTE — TELEPHONE ENCOUNTER
Patient remains admitted  Plan for discharge home with [facility]   Can follow up with patient/home care next week regarding void trial

## 2023-03-10 NOTE — PLAN OF CARE
Problem: Prexisting or High Potential for Compromised Skin Integrity  Goal: Skin integrity is maintained or improved  Description: INTERVENTIONS:  - Identify patients at risk for skin breakdown  - Assess and monitor skin integrity  - Assess and monitor nutrition and hydration status  - Monitor labs   - Assess for incontinence   - Turn and reposition patient  - Assist with mobility/ambulation  - Relieve pressure over bony prominences  - Avoid friction and shearing  - Provide appropriate hygiene as needed including keeping skin clean and dry  - Evaluate need for skin moisturizer/barrier cream  - Collaborate with interdisciplinary team   - Patient/family teaching  - Consider wound care consult   3/10/2023 1409 by Meseret Diaz RN  Outcome: Adequate for Discharge  3/10/2023 0928 by Meseret Diaz RN  Outcome: Progressing     Problem: MOBILITY - ADULT  Goal: Maintain or return to baseline ADL function  Description: INTERVENTIONS:  -  Assess patient's ability to carry out ADLs; assess patient's baseline for ADL function and identify physical deficits which impact ability to perform ADLs (bathing, care of mouth/teeth, toileting, grooming, dressing, etc )  - Assess/evaluate cause of self-care deficits   - Assess range of motion  - Assess patient's mobility; develop plan if impaired  - Assess patient's need for assistive devices and provide as appropriate  - Encourage maximum independence but intervene and supervise when necessary  - Involve family in performance of ADLs  - Assess for home care needs following discharge   - Consider OT consult to assist with ADL evaluation and planning for discharge  - Provide patient education as appropriate  3/10/2023 1409 by Meseret Diaz RN  Outcome: Adequate for Discharge  3/10/2023 0928 by Meseret Diaz RN  Outcome: Progressing  Goal: Maintains/Returns to pre admission functional level  Description: INTERVENTIONS:  - Perform BMAT or MOVE assessment daily    - Set and communicate daily mobility goal to care team and patient/family/caregiver     - Collaborate with rehabilitation services on mobility goals if consulted  - Out of bed for toileting  - Record patient progress and toleration of activity level   3/10/2023 1409 by Tejas Rogers RN  Outcome: Adequate for Discharge  3/10/2023 0928 by Tejas Rogers RN  Outcome: Progressing     Problem: PAIN - ADULT  Goal: Verbalizes/displays adequate comfort level or baseline comfort level  Description: Interventions:  - Encourage patient to monitor pain and request assistance  - Assess pain using appropriate pain scale  - Administer analgesics based on type and severity of pain and evaluate response  - Implement non-pharmacological measures as appropriate and evaluate response  - Consider cultural and social influences on pain and pain management  - Notify physician/advanced practitioner if interventions unsuccessful or patient reports new pain  3/10/2023 1409 by Tejas Rogers RN  Outcome: Adequate for Discharge  3/10/2023 0928 by Tejas Rogers RN  Outcome: Progressing     Problem: INFECTION - ADULT  Goal: Absence or prevention of progression during hospitalization  Description: INTERVENTIONS:  - Assess and monitor for signs and symptoms of infection  - Monitor lab/diagnostic results  - Monitor all insertion sites, i e  indwelling lines, tubes, and drains  - Monitor endotracheal if appropriate and nasal secretions for changes in amount and color  - [place] appropriate cooling/warming therapies per order  - Administer medications as ordered  - Instruct and encourage patient and family to use good hand hygiene technique  - Identify and instruct in appropriate isolation precautions for identified infection/condition  3/10/2023 1409 by Tejas Rogers RN  Outcome: Adequate for Discharge  3/10/2023 0928 by Tejas Rogers RN  Outcome: Progressing     Problem: SAFETY ADULT  Goal: Patient will remain free of falls  Description: INTERVENTIONS:  - Educate patient/family on patient safety including physical limitations  - Instruct patient to call for assistance with activity   - Consult OT/PT to assist with strengthening/mobility   - Keep Call bell within reach  - Keep bed low and locked with side rails adjusted as appropriate  - Keep care items and personal belongings within reach  - Initiate and maintain comfort rounds  - Make Fall Risk Sign visible to staff  - Offer Toileting every 2 hours  - Initiate/Maintain bed alarm  - Obtain necessary fall risk management equipment  - Apply yellow socks and bracelet for high fall risk patients  - Consider moving patient to room near nurses station  3/10/2023 1409 by Meredith Crockett RN  Outcome: Adequate for Discharge  3/10/2023 0928 by Meredith Crockett RN  Outcome: Progressing     Problem: DISCHARGE PLANNING  Goal: Discharge to home or other facility with appropriate resources  Description: INTERVENTIONS:  - Identify barriers to discharge w/patient and caregiver  - Arrange for needed discharge resources and transportation as appropriate  - Identify discharge learning needs (meds, wound care, etc )  - Arrange for interpretive services to assist at discharge as needed  - Refer to Case Management Department for coordinating discharge planning if the patient needs post-hospital services based on physician/advanced practitioner order or complex needs related to functional status, cognitive ability, or social support system  3/10/2023 1409 by Meredith Crockett RN  Outcome: Adequate for Discharge  3/10/2023 0928 by Meredith Crockett RN  Outcome: Progressing     Problem: Knowledge Deficit  Goal: Patient/family/caregiver demonstrates understanding of disease process, treatment plan, medications, and discharge instructions  Description: Complete learning assessment and assess knowledge base    Interventions:  - Provide teaching at level of understanding  - Provide teaching via preferred learning methods  3/10/2023 1409 by Meredith Crockett RN  Outcome: Adequate for Discharge  3/10/2023 0928 by Shiv Bond RN  Outcome: Progressing     Problem: RESPIRATORY - ADULT  Goal: Achieves optimal ventilation and oxygenation  Description: INTERVENTIONS:  - Assess for changes in respiratory status  - Assess for changes in mentation and behavior  - Position to facilitate oxygenation and minimize respiratory effort  - Oxygen administered by appropriate delivery if ordered  - Initiate smoking cessation education as indicated  - Encourage broncho-pulmonary hygiene including cough, deep breathe, Incentive Spirometry  - Assess the need for suctioning and aspirate as needed  - Assess and instruct to report SOB or any respiratory difficulty  - Respiratory Therapy support as indicated  3/10/2023 1409 by Shiv Bond RN  Outcome: Adequate for Discharge  3/10/2023 0928 by Shiv Bond RN  Outcome: Progressing     Problem: GENITOURINARY - ADULT  Goal: Maintains or returns to baseline urinary function  Description: INTERVENTIONS:  - Assess urinary function  - Encourage oral fluids to ensure adequate hydration if ordered  - Administer IV fluids as ordered to ensure adequate hydration  - Administer ordered medications as needed  - Offer frequent toileting  - Follow urinary retention protocol if ordered  3/10/2023 1409 by Shiv Bond RN  Outcome: Adequate for Discharge  3/10/2023 0928 by Shiv Bond RN  Outcome: Progressing  Goal: Absence of urinary retention  Description: INTERVENTIONS:  - Assess patient’s ability to void and empty bladder  - Monitor I/O  - Bladder scan as needed  - Discuss with physician/AP medications to alleviate retention as needed  - Discuss catheterization for long term situations as appropriate  3/10/2023 1409 by Shiv Bond RN  Outcome: Adequate for Discharge  3/10/2023 0928 by Shiv Bond RN  Outcome: Progressing  Goal: Urinary catheter remains patent  Description: INTERVENTIONS:  - Assess patency of urinary catheter  - If patient has a chronic burgos, consider changing catheter if non-functioning  - Follow guidelines for intermittent irrigation of non-functioning urinary catheter  3/10/2023 1409 by Samantha Guy RN  Outcome: Adequate for Discharge  3/10/2023 0928 by Samantha Guy RN  Outcome: Progressing     Problem: METABOLIC, FLUID AND ELECTROLYTES - ADULT  Goal: Electrolytes maintained within normal limits  Description: INTERVENTIONS:  - Monitor labs and assess patient for signs and symptoms of electrolyte imbalances  - Administer electrolyte replacement as ordered  - Monitor response to electrolyte replacements, including repeat lab results as appropriate  - Instruct patient on fluid and nutrition as appropriate  3/10/2023 1409 by Samantha Guy RN  Outcome: Adequate for Discharge  3/10/2023 0928 by Samantha Guy RN  Outcome: Progressing     Problem: SKIN/TISSUE INTEGRITY - ADULT  Goal: Skin Integrity remains intact(Skin Breakdown Prevention)  Description: Assess:  -Perform Kosta assessment every shift  -Clean and moisturize skin every shift  -Inspect skin when repositioning, toileting, and assisting with ADLS  -Assess under medical devices   -Assess extremities for adequate circulation and sensation     Bed Management:  -Have minimal linens on bed & keep smooth, unwrinkled  -Change linens as needed when moist or perspiring  -Avoid sitting or lying in one position while in bed    Toileting:  -Offer bedside commode  -Assess for incontinence   -Use incontinent care products after each incontinent episode     Activity:  -Encourage activity and walks on unit  -Encourage or provide ROM exercises   -Turn and reposition patient every 2 Hours, if needed  -Use appropriate equipment to lift or move patient in bed  -Instruct/ Assist with weight shifting when out of bed in chair    Skin Care:  -Avoid use of baby powder, tape, friction and shearing, hot water or constrictive clothing  -Relieve pressure over bony prominences   -Do not massage red bony areas    Next Steps:  -Teach patient strategies to minimize risks   -Consider consults to  interdisciplinary teams   3/10/2023 1409 by Kimberly Florence RN  Outcome: Adequate for Discharge  3/10/2023 0928 by Kimberly Florence RN  Outcome: Progressing

## 2023-03-10 NOTE — ASSESSMENT & PLAN NOTE
· Hyponatremia resolved with IV fluids      Results from last 7 days   Lab Units 03/10/23  0443 03/09/23  0441 03/08/23  0444 03/07/23  0546   SODIUM mmol/L 135 134* 135 132*

## 2023-03-12 ENCOUNTER — HOME CARE VISIT (OUTPATIENT)
Dept: HOME HEALTH SERVICES | Facility: HOME HEALTHCARE | Age: 78
End: 2023-03-12

## 2023-03-12 VITALS
DIASTOLIC BLOOD PRESSURE: 60 MMHG | RESPIRATION RATE: 16 BRPM | HEART RATE: 75 BPM | SYSTOLIC BLOOD PRESSURE: 110 MMHG | TEMPERATURE: 97.7 F | OXYGEN SATURATION: 93 %

## 2023-03-12 LAB
BACTERIA BLD CULT: NORMAL
BACTERIA BLD CULT: NORMAL

## 2023-03-13 ENCOUNTER — TELEPHONE (OUTPATIENT)
Dept: UROLOGY | Facility: CLINIC | Age: 78
End: 2023-03-13

## 2023-03-13 ENCOUNTER — TELEPHONE (OUTPATIENT)
Age: 78
End: 2023-03-13

## 2023-03-13 ENCOUNTER — TRANSITIONAL CARE MANAGEMENT (OUTPATIENT)
Dept: FAMILY MEDICINE CLINIC | Facility: CLINIC | Age: 78
End: 2023-03-13

## 2023-03-13 ENCOUNTER — HOME CARE VISIT (OUTPATIENT)
Dept: HOME HEALTH SERVICES | Facility: HOME HEALTHCARE | Age: 78
End: 2023-03-13

## 2023-03-13 LAB
BACTERIA BLD CULT: NORMAL
BACTERIA BLD CULT: NORMAL

## 2023-03-13 NOTE — TELEPHONE ENCOUNTER
[facility]  [address] [facility], [address] [place] [address], [address] [place] [address]    [phone]    Telephone Intake: Geriatric Assessment     Referral source: Hospitalizist    Caller who is scheduling/relationship to pt: Luz roque phone number: [phone]    Reason for referral: Patient concerns , Family member concerns and Provider concerns regarding memory concerns, behavior changes/concerns and home safety concerns  If there are behavioral concerns, is the pt prescribed medications to manage these? no   If so, how many? none   Has the patient ever had an inpatient psychiatric hospitalization? No,   What is the goal of the visit? initial assessment and diagnosis; wandering     Has the patient been seen by a Neurologist or Geriatrician? No   If yes, is this appointment for a second opinion? No  Has the patient ever been diagnosed with dementia? No      Preferred language? Syriac, Daughter will interpret  Patient understands English  Highest education level? 11th Grade  Does the patient wear glasses? Yes   Does the patient use hearing aids? No     Is there a living will/healthcare POA in place/If so, who? No,     Does the pt/caregiver have access for a virtual visit (computer/smart phone with audio/video)? Yes    Caller was informed:   • Please make sure the pt is accompanied by someone who knows them well / caregiver / family member to participate in this appointment  Who will accompany the pt (name and relationship)? Janee Roque  Phone number of person accompanying pt: [phone]  • Please make sure the pt attends all appointments, including the assessment, care conference, follow-up, whether in-person or virtual   • For virtual visits, pt must be physically present in [facility]  Office packet mailed out to: [id number], [place], [address] [facility]  Added to wait list for sooner appointments?  Yes :  Has this patient been seen by our department in the last 3 years?  No    Please route to provider for chart review prior to scheduling and let the caller know that this phone intake will be reviewed IF -  • Pt was recently hospitalized  • Pt is prescribed medications for behavior management or has a history of psychiatric hospitalization  • Pt plans to attend alone

## 2023-03-13 NOTE — TELEPHONE ENCOUNTER
----- Message from David Cervantes MD sent at 3/11/2023  4:50 PM EST -----  Regarding: [facility] contact info  Mart Manning, this is Dr Jose Martin Laws and Dr Braden Pugh with GYN  We placed a pessary for Emily during her hospitalization  I see she will have VNA now that she is discharged  Would you be able to let us know the VNA schedule and also pass along these simple instructions to them? Please check patient's vulva and perineum     -If she has a visible prolapse please let us know  -If her pessary has fallen out please let us know  -Voiding function after removal of the Mayo falls under the responsibility of Urology, but we would appreciate being informed if there is any trouble voiding  -VNA can reach out with a group Tiger message to me Renetta Bryson and Rubi Park as needed       Thank you,   Dr Jose Martin Laws  OB/GYN

## 2023-03-13 NOTE — TELEPHONE ENCOUNTER
Called and spoke with Prasanna Upton, nurse from [facility]  Patient is scheduled for void trial with Urology on 3/16/23  Home care will remove patients Mayo in am, then patient will present to Urology office in the afternoon for PVR  Reviewed instructions from Dr Ted Melton with Prasanna Upton regarding patients pessary  She verbalized understanding  Patient scheduled for multiple home care visits- 3/14, 3/16, 3/21, 3/24, 3/27, 3/30, 4/3, 4/6, 4/10, 4/13, 4/17, and 4/20  OB/GYN MD's names provided to Prasanna Upton and she was advised they can be reached via [employer] as needed for any issues  Will forward to Dr Ted Melton as Yancy Ma

## 2023-03-13 NOTE — TELEPHONE ENCOUNTER
Contacted patient's daughter Rina Galeas to help arrange a void trial for her mother  Explained to Rina Gudelia her mother's burgos would have to be removed in the morning and then a visit in the afternoon at the office for PVR  [facility] visiting patient on 3/16/2023  Will arrange burgos removal that morning and patient scheduled 230 pm at the [facility] office for PVR  Called [facility] and spoke with Jemima Gamble given for VN to remove burgos between 8-9 am on 3/16/2023  and then Urology will see patient at 230 the same day

## 2023-03-14 ENCOUNTER — HOME CARE VISIT (OUTPATIENT)
Dept: HOME HEALTH SERVICES | Facility: HOME HEALTHCARE | Age: 78
End: 2023-03-14

## 2023-03-14 VITALS — OXYGEN SATURATION: 98 % | HEART RATE: 59 BPM | DIASTOLIC BLOOD PRESSURE: 62 MMHG | SYSTOLIC BLOOD PRESSURE: 140 MMHG

## 2023-03-16 ENCOUNTER — PROCEDURE VISIT (OUTPATIENT)
Dept: UROLOGY | Facility: CLINIC | Age: 78
End: 2023-03-16

## 2023-03-16 ENCOUNTER — HOME CARE VISIT (OUTPATIENT)
Dept: HOME HEALTH SERVICES | Facility: HOME HEALTHCARE | Age: 78
End: 2023-03-16

## 2023-03-16 VITALS
RESPIRATION RATE: 20 BRPM | HEART RATE: 64 BPM | DIASTOLIC BLOOD PRESSURE: 70 MMHG | HEIGHT: 61 IN | WEIGHT: 126 LBS | SYSTOLIC BLOOD PRESSURE: 150 MMHG | BODY MASS INDEX: 23.79 KG/M2

## 2023-03-16 DIAGNOSIS — N13.30 HYDROURETERONEPHROSIS: Primary | ICD-10-CM

## 2023-03-16 LAB — POST-VOID RESIDUAL VOLUME, ML POC: 32 ML

## 2023-03-16 NOTE — PROGRESS NOTES
3/16/2023  Yasmeen Aguirre is a 68 y o  female  [id number]    Diagnosis:  Chief Complaint    Urinary Retention         Patient presents for follow up post void residual  managed by our office  Patient's burgos removed by VNA this morning  Plan:  Follow up prn        Assessment:      Vitals:    03/16/23 1441   BP: 150/70   Pulse: 64   Resp: 20   Weight: 57 2 kg (126 lb)   Height: 5' 1" (1 549 m)           Patient voided 0 mL in the office    Post void residual measured via bladder scan to be 32 mL        Enzo Brown RN

## 2023-03-17 ENCOUNTER — HOME CARE VISIT (OUTPATIENT)
Dept: HOME HEALTH SERVICES | Facility: HOME HEALTHCARE | Age: 78
End: 2023-03-17

## 2023-03-17 VITALS — DIASTOLIC BLOOD PRESSURE: 60 MMHG | HEART RATE: 57 BPM | SYSTOLIC BLOOD PRESSURE: 126 MMHG | OXYGEN SATURATION: 99 %

## 2023-03-17 NOTE — TELEPHONE ENCOUNTER
Dr Alexander Sierra  Patient seen yesterday for void trial  Patient urinating without difficulty  Her post void residual was 32 ml  FYI

## 2023-03-17 NOTE — CASE COMMUNICATION
For informational purposes only  No response required  OT evaluation completed on 3/17/23  Pt demonstrates diminished strength, endurance, impaired balance, decreased safety awareness, and impaired activity tolerance impacting participation in ADLs  Pt would benefit from skilled OT services however pt is declining further OT services  Educated pt and pt daughter that new orders would be needed if they were to need OT services in th e future  OT visit pattern 1x for OT evaluation

## 2023-03-17 NOTE — UTILIZATION REVIEW
NOTIFICATION OF ADMISSION DISCHARGE   This is a Notification of Discharge from [address]  Please be advised that this patient has been discharge from our facility  Below you will find the admission and discharge date and time including the patient’s disposition  UTILIZATION REVIEW CONTACT:  Hannah Sauceda  Utilization   Network Utilization Review Department  Phone: [phone] x carefully listen to the prompts  All voicemails are confidential   Email: [email or web address]     ADMISSION INFORMATION  PRESENTATION DATE: 3/6/2023  5:06 PM  OBERVATION ADMISSION DATE:   INPATIENT ADMISSION DATE: 3/6/23  8:27 PM   DISCHARGE DATE: 3/10/2023  6:27 PM   DISPOSITION:Home with [address] INFORMATION:  Send all requests for admission clinical reviews, approved or denied determinations and any other requests to dedicated fax number below belonging to the campus where the patient is receiving treatment   List of dedicated fax numbers:  [address] DENIALS (Administrative/Medical Necessity) [phone]   [address] (Maternity/NICU/Pediatrics) [phone]   [facility] [phone]   [place] [address] [phone]   [address] [phone]   [address] [phone] 90 [facility] [phone]   [address] [place] [address] 119 [phone]   [facility]  [phone]   [address] [phone]   412 [facility] [address] [facility] [phone]

## 2023-03-19 VITALS
HEART RATE: 76 BPM | DIASTOLIC BLOOD PRESSURE: 62 MMHG | RESPIRATION RATE: 18 BRPM | SYSTOLIC BLOOD PRESSURE: 130 MMHG | OXYGEN SATURATION: 96 % | TEMPERATURE: 98 F

## 2023-03-20 ENCOUNTER — OFFICE VISIT (OUTPATIENT)
Dept: FAMILY MEDICINE CLINIC | Facility: CLINIC | Age: 78
End: 2023-03-20

## 2023-03-20 VITALS
RESPIRATION RATE: 14 BRPM | HEART RATE: 63 BPM | DIASTOLIC BLOOD PRESSURE: 70 MMHG | WEIGHT: 122.4 LBS | HEIGHT: 61 IN | SYSTOLIC BLOOD PRESSURE: 122 MMHG | OXYGEN SATURATION: 94 % | BODY MASS INDEX: 23.11 KG/M2 | TEMPERATURE: 97.1 F

## 2023-03-20 DIAGNOSIS — I10 ESSENTIAL HYPERTENSION: ICD-10-CM

## 2023-03-20 DIAGNOSIS — I25.10 CORONARY ARTERY DISEASE DUE TO LIPID RICH PLAQUE: ICD-10-CM

## 2023-03-20 DIAGNOSIS — M81.8 IDIOPATHIC OSTEOPOROSIS: Primary | ICD-10-CM

## 2023-03-20 DIAGNOSIS — A41.9 SEPSIS WITHOUT ACUTE ORGAN DYSFUNCTION, DUE TO UNSPECIFIED ORGANISM (HCC): ICD-10-CM

## 2023-03-20 DIAGNOSIS — Z12.31 SCREENING MAMMOGRAM FOR BREAST CANCER: ICD-10-CM

## 2023-03-20 DIAGNOSIS — K21.9 GASTROESOPHAGEAL REFLUX DISEASE WITHOUT ESOPHAGITIS: ICD-10-CM

## 2023-03-20 DIAGNOSIS — R79.89 LOW VITAMIN D LEVEL: ICD-10-CM

## 2023-03-20 DIAGNOSIS — J43.9 PULMONARY EMPHYSEMA, UNSPECIFIED EMPHYSEMA TYPE (HCC): ICD-10-CM

## 2023-03-20 DIAGNOSIS — E53.8 LOW VITAMIN B12 LEVEL: ICD-10-CM

## 2023-03-20 DIAGNOSIS — I25.83 CORONARY ARTERY DISEASE DUE TO LIPID RICH PLAQUE: ICD-10-CM

## 2023-03-20 DIAGNOSIS — E78.5 HYPERLIPIDEMIA ASSOCIATED WITH TYPE 2 DIABETES MELLITUS (HCC): ICD-10-CM

## 2023-03-20 DIAGNOSIS — J44.9 CHRONIC OBSTRUCTIVE PULMONARY DISEASE, UNSPECIFIED COPD TYPE (HCC): ICD-10-CM

## 2023-03-20 DIAGNOSIS — E83.42 HYPOMAGNESEMIA: ICD-10-CM

## 2023-03-20 DIAGNOSIS — Z12.11 SCREEN FOR COLON CANCER: ICD-10-CM

## 2023-03-20 DIAGNOSIS — E11.69 HYPERLIPIDEMIA ASSOCIATED WITH TYPE 2 DIABETES MELLITUS (HCC): ICD-10-CM

## 2023-03-20 DIAGNOSIS — I10 BENIGN HYPERTENSION: ICD-10-CM

## 2023-03-20 LAB
SL AMB  POCT GLUCOSE, UA: NEGATIVE
SL AMB LEUKOCYTE ESTERASE,UA: ABNORMAL
SL AMB POCT BILIRUBIN,UA: NEGATIVE
SL AMB POCT BLOOD,UA: NEGATIVE
SL AMB POCT CLARITY,UA: CLEAR
SL AMB POCT COLOR,UA: YELLOW
SL AMB POCT HEMOGLOBIN AIC: 6.3 (ref ?–6.5)
SL AMB POCT KETONES,UA: NEGATIVE
SL AMB POCT NITRITE,UA: NEGATIVE
SL AMB POCT PH,UA: 6.5
SL AMB POCT SPECIFIC GRAVITY,UA: 1.01
SL AMB POCT URINE PROTEIN: NEGATIVE
SL AMB POCT UROBILINOGEN: 0.2

## 2023-03-20 RX ORDER — IPRATROPIUM BROMIDE AND ALBUTEROL SULFATE 2.5; .5 MG/3ML; MG/3ML
3 SOLUTION RESPIRATORY (INHALATION) EVERY 6 HOURS PRN
Qty: 270 ML | Refills: 3 | Status: SHIPPED | OUTPATIENT
Start: 2023-03-20

## 2023-03-20 RX ORDER — ROSUVASTATIN CALCIUM 5 MG/1
5 TABLET, COATED ORAL DAILY
Qty: 90 TABLET | Refills: 3 | Status: SHIPPED | OUTPATIENT
Start: 2023-03-20

## 2023-03-20 RX ORDER — ALBUTEROL SULFATE 90 UG/1
2 AEROSOL, METERED RESPIRATORY (INHALATION) EVERY 6 HOURS PRN
Qty: 18 G | Refills: 5 | Status: SHIPPED | OUTPATIENT
Start: 2023-03-20

## 2023-03-20 RX ORDER — AMLODIPINE BESYLATE 5 MG/1
5 TABLET ORAL 2 TIMES DAILY
Qty: 180 TABLET | Refills: 3 | Status: SHIPPED | OUTPATIENT
Start: 2023-03-20

## 2023-03-20 RX ORDER — AMPICILLIN TRIHYDRATE 250 MG
600 CAPSULE ORAL 2 TIMES DAILY WITH MEALS
Qty: 200 CAPSULE | Refills: 3 | Status: SHIPPED | OUTPATIENT
Start: 2023-03-20

## 2023-03-20 RX ORDER — LANOLIN ALCOHOL/MO/W.PET/CERES
1000 CREAM (GRAM) TOPICAL DAILY
Qty: 90 TABLET | Refills: 3 | Status: SHIPPED | OUTPATIENT
Start: 2023-03-20

## 2023-03-20 RX ORDER — MAGNESIUM L-LACTATE 84 MG
84 TABLET, EXTENDED RELEASE ORAL DAILY
Qty: 90 TABLET | Refills: 3 | Status: SHIPPED | OUTPATIENT
Start: 2023-03-20

## 2023-03-20 RX ORDER — ERGOCALCIFEROL 1.25 MG/1
50000 CAPSULE ORAL WEEKLY
Qty: 13 CAPSULE | Refills: 3 | Status: SHIPPED | OUTPATIENT
Start: 2023-03-20

## 2023-03-20 RX ORDER — PANTOPRAZOLE SODIUM 20 MG/1
20 TABLET, DELAYED RELEASE ORAL DAILY
Qty: 90 TABLET | Refills: 3 | Status: SHIPPED | OUTPATIENT
Start: 2023-03-20

## 2023-03-20 RX ORDER — BISOPROLOL FUMARATE 10 MG/1
10 TABLET, FILM COATED ORAL DAILY
Qty: 90 TABLET | Refills: 3 | Status: SHIPPED | OUTPATIENT
Start: 2023-03-20

## 2023-03-20 RX ORDER — CLOPIDOGREL BISULFATE 75 MG
75 TABLET ORAL DAILY
Qty: 90 TABLET | Refills: 3 | Status: SHIPPED | OUTPATIENT
Start: 2023-03-20

## 2023-03-20 NOTE — PROGRESS NOTES
Name: Canelo Loera      : 1945      MRN: [id number]  Encounter Provider: Roger Gamble MD  Encounter Date: 3/20/2023   Encounter department: [address] [place] [address]     1  Idiopathic osteoporosis  -     DXA bone density spine hip and pelvis; Future; Expected date: 2023  -     Vitamin D 25 hydroxy; Future; Expected date: 2023  -     Vitamin D 25 hydroxy; Future; Expected date: 2023    2  Low vitamin B12 level  -     vitamin B-12 (VITAMIN B-12) 1,000 mcg tablet; Take 1 tablet (1,000 mcg total) by mouth daily    3  Hyperlipidemia associated with type 2 diabetes mellitus (HCC)  -     rosuvastatin (CRESTOR) 5 mg tablet; Take 1 tablet (5 mg total) by mouth daily  -     Red Yeast Rice 600 MG CAPS; Take 1 capsule (600 mg total) by mouth 2 (two) times a day with meals  -     co-enzyme Q-10 30 MG capsule; Take 1 capsule (30 mg total) by mouth 2 (two) times a day  -     UA (URINE) with reflex to Scope; Future; Expected date: 2023  -     Comprehensive metabolic panel; Future  -     CBC and differential; Future  -     Magnesium; Future  -     Ferritin; Future  -     Lipid panel; Future  -     TSH, 3rd generation; Future; Expected date: 2023  -     T4, free; Future; Expected date: 2023  -     Vitamin B12; Future    4  Coronary artery disease due to lipid rich plaque  -     Plavix 75 MG tablet; Take 1 tablet (75 mg total) by mouth daily  -     UA (URINE) with reflex to Scope; Future; Expected date: 2023  -     Comprehensive metabolic panel; Future  -     CBC and differential; Future  -     Magnesium; Future  -     Ferritin; Future  -     Lipid panel; Future  -     TSH, 3rd generation; Future; Expected date: 2023  -     T4, free; Future; Expected date: 2023  -     Vitamin B12; Future    5  Gastroesophageal reflux disease without esophagitis  -     pantoprazole (PROTONIX) 20 mg tablet;  Take 1 tablet (20 mg total) by mouth daily    6  Hypomagnesemia  -     magnesium (MAGTAB) 84 MG (7MEQ) TBCR; Take 1 tablet (84 mg total) by mouth daily    7  Low vitamin D level  -     ergocalciferol (VITAMIN D2) 50,000 units; Take 1 capsule (50,000 Units total) by mouth once a week    8  Benign hypertension  -     bisoprolol (ZEBETA) 10 MG tablet; Take 1 tablet (10 mg total) by mouth daily    9  Essential hypertension  -     amLODIPine (NORVASC) 5 mg tablet; Take 1 tablet (5 mg total) by mouth 2 (two) times a day    10  Chronic obstructive pulmonary disease, unspecified COPD type ([facility])  -     albuterol (PROVENTIL HFA,VENTOLIN HFA) 90 mcg/act inhaler; Inhale 2 puffs every 6 (six) hours as needed for wheezing    11  Screen for colon cancer  -     Cologuard    12  Screening mammogram for breast cancer  -     Mammo screening bilateral w 3d & cad; Future; Expected date: 03/20/2023    13  Sepsis without acute organ dysfunction, due to unspecified organism ([place] [address] )  -     POCT urine dip  -     POCT hemoglobin A1c  -     UA (URINE) with reflex to Scope; Future; Expected date: 03/27/2023  -     Comprehensive metabolic panel; Future  -     CBC and differential; Future  -     Magnesium; Future  -     Ferritin; Future  -     Lipid panel; Future  -     TSH, 3rd generation; Future; Expected date: 03/20/2023  -     T4, free; Future; Expected date: 03/20/2023  -     Vitamin B12; Future    14  Pulmonary emphysema, unspecified emphysema type ([place] [address] )  -     Nebulizer  -     Nebulizer Supplies  -     ipratropium-albuterol (DUO-NEB) 0 5-2 5 mg/3 mL nebulizer solution; Take 3 mL by nebulization every 6 (six) hours as needed for wheezing or shortness of breath     Continue and Renew Meds  RTC in 1-2 mos w Blood work       Subjective      68 y o lady is here for Post Hospital/TCM Visit, she feels a lot better, D/C Summary and Med list reviewed w  Pt in Detail    Review of Systems   Constitutional: Negative for chills, fatigue and fever     HENT: Negative for congestion, facial swelling, sore throat, trouble swallowing and voice change  Eyes: Negative for pain, discharge and visual disturbance  Respiratory: Negative for cough, shortness of breath and wheezing  Cardiovascular: Negative for chest pain, palpitations and leg swelling  Gastrointestinal: Negative for abdominal pain, blood in stool, constipation, diarrhea and nausea  Endocrine: Negative for polydipsia, polyphagia and polyuria  Genitourinary: Negative for difficulty urinating, hematuria and urgency  Musculoskeletal: Negative for arthralgias and myalgias  Skin: Negative for rash  Neurological: Negative for dizziness, tremors, weakness and headaches  Hematological: Negative for adenopathy  Does not bruise/bleed easily  Psychiatric/Behavioral: Negative for dysphoric mood, sleep disturbance and suicidal ideas         Current Outpatient Medications on File Prior to Visit   Medication Sig   • Air  (Vicks Air Purifier/HEPA) (Device) MISC Use as directed   • albuterol (ACCUNEB) 1 25 MG/3ML nebulizer solution Take 3 mL by nebulization every 6 (six) hours as needed for wheezing   • ALPRAZolam (XANAX) 0 5 mg tablet Take 1 tablet (0 5 mg total) by mouth daily at bedtime as needed for anxiety   • glucose blood test strip Use 1 each as needed (as needed) Use as instructed   • nitroglycerin (NITROSTAT) 0 4 mg SL tablet Place 1 tablet (0 4 mg total) under the tongue every 5 (five) minutes as needed for chest pain   • [DISCONTINUED] albuterol (PROVENTIL HFA,VENTOLIN HFA) 90 mcg/act inhaler Inhale 2 puffs every 6 (six) hours as needed for wheezing   • [DISCONTINUED] amLODIPine (NORVASC) 5 mg tablet Take 1 tablet (5 mg total) by mouth 2 (two) times a day   • [DISCONTINUED] bisoprolol (ZEBETA) 10 MG tablet Take 1 tablet (10 mg total) by mouth daily   • [DISCONTINUED] co-enzyme Q-10 30 MG capsule Take 1 capsule (30 mg total) by mouth 2 (two) times a day   • [DISCONTINUED] ergocalciferol (VITAMIN D2) 50,000 units Take 1 capsule (50,000 Units total) by mouth once a week   • [DISCONTINUED] magnesium (MAGTAB) 84 MG (7MEQ) TBCR Take 1 tablet (84 mg total) by mouth daily   • [DISCONTINUED] oxygen gas Inhale 4 L/min as needed (with excertion)  Indications: short of breath   • [DISCONTINUED] pantoprazole (PROTONIX) 20 mg tablet Take 1 tablet (20 mg total) by mouth daily   • [DISCONTINUED] Plavix 75 MG tablet Take 1 tablet (75 mg total) by mouth daily   • [DISCONTINUED] Red Yeast Rice 600 MG CAPS Take 1 capsule (600 mg total) by mouth 2 (two) times a day with meals   • [DISCONTINUED] rosuvastatin (CRESTOR) 5 mg tablet Take 1 tablet (5 mg total) by mouth daily   • [DISCONTINUED] vitamin B-12 (VITAMIN B-12) 1,000 mcg tablet Take 1 tablet (1,000 mcg total) by mouth daily   • [DISCONTINUED] omega-3-acid ethyl esters (LOVAZA) 1 g capsule Take 1 capsule (1 g total) by mouth 2 (two) times a day (Patient not taking: Reported on 3/16/2023)       Objective     /70 (BP Location: Left arm, Patient Position: Sitting, Cuff Size: Standard)   Pulse 63   Temp (!) 97 1 °F (36 2 °C) (Tympanic)   Resp 14   Ht 5' 1" (1 549 m)   Wt 55 5 kg (122 lb 6 4 oz)   LMP  (LMP Unknown)   SpO2 94%   BMI 23 13 kg/m²     Physical Exam  Constitutional:       General: She is not in acute distress  HENT:      Head: Normocephalic  Mouth/Throat:      Pharynx: No oropharyngeal exudate  Eyes:      General: No scleral icterus  Conjunctiva/sclera: Conjunctivae normal       Pupils: Pupils are equal, round, and reactive to light  Neck:      Thyroid: No thyromegaly  Cardiovascular:      Rate and Rhythm: Normal rate and regular rhythm  Heart sounds: Normal heart sounds  No murmur heard  Pulmonary:      Effort: Pulmonary effort is normal  No respiratory distress  Breath sounds: Normal breath sounds  No wheezing or rales  Abdominal:      General: Bowel sounds are normal  There is no distension  Palpations: Abdomen is soft        Tenderness: There is no abdominal tenderness  There is no guarding or rebound  Musculoskeletal:         General: No tenderness  Cervical back: Neck supple  Lymphadenopathy:      Cervical: No cervical adenopathy  Skin:     Coloration: Skin is not pale  Findings: No rash  Neurological:      Mental Status: She is alert and oriented to person, place, and time  Sensory: No sensory deficit        Comments: Pt uses a walker to support gait       Lawrence Mejia MD

## 2023-03-20 NOTE — PATIENT INSTRUCTIONS
Osteoporosis Education   Osteoporosis  is a long-term medical condition that causes your bones to become weak, brittle, and more likely to fracture  Osteoporosis occurs when your body absorbs more bone than it makes  It is also caused by a lack of calcium and estrogen (female hormone)  Common symptoms include the following: You may not have any signs or symptoms  You may break a bone after a muscle strain, bump, or fall  A break usually occurs in the hip, spine, or wrist  A collapsed vertebra (bone in your spine) may cause severe back pain or loss of height from bent posture  Call your doctor if:   ·  You have severe pain  ·  You have increasing pain after a fall  ·  You have pain when you do your daily activities  ·  You have questions or concerns about your condition or care  Diagnosis of osteoporosis:   · Blood and urine tests  measure your calcium, vitamin D, and estrogen levels  · An x-ray or CT may show thinned bones or a fracture  You may be given contrast liquid to help the bones show up better in the pictures  Tell the healthcare provider if you have ever had an allergic reaction to contrast liquid  Do not enter the MRI room with anything metal  Metal can cause serious injury  Tell the healthcare provider if you have any metal in or on your body  · A bone density test  compares your bone thickness with what is expected for someone of your age, gender, and ethnicity  Treatment for osteoporosis may include medicines to prevent bone loss, build new bone, and increase estrogen  These medicines help prevent fractures and may be given as a pill or injection  Ask your healthcare provider for more information on these medicines  Prevent bone loss:  · Eat healthy foods that are high in calcium  This helps keep your bones strong  Good sources of calcium are milk, cheese, broccoli, tofu, almonds, and canned salmon and sardines  Recommended to get at least 1200mg daily of calcium    · Increase your vitamin D intake  Vitamin D is in fish oils, some vegetables, and fortified milk, cereal, and bread  Vitamin D is also formed in the skin when it is exposed to the sun  Ask your healthcare provider how much sunlight is safe for you  You will require at least 800 units of vitamin D daily taken as a supplement  · Drink liquids as directed  Ask your healthcare provider how much liquid to drink each day and which liquids are best for you  Do not have alcohol or caffeine  They decrease bone mineral density, which can weaken your bones  · Exercise regularly  Ask your healthcare provider about the best exercise plan for you  Weight bearing exercise for 30 minutes, 3 times a week can help build and strengthen bone  · Do not smoke  Nicotine and other chemicals in cigarettes and cigars can cause lung damage  Ask your healthcare provider for information if you currently smoke and need help to quit  E-cigarettes or smokeless tobacco still contain nicotine  Talk to your healthcare provider before you use these products  · Go to physical therapy as directed  A physical therapist teaches you exercises to help improve movement and muscle strength  · Alcohol  It is recommended to avoid heavy alcohol use as increased consumption of alcohol is known to cause bone loss  © Copyright [employer] 2021 Information is for End User's use only and may not be sold, redistributed or otherwise used for commercial purposes  All illustrations and images included in CareNotes® are the copyrighted property of [employer]  or [facility] Jennifer Keys     Calcium and vitamin D for bone health (Beyond the Basics)    CALCIUM AND VITAMIN D OVERVIEW  Osteoporosis is a common bone disorder that causes a progressive loss in bone density and mass  As a result, bones become thin, weakened, and easily fractured   It is estimated that more than 1 3 million osteoporosis-associated (or "osteoporotic") fractures occur every year in the [place], primarily of bone within the spine (the vertebrae), the hip, and the forearm near the wrist  =    A number of treatments can help to prevent loss of bone and treat low bone mass  However, the first step in preventing or treating osteoporosis is to consume foods and drinks that provide calcium, a mineral essential for bone strength, and vitamin D, which aids in calcium breakdown and absorption  =    CALCIUM AND VITAMIN D BENEFITS  Good nutrition is important at all ages to keep the bones healthy  · Taking calcium reduces bone loss and decreases the risk of fracturing the vertebrae (the bones that surround the spinal cord)  · Consuming calcium during childhood (eg, in milk) can lead to higher bone mass in adulthood  This increase in bone density can reduce the risk of fractures later in life  · Calcium may also have benefits in other body systems by reducing blood pressure and cholesterol levels  · Calcium and vitamin D supplements may help prevent tooth loss in older adults  RECOMMENDATIONS FOR CALCIUM    General recommendations -- Premenopausal women and men should consume at least 1000 mg of calcium, while postmenopausal women should consume 1200 mg (total diet plus supplement)  You should not consume more than 2000 mg of calcium per day (total diet plus supplement) due to the risk of side effects  Calcium in the diet -- The primary sources of calcium in the diet include milk and other dairy products, such as hard cheese, cottage cheese, or yogurt, as well as green vegetables, such as kale and broccoli (table 1)  Some cereals, soy products, and fruit juices are fortified with calcium  Calcium supplements -- The body is able to absorb calcium contained in supplements as well as from dietary sources  If it is not possible to get enough calcium from dietary sources, consult a health care provider to determine the best type, dose, and timing of calcium supplements       · Calcium carbonate is effective and is the least expensive form of calcium  It is best absorbed with a low-iron meal (such as breakfast)  Calcium carbonate may not be absorbed well in people who also take a specific medication for gastroesophageal reflux (called a proton pump inhibitor or H2 blocker), which blocks stomach acid  · Many natural calcium carbonate preparations such as oyster shells or bone meal contain some lead  · Calcium citrate is well absorbed in the fasting state as well as with a meal   · Calcium doses above 500 mg are not absorbed as well as smaller doses, so large doses of supplements should be taken in divided doses (eg, in the morning and evening)  · Calcium supplements do not replace other osteoporosis treatments such as hormone replacement, bisphosphonates (eg, risedronate [sample brand name: Actonel] and alendronate [brand name: Fosamax]), and raloxifene (brand name: Evista)  Calcium and vitamin D supplements alone are usually insufficient to prevent age-related bone loss, although they may be beneficial in some subgroups (older adults, those with very low intake)  In most patients with or at risk for osteoporosis, the addition of medication or hormonal therapy is necessary in order to slow the breakdown and removal of bone (ie, resorption)  Underlying gastrointestinal diseases -- Patients who do not adequately absorb nutrients from the gastrointestinal tract (due to malabsorption) may require more than 1000 to 1200 mg of calcium per day  In such cases, a health care provider can help to determine the optimal dose of calcium  Medications -- All medications should be discussed with a health care provider to ensure that possible interactions with calcium are identified  Certain medications change the amount of calcium that is absorbed and/or excreted  As an example, loop diuretics (eg, furosemide [sample brand name: Lasix]) increase the amount of calcium excreted in the urine      On the other hand, thiazide diuretics (eg, hydrochlorothiazide) can reduce levels of calcium in the urine, potentially reducing the risk of bone loss and kidney stones  Side effects of calcium -- Calcium is usually easily tolerated when it is taken in divided doses several times per day  Some people experience side effects related to calcium, including constipation and indigestion  Calcium supplements interfere with the absorption of iron and thyroid hormone, and therefore, these medications should be taken at different times  Kidney stones -- There is no evidence that consuming large amounts of calcium (from foods and drinks) increases the risk of kidney stones, or that avoiding dietary calcium decreases the risk  In fact, avoiding dairy products is likely to increase the risk of kidney stones  However, use of calcium supplements may increase the risk of kidney stones in susceptible individuals by raising the level of calcium in the urine  This is particularly true if the supplement is taken between meals or at bedtime, and this is one of the reasons we prefer for patients to get as much of their calcium requirement through dietary means  IMPORTANCE OF VITAMIN D  Vitamin D decreases bone loss and lowers the risk of fracture, especially in older men and women  Along with calcium, vitamin D also helps to prevent and treat osteoporosis  To absorb calcium efficiently, an adequate amount of vitamin D must be present  Vitamin D is normally made in the skin after exposure to sunlight  Recommendations for vitamin D -- The current recommendation is that men over 70 years and postmenopausal women consume at least 800 international units (20 micrograms) of vitamin D per day  Lower levels of vitamin D are not as effective, while high doses can be toxic, especially if taken for long periods of time   Although the optimal intake has not been clearly established in premenopausal women or in younger men with osteoporosis, 600 international units (15 micrograms) of vitamin D daily is generally suggested  Vitamin D is available as an individual supplement and is included in most multivitamins and some calcium supplements (table 2)  Milk is a relatively good dietary source of vitamin D, with approximately 100 international units (2 5 micrograms) per cup (240 mL), and salmon has 800 to 1000 international units (20 to 25 micrograms) of vitamin D per serving  Most healthy people don't need to check with their health care provider before taking standard doses of vitamin D and don't need monitoring of vitamin D levels if they are not being treated for vitamin D deficiency  However, recommendations for vitamin D supplementation may be different in people with certain underlying medical conditions, such as sarcoidosis or lymphoma  In these situations, a provider will determine if supplements are needed; if so, the person's vitamin D and calcium levels should be monitored with regular tests  ©8317 [employer], [address] rights reserved

## 2023-03-21 ENCOUNTER — HOME CARE VISIT (OUTPATIENT)
Dept: HOME HEALTH SERVICES | Facility: HOME HEALTHCARE | Age: 78
End: 2023-03-21

## 2023-03-22 VITALS
DIASTOLIC BLOOD PRESSURE: 68 MMHG | OXYGEN SATURATION: 97 % | HEART RATE: 72 BPM | RESPIRATION RATE: 18 BRPM | SYSTOLIC BLOOD PRESSURE: 130 MMHG | TEMPERATURE: 97.9 F

## 2023-03-23 ENCOUNTER — CONSULT (OUTPATIENT)
Dept: PULMONOLOGY | Facility: CLINIC | Age: 78
End: 2023-03-23

## 2023-03-23 VITALS
HEIGHT: 61 IN | BODY MASS INDEX: 24.05 KG/M2 | WEIGHT: 127.4 LBS | DIASTOLIC BLOOD PRESSURE: 64 MMHG | SYSTOLIC BLOOD PRESSURE: 122 MMHG | HEART RATE: 63 BPM | OXYGEN SATURATION: 96 %

## 2023-03-23 DIAGNOSIS — F17.211 NICOTINE DEPENDENCE, CIGARETTES, IN REMISSION: ICD-10-CM

## 2023-03-23 DIAGNOSIS — R06.09 DOE (DYSPNEA ON EXERTION): Primary | ICD-10-CM

## 2023-03-23 DIAGNOSIS — J44.9 CHRONIC OBSTRUCTIVE PULMONARY DISEASE, UNSPECIFIED COPD TYPE (HCC): ICD-10-CM

## 2023-03-23 LAB
DME PARACHUTE DELIVERY DATE REQUESTED: NORMAL
DME PARACHUTE ITEM DESCRIPTION: NORMAL
DME PARACHUTE ORDER STATUS: NORMAL
DME PARACHUTE SUPPLIER NAME: NORMAL
DME PARACHUTE SUPPLIER PHONE: NORMAL

## 2023-03-23 RX ORDER — FLUTICASONE FUROATE, UMECLIDINIUM BROMIDE AND VILANTEROL TRIFENATATE 100; 62.5; 25 UG/1; UG/1; UG/1
1 POWDER RESPIRATORY (INHALATION) DAILY
Qty: 180 BLISTER | Refills: 5 | Status: SHIPPED | OUTPATIENT
Start: 2023-03-23 | End: 2023-06-21

## 2023-03-23 RX ORDER — FLUTICASONE FUROATE, UMECLIDINIUM BROMIDE AND VILANTEROL TRIFENATATE 100; 62.5; 25 UG/1; UG/1; UG/1
1 POWDER RESPIRATORY (INHALATION) DAILY
Qty: 180 BLISTER | Refills: 0 | Status: SHIPPED | OUTPATIENT
Start: 2023-03-23 | End: 2023-03-23

## 2023-03-23 NOTE — PROGRESS NOTES
Pulmonary Consultation   Grover Kay 68 y o  female MRN: [id number]    Encounter: [id number]      Reason for consultation:   Chronic obstructive pulmonary disease    Requesting physician:  Manda Justin DO      Impressions:   · Dyspnea on exertion  · History of tobacco use  · History of acute hypoxemic respiratory failure  Resolved  · Severe chronic obstructive pulmonary disease  Recommendations:  · 6-minute walk test   · Spirometry  · Trelegy Ellipta 1 inhalation once a day  · Albuterol/ipratropium nebulizer treatments 4 times a day as needed  · Discontinue home oxygen  · Follow-up in 4 weeks  Discussion:  The patient's COPD is severe as evident on the spirometry done today in the office  I have started her on Trelegy Ellipta 1 inhalation once a day  I have provided her with a sample and showed her how to use it appropriately  I have sent the prescription to her pharmacy  She will use the albuterol/ipratropium nebulizer treatments 4 times a day as needed  She did not desaturate on the 6-minute walk test   I have discontinued the home oxygen  I will see her in 4 weeks  History of Present Illness   HPI:  Grover Kay is a 68 y o  female who is here for evaluation of COPD  The patient was admitted recently to [address] [place] [address] with sepsis secondary to urinary tract infection  She was wheezing and required oxygen  She was treated with antibiotics and bronchodilators  On discharge she needed oxygen and was sent home on oxygen  Since then she feels a lot better  She has episodes of cough  She has occasional wheezing  She was diagnosed with bronchial asthma by her family doctor years ago  She has been on albuterol and ipratropium nebulizer treatments as needed    The patient has history of coronary artery disease and had CABG done years ago at the [facility]     Review of systems:  12 point review of systems was completed and was otherwise negative except as listed in HPI  Historical Information   Past Medical History:   Diagnosis Date   • Arthritis    • Asthma    • Community acquired pneumonia 11/13/2019   • Coronary artery disease    • DM (diabetes mellitus) ([facility] [address] )    • HTN (hypertension)    • Hydronephrosis, bilateral 03/06/2023   • Hyperlipidemia    • Hyponatremia    • Urinary retention      Past Surgical History:   Procedure Laterality Date   • CARDIAC CATHETERIZATION  2010   • CORONARY ARTERY BYPASS GRAFT  12/06/2010    with subsequent stent to the saphenous veingraft to the RCA 3 months later   • KNEE ARTHROSCOPY     • POLYPECTOMY      laryngeal   • TOTAL HIP ARTHROPLASTY     • TUBAL LIGATION       Family History   Problem Relation Age of Onset   • Stroke Mother    • Hypertension Mother    • Heart disease Mother    • Colon cancer Father    • Heart attack Brother    • Heart attack Brother        Family History:  No family history of chronic lung disease  Social History:  The patient lives with a roommate  She has over 30-pack-year smoking history and quit in 2000  She worked in different places  No significant occupational exposure  She has no pets  Meds/Allergies   Current Facility-Administered Medications   Medication Dose Route Frequency   • cyanocobalamin injection 1,000 mcg  1,000 mcg Intramuscular Q30 Days     (Not in a hospital admission)    Allergies   Allergen Reactions   • Ezetimibe Rash   • Ace Inhibitors Other (See Comments)     Includes lisinopril   • Angiotensin Receptor Blockers Other (See Comments)     Not specified  Includes losartan and olmesartan   • Prednisone Confusion   • Statins Arthralgia     Tolerates Crestor       Vitals: Blood pressure 122/64, pulse 63, height 5' 1" (1 549 m), weight 57 8 kg (127 lb 6 4 oz), SpO2 96 %  ,      Physical exam:        Head/eyes:    Normocephalic, without obvious abnormality, atraumatic,         PERRL, extraocular muscles intact, no scleral icterus    Nose:   Nares normal, septum midline, mucosa normal, no drainage    or sinus tenderness   Throat:   Moist mucous membranes, no thrush   Neck:   Supple, trachea midline, no adenopathy; no carotid    bruit or JVD   Lungs:    Decreased breath sounds  Few basal crackles  No wheezing            Heart:    Regular rate and rhythm, S1 and S2 normal, no murmur, rub   or gallop   Abdomen:     Soft, non-tender, bowel sounds active all four quadrants,     no masses, no organomegaly   Extremities:   Extremities normal, atraumatic, no cyanosis or edema   Skin:   Warm, dry, turgor normal, no rashes or lesions   Neurologic:   CNII-XII intact, normal strength, non-focal             Imaging and other studies: I have personally reviewed pertinent films in PACS chest x-rays reviewed on the PACS system  No acute pulmonary findings  Lab Results   Component Value Date    WBC 14 49 (H) 03/10/2023    HGB 12 3 03/10/2023    HCT 39 0 03/10/2023    MCV 85 03/10/2023     (H) 03/10/2023     Lab Results   Component Value Date    SODIUM 135 03/10/2023    K 3 7 03/10/2023    CL 94 (L) 03/10/2023    CO2 30 03/10/2023    BUN 11 03/10/2023    CREATININE 0 57 (L) 03/10/2023    GLUC 127 03/10/2023    CALCIUM 9 8 03/10/2023     6-minute walk test was done today in the office  The patient started with a heart rate of 63 bpm and O2 saturation 98%  At the end of the test the heart rate was 80 bpm and the O2 saturation 94%  She walked 154 m  Spirometry is done today in the office  It showed severe airflow limitation          Lennox Anon, MD

## 2023-03-24 ENCOUNTER — HOME CARE VISIT (OUTPATIENT)
Dept: HOME HEALTH SERVICES | Facility: HOME HEALTHCARE | Age: 78
End: 2023-03-24

## 2023-03-24 VITALS
SYSTOLIC BLOOD PRESSURE: 128 MMHG | DIASTOLIC BLOOD PRESSURE: 60 MMHG | TEMPERATURE: 97.5 F | OXYGEN SATURATION: 98 % | RESPIRATION RATE: 18 BRPM | HEART RATE: 62 BPM

## 2023-04-03 ENCOUNTER — HOSPITAL ENCOUNTER (OUTPATIENT)
Dept: BONE DENSITY | Facility: CLINIC | Age: 78
Discharge: HOME | End: 2023-04-03

## 2023-04-03 DIAGNOSIS — Z13.820 ENCOUNTER FOR SCREENING FOR OSTEOPOROSIS: ICD-10-CM

## 2023-04-03 DIAGNOSIS — M81.8 IDIOPATHIC OSTEOPOROSIS: ICD-10-CM

## 2023-04-04 DIAGNOSIS — M81.8 IDIOPATHIC OSTEOPOROSIS: Primary | ICD-10-CM

## 2023-04-04 RX ORDER — CAL/D3/MAG11/ZINC/COP/MANG/BOR 600 MG-800
1 TABLET ORAL 2 TIMES DAILY WITH MEALS
Qty: 200 TABLET | Refills: 6 | Status: SHIPPED | OUTPATIENT
Start: 2023-04-04

## 2023-04-05 DIAGNOSIS — F41.9 ANXIETY: ICD-10-CM

## 2023-04-06 ENCOUNTER — OFFICE VISIT (OUTPATIENT)
Dept: OBGYN CLINIC | Facility: CLINIC | Age: 78
End: 2023-04-06

## 2023-04-06 VITALS
WEIGHT: 125.8 LBS | SYSTOLIC BLOOD PRESSURE: 161 MMHG | DIASTOLIC BLOOD PRESSURE: 69 MMHG | BODY MASS INDEX: 23.77 KG/M2 | HEART RATE: 68 BPM

## 2023-04-06 DIAGNOSIS — Z46.89 PESSARY MAINTENANCE: Primary | ICD-10-CM

## 2023-04-06 RX ORDER — ALPRAZOLAM 0.5 MG/1
0.5 TABLET ORAL
Qty: 30 TABLET | Refills: 3 | Status: SHIPPED | OUTPATIENT
Start: 2023-04-06

## 2023-04-06 NOTE — PROGRESS NOTES
Subjective:     Federico Zavala is a 68 y o  Daryn Barrette female who presents for follow up after pessary placement in the hospital in the sitting of sepsis related to a UTI cause by hydronephrosis related to prolapse  Since placement she has been able to void  Her burgos was removed on 3/16 and she was voiding without difficulty  Objective:    Vitals: Blood pressure 161/69, pulse 68, weight 57 1 kg (125 lb 12 8 oz)  Body mass index is 23 77 kg/m²  Physical Exam  Vitals reviewed  Constitutional:       Appearance: Normal appearance  Cardiovascular:      Rate and Rhythm: Normal rate  Pulmonary:      Effort: Pulmonary effort is normal    Neurological:      Mental Status: She is alert and oriented to person, place, and time           Assessment/Plan:    RTO in 3 months for pessary cleaning        Lubna Marinelli MD  4/6/2023  11:36 AM

## 2023-04-25 ENCOUNTER — OFFICE VISIT (OUTPATIENT)
Dept: PULMONOLOGY | Facility: CLINIC | Age: 78
End: 2023-04-25

## 2023-04-25 VITALS
SYSTOLIC BLOOD PRESSURE: 142 MMHG | DIASTOLIC BLOOD PRESSURE: 78 MMHG | BODY MASS INDEX: 24.45 KG/M2 | HEIGHT: 61 IN | HEART RATE: 63 BPM | OXYGEN SATURATION: 97 % | WEIGHT: 129.5 LBS

## 2023-04-25 DIAGNOSIS — J44.9 CHRONIC OBSTRUCTIVE PULMONARY DISEASE, UNSPECIFIED COPD TYPE (HCC): Primary | ICD-10-CM

## 2023-04-25 DIAGNOSIS — R06.09 DOE (DYSPNEA ON EXERTION): ICD-10-CM

## 2023-04-25 DIAGNOSIS — F17.211 NICOTINE DEPENDENCE, CIGARETTES, IN REMISSION: ICD-10-CM

## 2023-04-25 NOTE — PROGRESS NOTES
"Office Progress Note - Pulmonary    Brett Bianchi 68 y o  female MRN: [id number]    Encounter: [id number]      Assessment:  • Obstructive pulmonary disease  • Dyspnea on exertion  • Allergic rhinitis  Plan:   • Resume Trelegy Ellipta 100 once a day  • Albuterol rescue inhaler 2 inhalations 4 times a day as needed  • Regular walks to improve stamina  • Claritin 10 mg once a day  • Follow-up in 6 months  Discussion:   The patient's COPD is better treated  It was even better when she uses the Trelegy Ellipta  I have resumed her Trelegy once a day  I told her to take it before meals  She should also rinse her mouth with water after each use  She will use the albuterol rescue inhaler only as needed for wheezing or chest tightness  Her allergies are better controlled  I have maintained her on the Claritin 10 mg once a day  Advised her to take regular walks to improve stamina  I will see her in 6 months and a follow-up visit  Subjective: The patient is here for a follow-up visit  She felt better while she was on the Trelegy Ellipta  She developed soreness of her throat and she stopped the Trelegy  She was using it after her breakfast   She was rinsing her mouth  Now it has resolved  She is using her albuterol rescue inhaler on average twice a day  She is taking Claritin 10 mg once a day for her allergies  Review of systems:  A 12 point system review is done and aside from what is stated above the rest of the review of systems is negative  Family history and social history are reviewed  Medications list is reviewed  Vitals: Blood pressure 142/78, pulse 63, height 5' 1\" (1 549 m), weight 58 7 kg (129 lb 8 oz), SpO2 97 %  ,     Physical Exam  Gen: Awake, alert, oriented x 3, no acute distress  HEENT: Mucous membranes moist, no oral lesions, no thrush  NECK: No accessory muscle use, JVP not elevated  Cardiac: Regular, single S1, single S2, no murmurs, no rubs, no " gallops  Lungs: Decreased breath sounds  No wheezing or rhonchi  Abdomen: normoactive bowel sounds, soft nontender, nondistended, no rebound or rigidity, no guarding  Extremities: no cyanosis, no clubbing, no edema  Neuro:  Grossly nonfocal   Skin:  No rash

## 2023-04-28 ENCOUNTER — OFFICE VISIT (OUTPATIENT)
Age: 78
End: 2023-04-28

## 2023-04-28 DIAGNOSIS — M19.90 OSTEOARTHRITIS, UNSPECIFIED OSTEOARTHRITIS TYPE, UNSPECIFIED SITE: ICD-10-CM

## 2023-04-28 DIAGNOSIS — N39.0 URINARY TRACT INFECTION WITHOUT HEMATURIA, SITE UNSPECIFIED: ICD-10-CM

## 2023-04-28 DIAGNOSIS — R41.89 COGNITIVE DECLINE: Primary | ICD-10-CM

## 2023-04-28 DIAGNOSIS — R26.2 AMBULATORY DYSFUNCTION: ICD-10-CM

## 2023-04-28 DIAGNOSIS — I10 PRIMARY HYPERTENSION: ICD-10-CM

## 2023-04-28 DIAGNOSIS — H40.9 GLAUCOMA, UNSPECIFIED GLAUCOMA TYPE, UNSPECIFIED LATERALITY: ICD-10-CM

## 2023-04-28 NOTE — ASSESSMENT & PLAN NOTE
· Reports no eyes related concerns  · Continue good lighting, reminders for glasses as needed  · Continue follow-ups with ophthalmology

## 2023-04-28 NOTE — ASSESSMENT & PLAN NOTE
· Pt assist with adl/iadls  Noted more memory change since recent hospitalization  · H/o hospital delirium  · MoCA 23/30  Deficits in visual-spatial (cube only), attention, language, abstraction, orientation  Patient scored 5/5 on delayed recall  Recent Imaging:  None recent  Gerilabs:  None recent  History, physical, and cognitive screening are most consistent with:  Mild cognitive impairment  Contributing factors:  Recent hospitalization  Further eval warrants the following:  Gerilabs  Pt declines imaging at this time  Cont supportive cares- pt lives with room mate, dgt visiting daily to help with cares  Ensure pt has phone and reinforce to not use stove or shower while gone  SW needs this visit:  See intake note  Patient has applied for waiver  Discussed safe environment  Some items to consider would be door video surveillance, ID bracelet, Tile GPS - in case wandering ever occurs  No safety concerns per dgt  Pt doesn't drive  Continue to engage in physical, cognitive, social activity  Cont doing games, puzzles, trivia, current event discussions  Cont daily walks or exercise video  Cont outings with friends and family  Avoid social isolation  Referral to cognitive therapy  Optimize chronic and acute conditions  Cont to f/u with providers and ensure medication compliance  Vascular risk factors:  Htn, hld, dm2, copd  Monitor for changes in behavior/mood- if noted, notify provider for eval  Avoid medications that worsen cognition - check with provider or pharmacist before starting new or OTC meds  Ensure good nutrition, adequate hydration  Do not overwhelm pt with info  Do one task at a time  Use slower pace  Keep to one conversation at a time  Do not multitask

## 2023-04-28 NOTE — ASSESSMENT & PLAN NOTE
· Reporting neck pain  · Continue as needed Tylenol/topical analgesic    Continue nonpharmacological methods of pain control  · Notify pcp if pain increases

## 2023-04-28 NOTE — PATIENT INSTRUCTIONS
Global Cognition Activity Apps:  30/30- time management, set up lists of tasks to accomplish and time needed to complete them  Awesome Memory- cognition and memory  Bejeweled- clear the gems by matching the same colors  Brain HQ- tests your brain in the area of memory, speed, and attention  Brain Workout- test your brain in the area of memory, focus, reaction, and accuracy   Classic Mello- tests memory and attention as you repeat the colors and sounds in the correct order  Drawesome- copy the image without picking up the pencil  Fit Brains Trainer- addresses memory and recognition, concentration, attention span, processing speed, problem-solving, hand-eye coordination, and reaction time  Lumosity Brain Trainer- personalized brain workouts for memory, attention, and brain performance (can also be accessed online)  Mind Games (Pro)- abstraction, attention, memory, executive functioning, etc   Rush Hour-sliding block traffic jam puzzle  Skill Game [place]/Skills Game- sustain attention to complete trail-making with numbers   Skill Training- visual motor activity annia requiring eye-to-hand coordination, foresight, and concentration  [employer]- four color memory game for cognition  Sudoku- puzzle game that exercises the brain, logical thinking, and memory  Bartolo [place] of [address] challenging attention, memory, and executive functioning  Unblock Me- get to the red block by sliding other blocks out of the way  Word Association!- scanning to find associated words  Wordsworth Pro- word-making game  [employer]- anagram word game    Attention Activities:  Catch Me-FlashPad- fast-paced, light-up game (touch all of the red lights, skip the green lights)  Metronome Beats- practice divided attention by performing a task with metronome running    Memory Activities:  Memory Block- test your memory and reflexes  Memory Trainer- memory training and games for visualization, chunking, focus, and spatial memory, etc   My Personal Memory Trainer- activities to address working memory      Please access your [employer] (Apple devices) or [employer] (Android devices) to see if you can access these apps and if there is any cost associated with downloading the briana

## 2023-04-28 NOTE — PROGRESS NOTES
Assessment & Plan:   1  Cognitive decline  Assessment & Plan:  · Pt assist with adl/iadls  Noted more memory change since recent hospitalization  · H/o hospital delirium  · MoCA 23/30  Deficits in visual-spatial (cube only), attention, language, abstraction, orientation  Patient scored 5/5 on delayed recall  Recent Imaging:  None recent  Gerilabs:  None recent  History, physical, and cognitive screening are most consistent with:  Mild cognitive impairment  Contributing factors:  Recent hospitalization  Further eval warrants the following:  Gerilabs  Pt declines imaging at this time  Cont supportive cares- pt lives with room mate, dgt visiting daily to help with cares  Ensure pt has phone and reinforce to not use stove or shower while gone  SW needs this visit:  See intake note  Patient has applied for waiver  Discussed safe environment  Some items to consider would be door video surveillance, ID bracelet, Tile GPS - in case wandering ever occurs  No safety concerns per dgt  Pt doesn't drive  Continue to engage in physical, cognitive, social activity  Cont doing games, puzzles, trivia, current event discussions  Cont daily walks or exercise video  Cont outings with friends and family  Avoid social isolation  Referral to cognitive therapy  Optimize chronic and acute conditions  Cont to f/u with providers and ensure medication compliance  Vascular risk factors:  Htn, hld, dm2, copd  Monitor for changes in behavior/mood- if noted, notify provider for eval  Avoid medications that worsen cognition - check with provider or pharmacist before starting new or OTC meds  Ensure good nutrition, adequate hydration  Do not overwhelm pt with info  Do one task at a time  Use slower pace  Keep to one conversation at a time  Do not multitask  Orders:  -     Ambulatory Referral to group home  -     Vitamin B12/Folate, Serum Panel; Future  -     TSH, 3rd generation;  Future  -     Ambulatory Referral to Occupational Therapy; Future    2  Primary hypertension  Assessment & Plan:  · BP stable, no headache or dizziness  · Remains on amlodipine, bisoprolol  · Continue dietary and lifestyle interventions      3  Glaucoma, unspecified glaucoma type, unspecified laterality  Assessment & Plan:  · Reports no eyes related concerns  · Continue good lighting, reminders for glasses as needed  · Continue follow-ups with ophthalmology      4  Osteoarthritis, unspecified osteoarthritis type, unspecified site  Assessment & Plan:  · Reporting neck pain  · Continue as needed Tylenol/topical analgesic  Continue nonpharmacological methods of pain control  · Notify pcp if pain increases      5  Ambulatory dysfunction  Assessment & Plan:  · Patient uses cane, fall history  · Is currently on [facility]  PT services  · Fall precautions      6  Urinary tract infection without hematuria, site unspecified  Assessment & Plan:  · Was hospitalized for this, has since resolved  · Following with gyn-uro  · Encouraged timed voids, good periarea hygiene, avoid constipation, keep hydrated      HPI:  We had the pleasure of evaluating Chuck Marte who is a 68 y o  female in Geriatric Consultation today  Previous MOCA:  None noted  Comorbidities include htn, cognitive decline, glaucoma  Ms Kemal Robles is in the office with her daughter    Memory Concerns per family:   Was in hospital with infection  Became very disoriented  Has some memory loss as baseline  Has a room mate  She was able to care for herself  Before hospital pt more independent, required some family support  She is doing much better since hospitalization, but family does note some changes  Tried PT at home, pt declined  From time to time she has some confusions  OVerall, memory is better  Dgt checking in on her everyday  Dgt puts medications in planner, dgt monitoring  She has been doing some cleaning/cooking  No worries about pt leaving stove on  Sleeping trouble sometimes  No wandering  Appetite is still decreased  Occasionally forgets to put things away  Not confused to place or person  SOmetimes forgets conversations  Likes to play on cell phone  Daughter trying to encourage to out to walk  She has no problems operating household appliance such as TV remote, kitchen appliances, computer  She has difficulty finding the right word while speaking: Yes - sometimes  Patient requires repeat information or ask the same question repeatedly: Yes     Memory Concerns per patient:  Had hospital delirium from UTI and meds, resolved now  Not baseline memory loss  Independent with self care  Daughter is starting to helping cooking and cleaning d/t balance  Does forget recipes sometimes  Sleep - sometimes not good  Sometimes naps during the day  No falling asleep during convo or meals  Appetite is good  Swallow good  Glasses for reading, no hearing impairments  Has own teeth, but does have some ready to pull out  She likes her ice cream and donuts  Cognitive:  Phone games  Physical:  Plans on walking  Social:  Mostly in the house  Pt answers questions appropriately, has good insight to medical conditions and history  Function Concerns:    She lives with a room mate  ADL assist, iADL assist, Medication management: assist   Do you drive: No       Do you handle your own financial affairs such as balancing your checkbook, paying bills, investments: No - autopay, dgt assists  Have you noticed any gait or balance disorder:  Yes  Uses cane to assist with ambulation, one fall, had syncope (when infection started)     Any urinary/stool incontinence:  yes   Appetite/swallow: good/good    Hearing issues:no Vision issues: readers  Dentition issues:  no     Psychosocial concerns:  Have you or your family noted any change in your mood or personality:Yes  Are you currently or have you been treated in the past for depression or anxiety: Yes  Any hallucination or delusion: Yes - the people upstairs cause mold that make it hard for her to breath, not the copd  Sleep Issues: No - wasn't sleeping for a while  Pain:  Hand OA    Do you have POA:No  Do you have a Living will unsure    Past Medical, surgical, social, medication and allergy history and patients previous records reviewed  Family Review of Behavior [facility]:    pacing  No    agressive/combative behavior  Yes  - cursing   agitated  Yes   wandering  No   resistance to care  Yes  suspicious  Yes - only trust children  withdrawn Yes  misplacing/losing objects Yes - occasional  personal hygiene problems  Yes - dgt assisting  forgetfulness of actions Yes    Family member with dementia and what type?no  Stroke:  unknown  Have you had any head trauma- once when younger  Does patient have history of alcohol abuse Yes - in past, not anymore  ROS:  Review of Systems   Constitutional: Negative for activity change, appetite change, chills and fatigue  HENT: Negative for congestion, hearing loss and trouble swallowing  Eyes: Negative for visual disturbance  Respiratory: Negative for cough and shortness of breath  Cardiovascular: Negative for chest pain  Gastrointestinal: Negative for abdominal pain, constipation, diarrhea, nausea and vomiting  Genitourinary: Negative for difficulty urinating  Musculoskeletal: Positive for arthralgias (left hand OA) and gait problem (cane)  Negative for back pain  Neurological: Negative for dizziness and light-headedness  Psychiatric/Behavioral: Positive for decreased concentration (forgetful)  Negative for dysphoric mood and sleep disturbance  The patient is nervous/anxious  Allergies: Allergies   Allergen Reactions   • Ezetimibe Rash   • Ace Inhibitors Other (See Comments)     Includes lisinopril   • Angiotensin Receptor Blockers Other (See Comments)     Not specified    Includes losartan and olmesartan   • Statins Arthralgia     Tolerates Crestor       Medications: Current Outpatient Medications:   •  Air  (Vicks Air Purifier/HEPA) (Device) MISC, Use as directed, Disp: 1 each, Rfl: 0  •  albuterol (ACCUNEB) 1 25 MG/3ML nebulizer solution, Take 3 mL by nebulization every 6 (six) hours as needed for wheezing, Disp: 225 mL, Rfl: 5  •  albuterol (PROVENTIL HFA,VENTOLIN HFA) 90 mcg/act inhaler, Inhale 2 puffs every 6 (six) hours as needed for wheezing, Disp: 18 g, Rfl: 5  •  ALPRAZolam (XANAX) 0 5 mg tablet, Take 1 tablet (0 5 mg total) by mouth daily at bedtime as needed for anxiety, Disp: 30 tablet, Rfl: 3  •  amLODIPine (NORVASC) 5 mg tablet, Take 1 tablet (5 mg total) by mouth 2 (two) times a day, Disp: 180 tablet, Rfl: 3  •  bisoprolol (ZEBETA) 10 MG tablet, Take 1 tablet (10 mg total) by mouth daily, Disp: 90 tablet, Rfl: 3  •  Calcium Carbonate-Vit D-Min (Caltrate 600+D Plus Minerals) 600-800 MG-UNIT TABS, Take 1 tablet by mouth 2 (two) times a day with meals, Disp: 200 tablet, Rfl: 6  •  co-enzyme Q-10 30 MG capsule, Take 1 capsule (30 mg total) by mouth 2 (two) times a day, Disp: 200 capsule, Rfl: 3  •  denosumab (PROLIA) 60 mg/mL, Inject 1 mL (60 mg total) under the skin once for 1 dose, Disp: 1 mL, Rfl: 1  •  ergocalciferol (VITAMIN D2) 50,000 units, Take 1 capsule (50,000 Units total) by mouth once a week, Disp: 13 capsule, Rfl: 3  •  fluticasone-umeclidinium-vilanterol (Trelegy Ellipta) 100-62 5-25 mcg/actuation inhaler, Inhale 1 puff daily Rinse mouth after use , Disp: 180 blister, Rfl: 5  •  glucose blood test strip, Use 1 each as needed (as needed) Use as instructed, Disp: 100 each, Rfl: 5  •  ipratropium-albuterol (DUO-NEB) 0 5-2 5 mg/3 mL nebulizer solution, Take 3 mL by nebulization every 6 (six) hours as needed for wheezing or shortness of breath, Disp: 270 mL, Rfl: 3  •  magnesium (MAGTAB) 84 MG (7MEQ) TBCR, Take 1 tablet (84 mg total) by mouth daily, Disp: 90 tablet, Rfl: 3  •  nitroglycerin (NITROSTAT) 0 4 mg SL tablet, Place 1 tablet (0 4 mg total) under the tongue every 5 (five) minutes as needed for chest pain, Disp: 30 tablet, Rfl: 5  •  pantoprazole (PROTONIX) 20 mg tablet, Take 1 tablet (20 mg total) by mouth daily, Disp: 90 tablet, Rfl: 3  •  Plavix 75 MG tablet, Take 1 tablet (75 mg total) by mouth daily, Disp: 90 tablet, Rfl: 3  •  Red Yeast Rice 600 MG CAPS, Take 1 capsule (600 mg total) by mouth 2 (two) times a day with meals, Disp: 200 capsule, Rfl: 3  •  rosuvastatin (CRESTOR) 5 mg tablet, Take 1 tablet (5 mg total) by mouth daily, Disp: 90 tablet, Rfl: 3  •  vitamin B-12 (VITAMIN B-12) 1,000 mcg tablet, Take 1 tablet (1,000 mcg total) by mouth daily, Disp: 90 tablet, Rfl: 3    Current Facility-Administered Medications:   •  cyanocobalamin injection 1,000 mcg, 1,000 mcg, Intramuscular, Q30 Days, Hernandez Navarro MD, 1,000 mcg at 10/29/21 1217    Vitals: There were no vitals filed for this visit      History:  Past Medical History:   Diagnosis Date   • Arthritis    • Asthma    • Community acquired pneumonia 11/13/2019   • Coronary artery disease    • DM (diabetes mellitus) ([facility] [address] )    • HTN (hypertension)    • Hydronephrosis, bilateral 03/06/2023   • Hyperlipidemia    • Hyponatremia    • Urinary retention      Past Surgical History:   Procedure Laterality Date   • CARDIAC CATHETERIZATION  2010   • CORONARY ARTERY BYPASS GRAFT  12/06/2010    with subsequent stent to the saphenous veingraft to the RCA 3 months later   • KNEE ARTHROSCOPY     • POLYPECTOMY      laryngeal   • TOTAL HIP ARTHROPLASTY     • TUBAL LIGATION       Family History   Problem Relation Age of Onset   • Stroke Mother    • Hypertension Mother    • Heart disease Mother    • Colon cancer Father    • Heart attack Brother    • Heart attack Brother      Social History     Socioeconomic History   • Marital status: Single     Spouse name: Not on file   • Number of children: 5   • Years of education: Not on file   • Highest education level: Not on file   Occupational History   • Occupation: retired   Tobacco Use   • Smoking status: Former     Packs/day: 0 50     Years: 13 00     Pack years: 6 50     Types: Cigarettes     Start date:      Quit date:      Years since quittin 3   • Smokeless tobacco: Never   • Tobacco comments:     no passive smoke exposure   Vaping Use   • Vaping Use: Never used   Substance and Sexual Activity   • Alcohol use: Yes     Comment: socially   • Drug use: Never   • Sexual activity: Not Currently   Other Topics Concern   • Not on file   Social History Narrative   • Not on file     Social Determinants of Health     Financial Resource Strain: Low Risk    • Difficulty of Paying Living Expenses: Not hard at all   Food Insecurity: No Food Insecurity   • Worried About [address] in the Last Year: Never true   • Ran Out of Food in the Last Year: Never true   Transportation Needs: No Transportation Needs   • Lack of Transportation (Medical): No   • Lack of Transportation (Non-Medical): No   Physical Activity: Not on file   Stress: Not on file   Social Connections: Not on file   Intimate Partner Violence: Not on file   Housing Stability: Unknown   • Unable to Pay for Housing in the Last Year: No   • Number of Places Lived in the Last Year: Not on file   • Unstable Housing in the Last Year: Not on file     Financial Resource Strain: Low Risk    • Difficulty of Paying Living Expenses: Not hard at all   Food Insecurity: No Food Insecurity   • Worried About [address] in the Last Year: Never true   • Ran Out of Food in the Last Year: Never true   Transportation Needs: No Transportation Needs   • Lack of Transportation (Medical): No   • Lack of Transportation (Non-Medical):  No   Physical Activity: Not on file   Stress: Not on file   Social Connections: Not on file   Intimate Partner Violence: Not on file   Housing Stability: Unknown   • Unable to Pay for Housing in the Last Year: No   • Number of Places Lived in the Last Year: Not on file   • Unstable Housing in the Last Year: Not on file      Procedure Laterality Date   • CARDIAC CATHETERIZATION  2010   • CORONARY ARTERY BYPASS GRAFT  12/06/2010    with subsequent stent to the saphenous veingraft to the RCA 3 months later   • KNEE ARTHROSCOPY     • POLYPECTOMY      laryngeal   • TOTAL HIP ARTHROPLASTY     • TUBAL LIGATION         Physical Exam  Observed Ambulation:  Use of cane, fairly steady, sl slower pace  Physical Exam  Vitals and nursing note reviewed  Constitutional:       General: She is not in acute distress  Appearance: Normal appearance  She is well-developed  She is not diaphoretic  HENT:      Head: Normocephalic  Cardiovascular:      Rate and Rhythm: Normal rate  Heart sounds: No murmur heard  No friction rub  No gallop  Pulmonary:      Effort: Pulmonary effort is normal  No respiratory distress  Breath sounds: Normal breath sounds  No wheezing or rales  Comments: Decreased bilat  Abdominal:      General: Bowel sounds are normal  There is no distension  Palpations: Abdomen is soft  Tenderness: There is no abdominal tenderness  There is no rebound  Musculoskeletal:         General: Normal range of motion  Skin:     General: Skin is warm and dry  Neurological:      General: No focal deficit present  Mental Status: She is alert  Mental status is at baseline     Psychiatric:         Mood and Affect: Mood normal          Behavior: Behavior normal

## 2023-04-28 NOTE — PROGRESS NOTES
[facility]  [address] [facility], [address] [facility], [address] [place] [address]  [phone]    Social Work [address] [place] [address] presents today for a geriatric assessment, accompanied by her daughter Julio Cesar Wiley  Social/Family History   Marital status: Single    Does patient have children? yes, has 5 children: [place] [facility] [place] [facility], Mirnatrenton Renner in [place], Julio Cesar Wiley in [facility], Prateek Melendrez in [place], [place] in [facility] (first 3 girls from one father and last 2 sons from another father)  Family members assisting patient at home: Mirna Reynosos comes down 1x/month from [place] to spend weekend with pt/ help Julio Cesar Melendrez comes by every other weekend  Julio Cesar Wiley goes to see pt every day Mejia Arana is working on getting shower chair, helps dress her, supervises pt when pt cooks, Julio Cesar Wiley does pt's hair, helps with ambulation (pt does not use her walker), Julio Cesar Saurabh does laundry, transports to [employer], does [employer], Ivanna cleans the house)  Are any relationships causing problems right now: Yes  disruptive neighbors live above pt's apartment; otherwise no  Who is available to provide care in case of illness or crisis (please specify relation to patient / level of care able to provide)? daughter Julio Cesar Wiley        Employment and Education   Education: Highest Level of Education: [employer] (No Diploma)    Currently Employed: No    Retired:  Yes                        Date of alf? had both hips replaced and stopped working following that (gets SSI and Social Security), around her late 40s/early 46s   Type of employment: worked in [place]   : [address] [place]  is a , but none on pt's side of the family (no sons or previous partners are veterans)      Lifestyle/Community Exos and Organizations: none   Major life events in past two years (ex: alf, death in family, major illness etc ): one of her best friends passed around 2 years ago, was hospitalized for infection recently    Have you ever used a home care agency, meal delivery service, or respite care?: no   Services that assessment team feels would be beneficial to patient/family: pt approved for Waiver services; Shonna Jacobson reports that they are just waiting on visit from [facility] to determine the number of hours that pt will be approved for       Financial   Total Monthly income: $024     Source of income: Social Security and SSI   Meet current needs? Yes    Funds available for home care? No; pt recently approved for [facility] available for nursing home? No   Do you rent or own your home? Rent       End-Of-Life Checklist   Have wishes or desires for end-of-life care been discussed? Advanced directives: has NO advanced directive  - information provided to family [facility] provided the following in office to Shonna Jacobson:   - Advance Care Planning: Tips from the [employer] on [address] and [address] for [address] [facility] [address] with Dementia  - [address] brochure      For all patients, we encourage seeing a  to establish a Financial Power of , a [address], and an Advanced Directive (living will)  Particularly for patients experiencing memory concerns, we strongly recommend that this is completed as soon as possible  Safety Assessment   Is the patient still driving? No    Is the patient taking medications as prescribed? Yes     Is there a system in place for medication management? Shonna Jacobson uses pillbox and helps remind pt to take her meds  Are firearms or weapons in the home? no  Does the patient live alone? Pt has a roommate (have lived together for several years)  1  What is the layout of the home? Lives on ground floor of a split level home (2 units); about 7 steps to get inside  2  Do you feel comfortable leaving the person home alone? Yes  have installed door alarms and discussed putting cameras up with remedios due to hospital delirium, but Shonna Jacobson reports pt has been doing better recently    3  Have you noticed any burned pans or other signs of issues with the stove or other appliances? only uses stove when Wendy Quach is there to supervise; has not been doing a lot of cooking recently   4  Do you have any concerns about the person’s cooking or eating habits? not eating enough, snacks a lot (on yogurt, applesauce, etc )   5  Are there working smoke detectors and fire extinguishers in the home? Yes    6  Have you thought about when it will no longer be safe for the patient to live alone? Keep her at home as long as possible     [place] Cognitive Assessment (MoCA) Version 8 1  Education: 11th grade    Points Earned POSSIBLE Points   Visuospatial/Executive   Alternating [place] Making 1 1   Visuoconstructional skills 0 1   Visuoconstructional skills (clock) 3 3   Naming   Naming Animals 3 3   Attention   Digit Span 0 2   Vigilance (letters) 1 1   Serial 7 subtraction 3 3   Language   Sentence Repetition 1 2   Verbal fluency 0 1   Abstraction   Abstraction (word pairings) 1 2   Delayed recall   Delayed recall 5 5   Memory index score: 15/15   Orientation   Orientation 4 6   TOTAL SCORE: 23/30  (Normal ?26/30)   Additional notes: daughter Wendy Quach stayed in room with pt while LSW administered MoCA for assistance with translation, but no/very minimal assistance needed with translation     Geriatric Depression Scale (GDS) completed today, 2/15

## 2023-04-28 NOTE — ASSESSMENT & PLAN NOTE
· Patient uses cane, fall history  · Is currently on [facility]  PT services  · Fall precautions

## 2023-05-08 PROBLEM — N39.0 UTI (URINARY TRACT INFECTION): Status: RESOLVED | Noted: 2023-03-06 | Resolved: 2023-05-08

## 2023-05-09 PROBLEM — N39.0 SEPSIS SECONDARY TO UTI (HCC): Status: RESOLVED | Noted: 2019-11-13 | Resolved: 2023-05-09

## 2023-05-09 PROBLEM — A41.9 SEPSIS SECONDARY TO UTI (HCC): Status: RESOLVED | Noted: 2019-11-13 | Resolved: 2023-05-09

## 2023-06-05 NOTE — ASSESSMENT & PLAN NOTE
· BP stable, no headache or dizziness  · Remains on amlodipine, bisoprolol  · Continue dietary and lifestyle interventions No

## 2023-06-07 NOTE — PROGRESS NOTES
[address] [facility]  [address] [facility], [address] [facility], [address] [place] [address]  [phone]    Care Conference    NAME: Ashely Jo  AGE: 66 y o  SEX: female  YOB: 1945  DATE OF ASSESSMENT: 4/28/23  DATE OF CONFERENCE: 6/14/23    Family Present: patient and daughter Chantal Monique  Staff Present: TRINIDAD Sexton    Patient / Family Goals of Care: Review cognitive decline and associated symptoms, discuss treatment options and care planning  Medical Concerns (Current/Historical): primary hypertension, Urinary tract infection without hematuria, site unspecified    Patient update:  No changes since last visit  Doing ok  Daughter working on waiver paperwork, almost completed  Geriatric Syndromes/Age Related Syndromes: mild cognitive impairment (MCI), Glaucoma, unspecified glaucoma type, unspecified laterality, Osteoarthritis, unspecified osteoarthritis type, unspecified site, ambulatory dysfunction    Neuropsychological  • Mild Cognitive Impairment  o Chinmay Cognitive Assessment: 23/30; deficits in visual-spatial (cube only), attention, language, abstraction, orientation  Patient scored 5/5 on delayed recall   o Geriatric Depression Screen: 2/15    • History, physical, and cognitive screening are most consistent with: Mild cognitive impairment  o Contributing factors: recent hospitalization   • Noted more memory change since recent hospitalization  Pt has history of hospital delirium  • Continue supportive cares: pt lives with roommate, daughter visiting daily to help with cares  o Ensure pt has phone and reinforce to not use stove or shower while gone  o Patient has applied for Waiver services  • Discussed safe environment    o No safety concerns per daughter  Pt does not drive     • Remain active physically, mentally and socially  o Referral placed for cognitive therapy - has not started at this time  • Pharmaceutical and non-pharmaceutical interventions discussed  o May consider adding cholinesterase inhibitor in future if pt ever progresses to AD  • Engage in cognitively challenging exercises such as crosswords and puzzles  • Maintain chronic conditions under control  o Continue to follow up with providers and ensure medication compliance  - Vascular risk factors: hypertension, hyperlipidemia, type 2 diabetes, COPD  o Ensure good nutrition, adequate hydration  • Do not overwhelm patient with information  o Do one task at a time, use slower pace  Keep to one conversation at a time  Do not multitask  • Repeat cognitive assessment in 6 months    Diagnostic Studies  • Review of bloodwork  o Vitamin B12/Folate, Serum Panel: pending  o TSH, 3rd generation: pending  • Review of MRI NeuroQuant / previous imaging:  declines    Physical Finding Impacting Function   Fall Risk: moderate - recommend life alert which patient is already pursuing   Activities of Daily Living: assisted   Instrumental Activities of Daily Living: assisted     • Encourage appropriate footwear at all times  • Review fall risk prevention tips and adjust within the home environment as needed    Medications Reviewed   • Medications seem appropriate for present conditions  o Did discuss risk vs benefit of xanax  Use sparingly and lowest dose tolerated  • Check with PCP before using over the counter medications  • Avoid over the counter medications that can affect cognition (e g , Benadryl, Tylenol PM)   • Avoid NSAIDs due to risk of GI bleed and renal impairment    Other Findings   Overall health  • BMI: 24 47 kg/m2  • Maintain well-balanced diet  • Continue following with primary care physician regularly  Primary hypertension   • Blood pressure stable, no headache or dizziness  ? Remains on amlodipine, bisoprolol  • Continue dietary and lifestyle interventions  Glaucoma, unspecified glaucoma type, unspecified laterality   • Reports no eyes related concerns  ?  Continue good lighting, reminders for glasses as needed  • Continue follow-ups with ophthalmology  Osteoarthritis, unspecified osteoarthritis type, unspecified site  • Reporting neck pain  ? Continue as needed Tylenol/topical analgesic  Continue nonpharmacological methods of pain control  • Notify PCP if pain increases  Ambulatory dysfunction   • Patient uses cane, fall history  ? Is currently on [facility]  PT services  • Fall precautions  Urinary tract infection without hematuria, site unspecified   • Was hospitalized for this, has since resolved  ? Following with gynecology and urology  • Encouraged timed voids, good periarea hygiene, avoid constipation, keep hydrated    Recommended Health Maintenance   Immunizations, if not contraindicated:   • Influenza vaccine yearly  • Pneumo vaccine every 5 years (65 years and over)  • Shingles vaccine  • COVID-19 vaccine    Social / Safety Concerns  • Consider a [address] for positive socialization, physical exercise, cognitive stimulation   • Stay in touch with family and friends  • Plan self-care activities for your mental well-being each week  • Consider volunteering through Re-APP989-007-3733) or Volunteer Match  • Recommend review of fall risk prevention tips  • Recommend use of fall precautions including fall alert device  • Consider assistance for medication administration such as blister packaging or use of an automated pill dispenser  • Consider contacting your local [facility] for possible eligible programs such as OPTIONS, Caregiver Support Program, or [address] [place] [address] [address] updated advance directives and provide a copy to your primary care provider  • Consider caregiver support groups and educational resources through the Alzheimer's Association; access Alzheimer's Association 24/7 Helpline at 9-553.610.2445  ? [facility] does offer a monthly caregiver support group  If interested, please speak with a     • Utilize reorientation and redirection as needed (dependent on situation)  • Educational information provided    Patient and family verbalized understanding of above care plan  For care coordination purposes, this care plan will be shared with your primary care provider  With any questions, please contact our office at [phone]

## 2023-06-14 ENCOUNTER — OFFICE VISIT (OUTPATIENT)
Age: 78
End: 2023-06-14
Payer: MEDICARE

## 2023-06-14 VITALS
HEART RATE: 63 BPM | DIASTOLIC BLOOD PRESSURE: 70 MMHG | BODY MASS INDEX: 23.7 KG/M2 | WEIGHT: 128.8 LBS | TEMPERATURE: 97.3 F | OXYGEN SATURATION: 96 % | SYSTOLIC BLOOD PRESSURE: 170 MMHG | HEIGHT: 62 IN

## 2023-06-14 DIAGNOSIS — R41.89 COGNITIVE DECLINE: Primary | ICD-10-CM

## 2023-06-14 PROCEDURE — 99483 ASSMT & CARE PLN PT COG IMP: CPT | Performed by: NURSE PRACTITIONER

## 2023-06-14 NOTE — PROGRESS NOTES
Ana Cristina [place] [facility]  [address] [facility], [address] [facility] [address], [address]  [phone]    Care Conference: Resources Provided    LSW provided the following resources, along with a copy of care plan:  [address] [facility]  - 10 Ways to Love Your Brain  - Memory loss ladder  - Alzheimer's Association information sheet on Mild Cognitive Impairment diagnosis (in [place] and [address] [place] [address])    Caregiver Support  - Flyer for Alli Varghese (meeting 2x per month by [employer])  - Alzheimer's Association Rx Pad (guide to website and 24/7 helpline, 2-253.266.9774) (in [place] and [address] [place] [address])     Safety  - [facility] fall prevention / home safety [address] [facility] in [address] [place] [address] contains information on home care agencies, adult day centers, higher levels of care, and more

## 2023-06-14 NOTE — PATIENT INSTRUCTIONS
Global Cognition Activity Apps:  30/30- time management, set up lists of tasks to accomplish and time needed to complete them  Awesome Memory- cognition and memory  Bejeweled- clear the gems by matching the same colors  Brain HQ- tests your brain in the area of memory, speed, and attention  Brain Workout- test your brain in the area of memory, focus, reaction, and accuracy   Classic Mello- tests memory and attention as you repeat the colors and sounds in the correct order  Drawesome- copy the image without picking up the pencil  Fit Brains Trainer- addresses memory and recognition, concentration, attention span, processing speed, problem-solving, hand-eye coordination, and reaction time  Lumosity Brain Trainer- personalized brain workouts for memory, attention, and brain performance (can also be accessed online)  Mind Games (Pro)- abstraction, attention, memory, executive functioning, etc   Rush Hour-sliding block traffic jam puzzle  Skill Game Fair Lawn/Skills Game- sustain attention to complete trail-making with numbers   Skill Training- visual motor activity annia requiring eye-to-hand coordination, foresight, and concentration  [employer]- four color memory game for cognition  Sudoku- puzzle game that exercises the brain, logical thinking, and memory  Serene Peppers of [address] challenging attention, memory, and executive functioning  Unblock Me- get to the red block by sliding other blocks out of the way  Word Association!- scanning to find associated words  Wordsworth Pro- word-making game  [employer]- anagram word game    Attention Activities:  Catch Me-FlashPad- fast-paced, light-up game (touch all of the red lights, skip the green lights)  Metronome Beats- practice divided attention by performing a task with metronome running    Memory Activities:  Memory Block- test your memory and reflexes  Memory Trainer- memory training and games for visualization, chunking, focus, and spatial memory, etc   My Personal Memory Trainer- activities to address working memory      Please access your Briana Store (Apple devices) or Smore (Android devices) to see if you can access these apps and if there is any cost associated with downloading the briana

## 2023-06-17 ENCOUNTER — APPOINTMENT (EMERGENCY)
Dept: RADIOLOGY | Facility: HOSPITAL | Age: 78
End: 2023-06-17
Payer: MEDICARE

## 2023-06-17 ENCOUNTER — HOSPITAL ENCOUNTER (EMERGENCY)
Facility: HOSPITAL | Age: 78
Discharge: HOME | End: 2023-06-17
Attending: EMERGENCY MEDICINE
Payer: MEDICARE

## 2023-06-17 VITALS
HEART RATE: 72 BPM | OXYGEN SATURATION: 91 % | RESPIRATION RATE: 18 BRPM | DIASTOLIC BLOOD PRESSURE: 63 MMHG | TEMPERATURE: 99.9 F | SYSTOLIC BLOOD PRESSURE: 146 MMHG | BODY MASS INDEX: 25.6 KG/M2 | WEIGHT: 138.23 LBS

## 2023-06-17 DIAGNOSIS — R11.0 NAUSEA: ICD-10-CM

## 2023-06-17 DIAGNOSIS — R05.9 COUGH: Primary | ICD-10-CM

## 2023-06-17 DIAGNOSIS — J06.9 URI (UPPER RESPIRATORY INFECTION): ICD-10-CM

## 2023-06-17 LAB
2HR DELTA HS TROPONIN: 0 NG/L
ALBUMIN SERPL BCP-MCNC: 3.9 G/DL (ref 3.5–5)
ALP SERPL-CCNC: 53 U/L (ref 34–104)
ALT SERPL W P-5'-P-CCNC: 10 U/L (ref 7–52)
ANION GAP SERPL CALCULATED.3IONS-SCNC: 7 MMOL/L (ref 4–13)
AST SERPL W P-5'-P-CCNC: 11 U/L (ref 13–39)
ATRIAL RATE: 68 BPM
BACTERIA UR QL AUTO: NORMAL /HPF
BASOPHILS # BLD AUTO: 0.04 THOUSANDS/ÂΜL (ref 0–0.1)
BASOPHILS NFR BLD AUTO: 0 % (ref 0–1)
BILIRUB SERPL-MCNC: 0.34 MG/DL (ref 0.2–1)
BILIRUB UR QL STRIP: NEGATIVE
BUN SERPL-MCNC: 16 MG/DL (ref 5–25)
CALCIUM SERPL-MCNC: 9.4 MG/DL (ref 8.4–10.2)
CARDIAC TROPONIN I PNL SERPL HS: 6 NG/L
CARDIAC TROPONIN I PNL SERPL HS: 6 NG/L
CHLORIDE SERPL-SCNC: 95 MMOL/L (ref 96–108)
CLARITY UR: CLEAR
CO2 SERPL-SCNC: 28 MMOL/L (ref 21–32)
COLOR UR: ABNORMAL
CREAT SERPL-MCNC: 0.92 MG/DL (ref 0.6–1.3)
EOSINOPHIL # BLD AUTO: 0.08 THOUSAND/ÂΜL (ref 0–0.61)
EOSINOPHIL NFR BLD AUTO: 1 % (ref 0–6)
ERYTHROCYTE [DISTWIDTH] IN BLOOD BY AUTOMATED COUNT: 14.8 % (ref 11.6–15.1)
GFR SERPL CREATININE-BSD FRML MDRD: 59 ML/MIN/1.73SQ M
GLUCOSE SERPL-MCNC: 114 MG/DL (ref 65–140)
GLUCOSE UR STRIP-MCNC: NEGATIVE MG/DL
HCT VFR BLD AUTO: 35.3 % (ref 34.8–46.1)
HGB BLD-MCNC: 11.3 G/DL (ref 11.5–15.4)
HGB UR QL STRIP.AUTO: 25
IMM GRANULOCYTES # BLD AUTO: 0.02 THOUSAND/UL (ref 0–0.2)
IMM GRANULOCYTES NFR BLD AUTO: 0 % (ref 0–2)
KETONES UR STRIP-MCNC: NEGATIVE MG/DL
LEUKOCYTE ESTERASE UR QL STRIP: 25
LYMPHOCYTES # BLD AUTO: 1.1 THOUSANDS/ÂΜL (ref 0.6–4.47)
LYMPHOCYTES NFR BLD AUTO: 10 % (ref 14–44)
MCH RBC QN AUTO: 26.7 PG (ref 26.8–34.3)
MCHC RBC AUTO-ENTMCNC: 32 G/DL (ref 31.4–37.4)
MCV RBC AUTO: 84 FL (ref 82–98)
MONOCYTES # BLD AUTO: 1.06 THOUSAND/ÂΜL (ref 0.17–1.22)
MONOCYTES NFR BLD AUTO: 10 % (ref 4–12)
NEUTROPHILS # BLD AUTO: 8.45 THOUSANDS/ÂΜL (ref 1.85–7.62)
NEUTS SEG NFR BLD AUTO: 79 % (ref 43–75)
NITRITE UR QL STRIP: NEGATIVE
NON-SQ EPI CELLS URNS QL MICRO: NORMAL /HPF
NRBC BLD AUTO-RTO: 0 /100 WBCS
P AXIS: 59 DEGREES
PH UR STRIP.AUTO: 7 [PH]
PLATELET # BLD AUTO: 234 THOUSANDS/UL (ref 149–390)
PMV BLD AUTO: 9.2 FL (ref 8.9–12.7)
POTASSIUM SERPL-SCNC: 3.6 MMOL/L (ref 3.5–5.3)
PR INTERVAL: 156 MS
PROT SERPL-MCNC: 7.1 G/DL (ref 6.4–8.4)
PROT UR STRIP-MCNC: ABNORMAL MG/DL
QRS AXIS: 47 DEGREES
QRSD INTERVAL: 112 MS
QT INTERVAL: 386 MS
QTC INTERVAL: 410 MS
RBC # BLD AUTO: 4.23 MILLION/UL (ref 3.81–5.12)
RBC #/AREA URNS AUTO: NORMAL /HPF
SODIUM SERPL-SCNC: 130 MMOL/L (ref 135–147)
SP GR UR STRIP.AUTO: 1.01 (ref 1–1.04)
T WAVE AXIS: 29 DEGREES
UROBILINOGEN UA: NEGATIVE MG/DL
VENTRICULAR RATE: 68 BPM
WBC # BLD AUTO: 10.75 THOUSAND/UL (ref 4.31–10.16)
WBC #/AREA URNS AUTO: NORMAL /HPF

## 2023-06-17 PROCEDURE — 93010 ELECTROCARDIOGRAM REPORT: CPT | Performed by: STUDENT IN AN ORGANIZED HEALTH CARE EDUCATION/TRAINING PROGRAM

## 2023-06-17 PROCEDURE — 99285 EMERGENCY DEPT VISIT HI MDM: CPT

## 2023-06-17 PROCEDURE — 81001 URINALYSIS AUTO W/SCOPE: CPT | Performed by: EMERGENCY MEDICINE

## 2023-06-17 PROCEDURE — 84484 ASSAY OF TROPONIN QUANT: CPT | Performed by: EMERGENCY MEDICINE

## 2023-06-17 PROCEDURE — 94640 AIRWAY INHALATION TREATMENT: CPT

## 2023-06-17 PROCEDURE — 93005 ELECTROCARDIOGRAM TRACING: CPT

## 2023-06-17 PROCEDURE — 80053 COMPREHEN METABOLIC PANEL: CPT | Performed by: EMERGENCY MEDICINE

## 2023-06-17 PROCEDURE — 87636 SARSCOV2 & INF A&B AMP PRB: CPT | Performed by: EMERGENCY MEDICINE

## 2023-06-17 PROCEDURE — 85025 COMPLETE CBC W/AUTO DIFF WBC: CPT | Performed by: EMERGENCY MEDICINE

## 2023-06-17 PROCEDURE — 36415 COLL VENOUS BLD VENIPUNCTURE: CPT | Performed by: EMERGENCY MEDICINE

## 2023-06-17 PROCEDURE — 71045 X-RAY EXAM CHEST 1 VIEW: CPT

## 2023-06-17 RX ORDER — ALBUTEROL SULFATE 90 UG/1
2 AEROSOL, METERED RESPIRATORY (INHALATION) EVERY 4 HOURS PRN
Qty: 18 G | Refills: 0 | Status: SHIPPED | OUTPATIENT
Start: 2023-06-17

## 2023-06-17 RX ORDER — ONDANSETRON 4 MG/1
4 TABLET, ORALLY DISINTEGRATING ORAL EVERY 8 HOURS PRN
Qty: 20 TABLET | Refills: 0 | Status: SHIPPED | OUTPATIENT
Start: 2023-06-17

## 2023-06-17 RX ORDER — BENZONATATE 100 MG/1
100 CAPSULE ORAL EVERY 8 HOURS
Qty: 21 CAPSULE | Refills: 0 | Status: SHIPPED | OUTPATIENT
Start: 2023-06-17

## 2023-06-17 RX ORDER — GUAIFENESIN 600 MG/1
1200 TABLET, EXTENDED RELEASE ORAL EVERY 12 HOURS SCHEDULED
Qty: 30 TABLET | Refills: 0 | Status: SHIPPED | OUTPATIENT
Start: 2023-06-17

## 2023-06-17 RX ORDER — IPRATROPIUM BROMIDE AND ALBUTEROL SULFATE 2.5; .5 MG/3ML; MG/3ML
3 SOLUTION RESPIRATORY (INHALATION)
Status: DISCONTINUED | OUTPATIENT
Start: 2023-06-17 | End: 2023-06-17 | Stop reason: HOSPADM

## 2023-06-17 RX ADMIN — IPRATROPIUM BROMIDE AND ALBUTEROL SULFATE 3 ML: .5; 3 SOLUTION RESPIRATORY (INHALATION) at 01:17

## 2023-06-17 NOTE — ED PROVIDER NOTES
History  Chief Complaint   Patient presents with   • Cold Like Symptoms   • Nausea     72-year-old female presents with cold-like symptoms  The patient complains of runny nose and congestion along with a dry cough, mild headache, and mild upset stomach  She states that a roommate has had similar symptoms  She has history of COPD and states that her shortness of breath is not any worse than it is at baseline  She is taken Tylenol and has taken tea but denies use of any other medications  Nausea  The primary symptoms include fever (low grade), abdominal pain and nausea  The illness began today  The onset was gradual    The illness is also significant for chills  URI  Presenting symptoms: congestion, cough, fever (low grade) and rhinorrhea    Congestion:     Location:  Nasal  Cough:     Sputum characteristics:  Nondescript    Progression:  Unchanged    Chronicity:  New  Fever:     Timing:  Intermittent    Progression:  Resolved  Severity:  Mild  Onset quality:  Gradual  Timing:  Intermittent  Progression:  Waxing and waning      Prior to Admission Medications   Prescriptions Last Dose Informant Patient Reported? Taking?    ALPRAZolam (XANAX) 0 5 mg tablet   No No   Sig: Take 1 tablet (0 5 mg total) by mouth daily at bedtime as needed for anxiety   Air  (Vicks Air Purifier/HEPA) (Device) MISC   No No   Sig: Use as directed   Calcium Carbonate-Vit D-Min (Caltrate 600+D Plus Minerals) 600-800 MG-UNIT TABS Not Taking  No No   Sig: Take 1 tablet by mouth 2 (two) times a day with meals   Patient not taking: Reported on 6/17/2023   Plavix 75 MG tablet   No No   Sig: Take 1 tablet (75 mg total) by mouth daily   Red Yeast Rice 600 MG CAPS   No No   Sig: Take 1 capsule (600 mg total) by mouth 2 (two) times a day with meals   albuterol (ACCUNEB) 1 25 MG/3ML nebulizer solution   No No   Sig: Take 3 mL by nebulization every 6 (six) hours as needed for wheezing   albuterol (PROVENTIL HFA,VENTOLIN HFA) 90 mcg/act inhaler   No No   Sig: Inhale 2 puffs every 6 (six) hours as needed for wheezing   amLODIPine (NORVASC) 5 mg tablet   No No   Sig: Take 1 tablet (5 mg total) by mouth 2 (two) times a day   bisoprolol (ZEBETA) 10 MG tablet   No No   Sig: Take 1 tablet (10 mg total) by mouth daily   co-enzyme Q-10 30 MG capsule   No No   Sig: Take 1 capsule (30 mg total) by mouth 2 (two) times a day   denosumab (PROLIA) 60 mg/mL   No No   Sig: Inject 1 mL (60 mg total) under the skin once for 1 dose   ergocalciferol (VITAMIN D2) 50,000 units   No No   Sig: Take 1 capsule (50,000 Units total) by mouth once a week   fluticasone-umeclidinium-vilanterol (Trelegy Ellipta) 100-62 5-25 mcg/actuation inhaler   No No   Sig: Inhale 1 puff daily Rinse mouth after use     glucose blood test strip   No No   Sig: Use 1 each as needed (as needed) Use as instructed   ipratropium-albuterol (DUO-NEB) 0 5-2 5 mg/3 mL nebulizer solution   No No   Sig: Take 3 mL by nebulization every 6 (six) hours as needed for wheezing or shortness of breath   magnesium (MAGTAB) 84 MG (7MEQ) TBCR   No No   Sig: Take 1 tablet (84 mg total) by mouth daily   nitroglycerin (NITROSTAT) 0 4 mg SL tablet   No No   Sig: Place 1 tablet (0 4 mg total) under the tongue every 5 (five) minutes as needed for chest pain   pantoprazole (PROTONIX) 20 mg tablet   No No   Sig: Take 1 tablet (20 mg total) by mouth daily   rosuvastatin (CRESTOR) 5 mg tablet   No No   Sig: Take 1 tablet (5 mg total) by mouth daily   vitamin B-12 (VITAMIN B-12) 1,000 mcg tablet   No No   Sig: Take 1 tablet (1,000 mcg total) by mouth daily      Facility-Administered Medications Last Administration Doses Remaining   cyanocobalamin injection 1,000 mcg 10/29/2021 12:17 PM           Past Medical History:   Diagnosis Date   • Arthritis    • Asthma    • Community acquired pneumonia 11/13/2019   • Coronary artery disease    • [facility]    • HTN (hypertension)    • Hydronephrosis, bilateral 03/06/2023 • Hyperlipidemia    • Hyponatremia    • Urinary retention        Past Surgical History:   Procedure Laterality Date   • CARDIAC CATHETERIZATION     • CORONARY ARTERY BYPASS GRAFT  2010    with subsequent stent to the saphenous veingraft to the RCA 3 months later   • KNEE ARTHROSCOPY     • POLYPECTOMY      laryngeal   • TOTAL HIP ARTHROPLASTY     • TUBAL LIGATION         Family History   Problem Relation Age of Onset   • Stroke Mother    • Hypertension Mother    • Heart disease Mother    • Colon cancer Father    • Heart attack Brother    • Heart attack Brother      I have reviewed and agree with the history as documented  E-Cigarette/Vaping   • E-Cigarette Use Never User      E-Cigarette/Vaping Substances   • Nicotine No    • THC No    • CBD No    • Flavoring No    • Other No    • Unknown No      Social History     Tobacco Use   • Smoking status: Former     Packs/day: 0 50     Years: 13 00     Total pack years: 6 50     Types: Cigarettes     Start date:      Quit date:      Years since quittin 4   • Smokeless tobacco: Never   • Tobacco comments:     no passive smoke exposure   Vaping Use   • Vaping Use: Never used   Substance Use Topics   • Alcohol use: Yes     Comment: socially   • Drug use: Never       Review of Systems   Constitutional: Positive for chills and fever (low grade)  HENT: Positive for congestion and rhinorrhea  Respiratory: Positive for cough  Gastrointestinal: Positive for abdominal pain and nausea  All other systems reviewed and are negative  Physical Exam  Physical Exam  Vitals and nursing note reviewed  Constitutional:       General: She is not in acute distress  Appearance: Normal appearance  She is well-developed  She is not ill-appearing, toxic-appearing or diaphoretic  HENT:      Head: Normocephalic and atraumatic  Right Ear: External ear normal       Left Ear: External ear normal       Nose: Congestion present  No rhinorrhea  Mouth/Throat:      Mouth: Mucous membranes are moist       Pharynx: Oropharynx is clear  Eyes:      Conjunctiva/sclera: Conjunctivae normal       Pupils: Pupils are equal, round, and reactive to light  Cardiovascular:      Rate and Rhythm: Normal rate and regular rhythm  Heart sounds: Normal heart sounds  Pulmonary:      Effort: Pulmonary effort is normal  No respiratory distress  Breath sounds: Normal breath sounds  No wheezing  Abdominal:      General: Bowel sounds are normal       Palpations: Abdomen is soft  Tenderness: There is no abdominal tenderness  There is no guarding  Musculoskeletal:         General: No tenderness or deformity  Cervical back: Normal range of motion and neck supple  No rigidity  Skin:     General: Skin is warm and dry  Capillary Refill: Capillary refill takes less than 2 seconds  Findings: No rash  Neurological:      General: No focal deficit present  Mental Status: She is alert and oriented to person, place, and time     Psychiatric:         Mood and Affect: Mood normal          Behavior: Behavior normal          Vital Signs  ED Triage Vitals [06/17/23 0101]   Temperature Pulse Respirations Blood Pressure SpO2   99 9 °F (37 7 °C) 71 21 128/65 92 %      Temp Source Heart Rate Source Patient Position - Orthostatic VS BP Location FiO2 (%)   Tympanic Monitor Lying Right arm --      Pain Score       No Pain           Vitals:    06/17/23 0101 06/17/23 0130 06/17/23 0200 06/17/23 0307   BP: 128/65 120/65 152/66 142/70   Pulse: 71  68 71   Patient Position - Orthostatic VS: Lying Lying Lying Lying         Visual Acuity      ED Medications  Medications   ipratropium-albuterol (DUO-NEB) 0 5-2 5 mg/3 mL inhalation solution 3 mL (3 mL Nebulization Given 6/17/23 0117)       Diagnostic Studies  Results Reviewed     Procedure Component Value Units Date/Time    HS Troponin I 2hr [[id number]]  (Normal) Collected: 06/17/23 0304    Lab Status: Final result Specimen: Blood from Arm, Left Updated: 06/17/23 0339     hs TnI 2hr 6 ng/L      Delta 2hr hsTnI 0 ng/L     Urine Microscopic [[id number]]  (Normal) Collected: 06/17/23 0304    Lab Status: Final result Specimen: Urine, Clean Catch Updated: 06/17/23 0326     RBC, UA 1-2 /hpf      WBC, UA 1-2 /hpf      Epithelial Cells Occasional /hpf      Bacteria, UA None Seen /hpf     UA (URINE) with reflex to Scope [[id number]]  (Abnormal) Collected: 06/17/23 0304    Lab Status: Final result Specimen: Urine, Clean Catch Updated: 06/17/23 0315     Color, UA Straw     Clarity, UA Clear     Specific Gravity, UA 1 010     pH, UA 7 0     Leukocytes, UA 25 0     Nitrite, UA Negative     Protein, UA 30 (1+) mg/dl      Glucose, UA Negative mg/dl      Ketones, UA Negative mg/dl      Bilirubin, UA Negative     Occult Blood, UA 25 0     UROBILINOGEN UA Negative mg/dL     HS Troponin I 4hr [[id number]]     Lab Status: No result Specimen: Blood     FLU/COVID - if FLU clinically relevant [[id number]] Collected: 06/17/23 0225    Lab Status:  In process Specimen: Nares from Nose Updated: 06/17/23 0250    HS Troponin 0hr (reflex protocol) [[id number]]  (Normal) Collected: 06/17/23 0111    Lab Status: Final result Specimen: Blood from Arm, Left Updated: 06/17/23 0138     hs TnI 0hr 6 ng/L     Comprehensive metabolic panel [[id number]]  (Abnormal) Collected: 06/17/23 0111    Lab Status: Final result Specimen: Blood from Arm, Left Updated: 06/17/23 0133     Sodium 130 mmol/L      Potassium 3 6 mmol/L      Chloride 95 mmol/L      CO2 28 mmol/L      ANION GAP 7 mmol/L      BUN 16 mg/dL      Creatinine 0 92 mg/dL      Glucose 114 mg/dL      Calcium 9 4 mg/dL      AST 11 U/L      ALT 10 U/L      Alkaline Phosphatase 53 U/L      Total Protein 7 1 g/dL      Albumin 3 9 g/dL      Total Bilirubin 0 34 mg/dL      eGFR 59 ml/min/1 73sq m     Narrative:      [place] guidelines for Chronic Kidney Disease (CKD):   •  Stage 1 with normal or high GFR (GFR > 90 mL/min/1 73 square meters)  •  Stage 2 Mild CKD (GFR = 60-89 mL/min/1 73 square meters)  •  Stage 3A Moderate CKD (GFR = 45-59 mL/min/1 73 square meters)  •  Stage 3B Moderate CKD (GFR = 30-44 mL/min/1 73 square meters)  •  Stage 4 Severe CKD (GFR = 15-29 mL/min/1 73 square meters)  •  Stage 5 End Stage CKD (GFR <15 mL/min/1 73 square meters)  Note: GFR calculation is accurate only with a steady state creatinine    CBC and differential [[id number]]  (Abnormal) Collected: 06/17/23 0111    Lab Status: Final result Specimen: Blood from Arm, Left Updated: 06/17/23 0117     WBC 10 75 Thousand/uL      RBC 4 23 Million/uL      Hemoglobin 11 3 g/dL      Hematocrit 35 3 %      MCV 84 fL      MCH 26 7 pg      MCHC 32 0 g/dL      RDW 14 8 %      MPV 9 2 fL      Platelets 678 Thousands/uL      nRBC 0 /100 WBCs      Neutrophils Relative 79 %      Immat GRANS % 0 %      Lymphocytes Relative 10 %      Monocytes Relative 10 %      Eosinophils Relative 1 %      Basophils Relative 0 %      Neutrophils Absolute 8 45 Thousands/µL      Immature Grans Absolute 0 02 Thousand/uL      Lymphocytes Absolute 1 10 Thousands/µL      Monocytes Absolute 1 06 Thousand/µL      Eosinophils Absolute 0 08 Thousand/µL      Basophils Absolute 0 04 Thousands/µL                  XR chest portable   ED Interpretation by Rickie Lew DO (06/17 0216)   No acute changes                 Procedures  ECG 12 Lead Documentation Only    Date/Time: 6/17/2023 1:09 AM    Performed by: Rickie Lew DO  Authorized by:  Rickie Lew DO    ECG reviewed by me, the ED Provider: yes    Patient location:  ED  Rate:     ECG rate:  68    ECG rate assessment: normal    Rhythm:     Rhythm: sinus rhythm    Ectopy:     Ectopy: none    QRS:     QRS axis:  Normal  Conduction:     Conduction: normal    ST segments:     ST segments:  Normal  T waves:     T waves: non-specific               ED Course                               SBIRT 20yo+    Flowsheet Row Most Recent Value   Initial Alcohol Screen: US AUDIT-C     1  How often do you have a drink containing alcohol? 0 Filed at: 06/17/2023 0105   2  How many drinks containing alcohol do you have on a typical day you are drinking? 0 Filed at: 06/17/2023 0105   3b  FEMALE Any Age, or MALE 65+: How often do you have 4 or more drinks on one occassion? 0 Filed at: 06/17/2023 0105   Audit-C Score 0 Filed at: 06/17/2023 0105   VENITA: How many times in the past year have you    Used an illegal drug or used a prescription medication for non-medical reasons? Never Filed at: 06/17/2023 0105                    Medical Decision Making  70-year-old female presents with URI symptoms along with nausea  On exam she is in no acute distress and has stable vital signs  Patient has a history of COPD and does not require home oxygen  She states that her roommate has had identical symptoms  Initial troponin was found to be 6 but the delta was found to be 0  Patient denies any chest pain  Patient rested comfortably after receiving a DuoNeb  Suspect viral etiology for her symptoms -chest x-ray showed no acute consolidations  Discussed supportive care measures, expected clinical course, need for outpatient follow-up, and reasons to return to the ER  Amount and/or Complexity of Data Reviewed  Labs: ordered  Radiology: ordered and independent interpretation performed  Risk  OTC drugs  Prescription drug management            Disposition  Final diagnoses:   Cough   URI (upper respiratory infection)   Nausea     Time reflects when diagnosis was documented in both MDM as applicable and the Disposition within this note     Time User Action Codes Description Comment    6/17/2023  4:02 AM Ansley Shoemaker Add [R05 9] Cough     6/17/2023  4:02 AM Ansley Shoemaker Add [J06 9] URI (upper respiratory infection)     6/17/2023  4:04 AM Ansley Shoemaker Add [R11 0] Nausea       ED Disposition     ED Disposition   Discharge    Condition   Stable Date/Time   Sat Jun 17, 2023  4:02 AM    Comment   Jenn Ledezma discharge to home/self care  Follow-up Information     Follow up With Specialties Details Why Griselda Chandler MD Internal Medicine Call   [id number] [facility] [address]  [phone]            Patient's Medications   Discharge Prescriptions    ALBUTEROL (PROVENTIL HFA,VENTOLIN HFA) 90 MCG/ACT INHALER    Inhale 2 puffs every 4 (four) hours as needed for wheezing       Start Date: 6/17/2023 End Date: --       Order Dose: 2 puffs       Quantity: 18 g    Refills: 0    BENZONATATE (TESSALON PERLES) 100 MG CAPSULE    Take 1 capsule (100 mg total) by mouth every 8 (eight) hours       Start Date: 6/17/2023 End Date: --       Order Dose: 100 mg       Quantity: 21 capsule    Refills: 0    GUAIFENESIN (MUCINEX) 600 MG 12 HR TABLET    Take 2 tablets (1,200 mg total) by mouth every 12 (twelve) hours       Start Date: 6/17/2023 End Date: --       Order Dose: 1,200 mg       Quantity: 30 tablet    Refills: 0    MENTHOL-CETYLPYRIDINIUM (CEPACOL) 3 MG LOZENGE    Take 1 lozenge (3 mg total) by mouth as needed for sore throat       Start Date: 6/17/2023 End Date: --       Order Dose: 3 mg       Quantity: 9 lozenge    Refills: 0    ONDANSETRON (ZOFRAN-ODT) 4 MG DISINTEGRATING TABLET    Take 1 tablet (4 mg total) by mouth every 8 (eight) hours as needed for nausea or vomiting       Start Date: 6/17/2023 End Date: --       Order Dose: 4 mg       Quantity: 20 tablet    Refills: 0       No discharge procedures on file      PDMP Review       Value Time User    PDMP Reviewed  Yes 4/6/2023  7:43 AM Kaela Neves MD          ED Provider  Electronically Signed by           Abdiel Vaughan DO  06/17/23 0748

## 2023-06-17 NOTE — ED NOTES
Pt assisted oob to b r  to void, without difficulty or complaints  Urine specimen obtained and sent to lab  Pt is currently resting in bed without any other needs @ this time  Call bell @ bedside       Wu Hope  06/17/23 3973

## 2023-06-17 NOTE — ED NOTES
With sleep, oxygen saturation noted to be 84% = oxygen applied at 1 l with saturations up to 94%       Ale Sam RN  06/17/23 1134

## 2023-06-19 LAB
FLUAV RNA RESP QL NAA+PROBE: NEGATIVE
FLUBV RNA RESP QL NAA+PROBE: NEGATIVE
SARS-COV-2 RNA RESP QL NAA+PROBE: POSITIVE

## 2023-06-20 NOTE — RESULT ENCOUNTER NOTE
[id number]  Informed of +covid result   Advised patient to self isolate x 5 days (wear mask for 10), go to ED for worsening SOB, f/u with PCP

## 2023-07-06 NOTE — PROGRESS NOTES
Assessment/Plan:     No problem-specific Assessment & Plan notes found for this encounter. Diagnoses and all orders for this visit:    Pessary maintenance      RTO in 3 months for pessary cleaning. Subjective:      Patient ID: Yuan Patel is a 66 y.o. female who presents for pessary cleaning. She has a gelhorn # 6. She had scant bleeding x 1 episode. She offers no complaints today. HPI    The following portions of the patient's history were reviewed and updated as appropriate: allergies, current medications, past family history, past medical history, past social history, past surgical history and problem list.    Review of Systems      Objective:      /66   Pulse 65   Ht 5' 1" (1.549 m)   Wt 59 kg (130 lb)   LMP  (LMP Unknown)   BMI 24.56 kg/m²          Physical Exam  Vitals and nursing note reviewed. Exam conducted with a chaperone present. Constitutional:       Appearance: Normal appearance. Pulmonary:      Effort: Pulmonary effort is normal.   Genitourinary:     Labia:         Right: No rash, tenderness, lesion or injury. Left: No rash, tenderness, lesion or injury. Vagina: No signs of injury and foreign body. Prolapsed vaginal walls present. No vaginal discharge, erythema, tenderness, bleeding or lesions. Comments: Gellhorn pessary cleansed and replaced  Neurological:      General: No focal deficit present. Mental Status: She is alert and oriented to person, place, and time.    Psychiatric:         Mood and Affect: Mood normal.         Behavior: Behavior normal.

## 2023-07-11 ENCOUNTER — OFFICE VISIT (OUTPATIENT)
Dept: OBGYN CLINIC | Facility: CLINIC | Age: 78
End: 2023-07-11

## 2023-07-11 VITALS
HEART RATE: 65 BPM | WEIGHT: 130 LBS | SYSTOLIC BLOOD PRESSURE: 154 MMHG | DIASTOLIC BLOOD PRESSURE: 66 MMHG | HEIGHT: 61 IN | BODY MASS INDEX: 24.55 KG/M2

## 2023-07-11 DIAGNOSIS — Z46.89 PESSARY MAINTENANCE: Primary | ICD-10-CM

## 2023-07-11 PROCEDURE — 99213 OFFICE O/P EST LOW 20 MIN: CPT | Performed by: OBSTETRICS & GYNECOLOGY

## 2023-07-21 ENCOUNTER — APPOINTMENT (OUTPATIENT)
Dept: LAB | Facility: HOSPITAL | Age: 78
End: 2023-07-21
Payer: MEDICARE

## 2023-07-21 DIAGNOSIS — A41.9 SEPSIS WITHOUT ACUTE ORGAN DYSFUNCTION, DUE TO UNSPECIFIED ORGANISM (HCC): ICD-10-CM

## 2023-07-21 DIAGNOSIS — M81.8 IDIOPATHIC OSTEOPOROSIS: ICD-10-CM

## 2023-07-21 DIAGNOSIS — E11.69 HYPERLIPIDEMIA ASSOCIATED WITH TYPE 2 DIABETES MELLITUS (HCC): ICD-10-CM

## 2023-07-21 DIAGNOSIS — I25.10 CORONARY ARTERY DISEASE DUE TO LIPID RICH PLAQUE: ICD-10-CM

## 2023-07-21 DIAGNOSIS — I25.83 CORONARY ARTERY DISEASE DUE TO LIPID RICH PLAQUE: ICD-10-CM

## 2023-07-21 DIAGNOSIS — E78.5 HYPERLIPIDEMIA ASSOCIATED WITH TYPE 2 DIABETES MELLITUS (HCC): ICD-10-CM

## 2023-07-21 DIAGNOSIS — R41.89 COGNITIVE DECLINE: ICD-10-CM

## 2023-07-21 LAB
25(OH)D3 SERPL-MCNC: 27.2 NG/ML (ref 30–100)
ALBUMIN SERPL BCP-MCNC: 4.1 G/DL (ref 3.5–5)
ALP SERPL-CCNC: 61 U/L (ref 34–104)
ALT SERPL W P-5'-P-CCNC: 11 U/L (ref 7–52)
ANION GAP SERPL CALCULATED.3IONS-SCNC: 5 MMOL/L
AST SERPL W P-5'-P-CCNC: 12 U/L (ref 13–39)
BASOPHILS # BLD AUTO: 0.07 THOUSANDS/ÂΜL (ref 0–0.1)
BASOPHILS NFR BLD AUTO: 1 % (ref 0–1)
BILIRUB SERPL-MCNC: 0.28 MG/DL (ref 0.2–1)
BUN SERPL-MCNC: 12 MG/DL (ref 5–25)
CALCIUM SERPL-MCNC: 9.4 MG/DL (ref 8.4–10.2)
CHLORIDE SERPL-SCNC: 100 MMOL/L (ref 96–108)
CHOLEST SERPL-MCNC: 277 MG/DL
CO2 SERPL-SCNC: 30 MMOL/L (ref 21–32)
CREAT SERPL-MCNC: 0.71 MG/DL (ref 0.6–1.3)
EOSINOPHIL # BLD AUTO: 0.27 THOUSAND/ÂΜL (ref 0–0.61)
EOSINOPHIL NFR BLD AUTO: 3 % (ref 0–6)
ERYTHROCYTE [DISTWIDTH] IN BLOOD BY AUTOMATED COUNT: 14.7 % (ref 11.6–15.1)
FERRITIN SERPL-MCNC: 19 NG/ML (ref 11–307)
FOLATE SERPL-MCNC: 9.2 NG/ML
GFR SERPL CREATININE-BSD FRML MDRD: 81 ML/MIN/1.73SQ M
GLUCOSE P FAST SERPL-MCNC: 111 MG/DL (ref 65–99)
HCT VFR BLD AUTO: 39.3 % (ref 34.8–46.1)
HDLC SERPL-MCNC: 48 MG/DL
HGB BLD-MCNC: 12.2 G/DL (ref 11.5–15.4)
IMM GRANULOCYTES # BLD AUTO: 0.03 THOUSAND/UL (ref 0–0.2)
IMM GRANULOCYTES NFR BLD AUTO: 0 % (ref 0–2)
LDLC SERPL CALC-MCNC: 208 MG/DL (ref 0–100)
LYMPHOCYTES # BLD AUTO: 2.68 THOUSANDS/ÂΜL (ref 0.6–4.47)
LYMPHOCYTES NFR BLD AUTO: 31 % (ref 14–44)
MAGNESIUM SERPL-MCNC: 1.6 MG/DL (ref 1.9–2.7)
MCH RBC QN AUTO: 26.2 PG (ref 26.8–34.3)
MCHC RBC AUTO-ENTMCNC: 31 G/DL (ref 31.4–37.4)
MCV RBC AUTO: 84 FL (ref 82–98)
MONOCYTES # BLD AUTO: 0.65 THOUSAND/ÂΜL (ref 0.17–1.22)
MONOCYTES NFR BLD AUTO: 8 % (ref 4–12)
NEUTROPHILS # BLD AUTO: 4.94 THOUSANDS/ÂΜL (ref 1.85–7.62)
NEUTS SEG NFR BLD AUTO: 57 % (ref 43–75)
NONHDLC SERPL-MCNC: 229 MG/DL
NRBC BLD AUTO-RTO: 0 /100 WBCS
PLATELET # BLD AUTO: 307 THOUSANDS/UL (ref 149–390)
PMV BLD AUTO: 9.5 FL (ref 8.9–12.7)
POTASSIUM SERPL-SCNC: 3.9 MMOL/L (ref 3.5–5.3)
PROT SERPL-MCNC: 7.5 G/DL (ref 6.4–8.4)
RBC # BLD AUTO: 4.66 MILLION/UL (ref 3.81–5.12)
SODIUM SERPL-SCNC: 135 MMOL/L (ref 135–147)
T4 FREE SERPL-MCNC: 0.78 NG/DL (ref 0.61–1.12)
TRIGL SERPL-MCNC: 105 MG/DL
TSH SERPL DL<=0.05 MIU/L-ACNC: 1.68 UIU/ML (ref 0.45–4.5)
VIT B12 SERPL-MCNC: 163 PG/ML (ref 180–914)
WBC # BLD AUTO: 8.64 THOUSAND/UL (ref 4.31–10.16)

## 2023-07-21 PROCEDURE — 84439 ASSAY OF FREE THYROXINE: CPT

## 2023-07-21 PROCEDURE — 83735 ASSAY OF MAGNESIUM: CPT

## 2023-07-21 PROCEDURE — 80053 COMPREHEN METABOLIC PANEL: CPT

## 2023-07-21 PROCEDURE — 80061 LIPID PANEL: CPT

## 2023-07-21 PROCEDURE — 82607 VITAMIN B-12: CPT

## 2023-07-21 PROCEDURE — 85025 COMPLETE CBC W/AUTO DIFF WBC: CPT

## 2023-07-21 PROCEDURE — 36415 COLL VENOUS BLD VENIPUNCTURE: CPT

## 2023-07-21 PROCEDURE — 82746 ASSAY OF FOLIC ACID SERUM: CPT

## 2023-07-21 PROCEDURE — 82728 ASSAY OF FERRITIN: CPT

## 2023-07-21 PROCEDURE — 84443 ASSAY THYROID STIM HORMONE: CPT

## 2023-07-21 PROCEDURE — 82306 VITAMIN D 25 HYDROXY: CPT

## 2023-07-26 ENCOUNTER — HOSPITAL ENCOUNTER (OUTPATIENT)
Dept: MAMMOGRAPHY | Facility: CLINIC | Age: 78
Discharge: HOME | End: 2023-07-26
Payer: MEDICARE

## 2023-07-26 VITALS — WEIGHT: 130 LBS | BODY MASS INDEX: 24.55 KG/M2 | HEIGHT: 61 IN

## 2023-07-26 DIAGNOSIS — Z12.31 SCREENING MAMMOGRAM FOR BREAST CANCER: ICD-10-CM

## 2023-07-26 PROCEDURE — 77067 SCR MAMMO BI INCL CAD: CPT

## 2023-07-26 PROCEDURE — 77063 BREAST TOMOSYNTHESIS BI: CPT

## 2023-08-03 ENCOUNTER — OFFICE VISIT (OUTPATIENT)
Dept: FAMILY MEDICINE CLINIC | Facility: CLINIC | Age: 78
End: 2023-08-03
Payer: MEDICARE

## 2023-08-03 VITALS
RESPIRATION RATE: 14 BRPM | WEIGHT: 130 LBS | SYSTOLIC BLOOD PRESSURE: 140 MMHG | BODY MASS INDEX: 24.55 KG/M2 | OXYGEN SATURATION: 95 % | HEART RATE: 65 BPM | TEMPERATURE: 98 F | HEIGHT: 61 IN | DIASTOLIC BLOOD PRESSURE: 82 MMHG

## 2023-08-03 DIAGNOSIS — Z00.01 ENCOUNTER FOR GENERAL ADULT MEDICAL EXAMINATION WITH ABNORMAL FINDINGS: Primary | ICD-10-CM

## 2023-08-03 DIAGNOSIS — E53.8 LOW VITAMIN B12 LEVEL: ICD-10-CM

## 2023-08-03 DIAGNOSIS — E78.5 HYPERLIPIDEMIA ASSOCIATED WITH TYPE 2 DIABETES MELLITUS (HCC): ICD-10-CM

## 2023-08-03 DIAGNOSIS — E83.42 HYPOMAGNESEMIA: ICD-10-CM

## 2023-08-03 DIAGNOSIS — J43.8 OTHER EMPHYSEMA (HCC): ICD-10-CM

## 2023-08-03 DIAGNOSIS — M25.551 BILATERAL HIP PAIN: ICD-10-CM

## 2023-08-03 DIAGNOSIS — E11.69 HYPERLIPIDEMIA ASSOCIATED WITH TYPE 2 DIABETES MELLITUS (HCC): ICD-10-CM

## 2023-08-03 DIAGNOSIS — M25.552 BILATERAL HIP PAIN: ICD-10-CM

## 2023-08-03 DIAGNOSIS — E11.8 TYPE II DIABETES MELLITUS WITH MANIFESTATIONS (HCC): ICD-10-CM

## 2023-08-03 DIAGNOSIS — R79.89 LOW VITAMIN D LEVEL: ICD-10-CM

## 2023-08-03 DIAGNOSIS — M81.8 IDIOPATHIC OSTEOPOROSIS: ICD-10-CM

## 2023-08-03 DIAGNOSIS — J44.9 CHRONIC OBSTRUCTIVE PULMONARY DISEASE, UNSPECIFIED COPD TYPE (HCC): ICD-10-CM

## 2023-08-03 DIAGNOSIS — J01.80 ACUTE NON-RECURRENT SINUSITIS OF OTHER SINUS: ICD-10-CM

## 2023-08-03 PROCEDURE — G0439 PPPS, SUBSEQ VISIT: HCPCS | Performed by: INTERNAL MEDICINE

## 2023-08-03 PROCEDURE — 99214 OFFICE O/P EST MOD 30 MIN: CPT | Performed by: INTERNAL MEDICINE

## 2023-08-03 PROCEDURE — 96372 THER/PROPH/DIAG INJ SC/IM: CPT | Performed by: INTERNAL MEDICINE

## 2023-08-03 RX ORDER — ERGOCALCIFEROL 1.25 MG/1
50000 CAPSULE ORAL WEEKLY
Qty: 13 CAPSULE | Refills: 3 | Status: SHIPPED | OUTPATIENT
Start: 2023-08-03

## 2023-08-03 RX ORDER — CYANOCOBALAMIN 1000 UG/ML
1000 INJECTION, SOLUTION INTRAMUSCULAR; SUBCUTANEOUS ONCE
Status: COMPLETED | OUTPATIENT
Start: 2023-08-03 | End: 2023-08-03

## 2023-08-03 RX ORDER — FLUTICASONE FUROATE, UMECLIDINIUM BROMIDE AND VILANTEROL TRIFENATATE 100; 62.5; 25 UG/1; UG/1; UG/1
1 POWDER RESPIRATORY (INHALATION) DAILY
Qty: 180 BLISTER | Refills: 5 | Status: SHIPPED | OUTPATIENT
Start: 2023-08-03 | End: 2023-11-01

## 2023-08-03 RX ORDER — MAGNESIUM L-LACTATE 84 MG
84 TABLET, EXTENDED RELEASE ORAL 2 TIMES DAILY
Qty: 180 TABLET | Refills: 3 | Status: SHIPPED | OUTPATIENT
Start: 2023-08-03

## 2023-08-03 RX ORDER — ROSUVASTATIN CALCIUM 5 MG/1
5 TABLET, COATED ORAL DAILY
Qty: 90 TABLET | Refills: 3 | Status: SHIPPED | OUTPATIENT
Start: 2023-08-03

## 2023-08-03 RX ORDER — LANOLIN ALCOHOL/MO/W.PET/CERES
1000 CREAM (GRAM) TOPICAL DAILY
Qty: 90 TABLET | Refills: 3 | Status: SHIPPED | OUTPATIENT
Start: 2023-08-03

## 2023-08-03 RX ADMIN — CYANOCOBALAMIN 1000 MCG: 1000 INJECTION, SOLUTION INTRAMUSCULAR; SUBCUTANEOUS at 11:59

## 2023-08-03 NOTE — PATIENT INSTRUCTIONS
Medicare Preventive Visit Patient Instructions  Thank you for completing your Welcome to Medicare Visit or Medicare Annual Wellness Visit today. Your next wellness visit will be due in one year (8/3/2024). The screening/preventive services that you may require over the next 5-10 years are detailed below. Some tests may not apply to you based off risk factors and/or age. Screening tests ordered at today's visit but not completed yet may show as past due. Also, please note that scanned in results may not display below. Preventive Screenings:  Service Recommendations Previous Testing/Comments   Colorectal Cancer Screening  * Colonoscopy    * Fecal Occult Blood Test (FOBT)/Fecal Immunochemical Test (FIT)  * Fecal DNA/Cologuard Test  * Flexible Sigmoidoscopy Age: 43-73 years old   Colonoscopy: every 10 years (may be performed more frequently if at higher risk)  OR  FOBT/FIT: every 1 year  OR  Cologuard: every 3 years  OR  Sigmoidoscopy: every 5 years  Screening may be recommended earlier than age 39 if at higher risk for colorectal cancer. Also, an individualized decision between you and your healthcare provider will decide whether screening between the ages of 77-80 would be appropriate. Colonoscopy: Not on file  FOBT/FIT: Not on file  Cologuard: Not on file  Sigmoidoscopy: Not on file    Risks and Benefits Discussed     Breast Cancer Screening Age: 36 years old  Frequency: every 1-2 years  Not required if history of left and right mastectomy Mammogram: 07/26/2023    Screening Current   Cervical Cancer Screening Between the ages of 21-29, pap smear recommended once every 3 years. Between the ages of 32-69, can perform pap smear with HPV co-testing every 5 years.    Recommendations may differ for women with a history of total hysterectomy, cervical cancer, or abnormal pap smears in past. Pap Smear: Not on file    Screening Not Indicated   Hepatitis C Screening Once for adults born between [address] [place] [address] frequently in patients at high risk for Hepatitis C Hep C Antibody: 08/30/2019    Screening Current   Diabetes Screening 1-2 times per year if you're at risk for diabetes or have pre-diabetes Fasting glucose: 111 mg/dL (7/21/2023)  A1C: 6.3 (3/20/2023)  Screening Not Indicated  History Diabetes   Cholesterol Screening Once every 5 years if you don't have a lipid disorder. May order more often based on risk factors. Lipid panel: 07/21/2023    Screening Not Indicated  History Lipid Disorder     Other Preventive Screenings Covered by Medicare:  1. Abdominal Aortic Aneurysm (AAA) Screening: covered once if your at risk. You're considered to be at risk if you have a family history of AAA. 2. Lung Cancer Screening: covers low dose CT scan once per year if you meet all of the following conditions: (1) Age 48-67; (2) No signs or symptoms of lung cancer; (3) Current smoker or have quit smoking within the last 15 years; (4) You have a tobacco smoking history of at least 20 pack years (packs per day multiplied by number of years you smoked); (5) You get a written order from a healthcare provider. 3. Glaucoma Screening: covered annually if you're considered high risk: (1) You have diabetes OR (2) Family history of glaucoma OR (3)  aged 48 and older OR (3)  American aged 72 and older  3. Osteoporosis Screening: covered every 2 years if you meet one of the following conditions: (1) You're estrogen deficient and at risk for osteoporosis based off medical history and other findings; (2) Have a vertebral abnormality; (3) On glucocorticoid therapy for more than 3 months; (4) Have primary hyperparathyroidism; (5) On osteoporosis medications and need to assess response to drug therapy. · Last bone density test (DXA Scan): 04/03/2023.  5. HIV Screening: covered annually if you're between the age of 15-65. Also covered annually if you are younger than 13 and older than 72 with risk factors for HIV infection. For pregnant patients, it is covered up to 3 times per pregnancy. Immunizations:  Immunization Recommendations   Influenza Vaccine Annual influenza vaccination during flu season is recommended for all persons aged >= 6 months who do not have contraindications   Pneumococcal Vaccine   * Pneumococcal conjugate vaccine = PCV13 (Prevnar 13), PCV15 (Vaxneuvance), PCV20 (Prevnar 20)  * Pneumococcal polysaccharide vaccine = PPSV23 (Pneumovax) Adults 20-63 years old: 1-3 doses may be recommended based on certain risk factors  Adults 72 years old: 1-2 doses may be recommended based off what pneumonia vaccine you previously received   Hepatitis B Vaccine 3 dose series if at intermediate or high risk (ex: diabetes, end stage renal disease, liver disease)   Tetanus (Td) Vaccine - COST NOT COVERED BY MEDICARE PART B Following completion of primary series, a booster dose should be given every 10 years to maintain immunity against tetanus. Td may also be given as tetanus wound prophylaxis. Tdap Vaccine - COST NOT COVERED BY MEDICARE PART B Recommended at least once for all adults. For pregnant patients, recommended with each pregnancy. Shingles Vaccine (Shingrix) - COST NOT COVERED BY MEDICARE PART B  2 shot series recommended in those aged 48 and above     Health Maintenance Due:      Topic Date Due   • Breast Cancer Screening: Mammogram  07/26/2024   • Hepatitis C Screening  Completed     Immunizations Due:      Topic Date Due   • Hepatitis A Vaccine (1 of 2 - Risk 2-dose series) Never done   • Hepatitis B Vaccine (1 of 3 - Risk 3-dose series) Never done   • COVID-19 Vaccine (3 - Pfizer series) 06/06/2021   • Influenza Vaccine (1) 09/01/2023     Advance Directives   What are advance directives? Advance directives are legal documents that state your wishes and plans for medical care. These plans are made ahead of time in case you lose your ability to make decisions for yourself.  Advance directives can apply to any medical decision, such as the treatments you want, and if you want to donate organs. What are the types of advance directives? There are many types of advance directives, and each state has rules about how to use them. You may choose a combination of any of the following:  · Living will: This is a written record of the treatment you want. You can also choose which treatments you do not want, which to limit, and which to stop at a certain time. This includes surgery, medicine, IV fluid, and tube feedings. · Durable power of  for healthcare [facility]): This is a written record that states who you want to make healthcare choices for you when you are unable to make them for yourself. This person, called a proxy, is usually a family member or a friend. You may choose more than 1 proxy. · Do not resuscitate (DNR) order:  A DNR order is used in case your heart stops beating or you stop breathing. It is a request not to have certain forms of treatment, such as CPR. A DNR order may be included in other types of advance directives. · Medical directive: This covers the care that you want if you are in a coma, near death, or unable to make decisions for yourself. You can list the treatments you want for each condition. Treatment may include pain medicine, surgery, blood transfusions, dialysis, IV or tube feedings, and a ventilator (breathing machine). · Values history: This document has questions about your views, beliefs, and how you feel and think about life. This information can help others choose the care that you would choose. Why are advance directives important? An advance directive helps you control your care. Although spoken wishes may be used, it is better to have your wishes written down. Spoken wishes can be misunderstood, or not followed. Treatments may be given even if you do not want them. An advance directive may make it easier for your family to make difficult choices about your care.        © Copyright [address] 2018 Information is for [employer] use only and may not be sold, redistributed or otherwise used for commercial purposes. All illustrations and images included in CareNotes® are the copyrighted property of [employer]D.A[employer] Inc. or [address] [place] [address]   Osteoporosis  is a long-term medical condition that causes your bones to become weak, brittle, and more likely to fracture. Osteoporosis occurs when your body absorbs more bone than it makes. It is also caused by a lack of calcium and estrogen (female hormone). Common symptoms include the following: You may not have any signs or symptoms. You may break a bone after a muscle strain, bump, or fall. A break usually occurs in the hip, spine, or wrist. A collapsed vertebra (bone in your spine) may cause severe back pain or loss of height from bent posture. Call your doctor if:   ·  You have severe pain. ·  You have increasing pain after a fall. ·  You have pain when you do your daily activities. ·  You have questions or concerns about your condition or care. Diagnosis of osteoporosis:   · Blood and urine tests  measure your calcium, vitamin D, and estrogen levels. · An x-ray or CT may show thinned bones or a fracture. You may be given contrast liquid to help the bones show up better in the pictures. Tell the healthcare provider if you have ever had an allergic reaction to contrast liquid. Do not enter the MRI room with anything metal. Metal can cause serious injury. Tell the healthcare provider if you have any metal in or on your body. · A bone density test  compares your bone thickness with what is expected for someone of your age, gender, and ethnicity. Treatment for osteoporosis may include medicines to prevent bone loss, build new bone, and increase estrogen. These medicines help prevent fractures and may be given as a pill or injection.  Ask your healthcare provider for more information on these medicines. Prevent bone loss:  · Eat healthy foods that are high in calcium. This helps keep your bones strong. Good sources of calcium are milk, cheese, broccoli, tofu, almonds, and canned salmon and sardines. Recommended to get at least 1200mg daily of calcium. · Increase your vitamin D intake. Vitamin D is in fish oils, some vegetables, and fortified milk, cereal, and bread. Vitamin D is also formed in the skin when it is exposed to the sun. Ask your healthcare provider how much sunlight is safe for you. You will require at least 800 units of vitamin D daily taken as a supplement. · Drink liquids as directed. Ask your healthcare provider how much liquid to drink each day and which liquids are best for you. Do not have alcohol or caffeine. They decrease bone mineral density, which can weaken your bones. · Exercise regularly. Ask your healthcare provider about the best exercise plan for you. Weight bearing exercise for 30 minutes, 3 times a week can help build and strengthen bone. · Do not smoke. Nicotine and other chemicals in cigarettes and cigars can cause lung damage. Ask your healthcare provider for information if you currently smoke and need help to quit. E-cigarettes or smokeless tobacco still contain nicotine. Talk to your healthcare provider before you use these products. · Go to physical therapy as directed. A physical therapist teaches you exercises to help improve movement and muscle strength. · Alcohol. It is recommended to avoid heavy alcohol use as increased consumption of alcohol is known to cause bone loss  © Copyright [address] 2021 Information is for End User's use only and may not be sold, redistributed or otherwise used for commercial purposes.  All illustrations and images included in CareNotes® are the copyrighted property of [employer]. or [address] [place] [address]    Calcium and vitamin D for bone health (Beyond the Basics)    CALCIUM AND VITAMIN D OVERVIEW  Osteoporosis is a common bone disorder that causes a progressive loss in bone density and mass. As a result, bones become thin, weakened, and easily fractured. It is estimated that more than 1.3 million osteoporosis-associated (or "osteoporotic") fractures occur every year in the [place], primarily of bone within the spine (the vertebrae), the hip, and the forearm near the wrist. =    A number of treatments can help to prevent loss of bone and treat low bone mass. However, the first step in preventing or treating osteoporosis is to consume foods and drinks that provide calcium, a mineral essential for bone strength, and vitamin D, which aids in calcium breakdown and absorption. =    CALCIUM AND VITAMIN D BENEFITS  Good nutrition is important at all ages to keep the bones healthy. · Taking calcium reduces bone loss and decreases the risk of fracturing the vertebrae (the bones that surround the spinal cord). · Consuming calcium during childhood (eg, in milk) can lead to higher bone mass in adulthood. This increase in bone density can reduce the risk of fractures later in life. · Calcium may also have benefits in other body systems by reducing blood pressure and cholesterol levels. · Calcium and vitamin D supplements may help prevent tooth loss in older adults. RECOMMENDATIONS FOR CALCIUM    General recommendations -- Premenopausal women and men should consume at least 1000 mg of calcium, while postmenopausal women should consume 1200 mg (total diet plus supplement). You should not consume more than 2000 mg of calcium per day (total diet plus supplement) due to the risk of side effects. Calcium in the diet -- The primary sources of calcium in the diet include milk and other dairy products, such as hard cheese, cottage cheese, or yogurt, as well as green vegetables, such as kale and broccoli (table 1). Some cereals, soy products, and fruit juices are fortified with calcium.     Calcium supplements -- The body is able to absorb calcium contained in supplements as well as from dietary sources. If it is not possible to get enough calcium from dietary sources, consult a health care provider to determine the best type, dose, and timing of calcium supplements. · Calcium carbonate is effective and is the least expensive form of calcium. It is best absorbed with a low-iron meal (such as breakfast). Calcium carbonate may not be absorbed well in people who also take a specific medication for gastroesophageal reflux (called a proton pump inhibitor or H2 blocker), which blocks stomach acid. · Many natural calcium carbonate preparations such as oyster shells or bone meal contain some lead. · Calcium citrate is well absorbed in the fasting state as well as with a meal.  · Calcium doses above 500 mg are not absorbed as well as smaller doses, so large doses of supplements should be taken in divided doses (eg, in the morning and evening). · Calcium supplements do not replace other osteoporosis treatments such as hormone replacement, bisphosphonates (eg, risedronate [sample brand name: Actonel] and alendronate [brand name: Fosamax]), and raloxifene (brand name: Evista). Calcium and vitamin D supplements alone are usually insufficient to prevent age-related bone loss, although they may be beneficial in some subgroups (older adults, those with very low intake). In most patients with or at risk for osteoporosis, the addition of medication or hormonal therapy is necessary in order to slow the breakdown and removal of bone (ie, resorption). Underlying gastrointestinal diseases -- Patients who do not adequately absorb nutrients from the gastrointestinal tract (due to malabsorption) may require more than 1000 to 1200 mg of calcium per day. In such cases, a health care provider can help to determine the optimal dose of calcium.     Medications -- All medications should be discussed with a health care provider to ensure that possible interactions with calcium are identified. Certain medications change the amount of calcium that is absorbed and/or excreted. As an example, loop diuretics (eg, furosemide [sample brand name: Lasix]) increase the amount of calcium excreted in the urine. On the other hand, thiazide diuretics (eg, hydrochlorothiazide) can reduce levels of calcium in the urine, potentially reducing the risk of bone loss and kidney stones. Side effects of calcium -- Calcium is usually easily tolerated when it is taken in divided doses several times per day. Some people experience side effects related to calcium, including constipation and indigestion. Calcium supplements interfere with the absorption of iron and thyroid hormone, and therefore, these medications should be taken at different times. Kidney stones -- There is no evidence that consuming large amounts of calcium (from foods and drinks) increases the risk of kidney stones, or that avoiding dietary calcium decreases the risk. In fact, avoiding dairy products is likely to increase the risk of kidney stones. However, use of calcium supplements may increase the risk of kidney stones in susceptible individuals by raising the level of calcium in the urine. This is particularly true if the supplement is taken between meals or at bedtime, and this is one of the reasons we prefer for patients to get as much of their calcium requirement through dietary means. IMPORTANCE OF VITAMIN D  Vitamin D decreases bone loss and lowers the risk of fracture, especially in older men and women. Along with calcium, vitamin D also helps to prevent and treat osteoporosis. To absorb calcium efficiently, an adequate amount of vitamin D must be present. Vitamin D is normally made in the skin after exposure to sunlight.     Recommendations for vitamin D -- The current recommendation is that men over 70 years and postmenopausal women consume at least 800 international units (20 micrograms) of vitamin D per day. Lower levels of vitamin D are not as effective, while high doses can be toxic, especially if taken for long periods of time. Although the optimal intake has not been clearly established in premenopausal women or in younger men with osteoporosis, 600 international units (15 micrograms) of vitamin D daily is generally suggested. Vitamin D is available as an individual supplement and is included in most multivitamins and some calcium supplements (table 2). Milk is a relatively good dietary source of vitamin D, with approximately 100 international units (2.5 micrograms) per cup (240 mL), and salmon has 800 to 1000 international units (20 to 25 micrograms) of vitamin D per serving. Most healthy people don't need to check with their health care provider before taking standard doses of vitamin D and don't need monitoring of vitamin D levels if they are not being treated for vitamin D deficiency. However, recommendations for vitamin D supplementation may be different in people with certain underlying medical conditions, such as sarcoidosis or lymphoma. In these situations, a provider will determine if supplements are needed; if so, the person's vitamin D and calcium levels should be monitored with regular tests. ©4703 [facility], [address] [facility] rights reserved.

## 2023-08-03 NOTE — PROGRESS NOTES
Assessment and Plan:     Problem List Items Addressed This Visit    None       Preventive health issues were discussed with patient, and age appropriate screening tests were ordered as noted in patient's After Visit Summary. Personalized health advice and appropriate referrals for health education or preventive services given if needed, as noted in patient's After Visit Summary.      History of Present Illness:     Patient presents for a Medicare Wellness Visit    HPI   Patient Care Team:  Ronal Watson MD as PCP - General (Internal Medicine)     Review of Systems:     Review of Systems     Problem List:     Patient Active Problem List   Diagnosis   • Asthma   • Chronic coronary artery disease   • Osteoarthritis   • Glaucoma   • Hiatal hernia   • Hyperlipidemia   • Hypertension   • Hx of CABG   • Hx stent of SVG to the RCA   • Community acquired pneumonia   • Chronic obstructive pulmonary disease, unspecified COPD type ([address])   • Hyponatremia   • Hypomagnesemia   • Hydronephrosis, bilateral   • Cystocele with prolapse   • Cognitive decline   • Pulmonary emphysema ([address])   • Ambulatory dysfunction      Past Medical and Surgical History:     Past Medical History:   Diagnosis Date   • Arthritis    • Asthma    • Community acquired pneumonia 11/13/2019   • Coronary artery disease    • DM (diabetes mellitus) ([address])    • HTN (hypertension)    • Hydronephrosis, bilateral 03/06/2023   • Hyperlipidemia    • Hyponatremia    • Urinary retention      Past Surgical History:   Procedure Laterality Date   • CARDIAC CATHETERIZATION  2010   • CORONARY ARTERY BYPASS GRAFT  12/06/2010    with subsequent stent to the saphenous veingraft to the RCA 3 months later   • KNEE ARTHROSCOPY     • POLYPECTOMY      laryngeal   • TOTAL HIP ARTHROPLASTY     • TUBAL LIGATION        Family History:     Family History   Problem Relation Age of Onset   • Stroke Mother    • Hypertension Mother    • Heart disease Mother    • Colon cancer Father    • No Known Problems Sister    • No Known Problems Sister    • No Known Problems Sister    • No Known Problems Daughter    • No Known Problems Daughter    • No Known Problems Daughter    • No Known Problems Maternal Grandmother    • No Known Problems Maternal Grandfather    • No Known Problems Paternal Grandmother    • No Known Problems Paternal Grandfather    • Heart attack Brother    • Heart attack Brother    • No Known Problems Maternal Aunt    • No Known Problems Maternal Aunt    • No Known Problems Paternal Aunt    • No Known Problems Paternal Aunt       Social History:     Social History     Socioeconomic History   • Marital status: Single     Spouse name: None   • Number of children: 5   • Years of education: None   • Highest education level: None   Occupational History   • Occupation: retired   Tobacco Use   • Smoking status: Former     Packs/day: 0.50     Years: 13.00     Total pack years: 6.50     Types: Cigarettes     Start date:      Quit date:      Years since quittin.6   • Smokeless tobacco: Never   • Tobacco comments:     no passive smoke exposure   Vaping Use   • Vaping Use: Never used   Substance and Sexual Activity   • Alcohol use: Yes     Comment: socially   • Drug use: Never   • Sexual activity: Not Currently   Other Topics Concern   • None   Social History Narrative   • None     Social Determinants of Health     Financial Resource Strain: Low Risk  (2023)    Overall Financial Resource Strain (CARDIA)    • Difficulty of Paying Living Expenses: Not hard at all   Food Insecurity: No Food Insecurity (3/7/2023)    Hunger Vital Sign    • Worried About Running Out of Food in the Last Year: Never true    • Ran Out of Food in the Last Year: Never true   Transportation Needs: No Transportation Needs (3/7/2023)    PRAPARE - Transportation    • Lack of Transportation (Medical): No    • Lack of Transportation (Non-Medical):  No   Physical Activity: Unknown (3/13/2020)    Exercise Vital Sign    • Days of Exercise per Week: Patient refused    • Minutes of Exercise per Session: Patient refused   Stress: No Stress Concern Present (3/13/2020)    [address]    • Feeling of Stress : Not at all   Social Connections: Unknown (3/13/2020)    Social Connection and Isolation Panel [NHANES]    • Frequency of Communication with Friends and Family: Patient refused    • Frequency of Social Gatherings with Friends and Family: Patient refused    • Attends Jehovah's witness Services: Patient refused    • Active Member of Clubs or Organizations: Patient refused    • Attends Club or Organization Meetings: Patient refused    • Marital Status: Patient refused   Intimate Partner Violence: Not At Risk (3/13/2020)    Humiliation, Afraid, Rape, and Kick questionnaire    • Fear of Current or Ex-Partner: No    • Emotionally Abused: No    • Physically Abused: No    • Sexually Abused: No   Housing Stability: Unknown (3/7/2023)    Housing Stability Vital Sign    • Unable to Pay for Housing in the Last Year: No    • Number of Places Lived in the Last Year: Not on file    • Unstable Housing in the Last Year: Not on file      Medications and Allergies:     Current Outpatient Medications   Medication Sig Dispense Refill   • Air  (Vicks Air Purifier/HEPA) (Device) MISC Use as directed 1 each 0   • albuterol (ACCUNEB) 1.25 MG/3ML nebulizer solution Take 3 mL by nebulization every 6 (six) hours as needed for wheezing 225 mL 5   • albuterol (PROVENTIL HFA,VENTOLIN HFA) 90 mcg/act inhaler Inhale 2 puffs every 6 (six) hours as needed for wheezing 18 g 5   • albuterol (PROVENTIL HFA,VENTOLIN HFA) 90 mcg/act inhaler Inhale 2 puffs every 4 (four) hours as needed for wheezing 18 g 0   • ALPRAZolam (XANAX) 0.5 mg tablet Take 1 tablet (0.5 mg total) by mouth daily at bedtime as needed for anxiety 30 tablet 3   • amLODIPine (NORVASC) 5 mg tablet Take 1 tablet (5 mg total) by mouth 2 (two) times a day 180 tablet 3   • benzonatate (TESSALON PERLES) 100 mg capsule Take 1 capsule (100 mg total) by mouth every 8 (eight) hours 21 capsule 0   • bisoprolol (ZEBETA) 10 MG tablet Take 1 tablet (10 mg total) by mouth daily 90 tablet 3   • co-enzyme Q-10 30 MG capsule Take 1 capsule (30 mg total) by mouth 2 (two) times a day 200 capsule 3   • ergocalciferol (VITAMIN D2) 50,000 units Take 1 capsule (50,000 Units total) by mouth once a week 13 capsule 3   • glucose blood test strip Use 1 each as needed (as needed) Use as instructed 100 each 5   • ipratropium-albuterol (DUO-NEB) 0.5-2.5 mg/3 mL nebulizer solution Take 3 mL by nebulization every 6 (six) hours as needed for wheezing or shortness of breath 270 mL 3   • magnesium (MAGTAB) 84 MG (7MEQ) TBCR Take 1 tablet (84 mg total) by mouth daily 90 tablet 3   • menthol-cetylpyridinium (CEPACOL) 3 MG lozenge Take 1 lozenge (3 mg total) by mouth as needed for sore throat 9 lozenge 0   • nitroglycerin (NITROSTAT) 0.4 mg SL tablet Place 1 tablet (0.4 mg total) under the tongue every 5 (five) minutes as needed for chest pain 30 tablet 5   • ondansetron (ZOFRAN-ODT) 4 mg disintegrating tablet Take 1 tablet (4 mg total) by mouth every 8 (eight) hours as needed for nausea or vomiting 20 tablet 0   • pantoprazole (PROTONIX) 20 mg tablet Take 1 tablet (20 mg total) by mouth daily 90 tablet 3   • Plavix 75 MG tablet Take 1 tablet (75 mg total) by mouth daily 90 tablet 3   • Red Yeast Rice 600 MG CAPS Take 1 capsule (600 mg total) by mouth 2 (two) times a day with meals 200 capsule 3   • rosuvastatin (CRESTOR) 5 mg tablet Take 1 tablet (5 mg total) by mouth daily 90 tablet 3   • vitamin B-12 (VITAMIN B-12) 1,000 mcg tablet Take 1 tablet (1,000 mcg total) by mouth daily 90 tablet 3   • Calcium Carbonate-Vit D-Min (Caltrate 600+D Plus Minerals) 600-800 MG-UNIT TABS Take 1 tablet by mouth 2 (two) times a day with meals (Patient not taking: Reported on 6/17/2023) 200 tablet 6   • denosumab (PROLIA) 60 mg/mL Inject 1 mL (60 mg total) under the skin once for 1 dose 1 mL 1   • fluticasone-umeclidinium-vilanterol (Trelegy Ellipta) 100-62.5-25 mcg/actuation inhaler Inhale 1 puff daily Rinse mouth after use. 180 blister 5   • guaiFENesin (MUCINEX) 600 mg 12 hr tablet Take 2 tablets (1,200 mg total) by mouth every 12 (twelve) hours (Patient not taking: Reported on 7/11/2023) 30 tablet 0     Current Facility-Administered Medications   Medication Dose Route Frequency Provider Last Rate Last Admin   • cyanocobalamin injection 1,000 mcg  1,000 mcg Intramuscular Q30 Days Asuncion Kebede MD   1,000 mcg at 10/29/21 1217     Allergies   Allergen Reactions   • Ace Inhibitors Other (See Comments)     Includes lisinopril   • Angiotensin Receptor Blockers Other (See Comments)     Not specified.   Includes losartan and olmesartan   • Ezetimibe Rash   • Statins Arthralgia     Tolerates Crestor      Immunizations:     Immunization History   Administered Date(s) Administered   • COVID-19 PFIZER VACCINE 0.3 ML IM 03/21/2021, 04/11/2021   • INFLUENZA 10/20/2015, 09/27/2016, 09/28/2016, 11/09/2017, 10/29/2021   • Influenza Split 10/15/2012, 10/29/2013, 09/26/2014   • Influenza Split High Dose Preservative Free IM 09/27/2016, 11/09/2017   • Influenza, high dose seasonal 0.7 mL 10/08/2018, 09/13/2019, 09/28/2020, 10/29/2021, 12/12/2022   • Influenza, seasonal, injectable 10/20/2015   • Pneumococcal Conjugate 13-Valent 11/09/2017   • Pneumococcal Polysaccharide PPV23 11/01/2009, 10/20/2015   • Tdap 02/01/2019      Health Maintenance:         Topic Date Due   • Breast Cancer Screening: Mammogram  07/26/2024   • Hepatitis C Screening  Completed         Topic Date Due   • Hepatitis A Vaccine (1 of 2 - Risk 2-dose series) Never done   • Hepatitis B Vaccine (1 of 3 - Risk 3-dose series) Never done   • COVID-19 Vaccine (3 - Pfizer series) 06/06/2021   • Influenza Vaccine (1) 09/01/2023      Medicare Screening Tests and Risk Assessments:     Mervin Marin is here for her Subsequent Wellness visit. Last Medicare Wellness visit information reviewed, patient interviewed, no change since last AWV. Health Risk Assessment:   Patient rates overall health as fair. Patient feels that their physical health rating is slightly worse. Patient is satisfied with their life. Eyesight was rated as same. Hearing was rated as same. Patient feels that their emotional and mental health rating is same. Patients states they are never, rarely angry. Patient states they are sometimes unusually tired/fatigued. Pain experienced in the last 7 days has been some. Patient's pain rating has been 7/10. Patient states that she has experienced no weight loss or gain in last 6 months. Depression Screening:   PHQ-2 Score: 0      Fall Risk Screening: In the past year, patient has experienced: no history of falling in past year      Urinary Incontinence Screening:   Patient has not leaked urine accidently in the last six months. Home Safety:  Patient has trouble with stairs inside or outside of their home. Patient has working smoke alarms and has working carbon monoxide detector. Home safety hazards include: none. Nutrition:   Current diet is Regular. Medications:   Patient is not currently taking any over-the-counter supplements. Patient is able to manage medications. Activities of Daily Living (ADLs)/Instrumental Activities of Daily Living (IADLs):   Walk and transfer into and out of bed and chair?: Yes  Dress and groom yourself?: Yes    Bathe or shower yourself?: Yes    Feed yourself?  Yes  Do your laundry/housekeeping?: Yes  Manage your money, pay your bills and track your expenses?: Yes  Make your own meals?: Yes    Do your own shopping?: Yes    Previous Hospitalizations:   Any hospitalizations or ED visits within the last 12 months?: Yes    How many hospitalizations have you had in the last year?: 3-4    Advance Care Planning:   Living will: No Durable POA for healthcare: No    Advanced directive counseling given: No    Five wishes given: No    End of Life Decisions reviewed with patient: No      PREVENTIVE SCREENINGS      Cardiovascular Screening:    General: Screening Not Indicated, History Lipid Disorder and Risks and Benefits Discussed      Diabetes Screening:     General: Screening Not Indicated, History Diabetes, Risks and Benefits Discussed and Screening Current      Colorectal Cancer Screening:     General: Risks and Benefits Discussed      Breast Cancer Screening:     General: Screening Current and Risks and Benefits Discussed      Cervical Cancer Screening:    General: Screening Not Indicated and Risks and Benefits Discussed      Osteoporosis Screening:    General: Screening Not Indicated, History Osteoporosis and Risks and Benefits Discussed      Abdominal Aortic Aneurysm (AAA) Screening:        General: Risks and Benefits Discussed      Lung Cancer Screening:     General: Screening Not Indicated and Risks and Benefits Discussed      Hepatitis C Screening:    General: Screening Current and Risks and Benefits Discussed    Screening, Brief Intervention, and Referral to Treatment (SBIRT)    Screening      Single Item Drug Screening:  How often have you used an illegal drug (including marijuana) or a prescription medication for non-medical reasons in the past year? never    Single Item Drug Screen Score: 0  Interpretation: Negative screen for possible drug use disorder    Other Counseling Topics:   Car/seat belt/driving safety, skin self-exam, sunscreen and calcium and vitamin D intake and regular weightbearing exercise. No results found.      Physical Exam:     Ht 5' 1" (1.549 m)   LMP  (LMP Unknown)   BMI 24.56 kg/m²     Physical Exam     Ronal Watson MD

## 2023-08-03 NOTE — PROGRESS NOTES
Name: Quyen Abraham      : 1945      MRN: [id number]  Encounter Provider: Eusebio Funes MD  Encounter Date: 8/3/2023   Encounter department: [address] [place] [address]     1. Encounter for general adult medical examination with abnormal findings    2. Acute non-recurrent sinusitis of other sinus    3. Low vitamin B12 level  -     cyanocobalamin injection 1,000 mcg  -     vitamin B-12 (VITAMIN B-12) 1,000 mcg tablet; Take 1 tablet (1,000 mcg total) by mouth daily    4. Low vitamin D level  -     ergocalciferol (VITAMIN D2) 50,000 units; Take 1 capsule (50,000 Units total) by mouth once a week    5. Hyperlipidemia associated with type 2 diabetes mellitus (HCC)  -     rosuvastatin (CRESTOR) 5 mg tablet; Take 1 tablet (5 mg total) by mouth daily    6. Idiopathic osteoporosis  -     denosumab (PROLIA) 60 mg/mL; Inject 1 mL (60 mg total) under the skin once for 1 dose    7. Bilateral hip pain  -     XR hip/pelv 2-3 vws left if performed; Future; Expected date: 2023  -     XR hip/pelv 2-3 vws right if performed; Future; Expected date: 2023  -     XR spine lumbar minimum 4 views non injury; Future; Expected date: 2023    8. Hypomagnesemia  -     magnesium (MAGTAB) 84 MG (7MEQ) TBCR; Take 1 tablet (84 mg total) by mouth 2 (two) times a day    9. Chronic obstructive pulmonary disease, unspecified COPD type ([address])  -     fluticasone-umeclidinium-vilanterol (Trelegy Ellipta) 100-62.5-25 mcg/actuation inhaler; Inhale 1 puff daily Rinse mouth after use. 10. Type II diabetes mellitus with manifestations ([address])  -     Comprehensive metabolic panel; Future; Expected date: 2023  -     CBC and differential; Future; Expected date: 2023  -     Lipid Panel with Direct LDL reflex; Future; Expected date: 2023  -     TSH, 3rd generation with Free T4 reflex; Future; Expected date: 2023  -     Hemoglobin A1c (w/out EAG) ([facility] ONLY);  Future; Expected date: 11/03/2023  -     Microalbumin, Random Urine (W/Creatinine) ([facility] ONLY); Future; Expected date: 11/03/2023    11. Other emphysema ([address])     moist Heat  Home P.T. Continue same  FU w cardiology  AWV; Done in detail. .  RTC in 3 mos w Blood  work       Subjective      [address] is here for AWV and regular check Up, she has few symptoms, recent blood work and med list reviewed w Pt in Detail. .    Review of Systems   Constitutional: Negative for chills, fatigue and fever. HENT: Positive for nosebleeds. Negative for congestion, facial swelling, sore throat, trouble swallowing and voice change. Eyes: Negative for pain, discharge and visual disturbance. Respiratory: Negative for cough, shortness of breath and wheezing. Cardiovascular: Negative for chest pain, palpitations and leg swelling. Gastrointestinal: Negative for abdominal pain, blood in stool, constipation, diarrhea and nausea. Endocrine: Negative for polydipsia, polyphagia and polyuria. Genitourinary: Negative for difficulty urinating, hematuria and urgency. Musculoskeletal: Positive for arthralgias, gait problem and joint swelling. Negative for myalgias. Skin: Negative for rash. Neurological: Positive for numbness. Negative for dizziness, tremors, weakness and headaches. Hematological: Negative for adenopathy. Does not bruise/bleed easily. Psychiatric/Behavioral: Negative for dysphoric mood, sleep disturbance and suicidal ideas.        Current Outpatient Medications on File Prior to Visit   Medication Sig   • Air  (Vicks Air Purifier/HEPA) (Device) MISC Use as directed   • albuterol (ACCUNEB) 1.25 MG/3ML nebulizer solution Take 3 mL by nebulization every 6 (six) hours as needed for wheezing   • albuterol (PROVENTIL HFA,VENTOLIN HFA) 90 mcg/act inhaler Inhale 2 puffs every 6 (six) hours as needed for wheezing   • albuterol (PROVENTIL HFA,VENTOLIN HFA) 90 mcg/act inhaler Inhale 2 puffs every 4 (four) hours as needed for wheezing   • ALPRAZolam (XANAX) 0.5 mg tablet Take 1 tablet (0.5 mg total) by mouth daily at bedtime as needed for anxiety   • amLODIPine (NORVASC) 5 mg tablet Take 1 tablet (5 mg total) by mouth 2 (two) times a day   • benzonatate (TESSALON PERLES) 100 mg capsule Take 1 capsule (100 mg total) by mouth every 8 (eight) hours   • bisoprolol (ZEBETA) 10 MG tablet Take 1 tablet (10 mg total) by mouth daily   • co-enzyme Q-10 30 MG capsule Take 1 capsule (30 mg total) by mouth 2 (two) times a day   • glucose blood test strip Use 1 each as needed (as needed) Use as instructed   • ipratropium-albuterol (DUO-NEB) 0.5-2.5 mg/3 mL nebulizer solution Take 3 mL by nebulization every 6 (six) hours as needed for wheezing or shortness of breath   • menthol-cetylpyridinium (CEPACOL) 3 MG lozenge Take 1 lozenge (3 mg total) by mouth as needed for sore throat   • nitroglycerin (NITROSTAT) 0.4 mg SL tablet Place 1 tablet (0.4 mg total) under the tongue every 5 (five) minutes as needed for chest pain   • ondansetron (ZOFRAN-ODT) 4 mg disintegrating tablet Take 1 tablet (4 mg total) by mouth every 8 (eight) hours as needed for nausea or vomiting   • pantoprazole (PROTONIX) 20 mg tablet Take 1 tablet (20 mg total) by mouth daily   • Plavix 75 MG tablet Take 1 tablet (75 mg total) by mouth daily   • Red Yeast Rice 600 MG CAPS Take 1 capsule (600 mg total) by mouth 2 (two) times a day with meals   • [DISCONTINUED] ergocalciferol (VITAMIN D2) 50,000 units Take 1 capsule (50,000 Units total) by mouth once a week   • [DISCONTINUED] magnesium (MAGTAB) 84 MG (7MEQ) TBCR Take 1 tablet (84 mg total) by mouth daily   • [DISCONTINUED] rosuvastatin (CRESTOR) 5 mg tablet Take 1 tablet (5 mg total) by mouth daily   • [DISCONTINUED] vitamin B-12 (VITAMIN B-12) 1,000 mcg tablet Take 1 tablet (1,000 mcg total) by mouth daily   • [DISCONTINUED] Calcium Carbonate-Vit D-Min (Caltrate 600+D Plus Minerals) 600-800 MG-UNIT TABS Take 1 tablet by mouth 2 (two) times a day with meals (Patient not taking: Reported on 6/17/2023)   • [DISCONTINUED] denosumab (PROLIA) 60 mg/mL Inject 1 mL (60 mg total) under the skin once for 1 dose   • [DISCONTINUED] fluticasone-umeclidinium-vilanterol (Trelegy Ellipta) 100-62.5-25 mcg/actuation inhaler Inhale 1 puff daily Rinse mouth after use. • [DISCONTINUED] guaiFENesin (MUCINEX) 600 mg 12 hr tablet Take 2 tablets (1,200 mg total) by mouth every 12 (twelve) hours (Patient not taking: Reported on 7/11/2023)       Objective     /82 (BP Location: Left arm, Patient Position: Sitting, Cuff Size: Standard)   Pulse 65   Temp 98 °F (36.7 °C) (Tympanic)   Resp 14   Ht 5' 1" (1.549 m)   Wt 59 kg (130 lb)   LMP  (LMP Unknown)   SpO2 95%   BMI 24.56 kg/m²     Physical Exam  Constitutional:       General: She is not in acute distress. HENT:      Head: Normocephalic. Mouth/Throat:      Pharynx: No oropharyngeal exudate. Eyes:      General: No scleral icterus. Conjunctiva/sclera: Conjunctivae normal.      Pupils: Pupils are equal, round, and reactive to light. Neck:      Thyroid: No thyromegaly. Cardiovascular:      Rate and Rhythm: Normal rate and regular rhythm. Heart sounds: Murmur heard. Pulmonary:      Effort: Pulmonary effort is normal. No respiratory distress. Breath sounds: Normal breath sounds. No wheezing or rales. Abdominal:      General: Bowel sounds are normal. There is no distension. Palpations: Abdomen is soft. Tenderness: There is no abdominal tenderness. There is no guarding or rebound. Musculoskeletal:         General: Tenderness present. Cervical back: Neck supple. Lymphadenopathy:      Cervical: No cervical adenopathy. Skin:     Coloration: Skin is not pale. Findings: No rash. Neurological:      Mental Status: She is alert and oriented to person, place, and time. Sensory: Sensory deficit present. Motor: Weakness present.       Comments: Pt uses a walker to support gait       Dana Calixto MD

## 2023-08-07 ENCOUNTER — HOSPITAL ENCOUNTER (OUTPATIENT)
Dept: RADIOLOGY | Facility: HOSPITAL | Age: 78
Discharge: HOME | End: 2023-08-07
Payer: MEDICARE

## 2023-08-07 DIAGNOSIS — M25.551 BILATERAL HIP PAIN: ICD-10-CM

## 2023-08-07 DIAGNOSIS — M25.552 BILATERAL HIP PAIN: ICD-10-CM

## 2023-08-07 PROCEDURE — 72110 X-RAY EXAM L-2 SPINE 4/>VWS: CPT

## 2023-08-07 PROCEDURE — 73521 X-RAY EXAM HIPS BI 2 VIEWS: CPT

## 2023-08-14 DIAGNOSIS — F41.9 ANXIETY: ICD-10-CM

## 2023-08-14 DIAGNOSIS — J44.9 CHRONIC OBSTRUCTIVE PULMONARY DISEASE, UNSPECIFIED COPD TYPE (HCC): ICD-10-CM

## 2023-08-14 RX ORDER — ALPRAZOLAM 0.5 MG/1
0.5 TABLET ORAL
Qty: 30 TABLET | Refills: 3 | Status: SHIPPED | OUTPATIENT
Start: 2023-08-14

## 2023-08-14 RX ORDER — ALBUTEROL SULFATE 90 UG/1
2 AEROSOL, METERED RESPIRATORY (INHALATION) EVERY 6 HOURS PRN
Qty: 18 G | Refills: 5 | Status: SHIPPED | OUTPATIENT
Start: 2023-08-14

## 2023-08-23 ENCOUNTER — TELEPHONE (OUTPATIENT)
Dept: FAMILY MEDICINE CLINIC | Facility: CLINIC | Age: 78
End: 2023-08-23

## 2023-08-23 NOTE — TELEPHONE ENCOUNTER
Received a call from [facility] in regards to order Dr. Tatianna Jacome placed for a Nebulizer, secondary insurance is denying the claim because Dr. Tatianna Jacome is showing up as not in par with [employer] insurance. Cindy Jasso [phone]    Reached out to credentialing department.

## 2023-09-06 ENCOUNTER — TELEPHONE (OUTPATIENT)
Dept: PULMONOLOGY | Facility: CLINIC | Age: 78
End: 2023-09-06

## 2023-09-06 NOTE — TELEPHONE ENCOUNTER
Unable to leave message mailbox full to set up follow up with Dr Yesenia Caldwell in the [facility]

## 2023-10-04 ENCOUNTER — OFFICE VISIT (OUTPATIENT)
Dept: PULMONOLOGY | Facility: CLINIC | Age: 78
End: 2023-10-04
Payer: MEDICARE

## 2023-10-04 VITALS
OXYGEN SATURATION: 96 % | WEIGHT: 136.4 LBS | HEART RATE: 63 BPM | DIASTOLIC BLOOD PRESSURE: 80 MMHG | BODY MASS INDEX: 25.75 KG/M2 | SYSTOLIC BLOOD PRESSURE: 132 MMHG | HEIGHT: 61 IN | RESPIRATION RATE: 18 BRPM

## 2023-10-04 DIAGNOSIS — J31.0 CHRONIC RHINITIS: ICD-10-CM

## 2023-10-04 DIAGNOSIS — J44.9 CHRONIC OBSTRUCTIVE PULMONARY DISEASE, UNSPECIFIED COPD TYPE (HCC): Primary | ICD-10-CM

## 2023-10-04 PROCEDURE — 99214 OFFICE O/P EST MOD 30 MIN: CPT | Performed by: NURSE PRACTITIONER

## 2023-10-04 RX ORDER — FLUTICASONE FUROATE, UMECLIDINIUM BROMIDE AND VILANTEROL TRIFENATATE 100; 62.5; 25 UG/1; UG/1; UG/1
1 POWDER RESPIRATORY (INHALATION) DAILY
Qty: 3 EACH | Refills: 3 | Status: SHIPPED | OUTPATIENT
Start: 2023-10-04 | End: 2024-01-02

## 2023-10-04 NOTE — PROGRESS NOTES
Pulmonary Follow-Up Note   Evelyn Albarado 66 y.o. female MRN: [id number]  10/4/2023      Assessment/Plan:    1. COPD  · She feels her symptoms are controlled  · Continue Trelegy 1 inhalation daily; gargle and spit after dose  · Albuterol HFA if needed for active symptoms  · Continue walking and staying active    2. Rhinitis  · Symptoms are doing well with the addition of Claritin daily  · Continue Claritin 10mg (OTC) once per day as doing    Diagnoses and all orders for this visit:    Chronic obstructive pulmonary disease, unspecified COPD type ([address])  -     fluticasone-umeclidinium-vilanterol (Trelegy Ellipta) 100-62.5-25 mcg/actuation inhaler; Inhale 1 puff daily Rinse mouth after use. Chronic rhinitis        Education provided at this visit:   Need for Vaccination: Eligible for flu vaccine - speak to PCP   Inhaler Use: Reiterated proper use and technique    Return in about 6 months (around 4/4/2024). All of Emily's questions were answered prior to leaving the office today. She will follow-up with Dr. Simon George in 6 months or sooner should the need arise. She is aware to call our office with any further questions or concerns. History of Present Illness   Reason for Visit: Follow up COPD  Chief Complaint: "my breathing is good"  HPI: Evelyn Albarado is a 66 y.o. female who presents to the office today for routine follow up; she is ambulating with cane. Today she feels well and denies any tight chest, shortness of breath or wheezing. Very rare cough with minimal sputum. She sleeps well usually with only one awakening to use the bathroom. She is taking Trelegy 100, 1 inhalation daily. Albuterol HFA is used when needed about 1-2 times per week. DuoNeb via nebulizer has not been used lately. Claritin OTC is taken daily    She reports an illness in June 2023 with need for antibiotic and steroids. Nothing since then. Review of Systems   Respiratory: Positive for cough.     All other systems reviewed and are negative.       Historical Information   Past Medical History:   Diagnosis Date   • Arthritis    • Asthma    • Community acquired pneumonia 2019   • Coronary artery disease    • DM (diabetes mellitus) ([address])    • HTN (hypertension)    • Hydronephrosis, bilateral 2023   • Hyperlipidemia    • Hyponatremia    • Urinary retention      Past Surgical History:   Procedure Laterality Date   • CARDIAC CATHETERIZATION     • CORONARY ARTERY BYPASS GRAFT  2010    with subsequent stent to the saphenous veingraft to the RCA 3 months later   • KNEE ARTHROSCOPY     • POLYPECTOMY      laryngeal   • TOTAL HIP ARTHROPLASTY     • TUBAL LIGATION       Family History   Problem Relation Age of Onset   • Stroke Mother    • Hypertension Mother    • Heart disease Mother    • Colon cancer Father    • No Known Problems Sister    • No Known Problems Sister    • No Known Problems Sister    • No Known Problems Daughter    • No Known Problems Daughter    • No Known Problems Daughter    • No Known Problems Maternal Grandmother    • No Known Problems Maternal Grandfather    • No Known Problems Paternal Grandmother    • No Known Problems Paternal Grandfather    • Heart attack Brother    • Heart attack Brother    • No Known Problems Maternal Aunt    • No Known Problems Maternal Aunt    • No Known Problems Paternal Aunt    • No Known Problems Paternal Aunt      Social History   Social History     Substance and Sexual Activity   Alcohol Use Yes    Comment: socially     Social History     Substance and Sexual Activity   Drug Use Never     Social History     Tobacco Use   Smoking Status Former   • Packs/day: 0.50   • Years: 13.00   • Total pack years: 6.50   • Types: Cigarettes   • Start date:    • Quit date:    • Years since quittin.7   Smokeless Tobacco Never   Tobacco Comments    no passive smoke exposure     E-Cigarette/Vaping   • E-Cigarette Use Never User      E-Cigarette/Vaping Substances   • Nicotine No    • THC No    • CBD No    • Flavoring No    • Other No    • Unknown No        Meds/Allergies     Current Outpatient Medications:   •  Air  (Vicks Air Purifier/HEPA) (Device) MISC, Use as directed, Disp: 1 each, Rfl: 0  •  albuterol (ACCUNEB) 1.25 MG/3ML nebulizer solution, Take 3 mL by nebulization every 6 (six) hours as needed for wheezing, Disp: 225 mL, Rfl: 5  •  albuterol (PROVENTIL HFA,VENTOLIN HFA) 90 mcg/act inhaler, Inhale 2 puffs every 6 (six) hours as needed for wheezing, Disp: 18 g, Rfl: 5  •  ALPRAZolam (XANAX) 0.5 mg tablet, Take 1 tablet (0.5 mg total) by mouth daily at bedtime as needed for anxiety, Disp: 30 tablet, Rfl: 3  •  amLODIPine (NORVASC) 5 mg tablet, Take 1 tablet (5 mg total) by mouth 2 (two) times a day, Disp: 180 tablet, Rfl: 3  •  benzonatate (TESSALON PERLES) 100 mg capsule, Take 1 capsule (100 mg total) by mouth every 8 (eight) hours, Disp: 21 capsule, Rfl: 0  •  bisoprolol (ZEBETA) 10 MG tablet, Take 1 tablet (10 mg total) by mouth daily, Disp: 90 tablet, Rfl: 3  •  co-enzyme Q-10 30 MG capsule, Take 1 capsule (30 mg total) by mouth 2 (two) times a day, Disp: 200 capsule, Rfl: 3  •  ergocalciferol (VITAMIN D2) 50,000 units, Take 1 capsule (50,000 Units total) by mouth once a week, Disp: 13 capsule, Rfl: 3  •  fluticasone-umeclidinium-vilanterol (Trelegy Ellipta) 100-62.5-25 mcg/actuation inhaler, Inhale 1 puff daily Rinse mouth after use., Disp: 3 each, Rfl: 3  •  glucose blood test strip, Use 1 each as needed (as needed) Use as instructed, Disp: 100 each, Rfl: 5  •  ipratropium-albuterol (DUO-NEB) 0.5-2.5 mg/3 mL nebulizer solution, Take 3 mL by nebulization every 6 (six) hours as needed for wheezing or shortness of breath, Disp: 270 mL, Rfl: 3  •  magnesium (MAGTAB) 84 MG (7MEQ) TBCR, Take 1 tablet (84 mg total) by mouth 2 (two) times a day, Disp: 180 tablet, Rfl: 3  •  menthol-cetylpyridinium (CEPACOL) 3 MG lozenge, Take 1 lozenge (3 mg total) by mouth as needed for sore throat, Disp: 9 lozenge, Rfl: 0  •  nitroglycerin (NITROSTAT) 0.4 mg SL tablet, Place 1 tablet (0.4 mg total) under the tongue every 5 (five) minutes as needed for chest pain, Disp: 30 tablet, Rfl: 5  •  pantoprazole (PROTONIX) 20 mg tablet, Take 1 tablet (20 mg total) by mouth daily, Disp: 90 tablet, Rfl: 3  •  Plavix 75 MG tablet, Take 1 tablet (75 mg total) by mouth daily, Disp: 90 tablet, Rfl: 3  •  Red Yeast Rice 600 MG CAPS, Take 1 capsule (600 mg total) by mouth 2 (two) times a day with meals, Disp: 200 capsule, Rfl: 3  •  rosuvastatin (CRESTOR) 5 mg tablet, Take 1 tablet (5 mg total) by mouth daily, Disp: 90 tablet, Rfl: 3  •  vitamin B-12 (VITAMIN B-12) 1,000 mcg tablet, Take 1 tablet (1,000 mcg total) by mouth daily, Disp: 90 tablet, Rfl: 3  •  denosumab (PROLIA) 60 mg/mL, Inject 1 mL (60 mg total) under the skin once for 1 dose, Disp: 1 mL, Rfl: 1  •  ondansetron (ZOFRAN-ODT) 4 mg disintegrating tablet, Take 1 tablet (4 mg total) by mouth every 8 (eight) hours as needed for nausea or vomiting (Patient not taking: Reported on 10/4/2023), Disp: 20 tablet, Rfl: 0    Current Facility-Administered Medications:   •  cyanocobalamin injection 1,000 mcg, 1,000 mcg, Intramuscular, Q30 Days, Ramsey Ornelas MD, 1,000 mcg at 10/29/21 1217  Allergies   Allergen Reactions   • Ace Inhibitors Other (See Comments)     Includes lisinopril   • Angiotensin Receptor Blockers Other (See Comments)     Not specified. Includes losartan and olmesartan   • Ezetimibe Rash   • Statins Arthralgia     Tolerates Crestor       Vitals: Blood pressure 132/80, pulse 63, resp. rate 18, height 5' 1" (1.549 m), weight 61.9 kg (136 lb 6.4 oz), SpO2 96 %. Body mass index is 25.77 kg/m². Oxygen Therapy  SpO2: 96 %  Oxygen Therapy: None (Room air)    Physical Exam  Constitutional:       Appearance: Normal appearance. She is normal weight.    HENT:      Nose: Nose normal.      Mouth/Throat:      Mouth: Mucous membranes are moist. Pharynx: Oropharynx is clear. Cardiovascular:      Rate and Rhythm: Normal rate and regular rhythm. Pulses: Normal pulses. Heart sounds: Normal heart sounds. Pulmonary:      Effort: Pulmonary effort is normal.      Breath sounds: Normal breath sounds. Abdominal:      General: Abdomen is flat. Bowel sounds are normal.      Palpations: Abdomen is soft. Musculoskeletal:         General: Normal range of motion. Cervical back: Normal range of motion and neck supple. Skin:     General: Skin is warm and dry. Neurological:      Mental Status: She is alert. Psychiatric:         Mood and Affect: Mood normal.         Labs:   Lab Results   Component Value Date    WBC 8.64 07/21/2023    HGB 12.2 07/21/2023    HCT 39.3 07/21/2023    MCV 84 07/21/2023     07/21/2023    EOSPCT 3 07/21/2023    EOSABS 0.27 07/21/2023    SEGSPCT 82 (H) 11/12/2019    MONOPCT 8 07/21/2023    MONOABS 0.36 11/12/2019    BANDSPCT 6 11/12/2019     Lab Results   Component Value Date    CALCIUM 9.4 07/21/2023     06/02/2018    K 3.9 07/21/2023    CO2 30 07/21/2023     07/21/2023    BUN 12 07/21/2023    CREATININE 0.71 07/21/2023     No results found for: "IGE", "RAST"  Lab Results   Component Value Date    ALT 11 07/21/2023    AST 12 (L) 07/21/2023    ALKPHOS 61 07/21/2023    BILITOT 0.4 06/02/2018         Imaging and other studies: I have personally reviewed pertinent reports. and I have personally reviewed pertinent films in PACS   CXR 6/17/23: no pneumothorax or pleural effusion; the lungs are clear      TRINIDAD Robb  [facility]        Portions of the record may have been created with voice recognition software. Occasional wrong word or "sound a like" substitutions may have occurred due to the inherent limitations of voice recognition software.   Read the chart carefully and recognize, using context, where substitutions have occurred or contact the dictating provider.

## 2023-10-10 ENCOUNTER — OFFICE VISIT (OUTPATIENT)
Dept: OBGYN CLINIC | Facility: CLINIC | Age: 78
End: 2023-10-10

## 2023-10-10 VITALS
BODY MASS INDEX: 26.66 KG/M2 | HEIGHT: 61 IN | WEIGHT: 141.2 LBS | SYSTOLIC BLOOD PRESSURE: 180 MMHG | DIASTOLIC BLOOD PRESSURE: 68 MMHG | HEART RATE: 69 BPM

## 2023-10-10 DIAGNOSIS — N89.8 VAGINAL EROSION SECONDARY TO PESSARY USE, INITIAL ENCOUNTER: Primary | ICD-10-CM

## 2023-10-10 DIAGNOSIS — T83.89XA VAGINAL EROSION SECONDARY TO PESSARY USE, INITIAL ENCOUNTER: Primary | ICD-10-CM

## 2023-10-10 RX ORDER — ESTRADIOL 0.1 MG/G
1 CREAM VAGINAL 2 TIMES DAILY
Qty: 42.5 G | Refills: 1 | Status: SHIPPED | OUTPATIENT
Start: 2023-10-10

## 2023-10-11 NOTE — PROGRESS NOTES
Pessary    Date/Time: 10/10/2023 9:23 PM    Performed by: Britney Merchant MD  Authorized by: Britney Merchant MD  Universal Protocol:  Procedure performed by: (Dr. Rosalba Villasenor)  Consent: Verbal consent obtained. Patient understanding: patient states understanding of the procedure being performed  Patient identity confirmed: verbally with patient      Indication:     Indication for pessary: cystocele and vaginal vault prolapse    Pre-procedure:     Pessary procedure type:  Cleaning/check  Problems:     Pessary complications: none    Procedure:     Pessary type: Size 6 gellhorn. Patient tolerance of procedure: Tolerated well, no immediate complications  Comments:     Procedure comments:  After removal and cleaning of pessary, decision was made to defer replacement of pessary owing to vaginal erosion.

## 2023-10-11 NOTE — PROGRESS NOTES
OB/GYN VISIT  Ildefonso Bravo  10/10/2023  9:32 PM    Subjective:     Ildefonso Bravo is a 66 y.o. female with pessary in place for cystocele and vaginal vault prolapse as etiology of urosepsis and hydronephrosis with admission in March of 2023. The pessary was placed at the time of hospital admission. The patient is doing well and states that the pessary is working for her. She sometimes sees light pink bleeding in her urine but otherwise has no complaints. Past Medical History:   Diagnosis Date   • Arthritis    • Asthma    • Community acquired pneumonia 11/13/2019   • Coronary artery disease    • DM (diabetes mellitus) ([address])    • HTN (hypertension)    • Hydronephrosis, bilateral 03/06/2023   • Hyperlipidemia    • Hyponatremia    • Urinary retention      Past Surgical History:   Procedure Laterality Date   • CARDIAC CATHETERIZATION  2010   • CORONARY ARTERY BYPASS GRAFT  12/06/2010    with subsequent stent to the saphenous veingraft to the RCA 3 months later   • KNEE ARTHROSCOPY     • POLYPECTOMY      laryngeal   • TOTAL HIP ARTHROPLASTY     • TUBAL LIGATION         Objective:    Vitals: Blood pressure (!) 180/68, pulse 69, height 5' 1" (1.549 m), weight 64 kg (141 lb 3.2 oz). Body mass index is 26.68 kg/m². Physical Exam:  GEN: The patient was alert and oriented x3, pleasant well-appearing female in no acute distress. RESP:  Unlabored breathing  GI:  Soft, nontender, non-distended  MSK: bilateral lower extremities are nontender, no edema  : Normal appearing external female genitalia, normal appearing urethral meatus. On sterile speculum exam, erosion noted in posterior vaginal vault. No vaginal discharge, light pink vaginal bleeding noted. Grossly normal appearing cervix.        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 Mirna Jacobson MD  10/10/2023  9:32 PM    Please note, all notes within the  [address] are written in medical terminology for other doctors to read. If you have a question about something you see in your chart, please don't hesitate to ask about it at your next appointment. All test results are immediately available through [employer] as well, and I will review results at my earliest opportunity and contact you with the appropriate urgency.

## 2023-10-11 NOTE — ASSESSMENT & PLAN NOTE
-Patient found to have vaginal erosion noted on speculum exam after pessary removal  -Patient prescribed estrace cream BID x 14 days  -Instructed to return to clinic with pessary in 3 weeks for possible placement

## 2023-10-13 ENCOUNTER — TELEPHONE (OUTPATIENT)
Dept: OBGYN CLINIC | Facility: CLINIC | Age: 78
End: 2023-10-13

## 2023-10-24 DIAGNOSIS — N89.8 VAGINAL EROSION SECONDARY TO PESSARY USE, SEQUELA: Primary | ICD-10-CM

## 2023-10-24 DIAGNOSIS — T83.89XS VAGINAL EROSION SECONDARY TO PESSARY USE, SEQUELA: Primary | ICD-10-CM

## 2023-10-24 RX ORDER — CONJUGATED ESTROGENS 0.62 MG/G
0.62 CREAM VAGINAL DAILY
Qty: 30 G | Refills: 0 | Status: SHIPPED | OUTPATIENT
Start: 2023-10-24 | End: 2023-12-11

## 2023-10-25 ENCOUNTER — OFFICE VISIT (OUTPATIENT)
Dept: FAMILY MEDICINE CLINIC | Facility: CLINIC | Age: 78
End: 2023-10-25
Payer: MEDICARE

## 2023-10-25 VITALS
SYSTOLIC BLOOD PRESSURE: 140 MMHG | WEIGHT: 137.4 LBS | RESPIRATION RATE: 14 BRPM | TEMPERATURE: 97.8 F | BODY MASS INDEX: 25.94 KG/M2 | DIASTOLIC BLOOD PRESSURE: 80 MMHG | HEIGHT: 61 IN | OXYGEN SATURATION: 98 % | HEART RATE: 89 BPM

## 2023-10-25 DIAGNOSIS — E11.8 TYPE II DIABETES MELLITUS WITH MANIFESTATIONS (HCC): ICD-10-CM

## 2023-10-25 DIAGNOSIS — Z23 ENCOUNTER FOR IMMUNIZATION: ICD-10-CM

## 2023-10-25 DIAGNOSIS — M81.8 IDIOPATHIC OSTEOPOROSIS: ICD-10-CM

## 2023-10-25 DIAGNOSIS — N39.498 OTHER URINARY INCONTINENCE: Primary | ICD-10-CM

## 2023-10-25 DIAGNOSIS — E78.5 HYPERLIPIDEMIA ASSOCIATED WITH TYPE 2 DIABETES MELLITUS: ICD-10-CM

## 2023-10-25 DIAGNOSIS — E11.69 HYPERLIPIDEMIA ASSOCIATED WITH TYPE 2 DIABETES MELLITUS: ICD-10-CM

## 2023-10-25 LAB — SL AMB POCT HEMOGLOBIN AIC: 5.8 (ref ?–6.5)

## 2023-10-25 PROCEDURE — G0009 ADMIN PNEUMOCOCCAL VACCINE: HCPCS

## 2023-10-25 PROCEDURE — G0008 ADMIN INFLUENZA VIRUS VAC: HCPCS

## 2023-10-25 PROCEDURE — 90662 IIV NO PRSV INCREASED AG IM: CPT

## 2023-10-25 PROCEDURE — 99214 OFFICE O/P EST MOD 30 MIN: CPT | Performed by: INTERNAL MEDICINE

## 2023-10-25 PROCEDURE — 90677 PCV20 VACCINE IM: CPT

## 2023-10-25 PROCEDURE — 83036 HEMOGLOBIN GLYCOSYLATED A1C: CPT | Performed by: INTERNAL MEDICINE

## 2023-10-25 RX ORDER — CAL/D3/MAG11/ZINC/COP/MANG/BOR 600 MG-800
1 TABLET ORAL 2 TIMES DAILY WITH MEALS
Qty: 200 TABLET | Refills: 6 | Status: SHIPPED | OUTPATIENT
Start: 2023-10-25

## 2023-10-25 RX ORDER — NITROFURANTOIN 25; 75 MG/1; MG/1
100 CAPSULE ORAL
Qty: 90 CAPSULE | Refills: 0 | Status: SHIPPED | OUTPATIENT
Start: 2023-10-25

## 2023-10-25 NOTE — PATIENT INSTRUCTIONS

## 2023-10-25 NOTE — PROGRESS NOTES
BMI Counseling: Body mass index is 25.96 kg/m². The BMI is above normal. Nutrition recommendations include reducing portion sizes.

## 2023-10-25 NOTE — PROGRESS NOTES
Name: Jeremias Curiel      : 1945      MRN: [id number]  Encounter Provider: Yeimy Bolanos MD  Encounter Date: 10/25/2023   Encounter department: [address] [place] [address]     1. Other urinary incontinence  -     Ambulatory Referral to Urology; Future  -     nitrofurantoin (MACROBID) 100 mg capsule; Take 1 capsule (100 mg total) by mouth daily with lunch  -     UA (URINE) with reflex to Scope; Future; Expected date: 2023  -     Magnesium; Future  -     US kidney and bladder with pvr; Future; Expected date: 10/25/2023    2. Hyperlipidemia associated with type 2 diabetes mellitus   -     POCT hemoglobin A1c  -     Comprehensive metabolic panel; Future; Expected date: 2024  -     CBC and differential; Future; Expected date: 2024  -     Lipid Panel with Direct LDL reflex; Future; Expected date: 2024  -     TSH, 3rd generation with Free T4 reflex; Future; Expected date: 2024  -     Microalbumin, Random Urine (W/Creatinine) ([facility] ONLY); Future; Expected date: 2024  -     Microalbumin, Random Urine (W/Creatinine) ([facility] ONLY)    3. Type II diabetes mellitus with manifestations (HCC)  -     POCT hemoglobin A1c  -     Comprehensive metabolic panel; Future; Expected date: 2024  -     CBC and differential; Future; Expected date: 2024  -     Lipid Panel with Direct LDL reflex; Future; Expected date: 2024  -     TSH, 3rd generation with Free T4 reflex; Future; Expected date: 2024  -     Microalbumin, Random Urine (W/Creatinine) ([facility] ONLY); Future; Expected date: 2024  -     Microalbumin, Random Urine (W/Creatinine) ([facility] ONLY)  -     UA (URINE) with reflex to Scope; Future; Expected date: 2023  -     Magnesium; Future    4. Encounter for immunization  -     Pneumococcal Conjugate Vaccine 20-valent (Pcv20)  -     influenza vaccine, high-dose, PF 0.7 mL (FLUZONE HIGH-DOSE)    5.  Idiopathic osteoporosis  - denosumab (PROLIA) 60 mg/mL; Inject 1 mL (60 mg total) under the skin once for 1 dose  -     Calcium Carbonate-Vit D-Min (Caltrate 600+D Plus Minerals) 600-800 MG-UNIT TABS; Take 1 tablet by mouth 2 (two) times a day with meals    FU w Urology ASAP  Consider Gyn-Urology  RTC in 1-2 mos w Blood work    Depression Screening and Follow-up Plan: Patient was screened for depression during today's encounter. They screened negative with a PHQ-2 score of 0. Subjective      HPI  Review of Systems    Current Outpatient Medications on File Prior to Visit   Medication Sig    Air  (Vicks Air Purifier/HEPA) (Device) MISC Use as directed    albuterol (ACCUNEB) 1.25 MG/3ML nebulizer solution Take 3 mL by nebulization every 6 (six) hours as needed for wheezing    albuterol (PROVENTIL HFA,VENTOLIN HFA) 90 mcg/act inhaler Inhale 2 puffs every 6 (six) hours as needed for wheezing    ALPRAZolam (XANAX) 0.5 mg tablet Take 1 tablet (0.5 mg total) by mouth daily at bedtime as needed for anxiety    amLODIPine (NORVASC) 5 mg tablet Take 1 tablet (5 mg total) by mouth 2 (two) times a day    bisoprolol (ZEBETA) 10 MG tablet Take 1 tablet (10 mg total) by mouth daily    co-enzyme Q-10 30 MG capsule Take 1 capsule (30 mg total) by mouth 2 (two) times a day    ergocalciferol (VITAMIN D2) 50,000 units Take 1 capsule (50,000 Units total) by mouth once a week    estradiol (ESTRACE VAGINAL) 0.1 mg/g vaginal cream Insert 1 g into the vagina 2 (two) times a day for 14 days    fluticasone-umeclidinium-vilanterol (Trelegy Ellipta) 100-62.5-25 mcg/actuation inhaler Inhale 1 puff daily Rinse mouth after use.     glucose blood test strip Use 1 each as needed (as needed) Use as instructed    ipratropium-albuterol (DUO-NEB) 0.5-2.5 mg/3 mL nebulizer solution Take 3 mL by nebulization every 6 (six) hours as needed for wheezing or shortness of breath    magnesium (MAGTAB) 84 MG (7MEQ) TBCR Take 1 tablet (84 mg total) by mouth 2 (two) times a day    nitroglycerin (NITROSTAT) 0.4 mg SL tablet Place 1 tablet (0.4 mg total) under the tongue every 5 (five) minutes as needed for chest pain    ondansetron (ZOFRAN-ODT) 4 mg disintegrating tablet Take 1 tablet (4 mg total) by mouth every 8 (eight) hours as needed for nausea or vomiting    pantoprazole (PROTONIX) 20 mg tablet Take 1 tablet (20 mg total) by mouth daily    Plavix 75 MG tablet Take 1 tablet (75 mg total) by mouth daily    Premarin vaginal cream Insert 0.625 g into the vagina daily    Red Yeast Rice 600 MG CAPS Take 1 capsule (600 mg total) by mouth 2 (two) times a day with meals    rosuvastatin (CRESTOR) 5 mg tablet Take 1 tablet (5 mg total) by mouth daily    vitamin B-12 (VITAMIN B-12) 1,000 mcg tablet Take 1 tablet (1,000 mcg total) by mouth daily    [DISCONTINUED] benzonatate (TESSALON PERLES) 100 mg capsule Take 1 capsule (100 mg total) by mouth every 8 (eight) hours    [DISCONTINUED] menthol-cetylpyridinium (CEPACOL) 3 MG lozenge Take 1 lozenge (3 mg total) by mouth as needed for sore throat    [DISCONTINUED] denosumab (PROLIA) 60 mg/mL Inject 1 mL (60 mg total) under the skin once for 1 dose       Objective     /80 (BP Location: Left arm, Patient Position: Sitting, Cuff Size: Standard)   Pulse 89   Temp 97.8 °F (36.6 °C) (Tympanic)   Resp 14   Ht 5' 1" (1.549 m)   Wt 62.3 kg (137 lb 6.4 oz)   LMP  (LMP Unknown)   SpO2 98%   BMI 25.96 kg/m²     Physical Exam  Susan Thomas MD

## 2023-10-27 ENCOUNTER — OFFICE VISIT (OUTPATIENT)
Dept: UROLOGY | Facility: CLINIC | Age: 78
End: 2023-10-27
Payer: MEDICARE

## 2023-10-27 VITALS
OXYGEN SATURATION: 98 % | SYSTOLIC BLOOD PRESSURE: 158 MMHG | HEART RATE: 77 BPM | HEIGHT: 61 IN | BODY MASS INDEX: 26.06 KG/M2 | WEIGHT: 138 LBS | DIASTOLIC BLOOD PRESSURE: 82 MMHG

## 2023-10-27 DIAGNOSIS — N39.498 OTHER URINARY INCONTINENCE: Primary | ICD-10-CM

## 2023-10-27 LAB — POST-VOID RESIDUAL VOLUME, ML POC: 39 ML

## 2023-10-27 PROCEDURE — 51798 US URINE CAPACITY MEASURE: CPT | Performed by: PHYSICIAN ASSISTANT

## 2023-10-27 PROCEDURE — 99213 OFFICE O/P EST LOW 20 MIN: CPT | Performed by: PHYSICIAN ASSISTANT

## 2023-10-27 NOTE — PROGRESS NOTES
10/27/2023      Chief Complaint   Patient presents with    Follow-up    Urinary Incontinence         Assessment and Plan    66 y.o. female managed by AP team    1. Pelvic organ prolapse  2. Urinary retention with bilateral hydronephrosis secondary to #1  3. Urinary incontinence- will improve again when her pessary is replaced, has appt with GYN for that in 2 weeks    She is emptying, PVR 32 mL today. I have no concerns about her bladder or kidneys acutely. No symptoms of infection no hematuria. She needs no urologic followup. I think her continence will recover when her pessary is replaced. She agrees, was hoping to have that done today. she will call them to see if can have it done sooner before her vacation/travel. if has continued issue of erosion with pessary she can see if urogynecology at [facility] or at dr Alisia Gongora offer surgical option for her prolapse. History of Present Illness  Kinjal Acevedo is a 66 y.o. female here for evaluation. I saw her in the hospital earlier this year with urosepsis hydronephrosis she had incomplete bladder emptying related to cystocele and pelvic organ prolapse. The gynecology team  kindly fitted her for a pessary her hydronephrosis and pelvic pain resolved quickly with that, after this her Mayo catheter was removed she is emptying much better since March. Her incontinence resolved as well. No significant bleeding episodes. Had pessary removed two weeks ago and taking estrogen cream vaginally for some vaginal wall erosion/bleeding prior to planned reinsertion next week. She saw her PCP yesterday and mention some incontinence they put her on Macrobid and sent her for US and appt here. Pt presented today hoping to have her pessary put back in. Review of Systems   Constitutional: Negative. Respiratory: Negative. Cardiovascular: Negative.     Genitourinary:  Negative for decreased urine volume, difficulty urinating, dysuria, flank pain, frequency, hematuria, urgency and vaginal bleeding. Musculoskeletal: Negative. Vitals  Vitals:    10/27/23 1434   BP: 158/82   BP Location: Left arm   Patient Position: Sitting   Cuff Size: Adult   Pulse: 77   SpO2: 98%   Weight: 62.6 kg (138 lb)   Height: 5' 1" (1.549 m)       Physical Exam  Vitals and nursing note reviewed. Constitutional:       General: She is not in acute distress. Appearance: Normal appearance. She is well-developed. She is not diaphoretic. HENT:      Head: Normocephalic and atraumatic. Pulmonary:      Effort: Pulmonary effort is normal.   Abdominal:      General: There is no distension. Palpations: There is no mass. Tenderness: There is no right CVA tenderness or left CVA tenderness. Musculoskeletal:      Right lower leg: No edema. Left lower leg: No edema. Skin:     General: Skin is warm and dry. Neurological:      General: No focal deficit present. Mental Status: She is alert and oriented to person, place, and time.    Psychiatric:         Mood and Affect: Mood normal.         Speech: Speech normal.         Behavior: Behavior normal.           Past History  Past Medical History:   Diagnosis Date    Arthritis     Asthma     Community acquired pneumonia 2019    Coronary artery disease     DM (diabetes mellitus) (HCC)     HTN (hypertension)     Hydronephrosis, bilateral 2023    Hyperlipidemia     Hyponatremia     Urinary retention      Social History     Socioeconomic History    Marital status: Single     Spouse name: None    Number of children: 5    Years of education: None    Highest education level: None   Occupational History    Occupation: retired   Tobacco Use    Smoking status: Former     Packs/day: 0.50     Years: 13.00     Total pack years: 6.50     Types: Cigarettes     Start date:      Quit date: 2000     Years since quittin.8    Smokeless tobacco: Never    Tobacco comments:     no passive smoke exposure   Vaping Use Vaping Use: Never used   Substance and Sexual Activity    Alcohol use: Yes     Comment: socially    Drug use: Never    Sexual activity: Not Currently   Other Topics Concern    None   Social History Narrative    None     Social Determinants of Health     Financial Resource Strain: Low Risk  (8/3/2023)    Overall Financial Resource Strain (CARDIA)     Difficulty of Paying Living Expenses: Not hard at all   Food Insecurity: No Food Insecurity (3/7/2023)    Hunger Vital Sign     Worried About Running Out of Food in the Last Year: Never true     Ran Out of Food in the Last Year: Never true   Transportation Needs: No Transportation Needs (8/3/2023)    PRAPARE - Transportation     Lack of Transportation (Medical): No     Lack of Transportation (Non-Medical):  No   Physical Activity: Unknown (3/13/2020)    Exercise Vital Sign     Days of Exercise per Week: Patient refused     Minutes of Exercise per Session: Patient refused   Stress: No Stress Concern Present (3/13/2020)    [address] [facility]     Feeling of Stress : Not at all   Social Connections: Unknown (3/13/2020)    Social Connection and Isolation Panel [NHANES]     Frequency of Communication with Friends and Family: Patient refused     Frequency of Social Gatherings with Friends and Family: Patient refused     Attends Anabaptism Services: Patient refused     Active Member of Clubs or Organizations: Patient refused     Attends Club or Organization Meetings: Patient refused     Marital Status: Patient refused   Intimate Partner Violence: Not At Risk (3/13/2020)    Humiliation, Afraid, Rape, and Kick questionnaire     Fear of Current or Ex-Partner: No     Emotionally Abused: No     Physically Abused: No     Sexually Abused: No   Housing Stability: Unknown (3/7/2023)    Housing Stability Vital Sign     Unable to Pay for Housing in the Last Year: No     Number of Places Lived in the Last Year: Not on file     Unstable Housing in the Last Year: Not on file     Social History     Tobacco Use   Smoking Status Former    Packs/day: 0.50    Years: 13.00    Total pack years: 6.50    Types: Cigarettes    Start date:     Quit date:     Years since quittin.8   Smokeless Tobacco Never   Tobacco Comments    no passive smoke exposure     Family History   Problem Relation Age of Onset    Stroke Mother     Hypertension Mother     Heart disease Mother     Colon cancer Father     No Known Problems Sister     No Known Problems Sister     No Known Problems Sister     No Known Problems Daughter     No Known Problems Daughter     No Known Problems Daughter     No Known Problems Maternal Grandmother     No Known Problems Maternal Grandfather     No Known Problems Paternal Grandmother     No Known Problems Paternal Grandfather     Heart attack Brother     Heart attack Brother     No Known Problems Maternal Aunt     No Known Problems Maternal Aunt     No Known Problems Paternal Aunt     No Known Problems Paternal Aunt        The following portions of the patient's history were reviewed and updated as appropriate: allergies, current medications, past medical history, past social history, past surgical history and problem list.    Results  Recent Results (from the past 1 hour(s))   POCT Measure PVR    Collection Time: 10/27/23  2:37 PM   Result Value Ref Range    POST-VOID RESIDUAL VOLUME, ML POC 39 mL   ]  No results found for: "PSA"  Lab Results   Component Value Date    CALCIUM 9.4 2023     2018    K 3.9 2023    CO2 30 2023     2023    BUN 12 2023    CREATININE 0.71 2023     Lab Results   Component Value Date    WBC 8.64 2023    HGB 12.2 2023    HCT 39.3 2023    MCV 84 2023     2023

## 2023-11-12 NOTE — PROGRESS NOTES
OB/GYN VISIT  Ashley Mcleod  11/14/2023  10:15 AM    Subjective:     Ashley Mcleod is a 66 y.o. female who presents for follow-up of vaginal erosion and pessary replacement. Patient's pessary was removed at last visit when vaginal erosion was noted, and was prescribed estrace cream which was changed to premarin cream owing to cost. She has still noticed some mild vaginal bleeding and has had bulging at the introitus notable. Past Medical History:   Diagnosis Date    Arthritis     Asthma     Community acquired pneumonia 11/13/2019    Coronary artery disease     DM (diabetes mellitus) ([address])     HTN (hypertension)     Hydronephrosis, bilateral 03/06/2023    Hyperlipidemia     Hyponatremia     Urinary retention      Past Surgical History:   Procedure Laterality Date    CARDIAC CATHETERIZATION  2010    CORONARY ARTERY BYPASS GRAFT  12/06/2010    with subsequent stent to the saphenous veingraft to the RCA 3 months later    KNEE ARTHROSCOPY      POLYPECTOMY      laryngeal    TOTAL HIP ARTHROPLASTY      TUBAL LIGATION         Objective:    Vitals: Blood pressure (!) 192/73, pulse 77, height 5' 1" (1.549 m), weight 61.5 kg (135 lb 9.6 oz). Body mass index is 25.62 kg/m². Physical Exam:  GEN: The patient was alert and oriented x3, pleasant well-appearing female in no acute distress. CV:  Regular rate   RESP:  Unlabored breathing  BREAST:  Symmetric breasts with no palpable breast masses or obvious breast lesions. She has no retractions or nipple discharge. She has no axillary abnormalities or palpable masses. GI:  Soft, nontender, non-distended  MSK: bilateral lower extremities are nontender, no edema  : Stage 4 uterine prolapse, with vaginal erosion improved from previous exam. Uterus was reduced into the vagina. Normal appearing external female genitalia, normal appearing urethral meatus. On sterile speculum exam, no vaginal discharge, no bleeding, grossly normal appearing cervix. Assessment/Plan:  Rick Rayo is a 65 y/o female with stage 4 prolapse, presenting for follow-up after pessary removal. Vaginal erosion is improved. -Pessary cleaned and placed today  -Continue use of premarin cream  -Patient is interested in discussing surgical options  -Return to clinic in 2 weeks for exam  -Return ASAP for urogynecology appointment  -Patient to be contacted regarding hypertension and recommendation to see PCP    D/w Dr. Grady Lundy MD  11/14/2023  10:15 AM    Please note, all notes within the  [address] are written in medical terminology for other doctors to read. If you have a question about something you see in your chart, please don't hesitate to ask about it at your next appointment. All test results are immediately available through [employer] as well, and I will review results at my earliest opportunity and contact you with the appropriate urgency.

## 2023-11-14 ENCOUNTER — PROCEDURE VISIT (OUTPATIENT)
Dept: OBGYN CLINIC | Facility: CLINIC | Age: 78
End: 2023-11-14

## 2023-11-14 VITALS
WEIGHT: 135.6 LBS | DIASTOLIC BLOOD PRESSURE: 73 MMHG | HEART RATE: 77 BPM | HEIGHT: 61 IN | BODY MASS INDEX: 25.6 KG/M2 | SYSTOLIC BLOOD PRESSURE: 192 MMHG

## 2023-11-14 DIAGNOSIS — N89.8 VAGINAL EROSION SECONDARY TO PESSARY USE, SUBSEQUENT ENCOUNTER: Primary | ICD-10-CM

## 2023-11-14 DIAGNOSIS — T83.89XD VAGINAL EROSION SECONDARY TO PESSARY USE, SUBSEQUENT ENCOUNTER: Primary | ICD-10-CM

## 2023-11-14 PROCEDURE — 99213 OFFICE O/P EST LOW 20 MIN: CPT | Performed by: OBSTETRICS & GYNECOLOGY

## 2023-11-15 ENCOUNTER — TELEPHONE (OUTPATIENT)
Dept: OBGYN CLINIC | Facility: CLINIC | Age: 78
End: 2023-11-15

## 2023-11-22 ENCOUNTER — PROCEDURE VISIT (OUTPATIENT)
Dept: OBGYN CLINIC | Facility: CLINIC | Age: 78
End: 2023-11-22

## 2023-11-22 VITALS
HEART RATE: 76 BPM | DIASTOLIC BLOOD PRESSURE: 68 MMHG | SYSTOLIC BLOOD PRESSURE: 160 MMHG | WEIGHT: 135.8 LBS | BODY MASS INDEX: 25.66 KG/M2

## 2023-11-22 DIAGNOSIS — Z46.89 PESSARY MAINTENANCE: Primary | ICD-10-CM

## 2023-11-22 DIAGNOSIS — N81.3 COMPLETE UTEROVAGINAL PROLAPSE: ICD-10-CM

## 2023-11-22 PROCEDURE — 57160 INSERT PESSARY/OTHER DEVICE: CPT | Performed by: OBSTETRICS & GYNECOLOGY

## 2023-11-22 PROCEDURE — A4561 PESSARY RUBBER, ANY TYPE: HCPCS | Performed by: OBSTETRICS & GYNECOLOGY

## 2023-11-22 NOTE — PROGRESS NOTES
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 Brief history includes known pelvic organ prolapse previously fitted with a Gellhorn #6 pessary in the ED. Associated symptoms included urinary retention requiring splinting for management and objective evidence of hydronephrosis evidenced on CT imaging in March. Significant vaginal erosive disease secondary to atrophy and manual displacement of prolapse was noted during outpatient visitation and patient was initially started on vaginal premarin cream for tissue rejuvenation, now maintained on estrace vaginal cream. Today, Aurea Rondon reports that while using the restroom a few days ago, she sneezed and her pessary fell out. On examination, complete procidentia is noted with significant anterior and apical laxity appreciated on manual exam. Mild erosive disease noted along apical vaginal tissue. Aurea Rondon was fitted with a pessary ring with support #7. We discussed expediting care with Urology/Gynecology to discuss possible surgical management vs. Continued care with pessary use. All questions were answered during visitation. We also reviewed patient's history of hypertension, managed with Amlodipine 5mg BID. She follows with Family Medicine and has scheduled follow-up on 23. Recommended patient consider shared decision making for medication titration given significantly elevated blood pressure in office. OBJECTIVE:  Vitals:    23 1542   BP: 160/68   Pulse: 76       Physical Exam  Constitutional:       General: She is not in acute distress. HENT:      Head: Normocephalic.       Right Ear: External ear normal.      Left Ear: External ear normal.   Eyes:      General: No scleral icterus. Right eye: No discharge. Left eye: No discharge. Conjunctiva/sclera: Conjunctivae normal.   Cardiovascular:      Rate and Rhythm: Normal rate and regular rhythm. Pulses: Normal pulses. Heart sounds: Normal heart sounds. Pulmonary:      Effort: Pulmonary effort is normal. No respiratory distress. Breath sounds: Normal breath sounds. Abdominal:      General: Abdomen is flat. There is no distension. Palpations: Abdomen is soft. Tenderness: There is no abdominal tenderness. There is no guarding. Genitourinary:     Comments: Pelvic organ prolapse, complete procidentia noted on exam  Bimanual exam with significant anterior and apical vaginal wall laxity noted  Musculoskeletal:         General: No swelling or tenderness. Normal range of motion. Cervical back: Normal range of motion. Right lower leg: No edema. Left lower leg: No edema. Skin:     General: Skin is warm and dry. Capillary Refill: Capillary refill takes less than 2 seconds. Neurological:      Mental Status: She is alert and oriented to person, place, and time. Mental status is at baseline.    Psychiatric:         Mood and Affect: Mood normal.         Behavior: Behavior normal.           Ashanti Edgar MD  OB/GYN PGY-3  11/22/2023  4:16 PM

## 2023-11-23 PROBLEM — N81.3 COMPLETE UTEROVAGINAL PROLAPSE: Status: ACTIVE | Noted: 2023-11-23

## 2023-11-30 ENCOUNTER — TELEPHONE (OUTPATIENT)
Dept: OBGYN CLINIC | Facility: CLINIC | Age: 78
End: 2023-11-30

## 2023-11-30 NOTE — TELEPHONE ENCOUNTER
----- Message from Yung Harvey sent at 11/30/2023  4:26 PM EST -----  Regarding: FW: expedited UroGyn appointment  Please call and ask her 12/8/2023 at 12 pm    ----- Message -----  From: Lincoln Baez MD  Sent: 11/28/2023   3:43 PM EST  To: Rashid Braswell MA; Elvira Magaña MD; #  Subject: RE: expedited Katelynn Toro appointment                 Sure, add her to 12/7.    ----- Message -----  From: Rashid Braswell MA  Sent: 11/22/2023   5:29 PM EST  To: Lincoln Baez MD; Elvira Magaña MD; #  Subject: FW: expedited Katelynn Toro appointment                 I would like to get this patient in earlier. Please review chart and pictures  One of the days you are both here we can extend into lunch if possible ?  ----- Message -----  From: Elvira Magaña MD  Sent: 11/22/2023   5:21 PM EST  To: Rashid Braswell MA  Subject: expedited UroGyn appointment                     Mart Nelson,    Thanks for looking into this for Ms. Berrios. Please let me know if I can assist in any way. Have a happy turkey day!!     Thanks,  Dacia Lara

## 2023-12-11 ENCOUNTER — TELEPHONE (OUTPATIENT)
Dept: FAMILY MEDICINE CLINIC | Facility: CLINIC | Age: 78
End: 2023-12-11

## 2023-12-11 DIAGNOSIS — J06.9 ACUTE URI: Primary | ICD-10-CM

## 2023-12-11 RX ORDER — AMOXICILLIN AND CLAVULANATE POTASSIUM 875; 125 MG/1; MG/1
1 TABLET, FILM COATED ORAL EVERY 12 HOURS SCHEDULED
Qty: 14 TABLET | Refills: 0 | Status: SHIPPED | OUTPATIENT
Start: 2023-12-11 | End: 2023-12-18

## 2023-12-11 RX ORDER — BENZONATATE 100 MG/1
100 CAPSULE ORAL 3 TIMES DAILY PRN
Qty: 20 CAPSULE | Refills: 0 | Status: SHIPPED | OUTPATIENT
Start: 2023-12-11

## 2023-12-11 NOTE — TELEPHONE ENCOUNTER
Pt called stating she has been having flu like symptoms, coughing and hoarseness. Pt states it has been ongoing for two weeks. Pt would like medications sent to pharmacy. Please advise.

## 2023-12-14 DIAGNOSIS — F41.9 ANXIETY: ICD-10-CM

## 2023-12-14 RX ORDER — ALPRAZOLAM 0.5 MG/1
0.5 TABLET ORAL
Qty: 30 TABLET | Refills: 3 | Status: SHIPPED | OUTPATIENT
Start: 2023-12-14

## 2023-12-27 ENCOUNTER — OFFICE VISIT (OUTPATIENT)
Dept: OBGYN CLINIC | Facility: CLINIC | Age: 78
End: 2023-12-27

## 2023-12-27 VITALS
WEIGHT: 133.4 LBS | HEIGHT: 61 IN | BODY MASS INDEX: 25.19 KG/M2 | SYSTOLIC BLOOD PRESSURE: 178 MMHG | DIASTOLIC BLOOD PRESSURE: 67 MMHG | HEART RATE: 76 BPM

## 2023-12-27 DIAGNOSIS — Z46.89 PESSARY MAINTENANCE: Primary | ICD-10-CM

## 2023-12-27 DIAGNOSIS — N81.3 COMPLETE UTEROVAGINAL PROLAPSE: ICD-10-CM

## 2023-12-27 PROCEDURE — 99213 OFFICE O/P EST LOW 20 MIN: CPT | Performed by: OBSTETRICS & GYNECOLOGY

## 2023-12-27 NOTE — PROGRESS NOTES
".Subjective:     Emily Berrios is a 78 y.o.  female who presents for pessary cleaning. She was seen in November for refitting after vaginal erosion was noted. She has complete procidentia. She will need surgical management as a definitive fix.     Objective:    Vitals: Blood pressure (!) 178/67, pulse 76, height 5' 1\" (1.549 m), weight 60.5 kg (133 lb 6.4 oz).Body mass index is 25.21 kg/m².    Physical Exam  Vitals reviewed.   Constitutional:       Appearance: Normal appearance.   Cardiovascular:      Rate and Rhythm: Normal rate.   Pulmonary:      Effort: Pulmonary effort is normal.   Abdominal:      Palpations: Abdomen is soft.   Musculoskeletal:         General: No swelling or tenderness.   Skin:     General: Skin is warm and dry.   Neurological:      Mental Status: She is alert and oriented to person, place, and time.   Psychiatric:         Mood and Affect: Mood normal.         Pessary    Date/Time: 12/27/2023 11:25 AM    Performed by: Frances Larueano MD  Authorized by: Frances Laureano MD  Universal Protocol:  Consent: Verbal consent obtained.  Risks and benefits: risks, benefits and alternatives were discussed  Consent given by: patient  Patient understanding: patient states understanding of the procedure being performed  Patient consent: the patient's understanding of the procedure matches consent given  Procedure consent: procedure consent matches procedure scheduled  Relevant documents: relevant documents present and verified  Patient identity confirmed: verbally with patient    Pre-procedure:     Pessary procedure type:  Cleaning/check  Problems:     Pessary complications comment:  Displacement  Procedure:     Pessary type:  Ring w/ support    Pessary size:  7    Patient tolerance of procedure:  Tolerated well, no immediate complications       Assessment/Plan:    Complete procidentia  Pessary removed, cleaned, and replaced        Frances Laureano MD  12/27/2023  11:25 AM          "

## 2023-12-29 ENCOUNTER — APPOINTMENT (OUTPATIENT)
Dept: LAB | Facility: HOSPITAL | Age: 78
End: 2023-12-29
Payer: MEDICARE

## 2023-12-29 DIAGNOSIS — E11.8 TYPE II DIABETES MELLITUS WITH MANIFESTATIONS (HCC): ICD-10-CM

## 2023-12-29 DIAGNOSIS — E11.69 HYPERLIPIDEMIA ASSOCIATED WITH TYPE 2 DIABETES MELLITUS: ICD-10-CM

## 2023-12-29 DIAGNOSIS — N39.498 OTHER URINARY INCONTINENCE: ICD-10-CM

## 2023-12-29 DIAGNOSIS — E78.5 HYPERLIPIDEMIA ASSOCIATED WITH TYPE 2 DIABETES MELLITUS: ICD-10-CM

## 2023-12-29 LAB
ALBUMIN SERPL BCP-MCNC: 4.1 G/DL (ref 3.5–5)
ALP SERPL-CCNC: 66 U/L (ref 34–104)
ALT SERPL W P-5'-P-CCNC: 12 U/L (ref 7–52)
ANION GAP SERPL CALCULATED.3IONS-SCNC: 7 MMOL/L
AST SERPL W P-5'-P-CCNC: 13 U/L (ref 13–39)
BASOPHILS # BLD AUTO: 0.09 THOUSANDS/ÂΜL (ref 0–0.1)
BASOPHILS NFR BLD AUTO: 1 % (ref 0–1)
BILIRUB SERPL-MCNC: 0.31 MG/DL (ref 0.2–1)
BUN SERPL-MCNC: 10 MG/DL (ref 5–25)
CALCIUM SERPL-MCNC: 9.6 MG/DL (ref 8.4–10.2)
CHLORIDE SERPL-SCNC: 99 MMOL/L (ref 96–108)
CHOLEST SERPL-MCNC: 240 MG/DL
CO2 SERPL-SCNC: 31 MMOL/L (ref 21–32)
CREAT SERPL-MCNC: 0.72 MG/DL (ref 0.6–1.3)
EOSINOPHIL # BLD AUTO: 0.62 THOUSAND/ÂΜL (ref 0–0.61)
EOSINOPHIL NFR BLD AUTO: 7 % (ref 0–6)
ERYTHROCYTE [DISTWIDTH] IN BLOOD BY AUTOMATED COUNT: 14.8 % (ref 11.6–15.1)
GFR SERPL CREATININE-BSD FRML MDRD: 80 ML/MIN/1.73SQ M
GLUCOSE P FAST SERPL-MCNC: 108 MG/DL (ref 65–99)
HCT VFR BLD AUTO: 40.8 % (ref 34.8–46.1)
HDLC SERPL-MCNC: 49 MG/DL
HGB BLD-MCNC: 12.7 G/DL (ref 11.5–15.4)
IMM GRANULOCYTES # BLD AUTO: 0.03 THOUSAND/UL (ref 0–0.2)
IMM GRANULOCYTES NFR BLD AUTO: 0 % (ref 0–2)
LDLC SERPL CALC-MCNC: 167 MG/DL (ref 0–100)
LYMPHOCYTES # BLD AUTO: 2.6 THOUSANDS/ÂΜL (ref 0.6–4.47)
LYMPHOCYTES NFR BLD AUTO: 28 % (ref 14–44)
MAGNESIUM SERPL-MCNC: 1.6 MG/DL (ref 1.9–2.7)
MCH RBC QN AUTO: 25.7 PG (ref 26.8–34.3)
MCHC RBC AUTO-ENTMCNC: 31.1 G/DL (ref 31.4–37.4)
MCV RBC AUTO: 83 FL (ref 82–98)
MONOCYTES # BLD AUTO: 0.83 THOUSAND/ÂΜL (ref 0.17–1.22)
MONOCYTES NFR BLD AUTO: 9 % (ref 4–12)
NEUTROPHILS # BLD AUTO: 5.3 THOUSANDS/ÂΜL (ref 1.85–7.62)
NEUTS SEG NFR BLD AUTO: 55 % (ref 43–75)
NRBC BLD AUTO-RTO: 0 /100 WBCS
PLATELET # BLD AUTO: 468 THOUSANDS/UL (ref 149–390)
PMV BLD AUTO: 9.4 FL (ref 8.9–12.7)
POTASSIUM SERPL-SCNC: 4.1 MMOL/L (ref 3.5–5.3)
PROT SERPL-MCNC: 8 G/DL (ref 6.4–8.4)
RBC # BLD AUTO: 4.94 MILLION/UL (ref 3.81–5.12)
SODIUM SERPL-SCNC: 137 MMOL/L (ref 135–147)
TRIGL SERPL-MCNC: 118 MG/DL
TSH SERPL DL<=0.05 MIU/L-ACNC: 2.96 UIU/ML (ref 0.45–4.5)
WBC # BLD AUTO: 9.47 THOUSAND/UL (ref 4.31–10.16)

## 2023-12-29 PROCEDURE — 80061 LIPID PANEL: CPT

## 2023-12-29 PROCEDURE — 84443 ASSAY THYROID STIM HORMONE: CPT

## 2023-12-29 PROCEDURE — 36415 COLL VENOUS BLD VENIPUNCTURE: CPT

## 2023-12-29 PROCEDURE — 80053 COMPREHEN METABOLIC PANEL: CPT

## 2023-12-29 PROCEDURE — 85025 COMPLETE CBC W/AUTO DIFF WBC: CPT

## 2023-12-29 PROCEDURE — 83735 ASSAY OF MAGNESIUM: CPT

## 2024-01-01 ENCOUNTER — HOSPITAL ENCOUNTER (INPATIENT)
Facility: HOSPITAL | Age: 79
LOS: 1 days | Discharge: HOME HEALTH | DRG: 563 | End: 2024-01-02
Attending: EMERGENCY MEDICINE | Admitting: INTERNAL MEDICINE
Payer: MEDICARE

## 2024-01-01 ENCOUNTER — APPOINTMENT (EMERGENCY)
Dept: RADIOLOGY | Facility: HOSPITAL | Age: 79
DRG: 563 | End: 2024-01-01
Payer: MEDICARE

## 2024-01-01 ENCOUNTER — APPOINTMENT (OUTPATIENT)
Dept: RADIOLOGY | Facility: HOSPITAL | Age: 79
DRG: 563 | End: 2024-01-01
Payer: MEDICARE

## 2024-01-01 DIAGNOSIS — E87.1 HYPONATREMIA: ICD-10-CM

## 2024-01-01 DIAGNOSIS — S42.309A HUMERUS FRACTURE: ICD-10-CM

## 2024-01-01 DIAGNOSIS — S42.215A CLOSED NONDISPLACED FRACTURE OF SURGICAL NECK OF LEFT HUMERUS, UNSPECIFIED FRACTURE MORPHOLOGY, INITIAL ENCOUNTER: Primary | ICD-10-CM

## 2024-01-01 PROBLEM — J44.9 CHRONIC OBSTRUCTIVE PULMONARY DISEASE, UNSPECIFIED COPD TYPE (HCC): Chronic | Status: ACTIVE | Noted: 2022-04-08

## 2024-01-01 PROBLEM — N81.4 CYSTOCELE WITH PROLAPSE: Chronic | Status: ACTIVE | Noted: 2023-03-06

## 2024-01-01 PROBLEM — E87.6 HYPOKALEMIA: Status: ACTIVE | Noted: 2024-01-01

## 2024-01-01 LAB
ALBUMIN SERPL BCP-MCNC: 4 G/DL (ref 3.5–5)
ALP SERPL-CCNC: 63 U/L (ref 34–104)
ALT SERPL W P-5'-P-CCNC: 14 U/L (ref 7–52)
ANION GAP SERPL CALCULATED.3IONS-SCNC: 11 MMOL/L
ANION GAP SERPL CALCULATED.3IONS-SCNC: 11 MMOL/L
ANION GAP SERPL CALCULATED.3IONS-SCNC: 7 MMOL/L
APTT PPP: 34 SECONDS (ref 23–37)
AST SERPL W P-5'-P-CCNC: 19 U/L (ref 13–39)
BASOPHILS # BLD AUTO: 0.08 THOUSANDS/ÂΜL (ref 0–0.1)
BASOPHILS NFR BLD AUTO: 1 % (ref 0–1)
BILIRUB SERPL-MCNC: 0.28 MG/DL (ref 0.2–1)
BUN SERPL-MCNC: 10 MG/DL (ref 5–25)
BUN SERPL-MCNC: 11 MG/DL (ref 5–25)
BUN SERPL-MCNC: 8 MG/DL (ref 5–25)
CALCIUM SERPL-MCNC: 8.5 MG/DL (ref 8.4–10.2)
CALCIUM SERPL-MCNC: 9 MG/DL (ref 8.4–10.2)
CALCIUM SERPL-MCNC: 9.3 MG/DL (ref 8.4–10.2)
CHLORIDE SERPL-SCNC: 90 MMOL/L (ref 96–108)
CHLORIDE SERPL-SCNC: 93 MMOL/L (ref 96–108)
CHLORIDE SERPL-SCNC: 94 MMOL/L (ref 96–108)
CO2 SERPL-SCNC: 20 MMOL/L (ref 21–32)
CO2 SERPL-SCNC: 23 MMOL/L (ref 21–32)
CO2 SERPL-SCNC: 26 MMOL/L (ref 21–32)
CREAT SERPL-MCNC: 0.5 MG/DL (ref 0.6–1.3)
CREAT SERPL-MCNC: 0.52 MG/DL (ref 0.6–1.3)
CREAT SERPL-MCNC: 0.66 MG/DL (ref 0.6–1.3)
EOSINOPHIL # BLD AUTO: 0.62 THOUSAND/ÂΜL (ref 0–0.61)
EOSINOPHIL NFR BLD AUTO: 5 % (ref 0–6)
ERYTHROCYTE [DISTWIDTH] IN BLOOD BY AUTOMATED COUNT: 14.7 % (ref 11.6–15.1)
ERYTHROCYTE [DISTWIDTH] IN BLOOD BY AUTOMATED COUNT: 14.8 % (ref 11.6–15.1)
GFR SERPL CREATININE-BSD FRML MDRD: 84 ML/MIN/1.73SQ M
GFR SERPL CREATININE-BSD FRML MDRD: 91 ML/MIN/1.73SQ M
GFR SERPL CREATININE-BSD FRML MDRD: 92 ML/MIN/1.73SQ M
GLUCOSE P FAST SERPL-MCNC: 122 MG/DL (ref 65–99)
GLUCOSE SERPL-MCNC: 107 MG/DL (ref 65–140)
GLUCOSE SERPL-MCNC: 122 MG/DL (ref 65–140)
GLUCOSE SERPL-MCNC: 124 MG/DL (ref 65–140)
HCT VFR BLD AUTO: 36.6 % (ref 34.8–46.1)
HCT VFR BLD AUTO: 37.6 % (ref 34.8–46.1)
HGB BLD-MCNC: 11.5 G/DL (ref 11.5–15.4)
HGB BLD-MCNC: 11.6 G/DL (ref 11.5–15.4)
IMM GRANULOCYTES # BLD AUTO: 0.13 THOUSAND/UL (ref 0–0.2)
IMM GRANULOCYTES NFR BLD AUTO: 1 % (ref 0–2)
INR PPP: 0.95 (ref 0.84–1.19)
LYMPHOCYTES # BLD AUTO: 4.08 THOUSANDS/ÂΜL (ref 0.6–4.47)
LYMPHOCYTES NFR BLD AUTO: 31 % (ref 14–44)
MCH RBC QN AUTO: 24.8 PG (ref 26.8–34.3)
MCH RBC QN AUTO: 25.3 PG (ref 26.8–34.3)
MCHC RBC AUTO-ENTMCNC: 30.9 G/DL (ref 31.4–37.4)
MCHC RBC AUTO-ENTMCNC: 31.4 G/DL (ref 31.4–37.4)
MCV RBC AUTO: 80 FL (ref 82–98)
MCV RBC AUTO: 80 FL (ref 82–98)
MONOCYTES # BLD AUTO: 0.94 THOUSAND/ÂΜL (ref 0.17–1.22)
MONOCYTES NFR BLD AUTO: 7 % (ref 4–12)
NEUTROPHILS # BLD AUTO: 7.29 THOUSANDS/ÂΜL (ref 1.85–7.62)
NEUTS SEG NFR BLD AUTO: 55 % (ref 43–75)
NRBC BLD AUTO-RTO: 0 /100 WBCS
OSMOLALITY UR: 250 MMOL/KG
PLATELET # BLD AUTO: 376 THOUSANDS/UL (ref 149–390)
PLATELET # BLD AUTO: 401 THOUSANDS/UL (ref 149–390)
PMV BLD AUTO: 8.7 FL (ref 8.9–12.7)
PMV BLD AUTO: 9.2 FL (ref 8.9–12.7)
POTASSIUM SERPL-SCNC: 3.3 MMOL/L (ref 3.5–5.3)
POTASSIUM SERPL-SCNC: 3.4 MMOL/L (ref 3.5–5.3)
POTASSIUM SERPL-SCNC: 4.3 MMOL/L (ref 3.5–5.3)
PROCALCITONIN SERPL-MCNC: <0.05 NG/ML
PROT SERPL-MCNC: 7.5 G/DL (ref 6.4–8.4)
PROTHROMBIN TIME: 13 SECONDS (ref 11.6–14.5)
RBC # BLD AUTO: 4.55 MILLION/UL (ref 3.81–5.12)
RBC # BLD AUTO: 4.68 MILLION/UL (ref 3.81–5.12)
SODIUM 24H UR-SCNC: 56 MOL/L
SODIUM SERPL-SCNC: 124 MMOL/L (ref 135–147)
SODIUM SERPL-SCNC: 125 MMOL/L (ref 135–147)
SODIUM SERPL-SCNC: 126 MMOL/L (ref 135–147)
WBC # BLD AUTO: 13.14 THOUSAND/UL (ref 4.31–10.16)
WBC # BLD AUTO: 14.47 THOUSAND/UL (ref 4.31–10.16)

## 2024-01-01 PROCEDURE — 85730 THROMBOPLASTIN TIME PARTIAL: CPT | Performed by: PHYSICIAN ASSISTANT

## 2024-01-01 PROCEDURE — 84300 ASSAY OF URINE SODIUM: CPT | Performed by: PHYSICIAN ASSISTANT

## 2024-01-01 PROCEDURE — 85027 COMPLETE CBC AUTOMATED: CPT | Performed by: PHYSICIAN ASSISTANT

## 2024-01-01 PROCEDURE — 99285 EMERGENCY DEPT VISIT HI MDM: CPT

## 2024-01-01 PROCEDURE — 83935 ASSAY OF URINE OSMOLALITY: CPT | Performed by: PHYSICIAN ASSISTANT

## 2024-01-01 PROCEDURE — 36415 COLL VENOUS BLD VENIPUNCTURE: CPT | Performed by: PHYSICIAN ASSISTANT

## 2024-01-01 PROCEDURE — 85610 PROTHROMBIN TIME: CPT | Performed by: PHYSICIAN ASSISTANT

## 2024-01-01 PROCEDURE — 80053 COMPREHEN METABOLIC PANEL: CPT | Performed by: PHYSICIAN ASSISTANT

## 2024-01-01 PROCEDURE — 85025 COMPLETE CBC W/AUTO DIFF WBC: CPT | Performed by: PHYSICIAN ASSISTANT

## 2024-01-01 PROCEDURE — 99222 1ST HOSP IP/OBS MODERATE 55: CPT | Performed by: INTERNAL MEDICINE

## 2024-01-01 PROCEDURE — 84145 PROCALCITONIN (PCT): CPT | Performed by: PHYSICIAN ASSISTANT

## 2024-01-01 PROCEDURE — 99285 EMERGENCY DEPT VISIT HI MDM: CPT | Performed by: PHYSICIAN ASSISTANT

## 2024-01-01 PROCEDURE — 72100 X-RAY EXAM L-S SPINE 2/3 VWS: CPT

## 2024-01-01 PROCEDURE — 80048 BASIC METABOLIC PNL TOTAL CA: CPT | Performed by: PHYSICIAN ASSISTANT

## 2024-01-01 PROCEDURE — 73030 X-RAY EXAM OF SHOULDER: CPT

## 2024-01-01 PROCEDURE — 96360 HYDRATION IV INFUSION INIT: CPT

## 2024-01-01 RX ORDER — POTASSIUM CHLORIDE 20 MEQ/1
40 TABLET, EXTENDED RELEASE ORAL ONCE
Status: DISCONTINUED | OUTPATIENT
Start: 2024-01-01 | End: 2024-01-01

## 2024-01-01 RX ORDER — CHOLECALCIFEROL (VITAMIN D3) 125 MCG
100 CAPSULE ORAL 2 TIMES DAILY
Status: DISCONTINUED | OUTPATIENT
Start: 2024-01-01 | End: 2024-01-02 | Stop reason: HOSPADM

## 2024-01-01 RX ORDER — DOCUSATE SODIUM 100 MG/1
100 CAPSULE, LIQUID FILLED ORAL 2 TIMES DAILY
Status: DISCONTINUED | OUTPATIENT
Start: 2024-01-01 | End: 2024-01-02 | Stop reason: HOSPADM

## 2024-01-01 RX ORDER — ACETAMINOPHEN 325 MG/1
650 TABLET ORAL ONCE
Status: COMPLETED | OUTPATIENT
Start: 2024-01-01 | End: 2024-01-01

## 2024-01-01 RX ORDER — BISOPROLOL FUMARATE 5 MG/1
10 TABLET, FILM COATED ORAL DAILY
Status: DISCONTINUED | OUTPATIENT
Start: 2024-01-01 | End: 2024-01-02 | Stop reason: HOSPADM

## 2024-01-01 RX ORDER — POTASSIUM CHLORIDE 20MEQ/15ML
20 LIQUID (ML) ORAL ONCE
Status: COMPLETED | OUTPATIENT
Start: 2024-01-01 | End: 2024-01-01

## 2024-01-01 RX ORDER — SODIUM CHLORIDE 9 MG/ML
75 INJECTION, SOLUTION INTRAVENOUS CONTINUOUS
Status: DISCONTINUED | OUTPATIENT
Start: 2024-01-01 | End: 2024-01-02 | Stop reason: HOSPADM

## 2024-01-01 RX ORDER — LANOLIN ALCOHOL/MO/W.PET/CERES
1 CREAM (GRAM) TOPICAL 2 TIMES DAILY WITH MEALS
Status: DISCONTINUED | OUTPATIENT
Start: 2024-01-01 | End: 2024-01-02 | Stop reason: HOSPADM

## 2024-01-01 RX ORDER — ENOXAPARIN SODIUM 100 MG/ML
40 INJECTION SUBCUTANEOUS DAILY
Status: DISCONTINUED | OUTPATIENT
Start: 2024-01-01 | End: 2024-01-02 | Stop reason: HOSPADM

## 2024-01-01 RX ORDER — BENZONATATE 100 MG/1
100 CAPSULE ORAL 3 TIMES DAILY PRN
Status: DISCONTINUED | OUTPATIENT
Start: 2024-01-01 | End: 2024-01-02 | Stop reason: HOSPADM

## 2024-01-01 RX ORDER — ERGOCALCIFEROL 1.25 MG/1
50000 CAPSULE ORAL WEEKLY
Status: DISCONTINUED | OUTPATIENT
Start: 2024-01-03 | End: 2024-01-02 | Stop reason: HOSPADM

## 2024-01-01 RX ORDER — OXYCODONE HYDROCHLORIDE 5 MG/1
5 TABLET ORAL EVERY 4 HOURS PRN
Status: DISCONTINUED | OUTPATIENT
Start: 2024-01-01 | End: 2024-01-02 | Stop reason: HOSPADM

## 2024-01-01 RX ORDER — ONDANSETRON 2 MG/ML
4 INJECTION INTRAMUSCULAR; INTRAVENOUS EVERY 6 HOURS PRN
Status: DISCONTINUED | OUTPATIENT
Start: 2024-01-01 | End: 2024-01-02 | Stop reason: HOSPADM

## 2024-01-01 RX ORDER — ACETAMINOPHEN 325 MG/1
975 TABLET ORAL EVERY 8 HOURS SCHEDULED
Status: DISCONTINUED | OUTPATIENT
Start: 2024-01-01 | End: 2024-01-02 | Stop reason: HOSPADM

## 2024-01-01 RX ORDER — PANTOPRAZOLE SODIUM 20 MG/1
20 TABLET, DELAYED RELEASE ORAL DAILY
Status: DISCONTINUED | OUTPATIENT
Start: 2024-01-01 | End: 2024-01-02 | Stop reason: HOSPADM

## 2024-01-01 RX ORDER — AMLODIPINE BESYLATE 5 MG/1
5 TABLET ORAL DAILY
Status: DISCONTINUED | OUTPATIENT
Start: 2024-01-01 | End: 2024-01-02 | Stop reason: HOSPADM

## 2024-01-01 RX ORDER — MAGNESIUM CHLORIDE 64 MG
64 TABLET, DELAYED RELEASE (ENTERIC COATED) ORAL DAILY
Status: DISCONTINUED | OUTPATIENT
Start: 2024-01-01 | End: 2024-01-02 | Stop reason: HOSPADM

## 2024-01-01 RX ORDER — ALBUTEROL SULFATE 90 UG/1
2 AEROSOL, METERED RESPIRATORY (INHALATION) EVERY 6 HOURS PRN
Status: DISCONTINUED | OUTPATIENT
Start: 2024-01-01 | End: 2024-01-02 | Stop reason: HOSPADM

## 2024-01-01 RX ORDER — ALBUTEROL SULFATE 2.5 MG/3ML
1.25 SOLUTION RESPIRATORY (INHALATION) EVERY 6 HOURS PRN
Status: DISCONTINUED | OUTPATIENT
Start: 2024-01-01 | End: 2024-01-02 | Stop reason: HOSPADM

## 2024-01-01 RX ORDER — NITROGLYCERIN 0.4 MG/1
0.4 TABLET SUBLINGUAL
Status: DISCONTINUED | OUTPATIENT
Start: 2024-01-01 | End: 2024-01-02 | Stop reason: HOSPADM

## 2024-01-01 RX ORDER — CALCIUM CARBONATE 500 MG/1
1000 TABLET, CHEWABLE ORAL 2 TIMES DAILY PRN
Status: DISCONTINUED | OUTPATIENT
Start: 2024-01-01 | End: 2024-01-02 | Stop reason: HOSPADM

## 2024-01-01 RX ORDER — ALPRAZOLAM 0.5 MG/1
0.5 TABLET ORAL
Status: DISCONTINUED | OUTPATIENT
Start: 2024-01-01 | End: 2024-01-02 | Stop reason: HOSPADM

## 2024-01-01 RX ADMIN — CALCIUM CARBONATE (ANTACID) CHEW TAB 500 MG 1000 MG: 500 CHEW TAB at 05:44

## 2024-01-01 RX ADMIN — POTASSIUM CHLORIDE 20 MEQ: 20 SOLUTION ORAL at 05:33

## 2024-01-01 RX ADMIN — Medication 1 TABLET: at 08:28

## 2024-01-01 RX ADMIN — AMLODIPINE BESYLATE 5 MG: 5 TABLET ORAL at 08:30

## 2024-01-01 RX ADMIN — ENOXAPARIN SODIUM 40 MG: 40 INJECTION SUBCUTANEOUS at 08:33

## 2024-01-01 RX ADMIN — BISOPROLOL FUMARATE 10 MG: 5 TABLET ORAL at 08:29

## 2024-01-01 RX ADMIN — Medication 100 MG: at 21:13

## 2024-01-01 RX ADMIN — Medication 100 MG: at 08:30

## 2024-01-01 RX ADMIN — SODIUM CHLORIDE 75 ML/HR: 0.9 INJECTION, SOLUTION INTRAVENOUS at 21:08

## 2024-01-01 RX ADMIN — ACETAMINOPHEN 975 MG: 325 TABLET ORAL at 21:14

## 2024-01-01 RX ADMIN — SODIUM CHLORIDE 1000 ML: 0.9 INJECTION, SOLUTION INTRAVENOUS at 02:25

## 2024-01-01 RX ADMIN — Medication 1 TABLET: at 17:30

## 2024-01-01 RX ADMIN — PANTOPRAZOLE SODIUM 20 MG: 20 TABLET, DELAYED RELEASE ORAL at 08:29

## 2024-01-01 RX ADMIN — ALBUTEROL SULFATE 2 PUFF: 90 AEROSOL, METERED RESPIRATORY (INHALATION) at 21:11

## 2024-01-01 RX ADMIN — ONDANSETRON 4 MG: 2 INJECTION INTRAMUSCULAR; INTRAVENOUS at 06:14

## 2024-01-01 RX ADMIN — CALCIUM CARBONATE (ANTACID) CHEW TAB 500 MG 1000 MG: 500 CHEW TAB at 17:32

## 2024-01-01 RX ADMIN — OXYCODONE HYDROCHLORIDE 5 MG: 5 TABLET ORAL at 13:21

## 2024-01-01 RX ADMIN — SODIUM CHLORIDE 75 ML/HR: 0.9 INJECTION, SOLUTION INTRAVENOUS at 04:24

## 2024-01-01 RX ADMIN — OXYCODONE HYDROCHLORIDE 5 MG: 5 TABLET ORAL at 05:06

## 2024-01-01 RX ADMIN — DOCUSATE SODIUM 100 MG: 100 CAPSULE, LIQUID FILLED ORAL at 08:29

## 2024-01-01 RX ADMIN — ACETAMINOPHEN 975 MG: 325 TABLET ORAL at 14:32

## 2024-01-01 RX ADMIN — ACETAMINOPHEN 650 MG: 325 TABLET ORAL at 02:22

## 2024-01-01 NOTE — H&P
[facility]  H&P  Name: Emily Berrios 78 y.o. female I MRN: [id number]  Unit/Bed#: 7T [facility] 707-01 I Date of Admission: 1/1/2024   Date of Service: 1/1/2024 I Hospital Day: 0      Assessment/Plan   * Closed nondisplaced fracture of surgical neck of left humerus  Assessment & Plan  Patient s/p fall with left shoulder pain.  Sling  Pain control  Ortho consult  Xray formal read pending    Hyponatremia  Assessment & Plan  Acute hyponatremia  Possibly w/ acute pain  Urine osmol and urine sodium    Hypokalemia  Assessment & Plan  Replete and monitor    Chronic obstructive pulmonary disease, unspecified COPD type (HCC)  Assessment & Plan  No exacerbation  Continue home medications    Primary hypertension  Assessment & Plan  Continue home meds with hold parameters    Hyperlipidemia  Assessment & Plan  Continue statin    Chronic coronary artery disease  Assessment & Plan  CAD w/ hx of CABG  No chest pain  Continue home medications    TeleMedicine H&P - [facility] Internal Medicine    Patient Information: Emily Berrios 78 y.o. female MRN: [id number]  Unit/Bed#: 7T [facility] 707-01 Encounter: [id number]  Admitting Physician: Dr. Nicole Burks  PCP: Ramsey Gtz MD  Date of Admission:  01/01/24    REQUIRED DOCUMENTATION:     1. This service was provided via Telemedicine.  2. Provider located at [facility].  3. TeleMed provider: Swapna Jensen PA-C.  4. Identify all parties in room with patient during tele consult:  Melissa Brito RN  5. After connecting through [employer], patient was identified by name and date of birth and assistant checked wristband.  Patient was then informed that this was a Telemedicine visit and that the exam was being conducted confidentially over secure lines. My office door was closed. No one else was in the room.  Patient acknowledged consent and understanding of privacy and security of the Telemedicine visit, and gave us permission to have the  assistant stay in the room in order to assist with the history and to conduct the exam.  I informed the patient that I have reviewed their record in Epic and presented the opportunity for them to ask any questions regarding the visit today.  The patient agreed to participate.      VTE Pharmacologic Prophylaxis: VTE Score: 6 High Risk (Score >/= 5) - Pharmacological DVT Prophylaxis Ordered: enoxaparin (Lovenox). Sequential Compression Devices Ordered.  Code Status: Level 1 - Full Code   Discussion with patient    Anticipated Length of Stay: Patient will be admitted on an observation basis with an anticipated length of stay of less than 2 midnights secondary to lab monitoring, specialist input.    Total Time Spent on Date of Encounter in care of patient: 65 mins. This time was spent on one or more of the following: performing physical exam; counseling and coordination of care; obtaining or reviewing history; documenting in the medical record; reviewing/ordering tests, medications or procedures; communicating with other healthcare professionals and discussing with patient's family/caregivers.    Chief Complaint: fall    History of Present Illness:  Emily Berrios is a 78 y.o. female with a PMH of COPD, CAD w/ CABG and stent, HTN, HLD who presents with fall. Patient was out celebrating New Years and had a few drinks and was dancing and missed a step and fell. She noted pain in her left arm with movement. No head injury.     Review of Systems:  Review of Systems   Constitutional:  Negative for chills and fever.   HENT:  Positive for sore throat. Negative for rhinorrhea and trouble swallowing.    Eyes:  Negative for discharge and redness.   Respiratory:  Positive for shortness of breath. Negative for cough.    Cardiovascular:  Positive for leg swelling. Negative for chest pain.   Gastrointestinal:  Positive for nausea and vomiting. Negative for abdominal pain and diarrhea.   Genitourinary:  Negative for dysuria and  hematuria.   Musculoskeletal:  Positive for arthralgias.   Skin:  Positive for rash. Negative for wound.   Neurological:  Negative for dizziness, weakness and headaches.   Psychiatric/Behavioral:  Negative for agitation and confusion.        Past Medical and Surgical History:   Past Medical History:   Diagnosis Date    Arthritis     Asthma     Chronic obstructive pulmonary disease, unspecified COPD type ([facility]) 04/08/2022    Cognitive decline 03/10/2023    Community acquired pneumonia 11/13/2019    Coronary artery disease     DM (diabetes mellitus) ([facility])     HTN (hypertension)     Hydronephrosis, bilateral 03/06/2023    Hyperlipidemia     Hyponatremia     Urinary retention        Past Surgical History:   Procedure Laterality Date    CARDIAC CATHETERIZATION  2010    CORONARY ARTERY BYPASS GRAFT  12/06/2010    with subsequent stent to the saphenous veingraft to the RCA 3 months later    KNEE ARTHROSCOPY      POLYPECTOMY      laryngeal    TOTAL HIP ARTHROPLASTY      TUBAL LIGATION         Meds/Allergies:  Prior to Admission medications    Medication Sig Start Date End Date Taking? Authorizing Provider   Air  (Vicks Air Purifier/HEPA) (Device) MISC Use as directed  Patient not taking: Reported on 11/22/2023 1/22/21   Ramsey Gtz MD   albuterol (ACCUNEB) 1.25 MG/3ML nebulizer solution Take 3 mL by nebulization every 6 (six) hours as needed for wheezing 3/4/22   Ramsey Gtz MD   albuterol (PROVENTIL HFA,VENTOLIN HFA) 90 mcg/act inhaler Inhale 2 puffs every 6 (six) hours as needed for wheezing 8/14/23   Ramsey Gtz MD   ALPRAZolam (XANAX) 0.5 mg tablet Take 1 tablet (0.5 mg total) by mouth daily at bedtime as needed for anxiety 12/14/23   Ramsey Gtz MD   amLODIPine (NORVASC) 5 mg tablet Take 1 tablet (5 mg total) by mouth 2 (two) times a day 3/20/23   Ramsey Gtz MD   benzonatate (TESSALON PERLES) 100 mg capsule Take 1 capsule (100 mg total) by mouth 3 (three) times a day as needed for cough 12/11/23   Ramsey  MD Tresa   bisoprolol (ZEBETA) 10 MG tablet Take 1 tablet (10 mg total) by mouth daily 3/20/23   Ramsey Gtz MD   Calcium Carbonate-Vit D-Min (Caltrate 600+D Plus Minerals) 600-800 MG-UNIT TABS Take 1 tablet by mouth 2 (two) times a day with meals 10/25/23   Ramsey Gtz MD   co-enzyme Q-10 30 MG capsule Take 1 capsule (30 mg total) by mouth 2 (two) times a day 3/20/23   Ramsey Gtz MD   denosumab (PROLIA) 60 mg/mL Inject 1 mL (60 mg total) under the skin once for 1 dose  Patient not taking: Reported on 11/22/2023 10/25/23 10/25/23  Ramsey Gtz MD   ergocalciferol (VITAMIN D2) 50,000 units Take 1 capsule (50,000 Units total) by mouth once a week 8/3/23   Ramsey Gtz MD   estradiol (ESTRACE VAGINAL) 0.1 mg/g vaginal cream Insert 1 g into the vagina 2 (two) times a day for 14 days 10/10/23   Chiquis Mckinney MD   fluticasone-umeclidinium-vilanterol (Trelegy Ellipta) 100-62.5-25 mcg/actuation inhaler Inhale 1 puff daily Rinse mouth after use. 10/4/23 1/2/24  TRINIDAD Reyes   glucose blood test strip Use 1 each as needed (as needed) Use as instructed 12/14/22   Ramsey Gtz MD   ipratropium-albuterol (DUO-NEB) 0.5-2.5 mg/3 mL nebulizer solution Take 3 mL by nebulization every 6 (six) hours as needed for wheezing or shortness of breath 3/20/23   Ramsey Gtz MD   magnesium (MAGTAB) 84 MG (7MEQ) TBCR Take 1 tablet (84 mg total) by mouth 2 (two) times a day 8/3/23   Ramsey Gtz MD   nitrofurantoin (MACROBID) 100 mg capsule Take 1 capsule (100 mg total) by mouth daily with lunch 10/25/23   Ramsey Gtz MD   nitroglycerin (NITROSTAT) 0.4 mg SL tablet Place 1 tablet (0.4 mg total) under the tongue every 5 (five) minutes as needed for chest pain 3/18/21   Ramsey Gtz MD   ondansetron (ZOFRAN-ODT) 4 mg disintegrating tablet Take 1 tablet (4 mg total) by mouth every 8 (eight) hours as needed for nausea or vomiting 6/17/23   Luciano Galvan DO   pantoprazole (PROTONIX) 20 mg tablet Take 1 tablet (20 mg total) by mouth daily  3/20/23   Ramsey Gtz MD   Plavix 75 MG tablet Take 1 tablet (75 mg total) by mouth daily 3/20/23   Ramsey Gtz MD   Premarin vaginal cream Insert 0.625 g into the vagina daily  Patient not taking: Reported on 2023 10/24/23 12/11/23  Chiquis Mckinney MD   Red Yeast Rice 600 MG CAPS Take 1 capsule (600 mg total) by mouth 2 (two) times a day with meals 3/20/23   Ramsey Gtz MD   rosuvastatin (CRESTOR) 5 mg tablet Take 1 tablet (5 mg total) by mouth daily 8/3/23   Ramsey Gtz MD   vitamin B-12 (VITAMIN B-12) 1,000 mcg tablet Take 1 tablet (1,000 mcg total) by mouth daily 8/3/23   Ramsey Gtz MD     I have reviewed home medications with patient personally.    Allergies:   Allergies   Allergen Reactions    Ace Inhibitors Other (See Comments)     Includes lisinopril    Angiotensin Receptor Blockers Other (See Comments)     Not specified.  Includes losartan and olmesartan    Ezetimibe Rash    Statins Arthralgia     Tolerates Crestor    Morphine Hives     pt states someone told her she is allergic to it after her hip surgery       Social History:  Marital Status: Single   Occupation: retired  Patient Pre-hospital Living Situation: Home  Patient Pre-hospital Level of Mobility: walks with cane.   Patient Pre-hospital Diet Restrictions: none  Substance Use History:   Social History     Substance and Sexual Activity   Alcohol Use Yes    Comment: socially     Social History     Tobacco Use   Smoking Status Former    Current packs/day: 0.00    Average packs/day: 0.5 packs/day for 32.0 years (16.0 ttl pk-yrs)    Types: Cigarettes    Start date:     Quit date: 2000    Years since quittin.0   Smokeless Tobacco Never   Tobacco Comments    no passive smoke exposure     Social History     Substance and Sexual Activity   Drug Use Never       Family History:  Family History   Problem Relation Age of Onset    Stroke Mother     Hypertension Mother     Heart disease Mother     Colon cancer Father     No Known Problems Sister   "   No Known Problems Sister     No Known Problems Sister     No Known Problems Daughter     No Known Problems Daughter     No Known Problems Daughter     No Known Problems Maternal Grandmother     No Known Problems Maternal Grandfather     No Known Problems Paternal Grandmother     No Known Problems Paternal Grandfather     Heart attack Brother     Heart attack Brother     No Known Problems Maternal Aunt     No Known Problems Maternal Aunt     No Known Problems Paternal Aunt     No Known Problems Paternal Aunt        Physical Exam:     Vitals:   Blood Pressure: 152/76 (01/01/24 0227)  Pulse: 74 (01/01/24 0227)  Temperature: 98 °F (36.7 °C) (01/01/24 0043)  Temp Source: Tympanic (01/01/24 0043)  Respirations: 18 (01/01/24 0227)  Height: 5' 1\" (154.9 cm) (01/01/24 0044)  Weight - Scale: 64.9 kg (143 lb 1.3 oz) (01/01/24 0044)  SpO2: 95 % (01/01/24 0227)    Physical Exam  Vitals reviewed.   Constitutional:       Appearance: Normal appearance.      Comments: Elderly  female   HENT:      Head: Normocephalic and atraumatic.      Nose: Nose normal.   Eyes:      General:         Right eye: No discharge.         Left eye: No discharge.      Extraocular Movements: Extraocular movements intact.   Cardiovascular:      Rate and Rhythm: Normal rate.      Comments: Rate 74  Pulmonary:      Effort: Pulmonary effort is normal.      Comments: RR 18, 95%RA, speaking in full sentences without difficulty  Musculoskeletal:         General: No swelling or tenderness. Normal range of motion.      Right lower leg: No edema.      Left lower leg: No edema.      Comments: Left arm in sling   Neurological:      General: No focal deficit present.      Mental Status: She is alert and oriented to person, place, and time. Mental status is at baseline.   Psychiatric:         Mood and Affect: Mood normal.         Behavior: Behavior normal.         Thought Content: Thought content normal.         Judgment: Judgment normal.      "       Additional Data:     Lab Results:  Results from last 7 days   Lab Units 01/01/24  0104   WBC Thousand/uL 13.14*   HEMOGLOBIN g/dL 11.5   HEMATOCRIT % 36.6   PLATELETS Thousands/uL 401*   NEUTROS PCT % 55   LYMPHS PCT % 31   MONOS PCT % 7   EOS PCT % 5     Results from last 7 days   Lab Units 01/01/24  0104   SODIUM mmol/L 124*   POTASSIUM mmol/L 3.3*   CHLORIDE mmol/L 90*   CO2 mmol/L 23   BUN mg/dL 11   CREATININE mg/dL 0.66   ANION GAP mmol/L 11   CALCIUM mg/dL 9.3   ALBUMIN g/dL 4.0   TOTAL BILIRUBIN mg/dL 0.28   ALK PHOS U/L 63   ALT U/L 14   AST U/L 19   GLUCOSE RANDOM mg/dL 107     Results from last 7 days   Lab Units 01/01/24  0104   INR  0.95         Lines/Drains:  Invasive Devices       Peripheral Intravenous Line  Duration             Peripheral IV 01/01/24 Right Antecubital <1 day                  Imaging: left femoral neck fracture my read, formal read pending  XR shoulder 2+ views LEFT   ED Interpretation by Cleo Durán PA-C (01/01 0142)          EKG and Other Studies Reviewed on Admission:   EKG:  no EKG in MUSE to review.    ** Please Note: This note has been constructed using a voice recognition system. **

## 2024-01-01 NOTE — ASSESSMENT & PLAN NOTE
Patient s/p fall with left shoulder pain.  Sling  Pain control  Ortho consult  Xray formal read pending

## 2024-01-01 NOTE — PLAN OF CARE
Problem: PAIN - ADULT  Goal: Verbalizes/displays adequate comfort level or baseline comfort level  Description: Interventions:  - Encourage patient to monitor pain and request assistance  - Assess pain using appropriate pain scale  - Administer analgesics based on type and severity of pain and evaluate response  - Implement non-pharmacological measures as appropriate and evaluate response  - Consider cultural and social influences on pain and pain management  - Notify physician/advanced practitioner if interventions unsuccessful or patient reports new pain  Outcome: Progressing     Problem: INFECTION - ADULT  Goal: Absence or prevention of progression during hospitalization  Description: INTERVENTIONS:  - Assess and monitor for signs and symptoms of infection  - Monitor lab/diagnostic results  - Monitor all insertion sites, i.e. indwelling lines, tubes, and drains  - Monitor endotracheal if appropriate and nasal secretions for changes in amount and color  - Champion appropriate cooling/warming therapies per order  - Administer medications as ordered  - Instruct and encourage patient and family to use good hand hygiene technique  - Identify and instruct in appropriate isolation precautions for identified infection/condition  Outcome: Progressing  Goal: Absence of fever/infection during neutropenic period  Description: INTERVENTIONS:  - Monitor WBC    Outcome: Progressing     Problem: SAFETY ADULT  Goal: Patient will remain free of falls  Description: INTERVENTIONS:  - Educate patient/family on patient safety including physical limitations  - Instruct patient to call for assistance with activity   - Consult OT/PT to assist with strengthening/mobility   - Keep Call bell within reach  - Keep bed low and locked with side rails adjusted as appropriate  - Keep care items and personal belongings within reach  - Initiate and maintain comfort rounds  - Make Fall Risk Sign visible to staff  - Apply yellow socks and bracelet  for high fall risk patients  - Consider moving patient to room near nurses station  Outcome: Progressing  Goal: Maintain or return to baseline ADL function  Description: INTERVENTIONS:  -  Assess patient's ability to carry out ADLs; assess patient's baseline for ADL function and identify physical deficits which impact ability to perform ADLs (bathing, care of mouth/teeth, toileting, grooming, dressing, etc.)  - Assess/evaluate cause of self-care deficits   - Assess range of motion  - Assess patient's mobility; develop plan if impaired  - Assess patient's need for assistive devices and provide as appropriate  - Encourage maximum independence but intervene and supervise when necessary  - Involve family in performance of ADLs  - Assess for home care needs following discharge   - Consider OT consult to assist with ADL evaluation and planning for discharge  - Provide patient education as appropriate  Outcome: Progressing  Goal: Maintains/Returns to pre admission functional level  Description: INTERVENTIONS:  - Perform AM-PAC 6 Click Basic Mobility/ Daily Activity assessment daily.  - Set and communicate daily mobility goal to care team and patient/family/caregiver.   - Collaborate with rehabilitation services on mobility goals if consulted  - Out of bed for toileting  - Record patient progress and toleration of activity level   Outcome: Progressing     Problem: DISCHARGE PLANNING  Goal: Discharge to home or other facility with appropriate resources  Description: INTERVENTIONS:  - Identify barriers to discharge w/patient and caregiver  - Arrange for needed discharge resources and transportation as appropriate  - Identify discharge learning needs (meds, wound care, etc.)  - Arrange for interpretive services to assist at discharge as needed  - Refer to Case Management Department for coordinating discharge planning if the patient needs post-hospital services based on physician/advanced practitioner order or complex needs  related to functional status, cognitive ability, or social support system  Outcome: Progressing     Problem: Knowledge Deficit  Goal: Patient/family/caregiver demonstrates understanding of disease process, treatment plan, medications, and discharge instructions  Description: Complete learning assessment and assess knowledge base.  Interventions:  - Provide teaching at level of understanding  - Provide teaching via preferred learning methods  Outcome: Progressing     Problem: Nutrition/Hydration-ADULT  Goal: Nutrient/Hydration intake appropriate for improving, restoring or maintaining nutritional needs  Description: Monitor and assess patient's nutrition/hydration status for malnutrition. Collaborate with interdisciplinary team and initiate plan and interventions as ordered.  Monitor patient's weight and dietary intake as ordered or per policy. Utilize nutrition screening tool and intervene as necessary. Determine patient's food preferences and provide high-protein, high-caloric foods as appropriate.     INTERVENTIONS:  - Monitor oral intake, urinary output, labs, and treatment plans  - Assess nutrition and hydration status and recommend course of action  - Evaluate amount of meals eaten  - Assist patient with eating if necessary   - Allow adequate time for meals  - Recommend/ encourage appropriate diets, oral nutritional supplements, and vitamin/mineral supplements  - Order, calculate, and assess calorie counts as needed  - Recommend, monitor, and adjust tube feedings and TPN/PPN based on assessed needs  - Assess need for intravenous fluids  - Provide specific nutrition/hydration education as appropriate  - Include patient/family/caregiver in decisions related to nutrition  Outcome: Progressing     Problem: Nutrition/Hydration-ADULT  Goal: Nutrient/Hydration intake appropriate for improving, restoring or maintaining nutritional needs  Description: Monitor and assess patient's nutrition/hydration status for  malnutrition. Collaborate with interdisciplinary team and initiate plan and interventions as ordered.  Monitor patient's weight and dietary intake as ordered or per policy. Utilize nutrition screening tool and intervene as necessary. Determine patient's food preferences and provide high-protein, high-caloric foods as appropriate.     INTERVENTIONS:  - Monitor oral intake, urinary output, labs, and treatment plans  - Assess nutrition and hydration status and recommend course of action  - Evaluate amount of meals eaten  - Assist patient with eating if necessary   - Allow adequate time for meals  - Recommend/ encourage appropriate diets, oral nutritional supplements, and vitamin/mineral supplements  - Order, calculate, and assess calorie counts as needed  - Recommend, monitor, and adjust tube feedings and TPN/PPN based on assessed needs  - Assess need for intravenous fluids  - Provide specific nutrition/hydration education as appropriate  - Include patient/family/caregiver in decisions related to nutrition  Outcome: Progressing

## 2024-01-01 NOTE — PLAN OF CARE
Problem: PAIN - ADULT  Goal: Verbalizes/displays adequate comfort level or baseline comfort level  Description: Interventions:  - Encourage patient to monitor pain and request assistance  - Assess pain using appropriate pain scale  - Administer analgesics based on type and severity of pain and evaluate response  - Implement non-pharmacological measures as appropriate and evaluate response  - Consider cultural and social influences on pain and pain management  - Notify physician/advanced practitioner if interventions unsuccessful or patient reports new pain  Outcome: Progressing     Problem: INFECTION - ADULT  Goal: Absence or prevention of progression during hospitalization  Description: INTERVENTIONS:  - Assess and monitor for signs and symptoms of infection  - Monitor lab/diagnostic results  - Monitor all insertion sites, i.e. indwelling lines, tubes, and drains  - Monitor endotracheal if appropriate and nasal secretions for changes in amount and color  - [place] appropriate cooling/warming therapies per order  - Administer medications as ordered  - Instruct and encourage patient and family to use good hand hygiene technique  - Identify and instruct in appropriate isolation precautions for identified infection/condition  Outcome: Progressing  Goal: Absence of fever/infection during neutropenic period  Description: INTERVENTIONS:  - Monitor WBC    Outcome: Progressing     Problem: SAFETY ADULT  Goal: Patient will remain free of falls  Description: INTERVENTIONS:  - Educate patient/family on patient safety including physical limitations  - Instruct patient to call for assistance with activity   - Consult OT/PT to assist with strengthening/mobility   - Keep Call bell within reach  - Keep bed low and locked with side rails adjusted as appropriate  - Keep care items and personal belongings within reach  - Initiate and maintain comfort rounds  - Make Fall Risk Sign visible to staff  - Apply yellow socks and bracelet  for high fall risk patients  - Consider moving patient to room near nurses station  Outcome: Progressing  Goal: Maintain or return to baseline ADL function  Description: INTERVENTIONS:  -  Assess patient's ability to carry out ADLs; assess patient's baseline for ADL function and identify physical deficits which impact ability to perform ADLs (bathing, care of mouth/teeth, toileting, grooming, dressing, etc.)  - Assess/evaluate cause of self-care deficits   - Assess range of motion  - Assess patient's mobility; develop plan if impaired  - Assess patient's need for assistive devices and provide as appropriate  - Encourage maximum independence but intervene and supervise when necessary  - Involve family in performance of ADLs  - Assess for home care needs following discharge   - Consider OT consult to assist with ADL evaluation and planning for discharge  - Provide patient education as appropriate  Outcome: Progressing  Goal: Maintains/Returns to pre admission functional level  Description: INTERVENTIONS:  - Perform AM-PAC 6 Click Basic Mobility/ Daily Activity assessment daily.  - Set and communicate daily mobility goal to care team and patient/family/caregiver.   - Collaborate with rehabilitation services on mobility goals if consulted  - Perform Range of Motion 2 times a day.  - Reposition patient every 2 hours.  - Dangle patient 2 times a day  - Stand patient 2 times a day  - Ambulate patient 2 times a day  - Out of bed to chair 2 times a day   - Out of bed for meals 2 times a day  - Out of bed for toileting  - Record patient progress and toleration of activity level   Outcome: Progressing     Problem: DISCHARGE PLANNING  Goal: Discharge to home or other facility with appropriate resources  Description: INTERVENTIONS:  - Identify barriers to discharge w/patient and caregiver  - Arrange for needed discharge resources and transportation as appropriate  - Identify discharge learning needs (meds, wound care, etc.)  -  Arrange for interpretive services to assist at discharge as needed  - Refer to Case Management Department for coordinating discharge planning if the patient needs post-hospital services based on physician/advanced practitioner order or complex needs related to functional status, cognitive ability, or social support system  Outcome: Progressing     Problem: Knowledge Deficit  Goal: Patient/family/caregiver demonstrates understanding of disease process, treatment plan, medications, and discharge instructions  Description: Complete learning assessment and assess knowledge base.  Interventions:  - Provide teaching at level of understanding  - Provide teaching via preferred learning methods  Outcome: Progressing     Problem: Nutrition/Hydration-ADULT  Goal: Nutrient/Hydration intake appropriate for improving, restoring or maintaining nutritional needs  Description: Monitor and assess patient's nutrition/hydration status for malnutrition. Collaborate with interdisciplinary team and initiate plan and interventions as ordered.  Monitor patient's weight and dietary intake as ordered or per policy. Utilize nutrition screening tool and intervene as necessary. Determine patient's food preferences and provide high-protein, high-caloric foods as appropriate.     INTERVENTIONS:  - Monitor oral intake, urinary output, labs, and treatment plans  - Assess nutrition and hydration status and recommend course of action  - Evaluate amount of meals eaten  - Assist patient with eating if necessary   - Allow adequate time for meals  - Recommend/ encourage appropriate diets, oral nutritional supplements, and vitamin/mineral supplements  - Order, calculate, and assess calorie counts as needed  - Recommend, monitor, and adjust tube feedings and TPN/PPN based on assessed needs  - Assess need for intravenous fluids  - Provide specific nutrition/hydration education as appropriate  - Include patient/family/caregiver in decisions related to  nutrition  Outcome: Progressing

## 2024-01-01 NOTE — ED PROVIDER NOTES
History  Chief Complaint   Patient presents with    Fall     Had a few ETOH drinks tonight was dancing and fell striking her left shoulder off of a glass table  noticeable deformity left shoulder  neurovascular status intact  stopped taking her plavix 5 days ago for a dental procedure     78-year-old female right-handed without significant past medical history presents complaining of left shoulder pain after a fall that occurred just prior to arrival.  Patient states that she was in a [employer] party when she was drinking alcohol and dancing and fell onto her left shoulder.  Denies head strike or LOC.  Patient states she usually takes Plavix however stopped taking it 5 days ago due to an upcoming dental procedure.  Denies any other pain complaints.      History provided by:  Patient   used: No        Prior to Admission Medications   Prescriptions Last Dose Informant Patient Reported? Taking?   ALPRAZolam (XANAX) 0.5 mg tablet   No No   Sig: Take 1 tablet (0.5 mg total) by mouth daily at bedtime as needed for anxiety   Air  (Vicks Air Purifier/HEPA) (Device) MISC  Self, Family Member No No   Sig: Use as directed   Patient not taking: Reported on 11/22/2023   Calcium Carbonate-Vit D-Min (Caltrate 600+D Plus Minerals) 600-800 MG-UNIT TABS  Self, Family Member No No   Sig: Take 1 tablet by mouth 2 (two) times a day with meals   Plavix 75 MG tablet  Self, Family Member No No   Sig: Take 1 tablet (75 mg total) by mouth daily   Premarin vaginal cream  Self, Family Member No No   Sig: Insert 0.625 g into the vagina daily   Patient not taking: Reported on 11/22/2023   Red Yeast Rice 600 MG CAPS  Self, Family Member No No   Sig: Take 1 capsule (600 mg total) by mouth 2 (two) times a day with meals   albuterol (ACCUNEB) 1.25 MG/3ML nebulizer solution  Self, Family Member No No   Sig: Take 3 mL by nebulization every 6 (six) hours as needed for wheezing   albuterol (PROVENTIL HFA,VENTOLIN HFA) 90  mcg/act inhaler  Self, Family Member No No   Sig: Inhale 2 puffs every 6 (six) hours as needed for wheezing   amLODIPine (NORVASC) 5 mg tablet  Self, Family Member No No   Sig: Take 1 tablet (5 mg total) by mouth 2 (two) times a day   benzonatate (TESSALON PERLES) 100 mg capsule   No No   Sig: Take 1 capsule (100 mg total) by mouth 3 (three) times a day as needed for cough   bisoprolol (ZEBETA) 10 MG tablet  Self, Family Member No No   Sig: Take 1 tablet (10 mg total) by mouth daily   co-enzyme Q-10 30 MG capsule  Self, Family Member No No   Sig: Take 1 capsule (30 mg total) by mouth 2 (two) times a day   denosumab (PROLIA) 60 mg/mL   No No   Sig: Inject 1 mL (60 mg total) under the skin once for 1 dose   Patient not taking: Reported on 11/22/2023   ergocalciferol (VITAMIN D2) 50,000 units  Self, Family Member No No   Sig: Take 1 capsule (50,000 Units total) by mouth once a week   estradiol (ESTRACE VAGINAL) 0.1 mg/g vaginal cream  Self, Family Member No No   Sig: Insert 1 g into the vagina 2 (two) times a day for 14 days   fluticasone-umeclidinium-vilanterol (Trelegy Ellipta) 100-62.5-25 mcg/actuation inhaler  Self, Family Member No No   Sig: Inhale 1 puff daily Rinse mouth after use.   glucose blood test strip  Self, Family Member No No   Sig: Use 1 each as needed (as needed) Use as instructed   ipratropium-albuterol (DUO-NEB) 0.5-2.5 mg/3 mL nebulizer solution  Self, Family Member No No   Sig: Take 3 mL by nebulization every 6 (six) hours as needed for wheezing or shortness of breath   magnesium (MAGTAB) 84 MG (7MEQ) TBCR  Self, Family Member No No   Sig: Take 1 tablet (84 mg total) by mouth 2 (two) times a day   nitrofurantoin (MACROBID) 100 mg capsule  Self, Family Member No No   Sig: Take 1 capsule (100 mg total) by mouth daily with lunch   nitroglycerin (NITROSTAT) 0.4 mg SL tablet  Self, Family Member No No   Sig: Place 1 tablet (0.4 mg total) under the tongue every 5 (five) minutes as needed for chest pain    ondansetron (ZOFRAN-ODT) 4 mg disintegrating tablet  Self, Family Member No No   Sig: Take 1 tablet (4 mg total) by mouth every 8 (eight) hours as needed for nausea or vomiting   pantoprazole (PROTONIX) 20 mg tablet  Self, Family Member No No   Sig: Take 1 tablet (20 mg total) by mouth daily   rosuvastatin (CRESTOR) 5 mg tablet  Self, Family Member No No   Sig: Take 1 tablet (5 mg total) by mouth daily   vitamin B-12 (VITAMIN B-12) 1,000 mcg tablet  Self, Family Member No No   Sig: Take 1 tablet (1,000 mcg total) by mouth daily      Facility-Administered Medications Last Administration Doses Remaining   cyanocobalamin injection 1,000 mcg 10/29/2021 12:17 PM           Past Medical History:   Diagnosis Date    Arthritis     Asthma     Chronic obstructive pulmonary disease, unspecified COPD type ([facility]) 04/08/2022    Cognitive decline 03/10/2023    Community acquired pneumonia 11/13/2019    Coronary artery disease     DM (diabetes mellitus) ([facility])     HTN (hypertension)     Hydronephrosis, bilateral 03/06/2023    Hyperlipidemia     Hyponatremia     Urinary retention        Past Surgical History:   Procedure Laterality Date    CARDIAC CATHETERIZATION  2010    CORONARY ARTERY BYPASS GRAFT  12/06/2010    with subsequent stent to the saphenous veingraft to the RCA 3 months later    KNEE ARTHROSCOPY      POLYPECTOMY      laryngeal    TOTAL HIP ARTHROPLASTY      TUBAL LIGATION         Family History   Problem Relation Age of Onset    Stroke Mother     Hypertension Mother     Heart disease Mother     Colon cancer Father     No Known Problems Sister     No Known Problems Sister     No Known Problems Sister     No Known Problems Daughter     No Known Problems Daughter     No Known Problems Daughter     No Known Problems Maternal Grandmother     No Known Problems Maternal Grandfather     No Known Problems Paternal Grandmother     No Known Problems Paternal Grandfather     Heart attack Brother     Heart attack Brother     No Known  Problems Maternal Aunt     No Known Problems Maternal Aunt     No Known Problems Paternal Aunt     No Known Problems Paternal Aunt      I have reviewed and agree with the history as documented.    E-Cigarette/Vaping    E-Cigarette Use Never User      E-Cigarette/Vaping Substances    Nicotine No     THC No     CBD No     Flavoring No     Other No     Unknown No      Social History     Tobacco Use    Smoking status: Former     Current packs/day: 0.00     Average packs/day: 0.5 packs/day for 32.0 years (16.0 ttl pk-yrs)     Types: Cigarettes     Start date:      Quit date:      Years since quittin.0    Smokeless tobacco: Never    Tobacco comments:     no passive smoke exposure   Vaping Use    Vaping status: Never Used   Substance Use Topics    Alcohol use: Yes     Comment: socially    Drug use: Never       Review of Systems   Constitutional: Negative.  Negative for chills and fatigue.   HENT:  Negative for ear pain and sore throat.    Eyes:  Negative for photophobia and redness.   Respiratory:  Negative for apnea, cough and shortness of breath.    Cardiovascular:  Negative for chest pain.   Gastrointestinal:  Negative for abdominal pain, nausea and vomiting.   Genitourinary:  Negative for dysuria.   Musculoskeletal:  Negative for arthralgias, neck pain and neck stiffness.        Left shoulder pain   Skin:  Negative for rash.   Neurological:  Negative for dizziness, tremors, syncope and weakness.   Psychiatric/Behavioral:  Negative for suicidal ideas.        Physical Exam  Physical Exam  Constitutional:       General: She is not in acute distress.     Appearance: She is well-developed. She is not diaphoretic.   Eyes:      Pupils: Pupils are equal, round, and reactive to light.   Cardiovascular:      Rate and Rhythm: Normal rate and regular rhythm.   Pulmonary:      Effort: Pulmonary effort is normal. No respiratory distress.      Breath sounds: Normal breath sounds.   Abdominal:      General: Bowel sounds  are normal. There is no distension.      Palpations: Abdomen is soft.   Musculoskeletal:         General: Normal range of motion.      Cervical back: Normal range of motion and neck supple.      Comments: Left shoulder without obvious deformity.  Tenderness on palpation to the proximal humeral head.  Radial pulse 2+.  Sensation intact distally.  Cap refill less than 2 seconds.   Skin:     General: Skin is warm and dry.   Neurological:      Mental Status: She is alert and oriented to person, place, and time.         Vital Signs  ED Triage Vitals   Temperature Pulse Respirations Blood Pressure SpO2   01/01/24 0043 01/01/24 0043 01/01/24 0043 01/01/24 0043 01/01/24 0134   98 °F (36.7 °C) 55 20 117/69 94 %      Temp Source Heart Rate Source Patient Position - Orthostatic VS BP Location FiO2 (%)   01/01/24 0043 01/01/24 0043 01/01/24 0043 01/01/24 0043 --   Tympanic Monitor Sitting Left arm       Pain Score       01/01/24 0044       10 - Worst Possible Pain           Vitals:    01/01/24 0043 01/01/24 0134 01/01/24 0227   BP: 117/69 154/75 152/76   Pulse: 55  74   Patient Position - Orthostatic VS: Sitting Standing Sitting         Visual Acuity      ED Medications  Medications   acetaminophen (TYLENOL) tablet 975 mg (has no administration in time range)   ondansetron (ZOFRAN) injection 4 mg (has no administration in time range)   sodium chloride 0.9 % infusion (has no administration in time range)   enoxaparin (LOVENOX) subcutaneous injection 40 mg (has no administration in time range)   oxyCODONE (ROXICODONE) IR tablet 2.5 mg (has no administration in time range)   oxyCODONE (ROXICODONE) IR tablet 5 mg (has no administration in time range)   potassium chloride (K-DUR,KLOR-CON) CR tablet 40 mEq (has no administration in time range)   docusate sodium (COLACE) capsule 100 mg (has no administration in time range)   sodium chloride 0.9 % bolus 1,000 mL (1,000 mL Intravenous New Bag 1/1/24 0225)   acetaminophen (TYLENOL)  tablet 650 mg (650 mg Oral Given 1/1/24 0222)       Diagnostic Studies  Results Reviewed       Procedure Component Value Units Date/Time    Procalcitonin [[id number]]  (Normal) Collected: 01/01/24 0104    Lab Status: Final result Specimen: Blood from Line Updated: 01/01/24 0336     Procalcitonin <0.05 ng/ml     Osmolality, urine [[id number]]     Lab Status: No result Specimen: Urine     Sodium, urine, random [[id number]]     Lab Status: No result Specimen: Urine     Comprehensive metabolic panel [[id number]]  (Abnormal) Collected: 01/01/24 0104    Lab Status: Final result Specimen: Blood from Line Updated: 01/01/24 0140     Sodium 124 mmol/L      Potassium 3.3 mmol/L      Chloride 90 mmol/L      CO2 23 mmol/L      ANION GAP 11 mmol/L      BUN 11 mg/dL      Creatinine 0.66 mg/dL      Glucose 107 mg/dL      Calcium 9.3 mg/dL      AST 19 U/L      ALT 14 U/L      Alkaline Phosphatase 63 U/L      Total Protein 7.5 g/dL      Albumin 4.0 g/dL      Total Bilirubin 0.28 mg/dL      eGFR 84 ml/min/1.73sq m     Narrative:      National Kid[employer] Disease Foundation guidelines for Chronic Kidney Disease (CKD):     Stage 1 with normal or high GFR (GFR > 90 mL/min/1.73 square meters)    Stage 2 Mild CKD (GFR = 60-89 mL/min/1.73 square meters)    Stage 3A Moderate CKD (GFR = 45-59 mL/min/1.73 square meters)    Stage 3B Moderate CKD (GFR = 30-44 mL/min/1.73 square meters)    Stage 4 Severe CKD (GFR = 15-29 mL/min/1.73 square meters)    Stage 5 End Stage CKD (GFR <15 mL/min/1.73 square meters)  Note: GFR calculation is accurate only with a steady state creatinine    Protime-INR [[id number]]  (Normal) Collected: 01/01/24 0104    Lab Status: Final result Specimen: Blood from Arm, Right Updated: 01/01/24 0123     Protime 13.0 seconds      INR 0.95    APTT [[id number]]  (Normal) Collected: 01/01/24 0104    Lab Status: Final result Specimen: Blood from Arm, Right Updated: 01/01/24 0123     PTT 34 seconds     CBC and differential [[id number]]   (Abnormal) Collected: 01/01/24 0104    Lab Status: Final result Specimen: Blood from Line Updated: 01/01/24 0112     WBC 13.14 Thousand/uL      RBC 4.55 Million/uL      Hemoglobin 11.5 g/dL      Hematocrit 36.6 %      MCV 80 fL      MCH 25.3 pg      MCHC 31.4 g/dL      RDW 14.7 %      MPV 8.7 fL      Platelets 401 Thousands/uL      nRBC 0 /100 WBCs      Neutrophils Relative 55 %      Immat GRANS % 1 %      Lymphocytes Relative 31 %      Monocytes Relative 7 %      Eosinophils Relative 5 %      Basophils Relative 1 %      Neutrophils Absolute 7.29 Thousands/µL      Immature Grans Absolute 0.13 Thousand/uL      Lymphocytes Absolute 4.08 Thousands/µL      Monocytes Absolute 0.94 Thousand/µL      Eosinophils Absolute 0.62 Thousand/µL      Basophils Absolute 0.08 Thousands/µL                    XR shoulder 2+ views LEFT   ED Interpretation by Cleo Durán PA-C (01/01 0142)                 Procedures  Procedures         ED Course                               SBIRT 22yo+      Flowsheet Row Most Recent Value   Initial Alcohol Screen: US AUDIT-C     1. How often do you have a drink containing alcohol? 0 Filed at: 01/01/2024 0050   2. How many drinks containing alcohol do you have on a typical day you are drinking?  0 Filed at: 01/01/2024 0050   Audit-C Score 0 Filed at: 01/01/2024 0050   VENITA: How many times in the past year have you...    Used an illegal drug or used a prescription medication for non-medical reasons? Never Filed at: 01/01/2024 0050                      Medical Decision Making  Patient had evidence of a right-sided humeral head fracture but was neurovascularly intact.  Patient had routine blood work prior to imaging secondary to age and current medications.  This revealed hyponatremia at 124.  Patient admits to history of similar in the past requiring admission to the hospital.  Given patient's age, current mobility status and hyponatremia plan to admit the patient for observation.  Admitting PA in  agreement with plan patient admitted to the floor in stable condition    Amount and/or Complexity of Data Reviewed  Labs: ordered.  Radiology: ordered and independent interpretation performed.    Risk  OTC drugs.  Decision regarding hospitalization.             Disposition  Final diagnoses:   Hyponatremia   Humerus fracture     Time reflects when diagnosis was documented in both MDM as applicable and the Disposition within this note       Time User Action Codes Description Comment    1/1/2024  3:02 AM Swapna Jensen Add [S42.215A] Closed nondisplaced fracture of surgical neck of left humerus, unspecified fracture morphology, initial encounter     1/1/2024  3:04 AM Cleo Durán Add [E87.1] Hyponatremia     1/1/2024  3:04 AM Cleo Durán Add [S42.309A] Humerus fracture           ED Disposition       ED Disposition   Admit    Condition   Stable    Date/Time   Mon Jan 1, 2024 0304    Comment   Case was discussed with SLIM PA and the patient's admission status was agreed to be Admission Status: observation status to the service of Dr. Burks .               Follow-up Information    None         Patient's Medications   Discharge Prescriptions    No medications on file       No discharge procedures on file.    PDMP Review         Value Time User    PDMP Reviewed  Yes 12/14/2023 11:47 AM Ramsey Gtz MD            ED Provider  Electronically Signed by             Cleo Durán PA-C  01/01/24 0511

## 2024-01-01 NOTE — ED NOTES
Sling applied to left arm   instructed on use   verbalized understanding     Anahy Saldana RN  01/01/24 0156

## 2024-01-02 ENCOUNTER — APPOINTMENT (INPATIENT)
Dept: CT IMAGING | Facility: HOSPITAL | Age: 79
DRG: 563 | End: 2024-01-02
Payer: MEDICARE

## 2024-01-02 ENCOUNTER — HOME HEALTH ADMISSION (OUTPATIENT)
Dept: HOME HEALTH SERVICES | Facility: HOME HEALTHCARE | Age: 79
End: 2024-01-02

## 2024-01-02 VITALS
HEART RATE: 94 BPM | BODY MASS INDEX: 24.93 KG/M2 | TEMPERATURE: 97.2 F | OXYGEN SATURATION: 95 % | SYSTOLIC BLOOD PRESSURE: 117 MMHG | RESPIRATION RATE: 16 BRPM | HEIGHT: 61 IN | DIASTOLIC BLOOD PRESSURE: 63 MMHG | WEIGHT: 132.06 LBS

## 2024-01-02 PROBLEM — E87.6 HYPOKALEMIA: Status: RESOLVED | Noted: 2024-01-01 | Resolved: 2024-01-02

## 2024-01-02 PROBLEM — S32.010A CLOSED COMPRESSION FRACTURE OF L1 VERTEBRA (HCC): Status: ACTIVE | Noted: 2024-01-02

## 2024-01-02 LAB
ANION GAP SERPL CALCULATED.3IONS-SCNC: 6 MMOL/L
ANION GAP SERPL CALCULATED.3IONS-SCNC: 6 MMOL/L
BUN SERPL-MCNC: 6 MG/DL (ref 5–25)
BUN SERPL-MCNC: 8 MG/DL (ref 5–25)
CALCIUM SERPL-MCNC: 8.1 MG/DL (ref 8.4–10.2)
CALCIUM SERPL-MCNC: 8.8 MG/DL (ref 8.4–10.2)
CHLORIDE SERPL-SCNC: 100 MMOL/L (ref 96–108)
CHLORIDE SERPL-SCNC: 96 MMOL/L (ref 96–108)
CO2 SERPL-SCNC: 26 MMOL/L (ref 21–32)
CO2 SERPL-SCNC: 27 MMOL/L (ref 21–32)
CREAT SERPL-MCNC: 0.49 MG/DL (ref 0.6–1.3)
CREAT SERPL-MCNC: 0.56 MG/DL (ref 0.6–1.3)
GFR SERPL CREATININE-BSD FRML MDRD: 89 ML/MIN/1.73SQ M
GFR SERPL CREATININE-BSD FRML MDRD: 93 ML/MIN/1.73SQ M
GLUCOSE P FAST SERPL-MCNC: 177 MG/DL (ref 65–99)
GLUCOSE SERPL-MCNC: 125 MG/DL (ref 65–140)
GLUCOSE SERPL-MCNC: 177 MG/DL (ref 65–140)
POTASSIUM SERPL-SCNC: 3.6 MMOL/L (ref 3.5–5.3)
POTASSIUM SERPL-SCNC: 3.9 MMOL/L (ref 3.5–5.3)
PROCALCITONIN SERPL-MCNC: 0.05 NG/ML
SODIUM SERPL-SCNC: 128 MMOL/L (ref 135–147)
SODIUM SERPL-SCNC: 133 MMOL/L (ref 135–147)

## 2024-01-02 PROCEDURE — 97535 SELF CARE MNGMENT TRAINING: CPT

## 2024-01-02 PROCEDURE — 99222 1ST HOSP IP/OBS MODERATE 55: CPT | Performed by: PHYSICIAN ASSISTANT

## 2024-01-02 PROCEDURE — 99239 HOSP IP/OBS DSCHRG MGMT >30: CPT | Performed by: INTERNAL MEDICINE

## 2024-01-02 PROCEDURE — 97167 OT EVAL HIGH COMPLEX 60 MIN: CPT

## 2024-01-02 PROCEDURE — 80048 BASIC METABOLIC PNL TOTAL CA: CPT | Performed by: PHYSICIAN ASSISTANT

## 2024-01-02 PROCEDURE — 94664 DEMO&/EVAL PT USE INHALER: CPT

## 2024-01-02 PROCEDURE — 72131 CT LUMBAR SPINE W/O DYE: CPT

## 2024-01-02 PROCEDURE — G1004 CDSM NDSC: HCPCS

## 2024-01-02 PROCEDURE — 84145 PROCALCITONIN (PCT): CPT | Performed by: PHYSICIAN ASSISTANT

## 2024-01-02 PROCEDURE — 94640 AIRWAY INHALATION TREATMENT: CPT

## 2024-01-02 PROCEDURE — 97163 PT EVAL HIGH COMPLEX 45 MIN: CPT

## 2024-01-02 PROCEDURE — 94760 N-INVAS EAR/PLS OXIMETRY 1: CPT

## 2024-01-02 RX ORDER — OXYCODONE HYDROCHLORIDE 5 MG/1
5 TABLET ORAL EVERY 4 HOURS PRN
Qty: 10 TABLET | Refills: 0 | Status: ON HOLD | OUTPATIENT
Start: 2024-01-02 | End: 2024-01-12

## 2024-01-02 RX ADMIN — ALPRAZOLAM 0.5 MG: 0.5 TABLET ORAL at 10:12

## 2024-01-02 RX ADMIN — Medication 1 TABLET: at 08:05

## 2024-01-02 RX ADMIN — BISOPROLOL FUMARATE 10 MG: 5 TABLET ORAL at 08:04

## 2024-01-02 RX ADMIN — ONDANSETRON 4 MG: 2 INJECTION INTRAMUSCULAR; INTRAVENOUS at 10:12

## 2024-01-02 RX ADMIN — MAGNESIUM 64 MG (MAGNESIUM CHLORIDE) TABLET,DELAYED RELEASE 64 MG: at 08:08

## 2024-01-02 RX ADMIN — PANTOPRAZOLE SODIUM 20 MG: 20 TABLET, DELAYED RELEASE ORAL at 08:04

## 2024-01-02 RX ADMIN — Medication 100 MG: at 08:04

## 2024-01-02 RX ADMIN — ENOXAPARIN SODIUM 40 MG: 40 INJECTION SUBCUTANEOUS at 08:05

## 2024-01-02 RX ADMIN — AMLODIPINE BESYLATE 5 MG: 5 TABLET ORAL at 08:04

## 2024-01-02 RX ADMIN — ACETAMINOPHEN 975 MG: 325 TABLET ORAL at 06:01

## 2024-01-02 RX ADMIN — DOCUSATE SODIUM 100 MG: 100 CAPSULE, LIQUID FILLED ORAL at 08:05

## 2024-01-02 RX ADMIN — ACETAMINOPHEN 975 MG: 325 TABLET ORAL at 13:37

## 2024-01-02 RX ADMIN — ONDANSETRON 4 MG: 2 INJECTION INTRAMUSCULAR; INTRAVENOUS at 10:11

## 2024-01-02 RX ADMIN — ALBUTEROL SULFATE 1.25 MG: 2.5 SOLUTION RESPIRATORY (INHALATION) at 05:44

## 2024-01-02 RX ADMIN — SODIUM CHLORIDE 75 ML/HR: 0.9 INJECTION, SOLUTION INTRAVENOUS at 10:41

## 2024-01-02 NOTE — PHYSICAL THERAPY NOTE
Physical Therapy Evaluation    Patient's Name: Emily Berrios    Admitting Diagnosis  Hyponatremia [E87.1]  Humerus fracture [S42.309A]  Closed nondisplaced fracture of surgical neck of left humerus, unspecified fracture morphology, initial encounter [S42.215A]  Unspecified multiple injuries, initial encounter [T07.XXXA]    Problem List  Patient Active Problem List   Diagnosis    Asthma    Chronic coronary artery disease    Osteoarthritis    Glaucoma    Hiatal hernia    Hyperlipidemia    Primary hypertension    Hx of CABG    Hx stent of SVG to the RCA    Community acquired pneumonia    Chronic obstructive pulmonary disease, unspecified COPD type (HCC)    Hyponatremia    Hypomagnesemia    Hydronephrosis, bilateral    Cystocele with prolapse    Cognitive decline    Pulmonary emphysema (HCC)    Ambulatory dysfunction    Chronic rhinitis    Vaginal erosion secondary to pessary use     Complete uterovaginal prolapse    Closed nondisplaced fracture of surgical neck of left humerus    Hypokalemia       Past Medical History  Past Medical History:   Diagnosis Date    Arthritis     Asthma     Chronic obstructive pulmonary disease, unspecified COPD type (HCC) 04/08/2022    Cognitive decline 03/10/2023    Community acquired pneumonia 11/13/2019    Coronary artery disease     DM (diabetes mellitus) (HCC)     HTN (hypertension)     Hydronephrosis, bilateral 03/06/2023    Hyperlipidemia     Hyponatremia     Urinary retention        Past Surgical History  Past Surgical History:   Procedure Laterality Date    CARDIAC CATHETERIZATION  2010    CORONARY ARTERY BYPASS GRAFT  12/06/2010    with subsequent stent to the saphenous veingraft to the RCA 3 months later    KNEE ARTHROSCOPY      POLYPECTOMY      laryngeal    TOTAL HIP ARTHROPLASTY      TUBAL LIGATION         Recent Imaging  CT spine lumbar wo contrast   Final Result by Josefina Ordonez MD (01/02 8812)      1.  Acute to subacute right L1 superior plate compression fracture with  mild height loss. There is minimal retropulsion without significant canal stenosis.   2.  Degenerative change as detailed pronounced at level L5-S1 with mild canal and moderate bilateral foraminal stenosis.   3.  Partially imaged distended urinary bladder. Correlate for urinary retention.   4.  Cholelithiasis.         Workstation performed: [id number]         XR spine lumbar 2 or 3 views injury   Final Result by Eros Caicedo MD (01/01 1834)      Questionable subtle superior endplate compression deformity at L1. This could probably be better assessed with CT if deemed clinically appropriate.      The study was marked in EPIC for immediate notification.         Workstation performed: [id number]         XR shoulder 2+ views LEFT   Final Result by Mikey Gilliam MD (01/01 1327)      Minimally displaced fracture of the proximal humeral metaphysis.      The study was marked in EPIC for immediate notification.      Workstation performed: [id number]             Recent Vital Signs  Vitals:    01/01/24 1526 01/01/24 2152 01/02/24 0544 01/02/24 0710   BP: 130/63 134/67  117/63   BP Location: Right arm Right arm  Left arm   Pulse: (!) 50 72  94   Resp: 20 19  16   Temp: (!) 97.2 °F (36.2 °C) 97.8 °F (36.6 °C)  (!) 97.2 °F (36.2 °C)   TempSrc: Temporal Temporal  Temporal   SpO2: 94% 96% 95% 95%   Weight:       Height:            01/02/24 1130   PT Last Visit   PT Visit Date 01/02/24   Note Type   Note type Evaluation   Pain Assessment   Pain Assessment Tool 0-10   Pain Score 6   Pain Location/Orientation Orientation: Left;Location: Shoulder   Restrictions/Precautions   Weight Bearing Precautions Per Order Yes   LUE Weight Bearing Per Order NWB   Braces or Orthoses Sling   Other Precautions O2;Fall Risk   Home Living   Type of Home House   Home Layout One level;Stairs to enter with rails   Bathroom Shower/Tub Tub/shower unit   Bathroom Toilet Standard   Home Equipment Cane   Prior Function   Level of [place]  Independent with ADLs;Independent with IADLS   Lives With Alone  (reports dtr can stay with her as needed after D/C)   Receives Help From Family   IADLs Independent with meal prep;Independent with medication management   Falls in the last 6 months 1 to 4   Comments normally home ind, reports can have assistance from family at D/C   General   Family/Caregiver Present No   Cognition   Arousal/Participation Alert   Attention Within functional limits   Orientation Level Oriented X4   Memory Within functional limits   Following Commands Follows all commands and directions without difficulty   RLE Assessment   RLE Assessment   (3+/5)   LLE Assessment   LLE Assessment   (3+/5)   Coordination   Movements are Fluid and Coordinated 0   Coordination and Movement Description antalgic and decreased gross motor coordination   Sensation WFL   Light Touch   RLE Light Touch Grossly intact   LLE Light Touch Grossly intact   Bed Mobility   Supine to Sit 5  Supervision   Additional items Increased time required   Transfers   Sit to Stand 5  Supervision   Additional items Increased time required;Verbal cues   Stand to Sit 5  Supervision   Additional items Increased time required;Verbal cues   Additional Comments with initially requesting HHA however progressing to supervision in roomm   Ambulation/Elevation   Gait pattern Step through pattern;Decreased toe off;Decreased heel strike;Excessively slow;Short stride;Decreased foot clearance;Narrow ELMER;Improper Weight shift   Gait Assistance 5  Supervision   Additional items Verbal cues   Assistive Device None   Distance 40ft   Balance   Static Sitting Good   Dynamic Sitting Fair +   Static Standing Fair   Dynamic Standing Fair -   Ambulatory Fair -   Endurance Deficit   Endurance Deficit Yes   Endurance Deficit Description LBP and fatigue   Activity Tolerance   Activity Tolerance Patient limited by fatigue;Patient limited by pain   Medical Staff Made Aware spoke to CM   Nurse Made Aware  spoke to RN   Assessment   Prognosis Good   Problem List Decreased strength;Decreased range of motion;Decreased endurance;Impaired balance;Decreased coordination;Decreased mobility;Pain;Orthopedic restrictions   Barriers to Discharge Inaccessible home environment;Decreased caregiver support   Goals   Patient Goals to go home and not go to inpt rehab   STG Expiration Date 01/12/24   Short Term Goal #1 see eval note   PT Treatment Day 0   Plan   Treatment/Interventions ADL retraining;Functional transfer training;LE strengthening/ROM;Elevations;Therapeutic exercise;Endurance training;Patient/family training;Equipment eval/education;Bed mobility;Gait training;Spoke to nursing;Spoke to case management;OT   PT Frequency 2-3x/wk   Discharge Recommendation   Rehab Resource Intensity Level, PT II (Moderate Resource Intensity)   Equipment Recommended Cane   AM-PAC Basic Mobility Inpatient   Turning in Flat Bed Without Bedrails 3   Lying on Back to Sitting on Edge of Flat Bed Without Bedrails 3   Moving Bed to Chair 3   Standing Up From Chair Using Arms 3   Walk in Room 3   Climb 3-5 Stairs With Railing 3   Basic Mobility Inpatient Raw Score 18   Basic Mobility Standardized Score 41.05   Highest Level Of Mobility   JH-HLM Goal 6: Walk 10 steps or more   JH-HLM Achieved 7: Walk 25 feet or more   End of Consult   Patient Position at End of Consult Supine;All needs within reach       ASSESSMENT                                                                                                                     Emily Berrios is a 78 y.o. female admitted to [facility] on 1/1/2024 for Closed nondisplaced fracture of surgical neck of left humerus. Pt  has a past medical history of Arthritis, Asthma, Chronic obstructive pulmonary disease, unspecified COPD type ([facility]) (04/08/2022), Cognitive decline (03/10/2023), Community acquired pneumonia (11/13/2019), Coronary artery disease, DM (diabetes mellitus) ([facility]), HTN (hypertension),  Hydronephrosis, bilateral (03/06/2023), Hyperlipidemia, Hyponatremia, and Urinary retention.. PT was consulted and pt was seen on 1/2/2024 for mobility assessment and d/c planning.   Pt presents seated in recliner alert and agreeable to therapy. She is reporting some pain in the L shoulder as well as the lower back with movement. Pt reporting fatigue and some unsteadiness at this time which she feels is due to pain medicine. Sling adjusted and pt educated on use of sling. Will likely have increased difficulty with ADLs and mobility due to NWB in sling on the LUE, pt reports that she will have assistance from her dtr when D/C home. Would likely to opt for home vs OPPT as opposed to inpt rehab due to having increased assistance available from family.   Impairments limiting pt at this time include decreased ROM, impaired balance, decreased endurance, decreased coordination, new onset of impairment of functional mobility, decreased ADLS, decreased IADLS, pain, decreased activity tolerance, and decreased strength. Pt is currently functioning at a supervision assistance x1 level for bed mobility, supervision assistance x1 level for transfers, supervision assistance x1 level for ambulation with no assistive device. The patient's AM-[facility] Basic Mobility Inpatient Short Form Raw Score is 18. A Raw score of greater than 16 suggests the patient may benefit from discharge to home. Please also refer to the recommendation of the Physical Therapist for safe discharge planning.    Goals                                                                                                                                    1) Bed mobility skills with modified independent assistance to facilitate safe return to previous living environment 2) Functional transfers with modified independent assistance to facilitate safe return to previous living environment  3) Ambulation with least restrictive AD modified independent assistance without LOB and  stable vitals for safe ambulation home/ community distances. 4) Stair training up/down flight 5 step/s with appropriate rail/s  and modified independent assistance for safe access to previous living environment. 5) Improve balance grades to fair + to reduce risk of falls. 6)Improve LE strength grades by 1 to increase independence w/ transfers and gait.  7) PT for ongoing pt and family education; DME needs and D/C planning to promote highest level of function in least restrictive environment.     Recommendations                                                                                                              Pt will benefit from continued skilled IP PT to address the above mentioned impairments in order to maximize recovery and increase functional independence when completing mobility and ADLs. See flow sheet for goals and POC.     DME: Single Point Cane    Discharge Disposition:   home/OPPT with increased family support      Mick Pathak PT, DPT

## 2024-01-02 NOTE — NURSING NOTE
PT was D/C to home D/C instruction was given  to PT and PT verbalized understanding of D/C instruction.  All personal belonging was given to PT.  IV was D/C.  7 T staff accompany PT to lobby.

## 2024-01-02 NOTE — CASE MANAGEMENT
Case Management Assessment & Discharge Planning Note    Patient name Emily Berrios  Location 7T [facility] 707/7T [facility] 707-01 MRN [id number]  : 1945 Date 2024       Current Admission Date: 2024  Current Admission Diagnosis:Closed nondisplaced fracture of surgical neck of left humerus   Patient Active Problem List    Diagnosis Date Noted    Closed compression fracture of L1 vertebra (HCC) 2024    Closed nondisplaced fracture of surgical neck of left humerus 2024    Complete uterovaginal prolapse 2023    Vaginal erosion secondary to pessary use  10/10/2023    Chronic rhinitis 10/04/2023    Ambulatory dysfunction 2023    Pulmonary emphysema (HCC) 2023    Cognitive decline 03/10/2023    Hyponatremia 2023    Hypomagnesemia 2023    Hydronephrosis, bilateral 2023    Cystocele with prolapse 2023    Chronic obstructive pulmonary disease, unspecified COPD type ([facility]) 2022    Community acquired pneumonia 2019    Hx of CABG 2018    Hx stent of SVG to the RCA 2018    Asthma 2014    Chronic coronary artery disease 2014    Hiatal hernia 2014    Osteoarthritis 2014    Glaucoma 2013    Hyperlipidemia 2012    Primary hypertension 2012      LOS (days): 0  Geometric Mean LOS (GMLOS) (days):   Days to GMLOS:     OBJECTIVE:    Risk of Unplanned Readmission Score: 12.82        Current admission status: Inpatient  Referral Reason: VNA    [facility]    [facility] [address] [place] [address] STREET  [address] [place] [id number]  Phone: [phone] Fax: [phone]    Primary Care Provider: Ramsey Gtz MD    Primary Insurance: [employer] [facility]  Secondary Insurance: [place] [employer] [facility]    ASSESSMENT:  Active Health Care Proxies       Ivanna Mazariegos First Alternate Health Care Agent - Daughter   Primary Phone: [phone] (Mobile)                    Readmission Root Cause  30 Day Readmission: No    Patient Information  Admitted from:: Home  Mental Status: Alert  During Assessment patient was accompanied by: Not accompanied during assessment  Assessment information provided by:: Patient  Primary Caregiver: Self  Support Systems: Daughter, Self, Family members  County of Residence: [place]  What city do you live in?: [place]  Home entry access options. Select all that apply.: Stairs  Number of steps to enter home.: 4  Do the steps have railings?: Yes  Type of Current Residence: Apartment  Floor Level: 1  Upon entering residence, is there a bedroom on the main floor (no further steps)?: Yes  Upon entering residence, is there a bathroom on the main floor (no further steps)?: Yes  Living Arrangements: Lives Alone  Is patient a ?: No    Activities of Daily Living Prior to Admission  Functional Status: Assistance  Completes ADLs independently?: No  Level of ADL dependence: Assistance  Ambulates independently?: Yes  Does patient use assisted devices?: Yes  Assisted Devices (DME) used: Straight Cane  Does patient currently own DME?: Yes  What DME does the patient currently own?: Straight Cane, Walker, Nebulizer  Does patient have a history of Outpatient Therapy (PT/OT)?: No  Does the patient have a history of Short-Term Rehab?: No  Does patient have a history of HHC?: Yes ([facility])  Does patient currently have HHC?: No         Patient Information Continued  Income Source: Pension/snf  Does patient have prescription coverage?: Yes  Does patient receive dialysis treatments?: No  Does patient have a history of substance abuse?: No  Does patient have a history of Mental Health Diagnosis?: No      Means of Transportation  Means of Transport to [facility]:: Family transport      Housing Stability: Low Risk  (1/2/2024)    Housing Stability Vital Sign     Unable to Pay for Housing in the Last Year: No     Number of Places Lived in the Last Year: 1     Unstable  Housing in the Last Year: No   Food Insecurity: No Food Insecurity (1/2/2024)    Hunger Vital Sign     Worried About Running Out of Food in the Last Year: Never true     Ran Out of Food in the Last Year: Never true   Transportation Needs: No Transportation Needs (1/2/2024)    PRAPARE - Transportation     Lack of Transportation (Medical): No     Lack of Transportation (Non-Medical): No   Utilities: Not At Risk (1/2/2024)    AHC Utilities     Threatened with loss of utilities: No       DISCHARGE DETAILS:    Discharge planning discussed with:: Patient and dtr Ivanna  Freedom of Choice: Yes  Comments - Freedom of Choice: Agreeable to home therapy [facility] referrals made via [facility]  CM contacted family/caregiver?: Yes  Were Treatment Team discharge recommendations reviewed with patient/caregiver?: Yes  Did patient/caregiver verbalize understanding of patient care needs?: Yes  Were patient/caregiver advised of the risks associated with not following Treatment Team discharge recommendations?: Yes    Contacts  Patient Contacts: Ivanna Yarbrough  [phone]  Relationship to Patient:: Family  Contact Method: Phone  Phone Number: Ivanna Yarbrough  [phone]  Reason/Outcome: Emergency Contact, Discharge Planning    Requested Home Health Care         Is the patient interested in HHC at discharge?: Yes  Home Health Discipline requested:: Occupational Therapy, Physical Therapy  Home Health Agency Name::  [facility]  Home Health Follow-Up Provider:: PCP  Home Health Services Needed:: Evaluate Functional Status and Safety, Gait/ADL Training, Strengthening/Theraputic Exercises to Improve Function  Homebound Criteria Met:: Uses an Assist Device (i.e. cane, walker, etc)  Supporting Clincal Findings:: Fatigues Easliy in Short Distances, Limited Endurance      Other Referral/Resources/Interventions Provided:  Interventions: HHC    Would you like to participate in our [facility] service program?  : Yes    Treatment  Team Recommendation: Home with Home Health Care  Discharge Destination Plan:: Home with Home Health Care       Additional Comments: Met with patient at bedside.  Agreeable to home therapy.  [place] referrals made.  Choice offered and wants [facility] reserved via [facility].  Call to dtr Ivanna and discussed discharge.  Ivanna is kari's paid caregiver 36-46 hrs a week.  CM department following thru discharge

## 2024-01-02 NOTE — PLAN OF CARE
Problem: PAIN - ADULT  Goal: Verbalizes/displays adequate comfort level or baseline comfort level  Description: Interventions:  - Encourage patient to monitor pain and request assistance  - Assess pain using appropriate pain scale  - Administer analgesics based on type and severity of pain and evaluate response  - Implement non-pharmacological measures as appropriate and evaluate response  - Consider cultural and social influences on pain and pain management  - Notify physician/advanced practitioner if interventions unsuccessful or patient reports new pain  1/2/2024 0038 by Sammi Aleman RN  Outcome: Progressing  1/2/2024 0038 by Sammi Aleman RN  Outcome: Progressing     Problem: INFECTION - ADULT  Goal: Absence or prevention of progression during hospitalization  Description: INTERVENTIONS:  - Assess and monitor for signs and symptoms of infection  - Monitor lab/diagnostic results  - Monitor all insertion sites, i.e. indwelling lines, tubes, and drains  - Monitor endotracheal if appropriate and nasal secretions for changes in amount and color  - [place] appropriate cooling/warming therapies per order  - Administer medications as ordered  - Instruct and encourage patient and family to use good hand hygiene technique  - Identify and instruct in appropriate isolation precautions for identified infection/condition  Outcome: Progressing     Problem: SAFETY ADULT  Goal: Patient will remain free of falls  Description: INTERVENTIONS:  - Educate patient/family on patient safety including physical limitations  - Instruct patient to call for assistance with activity   - Consult OT/PT to assist with strengthening/mobility   - Keep Call bell within reach  - Keep bed low and locked with side rails adjusted as appropriate  - Keep care items and personal belongings within reach  - Initiate and maintain comfort rounds  - Make Fall Risk Sign visible to staff  - Offer Toileting every 2 Hours, in advance of need  -  Initiate/Maintain bed alarm  - Obtain necessary fall risk management equipment: everyshift  - Apply yellow socks and bracelet for high fall risk patients  - Consider moving patient to room near nurses station  Outcome: Progressing

## 2024-01-02 NOTE — OCCUPATIONAL THERAPY NOTE
Occupational Therapy Evaluation & Treatment Note      Patient Name: Emily Berrios  Today's Date: 1/2/2024  Problem List  Principal Problem:    Closed nondisplaced fracture of surgical neck of left humerus  Active Problems:    Chronic coronary artery disease    Hyperlipidemia    Primary hypertension    Chronic obstructive pulmonary disease, unspecified COPD type ([facility])    Hyponatremia    Hypokalemia    Past Medical History  Past Medical History:   Diagnosis Date    Arthritis     Asthma     Chronic obstructive pulmonary disease, unspecified COPD type ([facility]) 04/08/2022    Cognitive decline 03/10/2023    Community acquired pneumonia 11/13/2019    Coronary artery disease     DM (diabetes mellitus) ([facility])     HTN (hypertension)     Hydronephrosis, bilateral 03/06/2023    Hyperlipidemia     Hyponatremia     Urinary retention      Past Surgical History  Past Surgical History:   Procedure Laterality Date    CARDIAC CATHETERIZATION  2010    CORONARY ARTERY BYPASS GRAFT  12/06/2010    with subsequent stent to the saphenous veingraft to the RCA 3 months later    KNEE ARTHROSCOPY      POLYPECTOMY      laryngeal    TOTAL HIP ARTHROPLASTY      TUBAL LIGATION             01/02/24 0945   OT Last Visit   OT Visit Date 01/02/24   Note Type   Note type Evaluation   Pain Assessment   Pain Assessment Tool 0-10   Pain Score 4   Pain Location/Orientation Orientation: Left;Location: Arm   Hospital Pain Intervention(s) Medication (See MAR)   Restrictions/Precautions   Weight Bearing Precautions Per Order Yes   LUE Weight Bearing Per Order NWB   Braces or Orthoses Sling   Other Precautions O2;Fall Risk  (IV running)   Home Living   Type of Home House   Home Layout One level;Stairs to enter with rails   Bathroom Shower/Tub Tub/shower unit   Bathroom Toilet Standard   Home Equipment Cane   Prior Function   Level of Moore Independent with ADLs;Independent with IADLS   Lives With Alone   IADLs Independent with meal prep;Independent  with medication management   Falls in the last 6 months 1 to 4   ADL   Grooming Assistance 4  Minimal Assistance   UB Bathing Assistance 4  Minimal Assistance   LB Bathing Assistance 4  Minimal Assistance   UB Dressing Assistance 4  Minimal Assistance   LB Dressing Assistance 3  Moderate Assistance   LB Dressing Deficit Thread LLE into pants;Thread RLE into pants;Pull up over hips   Toileting Assistance  3  Moderate Assistance   Toileting Deficit Clothing management up;Clothing management down;Perineal hygiene   Bed Mobility   Supine to Sit 5  Supervision   Additional items Increased time required   Transfers   Sit to Stand   (CGA)   Stand to Sit   (CGA)   Functional Mobility   Functional Mobility   (CGA)   Additional items Hand hold assistance   Balance   Static Sitting Good   Dynamic Sitting Fair +   Static Standing Fair   Dynamic Standing Fair -   Ambulatory Fair -   Activity Tolerance   Activity Tolerance Patient tolerated treatment well   RUE Assessment   RUE Assessment WFL   LUE Assessment   LUE Assessment X   Cognition   Arousal/Participation Alert;Cooperative   Attention Within functional limits   Orientation Level Oriented X4   Memory Within functional limits   Following Commands Follows all commands and directions without difficulty   Assessment   Limitation Decreased ADL status;Decreased endurance;Decreased high-level ADLs;Non-func L UE   Prognosis Good   Assessment   Pt is a 78 y.o. female seen for OT evaluation s/p admit to [facility] on 1/1/2024 w/ Closed nondisplaced fracture of surgical neck of left humerus, currently NWB with sling, pending further POC from ortho.  See medical history above for extensive list of comorbidities affecting pt's functional performance at time of assessment. Personal factors affecting Pt at time of IE include:health management . Upon evaluation: Pt requires CGA for functional ambulation including bathroom mobility and negotiation of small spaces with HHA, CGA/David for ADL  transfers.  Pt requires assist to manage all ADLs due to limited use of LUE. Pt's primary barrier(s) at this time: NWB LUE resulting in decreased bilateral coordination, impaired standing balance, poor endurance with quick onset of fatigue. The following deficits impact occupational performance: weakness, decreased strength, decreased balance, decreased tolerance, impaired FMC, increased pain, and orthopedic restrictions. Pt to benefit from continued skilled OT services while in the hospital to address deficits as defined above and maximize level of functional independence w ADL's and functional mobility. Occupational performance areas to address include: grooming, bathing/shower, toilet hygiene, dressing, health maintenance, functional mobility, and clothing management. From OT standpoint, recommendation at time of d/c would be moderate resource intensity.       Goals   STG Time Frame   (1 week)   Short Term Goals Pt will complete ADL transfers Mary.   Pt will complete functional ambulation Mary.   Pt will complete LB ADLs Mary.   Pt will complete toileting Mary.    Plan   Treatment Interventions ADL retraining;Functional transfer training;UE strengthening/ROM;Endurance training;Continued evaluation;Activityengagement   Goal Expiration Date 01/09/24   OT Treatment Day 1   OT Frequency 2-3x/wk   Discharge Recommendation   Rehab Resource Intensity Level, OT II (Moderate Resource Intensity)   AM-PAC Daily Activity Inpatient   Lower Body Dressing 2   Bathing 2   Toileting 2   Upper Body Dressing 3   Grooming 3   Eating 4   Daily Activity Raw Score 16   Daily Activity Standardized Score (Calc for Raw Score >=11) 35.96   Additional Treatment Session   Start Time 0945   End Time 1015   Treatment Assessment Pt seen for OT txt following initial evaluation. Pt assisted with brief change and donning pants. Pt limited in ability to manage clothing over hips due to limited use of LUE.  Pt educated re: positioning of David NUÑEZ  to manage sling and properly place. MaxA to don socks and shoes. Pt remains limited by above mentioned deficits.  Pt would benefit from continued OT services to further address deficits and maximize function.

## 2024-01-02 NOTE — PLAN OF CARE
Problem: PHYSICAL THERAPY ADULT  Goal: Performs mobility at highest level of function for planned discharge setting.  See evaluation for individualized goals.  Description: Treatment/Interventions: ADL retraining, Functional transfer training, LE strengthening/ROM, Elevations, Therapeutic exercise, Endurance training, Patient/family training, Equipment eval/education, Bed mobility, Gait training, Spoke to nursing, Spoke to case management, OT  Equipment Recommended: Cane       See flowsheet documentation for full assessment, interventions and recommendations.  Outcome: Progressing  Note: Prognosis: Good  Problem List: Decreased strength, Decreased range of motion, Decreased endurance, Impaired balance, Decreased coordination, Decreased mobility, Pain, Orthopedic restrictions     Barriers to Discharge: Inaccessible home environment, Decreased caregiver support     Rehab Resource Intensity Level, PT: II (Moderate Resource Intensity)    See flowsheet documentation for full assessment.

## 2024-01-02 NOTE — UTILIZATION REVIEW
Initial Clinical Review  Observation on 01/01 @ 0303 upgraded to Inpatient on 01/02 @ 0906.    Admission: Date/Time/Statement:   Admission Orders (From admission, onward)       Ordered        01/02/24 0906  Inpatient Admission  Once            01/01/24 0303  Place in Observation  Once                          Orders Placed This Encounter   Procedures    Inpatient Admission     Standing Status:   Standing     Number of Occurrences:   1     Order Specific Question:   Level of Care     Answer:   Med Surg [16]     Order Specific Question:   Estimated length of stay     Answer:   More than 2 Midnights     Order Specific Question:   Certification     Answer:   I certify that inpatient services are medically necessary for this patient for a duration of greater than two midnights. See H&P and MD Progress Notes for additional information about the patient's course of treatment.     ED Arrival Information       Expected   -    Arrival   1/1/2024 00:40    Acuity   Urgent              Means of arrival   Ambulance    Escorted by   [facility] ([facility])  (and [facility])    Service   Hospitalist    Admission type   Emergency              Arrival complaint   fall             Chief Complaint   Patient presents with    Fall     Had a few ETOH drinks tonight was dancing and fell striking her left shoulder off of a glass table  noticeable deformity left shoulder  neurovascular status intact  stopped taking her plavix 5 days ago for a dental procedure       Initial Presentation: 78 y.o. female w/ PMH of COPD, CAD s/p CABG and stent, HTN, HLD presented to the ED from home via EMS w/ left shoulder pain s/p mechanical fall.   Patient was out celebrating New Years and had a few drinks and was dancing and missed a step and fell. She noted pain in her left arm with movement. No head injury.  Also reports back pain.  In the ED, Xray showed Closed nondisplaced left humerus fracture. On exam, aaox3, L arm on sling. Na 124.   Given 1L IVF  bolus, po Tylenol.     Admit as observation level of care for closed non displaced fx of surgical neck of L humerus, hyponatremia.  Plan: continue sling and pain control.  Awaiting orthopedic consult. Will order lumbar x-ray. Continue IV NS for 10 hours and BMP check every 8.     Date: 01/02   Day 2:  Orthopedics Consult: left minimally displaced proximal humerus fracture, L1 superior endplate fracture :  Plan: No orthopedic surgical intervention required for either fracture at this time. NWB to LUE in sling. May remove sling for pendulum exercises with PT. WBAT to bilat LE. PT/ OT. Pain control prn.     IM Notes: Pt's hyponatremia improved with IV fluid resuscitation.  Pain is well-controlled.PT and OT recommended home with home health. Follow-up with PCP and recheck BMP as an outpatient     ED Triage Vitals   Temperature Pulse Respirations Blood Pressure SpO2   01/01/24 0043 01/01/24 0043 01/01/24 0043 01/01/24 0043 01/01/24 0134   98 °F (36.7 °C) 55 20 117/69 94 %      Temp Source Heart Rate Source Patient Position - Orthostatic VS BP Location FiO2 (%)   01/01/24 0043 01/01/24 0043 01/01/24 0043 01/01/24 0043 --   Tympanic Monitor Sitting Left arm       Pain Score       01/01/24 0044       10 - Worst Possible Pain          Wt Readings from Last 1 Encounters:   01/01/24 59.9 kg (132 lb 0.9 oz)     Additional Vital Signs:   Date/Time Temp Pulse Resp BP MAP (mmHg) SpO2 Calculated FIO2 (%) - Nasal Cannula Nasal Cannula O2 Flow Rate (L/min) O2 Device Patient Position - Orthostatic VS   01/02/24 0710 97.2 °F (36.2 °C) Abnormal  94 16 117/63 96 95 % 28 2 L/min Nasal cannula Lying   01/02/24 0544 -- -- -- -- -- 95 % 32 3 L/min Nasal cannula --   01/01/24 2152 97.8 °F (36.6 °C) 72 19 134/67 107 96 % 28 2 L/min Nasal cannula Lying   01/01/24 1526 97.2 °F (36.2 °C) Abnormal  50 Abnormal  20 130/63 75 94 % -- -- None (Room air) Lying   01/01/24 0742 96.5 °F (35.8 °C) Abnormal  59 20 122/61 -- 95 % 28 2 L/min Nasal cannula  Lying   01/01/24 0402 -- -- -- -- -- 95 % 28 2 L/min Nasal cannula --   01/01/24 0400 97.2 °F (36.2 °C) Abnormal  72 18 151/76 -- 89 % Abnormal  -- -- None (Room air) Lying   01/01/24 0227 -- 74 18 152/76 -- 95 % -- -- None (Room air) Sitting   01/01/24 0134 -- -- 18 154/75 -- 94 % -- -- None (Room air) Standing       Pertinent Labs/Diagnostic Test Results:   CT spine lumbar wo contrast   Final Result by Josefina Ordonez MD (01/02 1115)      1.  Acute to subacute right L1 superior plate compression fracture with mild height loss. There is minimal retropulsion without significant canal stenosis.   2.  Degenerative change as detailed pronounced at level L5-S1 with mild canal and moderate bilateral foraminal stenosis.   3.  Partially imaged distended urinary bladder. Correlate for urinary retention.   4.  Cholelithiasis.         Workstation performed: [id number]         XR spine lumbar 2 or 3 views injury   Final Result by Eros Caicedo MD (01/01 1834)      Questionable subtle superior endplate compression deformity at L1. This could probably be better assessed with CT if deemed clinically appropriate.      The study was marked in EPIC for immediate notification.         Workstation performed: [id number]         XR shoulder 2+ views LEFT   Final Result by Mikey Gilliam MD (01/01 1327)      Minimally displaced fracture of the proximal humeral metaphysis.      The study was marked in EPIC for immediate notification.      Workstation performed: [id number]               Results from last 7 days   Lab Units 01/01/24  0506 01/01/24  0104 12/29/23  0858   WBC Thousand/uL 14.47* 13.14* 9.47   HEMOGLOBIN g/dL 11.6 11.5 12.7   HEMATOCRIT % 37.6 36.6 40.8   PLATELETS Thousands/uL 376 401* 468*   NEUTROS ABS Thousands/µL  --  7.29 5.30         Results from last 7 days   Lab Units 01/02/24  0835 01/01/24  2356 01/01/24  1635 01/01/24  0506 01/01/24  0104 12/29/23  0858   SODIUM mmol/L 133* 128* 126* 125* 124* 137   POTASSIUM  mmol/L 3.6 3.9 4.3 3.4* 3.3* 4.1   CHLORIDE mmol/L 100 96 93* 94* 90* 99   CO2 mmol/L 27 26 26 20* 23 31   ANION GAP mmol/L 6 6 7 11 11 7   BUN mg/dL 6 8 8 10 11 10   CREATININE mg/dL 0.49* 0.56* 0.52* 0.50* 0.66 0.72   EGFR ml/min/1.73sq m 93 89 91 92 84 80   CALCIUM mg/dL 8.1* 8.8 9.0 8.5 9.3 9.6   MAGNESIUM mg/dL  --   --   --   --   --  1.6*     Results from last 7 days   Lab Units 01/01/24  0104 12/29/23  0858   AST U/L 19 13   ALT U/L 14 12   ALK PHOS U/L 63 66   TOTAL PROTEIN g/dL 7.5 8.0   ALBUMIN g/dL 4.0 4.1   TOTAL BILIRUBIN mg/dL 0.28 0.31         Results from last 7 days   Lab Units 01/02/24  0835 01/01/24  2356 01/01/24  1635 01/01/24  0506 01/01/24  0104   GLUCOSE RANDOM mg/dL 177* 125 124 122 107             Results from last 7 days   Lab Units 01/01/24  0104   PROTIME seconds 13.0   INR  0.95   PTT seconds 34     Results from last 7 days   Lab Units 12/29/23  0858   TSH 3RD GENERATON uIU/mL 2.961     Results from last 7 days   Lab Units 01/02/24  0515 01/01/24  0104   PROCALCITONIN ng/ml 0.05 <0.05       Results from last 7 days   Lab Units 01/01/24  0604   OSMO UR mmol/     Results from last 7 days   Lab Units 01/01/24  0604   SODIUM UR  56           ED Treatment:   Medication Administration from 01/01/2024 0039 to 01/01/2024 0400         Date/Time Order Dose Route Action     01/01/2024 0225 EST sodium chloride 0.9 % bolus 1,000 mL 1,000 mL Intravenous New Bag     01/01/2024 0222 EST acetaminophen (TYLENOL) tablet 650 mg 650 mg Oral Given          Past Medical History:   Diagnosis Date    Arthritis     Asthma     Chronic obstructive pulmonary disease, unspecified COPD type ([facility]) 04/08/2022    Cognitive decline 03/10/2023    Community acquired pneumonia 11/13/2019    Coronary artery disease     DM (diabetes mellitus) ([facility])     HTN (hypertension)     Hydronephrosis, bilateral 03/06/2023    Hyperlipidemia     Hyponatremia     Urinary retention      Present on Admission:   Closed nondisplaced  fracture of surgical neck of left humerus   Hyponatremia   Primary hypertension   Chronic obstructive pulmonary disease, unspecified COPD type ([facility])   Hypokalemia   Chronic coronary artery disease   Hyperlipidemia      Admitting Diagnosis: Hyponatremia [E87.1]  Humerus fracture [S42.309A]  Closed nondisplaced fracture of surgical neck of left humerus, unspecified fracture morphology, initial encounter [S42.215A]  Unspecified multiple injuries, initial encounter [T07.XXXA]  Age/Sex: 78 y.o. female  Admission Orders:  SCD  PT/OT      Scheduled Medications:  acetaminophen, 975 mg, Oral, Q8H KENIA  amLODIPine, 5 mg, Oral, Daily  bisoprolol, 10 mg, Oral, Daily  calcium carbonate-vitamin D, 1 tablet, Oral, BID With Meals  co-enzyme Q-10, 100 mg, Oral, BID  docusate sodium, 100 mg, Oral, BID  enoxaparin, 40 mg, Subcutaneous, Daily  [START ON 1/3/2024] ergocalciferol, 50,000 Units, Oral, Weekly  magnesium chloride, 64 mg, Oral, Daily  pantoprazole, 20 mg, Oral, Daily      Continuous IV Infusions:  sodium chloride, 75 mL/hr, Intravenous, Continuous      PRN Meds:  albuterol, 2 puff, Inhalation, Q6H PRN 01/01 x 1  albuterol, 1.25 mg, Nebulization, Q6H PRN 01/02 x 1  ALPRAZolam, 0.5 mg, Oral, HS PRN 01/02 x 1  benzonatate, 100 mg, Oral, TID PRN  calcium carbonate, 1,000 mg, Oral, BID PRN 01/01 x 2  nitroglycerin, 0.4 mg, Sublingual, Q5 Min PRN  ondansetron, 4 mg, Intravenous, Q6H PRN 01/01 x 1, 01/02 x 2  oxyCODONE, 5 mg, Oral, Q4H PRN 01/01 x 2  oxyCODONE, 2.5 mg, Oral, Q4H PRN        IP CONSULT TO ORTHOPEDIC SURGERY    Network Utilization Review Department  ATTENTION: Please call with any questions or concerns to [phone] and carefully listen to the prompts so that you are directed to the right person. All voicemails are confidential.   For Discharge needs, contact Care Management DC Support Team at [phone] opt. 2  Send all requests for admission clinical reviews, approved or denied determinations and any other  requests to dedicated fax number below belonging to the campus where the patient is receiving treatment. List of dedicated fax numbers for the Facilities:  FACILITY NAME UR FAX NUMBER   ADMISSION DENIALS (Administrative/Medical Necessity) [phone]   DISCHARGE SUPPORT TEAM (NETWORK) [phone]   PARENT CHILD HEALTH (Maternity/NICU/Pediatrics) [phone]   [facility] [phone]   [facility] [phone]   [facility] [phone]   [facility] [phone]   [facility] [phone]   [facility] [phone]   [facility] [phone]   [facility] [phone]   [facility] [phone]   [facility] [phone]   [facility] [phone]

## 2024-01-02 NOTE — PROGRESS NOTES
Patient:    MRN:  [id number]    Candida Request ID:  [id number]    Level of care reserved:    Partner Reserved:    Clinical needs requested:    Geography searched:  [phone]    Start of Service:    Request sent:  11:49am EST on 1/2/2024 by Marisol Alan    Partner reserved:  by Marisol Alan    Choice list shared:  1:39pm EST on 1/2/2024 by Marisol Alan

## 2024-01-02 NOTE — PLAN OF CARE
Problem: OCCUPATIONAL THERAPY ADULT  Goal: Performs self-care activities at highest level of function for planned discharge setting.  See evaluation for individualized goals.  Description: Treatment Interventions: ADL retraining, Functional transfer training, UE strengthening/ROM, Endurance training, Continued evaluation, Activityengagement          See flowsheet documentation for full assessment, interventions and recommendations.   Outcome: Progressing  Note: Limitation: Decreased ADL status, Decreased endurance, Decreased high-level ADLs, Non-func L UE  Prognosis: Good  Assessment: Pt is a 78 y.o. female seen for OT evaluation s/p admit to [facility]  on 1/1/2024 w/ Closed nondisplaced fracture of surgical neck of left humerus, currently NWB with sling, pending further POC from ortho.  See medical history above for extensive list of comorbidities affecting pt's functional performance at time of assessment. Personal factors affecting Pt at time of IE include:health management . Upon evaluation: Pt requires CGA for functional ambulation including bathroom mobility and negotiation of small spaces with HHA, CGA/David for ADL transfers.  Pt requires assist to manage all ADLs due to limited use of LUE. Pt's primary barrier(s) at this time: NWB LUE resulting in decreased bilateral coordination, impaired standing balance, poor endurance with quick onset of fatigue. The following deficits impact occupational performance: weakness, decreased strength, decreased balance, decreased tolerance, impaired FMC, increased pain, and orthopedic restrictions. Pt to benefit from continued skilled OT services while in the hospital to address deficits as defined above and maximize level of functional independence w ADL's and functional mobility. Occupational performance areas to address include: grooming, bathing/shower, toilet hygiene, dressing, health maintenance, functional mobility, and clothing management. From OT standpoint,  recommendation at time of d/c would be moderate resource intensity.       Rehab Resource Intensity Level, OT: II (Moderate Resource Intensity)

## 2024-01-02 NOTE — ASSESSMENT & PLAN NOTE
Patient s/p fall with left shoulder pain.  Sling  Pain control  Left shoulder xray revealed minimally displaced fracture of the proximal humeral metaphysis.   Orthopedic on board appreciate recommendations.  No intervention required.  Patient should follow up in the office in 4 weeks for repeat left shoulder x-rays.   PT OT recommended home with home health

## 2024-01-02 NOTE — ASSESSMENT & PLAN NOTE
Acute to subacute right L1 superior plate compression fracture with mild height loss. There is minimal retropulsion without significant canal stenosis.   2.  Degenerative change as detailed pronounced at level L5-S1 with mild canal and moderate bilateral foraminal stenosis.   Orthopedic on board appreciate recommendations.  No intervention required at this time.  She may follow up with Spine and Pain if lumbar pain persists.

## 2024-01-02 NOTE — DISCHARGE INSTR - AVS FIRST PAGE
Orthopedic Discharge Instructions     You should be non weight bearing to the left arm.   Maintain sling. You may remove the sling daily for hygiene and pendulums. You arm should stay at your side at all times.   Continue PT.   Apply ice as needed for pain and swelling.   You should follow up in the office in 4 weeks for repeat imaging. Please call [phone] if any questions or issues.       Discharge instructions from hospitalist  1. Follow-up with your primary care physician in 1 week in regards to recent hospitalization.  Recheck BMP in 1 week.    2. Take medications regularly    3. Come back to the ER if symptoms recur or worsen  4. Activity as tolerated  5. Diet :  Heart healthy diet

## 2024-01-02 NOTE — PLAN OF CARE
Problem: PAIN - ADULT  Goal: Verbalizes/displays adequate comfort level or baseline comfort level  Description: Interventions:  - Encourage patient to monitor pain and request assistance  - Assess pain using appropriate pain scale  - Administer analgesics based on type and severity of pain and evaluate response  - Implement non-pharmacological measures as appropriate and evaluate response  - Consider cultural and social influences on pain and pain management  - Notify physician/advanced practitioner if interventions unsuccessful or patient reports new pain  Outcome: Progressing     Problem: INFECTION - ADULT  Goal: Absence or prevention of progression during hospitalization  Description: INTERVENTIONS:  - Assess and monitor for signs and symptoms of infection  - Monitor lab/diagnostic results  - Monitor all insertion sites, i.e. indwelling lines, tubes, and drains  - Monitor endotracheal if appropriate and nasal secretions for changes in amount and color  - [place] appropriate cooling/warming therapies per order  - Administer medications as ordered  - Instruct and encourage patient and family to use good hand hygiene technique  - Identify and instruct in appropriate isolation precautions for identified infection/condition  Outcome: Progressing  Goal: Absence of fever/infection during neutropenic period  Description: INTERVENTIONS:  - Monitor WBC    Outcome: Progressing     Problem: SAFETY ADULT  Goal: Patient will remain free of falls  Description: INTERVENTIONS:  - Educate patient/family on patient safety including physical limitations  - Instruct patient to call for assistance with activity   - Consult OT/PT to assist with strengthening/mobility   - Keep Call bell within reach  - Keep bed low and locked with side rails adjusted as appropriate  - Keep care items and personal belongings within reach  - Initiate and maintain comfort rounds  - Make Fall Risk Sign visible to staff  - Offer Toileting every 2 Hours,  in advance of need  - Apply yellow socks and bracelet for high fall risk patients  - Consider moving patient to room near nurses station  Outcome: Progressing  Goal: Maintain or return to baseline ADL function  Description: INTERVENTIONS:  -  Assess patient's ability to carry out ADLs; assess patient's baseline for ADL function and identify physical deficits which impact ability to perform ADLs (bathing, care of mouth/teeth, toileting, grooming, dressing, etc.)  - Assess/evaluate cause of self-care deficits   - Assess range of motion  - Assess patient's mobility; develop plan if impaired  - Assess patient's need for assistive devices and provide as appropriate  - Encourage maximum independence but intervene and supervise when necessary  - Involve family in performance of ADLs  - Assess for home care needs following discharge   - Consider OT consult to assist with ADL evaluation and planning for discharge  - Provide patient education as appropriate  Outcome: Progressing  Goal: Maintains/Returns to pre admission functional level  Description: INTERVENTIONS:  - Perform AM-PAC 6 Click Basic Mobility/ Daily Activity assessment daily.  - Set and communicate daily mobility goal to care team and patient/family/caregiver.   - Collaborate with rehabilitation services on mobility goals if consulted  - Perform Range of Motion 2 times a day.  - Reposition patient every 2 hours.  - Dangle patient 2 times a day  - Stand patient 2 times a day  - Ambulate patient 2 times a day  - Out of bed to chair 2 times a day   - Out of bed for meals 2 times a day  - Out of bed for toileting  - Record patient progress and toleration of activity level   Outcome: Progressing     Problem: DISCHARGE PLANNING  Goal: Discharge to home or other facility with appropriate resources  Description: INTERVENTIONS:  - Identify barriers to discharge w/patient and caregiver  - Arrange for needed discharge resources and transportation as appropriate  - Identify  discharge learning needs (meds, wound care, etc.)  - Arrange for interpretive services to assist at discharge as needed  - Refer to Case Management Department for coordinating discharge planning if the patient needs post-hospital services based on physician/advanced practitioner order or complex needs related to functional status, cognitive ability, or social support system  Outcome: Progressing     Problem: Knowledge Deficit  Goal: Patient/family/caregiver demonstrates understanding of disease process, treatment plan, medications, and discharge instructions  Description: Complete learning assessment and assess knowledge base.  Interventions:  - Provide teaching at level of understanding  - Provide teaching via preferred learning methods  Outcome: Progressing     Problem: Nutrition/Hydration-ADULT  Goal: Nutrient/Hydration intake appropriate for improving, restoring or maintaining nutritional needs  Description: Monitor and assess patient's nutrition/hydration status for malnutrition. Collaborate with interdisciplinary team and initiate plan and interventions as ordered.  Monitor patient's weight and dietary intake as ordered or per policy. Utilize nutrition screening tool and intervene as necessary. Determine patient's food preferences and provide high-protein, high-caloric foods as appropriate.     INTERVENTIONS:  - Monitor oral intake, urinary output, labs, and treatment plans  - Assess nutrition and hydration status and recommend course of action  - Evaluate amount of meals eaten  - Assist patient with eating if necessary   - Allow adequate time for meals  - Recommend/ encourage appropriate diets, oral nutritional supplements, and vitamin/mineral supplements  - Order, calculate, and assess calorie counts as needed  - Recommend, monitor, and adjust tube feedings and TPN/PPN based on assessed needs  - Assess need for intravenous fluids  - Provide specific nutrition/hydration education as appropriate  - Include  patient/family/caregiver in decisions related to nutrition  Outcome: Progressing

## 2024-01-02 NOTE — DISCHARGE SUMMARY
[facility]  Discharge- Emily Berrios 1945, 78 y.o. female MRN: [id number]  Unit/Bed#: 7T [facility] 707-01 Encounter: [id number]  Primary Care Provider: Ramsey Gtz MD   Date and time admitted to hospital: 1/1/2024 12:40 AM    * Closed nondisplaced fracture of surgical neck of left humerus  Assessment & Plan  Patient s/p fall with left shoulder pain.  Sling  Pain control  Left shoulder xray revealed minimally displaced fracture of the proximal humeral metaphysis.   Orthopedic on board appreciate recommendations.  Patient should follow up in the office in 4 weeks for repeat left shoulder x-rays.   PT OT recommended home with home health    Closed compression fracture of L1 vertebra (HCC)  Assessment & Plan   Acute to subacute right L1 superior plate compression fracture with mild height loss. There is minimal retropulsion without significant canal stenosis.   2.  Degenerative change as detailed pronounced at level L5-S1 with mild canal and moderate bilateral foraminal stenosis.   Orthopedic on board appreciate recommendations.  No intervention required at this time.  She may follow up with Sp[facility] Pain if lumbar pain persists.     Hyponatremia  Assessment & Plan  Acute hyponatremia, improved to 133  Possibly w/ acute pain  Follow-up with PCP and recheck BMP as an outpatient    Chronic obstructive pulmonary disease, unspecified COPD type (HCC)  Assessment & Plan  No exacerbation  Continue home medications    Primary hypertension  Assessment & Plan  Continue home meds with hold parameters    Hyperlipidemia  Assessment & Plan  Continue statin    Chronic coronary artery disease  Assessment & Plan  CAD w/ hx of CABG  No chest pain  Continue home medications    Hypokalemia-resolved as of 1/2/2024  Assessment & Plan  Replete and monitor      Discharging Physician / Practitioner: Nicole Burks MD  PCP: Ramsey Gtz MD  Admission Date:   Admission Orders (From admission, onward)       Ordered         01/02/24 0906  Inpatient Admission  Once            01/01/24 0303  Place in Observation  Once                          Discharge Date: 01/02/24    Medical Problems       Resolved Problems  Date Reviewed: 1/2/2024            Resolved    Hypokalemia 1/2/2024     Resolved by  Nicole Burks MD          Consultations During Hospital Stay:  Orthopedic    Procedures Performed:   None    Significant Findings / Test Results:   CT spine lumbar wo contrast Result Date: 1/2/2024  Impression: 1.  Acute to subacute right L1 superior plate compression fracture with mild height loss. There is minimal retropulsion without significant canal stenosis. 2.  Degenerative change as detailed pronounced at level L5-S1 with mild canal and moderate bilateral foraminal stenosis. 3.  Partially imaged distended urinary bladder. Correlate for urinary retention. 4.  Cholelithiasis. Workstation performed: [id number]     XR spine lumbar 2 or 3 views injury Result Date: 1/1/2024  Impression: Questionable subtle superior endplate compression deformity at L1. This could probably be better assessed with CT if deemed clinically appropriate. The study was marked in EPIC for immediate notification. Workstation performed: [id number]     XR shoulder 2+ views LEFT Result Date: 1/1/2024  Impression: Minimally displaced fracture of the proximal humeral metaphysis. The study was marked in EPIC for immediate notification. Workstation performed: [id number]        Incidental Findings:   See above    Test Results Pending at Discharge (will require follow up):   None     Outpatient Tests Requested:  BMP in 1 week, follow-up left shoulder x-ray in 4 weeks    Complications: None    Reason for Admission: Mechanical fall, left shoulder fracture    Hospital Course:     Emily Berrios is a 78 y.o. female patient who originally presented to the hospital on 1/1/2024 due to mechanical fall and patient was admitted for left shoulder fracture.  During hospitalization, patient  "also had incidental finding of L1 compression fracture.  Pain is well-controlled.  Orthopedic on board and recommended no surgery required at this time.  PT and OT recommended home with home health.  Patient hyponatremia improved with IV fluid resuscitation.  Patient is recommended to get BMP in 1 week.  Patient need to follow-up with orthopedic in the office in 4 weeks for repeat left shoulder x-ray.    Patient is clinically and hemodynamically stable to be discharged today.    Please see above list of diagnoses and related plan for additional information.     Condition at Discharge: stable     Discharge Day Visit / Exam:     Subjective:  Patient was seen and examined at bedside. The patient denies any pain, headache, blurry vision, chest pain, palpitation, shortness of breath, N/V, abd pain.    Vitals: Blood Pressure: 117/63 (01/02/24 0710)  Pulse: 94 (01/02/24 0710)  Temperature: (!) 97.2 °F (36.2 °C) (01/02/24 0710)  Temp Source: Temporal (01/02/24 0710)  Respirations: 16 (01/02/24 0710)  Height: 5' 1\" (154.9 cm) (01/01/24 0400)  Weight - Scale: 59.9 kg (132 lb 0.9 oz) (01/01/24 0400)  SpO2: 95 % (01/02/24 0710)  Exam:   Physical Exam  General: no acute distress  HEENT: NC/AT, PERRL, EOM - normal  Neck: Supple  Pulm/Chest: Normal chest wall expansion, clear breath sounds on both side  CVS: normal S1&S2, capillary refill <2s  Abd: soft, non tender, non distended, bowel sounds +  MSK: move all 4 extremities spontaneously, left shoulder in sling  Skin: warm  CNS: no acute focal neuro deficit    Discussion with Family: Discussed with daughter    Discharge instructions/Information to patient and family:   See after visit summary for information provided to patient and family.      Provisions for Follow-Up Care:  See after visit summary for information related to follow-up care and any pertinent home health orders.      Disposition:     Home with [facility] Services (Reminder: Complete face to face encounter)    For " Discharges to [facility]:   Not Applicable to this Patient - Not Applicable to this Patient    Planned Readmission: No     Discharge Statement:  I spent 45 minutes discharging the patient. This time was spent on the day of discharge. I had direct contact with the patient on the day of discharge. Greater than 50% of the total time was spent examining patient, answering all patient questions, arranging and discussing plan of care with patient as well as directly providing post-discharge instructions.  Additional time then spent on discharge activities.    Discharge Medications:  See after visit summary for reconciled discharge medications provided to patient and family.      ** Please Note: This note has been constructed using a voice recognition system **

## 2024-01-02 NOTE — ASSESSMENT & PLAN NOTE
Acute hyponatremia, improved to 133  Possibly w/ acute pain  Follow-up with PCP and recheck BMP as an outpatient

## 2024-01-02 NOTE — CONSULTS
Consultation - Orthopedics   Emily Berrios 78 y.o. female MRN: [id number]  Unit/Bed#: 7T [facility] 707-01 Encounter: [id number]      Assessment/Plan     Assessment:  79 yo female with left minimally displaced proximal humerus fracture, L1 superior endplate fracture     Plan:  No orthopedic surgical intervention required for either fracture at this time.   NWB to LUE in sling. May remove sling for pendulum exercises with PT.   WBAT to bilat LE.   PT/ OT.   Pain control prn.   Medical management per primary team.   Patient should follow up in the office in 4 weeks for repeat left shoulder x-rays. She may follow up with Spine and Pain if lumbar pain persists.     History of Present Illness   Physician Requesting Consult: Nicole Burks MD  Reason for Consult / Principal Problem: left humerus fracture  HPI: Emily Berrios is a 78 y.o. year old RHD female who presents with left arm and low back pain after a fall 2 days ago while celebrating New Years. Patient notes that she fell onto her left side and had left arm pain. She presented to the ED where x-rays were obtained and a sling was applied. As of today patient reports minimal pain in the left shoulder at rest. She notes discomfort if she tries to move her arm. Patient denies previous surgery or injection in the left shoulder, but did do PT for her shoulder in the past. Patient also noted some low back pain after the fall. She states she has had issues with sciatica in the past. Denies distal numbness or tingling in the upper or lower extremities.       Inpatient consult to Orthopedic Surgery  Consult performed by: Stacy Ramos PA-C  Consult ordered by: Swapna Jensen PA-C        ROS: see HPI, all other systems negative.    Historical Information   Past Medical History:   Diagnosis Date    Arthritis     Asthma     Chronic obstructive pulmonary disease, unspecified COPD type (HCC) 04/08/2022    Cognitive decline 03/10/2023    Community acquired pneumonia  2019    Coronary artery disease     DM (diabetes mellitus) (HCC)     HTN (hypertension)     Hydronephrosis, bilateral 2023    Hyperlipidemia     Hyponatremia     Urinary retention      Past Surgical History:   Procedure Laterality Date    CARDIAC CATHETERIZATION      CORONARY ARTERY BYPASS GRAFT  2010    with subsequent stent to the saphenous veingraft to the RCA 3 months later    KNEE ARTHROSCOPY      POLYPECTOMY      laryngeal    TOTAL HIP ARTHROPLASTY      TUBAL LIGATION       Social History   Social History     Substance and Sexual Activity   Alcohol Use Yes    Comment: socially     Social History     Substance and Sexual Activity   Drug Use Never     Social History     Tobacco Use   Smoking Status Former    Current packs/day: 0.00    Average packs/day: 0.5 packs/day for 32.0 years (16.0 ttl pk-yrs)    Types: Cigarettes    Start date:     Quit date:     Years since quittin.0   Smokeless Tobacco Never   Tobacco Comments    no passive smoke exposure     Family History:   Family History   Problem Relation Age of Onset    Stroke Mother     Hypertension Mother     Heart disease Mother     Colon cancer Father     No Known Problems Sister     No Known Problems Sister     No Known Problems Sister     No Known Problems Daughter     No Known Problems Daughter     No Known Problems Daughter     No Known Problems Maternal Grandmother     No Known Problems Maternal Grandfather     No Known Problems Paternal Grandmother     No Known Problems Paternal Grandfather     Heart attack Brother     Heart attack Brother     No Known Problems Maternal Aunt     No Known Problems Maternal Aunt     No Known Problems Paternal Aunt     No Known Problems Paternal Aunt        Meds/Allergies   Facility-Administered Medications Prior to Admission   Medication    [DISCONTINUED] cyanocobalamin injection 1,000 mcg     Medications Prior to Admission   Medication    Air  (Vicks Air Purifier/HEPA) (Device)  "MISC    albuterol (ACCUNEB) 1.25 MG/3ML nebulizer solution    albuterol (PROVENTIL HFA,VENTOLIN HFA) 90 mcg/act inhaler    ALPRAZolam (XANAX) 0.5 mg tablet    amLODIPine (NORVASC) 5 mg tablet    benzonatate (TESSALON PERLES) 100 mg capsule    bisoprolol (ZEBETA) 10 MG tablet    Calcium Carbonate-Vit D-Min (Caltrate 600+D Plus Minerals) 600-800 MG-UNIT TABS    co-enzyme Q-10 30 MG capsule    denosumab (PROLIA) 60 mg/mL    ergocalciferol (VITAMIN D2) 50,000 units    estradiol (ESTRACE VAGINAL) 0.1 mg/g vaginal cream    fluticasone-umeclidinium-vilanterol (Trelegy Ellipta) 100-62.5-25 mcg/actuation inhaler    glucose blood test strip    ipratropium-albuterol (DUO-NEB) 0.5-2.5 mg/3 mL nebulizer solution    magnesium (MAGTAB) 84 MG (7MEQ) TBCR    nitroglycerin (NITROSTAT) 0.4 mg SL tablet    ondansetron (ZOFRAN-ODT) 4 mg disintegrating tablet    pantoprazole (PROTONIX) 20 mg tablet    Plavix 75 MG tablet    Premarin vaginal cream    Red Yeast Rice 600 MG CAPS    rosuvastatin (CRESTOR) 5 mg tablet    vitamin B-12 (VITAMIN B-12) 1,000 mcg tablet     Allergies   Allergen Reactions    Ace Inhibitors Other (See Comments)     Includes lisinopril    Angiotensin Receptor Blockers Other (See Comments)     Not specified.  Includes losartan and olmesartan    Ezetimibe Rash    Statins Arthralgia     Tolerates Crestor    Morphine Hives     pt states someone told her she is allergic to it after her hip surgery       Objective   Vitals: Blood pressure 117/63, pulse 94, temperature (!) 97.2 °F (36.2 °C), temperature source Temporal, resp. rate 16, height 5' 1\" (1.549 m), weight 59.9 kg (132 lb 0.9 oz), SpO2 95%.,Body mass index is 24.95 kg/m².      Intake/Output Summary (Last 24 hours) at 1/2/2024 0785  Last data filed at 1/2/2024 0501  Gross per 24 hour   Intake 600 ml   Output 1050 ml   Net -450 ml     I/O last 24 hours:  In: 600 [P.O.:600]  Out: 1050 [Urine:1050]    Invasive Devices       Peripheral Intravenous Line  Duration         " "    Peripheral IV 01/01/24 Right Antecubital 1 day                    PE:  Gen:  Awake and alert  HEENT:  Hearing intact  Heart:  Regular rate  Lungs: no audible wheezing  GI: no abdominal distension    Ortho Exam  Left upper extremity:  Inspection- sling in place. No ecchymosis, erythema, or abrasions.    Palpation- +TTP over fracture site. No TTP at the elbow, forearm, or wrist. Mild swelling upper arm. Upper and forearm compartments soft and compressible.   ROM- able to flex and extend wrist. No pain with isolated elbow motion within confines of sling. Shoulder motion deferred.   Neurovascular- sensation intact axillary, median, radial, and ulnar nerve distributions. Palpable radial pulse. AIN/ PIN intact.     Bilateral lower extremities:  Inspection- no obvious deformity.   Palpation- no TTP over hips, knees, ankles. Compartments soft and compressible.   Strength- 5/5 EHL, AT, GS, quad, hamstring, hip flexors, hip adductors, and hip abductors.   Neurovascular- sensation intact L4-S1. Palpable pedal pulse. AT/ GS/ EHL intact.       Lab Results: CBC: No results found for: \"WBC\", \"HGB\", \"HCT\", \"MCV\", \"PLT\", \"ADJUSTEDWBC\", \"RBC\", \"MCH\", \"MCHC\", \"RDW\", \"MPV\", \"NRBC\"  CMP:   Lab Results   Component Value Date    SODIUM 133 (L) 01/02/2024     01/02/2024    CO2 27 01/02/2024    BUN 6 01/02/2024    CREATININE 0.49 (L) 01/02/2024    CALCIUM 8.1 (L) 01/02/2024    EGFR 93 01/02/2024     Imaging Studies: I have personally reviewed pertinent films in PACS  X-ray left shoulder- minimally displaced left proximal humerus fracture, mild GH osteoarthritis   X-ray lumbar spine- irregularity at the L1 vertebral body, mild DDD  CT scan- L1 superior endplate fracture, multilevel degenerative changes     Code Status: Level 1 - Full Code  Advance Directive and Living Will:      Power of :    POLST:       "

## 2024-01-03 ENCOUNTER — TRANSITIONAL CARE MANAGEMENT (OUTPATIENT)
Dept: FAMILY MEDICINE CLINIC | Facility: CLINIC | Age: 79
End: 2024-01-03

## 2024-01-03 DIAGNOSIS — Z71.89 COMPLEX CARE COORDINATION: Primary | ICD-10-CM

## 2024-01-03 NOTE — UTILIZATION REVIEW
NOTIFICATION OF INPATIENT MEDICAL ADMISSION   AUTHORIZATION REQUEST   SERVICING FACILITY:   [facility]  [address] [place] [address]  Tax ID: [id number]  NPI: [id number] ATTENDING PROVIDER:  Attending Name and NPI#: Nicole Burks Md [[id number]]  Address: [address] [place] [address]  Phone: [phone]     ADMISSION INFORMATION:  Place of Service: Inpatient Acute [facility]  Place of Service Code: 21  Inpatient Admission Date/Time: 1/2/24  9:06 AM  Discharge Date/Time: 1/2/2024  4:05 PM  Admitting Diagnosis Code/Description:  Hyponatremia [E87.1]  Humerus fracture [S42.309A]  Closed nondisplaced fracture of surgical neck of left humerus, unspecified fracture morphology, initial encounter [S42.215A]  Unspecified multiple injuries, initial encounter [T07.XXXA]     UTILIZATION REVIEW CONTACT:  Candy Ferrari, Utilization   Network Utilization Review Department  Phone: [phone]  Fax [phone]  Email: [email or web address]  Contact for approvals/pending authorizations, clinical reviews, and discharge.     PHYSICIAN ADVISORY SERVICES:  Medical Necessity Denial & Ipcs-xn-Ehzy Review  Phone: [phone]  Fax: [phone]  Email: [email or web address]     DISCHARGE SUPPORT TEAM:  For Patients Discharge Needs & Updates  Phone: [phone] opt. 2 Fax: [phone]  Email: [email or web address]

## 2024-01-04 ENCOUNTER — HOME CARE VISIT (OUTPATIENT)
Dept: HOME HEALTH SERVICES | Facility: HOME HEALTHCARE | Age: 79
End: 2024-01-04

## 2024-01-04 ENCOUNTER — HOSPITAL ENCOUNTER (INPATIENT)
Facility: HOSPITAL | Age: 79
LOS: 4 days | Discharge: HOME HEALTH | DRG: 193 | End: 2024-01-08
Attending: EMERGENCY MEDICINE | Admitting: INTERNAL MEDICINE
Payer: MEDICARE

## 2024-01-04 ENCOUNTER — APPOINTMENT (EMERGENCY)
Dept: CT IMAGING | Facility: HOSPITAL | Age: 79
DRG: 193 | End: 2024-01-04
Payer: MEDICARE

## 2024-01-04 DIAGNOSIS — Z78.9 ALCOHOL USE: ICD-10-CM

## 2024-01-04 DIAGNOSIS — G93.40 ENCEPHALOPATHY ACUTE: ICD-10-CM

## 2024-01-04 DIAGNOSIS — R09.02 HYPOXIA: Primary | ICD-10-CM

## 2024-01-04 DIAGNOSIS — G47.00 INSOMNIA: ICD-10-CM

## 2024-01-04 DIAGNOSIS — E87.1 HYPONATREMIA: ICD-10-CM

## 2024-01-04 DIAGNOSIS — J44.9 COPD (CHRONIC OBSTRUCTIVE PULMONARY DISEASE) (HCC): ICD-10-CM

## 2024-01-04 PROBLEM — F10.90 ALCOHOL USE: Status: ACTIVE | Noted: 2024-01-04

## 2024-01-04 LAB
ANION GAP SERPL CALCULATED.3IONS-SCNC: 5 MMOL/L
ANION GAP SERPL CALCULATED.3IONS-SCNC: 7 MMOL/L
ATRIAL RATE: 73 BPM
BASOPHILS # BLD AUTO: 0.08 THOUSANDS/ÂΜL (ref 0–0.1)
BASOPHILS NFR BLD AUTO: 1 % (ref 0–1)
BNP SERPL-MCNC: 224 PG/ML (ref 0–100)
BUN SERPL-MCNC: 8 MG/DL (ref 5–25)
BUN SERPL-MCNC: 9 MG/DL (ref 5–25)
CALCIUM SERPL-MCNC: 8.9 MG/DL (ref 8.4–10.2)
CALCIUM SERPL-MCNC: 8.9 MG/DL (ref 8.4–10.2)
CHLORIDE SERPL-SCNC: 89 MMOL/L (ref 96–108)
CHLORIDE SERPL-SCNC: 96 MMOL/L (ref 96–108)
CO2 SERPL-SCNC: 28 MMOL/L (ref 21–32)
CO2 SERPL-SCNC: 29 MMOL/L (ref 21–32)
CREAT SERPL-MCNC: 0.49 MG/DL (ref 0.6–1.3)
CREAT SERPL-MCNC: 0.63 MG/DL (ref 0.6–1.3)
D DIMER PPP FEU-MCNC: 1.86 UG/ML FEU
EOSINOPHIL # BLD AUTO: 0.18 THOUSAND/ÂΜL (ref 0–0.61)
EOSINOPHIL NFR BLD AUTO: 1 % (ref 0–6)
ERYTHROCYTE [DISTWIDTH] IN BLOOD BY AUTOMATED COUNT: 15.5 % (ref 11.6–15.1)
FLUAV RNA RESP QL NAA+PROBE: NEGATIVE
FLUBV RNA RESP QL NAA+PROBE: NEGATIVE
GFR SERPL CREATININE-BSD FRML MDRD: 86 ML/MIN/1.73SQ M
GFR SERPL CREATININE-BSD FRML MDRD: 93 ML/MIN/1.73SQ M
GLUCOSE SERPL-MCNC: 134 MG/DL (ref 65–140)
GLUCOSE SERPL-MCNC: 168 MG/DL (ref 65–140)
HCT VFR BLD AUTO: 32.3 % (ref 34.8–46.1)
HGB BLD-MCNC: 10.4 G/DL (ref 11.5–15.4)
IMM GRANULOCYTES # BLD AUTO: 0.11 THOUSAND/UL (ref 0–0.2)
IMM GRANULOCYTES NFR BLD AUTO: 1 % (ref 0–2)
LYMPHOCYTES # BLD AUTO: 1.38 THOUSANDS/ÂΜL (ref 0.6–4.47)
LYMPHOCYTES NFR BLD AUTO: 10 % (ref 14–44)
MCH RBC QN AUTO: 25.9 PG (ref 26.8–34.3)
MCHC RBC AUTO-ENTMCNC: 32.2 G/DL (ref 31.4–37.4)
MCV RBC AUTO: 81 FL (ref 82–98)
MONOCYTES # BLD AUTO: 1.18 THOUSAND/ÂΜL (ref 0.17–1.22)
MONOCYTES NFR BLD AUTO: 9 % (ref 4–12)
NEUTROPHILS # BLD AUTO: 10.74 THOUSANDS/ÂΜL (ref 1.85–7.62)
NEUTS SEG NFR BLD AUTO: 78 % (ref 43–75)
NRBC BLD AUTO-RTO: 0 /100 WBCS
P AXIS: 57 DEGREES
PLATELET # BLD AUTO: 311 THOUSANDS/UL (ref 149–390)
PMV BLD AUTO: 9.8 FL (ref 8.9–12.7)
POTASSIUM SERPL-SCNC: 3.7 MMOL/L (ref 3.5–5.3)
POTASSIUM SERPL-SCNC: 3.8 MMOL/L (ref 3.5–5.3)
PR INTERVAL: 140 MS
PROCALCITONIN SERPL-MCNC: 0.7 NG/ML
QRS AXIS: 55 DEGREES
QRSD INTERVAL: 106 MS
QT INTERVAL: 380 MS
QTC INTERVAL: 418 MS
RBC # BLD AUTO: 4.01 MILLION/UL (ref 3.81–5.12)
RSV RNA RESP QL NAA+PROBE: NEGATIVE
SARS-COV-2 RNA RESP QL NAA+PROBE: NEGATIVE
SODIUM SERPL-SCNC: 123 MMOL/L (ref 135–147)
SODIUM SERPL-SCNC: 131 MMOL/L (ref 135–147)
T WAVE AXIS: 39 DEGREES
VENTRICULAR RATE: 73 BPM
WBC # BLD AUTO: 13.67 THOUSAND/UL (ref 4.31–10.16)

## 2024-01-04 PROCEDURE — 87040 BLOOD CULTURE FOR BACTERIA: CPT | Performed by: INTERNAL MEDICINE

## 2024-01-04 PROCEDURE — 71275 CT ANGIOGRAPHY CHEST: CPT

## 2024-01-04 PROCEDURE — 99285 EMERGENCY DEPT VISIT HI MDM: CPT | Performed by: EMERGENCY MEDICINE

## 2024-01-04 PROCEDURE — 96361 HYDRATE IV INFUSION ADD-ON: CPT

## 2024-01-04 PROCEDURE — 99223 1ST HOSP IP/OBS HIGH 75: CPT | Performed by: INTERNAL MEDICINE

## 2024-01-04 PROCEDURE — G1004 CDSM NDSC: HCPCS

## 2024-01-04 PROCEDURE — 83935 ASSAY OF URINE OSMOLALITY: CPT | Performed by: INTERNAL MEDICINE

## 2024-01-04 PROCEDURE — 87449 NOS EACH ORGANISM AG IA: CPT | Performed by: INTERNAL MEDICINE

## 2024-01-04 PROCEDURE — 84145 PROCALCITONIN (PCT): CPT | Performed by: INTERNAL MEDICINE

## 2024-01-04 PROCEDURE — 93010 ELECTROCARDIOGRAM REPORT: CPT | Performed by: STUDENT IN AN ORGANIZED HEALTH CARE EDUCATION/TRAINING PROGRAM

## 2024-01-04 PROCEDURE — 96360 HYDRATION IV INFUSION INIT: CPT

## 2024-01-04 PROCEDURE — 93005 ELECTROCARDIOGRAM TRACING: CPT

## 2024-01-04 PROCEDURE — 87205 SMEAR GRAM STAIN: CPT | Performed by: INTERNAL MEDICINE

## 2024-01-04 PROCEDURE — 94640 AIRWAY INHALATION TREATMENT: CPT

## 2024-01-04 PROCEDURE — 0241U HB NFCT DS VIR RESP RNA 4 TRGT: CPT | Performed by: INTERNAL MEDICINE

## 2024-01-04 PROCEDURE — 85379 FIBRIN DEGRADATION QUANT: CPT | Performed by: INTERNAL MEDICINE

## 2024-01-04 PROCEDURE — 80048 BASIC METABOLIC PNL TOTAL CA: CPT | Performed by: EMERGENCY MEDICINE

## 2024-01-04 PROCEDURE — 99285 EMERGENCY DEPT VISIT HI MDM: CPT

## 2024-01-04 PROCEDURE — 36415 COLL VENOUS BLD VENIPUNCTURE: CPT | Performed by: EMERGENCY MEDICINE

## 2024-01-04 PROCEDURE — 83880 ASSAY OF NATRIURETIC PEPTIDE: CPT | Performed by: INTERNAL MEDICINE

## 2024-01-04 PROCEDURE — 85025 COMPLETE CBC W/AUTO DIFF WBC: CPT | Performed by: EMERGENCY MEDICINE

## 2024-01-04 PROCEDURE — 80048 BASIC METABOLIC PNL TOTAL CA: CPT | Performed by: INTERNAL MEDICINE

## 2024-01-04 PROCEDURE — 87070 CULTURE OTHR SPECIMN AEROBIC: CPT | Performed by: INTERNAL MEDICINE

## 2024-01-04 RX ORDER — DOCUSATE SODIUM 100 MG/1
100 CAPSULE, LIQUID FILLED ORAL 2 TIMES DAILY
Status: DISCONTINUED | OUTPATIENT
Start: 2024-01-04 | End: 2024-01-08 | Stop reason: HOSPADM

## 2024-01-04 RX ORDER — FLUTICASONE FUROATE AND VILANTEROL 100; 25 UG/1; UG/1
1 POWDER RESPIRATORY (INHALATION) DAILY
Status: DISCONTINUED | OUTPATIENT
Start: 2024-01-05 | End: 2024-01-08 | Stop reason: HOSPADM

## 2024-01-04 RX ORDER — ALBUTEROL SULFATE 2.5 MG/3ML
5 SOLUTION RESPIRATORY (INHALATION) ONCE
Status: COMPLETED | OUTPATIENT
Start: 2024-01-04 | End: 2024-01-04

## 2024-01-04 RX ORDER — PRAVASTATIN SODIUM 40 MG
40 TABLET ORAL
Status: DISCONTINUED | OUTPATIENT
Start: 2024-01-05 | End: 2024-01-08 | Stop reason: HOSPADM

## 2024-01-04 RX ORDER — FOLIC ACID 1 MG/1
1 TABLET ORAL DAILY
Status: DISCONTINUED | OUTPATIENT
Start: 2024-01-04 | End: 2024-01-08 | Stop reason: HOSPADM

## 2024-01-04 RX ORDER — LANOLIN ALCOHOL/MO/W.PET/CERES
100 CREAM (GRAM) TOPICAL DAILY
Status: DISCONTINUED | OUTPATIENT
Start: 2024-01-04 | End: 2024-01-08 | Stop reason: HOSPADM

## 2024-01-04 RX ORDER — ALPRAZOLAM 0.5 MG/1
0.5 TABLET ORAL
Status: DISCONTINUED | OUTPATIENT
Start: 2024-01-04 | End: 2024-01-08 | Stop reason: HOSPADM

## 2024-01-04 RX ORDER — LEVALBUTEROL INHALATION SOLUTION 1.25 MG/3ML
1.25 SOLUTION RESPIRATORY (INHALATION)
Status: DISCONTINUED | OUTPATIENT
Start: 2024-01-04 | End: 2024-01-05

## 2024-01-04 RX ORDER — AMLODIPINE BESYLATE 10 MG/1
10 TABLET ORAL 2 TIMES DAILY
Status: DISCONTINUED | OUTPATIENT
Start: 2024-01-05 | End: 2024-01-08 | Stop reason: HOSPADM

## 2024-01-04 RX ORDER — PANTOPRAZOLE SODIUM 20 MG/1
20 TABLET, DELAYED RELEASE ORAL DAILY
Status: DISCONTINUED | OUTPATIENT
Start: 2024-01-05 | End: 2024-01-08 | Stop reason: HOSPADM

## 2024-01-04 RX ORDER — ONDANSETRON 2 MG/ML
4 INJECTION INTRAMUSCULAR; INTRAVENOUS EVERY 6 HOURS PRN
Status: DISCONTINUED | OUTPATIENT
Start: 2024-01-04 | End: 2024-01-08 | Stop reason: HOSPADM

## 2024-01-04 RX ORDER — SODIUM CHLORIDE 1 G/1
1 TABLET ORAL
Status: DISCONTINUED | OUTPATIENT
Start: 2024-01-05 | End: 2024-01-05

## 2024-01-04 RX ORDER — CLOPIDOGREL BISULFATE 75 MG/1
75 TABLET ORAL DAILY
Status: DISCONTINUED | OUTPATIENT
Start: 2024-01-05 | End: 2024-01-08 | Stop reason: HOSPADM

## 2024-01-04 RX ORDER — ALBUTEROL SULFATE 90 UG/1
2 AEROSOL, METERED RESPIRATORY (INHALATION) EVERY 6 HOURS PRN
Status: DISCONTINUED | OUTPATIENT
Start: 2024-01-04 | End: 2024-01-08 | Stop reason: HOSPADM

## 2024-01-04 RX ORDER — BENZONATATE 100 MG/1
100 CAPSULE ORAL 3 TIMES DAILY PRN
Status: DISCONTINUED | OUTPATIENT
Start: 2024-01-04 | End: 2024-01-08 | Stop reason: HOSPADM

## 2024-01-04 RX ORDER — BISOPROLOL FUMARATE 5 MG/1
10 TABLET, FILM COATED ORAL DAILY
Status: DISCONTINUED | OUTPATIENT
Start: 2024-01-05 | End: 2024-01-08 | Stop reason: HOSPADM

## 2024-01-04 RX ORDER — NITROGLYCERIN 0.4 MG/1
0.4 TABLET SUBLINGUAL
Status: DISCONTINUED | OUTPATIENT
Start: 2024-01-04 | End: 2024-01-08 | Stop reason: HOSPADM

## 2024-01-04 RX ORDER — HEPARIN SODIUM 5000 [USP'U]/ML
5000 INJECTION, SOLUTION INTRAVENOUS; SUBCUTANEOUS EVERY 8 HOURS SCHEDULED
Status: DISCONTINUED | OUTPATIENT
Start: 2024-01-04 | End: 2024-01-08 | Stop reason: HOSPADM

## 2024-01-04 RX ORDER — OXYCODONE HYDROCHLORIDE 5 MG/1
5 TABLET ORAL EVERY 6 HOURS PRN
Status: DISCONTINUED | OUTPATIENT
Start: 2024-01-04 | End: 2024-01-08 | Stop reason: HOSPADM

## 2024-01-04 RX ADMIN — Medication 1 TABLET: at 19:07

## 2024-01-04 RX ADMIN — DOCUSATE SODIUM 100 MG: 100 CAPSULE, LIQUID FILLED ORAL at 22:33

## 2024-01-04 RX ADMIN — IPRATROPIUM BROMIDE 0.5 MG: 0.5 SOLUTION RESPIRATORY (INHALATION) at 19:17

## 2024-01-04 RX ADMIN — SODIUM CHLORIDE 1000 ML: 0.9 INJECTION, SOLUTION INTRAVENOUS at 14:09

## 2024-01-04 RX ADMIN — HEPARIN SODIUM 5000 UNITS: 5000 INJECTION INTRAVENOUS; SUBCUTANEOUS at 22:33

## 2024-01-04 RX ADMIN — FOLIC ACID 1 MG: 1 TABLET ORAL at 19:07

## 2024-01-04 RX ADMIN — THIAMINE HCL TAB 100 MG 100 MG: 100 TAB at 19:07

## 2024-01-04 RX ADMIN — ALBUTEROL SULFATE 5 MG: 2.5 SOLUTION RESPIRATORY (INHALATION) at 13:17

## 2024-01-04 RX ADMIN — IOHEXOL 60 ML: 350 INJECTION, SOLUTION INTRAVENOUS at 14:32

## 2024-01-04 RX ADMIN — LEVALBUTEROL HYDROCHLORIDE 1.25 MG: 1.25 SOLUTION RESPIRATORY (INHALATION) at 19:17

## 2024-01-04 NOTE — ED PROVIDER NOTES
History  Chief Complaint   Patient presents with    Shortness of Breath     Pt was at PT when oxygen was seen at 80%. Pt has dyspnea on exertion. Hx of COPD and was just recently d/c for broken arm. No dizziness, chest pain, headache, palpitations.     79 yo F referred to ED from home for hypoxemia, reported by home PT down to 80% during treatment . She was admitted at  this past week briefly for a fall with nonoperative left humeral head fracture, and ambulatory dysfunction. She has COPD at baseline, and home PT sent her in for SpO2 80% during their assessment. She wasn't in any distress, but 86-88% on RA here in the ED on arrival.        History provided by:  Patient  Shortness of Breath      Prior to Admission Medications   Prescriptions Last Dose Informant Patient Reported? Taking?   ALPRAZolam (XANAX) 0.5 mg tablet   No No   Sig: Take 1 tablet (0.5 mg total) by mouth daily at bedtime as needed for anxiety   Air  (Vicks Air Purifier/HEPA) (Device) MISC  Self, Family Member No No   Sig: Use as directed   Patient not taking: Reported on 11/22/2023   Calcium Carbonate-Vit D-Min (Caltrate 600+D Plus Minerals) 600-800 MG-UNIT TABS  Self, Family Member No No   Sig: Take 1 tablet by mouth 2 (two) times a day with meals   Plavix 75 MG tablet  Self, Family Member No No   Sig: Take 1 tablet (75 mg total) by mouth daily   Premarin vaginal cream  Self, Family Member No No   Sig: Insert 0.625 g into the vagina daily   Patient not taking: Reported on 11/22/2023   Red Yeast Rice 600 MG CAPS  Self, Family Member No No   Sig: Take 1 capsule (600 mg total) by mouth 2 (two) times a day with meals   albuterol (ACCUNEB) 1.25 MG/3ML nebulizer solution  Self, Family Member No No   Sig: Take 3 mL by nebulization every 6 (six) hours as needed for wheezing   albuterol (PROVENTIL HFA,VENTOLIN HFA) 90 mcg/act inhaler  Self, Family Member No No   Sig: Inhale 2 puffs every 6 (six) hours as needed for wheezing   amLODIPine (NORVASC)  5 mg tablet  Self, Family Member No No   Sig: Take 1 tablet (5 mg total) by mouth 2 (two) times a day   benzonatate (TESSALON PERLES) 100 mg capsule   No No   Sig: Take 1 capsule (100 mg total) by mouth 3 (three) times a day as needed for cough   bisoprolol (ZEBETA) 10 MG tablet  Self, Family Member No No   Sig: Take 1 tablet (10 mg total) by mouth daily   co-enzyme Q-10 30 MG capsule  Self, Family Member No No   Sig: Take 1 capsule (30 mg total) by mouth 2 (two) times a day   denosumab (PROLIA) 60 mg/mL   No No   Sig: Inject 1 mL (60 mg total) under the skin once for 1 dose   Patient not taking: Reported on 11/22/2023   ergocalciferol (VITAMIN D2) 50,000 units  Self, Family Member No No   Sig: Take 1 capsule (50,000 Units total) by mouth once a week   estradiol (ESTRACE VAGINAL) 0.1 mg/g vaginal cream  Self, Family Member No No   Sig: Insert 1 g into the vagina 2 (two) times a day for 14 days   fluticasone-umeclidinium-vilanterol (Trelegy Ellipta) 100-62.5-25 mcg/actuation inhaler  Self, Family Member No No   Sig: Inhale 1 puff daily Rinse mouth after use.   glucose blood test strip  Self, Family Member No No   Sig: Use 1 each as needed (as needed) Use as instructed   ipratropium-albuterol (DUO-NEB) 0.5-2.5 mg/3 mL nebulizer solution  Self, Family Member No No   Sig: Take 3 mL by nebulization every 6 (six) hours as needed for wheezing or shortness of breath   magnesium (MAGTAB) 84 MG (7MEQ) TBCR  Self, Family Member No No   Sig: Take 1 tablet (84 mg total) by mouth 2 (two) times a day   nitroglycerin (NITROSTAT) 0.4 mg SL tablet  Self, Family Member No No   Sig: Place 1 tablet (0.4 mg total) under the tongue every 5 (five) minutes as needed for chest pain   ondansetron (ZOFRAN-ODT) 4 mg disintegrating tablet  Self, Family Member No No   Sig: Take 1 tablet (4 mg total) by mouth every 8 (eight) hours as needed for nausea or vomiting   oxyCODONE (ROXICODONE) 5 immediate release tablet   No No   Sig: Take 1 tablet (5  mg total) by mouth every 4 (four) hours as needed for severe pain for up to 10 days Max Daily Amount: 30 mg   pantoprazole (PROTONIX) 20 mg tablet  Self, Family Member No No   Sig: Take 1 tablet (20 mg total) by mouth daily   rosuvastatin (CRESTOR) 5 mg tablet  Self, Family Member No No   Sig: Take 1 tablet (5 mg total) by mouth daily   vitamin B-12 (VITAMIN B-12) 1,000 mcg tablet  Self, Family Member No No   Sig: Take 1 tablet (1,000 mcg total) by mouth daily      Facility-Administered Medications: None       Past Medical History:   Diagnosis Date    Arthritis     Asthma     Chronic obstructive pulmonary disease, unspecified COPD type ([facility]) 04/08/2022    Cognitive decline 03/10/2023    Community acquired pneumonia 11/13/2019    COPD (chronic obstructive pulmonary disease) ([facility])     Coronary artery disease     DM (diabetes mellitus) ([facility])     HTN (hypertension)     Hydronephrosis, bilateral 03/06/2023    Hyperlipidemia     Hyponatremia     Urinary retention        Past Surgical History:   Procedure Laterality Date    CARDIAC CATHETERIZATION  2010    CORONARY ARTERY BYPASS GRAFT  12/06/2010    with subsequent stent to the saphenous veingraft to the RCA 3 months later    KNEE ARTHROSCOPY      POLYPECTOMY      laryngeal    TOTAL HIP ARTHROPLASTY      TUBAL LIGATION         Family History   Problem Relation Age of Onset    Stroke Mother     Hypertension Mother     Heart disease Mother     Colon cancer Father     No Known Problems Sister     No Known Problems Sister     No Known Problems Sister     No Known Problems Daughter     No Known Problems Daughter     No Known Problems Daughter     No Known Problems Maternal Grandmother     No Known Problems Maternal Grandfather     No Known Problems Paternal Grandmother     No Known Problems Paternal Grandfather     Heart attack Brother     Heart attack Brother     No Known Problems Maternal Aunt     No Known Problems Maternal Aunt     No Known Problems Paternal Aunt     No  Known Problems Paternal Aunt      I have reviewed and agree with the history as documented.    E-Cigarette/Vaping    E-Cigarette Use Never User      E-Cigarette/Vaping Substances    Nicotine No     THC No     CBD No     Flavoring No     Other No     Unknown No      Social History     Tobacco Use    Smoking status: Former     Current packs/day: 0.00     Average packs/day: 0.5 packs/day for 32.0 years (16.0 ttl pk-yrs)     Types: Cigarettes     Start date:      Quit date: 2000     Years since quittin.0    Smokeless tobacco: Never    Tobacco comments:     no passive smoke exposure   Vaping Use    Vaping status: Never Used   Substance Use Topics    Alcohol use: Yes     Alcohol/week: 42.0 standard drinks of alcohol     Types: 42 Cans of beer per week     Comment: socially    Drug use: Never       Review of Systems   Respiratory:  Positive for shortness of breath.        Physical Exam  Physical Exam  Vitals (86-88% RA) and nursing note reviewed.   Constitutional:       General: She is not in acute distress.     Appearance: She is well-developed. She is not diaphoretic.   HENT:      Head: Normocephalic and atraumatic.      Right Ear: External ear normal.      Left Ear: External ear normal.      Nose: Nose normal.      Mouth/Throat:      Mouth: Mucous membranes are moist.   Eyes:      Conjunctiva/sclera: Conjunctivae normal.      Pupils: Pupils are equal, round, and reactive to light.   Cardiovascular:      Rate and Rhythm: Normal rate and regular rhythm.   Pulmonary:      Effort: Pulmonary effort is normal.   Abdominal:      Palpations: Abdomen is soft.      Tenderness: There is no abdominal tenderness.   Musculoskeletal:         General: Normal range of motion.      Cervical back: Normal range of motion and neck supple.   Skin:     General: Skin is warm and dry.      Capillary Refill: Capillary refill takes less than 2 seconds.      Findings: No rash.   Neurological:      Mental Status: She is alert and oriented  to person, place, and time.      Gait: Gait normal.   Psychiatric:         Behavior: Behavior normal.         Thought Content: Thought content normal.         Judgment: Judgment normal.         Vital Signs  ED Triage Vitals   Temperature Pulse Respirations Blood Pressure SpO2   01/04/24 1223 01/04/24 1223 01/04/24 1223 01/04/24 1223 01/04/24 1223   98.1 °F (36.7 °C) 77 20 134/59 93 %      Temp Source Heart Rate Source Patient Position - Orthostatic VS BP Location FiO2 (%)   01/04/24 1223 01/04/24 1223 01/04/24 1223 01/04/24 1223 --   Oral Monitor Lying Right arm       Pain Score       01/05/24 0100       No Pain           Vitals:    01/05/24 0734 01/05/24 0933 01/05/24 1225 01/05/24 1549   BP: 169/76 169/76 152/69 146/70   Pulse: (!) 110 (!) 110 83 90   Patient Position - Orthostatic VS: Lying  Lying Lying         Visual Acuity      ED Medications  Medications   albuterol (PROVENTIL HFA,VENTOLIN HFA) inhaler 2 puff (has no administration in time range)   ALPRAZolam (XANAX) tablet 0.5 mg (has no administration in time range)   amLODIPine (NORVASC) tablet 10 mg (10 mg Oral Given 1/5/24 0855)   benzonatate (TESSALON PERLES) capsule 100 mg (has no administration in time range)   bisoprolol (ZEBETA) tablet 10 mg (10 mg Oral Given 1/5/24 0854)   Fluticasone Furoate-Vilanterol 100-25 mcg/actuation 1 puff (1 puff Inhalation Given 1/5/24 0853)     And   umeclidinium 62.5 mcg/actuation inhaler AEPB 1 puff (1 puff Inhalation Given 1/5/24 0853)   nitroglycerin (NITROSTAT) SL tablet 0.4 mg (has no administration in time range)   oxyCODONE (ROXICODONE) IR tablet 5 mg (has no administration in time range)   pantoprazole (PROTONIX) EC tablet 20 mg (20 mg Oral Given 1/5/24 0855)   clopidogrel (PLAVIX) tablet 75 mg (75 mg Oral Given 1/5/24 0855)   pravastatin (PRAVACHOL) tablet 40 mg (has no administration in time range)   cyanocobalamin (VITAMIN B-12) tablet 1,000 mcg (1,000 mcg Oral Given 1/5/24 0855)   docusate sodium (COLACE)  capsule 100 mg (100 mg Oral Given 1/5/24 0855)   ondansetron (ZOFRAN) injection 4 mg (has no administration in time range)   heparin (porcine) subcutaneous injection 5,000 Units (5,000 Units Subcutaneous Given 1/5/24 1330)   thiamine tablet 100 mg (100 mg Oral Given 1/5/24 0855)   folic acid (FOLVITE) tablet 1 mg (1 mg Oral Given 1/5/24 0855)   multivitamin-minerals (CENTRUM) tablet 1 tablet (1 tablet Oral Given 1/5/24 0854)   levalbuterol (XOPENEX) inhalation solution 1.25 mg (1.25 mg Nebulization Given 1/5/24 1425)   ipratropium (ATROVENT) 0.02 % inhalation solution 0.5 mg (0.5 mg Nebulization Given 1/5/24 1425)   cefTRIAXone (ROCEPHIN) IVPB (premix in dextrose) 1,000 mg 50 mL (1,000 mg Intravenous New Bag 1/5/24 0915)   potassium chloride (K-DUR,KLOR-CON) CR tablet 20 mEq (has no administration in time range)   albuterol inhalation solution 5 mg (5 mg Nebulization Given 1/4/24 1317)   sodium chloride 0.9 % bolus 1,000 mL (0 mL Intravenous Stopped 1/4/24 1719)   iohexol (OMNIPAQUE) 350 MG/ML injection (SINGLE-DOSE) 60 mL (60 mL Intravenous Given 1/4/24 1432)   dextrose 5 % infusion 250 mL (0 mL Intravenous Stopped 1/5/24 1506)   dextrose 5 % bolus 1,000 mL (1,000 mL Intravenous New Bag 1/5/24 1510)   potassium chloride (K-DUR,KLOR-CON) CR tablet 40 mEq (40 mEq Oral Given 1/5/24 1507)       Diagnostic Studies  Results Reviewed       Procedure Component Value Units Date/Time    Sputum culture and Gram stain [[id number]]  (Abnormal) Collected: 01/04/24 2200    Lab Status: Preliminary result Specimen: Expectorated Sputum Updated: 01/05/24 1402     Gram Stain Result 1+ Epithelial cells per low power field      1+ Polys      2+ Gram positive cocci in clusters      1+ Gram positive cocci in chains      1+ Gram positive rods    Legionella antigen, urine [[id number]]  (Normal) Collected: 01/04/24 2961    Lab Status: Final result Specimen: Urine, Clean Catch Updated: 01/05/24 1212     Legionella Urinary Antigen Negative     Strep Pneumoniae, Urine [[id number]]  (Normal) Collected: 01/04/24 2338    Lab Status: Final result Specimen: Urine, Clean Catch Updated: 01/05/24 1211     Strep pneumoniae antigen, urine Negative    Blood culture [[id number]] Collected: 01/04/24 1916    Lab Status: Preliminary result Specimen: Blood from Arm, Right Updated: 01/05/24 0101     Blood Culture Received in Microbiology Lab. Culture in Progress.    Blood culture [[id number]] Collected: 01/04/24 1932    Lab Status: Preliminary result Specimen: Blood from Hand, Right Updated: 01/05/24 0101     Blood Culture Received in Microbiology Lab. Culture in Progress.    Basic metabolic panel [[id number]]  (Abnormal) Collected: 01/04/24 2110    Lab Status: Final result Specimen: Blood from Arm, Right Updated: 01/04/24 2144     Sodium 131 mmol/L      Potassium 3.7 mmol/L      Chloride 96 mmol/L      CO2 28 mmol/L      ANION GAP 7 mmol/L      BUN 8 mg/dL      Creatinine 0.49 mg/dL      Glucose 168 mg/dL      Calcium 8.9 mg/dL      eGFR 93 ml/min/1.73sq m     Narrative:      National Kidney Disease Foundation guidelines for Chronic Kidney Disease (CKD):     Stage 1 with normal or high GFR (GFR > 90 mL/min/1.73 square meters)    Stage 2 Mild CKD (GFR = 60-89 mL/min/1.73 square meters)    Stage 3A Moderate CKD (GFR = 45-59 mL/min/1.73 square meters)    Stage 3B Moderate CKD (GFR = 30-44 mL/min/1.73 square meters)    Stage 4 Severe CKD (GFR = 15-29 mL/min/1.73 square meters)    Stage 5 End Stage CKD (GFR <15 mL/min/1.73 square meters)  Note: GFR calculation is accurate only with a steady state creatinine    COVID19, Influenza A/B, RSV PCR, [facility] [[id number]]  (Normal) Collected: 01/04/24 1816    Lab Status: Final result Specimen: Nares from Nasopharyngeal Swab Updated: 01/04/24 1928     SARS-CoV-2 Negative     INFLUENZA A PCR Negative     INFLUENZA B PCR Negative     RSV PCR Negative    Narrative:      FOR PEDIATRIC PATIENTS - copy/paste COVID Guidelines URL to browser:  [email or web address]    SARS-CoV-2 assay is a Nucleic Acid Amplification assay intended for the  qualitative detection of nucleic acid from SARS-CoV-2 in nasopharyngeal  swabs. Results are for the presumptive identification of SARS-CoV-2 RNA.    Positive results are indicative of infection with SARS-CoV-2, the virus  causing COVID-19, but do not rule out bacterial infection or co-infection  with other viruses. Laboratories within the [place] and its  territories are required to report all positive results to the appropriate  public health authorities. Negative results do not preclude SARS-CoV-2  infection and should not be used as the sole basis for treatment or other  patient management decisions. Negative results must be combined with  clinical observations, patient history, and epidemiological information.  This test has not been FDA cleared or approved.    This test has been authorized by FDA under an Emergency Use Authorization  (EUA). This test is only authorized for the duration of time the  declaration that circumstances exist justifying the authorization of the  emergency use of an in vitro diagnostic tests for detection of SARS-CoV-2  virus and/or diagnosis of COVID-19 infection under section 564(b)(1) of  the Act, 21 U.S.C. 360bbb-3(b)(1), unless the authorization is terminated  or revoked sooner. The test has been validated but independent review by FDA  and CLIA is pending.    Test performed using [email or web address] Gene[employer]pert: This RT-PCR assay targets N2,  a region unique to SARS-CoV-2. A conserved region in the E-gene was chosen  for pan-Sarbecovirus detection which includes SARS-CoV-2.    According to CMS-2020-01-R, this platform meets the definition of high-throughput technology.    Procalcitonin [[id number]]  (Abnormal) Collected: 01/04/24 1816    Lab Status: Final result Specimen: Blood from Arm, Right Updated: 01/04/24 1854     Procalcitonin 0.70  ng/ml     B-Type Natriuretic Peptide(BNP) [[id number]]  (Abnormal) Collected: 01/04/24 1816    Lab Status: Final result Specimen: Blood from Arm, Right Updated: 01/04/24 1850      pg/mL     D-dimer, quantitative [[id number]]  (Abnormal) Collected: 01/04/24 1816    Lab Status: Final result Specimen: Blood from Arm, Right Updated: 01/04/24 1842     D-Dimer, Quant 1.86 ug/ml FEU     Narrative:      In the evaluation for possible pulmonary embolism, in the appropriate (Well's Score of 4 or less) patient, the age adjusted d-dimer cutoff for this patient can be calculated as:    Age x 0.01 (in ug/mL) for Age-adjusted D-dimer exclusion threshold for a patient over 50 years.    Basic metabolic panel [[id number]]  (Abnormal) Collected: 01/04/24 1316    Lab Status: Final result Specimen: Blood from Arm, Right Updated: 01/04/24 1356     Sodium 123 mmol/L      Potassium 3.8 mmol/L      Chloride 89 mmol/L      CO2 29 mmol/L      ANION GAP 5 mmol/L      BUN 9 mg/dL      Creatinine 0.63 mg/dL      Glucose 134 mg/dL      Calcium 8.9 mg/dL      eGFR 86 ml/min/1.73sq m     Narrative:      National Kidney Disease Foundation guidelines for Chronic Kidney Disease (CKD):     Stage 1 with normal or high GFR (GFR > 90 mL/min/1.73 square meters)    Stage 2 Mild CKD (GFR = 60-89 mL/min/1.73 square meters)    Stage 3A Moderate CKD (GFR = 45-59 mL/min/1.73 square meters)    Stage 3B Moderate CKD (GFR = 30-44 mL/min/1.73 square meters)    Stage 4 Severe CKD (GFR = 15-29 mL/min/1.73 square meters)    Stage 5 End Stage CKD (GFR <15 mL/min/1.73 square meters)  Note: GFR calculation is accurate only with a steady state creatinine    CBC and differential [[id number]]  (Abnormal) Collected: 01/04/24 1316    Lab Status: Final result Specimen: Blood from Arm, Right Updated: 01/04/24 1326     WBC 13.67 Thousand/uL      RBC 4.01 Million/uL      Hemoglobin 10.4 g/dL      Hematocrit 32.3 %      MCV 81 fL      MCH 25.9 pg      MCHC 32.2 g/dL      RDW  "15.5 %      MPV 9.8 fL      Platelets 311 Thousands/uL      nRBC 0 /100 WBCs      Neutrophils Relative 78 %      Immat GRANS % 1 %      Lymphocytes Relative 10 %      Monocytes Relative 9 %      Eosinophils Relative 1 %      Basophils Relative 1 %      Neutrophils Absolute 10.74 Thousands/µL      Immature Grans Absolute 0.11 Thousand/uL      Lymphocytes Absolute 1.38 Thousands/µL      Monocytes Absolute 1.18 Thousand/µL      Eosinophils Absolute 0.18 Thousand/µL      Basophils Absolute 0.08 Thousands/µL                    CTA ED chest PE study   Final Result by Chasity Bonilla MD (01/04 1456)      No definite acute pulmonary emboli but interpretation is mildly compromised by motion artifact which creates artifactual filling defects.      Patchy lateral groundglass opacity, greatest in the right upper lobe, infectious or inflammatory.      Trace right effusion.      Pulmonary artery enlargement which can be seen with pulmonary hypertension.      Moderate hiatal hernia.            Workstation performed: [id number]         VAS lower limb venous duplex study, complete bilateral    (Results Pending)              Procedures  Procedures         ED Course  ED Course as of 01/05/24 1715   Thu Jan 04, 2024   1401 Sodium(!): 123  Hyponatremia, c/w recent chronic value   1458 CTA ED chest PE study  Imaging reviewed and interpreted myself and concur with radiologist:   \"No definite acute pulmonary emboli but interpretation is mildly compromised by motion artifact which creates artifactual filling defects.     Patchy lateral groundglass opacity, greatest in the right upper lobe, infectious or inflammatory.     Trace right effusion.     Pulmonary artery enlargement which can be seen with pulmonary hypertension.\"   Ambulated in ED and dropped to 70%.  I am recommending admission for COPD exacerbation with recent stress and hospitalization for serial nebs and pulmonary toilet, PT/OT, and to consider home " oxygen                            SBIRT 20yo+      Flowsheet Row Most Recent Value   Initial Alcohol Screen: US AUDIT-C     1. How often do you have a drink containing alcohol? 1 Filed at: 01/04/2024 1222   2. How many drinks containing alcohol do you have on a typical day you are drinking?  0 Filed at: 01/04/2024 1222   3b. FEMALE Any Age, or MALE 65+: How often do you have 4 or more drinks on one occassion? 0 Filed at: 01/04/2024 1222   Audit-C Score 1 Filed at: 01/04/2024 1222   VENITA: How many times in the past year have you...    Used an illegal drug or used a prescription medication for non-medical reasons? Never Filed at: 01/04/2024 1222            Wells' Criteria for PE      Flowsheet Row Most Recent Value   Wells' Criteria for PE    Clinical signs and symptoms of DVT 0 Filed at: 01/04/2024 1344   PE is primary diagnosis or equally likely 3 Filed at: 01/04/2024 1344   HR >100 0 Filed at: 01/04/2024 1344   Immobilization at least 3 days or Surgery in the previous 4 weeks 1.5 Filed at: 01/04/2024 1344   Previous, objectively diagnosed PE or DVT 0 Filed at: 01/04/2024 1344   Hemoptysis --   Malignancy with treatment within 6 months or palliative 0 Filed at: 01/04/2024 1344   Wells' Criteria Total 4.5 Filed at: 01/04/2024 1344                  Medical Decision Making  Based on my history and physical examination, I considered the following life or limb threatening conditions in my differential diagnosis:  PE, pneumonia, pleural effusion, acute on chronic COPD exacerbation, poor pulmonary toilet.      Based on my clinical acumen, I will order the appropriate diagnostic testing to narrow my differential diagnosis and arrive at a final diagnosis.      Amount and/or Complexity of Data Reviewed  Labs: ordered. Decision-making details documented in ED Course.  Radiology: ordered. Decision-making details documented in ED Course.    Risk  Prescription drug management.  Decision regarding hospitalization.              Disposition  Final diagnoses:   Hypoxia   COPD (chronic obstructive pulmonary disease) ([facility])     Time reflects when diagnosis was documented in both MDM as applicable and the Disposition within this note       Time User Action Codes Description Comment    1/4/2024  4:08 PM Rohan Mariee Add [R09.02] Hypoxia     1/4/2024  4:08 PM Rohan Mariee Add [J44.9] COPD (chronic obstructive pulmonary disease) ([facility])     1/4/2024  5:51 PM Gabrielle Garcia Add [E87.1] Hyponatremia           ED Disposition       ED Disposition   Admit    Condition   Good    Date/Time   Thu Jan 4, 2024 9919    Comment   Case was discussed with Dr. Fisher and the patient's admission status was agreed to be Admission Status: observation status to the service of Dr. Fisher.               Follow-up Information    None         Current Discharge Medication List        CONTINUE these medications which have NOT CHANGED    Details   Air  ([employer] Air Purifier/HEPA) (Device) MISC Use as directed  Qty: 1 each, Refills: 0    Associated Diagnoses: Acute exacerbation of chronic obstructive pulmonary disease (COPD) ([facility])      albuterol (ACCUNEB) 1.25 MG/3ML nebulizer solution Take 3 mL by nebulization every 6 (six) hours as needed for wheezing  Qty: 225 mL, Refills: 5    Associated Diagnoses: Moderate asthma, unspecified whether complicated, unspecified whether persistent      albuterol (PROVENTIL HFA,VENTOLIN HFA) 90 mcg/act inhaler Inhale 2 puffs every 6 (six) hours as needed for wheezing  Qty: 18 g, Refills: 5    Comments: Substitution to a formulary equivalent within the same pharmaceutical class is authorized.  Associated Diagnoses: Chronic obstructive pulmonary disease, unspecified COPD type ([facility])      ALPRAZolam (XANAX) 0.5 mg tablet Take 1 tablet (0.5 mg total) by mouth daily at bedtime as needed for anxiety  Qty: 30 tablet, Refills: 3    Associated Diagnoses: Anxiety      amLODIPine (NORVASC) 5 mg tablet Take 1 tablet (5 mg  total) by mouth 2 (two) times a day  Qty: 180 tablet, Refills: 3    Associated Diagnoses: Essential hypertension      benzonatate (TESSALON PERLES) 100 mg capsule Take 1 capsule (100 mg total) by mouth 3 (three) times a day as needed for cough  Qty: 20 capsule, Refills: 0    Associated Diagnoses: Acute URI      bisoprolol (ZEBETA) 10 MG tablet Take 1 tablet (10 mg total) by mouth daily  Qty: 90 tablet, Refills: 3    Associated Diagnoses: Benign hypertension      Calcium Carbonate-Vit D-Min (Caltrate 600+D Plus Minerals) 600-800 MG-UNIT TABS Take 1 tablet by mouth 2 (two) times a day with meals  Qty: 200 tablet, Refills: 6    Associated Diagnoses: Idiopathic osteoporosis      co-enzyme Q-10 30 MG capsule Take 1 capsule (30 mg total) by mouth 2 (two) times a day  Qty: 200 capsule, Refills: 3    Associated Diagnoses: Hyperlipidemia associated with type 2 diabetes mellitus       denosumab (PROLIA) 60 mg/mL Inject 1 mL (60 mg total) under the skin once for 1 dose  Qty: 1 mL, Refills: 1    Associated Diagnoses: Idiopathic osteoporosis      ergocalciferol (VITAMIN D2) 50,000 units Take 1 capsule (50,000 Units total) by mouth once a week  Qty: 13 capsule, Refills: 3    Associated Diagnoses: Low vitamin D level      estradiol (ESTRACE VAGINAL) 0.1 mg/g vaginal cream Insert 1 g into the vagina 2 (two) times a day for 14 days  Qty: 42.5 g, Refills: 1    Associated Diagnoses: Vaginal erosion secondary to pessary use, initial encounter       fluticasone-umeclidinium-vilanterol (Trelegy Ellipta) 100-62.5-25 mcg/actuation inhaler Inhale 1 puff daily Rinse mouth after use.  Qty: 3 each, Refills: 3    Associated Diagnoses: Chronic obstructive pulmonary disease, unspecified COPD type (HCC)      glucose blood test strip Use 1 each as needed (as needed) Use as instructed  Qty: 100 each, Refills: 5    Associated Diagnoses: Diabetic complication (HCC)      ipratropium-albuterol (DUO-NEB) 0.5-2.5 mg/3 mL nebulizer solution Take 3 mL by  nebulization every 6 (six) hours as needed for wheezing or shortness of breath  Qty: 270 mL, Refills: 3    Associated Diagnoses: Pulmonary emphysema, unspecified emphysema type (HCC)      magnesium (MAGTAB) 84 MG (7MEQ) TBCR Take 1 tablet (84 mg total) by mouth 2 (two) times a day  Qty: 180 tablet, Refills: 3    Associated Diagnoses: Hypomagnesemia      nitroglycerin (NITROSTAT) 0.4 mg SL tablet Place 1 tablet (0.4 mg total) under the tongue every 5 (five) minutes as needed for chest pain  Qty: 30 tablet, Refills: 5    Associated Diagnoses: Coronary artery disease due to lipid rich plaque      ondansetron (ZOFRAN-ODT) 4 mg disintegrating tablet Take 1 tablet (4 mg total) by mouth every 8 (eight) hours as needed for nausea or vomiting  Qty: 20 tablet, Refills: 0    Associated Diagnoses: Nausea      oxyCODONE (ROXICODONE) 5 immediate release tablet Take 1 tablet (5 mg total) by mouth every 4 (four) hours as needed for severe pain for up to 10 days Max Daily Amount: 30 mg  Qty: 10 tablet, Refills: 0    Associated Diagnoses: Closed nondisplaced fracture of surgical neck of left humerus, unspecified fracture morphology, initial encounter      pantoprazole (PROTONIX) 20 mg tablet Take 1 tablet (20 mg total) by mouth daily  Qty: 90 tablet, Refills: 3    Associated Diagnoses: Gastroesophageal reflux disease without esophagitis      Plavix 75 MG tablet Take 1 tablet (75 mg total) by mouth daily  Qty: 90 tablet, Refills: 3    Associated Diagnoses: Coronary artery disease due to lipid rich plaque      Premarin vaginal cream Insert 0.625 g into the vagina daily  Qty: 30 g, Refills: 0    Associated Diagnoses: Vaginal erosion secondary to pessary use, sequela      Red Yeast Rice 600 MG CAPS Take 1 capsule (600 mg total) by mouth 2 (two) times a day with meals  Qty: 200 capsule, Refills: 3    Associated Diagnoses: Hyperlipidemia associated with type 2 diabetes mellitus       rosuvastatin (CRESTOR) 5 mg tablet Take 1 tablet (5 mg  total) by mouth daily  Qty: 90 tablet, Refills: 3    Associated Diagnoses: Hyperlipidemia associated with type 2 diabetes mellitus       vitamin B-12 (VITAMIN B-12) 1,000 mcg tablet Take 1 tablet (1,000 mcg total) by mouth daily  Qty: 90 tablet, Refills: 3    Associated Diagnoses: Low vitamin B12 level             No discharge procedures on file.    PDMP Review         Value Time User    PDMP Reviewed  Yes 12/14/2023 11:47 AM Ramsey Gtz MD            ED Provider  Electronically Signed by             Rohan Mariee MD  01/05/24 [id number]

## 2024-01-04 NOTE — ASSESSMENT & PLAN NOTE
Recently hospitalized 1/1 for this  Continue analgesics sling, nonweightbearing  Outpatient follow-up with orthopedic surgery in 4 weeks

## 2024-01-04 NOTE — Clinical Note
Case was discussed with Dr. Hays and the patient's admission status was agreed to be Admission Status: observation status to the service of Dr. Hays .

## 2024-01-04 NOTE — ASSESSMENT & PLAN NOTE
Patient is a 78-year-old female with past medical history significant for recent admission 1/1 - 1/2 at [facility] with left humeral fracture and L1 compression fracture after a fall.  Patient was sent to the ER by physical therapy for hypoxia    Patient reportedly had oxygenation of 80% on room air with home physical therapy  Patient was reported to have oxygen saturation in the mid 80s on room air at rest and decreased to the 70s with exertion while in the ER  CTA chest PE study with motion artifact however no definitive PE  CAT scan with patchy bilateral groundglass opacities, greatest in the right upper lobe  Will be admitted for evaluation of the etiology of her hypoxia  Underlying COPD and follows with Dr. Lama: However no previous hypoxia  A) Evaluate For infectious etiology with groundglass opacities  Will check COVID, influenza, RSV   check Legionella/strep pneumoniae antigen as well as procalcitonin  B) eval for PE  CT scan indeterminate due to motion artifact:    will check d dimer and BLE dopplers  Will d/w pulmonary team if VQ indicated   C) evaluate for CHF  Check 2D echocardiogram to evaluate for congestive heart failure, especially in light of enlarged pulmonary artery and possible pulmonary hypertension  Check BNP  Currently with adequate oxygenation with 2-3 L nasal cannula

## 2024-01-04 NOTE — ASSESSMENT & PLAN NOTE
Cont home xanax:  confirmed on PDMP website.  No red flags  Fu with pcp-  not ideal medication in elderly.  As chronic medication will not dc

## 2024-01-04 NOTE — H&P
[facility]  H&P  Name: Emily Berrios 78 y.o. female I MRN: [id number]  Unit/Bed#: ED-38 I Date of Admission: 1/4/2024   Date of Service: 1/4/2024 I Hospital Day: 0      Assessment/Plan   * Hypoxia  Assessment & Plan  Patient is a 78-year-old female with past medical history significant for recent admission 1/1 - 1/2 at [facility] with left humeral fracture and L1 compression fracture after a fall.  Patient was sent to the ER by physical therapy for hypoxia    Patient reportedly had oxygenation of 80% on room air with home physical therapy  Patient was reported to have oxygen saturation in the mid 80s on room air at rest and decreased to the 70s with exertion while in the ER  CTA chest PE study with motion artifact however no definitive PE  CAT scan with patchy bilateral groundglass opacities, greatest in the right upper lobe  Will be admitted for evaluation of the etiology of her hypoxia  Underlying COPD and follows with Dr. Lama: However no previous hypoxia  A) Evaluate For infectious etiology with groundglass opacities  Will check COVID, influenza, RSV   check Legionella/strep pneumoniae antigen as well as procalcitonin  B) eval for PE  CT scan indeterminate due to motion artifact:    will check d dimer and BLE dopplers  Will d/w pulmonary team if VQ indicated   C) evaluate for CHF  Check 2D echocardiogram to evaluate for congestive heart failure, especially in light of enlarged pulmonary artery and possible pulmonary hypertension  Check BNP  Currently with adequate oxygenation with 2-3 L nasal cannula      Alcohol use  Assessment & Plan  Pt drinks 6 pack of beer or more daily  CIWA scale  Thiamine and folic acid   for alcohol cessation counseling    Hyponatremia  Assessment & Plan  Patient presented with profound hyponatremia with a sodium of 123  During her admission earlier this week her sodium was 124 on admission and improved to 133 prior to  discharge  Will check urine and serum osmolalities  Reviewed outpatient medications: Patient is not on an SSRI, not on diuretics, is on a proton pump inhibitor  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  No red flags  Fu with pcp-  not ideal medication in elderly.  As chronic medication will not dc    Closed compression fracture of L1 vertebra (HCC)  Assessment & Plan  lidoderm patch, analgesics, physical therapy    Closed nondisplaced fracture of surgical neck of left humerus  Assessment & Plan  Recently hospitalized 1/1 for this  Continue analgesics sling, nonweightbearing  Outpatient follow-up with orthopedic surgery in 4 weeks    Chronic obstructive pulmonary disease, unspecified COPD type (HCC)  Assessment & Plan  Patient has a history of COPD and follows with [facility]  At their office note from 10/23 was reviewed: Patient's symptoms were controlled on Trelegy as well as albuterol as needed    Primary hypertension  Assessment & Plan  Continue home Norvasc 5 mg twice a day and bisoprolol 10 mg daily    Chronic coronary artery disease  Assessment & Plan  No symptoms consistent with acute ischemia  Continue home bisoprolol and Plavix  Continuestatin           ==================================================    History of Present Illness     HPI:  Emily Berrios is a 78 y.o. female who to the ER by her physical therapist due to concerns of her hypoxia.  Saturations were reportedly in the 80s when exercising, on room air    Patient relates that she has had chest congestion and a cough for the past 2 weeks.  She notes phlegm.  She notes she had dyspnea for the past 2 weeks.  She states at home she was using her nebulizer as well as inhalers with improvement    Patient notes she has pain in  her left arm, from her humeral fracture.  She denies any pain anywhere else.  Denies any chest pain.  She denies any abdominal pain.  Denies any nausea, vomiting, diarrhea.    Was noted of hyponatremia.  She notes she drinks only small amounts of liquids during the day.  She however states she drinks approximately 6 beers daily.    Patient's son relates that patient's  passed away recently and she has had poor appetite, poor p.o. intake, and possibly had been drinking more than 6 beers daily due to grief.    Patient denies any abdominal pain, nausea, vomiting, diarrhea or constipation.  Denies any dizziness or lightheadedness.        Historical Information   Past Medical History:   Diagnosis Date    Arthritis     Asthma     Chronic obstructive pulmonary disease, unspecified COPD type ([facility]) 04/08/2022    Cognitive decline 03/10/2023    Community acquired pneumonia 11/13/2019    COPD (chronic obstructive pulmonary disease) (Former[facility])     Coronary artery disease     DM (diabetes mellitus) ([facility])     HTN (hypertension)     Hydronephrosis, bilateral 03/06/2023    Hyperlipidemia     Hyponatremia     Urinary retention      Patient Active Problem List   Diagnosis    Asthma    Chronic coronary artery disease    Osteoarthritis    Glaucoma    Hiatal hernia    Hyperlipidemia    Primary hypertension    Hx of CABG    Hx stent of SVG to the RCA    Community acquired pneumonia    Chronic obstructive pulmonary disease, unspecified COPD type ([facility])    Hyponatremia    Hypomagnesemia    Hydronephrosis, bilateral    Cystocele with prolapse    Cognitive decline    Pulmonary emphysema ([facility])    Ambulatory dysfunction    Chronic rhinitis    Vaginal erosion secondary to pessary use     Complete uterovaginal prolapse    Closed nondisplaced fracture of surgical neck of left humerus    Closed compression fracture of L1 vertebra ([facility])    Hypoxia    Insomnia    Alcohol use     Past Surgical History:   Procedure Laterality Date    CARDIAC  CATHETERIZATION      CORONARY ARTERY BYPASS GRAFT  2010    with subsequent stent to the saphenous veingraft to the RCA 3 months later    KNEE ARTHROSCOPY      POLYPECTOMY      laryngeal    TOTAL HIP ARTHROPLASTY      TUBAL LIGATION         Social History   Social History     Substance and Sexual Activity   Alcohol Use Yes    Alcohol/week: 42.0 standard drinks of alcohol    Types: 42 Cans of beer per week    Comment: socially     Social History     Substance and Sexual Activity   Drug Use Never     Social History     Tobacco Use   Smoking Status Former    Current packs/day: 0.00    Average packs/day: 0.5 packs/day for 32.0 years (16.0 ttl pk-yrs)    Types: Cigarettes    Start date:     Quit date:     Years since quittin.0   Smokeless Tobacco Never   Tobacco Comments    no passive smoke exposure       Family History:   Family History   Problem Relation Age of Onset    Stroke Mother     Hypertension Mother     Heart disease Mother     Colon cancer Father     No Known Problems Sister     No Known Problems Sister     No Known Problems Sister     No Known Problems Daughter     No Known Problems Daughter     No Known Problems Daughter     No Known Problems Maternal Grandmother     No Known Problems Maternal Grandfather     No Known Problems Paternal Grandmother     No Known Problems Paternal Grandfather     Heart attack Brother     Heart attack Brother     No Known Problems Maternal Aunt     No Known Problems Maternal Aunt     No Known Problems Paternal Aunt     No Known Problems Paternal Aunt        Meds/Allergies       Current Facility-Administered Medications:     folic acid (FOLVITE) tablet 1 mg, 1 mg, Oral, Daily, Gabrielle Garcia MD    multivitamin-minerals (CENTRUM) tablet 1 tablet, 1 tablet, Oral, Daily, Gabrielle Garcia MD    thiamine tablet 100 mg, 100 mg, Oral, Daily, Gabrielle Garcia MD    Current Outpatient Medications:     Air  ([employer] Air Purifier/HEPA) (Device) MISC, Use as  directed (Patient not taking: Reported on 11/22/2023), Disp: 1 each, Rfl: 0    albuterol (ACCUNEB) 1.25 MG/3ML nebulizer solution, Take 3 mL by nebulization every 6 (six) hours as needed for wheezing, Disp: 225 mL, Rfl: 5    albuterol (PROVENTIL HFA,VENTOLIN HFA) 90 mcg/act inhaler, Inhale 2 puffs every 6 (six) hours as needed for wheezing, Disp: 18 g, Rfl: 5    ALPRAZolam (XANAX) 0.5 mg tablet, Take 1 tablet (0.5 mg total) by mouth daily at bedtime as needed for anxiety, Disp: 30 tablet, Rfl: 3    amLODIPine (NORVASC) 5 mg tablet, Take 1 tablet (5 mg total) by mouth 2 (two) times a day, Disp: 180 tablet, Rfl: 3    benzonatate (TESSALON PERLES) 100 mg capsule, Take 1 capsule (100 mg total) by mouth 3 (three) times a day as needed for cough, Disp: 20 capsule, Rfl: 0    bisoprolol (ZEBETA) 10 MG tablet, Take 1 tablet (10 mg total) by mouth daily, Disp: 90 tablet, Rfl: 3    Calcium Carbonate-Vit D-Min (Caltrate 600+D Plus Minerals) 600-800 MG-UNIT TABS, Take 1 tablet by mouth 2 (two) times a day with meals, Disp: 200 tablet, Rfl: 6    co-enzyme Q-10 30 MG capsule, Take 1 capsule (30 mg total) by mouth 2 (two) times a day, Disp: 200 capsule, Rfl: 3    denosumab (PROLIA) 60 mg/mL, Inject 1 mL (60 mg total) under the skin once for 1 dose (Patient not taking: Reported on 11/22/2023), Disp: 1 mL, Rfl: 1    ergocalciferol (VITAMIN D2) 50,000 units, Take 1 capsule (50,000 Units total) by mouth once a week, Disp: 13 capsule, Rfl: 3    estradiol (ESTRACE VAGINAL) 0.1 mg/g vaginal cream, Insert 1 g into the vagina 2 (two) times a day for 14 days, Disp: 42.5 g, Rfl: 1    fluticasone-umeclidinium-vilanterol (Trelegy Ellipta) 100-62.5-25 mcg/actuation inhaler, Inhale 1 puff daily Rinse mouth after use., Disp: 3 each, Rfl: 3    glucose blood test strip, Use 1 each as needed (as needed) Use as instructed, Disp: 100 each, Rfl: 5    ipratropium-albuterol (DUO-NEB) 0.5-2.5 mg/3 mL nebulizer solution, Take 3 mL by nebulization every 6  (six) hours as needed for wheezing or shortness of breath, Disp: 270 mL, Rfl: 3    magnesium (MAGTAB) 84 MG (7MEQ) TBCR, Take 1 tablet (84 mg total) by mouth 2 (two) times a day, Disp: 180 tablet, Rfl: 3    nitroglycerin (NITROSTAT) 0.4 mg SL tablet, Place 1 tablet (0.4 mg total) under the tongue every 5 (five) minutes as needed for chest pain, Disp: 30 tablet, Rfl: 5    ondansetron (ZOFRAN-ODT) 4 mg disintegrating tablet, Take 1 tablet (4 mg total) by mouth every 8 (eight) hours as needed for nausea or vomiting, Disp: 20 tablet, Rfl: 0    oxyCODONE (ROXICODONE) 5 immediate release tablet, Take 1 tablet (5 mg total) by mouth every 4 (four) hours as needed for severe pain for up to 10 days Max Daily Amount: 30 mg, Disp: 10 tablet, Rfl: 0    pantoprazole (PROTONIX) 20 mg tablet, Take 1 tablet (20 mg total) by mouth daily, Disp: 90 tablet, Rfl: 3    Plavix 75 MG tablet, Take 1 tablet (75 mg total) by mouth daily, Disp: 90 tablet, Rfl: 3    Premarin vaginal cream, Insert 0.625 g into the vagina daily (Patient not taking: Reported on 11/22/2023), Disp: 30 g, Rfl: 0    Red Yeast Rice 600 MG CAPS, Take 1 capsule (600 mg total) by mouth 2 (two) times a day with meals, Disp: 200 capsule, Rfl: 3    rosuvastatin (CRESTOR) 5 mg tablet, Take 1 tablet (5 mg total) by mouth daily, Disp: 90 tablet, Rfl: 3    vitamin B-12 (VITAMIN B-12) 1,000 mcg tablet, Take 1 tablet (1,000 mcg total) by mouth daily, Disp: 90 tablet, Rfl: 3    Allergies   Allergen Reactions    Ace Inhibitors Other (See Comments)     Includes lisinopril    Angiotensin Receptor Blockers Other (See Comments)     Not specified.  Includes losartan and olmesartan    Ezetimibe Rash    Statins Arthralgia     Tolerates Crestor    Morphine Hives     pt states someone told her she is allergic to it after her hip surgery       Review of Systems  A detailed 12 point review of systems was conducted and is negative apart from those mentioned in the HPI.        Objective   Vitals:  Blood pressure 151/69, pulse 88, temperature 98.1 °F (36.7 °C), temperature source Oral, resp. rate 18, SpO2 95%.    Physical Exam   General: Pleasant female.  No acute distress.  Nontachypneic   HEENT: EOMI, sclera anicteric, dry mucous membranes,  Heart: Regular rate and rhythm, S1S2 present.  No murmur, rub or gallop.    Lungs; bibasilar and midlung field dry crackles.  No wheezing.  Faint scattered rhonchi.  No accessory muscle use or respiratory distress.  Abdomen: soft, non-tender, non-distended, NABS.  No guarding or rebound. No peritoneal sound or mass.  Extremities: no clubbing, cyanosis, or edema.  2+ pedal pulses bilaterally.  Left arm in sling  Neurologic: Alert.  Fluent speech.  Interactive  Skin: warm and dry. No petechiae, purpura or rash.    Lab Results:   Results from last 7 days   Lab Units 01/04/24  1316 01/01/24  0506 01/01/24  0104   WBC Thousand/uL 13.67* 14.47* 13.14*   HEMOGLOBIN g/dL 10.4* 11.6 11.5   HEMATOCRIT % 32.3* 37.6 36.6   PLATELETS Thousands/uL 311 376 401*     Results from last 7 days   Lab Units 01/04/24  1316 01/02/24  0835 01/01/24  2356   POTASSIUM mmol/L 3.8 3.6 3.9   CHLORIDE mmol/L 89* 100 96   CO2 mmol/L 29 27 26   BUN mg/dL 9 6 8   CREATININE mg/dL 0.63 0.49* 0.56*   CALCIUM mg/dL 8.9 8.1* 8.8         Imaging:  CTA chest PE study:  no definite acute pulmonary emboli but interpretation is mildly compromised by motion artifact which creates artifactual filling defects.   Patchy lateral groundglass opacity, greatest in the right upper lobe, infectious or inflammatory.   Trace right effusion.   Pulmonary artery enlargement which can be seen with pulmonary hypertension.   Moderate hiatal hernia.    EKG: NSR.  No acute ST or T wave changes        Code Status: Full code    disposition: Due to patient's hypoxia, and hyponatremia, she will be admitted to the hospital.  It is anticipated that her length of stay will be greater than 2 midnights and should be placed on inpatient  status.  Initially in the ER she was placed on observation, however upon full review of the chart, including the hyponatremia and the abnormal CAT scan it was determined she would require greater than 2 midnights for complete workup    Family: : Updated son Román Pandey via phone.  Reviewed all test results, and treatment plan.  Answered all questions    Portions of the record may have been created with voice recognition software.

## 2024-01-04 NOTE — ASSESSMENT & PLAN NOTE
Patient has a history of COPD and follows with [facility]  At their office note from 10/23 was reviewed: Patient's symptoms were controlled on Trelegy as well as albuterol as needed

## 2024-01-04 NOTE — ASSESSMENT & PLAN NOTE
Pt drinks 6 pack of beer or more daily  CIWA scale  Thiamine and folic acid   for alcohol cessation counseling

## 2024-01-04 NOTE — ASSESSMENT & PLAN NOTE
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

## 2024-01-05 ENCOUNTER — HOME HEALTH ADMISSION (OUTPATIENT)
Dept: HOME HEALTH SERVICES | Facility: HOME HEALTHCARE | Age: 79
End: 2024-01-05
Payer: MEDICARE

## 2024-01-05 ENCOUNTER — APPOINTMENT (INPATIENT)
Dept: NON INVASIVE DIAGNOSTICS | Facility: HOSPITAL | Age: 79
DRG: 193 | End: 2024-01-05
Payer: MEDICARE

## 2024-01-05 LAB
ANION GAP SERPL CALCULATED.3IONS-SCNC: 7 MMOL/L
ANION GAP SERPL CALCULATED.3IONS-SCNC: 8 MMOL/L
ANION GAP SERPL CALCULATED.3IONS-SCNC: 8 MMOL/L
AORTIC ROOT: 2.9 CM
AORTIC VALVE MEAN VELOCITY: 13.1 M/S
APICAL FOUR CHAMBER EJECTION FRACTION: 67 %
AV LVOT MEAN GRADIENT: 4 MMHG
AV LVOT PEAK GRADIENT: 7 MMHG
AV MEAN GRADIENT: 8 MMHG
AV PEAK GRADIENT: 14 MMHG
AV VELOCITY RATIO: 0.68
BUN SERPL-MCNC: 4 MG/DL (ref 5–25)
BUN SERPL-MCNC: 5 MG/DL (ref 5–25)
BUN SERPL-MCNC: 5 MG/DL (ref 5–25)
CALCIUM SERPL-MCNC: 8.8 MG/DL (ref 8.4–10.2)
CALCIUM SERPL-MCNC: 9.2 MG/DL (ref 8.4–10.2)
CALCIUM SERPL-MCNC: 9.6 MG/DL (ref 8.4–10.2)
CHLORIDE SERPL-SCNC: 95 MMOL/L (ref 96–108)
CHLORIDE SERPL-SCNC: 95 MMOL/L (ref 96–108)
CHLORIDE SERPL-SCNC: 97 MMOL/L (ref 96–108)
CO2 SERPL-SCNC: 29 MMOL/L (ref 21–32)
CO2 SERPL-SCNC: 30 MMOL/L (ref 21–32)
CO2 SERPL-SCNC: 30 MMOL/L (ref 21–32)
CREAT SERPL-MCNC: 0.39 MG/DL (ref 0.6–1.3)
CREAT SERPL-MCNC: 0.41 MG/DL (ref 0.6–1.3)
CREAT SERPL-MCNC: 0.47 MG/DL (ref 0.6–1.3)
DOP CALC AO PEAK VEL: 1.86 M/S
DOP CALC AO VTI: 41.82 CM
DOP CALC LVOT PEAK VEL VTI: 28.07 CM
DOP CALC LVOT PEAK VEL: 1.27 M/S
E WAVE DECELERATION TIME: 108 MS
E/A RATIO: 0.67
ERYTHROCYTE [DISTWIDTH] IN BLOOD BY AUTOMATED COUNT: 15.6 % (ref 11.6–15.1)
FRACTIONAL SHORTENING: 43 (ref 28–44)
GFR SERPL CREATININE-BSD FRML MDRD: 100 ML/MIN/1.73SQ M
GFR SERPL CREATININE-BSD FRML MDRD: 94 ML/MIN/1.73SQ M
GFR SERPL CREATININE-BSD FRML MDRD: 99 ML/MIN/1.73SQ M
GLUCOSE SERPL-MCNC: 105 MG/DL (ref 65–140)
GLUCOSE SERPL-MCNC: 137 MG/DL (ref 65–140)
GLUCOSE SERPL-MCNC: 181 MG/DL (ref 65–140)
HCT VFR BLD AUTO: 34.1 % (ref 34.8–46.1)
HGB BLD-MCNC: 11.1 G/DL (ref 11.5–15.4)
INTERVENTRICULAR SEPTUM IN DIASTOLE (PARASTERNAL SHORT AXIS VIEW): 0.8 CM
INTERVENTRICULAR SEPTUM: 0.8 CM (ref 0.6–1.1)
L PNEUMO1 AG UR QL IA.RAPID: NEGATIVE
LAAS-AP2: 17.4 CM2
LAAS-AP4: 18.6 CM2
LEFT ATRIUM SIZE: 3.2 CM
LEFT ATRIUM VOLUME (MOD BIPLANE): 53 ML
LEFT ATRIUM VOLUME INDEX (MOD BIPLANE): 33.5 ML/M2
LEFT INTERNAL DIMENSION IN SYSTOLE: 2.1 CM (ref 2.1–4)
LEFT VENTRICULAR INTERNAL DIMENSION IN DIASTOLE: 3.7 CM (ref 3.5–6)
LEFT VENTRICULAR POSTERIOR WALL IN END DIASTOLE: 0.8 CM
LEFT VENTRICULAR STROKE VOLUME: 42 ML
LVSV (TEICH): 42 ML
MCH RBC QN AUTO: 25.9 PG (ref 26.8–34.3)
MCHC RBC AUTO-ENTMCNC: 32.6 G/DL (ref 31.4–37.4)
MCV RBC AUTO: 80 FL (ref 82–98)
MV E'TISSUE VEL-SEP: 6 CM/S
MV PEAK A VEL: 1.63 M/S
MV PEAK E VEL: 109 CM/S
MV STENOSIS PRESSURE HALF TIME: 31 MS
MV VALVE AREA P 1/2 METHOD: 7.1
OSMOLALITY UR/SERPL-RTO: 288 MMOL/KG (ref 282–298)
OSMOLALITY UR: 184 MMOL/KG
PLATELET # BLD AUTO: 356 THOUSANDS/UL (ref 149–390)
PMV BLD AUTO: 9.4 FL (ref 8.9–12.7)
POTASSIUM SERPL-SCNC: 3.4 MMOL/L (ref 3.5–5.3)
POTASSIUM SERPL-SCNC: 3.8 MMOL/L (ref 3.5–5.3)
POTASSIUM SERPL-SCNC: 4.3 MMOL/L (ref 3.5–5.3)
RBC # BLD AUTO: 4.28 MILLION/UL (ref 3.81–5.12)
RIGHT VENTRICLE ID DIMENSION: 3.9 CM
S PNEUM AG UR QL: NEGATIVE
SL CV LEFT ATRIUM LENGTH A2C: 5 CM
SL CV LV EF: 65
SL CV PED ECHO LEFT VENTRICLE DIASTOLIC VOLUME (MOD BIPLANE) 2D: 57 ML
SL CV PED ECHO LEFT VENTRICLE SYSTOLIC VOLUME (MOD BIPLANE) 2D: 14 ML
SODIUM SERPL-SCNC: 132 MMOL/L (ref 135–147)
SODIUM SERPL-SCNC: 133 MMOL/L (ref 135–147)
SODIUM SERPL-SCNC: 134 MMOL/L (ref 135–147)
TR MAX PG: 57 MMHG
TR PEAK VELOCITY: 3.8 M/S
TRICUSPID ANNULAR PLANE SYSTOLIC EXCURSION: 2.2 CM
TRICUSPID VALVE PEAK REGURGITATION VELOCITY: 3.79 M/S
WBC # BLD AUTO: 13.71 THOUSAND/UL (ref 4.31–10.16)

## 2024-01-05 PROCEDURE — 94762 N-INVAS EAR/PLS OXIMTRY CONT: CPT

## 2024-01-05 PROCEDURE — 80048 BASIC METABOLIC PNL TOTAL CA: CPT | Performed by: NURSE PRACTITIONER

## 2024-01-05 PROCEDURE — 94640 AIRWAY INHALATION TREATMENT: CPT

## 2024-01-05 PROCEDURE — 99232 SBSQ HOSP IP/OBS MODERATE 35: CPT | Performed by: INTERNAL MEDICINE

## 2024-01-05 PROCEDURE — 80048 BASIC METABOLIC PNL TOTAL CA: CPT | Performed by: INTERNAL MEDICINE

## 2024-01-05 PROCEDURE — 93970 EXTREMITY STUDY: CPT

## 2024-01-05 PROCEDURE — 83930 ASSAY OF BLOOD OSMOLALITY: CPT | Performed by: INTERNAL MEDICINE

## 2024-01-05 PROCEDURE — 94760 N-INVAS EAR/PLS OXIMETRY 1: CPT

## 2024-01-05 PROCEDURE — 85027 COMPLETE CBC AUTOMATED: CPT | Performed by: INTERNAL MEDICINE

## 2024-01-05 PROCEDURE — 97166 OT EVAL MOD COMPLEX 45 MIN: CPT

## 2024-01-05 PROCEDURE — 93306 TTE W/DOPPLER COMPLETE: CPT

## 2024-01-05 PROCEDURE — 99223 1ST HOSP IP/OBS HIGH 75: CPT | Performed by: INTERNAL MEDICINE

## 2024-01-05 PROCEDURE — 99223 1ST HOSP IP/OBS HIGH 75: CPT | Performed by: NURSE PRACTITIONER

## 2024-01-05 PROCEDURE — 93306 TTE W/DOPPLER COMPLETE: CPT | Performed by: INTERNAL MEDICINE

## 2024-01-05 PROCEDURE — 97163 PT EVAL HIGH COMPLEX 45 MIN: CPT

## 2024-01-05 RX ORDER — POTASSIUM CHLORIDE 20 MEQ/1
20 TABLET, EXTENDED RELEASE ORAL ONCE
Status: COMPLETED | OUTPATIENT
Start: 2024-01-05 | End: 2024-01-05

## 2024-01-05 RX ORDER — CEFTRIAXONE 1 G/50ML
1000 INJECTION, SOLUTION INTRAVENOUS EVERY 24 HOURS
Status: DISCONTINUED | OUTPATIENT
Start: 2024-01-05 | End: 2024-01-08 | Stop reason: HOSPADM

## 2024-01-05 RX ORDER — POTASSIUM CHLORIDE 20 MEQ/1
40 TABLET, EXTENDED RELEASE ORAL ONCE
Status: COMPLETED | OUTPATIENT
Start: 2024-01-05 | End: 2024-01-05

## 2024-01-05 RX ORDER — LEVALBUTEROL INHALATION SOLUTION 1.25 MG/3ML
1.25 SOLUTION RESPIRATORY (INHALATION)
Status: DISCONTINUED | OUTPATIENT
Start: 2024-01-05 | End: 2024-01-08

## 2024-01-05 RX ORDER — DEXTROSE MONOHYDRATE 50 MG/ML
250 INJECTION, SOLUTION INTRAVENOUS ONCE
Status: COMPLETED | OUTPATIENT
Start: 2024-01-05 | End: 2024-01-05

## 2024-01-05 RX ADMIN — THIAMINE HCL TAB 100 MG 100 MG: 100 TAB at 08:55

## 2024-01-05 RX ADMIN — AMLODIPINE BESYLATE 10 MG: 10 TABLET ORAL at 18:21

## 2024-01-05 RX ADMIN — LEVALBUTEROL HYDROCHLORIDE 1.25 MG: 1.25 SOLUTION RESPIRATORY (INHALATION) at 19:49

## 2024-01-05 RX ADMIN — AMLODIPINE BESYLATE 10 MG: 10 TABLET ORAL at 08:55

## 2024-01-05 RX ADMIN — FLUTICASONE FUROATE AND VILANTEROL TRIFENATATE 1 PUFF: 100; 25 POWDER RESPIRATORY (INHALATION) at 08:53

## 2024-01-05 RX ADMIN — CEFTRIAXONE 1000 MG: 1 INJECTION, SOLUTION INTRAVENOUS at 09:15

## 2024-01-05 RX ADMIN — DOCUSATE SODIUM 100 MG: 100 CAPSULE, LIQUID FILLED ORAL at 18:21

## 2024-01-05 RX ADMIN — CYANOCOBALAMIN TAB 500 MCG 1000 MCG: 500 TAB at 08:55

## 2024-01-05 RX ADMIN — POTASSIUM CHLORIDE 20 MEQ: 1500 TABLET, EXTENDED RELEASE ORAL at 18:21

## 2024-01-05 RX ADMIN — ALPRAZOLAM 0.5 MG: 0.5 TABLET ORAL at 18:21

## 2024-01-05 RX ADMIN — Medication 1 TABLET: at 08:54

## 2024-01-05 RX ADMIN — CLOPIDOGREL BISULFATE 75 MG: 75 TABLET ORAL at 08:55

## 2024-01-05 RX ADMIN — IPRATROPIUM BROMIDE 0.5 MG: 0.5 SOLUTION RESPIRATORY (INHALATION) at 19:49

## 2024-01-05 RX ADMIN — HEPARIN SODIUM 5000 UNITS: 5000 INJECTION INTRAVENOUS; SUBCUTANEOUS at 13:30

## 2024-01-05 RX ADMIN — DEXTROSE 250 ML: 5 SOLUTION INTRAVENOUS at 09:06

## 2024-01-05 RX ADMIN — DOCUSATE SODIUM 100 MG: 100 CAPSULE, LIQUID FILLED ORAL at 08:55

## 2024-01-05 RX ADMIN — IPRATROPIUM BROMIDE 0.5 MG: 0.5 SOLUTION RESPIRATORY (INHALATION) at 07:04

## 2024-01-05 RX ADMIN — HEPARIN SODIUM 5000 UNITS: 5000 INJECTION INTRAVENOUS; SUBCUTANEOUS at 05:35

## 2024-01-05 RX ADMIN — UMECLIDINIUM 1 PUFF: 62.5 AEROSOL, POWDER ORAL at 08:53

## 2024-01-05 RX ADMIN — LEVALBUTEROL HYDROCHLORIDE 1.25 MG: 1.25 SOLUTION RESPIRATORY (INHALATION) at 07:04

## 2024-01-05 RX ADMIN — DEXTROSE 1000 ML: 5 SOLUTION INTRAVENOUS at 15:10

## 2024-01-05 RX ADMIN — PRAVASTATIN SODIUM 40 MG: 40 TABLET ORAL at 18:21

## 2024-01-05 RX ADMIN — HEPARIN SODIUM 5000 UNITS: 5000 INJECTION INTRAVENOUS; SUBCUTANEOUS at 22:39

## 2024-01-05 RX ADMIN — POTASSIUM CHLORIDE 40 MEQ: 1500 TABLET, EXTENDED RELEASE ORAL at 15:07

## 2024-01-05 RX ADMIN — BISOPROLOL FUMARATE 10 MG: 5 TABLET ORAL at 08:54

## 2024-01-05 RX ADMIN — IPRATROPIUM BROMIDE 0.5 MG: 0.5 SOLUTION RESPIRATORY (INHALATION) at 14:25

## 2024-01-05 RX ADMIN — PANTOPRAZOLE SODIUM 20 MG: 20 TABLET, DELAYED RELEASE ORAL at 08:55

## 2024-01-05 RX ADMIN — LEVALBUTEROL HYDROCHLORIDE 1.25 MG: 1.25 SOLUTION RESPIRATORY (INHALATION) at 14:25

## 2024-01-05 RX ADMIN — FOLIC ACID 1 MG: 1 TABLET ORAL at 08:55

## 2024-01-05 NOTE — CONSULTS
Consultation - Nephrology   Emily Berrios 78 y.o. female MRN: [id number]  Unit/Bed#: E2 -02 Encounter: [id number]    ASSESSMENT/PLAN:  Hyponatremia: With history of alcohol consumption daily.  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  -Presented with sodium of 123 mmol/L.   -Repeat sodium level this morning 134, correcting 9 mmol/L.   -Goal Na level this morning 129-131 mmol/L.   -Received 1 L of normal saline.  -Currently on sodium chloride tablet 1 g 3 times daily.  Started this morning.  Will place on hold for now as patient had rapid correction.  -Maintain on fluid restriction, will lift to 1.8 L per 24 hours.  -Serum osmolality, urine osm, urine Na levels are pending.  -Goal to correct 6-8 in 24 hours.   -Will provide 250 cc of D5W and repeat level this afternoon.  -Status post IV contrast 1/4/2024.  Will monitor for contrast associated nephropathy.  Renal function remained stable.  Baseline creatinine 0.4-0.6.    Hypertension: blood pressure appears controlled.  -Current medication: Amlodipine 10 mg 2 times per day, bisoprolol 10 mg daily.  -Home medications: Norvasc 5 mg BID, bisoprolol 10 mg po daily.   -Recommend avoiding hypotension or high fluctuations in blood pressure.    Hypoxia: History of COPD. Currently on O2.  -COVID/influenza/RSV negative.  -Lower extremity Dopplers are pending.  -CT scan indeterminant for PE.  -Echocardiogram pending.    Alcohol abuse: Reports drinking 6 pack of beer or more daily.  -Continues on multivitamin, thiamine, and folic acid.  -Monitoring for signs of alcohol withdrawal.    Other:  -Recent left humeral fracture and L1 compression fracture secondary to fall, was attending [facility] and transferred to ER due to hypoxia.      HISTORY OF PRESENT ILLNESS:  Requesting Physician: Nelson Vance DO  Reason for Consult: Hyponatremia (POA)    Emily Berrios is a 78 y.o. year old female with history of hypertension, recent left humeral fracture and L1  compression fracture, daily alcohol consumption, CAD, and COPD who was admitted to [facility] after presenting from [facility] with hypoxia. The patient reports feeling short of breathe for the past week with worsening symptoms yesterday requiring oxygen. She denies fevers or chills. She denies chest discomfort. She denies pain, nausea, vomiting, or diarrhea. She reports normal appetite.A renal consultation is requested today for assistance in the management of hyponatremia.    PAST MEDICAL HISTORY:  Past Medical History:   Diagnosis Date    Arthritis     Asthma     Chronic obstructive pulmonary disease, unspecified COPD type (HCC) 04/08/2022    Cognitive decline 03/10/2023    Community acquired pneumonia 11/13/2019    COPD (chronic obstructive pulmonary disease) ([facility])     Coronary artery disease     DM (diabetes mellitus) ([facility])     HTN (hypertension)     Hydronephrosis, bilateral 03/06/2023    Hyperlipidemia     Hyponatremia     Urinary retention        PAST SURGICAL HISTORY:  Past Surgical History:   Procedure Laterality Date    CARDIAC CATHETERIZATION  2010    CORONARY ARTERY BYPASS GRAFT  12/06/2010    with subsequent stent to the saphenous veingraft to the RCA 3 months later    KNEE ARTHROSCOPY      POLYPECTOMY      laryngeal    TOTAL HIP ARTHROPLASTY      TUBAL LIGATION         ALLERGIES:  Allergies   Allergen Reactions    Ace Inhibitors Other (See Comments)     Includes lisinopril    Angiotensin Receptor Blockers Other (See Comments)     Not specified.  Includes losartan and olmesartan    Ezetimibe Rash    Statins Arthralgia     Tolerates Crestor    Morphine Hives     pt states someone told her she is allergic to it after her hip surgery       SOCIAL HISTORY:  Social History     Substance and Sexual Activity   Alcohol Use Yes    Alcohol/week: 42.0 standard drinks of alcohol    Types: 42 Cans of beer per week    Comment: socially     Social History     Substance and Sexual Activity   Drug Use Never     Social  History     Tobacco Use   Smoking Status Former    Current packs/day: 0.00    Average packs/day: 0.5 packs/day for 32.0 years (16.0 ttl pk-yrs)    Types: Cigarettes    Start date:     Quit date:     Years since quittin.0   Smokeless Tobacco Never   Tobacco Comments    no passive smoke exposure       FAMILY HISTORY:  Family History   Problem Relation Age of Onset    Stroke Mother     Hypertension Mother     Heart disease Mother     Colon cancer Father     No Known Problems Sister     No Known Problems Sister     No Known Problems Sister     No Known Problems Daughter     No Known Problems Daughter     No Known Problems Daughter     No Known Problems Maternal Grandmother     No Known Problems Maternal Grandfather     No Known Problems Paternal Grandmother     No Known Problems Paternal Grandfather     Heart attack Brother     Heart attack Brother     No Known Problems Maternal Aunt     No Known Problems Maternal Aunt     No Known Problems Paternal Aunt     No Known Problems Paternal Aunt        MEDICATIONS:    Current Facility-Administered Medications:     albuterol (PROVENTIL HFA,VENTOLIN HFA) inhaler 2 puff, 2 puff, Inhalation, Q6H PRN, Gabrielle Garcia MD    ALPRAZolam (XANAX) tablet 0.5 mg, 0.5 mg, Oral, HS PRN, Gabrielle Garcia MD    amLODIPine (NORVASC) tablet 10 mg, 10 mg, Oral, BID, Gabrielle Garcia MD    benzonatate (TESSALON PERLES) capsule 100 mg, 100 mg, Oral, TID PRN, Gabrielle Garcia MD    bisoprolol (ZEBETA) tablet 10 mg, 10 mg, Oral, Daily, Gabrielle Garcia MD    clopidogrel (PLAVIX) tablet 75 mg, 75 mg, Oral, Daily, Gabrielle Garcia MD    cyanocobalamin (VITAMIN B-12) tablet 1,000 mcg, 1,000 mcg, Oral, Daily, Gabrielle Garcia MD    docusate sodium (COLACE) capsule 100 mg, 100 mg, Oral, BID, Gabrielle Garcia MD, 100 mg at 24 2774    Fluticasone Furoate-Vilanterol 100-25 mcg/actuation 1 puff, 1 puff, Inhalation, Daily **AND** umeclidinium 62.5 mcg/actuation inhaler AEPB 1 puff, 1 puff,  Inhalation, Daily, Gabrielle Garcia MD    folic acid (FOLVITE) tablet 1 mg, 1 mg, Oral, Daily, Gabrielle Garcia MD, 1 mg at 01/04/24 1907    heparin (porcine) subcutaneous injection 5,000 Units, 5,000 Units, Subcutaneous, Q8H KENIA, 5,000 Units at 01/05/24 0535 **AND** Platelet count, , , Once, Gabrielle Garcia MD    ipratropium (ATROVENT) 0.02 % inhalation solution 0.5 mg, 0.5 mg, Nebulization, Q6H, Gabrielle Garcia MD, 0.5 mg at 01/05/24 0704    levalbuterol (XOPENEX) inhalation solution 1.25 mg, 1.25 mg, Nebulization, Q6H, Gabrielle Garcia MD, 1.25 mg at 01/05/24 0704    multivitamin-minerals (CENTRUM) tablet 1 tablet, 1 tablet, Oral, Daily, Gabrielle Garcia MD, 1 tablet at 01/04/24 1907    nitroglycerin (NITROSTAT) SL tablet 0.4 mg, 0.4 mg, Sublingual, Q5 Min PRN, Gabrielle Garcia MD    ondansetron (ZOFRAN) injection 4 mg, 4 mg, Intravenous, Q6H PRN, Gabrielle Garcia MD    oxyCODONE (ROXICODONE) IR tablet 5 mg, 5 mg, Oral, Q6H PRN, Gabrielle Garcia MD    pantoprazole (PROTONIX) EC tablet 20 mg, 20 mg, Oral, Daily, Gabrielle Gacria MD    pravastatin (PRAVACHOL) tablet 40 mg, 40 mg, Oral, Daily With Dinner, Gabrielle Garcia MD    sodium chloride tablet 1 g, 1 g, Oral, TID With Meals, Gabrielle Garcia MD    thiamine tablet 100 mg, 100 mg, Oral, Daily, Gabrielle Garcia MD, 100 mg at 01/04/24 1907    REVIEW OF SYSTEMS:  A complete review of systems was performed and found to be negative unless otherwise noted in the history of present illness.  General: No fevers, chills.   Cardiovascular:  No chest pain, No leg edema.  Respiratory: No cough, sputum production,  + shortness of breath.  Gastrointestinal:  No nausea/vomiting,  No diarrhea,  No abdominal pain.  Genitourinary: No hematuria.  No foamy urine.  No dysuria    PHYSICAL EXAM:  Current Weight:    First Weight:    Vitals:    01/04/24 2210 01/05/24 0259 01/05/24 0705 01/05/24 0734   BP: 160/71 168/80  169/76   BP Location: Right arm Right arm  Right arm   Pulse: 104 104  (!)  110   Resp: 18 18  19   Temp: 98 °F (36.7 °C) 97.6 °F (36.4 °C)  98 °F (36.7 °C)   TempSrc: Temporal Temporal  Temporal   SpO2: 96% 97% 94% 93%       Intake/Output Summary (Last 24 hours) at 1/5/2024 0834  Last data filed at 1/5/2024 0601  Gross per 24 hour   Intake 1000 ml   Output 900 ml   Net 100 ml     General: NAD  Skin: warm, dry, intact, no rash  HEENT: Moist mucous membranes, sclera anicteric, normocephalic, atraumatic  Neck: No apparent JVD appreciated  Chest:lung sounds decreased B/L, on O2  CVS:Regular rate and rhythm, no murmer   Abdomen: Soft, round, non-tender, +BS  Extremities: No B/L LE edema present, LUE sling   Neuro: alert and oriented  Psych: appropriate mood and affect     Invasive Devices:      Lab Results:   Results from last 7 days   Lab Units 01/05/24  0528 01/04/24  2110 01/04/24  1316 01/01/24  1635 01/01/24  0506 01/01/24  0104 12/29/23  0858   WBC Thousand/uL 13.71*  --  13.67*  --  14.47* 13.14* 9.47   HEMOGLOBIN g/dL 11.1*  --  10.4*  --  11.6 11.5 12.7   HEMATOCRIT % 34.1*  --  32.3*  --  37.6 36.6 40.8   PLATELETS Thousands/uL 356  --  311  --  376 401* 468*   SODIUM mmol/L 134* 131* 123*   < > 125* 124* 137   POTASSIUM mmol/L 3.8 3.7 3.8   < > 3.4* 3.3* 4.1   CHLORIDE mmol/L 97 96 89*   < > 94* 90* 99   CO2 mmol/L 29 28 29   < > 20* 23 31   BUN mg/dL 5 8 9   < > 10 11 10   CREATININE mg/dL 0.39* 0.49* 0.63   < > 0.50* 0.66 0.72   CALCIUM mg/dL 9.2 8.9 8.9   < > 8.5 9.3 9.6   MAGNESIUM mg/dL  --   --   --   --   --   --  1.6*   ALK PHOS U/L  --   --   --   --   --  63 66   ALT U/L  --   --   --   --   --  14 12   AST U/L  --   --   --   --   --  19 13    < > = values in this interval not displayed.

## 2024-01-05 NOTE — CONSULTS
Consultation - Pulmonary Medicine   Emily Berrios 78 y.o. female MRN: [id number]  Unit/Bed#: E2 -02 Encounter: [id number]      Assessment/Plan:    Acute hypoxic respiratory failure  -Was in the 80s when presented to the ED  -Chest CT 1/4/2023 shows no definite acute pulmonary emboli but interpretation is mildly compromised by motion artifact which creates artifactual filling defects, D-dimer elevated  -Recent closed nondisplaced fracture of surgical neck of left humerus  -Currently 93% on 2 L nasal cannula, does not wear any supplemental home O2  -Pulmonary hygiene encouraged: Deep breathing with cough, out of bed to chair, incentive spirometry  -No indication for VQ scan at this time, will continue to monitor, bilateral venous duplex study ordered    Abnormal CT scan, infectious process versus pulmonary edema  -CT chest 1/4/2024 shows patchy lateral groundglass opacity, greatest in the right upper lobe infectious or inflammatory  -Echo results pending  -  -Appears euvolemic  -COVID/flu/RSV negative  -Legionella antigen and strep pneumonia pending  -Procalcitonin 0.70, continue to trend  -WBC 13.67-13.71  -Agree with ceftriaxone, day#1    COPD, severe with possible acute exacerbation  -In office spirometry completed 3/23/2023 showed severe airflow limitation  -Home regime: Trelegy Ellipta, albuterol/ipratropium nebulizer treatments 4 times daily as needed  -Continue nebulizer treatments at this time  -No indication for steroids at this time    Closed nondisplaced fracture of the surgical neck of left humerus  -Fall at home 1/1/2024  -No surgical intervention at that time  -Patient was discharged same day with sling and PT OT edition    History of Present Illness   Physician Requesting Consult: Nelson Vance DO  Reason for Consult / Principal Problem: Hypoxia  Hx and PE limited by: Nothing  Chief Complaint: Hypoxia during physical therapy  HPI: Emily Berrios is a 78 y.o.  female with past  medical history of hypertension hyperlipidemia CAD, COPD and recent closed nondisplaced fracture of surgical neck of left humerus who presented to [facility] from physical therapy due to concerns of hypoxia, saturations were reported to be in the 80s while doing physical therapy.  Patient had recent fall on 1/1/2024 with closed nondisplaced fracture of surgical neck of left humerus.  Was discharged without surgical intervention in a sling with orders for PT and OT.  Presented to the emergency room at the recommendation of physical therapy due to being hypoxic during PT.    Pulmonary was consulted for hypoxia.  From a pulmonary standpoint patient follows with Dr. Lama outpatient for COPD and dyspnea on exertion.  She is compliant with home medications.  Has a 20-pack-year history of smoking but has not smoked since 2000.  She has no recent sick exposures and no environmental or occupational hazards.  She has no recent exposure to mold or dust      Inpatient consult to Pulmonology  Consult performed by: TRINIDAD Cummins  Consult ordered by: Gabrielle Garcia MD        Review of Systems   Constitutional:  Negative for activity change, appetite change, chills, fatigue and fever.   HENT:  Positive for postnasal drip. Negative for congestion, rhinorrhea, sinus pressure, sinus pain and sore throat.    Respiratory:  Positive for cough, shortness of breath and wheezing. Negative for apnea, chest tightness and stridor.    Cardiovascular:  Negative for chest pain, palpitations and leg swelling.   Genitourinary:  Negative for dysuria.   Musculoskeletal:  Negative for arthralgias, joint swelling and myalgias.   Skin:  Negative for color change and pallor.   Neurological:  Negative for dizziness, syncope and light-headedness.   Psychiatric/Behavioral:  Negative for agitation, behavioral problems, confusion and self-injury.        Historical Information   Past Medical History:   Diagnosis Date    Arthritis      Asthma     Chronic obstructive pulmonary disease, unspecified COPD type ([facility]) 2022    Cognitive decline 03/10/2023    Community acquired pneumonia 2019    COPD (chronic obstructive pulmonary disease) ([facility])     Coronary artery disease     DM (diabetes mellitus) ([facility])     HTN (hypertension)     Hydronephrosis, bilateral 2023    Hyperlipidemia     Hyponatremia     Urinary retention      Past Surgical History:   Procedure Laterality Date    CARDIAC CATHETERIZATION      CORONARY ARTERY BYPASS GRAFT  2010    with subsequent stent to the saphenous veingraft to the RCA 3 months later    KNEE ARTHROSCOPY      POLYPECTOMY      laryngeal    TOTAL HIP ARTHROPLASTY      TUBAL LIGATION       Social History   Social History     Substance and Sexual Activity   Alcohol Use Yes    Alcohol/week: 42.0 standard drinks of alcohol    Types: 42 Cans of beer per week    Comment: socially     Social History     Substance and Sexual Activity   Drug Use Never     Social History     Tobacco Use   Smoking Status Former    Current packs/day: 0.00    Average packs/day: 0.5 packs/day for 32.0 years (16.0 ttl pk-yrs)    Types: Cigarettes    Start date:     Quit date:     Years since quittin.0   Smokeless Tobacco Never   Tobacco Comments    no passive smoke exposure     E-Cigarette/Vaping    E-Cigarette Use Never User      E-Cigarette/Vaping Substances    Nicotine No     THC No     CBD No     Flavoring No     Other No     Unknown No      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    Family History:   Family History   Problem Relation Age of Onset    Stroke Mother     Hypertension Mother     Heart disease Mother     Colon cancer Father     No Known Problems Sister     No Known Problems Sister     No Known Problems Sister     No Known Problems Daughter     No Known Problems Daughter     No Known Problems Daughter     No Known Problems Maternal Grandmother     No Known Problems  Maternal Grandfather     No Known Problems Paternal Grandmother     No Known Problems Paternal Grandfather     Heart attack Brother     Heart attack Brother     No Known Problems Maternal Aunt     No Known Problems Maternal Aunt     No Known Problems Paternal Aunt     No Known Problems Paternal Aunt        Meds/Allergies   all current active meds have been reviewed and current meds:   Current Facility-Administered Medications   Medication Dose Route Frequency    albuterol (PROVENTIL HFA,VENTOLIN HFA) inhaler 2 puff  2 puff Inhalation Q6H PRN    ALPRAZolam (XANAX) tablet 0.5 mg  0.5 mg Oral HS PRN    amLODIPine (NORVASC) tablet 10 mg  10 mg Oral BID    benzonatate (TESSALON PERLES) capsule 100 mg  100 mg Oral TID PRN    bisoprolol (ZEBETA) tablet 10 mg  10 mg Oral Daily    cefTRIAXone (ROCEPHIN) IVPB (premix in dextrose) 1,000 mg 50 mL  1,000 mg Intravenous Q24H    clopidogrel (PLAVIX) tablet 75 mg  75 mg Oral Daily    cyanocobalamin (VITAMIN B-12) tablet 1,000 mcg  1,000 mcg Oral Daily    docusate sodium (COLACE) capsule 100 mg  100 mg Oral BID    Fluticasone Furoate-Vilanterol 100-25 mcg/actuation 1 puff  1 puff Inhalation Daily    And    umeclidinium 62.5 mcg/actuation inhaler AEPB 1 puff  1 puff Inhalation Daily    folic acid (FOLVITE) tablet 1 mg  1 mg Oral Daily    heparin (porcine) subcutaneous injection 5,000 Units  5,000 Units Subcutaneous Q8H KENIA    ipratropium (ATROVENT) 0.02 % inhalation solution 0.5 mg  0.5 mg Nebulization Q6H    levalbuterol (XOPENEX) inhalation solution 1.25 mg  1.25 mg Nebulization Q6H    multivitamin-minerals (CENTRUM) tablet 1 tablet  1 tablet Oral Daily    nitroglycerin (NITROSTAT) SL tablet 0.4 mg  0.4 mg Sublingual Q5 Min PRN    ondansetron (ZOFRAN) injection 4 mg  4 mg Intravenous Q6H PRN    oxyCODONE (ROXICODONE) IR tablet 5 mg  5 mg Oral Q6H PRN    pantoprazole (PROTONIX) EC tablet 20 mg  20 mg Oral Daily    pravastatin (PRAVACHOL) tablet 40 mg  40 mg Oral Daily With Dinner     thiamine tablet 100 mg  100 mg Oral Daily       Allergies   Allergen Reactions    Ace Inhibitors Other (See Comments)     Includes lisinopril    Angiotensin Receptor Blockers Other (See Comments)     Not specified.  Includes losartan and olmesartan    Ezetimibe Rash    Statins Arthralgia     Tolerates Crestor    Morphine Hives     pt states someone told her she is allergic to it after her hip surgery       Objective   Vitals: Blood pressure 169/76, pulse (!) 110, temperature 98 °F (36.7 °C), temperature source Temporal, resp. rate 19, SpO2 93%.3 L,There is no height or weight on file to calculate BMI.    Intake/Output Summary (Last 24 hours) at 1/5/2024 0910  Last data filed at 1/5/2024 0601  Gross per 24 hour   Intake 1000 ml   Output 900 ml   Net 100 ml     Invasive Devices       Peripheral Intravenous Line  Duration             Peripheral IV 01/04/24 Right Antecubital <1 day                    Physical Exam  Constitutional:       General: She is not in acute distress.     Appearance: She is not ill-appearing.   HENT:      Head: Normocephalic and atraumatic.      Right Ear: External ear normal.      Left Ear: External ear normal.      Nose: Nose normal. No congestion.      Mouth/Throat:      Mouth: Mucous membranes are moist.      Pharynx: Oropharynx is clear.   Eyes:      Extraocular Movements: Extraocular movements intact.      Pupils: Pupils are equal, round, and reactive to light.   Cardiovascular:      Rate and Rhythm: Normal rate and regular rhythm.      Pulses: Normal pulses.      Heart sounds: Normal heart sounds.   Pulmonary:      Effort: Pulmonary effort is normal. No respiratory distress.      Breath sounds: No stridor. Wheezing present. No rhonchi or rales.      Comments: Left-sided mid expiratory wheeze noted   bibasilar crackles  Chest:      Chest wall: No tenderness.   Abdominal:      General: Bowel sounds are normal.      Tenderness: There is no abdominal tenderness. There is no guarding or  "rebound.   Musculoskeletal:         General: Normal range of motion.      Cervical back: Normal range of motion and neck supple.      Right lower leg: No edema.      Left lower leg: No edema.   Skin:     General: Skin is warm and dry.      Findings: No bruising or erythema.   Neurological:      Mental Status: She is alert and oriented to person, place, and time.      Motor: No weakness.      Coordination: Coordination normal.   Psychiatric:         Mood and Affect: Mood normal.         Behavior: Behavior normal.         Thought Content: Thought content normal.         Judgment: Judgment normal.         Lab Results: I have personally reviewed pertinent lab results., ABG: No results found for: \"PHART\", \"MJI4NON\", \"PO2ART\", \"YWJ4ZCS\", \"Z9ATJIYO\", \"BEART\", \"SOURCE\", BNP:   Lab Results   Component Value Date     (H) 01/04/2024   , CBC:   Lab Results   Component Value Date    WBC 13.71 (H) 01/05/2024    HGB 11.1 (L) 01/05/2024    HCT 34.1 (L) 01/05/2024    MCV 80 (L) 01/05/2024     01/05/2024    RBC 4.28 01/05/2024    MCH 25.9 (L) 01/05/2024    MCHC 32.6 01/05/2024    RDW 15.6 (H) 01/05/2024    MPV 9.4 01/05/2024    NRBC 0 01/04/2024   , CMP:   Lab Results   Component Value Date    SODIUM 134 (L) 01/05/2024    K 3.8 01/05/2024    CL 97 01/05/2024    CO2 29 01/05/2024    BUN 5 01/05/2024    CREATININE 0.39 (L) 01/05/2024    CALCIUM 9.2 01/05/2024    EGFR 100 01/05/2024         Procalcitonin: 0.70    Flu/COVID/RSV PCR: All negative    Culture Data: Pending    Urinary antigens: Pending    D-Dimer: 1.86    BNP: 224    Imaging Studies: I have personally reviewed pertinent reports.     CTA chest PE study 1/4/2024  IMPRESSION:   No definite acute pulmonary emboli but interpretation is mildly compromised by motion artifact which creates artifactual filling defects.   Patchy lateral groundglass opacity, greatest in the right upper lobe, infectious or inflammatory.   Trace right effusion.  Pulmonary artery enlargement " "which can be seen with pulmonary hypertension.    EKG, Pathology, and Other Studies: I have personally reviewed pertinent reports.     1/5/2024 TTE EF 67%    Pulmonary Results (PFTs, PSG): I have personally reviewed pertinent reports.     In office spirometry completed 3/23/2023 showed severe airflow limitation  6-minute walk completed 3/23/2023 showed lowest saturation of 94%.  Patient did not require any home O2  Code Status: Level 1 - Full Code    Portions of the record may have been created with voice recognition software.  Occasional wrong word or \"sound a like\" substitutions may have occurred due to the inherent limitations of voice recognition software.  Read the chart carefully and recognize, using context, where substitutions have occurred.  "

## 2024-01-05 NOTE — OCCUPATIONAL THERAPY NOTE
Occupational Therapy Evaluation     Patient Name: Emily Berrios  Today's Date: 1/5/2024  Problem List  Principal Problem:    Hypoxia  Active Problems:    Chronic coronary artery disease    Hyperlipidemia    Primary hypertension    Chronic obstructive pulmonary disease, unspecified COPD type (HCC)    Hyponatremia    Closed nondisplaced fracture of surgical neck of left humerus    Closed compression fracture of L1 vertebra (HCC)    Insomnia    Alcohol use    Past Medical History  Past Medical History:   Diagnosis Date    Arthritis     Asthma     Chronic obstructive pulmonary disease, unspecified COPD type (HCC) 04/08/2022    Cognitive decline 03/10/2023    Community acquired pneumonia 11/13/2019    COPD (chronic obstructive pulmonary disease) (HCC)     Coronary artery disease     DM (diabetes mellitus) ([facility])     HTN (hypertension)     Hydronephrosis, bilateral 03/06/2023    Hyperlipidemia     Hyponatremia     Urinary retention      Past Surgical History  Past Surgical History:   Procedure Laterality Date    CARDIAC CATHETERIZATION  2010    CORONARY ARTERY BYPASS GRAFT  12/06/2010    with subsequent stent to the saphenous veingraft to the RCA 3 months later    KNEE ARTHROSCOPY      POLYPECTOMY      laryngeal    TOTAL HIP ARTHROPLASTY      TUBAL LIGATION           01/05/24 1250   OT Last Visit   OT Visit Date 01/05/24   Note Type   Note type Evaluation   Additional Comments greeted in supine and agreeable to skilled OT evaluation.   Pain Assessment   Pain Assessment Tool FLACC   Pain Location/Orientation Location: Back   Pain Rating: FLACC (Rest) - Face 0   Pain Rating: FLACC (Rest) - Legs 0   Pain Rating: FLACC (Rest) - Activity 0   Pain Rating: FLACC (Rest) - Cry 0   Pain Rating: FLACC (Rest) - Consolability 0   Score: FLACC (Rest) 0   Pain Rating: FLACC (Activity) - Face 1   Pain Rating: FLACC (Activity) - Legs 1   Pain Rating: FLACC (Activity) - Activity 1   Pain Rating: FLACC (Activity) - Cry 1   Pain  Rating: FLACC (Activity) - Consolability 0   Score: FLACC (Activity) 4   Restrictions/Precautions   Weight Bearing Precautions Per Order Yes   LUE Weight Bearing Per Order NWB   Braces or Orthoses Sling   Other Precautions Chair Alarm;Bed Alarm;Multiple lines;O2;Fall Risk  (2 L O2)   Home Living   Type of Home House   Home Layout One level;Stairs to enter with rails   Bathroom Shower/Tub Tub/shower unit   Bathroom Toilet Standard   Home Equipment Cane   Prior Function   Level of [place] Independent with ADLs;Independent with functional mobility;Independent with IADLS  (since fall on 1/1 has required assist from DTR due to WBS.)   Lives With Daughter   Receives Help From Family;Home health   IADLs Family/Friend/Other provides transportation;Family/Friend/Other provides meals;Family/Friend/Other provides medication management   Falls in the last 6 months 1 to 4   Vocational Retired   Comments Prior to admission, pt lives alone but DTR has been staying with her since fall. Lives in a house with CARRI. Has a tub shower and standard toilets. Was I with ADLS and IADLS prior to fall. Ambulates with cane. 1 fall.   ADL   Where Assessed Edge of bed   Eating Assistance 5  Supervision/Setup   Grooming Assistance 5  Supervision/Setup   UB Bathing Assistance 3  Moderate Assistance   LB Bathing Assistance 4  Minimal Assistance   UB Dressing Assistance 3  Moderate Assistance   LB Dressing Assistance 4  Minimal Assistance   Toileting Assistance  5  Supervision/Setup   Bed Mobility   Supine to Sit 4  Minimal assistance   Additional items Assist x 1;Increased time required;Verbal cues;LE management   Sit to Supine 5  Supervision   Additional items Assist x 1;Bedrails;Increased time required;Verbal cues   Transfers   Sit to Stand 4  Minimal assistance   Additional items Assist x 1;Increased time required;Verbal cues   Stand to Sit 5  Supervision   Additional items Assist x 1;Increased time required;Verbal cues   Additional Comments  ceus for safety,pacing and best tech.   Functional Mobility   Functional Mobility 4  Minimal assistance   Additional Comments Ax1, Cane, short functional distances   Balance   Static Sitting Good   Dynamic Sitting Fair +   Static Standing Fair   Dynamic Standing Fair -   Ambulatory Poor +   Activity Tolerance   Activity Tolerance Patient tolerated treatment well;Patient limited by pain   Medical Staff Made Aware PT Jb   Nurse Made Aware RN   RUE Assessment   RUE Assessment WFL   LUE Assessment   LUE Assessment X   Hand Function   Gross Motor Coordination Impaired   Fine Motor Coordination Impaired   Vision-Basic Assessment   Current Vision No visual deficits   Psychosocial   Psychosocial (WDL) WDL   Cognition   Arousal/Participation Cooperative   Attention Attends with cues to redirect   Orientation Level Oriented to person;Oriented to place;Oriented to time   Following Commands Follows one step commands without difficulty   Comments cooperative. impulsive.   Assessment   Limitation Decreased ADL status;Decreased UE strength;Decreased Safe judgement during ADL;Decreased endurance;Decreased self-care trans;Decreased high-level ADLs   Prognosis Good   Assessment Emily Berrios is a 78 y.o. female seen for OT evaluation s/p admit to [facility] on 1/4/2024 w/ Hypoxia. Pt with recent fall 1/1 resulting in Closed nondisplaced fracture of the surgical neck of left humerus . NWB LUE in sling- no surgical intervention. Also with L1 compression fx. Now on 2 L O2 to maintain O2 saturation. Comorbidities affecting pt's functional performance at time of assessment include: DM and HTN. Pt with active OT orders and activity orders for Up and OOB as tolerated. Personal factors affecting pt at time of IE include:CARRI home environment, limited home support, difficulty performing ADLs, difficulty performing IADLs, difficulty performing transfers/mobility, WBS, fall risk , and functional decline . Prior to admission, pt lives alone but  DTR has been staying with her since fall. Lives in a house with CARRI. Has a tub shower and standard toilets. Was I with ADLS and IADLS prior to fall. Ambulates with cane. 1 fall.  Upon evaluation: Pt currently requires modA for UB ADLs, modA for LB ADLs, supervision for toileting, supervision for bed mobility, supervision for functional transfers, and David mobility 2* the following deficits impacting occupational performance:weakness, decreased strength , decreased balance, and decreased activity tolerance. VITALS during session: on 2L O2 sats 94%, on RA with activity drops to high 70s. Improves with rest and PLB and 2 L O2.  Pt to benefit from continued skilled OT tx while in the hospital to address deficits as defined above and maximize level of functional independence w ADL's and functional mobility. Occupational Performance areas to address include: grooming, bathing/shower, toilet hygiene, dressing, health maintenance, functional mobility, and clothing management. From OT standpoint, recommendation at time of d/c would be level 3 resources. OT to follow pt on caseload 2-3 x/wk.   Goals   Patient Goals to go home.   STG Time Frame 3-5   Short Term Goal #1 Pt will improve activity tolerance to G for min 30 min txment sessions for increase engagement in functional tasks   Short Term Goal #2 Pt will complete bed mobility at a Mod I level w/ G balance/safety demonstrated to decrease caregiver assistance required   Short Term Goal  Pt will complete UB dressing/self care w/ mod I using adaptive device and DME as needed   LTG Time Frame 10-14   Long Term Goal #1 Pt will complete LB dressing/self care w/ mod I using adaptive device and DME as needed   Long Term Goal #2 Pt will complete toileting w/ mod I w/ G hygiene/thoroughness using DME as needed   Long Term Goal Pt will improve functional transfers/ mobility to Mod I on/off all surfaces using DME as needed w/ G balance/safety   Plan   Treatment Interventions ADL  retraining;Functional transfer training;UE strengthening/ROM;Endurance training;Patient/family training;Equipment evaluation/education;Neuromuscular reeducation;Compensatory technique education;Energy conservation;Activityengagement   Goal Expiration Date 01/19/24   OT Treatment Day 0   OT Frequency 2-3x/wk   Discharge Recommendation   Rehab Resource Intensity Level, OT III (Minimum Resource Intensity)  (with support from DTR at home.)   Additional Comments  The patient's raw score on the AM-PAC Daily Activity Inpatient Short Form is 18. A raw score of less than 19 suggests the patient may benefit from discharge to post-acute rehabilitation services. Please refer to the recommendation of the Occupational Therapist for safe discharge planning.   -PAC Daily Activity Inpatient   Lower Body Dressing 3   Bathing 3   Toileting 3   Upper Body Dressing 2   Grooming 3   Eating 4   Daily Activity Raw Score 18   Daily Activity Standardized Score (Calc for Raw Score >=11) 38.66   AM-PAC Applied Cognition Inpatient   Following a Speech/Presentation 4   Understanding Ordinary Conversation 4   Taking Medications 3   Remembering Where Things Are Placed or Put Away 3   Remembering List of 4-5 Errands 3   Taking Care of Complicated Tasks 3   Applied Cognition Raw Score 20   Applied Cognition Standardized Score 41.76   Rossana Bailey, OT

## 2024-01-05 NOTE — ASSESSMENT & PLAN NOTE
Patient is a 78-year-old female with past medical history significant for recent admission 1/1 - 1/2 at [facility] with left humeral fracture and L1 compression fracture after a fall.  Patient was sent to the ER by physical therapy for hypoxia    CT PE study with no definite acute pulmonary emboli, patchy lateral groundglass opacity greater in the right upper lobe infectious versus inflammatory, trace right effusion, pulmonary artery enlargement concerning for pulmonary hypertension  Ultrasound negative for lower extremity DVT  Currently on Rocephin empirically and trend procalcitonin  Appreciate pulmonology recommendations  Follow-up echocardiogram.  BNP mildly elevated.  Patient does not appear volume overloaded on exam  Titrate oxygen to maintain saturations greater than 88%

## 2024-01-05 NOTE — ASSESSMENT & PLAN NOTE
Patient has a history of COPD and follows with [facility] pulmonary  Appreciate pulmonology recommendations  Does not appear to be having exacerbation

## 2024-01-05 NOTE — PLAN OF CARE
Problem: OCCUPATIONAL THERAPY ADULT  Goal: Performs self-care activities at highest level of function for planned discharge setting.  See evaluation for individualized goals.  Description: Treatment Interventions: ADL retraining, Functional transfer training, UE strengthening/ROM, Endurance training, Patient/family training, Equipment evaluation/education, Neuromuscular reeducation, Compensatory technique education, Energy conservation, Activityengagement          See flowsheet documentation for full assessment, interventions and recommendations.   Note: Limitation: Decreased ADL status, Decreased UE strength, Decreased Safe judgement during ADL, Decreased endurance, Decreased self-care trans, Decreased high-level ADLs  Prognosis: Good  Assessment: Emily Berrios is a 78 y.o. female seen for OT evaluation s/p admit to [facility] on 1/4/2024 w/ Hypoxia. Pt with recent fall 1/1 resulting in Closed nondisplaced fracture of the surgical neck of left humerus . NWB LUE in sling- no surgical intervention. Also with L1 compression fx. Now on 2 L O2 to maintain O2 saturation. Comorbidities affecting pt's functional performance at time of assessment include: DM and HTN. Pt with active OT orders and activity orders for Up and OOB as tolerated. Personal factors affecting pt at time of IE include:CARRI home environment, limited home support, difficulty performing ADLs, difficulty performing IADLs, difficulty performing transfers/mobility, WBS, fall risk , and functional decline . Prior to admission, pt lives alone but DTR has been staying with her since fall. Lives in a house with CARRI. Has a tub shower and standard toilets. Was I with ADLS and IADLS prior to fall. Ambulates with cane. 1 fall.  Upon evaluation: Pt currently requires modA for UB ADLs, modA for LB ADLs, supervision for toileting, supervision for bed mobility, supervision for functional transfers, and David mobility 2* the following deficits impacting occupational  performance:weakness, decreased strength , decreased balance, and decreased activity tolerance. VITALS during session: on 2L O2 sats 94%, on RA with activity drops to high 70s. Improves with rest and PLB and 2 L O2.  Pt to benefit from continued skilled OT tx while in the hospital to address deficits as defined above and maximize level of functional independence w ADL's and functional mobility. Occupational Performance areas to address include: grooming, bathing/shower, toilet hygiene, dressing, health maintenance, functional mobility, and clothing management. From OT standpoint, recommendation at time of d/c would be level 3 resources. OT to follow pt on caseload 2-3 x/wk.     Rehab Resource Intensity Level, OT: III (Minimum Resource Intensity) (with support from DTR at home.)

## 2024-01-05 NOTE — PROGRESS NOTES
[facility]  Progress Note  Name: Emily Berrios I  MRN: [id number]  Unit/Bed#: E2 -02 I Date of Admission: 1/4/2024   Date of Service: 1/5/2024 I Hospital Day: 1    Assessment/Plan   * Hypoxia  Assessment & Plan  Patient is a 78-year-old female with past medical history significant for recent admission 1/1 - 1/2 at [facility] with left humeral fracture and L1 compression fracture after a fall.  Patient was sent to the ER by physical therapy for hypoxia    CT PE study with no definite acute pulmonary emboli, patchy lateral groundglass opacity greater in the right upper lobe infectious versus inflammatory, trace right effusion, pulmonary artery enlargement concerning for pulmonary hypertension  Ultrasound negative for lower extremity DVT  Currently on Rocephin empirically and trend procalcitonin  Appreciate pulmonology recommendations  Follow-up echocardiogram.  BNP mildly elevated.  Patient does not appear volume overloaded on exam  Titrate oxygen to maintain saturations greater than 88%      Alcohol use  Assessment & Plan  Pt drinks 6 pack of beer or more daily  CIWA scale  Thiamine and folic acid   for alcohol cessation counseling    Insomnia  Assessment & Plan  Cont home xanax:  confirmed on PDMP    Closed compression fracture of L1 vertebra (HCC)  Assessment & Plan  lidoderm patch, analgesics, physical therapy    Closed nondisplaced fracture of surgical neck of left humerus  Assessment & Plan  Recently hospitalized 1/1 for this  Continue analgesics sling, nonweightbearing  Outpatient follow-up with orthopedic surgery in 4 weeks    Hyponatremia  Assessment & Plan  Likely multifactorial secondary to prerenal plus SIADH plus beer [place]  Nephrology following  Overcorrected currently on D5 per nephrology    Chronic obstructive pulmonary disease, unspecified COPD type (HCC)  Assessment & Plan  Patient has a history of COPD and follows with [facility]  pulmonary  Appreciate pulmonology recommendations  Does not appear to be having exacerbation    Primary hypertension  Assessment & Plan  Continue home Norvasc 5 mg twice a day and bisoprolol 10 mg daily    Chronic coronary artery disease  Assessment & Plan  No symptoms consistent with acute ischemia  Continue home bisoprolol and Plavix  Continuestatin         VTE Pharmacologic Prophylaxis: heparin     Patient Centered Rounds:  Patient care rounds were performed with nursing    Education and Discussions with Family / Patient: Discussed care with daughter     Time Spent for Care: I have spent a total time of 40 minutes on 24 in caring for this patient including Diagnostic results, Risks and benefits of tx options, Instructions for management, Impressions, Counseling / Coordination of care, Documenting in the medical record, Reviewing / ordering tests, medicine, procedures  , and Communicating with other healthcare professionals .      Current Length of Stay: 1 day(s)    Current Patient Status: Inpatient   Certification Statement: The patient will continue to require additional inpatient hospital stay due to management of hyponatremia and hypoxia    Discharge Plan: Ongoing workup likely 48 hours    Code Status: Level 1 - Full Code      Subjective:   Patient seen and evaluated at bedside.  She feels okay today.  Denies any cough, fevers or chills.    Objective:     Vitals:   Temp (24hrs), Av.3 °F (36.8 °C), Min:97.6 °F (36.4 °C), Max:99.6 °F (37.6 °C)    Temp:  [97.6 °F (36.4 °C)-99.6 °F (37.6 °C)] 99.6 °F (37.6 °C)  HR:  [] 90  Resp:  [18-20] 20  BP: (143-169)/(67-80) 146/70  SpO2:  [92 %-97 %] 92 %  Body mass index is 24.94 kg/m².     Input and Output Summary (last 24 hours):       Intake/Output Summary (Last 24 hours) at 2024 1718  Last data filed at 2024 1152  Gross per 24 hour   Intake 1000 ml   Output 1700 ml   Net -700 ml       Physical Exam:     Physical Exam  Vitals reviewed.    Constitutional:       General: She is not in acute distress.     Appearance: She is well-developed. She is not ill-appearing, toxic-appearing or diaphoretic.   HENT:      Head: Normocephalic and atraumatic.      Mouth/Throat:      Mouth: Mucous membranes are moist.   Eyes:      General: No scleral icterus.     Extraocular Movements: Extraocular movements intact.   Cardiovascular:      Rate and Rhythm: Normal rate and regular rhythm.      Heart sounds: Normal heart sounds.   Pulmonary:      Effort: Pulmonary effort is normal. No respiratory distress.      Breath sounds: Normal breath sounds. No wheezing or rales.   Abdominal:      General: There is no distension.      Palpations: Abdomen is soft.      Tenderness: There is no abdominal tenderness. There is no guarding or rebound.   Musculoskeletal:         General: No swelling, tenderness or deformity.   Skin:     General: Skin is warm and dry.   Neurological:      General: No focal deficit present.      Mental Status: She is alert. Mental status is at baseline.   Psychiatric:         Mood and Affect: Mood normal.         Behavior: Behavior normal.         Thought Content: Thought content normal.         Judgment: Judgment normal.         Additional Data:     Labs: I have reviewed pertinent results     Results from last 7 days   Lab Units 01/05/24  0528 01/04/24  1316   WBC Thousand/uL 13.71* 13.67*   HEMOGLOBIN g/dL 11.1* 10.4*   HEMATOCRIT % 34.1* 32.3*   PLATELETS Thousands/uL 356 311   NEUTROS PCT %  --  78*   LYMPHS PCT %  --  10*   MONOS PCT %  --  9   EOS PCT %  --  1     Results from last 7 days   Lab Units 01/05/24  1303 01/01/24  0506 01/01/24  0104   SODIUM mmol/L 133*   < > 124*   POTASSIUM mmol/L 3.4*   < > 3.3*   CHLORIDE mmol/L 95*   < > 90*   CO2 mmol/L 30   < > 23   BUN mg/dL 4*   < > 11   CREATININE mg/dL 0.47*   < > 0.66   ANION GAP mmol/L 8   < > 11   CALCIUM mg/dL 9.6   < > 9.3   ALBUMIN g/dL  --   --  4.0   TOTAL BILIRUBIN mg/dL  --   --  0.28    ALK PHOS U/L  --   --  63   ALT U/L  --   --  14   AST U/L  --   --  19   GLUCOSE RANDOM mg/dL 181*   < > 107    < > = values in this interval not displayed.     Results from last 7 days   Lab Units 01/01/24  0104   INR  0.95             Results from last 7 days   Lab Units 01/04/24  1816 01/02/24  0515 01/01/24  0104   PROCALCITONIN ng/ml 0.70* 0.05 <0.05         Imaging: I have reviewed pertinent imaging       Recent Cultures (last 7 days):     Results from last 7 days   Lab Units 01/04/24  2338 01/04/24  2200 01/04/24  1932 01/04/24  1916   BLOOD CULTURE   --   --  Received in Microbiology Lab. Culture in Progress. Received in Microbiology Lab. Culture in Progress.   GRAM STAIN RESULT   --  1+ Epithelial cells per low power field*  1+ Polys*  2+ Gram positive cocci in clusters*  1+ Gram positive cocci in chains*  1+ Gram positive rods*  --   --    LEGIONELLA URINARY ANTIGEN  Negative  --   --   --        Last 24 Hours Medication List:   Current Facility-Administered Medications   Medication Dose Route Frequency Provider Last Rate    albuterol  2 puff Inhalation Q6H PRN Gabrielle Garcia MD      ALPRAZolam  0.5 mg Oral HS PRN Gabrielle Garcia MD      amLODIPine  10 mg Oral BID Gabrielle Garcia MD      benzonatate  100 mg Oral TID PRN Gabrielle Garcia MD      bisoprolol  10 mg Oral Daily Gabrielle Garcia MD      cefTRIAXone  1,000 mg Intravenous Q24H Nelson Vance DO 1,000 mg (01/05/24 0915)    clopidogrel  75 mg Oral Daily Gabrielle Garcia MD      vitamin B-12  1,000 mcg Oral Daily Gabrielle Garcia MD      docusate sodium  100 mg Oral BID Gabrielle Garcia MD      Fluticasone Furoate-Vilanterol  1 puff Inhalation Daily Gabrielle Garcia MD      And    umeclidinium  1 puff Inhalation Daily Gabrielle Garcia MD      folic acid  1 mg Oral Daily Gabrielle Garcia MD      heparin (porcine)  5,000 Units Subcutaneous Q8H [facility] Gabrielle Garcia MD      ipratropium  0.5 mg Nebulization Q6H Gabrielle Garcia MD      levalbuterol   1.25 mg Nebulization Q6H Gabrielle Garcia MD      multivitamin-minerals  1 tablet Oral Daily Gabrielle Garcia MD      nitroglycerin  0.4 mg Sublingual Q5 Min PRN Gabrielle Garcia MD      ondansetron  4 mg Intravenous Q6H PRN Gabrielle Garcia MD      oxyCODONE  5 mg Oral Q6H PRN Gabrielle Garcia MD      pantoprazole  20 mg Oral Daily Gabrielle Garcia MD      potassium chloride  20 mEq Oral Once Cynthia Lashell Bello MD      pravastatin  40 mg Oral Daily With Dinner Gabrielle Garcia MD      thiamine  100 mg Oral Daily Gabrielle Garcia MD          Today, Patient Was Seen By: Nelson Vance DO    ** Please Note: Dictation voice to text software may have been used in the creation of this document. **

## 2024-01-05 NOTE — PLAN OF CARE
Problem: Potential for Falls  Goal: Patient will remain free of falls  Description: INTERVENTIONS:  - Educate patient/family on patient safety including physical limitations  - Instruct patient to call for assistance with activity   - Consult OT/PT to assist with strengthening/mobility   - Keep Call bell within reach  - Keep bed low and locked with side rails adjusted as appropriate  - Keep care items and personal belongings within reach  - Initiate and maintain comfort rounds  - Make Fall Risk Sign visible to staff  - Offer Toileting every 2 Hours, in advance of need  - Initiate/Maintain bed alarm  - Obtain necessary fall risk management equipment: commode  - Apply yellow socks and bracelet for high fall risk patients  - Consider moving patient to room near nurses station  Outcome: Progressing     Problem: RESPIRATORY - ADULT  Goal: Achieves optimal ventilation and oxygenation  Description: INTERVENTIONS:  - Assess for changes in respiratory status  - Assess for changes in mentation and behavior  - Position to facilitate oxygenation and minimize respiratory effort  - Oxygen administered by appropriate delivery if ordered  - Initiate smoking cessation education as indicated  - Encourage broncho-pulmonary hygiene including cough, deep breathe, Incentive Spirometry  - Assess the need for suctioning and aspirate as needed  - Assess and instruct to report SOB or any respiratory difficulty  - Respiratory Therapy support as indicated  Outcome: Progressing

## 2024-01-05 NOTE — PLAN OF CARE
Problem: PHYSICAL THERAPY ADULT  Goal: Performs mobility at highest level of function for planned discharge setting.  See evaluation for individualized goals.  Description: Treatment/Interventions: Functional transfer training, LE strengthening/ROM, Elevations, Therapeutic exercise, Endurance training, Patient/family training, Bed mobility, Gait training, Spoke to nursing, OT, Spoke to case management          See flowsheet documentation for full assessment, interventions and recommendations.  Note: Prognosis: Fair  Problem List: Decreased strength, Decreased endurance, Impaired balance, Decreased mobility, Impaired judgement, Pain, Orthopedic restrictions  Assessment: Pt. 78 y.o.female who recently hospitalized on 1/1/24 2* to L humeral fx & L1 compression due to a fall, presented w/ hypoxia w/ SpO2 in the 80's RA. Pt admitted for Hypoxia w/ COPD & Hyponatremia. Pt referred to PT for mobility assessment & D/C planning w/ orders of Up and OOB as tolerated , & NWB to LUE + sling . Please see above for information re: home set-up & PLOF as well as objective findings during PT assessment. PTA, pt lives alone at baseline but her daughter has been staying with her since s/p fall on 1/1/24 & was I with ADLS and IADLS prior to fall.  On eval, pt functioning below baseline hence will continue skilled PT to improve function & safety. Pt require S/minAx1 for most functional mobility w/ SPC + cues for techniques & safety. Gait deviations as above but no gross LOB noted. Dec amb tolerance 2* to back pain. (+) impulsive require cues for safety. The patient's AM-PAC Basic Mobility Inpatient Short Form Raw Score is 18. A Raw score of greater than 16 suggests the patient may benefit from discharge to home. Please also refer to the recommendation of the Physical Therapist for safe discharge planning. From PT standpoint, will recommend Level III (minimum resource intensity) rehab services w/ inc family support at D/C. Pt noted to  "have low SpO2 of 77% RA after amb. SpO2 improved to 88-93% w/ 2L O2 NC. No dizziness reported t/o session. Nsg staff most recent vital signs as follows: /69 (BP Location: Right arm)   Pulse 83   Temp 98.1 °F (36.7 °C) (Temporal)   Resp 18   Ht 5' 1\" (1.549 m)   Wt 59.9 kg (132 lb)   LMP  (LMP Unknown)   SpO2 93%   BMI 24.94 kg/m² . At end of session, pt back in bed in stable condition, call bell & phone in reach, bed alarm activated, all lines intact. Fall precautions reinforced w/ good understanding. CM to follow. Nsg staff to continue to mobilized pt (OOB in chair for all meals & ambulate in room/unit) as tolerated to prevent further decline in function. Will also recommend Restorative for daily amb &/or daily OOB in chair as appropriate. Nsg notified. Co-eval was necessary to complete this PT eval for the pt's best interest given pt's medical acuity & complexity.        Rehab Resource Intensity Level, PT: III (Minimum Resource Intensity) (w/ inc family support)    See flowsheet documentation for full assessment.        "

## 2024-01-05 NOTE — TREATMENT PLAN
1 PM labs reviewed hypokalemia serum sodium 133 mEq, aim was for sodium around 131 mEq.  Will give 1 L D5 water bolus as well as total of 60 mill equivalents Kdur for hypokalemia  Aiming for sodium around 134 mEq by tomorrow  Cynthia Bello MD, [facility], 1/5/2024, 2:42 PM

## 2024-01-05 NOTE — ASSESSMENT & PLAN NOTE
Likely multifactorial secondary to prerenal plus SIADH plus beer Poto sophie  Nephrology following  Overcorrected currently on D5 per nephrology

## 2024-01-05 NOTE — UTILIZATION REVIEW
Initial Clinical Review    Admission: Date/Time/Statement:   Admission Orders (From admission, onward)       Ordered        01/04/24 1742  Inpatient Admission  Once                          Orders Placed This Encounter   Procedures    Inpatient Admission     Standing Status:   Standing     Number of Occurrences:   1     Order Specific Question:   Level of Care     Answer:   Med Surg [16]     Order Specific Question:   Estimated length of stay     Answer:   More than 2 Midnights     Order Specific Question:   Certification     Answer:   I certify that inpatient services are medically necessary for this patient for a duration of greater than two midnights. See H&P and MD Progress Notes for additional information about the patient's course of treatment.     ED Arrival Information       Expected   -    Arrival   1/4/2024 12:16    Acuity   Urgent              Means of arrival   Ambulance    Escorted by   [facility] ([facility])    Service   Hospitalist    Admission type   Emergency              Arrival complaint   sob             Chief Complaint   Patient presents with    Shortness of Breath     Pt was at PT when oxygen was seen at 80%. Pt has dyspnea on exertion. Hx of COPD and was just recently d/c for broken arm. No dizziness, chest pain, headache, palpitations.       Initial Presentation: 78 y.o. female to ED presents for concerns for hypoxia. Pt c/o chest congestion and a cough for the past 2 weeks. Notes phlegm. Also c/o dyspnea for past for 2 wks. She was using  her nebulizer as well as inhalers with improvement.  Pain in her left arm, from her humeral fracture. Noted with hyponatremia. Drinks only small amounts of liquids during the day. Drinks approx. 6 beers daily. Pt  passed away recently and she has had poor appetite, poor p.o. intake, and possibly had been drinking more than 6 beers daily due to grief. Recent hospitalization from 1/1 to 1/224. PMH for Compression fracture of L1 vertebra,  Nondisplaced fracture surgical neck of left humerus- sling, NWB, COPD, HTN, Chronic CAD.   Admit Inpatient level of care for Hypoxia, Alcohol use, Profund Hyponatremia. check COVID, influenza, RSV. Check Legionella/strep pneumoniae antigen as well as procalcitonin. Check D-dimer and BLE dopplers. 2D Echo. Check BNP. O2 with 2-3L NC. CIWA assess. Thiamine and folic acid. Na 123. Check urine and serum osmolalities. Fluid restriction. Hold IVF. Salts tabs.     Date: 1/5   Day 2:   Nephrology cons; Hyponatremia. Goal Na level this morning 129-131 mmol/L. Given 1L NS. Currently on sodium chloride tablet 1 g 3 times daily.  Started this morning. Place on hold for now as patient had rapid correction. Maintain on fluid restriction, will lift to 1.8 L per 24 hrs. Serum osmolality, urine osm, urine Na levels are pending. Given 250 ml of D5W and repeat level this afternoon. S/p Iv contrast 1/4/24. Monitor for contrast associated Nephropathy. Baseline creatinine 0.4-0.6. Continues on multivitamin, thiamine, and folic acid.     Pulmonology cons; Acute hypoxic respiratory failure. COPd, severe with possible acute exacerbation. O2 sats 80s in ED. Currently on O2 2L NC 93%, does not wear any supplemental home O2. Continue Iv antibiotics. Pulmonary hygiene encouraged: Deep breathing with cough, out of bed to chair, incentive spirometry. No indication for VQ scan at this time, will continue to monitor, bilateral venous duplex study ordered. Continue neb treatments at this time.   Echo pending. . Legionella antigen and strep pneumonia pending. -Procalcitonin 0.70, continue to trend. No surgical intervention at that time.    Progress notes; Continues on Iv antibiotics. F/u Echo. BNP mildly elevated. Titrate O2 to maintain sats greater than 88%.   CIWA assess = 1    Date: 1/6    Day 3: Has surpassed a 2nd midnight with active treatments and services, which include Hypoxia workup and treat. Continued on Iv antibiotics and  supplemental O2.      ED Triage Vitals   Temperature Pulse Respirations Blood Pressure SpO2   01/04/24 1223 01/04/24 1223 01/04/24 1223 01/04/24 1223 01/04/24 1223   98.1 °F (36.7 °C) 77 20 134/59 93 %      Temp Source Heart Rate Source Patient Position - Orthostatic VS BP Location FiO2 (%)   01/04/24 1223 01/04/24 1223 01/04/24 1223 01/04/24 1223 --   Oral Monitor Lying Right arm       Pain Score       01/05/24 0100       No Pain          Wt Readings from Last 1 Encounters:   01/05/24 59.9 kg (132 lb)     Additional Vital Signs:   01/05/24 0734 98 °F (36.7 °C) 110 Abnormal  19 169/76 109 93 % 32 3 L/min Nasal cannula Lying   01/05/24 0705 -- -- -- -- -- 94 % -- -- -- --   01/05/24 0259 97.6 °F (36.4 °C) 104 18 168/80 -- 97 % -- -- Nasal cannula Lying   01/05/24 0004 -- -- -- -- -- -- 32 3 L/min Nasal cannula --   01/04/24 2210 98 °F (36.7 °C) 104 18 160/71 99 96 % 32 3 L/min None (Room air) Lying   01/1945 -- 84 19 143/67 97 95 % 32 3 L/min Nasal cannula Sitting   01/04/24 1936 -- 89 18 143/67 97 95 % -- -- -- Sitting   01/04/24 1900 -- 88 -- 151/69 -- -- -- -- -- --   01/04/24 1630 -- 88 18 151/69 99 95 % 32 3 L/min Nasal cannula Sittin     Pertinent Labs/Diagnostic Test Results:   CTA ED chest PE study   Final Result by Chasity Bonilla MD (01/04 4436)      No definite acute pulmonary emboli but interpretation is mildly compromised by motion artifact which creates artifactual filling defects.      Patchy lateral groundglass opacity, greatest in the right upper lobe, infectious or inflammatory.      Trace right effusion.      Pulmonary artery enlargement which can be seen with pulmonary hypertension.      Moderate hiatal hernia.            Workstation performed: [id number]         VAS lower limb venous duplex study, complete bilateral  (1/4) -  (Results Pending)     Results from last 7 days   Lab Units 01/04/24  1816   SARS-COV-2  Negative     Results from last 7 days   Lab Units 01/05/24  0518  01/04/24  1316 01/01/24  0506 01/01/24  0104   WBC Thousand/uL 13.71* 13.67* 14.47* 13.14*   HEMOGLOBIN g/dL 11.1* 10.4* 11.6 11.5   HEMATOCRIT % 34.1* 32.3* 37.6 36.6   PLATELETS Thousands/uL 356 311 376 401*   NEUTROS ABS Thousands/µL  --  10.74*  --  7.29         Results from last 7 days   Lab Units 01/05/24  0528 01/04/24 2110 01/04/24  1316 01/02/24  0835 01/01/24  2356   SODIUM mmol/L 134* 131* 123* 133* 128*   POTASSIUM mmol/L 3.8 3.7 3.8 3.6 3.9   CHLORIDE mmol/L 97 96 89* 100 96   CO2 mmol/L 29 28 29 27 26   ANION GAP mmol/L 8 7 5 6 6   BUN mg/dL 5 8 9 6 8   CREATININE mg/dL 0.39* 0.49* 0.63 0.49* 0.56*   EGFR ml/min/1.73sq m 100 93 86 93 89   CALCIUM mg/dL 9.2 8.9 8.9 8.1* 8.8     Results from last 7 days   Lab Units 01/01/24  0104   AST U/L 19   ALT U/L 14   ALK PHOS U/L 63   TOTAL PROTEIN g/dL 7.5   ALBUMIN g/dL 4.0   TOTAL BILIRUBIN mg/dL 0.28         Results from last 7 days   Lab Units 01/05/24  0528 01/04/24 2110 01/04/24  1316 01/02/24  0835 01/01/24  2356 01/01/24  1635 01/01/24  0506 01/01/24  0104   GLUCOSE RANDOM mg/dL 137 168* 134 177* 125 124 122 107       Results from last 7 days   Lab Units 01/04/24  1816   D-DIMER QUANTITATIVE ug/ml FEU 1.86*     Results from last 7 days   Lab Units 01/01/24  0104   PROTIME seconds 13.0   INR  0.95   PTT seconds 34         Results from last 7 days   Lab Units 01/04/24  1816 01/02/24  0515 01/01/24  0104   PROCALCITONIN ng/ml 0.70* 0.05 <0.05                 Results from last 7 days   Lab Units 01/04/24  1816   BNP pg/mL 224*       Results from last 7 days   Lab Units 01/01/24  0604   OSMO UR mmol/     Results from last 7 days   Lab Units 01/01/24  0604   SODIUM UR  56     Results from last 7 days   Lab Units 01/04/24  1816   INFLUENZA A PCR  Negative   INFLUENZA B PCR  Negative   RSV PCR  Negative           Results from last 7 days   Lab Units 01/04/24  1932 01/04/24  1916   BLOOD CULTURE  Received in Microbiology Lab. Culture in Progress. Received  in Microbiology Lab. Culture in Progress.         ED Treatment:   Medication Administration from 01/04/2024 1216 to 01/04/2024 2159         Date/Time Order Dose Route Action     01/04/2024 1317 EST albuterol inhalation solution 5 mg 5 mg Nebulization Given     01/04/2024 1409 EST sodium chloride 0.9 % bolus 1,000 mL 1,000 mL Intravenous New Bag     01/04/2024 1432 EST iohexol (OMNIPAQUE) 350 MG/ML injection (SINGLE-DOSE) 60 mL 60 mL Intravenous Given     01/04/2024 1907 EST thiamine tablet 100 mg 100 mg Oral Given     01/04/2024 1907 EST folic acid (FOLVITE) tablet 1 mg 1 mg Oral Given     01/04/2024 1907 EST multivitamin-minerals (CENTRUM) tablet 1 tablet 1 tablet Oral Given     01/04/2024 1917 EST levalbuterol (XOPENEX) inhalation solution 1.25 mg 1.25 mg Nebulization Given     01/04/2024 1917 EST ipratropium (ATROVENT) 0.02 % inhalation solution 0.5 mg 0.5 mg Nebulization Given          Past Medical History:   Diagnosis Date    Arthritis     Asthma     Chronic obstructive pulmonary disease, unspecified COPD type ([facility]) 04/08/2022    Cognitive decline 03/10/2023    Community acquired pneumonia 11/13/2019    COPD (chronic obstructive pulmonary disease) ([facility])     Coronary artery disease     DM (diabetes mellitus) ([facility])     HTN (hypertension)     Hydronephrosis, bilateral 03/06/2023    Hyperlipidemia     Hyponatremia     Urinary retention      Present on Admission:   Chronic coronary artery disease   Hyperlipidemia   Primary hypertension   Chronic obstructive pulmonary disease, unspecified COPD type ([facility])   Hyponatremia   Closed compression fracture of L1 vertebra ([facility])   Hypoxia   Closed nondisplaced fracture of surgical neck of left humerus      Admitting Diagnosis: COPD (chronic obstructive pulmonary disease) ([facility]) [J44.9]  Hyponatremia [E87.1]  SOB (shortness of breath) [R06.02]  Hypoxia [R09.02]  Age/Sex: 78 y.o. female    Admission Orders:  Scheduled Medications:  amLODIPine, 10 mg, Oral, BID  bisoprolol, 10 mg,  Oral, Daily  cefTRIAXone, 1,000 mg, Intravenous, Q24H  clopidogrel, 75 mg, Oral, Daily  vitamin B-12, 1,000 mcg, Oral, Daily  docusate sodium, 100 mg, Oral, BID  Fluticasone Furoate-Vilanterol, 1 puff, Inhalation, Daily   And  umeclidinium, 1 puff, Inhalation, Daily  folic acid, 1 mg, Oral, Daily  heparin (porcine), 5,000 Units, Subcutaneous, Q8H KENIA  ipratropium, 0.5 mg, Nebulization, Q6H  levalbuterol, 1.25 mg, Nebulization, Q6H  multivitamin-minerals, 1 tablet, Oral, Daily  pantoprazole, 20 mg, Oral, Daily  pravastatin, 40 mg, Oral, Daily With Dinner  thiamine, 100 mg, Oral, Daily      Continuous IV Infusions: None     PRN Meds:  albuterol, 2 puff, Inhalation, Q6H PRN  ALPRAZolam, 0.5 mg, Oral, HS PRN  benzonatate, 100 mg, Oral, TID PRN  nitroglycerin, 0.4 mg, Sublingual, Q5 Min PRN  ondansetron, 4 mg, Intravenous, Q6H PRN  oxyCODONE, 5 mg, Oral, Q6H PRN        IP CONSULT TO CASE MANAGEMENT  IP CONSULT TO NEPHROLOGY  IP CONSULT TO PULMONOLOGY    Network Utilization Review Department  ATTENTION: Please call with any questions or concerns to [phone] and carefully listen to the prompts so that you are directed to the right person. All voicemails are confidential.   For Discharge needs, contact Care Management DC Support Team at [phone] opt. [phone]  Send all requests for admission clinical reviews, approved or denied determinations and any other requests to dedicated fax number below belonging to the campus where the patient is receiving treatment. List of dedicated fax numbers for the Facilities:  FACILITY NAME UR FAX NUMBER   ADMISSION DENIALS (Administrative/Medical Necessity) [phone]   DISCHARGE SUPPORT TEAM (NETWORK) [phone]   PARENT CHILD HEALTH (Maternity/NICU/Pediatrics) [phone]   [facility] [phone]   [facility] [phone]   [facility] [phone]   [facility] [phone]    [facility] [phone]   [facility] [phone]   [facility] [phone]   [facility] [phone]   [facility] [phone]   [facility] [phone]   [facility] [phone]

## 2024-01-05 NOTE — PHYSICAL THERAPY NOTE
PT EVALUATION    Pt. Name: Emily Berrios  Pt. Age: 78 y.o.  MRN: [id number]  LENGTH OF STAY: 1      Admitting Diagnoses:   COPD (chronic obstructive pulmonary disease) ([facility]) [J44.9]  Hyponatremia [E87.1]  SOB (shortness of breath) [R06.02]  Hypoxia [R09.02]    Past Medical History:   Diagnosis Date    Arthritis     Asthma     Chronic obstructive pulmonary disease, unspecified COPD type ([facility]) 04/08/2022    Cognitive decline 03/10/2023    Community acquired pneumonia 11/13/2019    COPD (chronic obstructive pulmonary disease) ([facility])     Coronary artery disease     DM (diabetes mellitus) ([facility])     HTN (hypertension)     Hydronephrosis, bilateral 03/06/2023    Hyperlipidemia     Hyponatremia     Urinary retention        Past Surgical History:   Procedure Laterality Date    CARDIAC CATHETERIZATION  2010    CORONARY ARTERY BYPASS GRAFT  12/06/2010    with subsequent stent to the saphenous veingraft to the RCA 3 months later    KNEE ARTHROSCOPY      POLYPECTOMY      laryngeal    TOTAL HIP ARTHROPLASTY      TUBAL LIGATION         Imaging Studies:  CTA ED chest PE study   Final Result by Chasity Bonilla MD (01/04 4905)      No definite acute pulmonary emboli but interpretation is mildly compromised by motion artifact which creates artifactual filling defects.      Patchy lateral groundglass opacity, greatest in the right upper lobe, infectious or inflammatory.      Trace right effusion.      Pulmonary artery enlargement which can be seen with pulmonary hypertension.      Moderate hiatal hernia.            Workstation performed: [id number]         VAS lower limb venous duplex study, complete bilateral    (Results Pending)         01/05/24 1300   PT Last Visit   PT Visit Date 01/05/24   Note Type   Note type Evaluation   Pain Assessment   Pain Assessment Tool FLACC   Pain Location/Orientation Location: Back   Hospital Pain Intervention(s) Repositioned;Ambulation/increased activity;Emotional support;Rest   Pain  Rating: FLACC (Rest) - Face 0   Pain Rating: FLACC (Rest) - Legs 0   Pain Rating: FLACC (Rest) - Activity 0   Pain Rating: FLACC (Rest) - Cry 0   Pain Rating: FLACC (Rest) - Consolability 0   Score: FLACC (Rest) 0   Pain Rating: FLACC (Activity) - Face 1   Pain Rating: FLACC (Activity) - Legs 1   Pain Rating: FLACC (Activity) - Activity 1   Pain Rating: FLACC (Activity) - Cry 1   Pain Rating: FLACC (Activity) - Consolability 1   Score: FLACC (Activity) 5   Restrictions/Precautions   Weight Bearing Precautions Per Order Yes   LUE Weight Bearing Per Order NWB   Braces or Orthoses Sling   Other Precautions Chair Alarm;Bed Alarm;Multiple lines;Fall Risk;O2  (2L O2 NC; continous pulse ox)   Home Living   Type of Home House   Home Layout One level;Stairs to enter with rails   Bathroom Shower/Tub Tub/shower unit   Bathroom Toilet Standard   Home Equipment Cane   Prior Function   Level of [place] Independent with functional mobility;Independent with ADLs;Independent with IADLS;Other (Comment)  (but pt reports since fall on 1/1 has required assist from DTR due to WBS; ambulates w/ SPC)   Lives With Daughter   Receives Help From Family;Home health   Falls in the last 6 months 1 to 4  (1x)   Vocational Retired   Comments Pt lives alone at baseline but her daughter has been staying with her since s/p fall on 1/1/24 & was I with ADLS and IADLS prior to fall.   General   Family/Caregiver Present No   Cognition   Arousal/Participation Alert   Attention Attends with cues to redirect   Orientation Level Oriented to person;Oriented to place;Oriented to time   Following Commands Follows one step commands without difficulty   Comments cooperative; impuslive   Subjective   Subjective Pt agreeable to PT/OT evals.   RUE Assessment   RUE Assessment   (refer to OT)   LUE Assessment   LUE Assessment   (refer to OT)   RLE Assessment   RLE Assessment WFL  (4-/5 grossly)   LLE Assessment   LLE Assessment WFL  (4-/5 grossly)   Bed Mobility    Supine to Sit 4  Minimal assistance   Additional items Assist x 1;HOB elevated;Bedrails;Increased time required;Verbal cues   Sit to Supine 5  Supervision   Additional items Increased time required;Verbal cues   Additional Comments cues for techniques & safety   Transfers   Sit to Stand 4  Minimal assistance   Additional items Assist x 1;Verbal cues;Impulsive   Stand to Sit 5  Supervision   Additional items Increased time required;Verbal cues;Impulsive   Additional Comments cues for techniques & safety   Ambulation/Elevation   Gait pattern Decreased foot clearance;Short stride;Inconsistent preston   Gait Assistance 4  Minimal assist   Additional items Assist x 1;Verbal cues;Tactile cues   Assistive Device Straight cane   Distance 40'x1  (pt refused to ambulate farther)   Ambulation/Elevation Additional Comments no gross LOB noted; pt notes close to baseline gait   Balance   Static Sitting Good   Dynamic Sitting Fair +   Static Standing Fair   Dynamic Standing Fair -   Ambulatory Poor +   Activity Tolerance   Activity Tolerance Patient limited by pain   Medical Staff Made Aware OTR María   Nurse Made Aware yes   Assessment   Prognosis Fair   Problem List Decreased strength;Decreased endurance;Impaired balance;Decreased mobility;Impaired judgement;Pain;Orthopedic restrictions   Assessment Pt. 78 y.o.female who recently hospitalized on 1/1/24 2* to L humeral fx & L1 compression due to a fall, presented w/ hypoxia w/ SpO2 in the 80's RA. Pt admitted for Hypoxia w/ COPD & Hyponatremia. Pt referred to PT for mobility assessment & D/C planning w/ orders of Up and OOB as tolerated , & NWB to LUE + sling . Please see above for information re: home set-up & PLOF as well as objective findings during PT assessment. PTA, pt lives alone at baseline but her daughter has been staying with her since s/p fall on 1/1/24 & was I with ADLS and IADLS prior to fall.  On eval, pt functioning below baseline hence will continue skilled PT  "to improve function & safety. Pt require S/minAx1 for most functional mobility w/ SPC + cues for techniques & safety. Gait deviations as above but no gross LOB noted. Dec amb tolerance 2* to back pain. (+) impulsive require cues for safety. The patient's AM-PAC Basic Mobility Inpatient Short Form Raw Score is 18. A Raw score of greater than 16 suggests the patient may benefit from discharge to home. Please also refer to the recommendation of the Physical Therapist for safe discharge planning. From PT standpoint, will recommend Level III (minimum resource intensity) rehab services w/ inc family support at D/C. Pt noted to have low SpO2 of 77% RA after amb. SpO2 improved to 88-93% w/ 2L O2 NC. No dizziness reported t/o session. Nsg staff most recent vital signs as follows: /69 (BP Location: Right arm)   Pulse 83   Temp 98.1 °F (36.7 °C) (Temporal)   Resp 18   Ht 5' 1\" (1.549 m)   Wt 59.9 kg (132 lb)   LMP  (LMP Unknown)   SpO2 93%   BMI 24.94 kg/m² . At end of session, pt back in bed in stable condition, call bell & phone in reach, bed alarm activated, all lines intact. Fall precautions reinforced w/ good understanding. CM to follow. Nsg staff to continue to mobilized pt (OOB in chair for all meals & ambulate in room/unit) as tolerated to prevent further decline in function. Will also recommend [facility] for daily amb &/or daily OOB in chair as appropriate. Nsg notified. Co-eval was necessary to complete this PT eval for the pt's best interest given pt's medical acuity & complexity.   Goals   Patient Goals to go home   STG Expiration Date 01/15/24   Short Term Goal #1 Goals to be met in 10 days; pt will be able to: 1) inc strength & balance by 1/2 grade to improve overall functional mobility & dec fall risk; 2) inc bed mobility to modified I for pt to be able to get in/OOB safely w/ proper techniques 100% of the time, to dec caregiver burden & safely function at home; 3) inc transfers to [facility] for " pt to transition safely from one surface to another w/o % of the time, to dec caregiver burden & safely function at home; 4) inc amb w/ appropriate AD  approx. >150' w/ S for pt to ambulate household distances w/o any % of the time, to dec caregiver burden & safely function at home; 5) negotiate stairs w/ S for inc safety during stair mgt inside/outside of home & dec caregiver burden; 6) pt/caregiver ed   PT Treatment Day 0   Plan   Treatment/Interventions Functional transfer training;LE strengthening/ROM;Elevations;Therapeutic exercise;Endurance training;Patient/family training;Bed mobility;Gait training;Spoke to nursing;OT;Spoke to case management   PT Frequency 3-5x/wk   Discharge Recommendation   Rehab Resource Intensity Level, PT III (Minimum Resource Intensity)  (w/ inc family support)   Additional Comments restorative for daily amb   AM-PAC Basic Mobility Inpatient   Turning in Flat Bed Without Bedrails 3   Lying on Back to Sitting on Edge of Flat Bed Without Bedrails 3   Moving Bed to Chair 3   Standing Up From Chair Using Arms 3   Walk in Room 3   Climb 3-5 Stairs With Railing 3   Basic Mobility Inpatient Raw Score 18   Basic Mobility Standardized Score 41.05   Highest Level Of Mobility   JH-HLM Goal 6: Walk 10 steps or more   JH-HLM Achieved 7: Walk 25 feet or more   End of Consult   Patient Position at End of Consult Supine;Bed/Chair alarm activated;All needs within reach   End of Consult Comments Pt in stable condition. All needs in reach. All lines intact. Bed alarm activated.   Hx/personal factors: co-morbidities, home alone, advanced age, mutliple lines, use of AD, pain, WB restrictions, h/o of falls, fall risk, assist w/ ADL's, and O2  Examination: dec mobility, dec balance, dec endurance, dec amb, risk for falls, pain, dec cognition, WB restrictions  Clinical: unpredictable (ongoing medical status, abnormal lab values, risk for falls, and pain mgt)  Complexity: high    Jb Radha

## 2024-01-05 NOTE — PLAN OF CARE
Problem: Potential for Falls  Goal: Patient will remain free of falls  Description: INTERVENTIONS:  - Educate patient/family on patient safety including physical limitations  - Instruct patient to call for assistance with activity   - Consult OT/PT to assist with strengthening/mobility   - Keep Call bell within reach  - Keep bed low and locked with side rails adjusted as appropriate  - Keep care items and personal belongings within reach  - Initiate and maintain comfort rounds  - Make Fall Risk Sign visible to staff  - Offer Toileting every 2 Hours, in advance of need  - Initiate/Maintain bed alarm  - Obtain necessary fall risk management equipment: non slip socks, call bell   - Apply yellow socks and bracelet for high fall risk patients  - Consider moving patient to room near nurses station  Outcome: Progressing     Problem: RESPIRATORY - ADULT  Goal: Achieves optimal ventilation and oxygenation  Description: INTERVENTIONS:  - Assess for changes in respiratory status  - Assess for changes in mentation and behavior  - Position to facilitate oxygenation and minimize respiratory effort  - Oxygen administered by appropriate delivery if ordered  - Initiate smoking cessation education as indicated  - Encourage broncho-pulmonary hygiene including cough, deep breathe, Incentive Spirometry  - Assess the need for suctioning and aspirate as needed  - Assess and instruct to report SOB or any respiratory difficulty  - Respiratory Therapy support as indicated  Outcome: Progressing

## 2024-01-06 LAB
ANION GAP SERPL CALCULATED.3IONS-SCNC: 7 MMOL/L
BUN SERPL-MCNC: 5 MG/DL (ref 5–25)
BUN SERPL-MCNC: 6 MG/DL (ref 5–25)
BUN SERPL-MCNC: 7 MG/DL (ref 5–25)
CALCIUM SERPL-MCNC: 9.1 MG/DL (ref 8.4–10.2)
CALCIUM SERPL-MCNC: 9.6 MG/DL (ref 8.4–10.2)
CALCIUM SERPL-MCNC: 9.6 MG/DL (ref 8.4–10.2)
CHLORIDE SERPL-SCNC: 94 MMOL/L (ref 96–108)
CHLORIDE SERPL-SCNC: 95 MMOL/L (ref 96–108)
CHLORIDE SERPL-SCNC: 96 MMOL/L (ref 96–108)
CO2 SERPL-SCNC: 28 MMOL/L (ref 21–32)
CREAT SERPL-MCNC: 0.46 MG/DL (ref 0.6–1.3)
CREAT SERPL-MCNC: 0.48 MG/DL (ref 0.6–1.3)
CREAT SERPL-MCNC: 0.49 MG/DL (ref 0.6–1.3)
ERYTHROCYTE [DISTWIDTH] IN BLOOD BY AUTOMATED COUNT: 15.5 % (ref 11.6–15.1)
GFR SERPL CREATININE-BSD FRML MDRD: 93 ML/MIN/1.73SQ M
GFR SERPL CREATININE-BSD FRML MDRD: 94 ML/MIN/1.73SQ M
GFR SERPL CREATININE-BSD FRML MDRD: 95 ML/MIN/1.73SQ M
GLUCOSE SERPL-MCNC: 111 MG/DL (ref 65–140)
GLUCOSE SERPL-MCNC: 125 MG/DL (ref 65–140)
GLUCOSE SERPL-MCNC: 140 MG/DL (ref 65–140)
HCT VFR BLD AUTO: 30.7 % (ref 34.8–46.1)
HGB BLD-MCNC: 9.5 G/DL (ref 11.5–15.4)
MAGNESIUM SERPL-MCNC: 1.5 MG/DL (ref 1.9–2.7)
MCH RBC QN AUTO: 25.1 PG (ref 26.8–34.3)
MCHC RBC AUTO-ENTMCNC: 30.9 G/DL (ref 31.4–37.4)
MCV RBC AUTO: 81 FL (ref 82–98)
PHOSPHATE SERPL-MCNC: 2.4 MG/DL (ref 2.3–4.1)
PLATELET # BLD AUTO: 313 THOUSANDS/UL (ref 149–390)
PMV BLD AUTO: 9.9 FL (ref 8.9–12.7)
POTASSIUM SERPL-SCNC: 4.2 MMOL/L (ref 3.5–5.3)
POTASSIUM SERPL-SCNC: 4.4 MMOL/L (ref 3.5–5.3)
POTASSIUM SERPL-SCNC: 4.6 MMOL/L (ref 3.5–5.3)
PROCALCITONIN SERPL-MCNC: 0.28 NG/ML
RBC # BLD AUTO: 3.78 MILLION/UL (ref 3.81–5.12)
SODIUM 24H UR-SCNC: 51 MOL/L
SODIUM SERPL-SCNC: 129 MMOL/L (ref 135–147)
SODIUM SERPL-SCNC: 130 MMOL/L (ref 135–147)
SODIUM SERPL-SCNC: 131 MMOL/L (ref 135–147)
WBC # BLD AUTO: 10.83 THOUSAND/UL (ref 4.31–10.16)

## 2024-01-06 PROCEDURE — 99232 SBSQ HOSP IP/OBS MODERATE 35: CPT | Performed by: INTERNAL MEDICINE

## 2024-01-06 PROCEDURE — 83735 ASSAY OF MAGNESIUM: CPT | Performed by: INTERNAL MEDICINE

## 2024-01-06 PROCEDURE — 80048 BASIC METABOLIC PNL TOTAL CA: CPT | Performed by: INTERNAL MEDICINE

## 2024-01-06 PROCEDURE — 85027 COMPLETE CBC AUTOMATED: CPT | Performed by: INTERNAL MEDICINE

## 2024-01-06 PROCEDURE — 84300 ASSAY OF URINE SODIUM: CPT | Performed by: NURSE PRACTITIONER

## 2024-01-06 PROCEDURE — 84145 PROCALCITONIN (PCT): CPT | Performed by: INTERNAL MEDICINE

## 2024-01-06 PROCEDURE — 94640 AIRWAY INHALATION TREATMENT: CPT

## 2024-01-06 PROCEDURE — 94760 N-INVAS EAR/PLS OXIMETRY 1: CPT

## 2024-01-06 PROCEDURE — 99231 SBSQ HOSP IP/OBS SF/LOW 25: CPT | Performed by: INTERNAL MEDICINE

## 2024-01-06 PROCEDURE — 84100 ASSAY OF PHOSPHORUS: CPT | Performed by: INTERNAL MEDICINE

## 2024-01-06 RX ORDER — MAGNESIUM SULFATE HEPTAHYDRATE 40 MG/ML
2 INJECTION, SOLUTION INTRAVENOUS ONCE
Status: COMPLETED | OUTPATIENT
Start: 2024-01-06 | End: 2024-01-06

## 2024-01-06 RX ORDER — SODIUM CHLORIDE 1 G/1
1 TABLET ORAL ONCE
Status: COMPLETED | OUTPATIENT
Start: 2024-01-06 | End: 2024-01-06

## 2024-01-06 RX ADMIN — FLUTICASONE FUROATE AND VILANTEROL TRIFENATATE 1 PUFF: 100; 25 POWDER RESPIRATORY (INHALATION) at 08:55

## 2024-01-06 RX ADMIN — AMLODIPINE BESYLATE 10 MG: 10 TABLET ORAL at 17:16

## 2024-01-06 RX ADMIN — LEVALBUTEROL HYDROCHLORIDE 1.25 MG: 1.25 SOLUTION RESPIRATORY (INHALATION) at 13:42

## 2024-01-06 RX ADMIN — Medication 1 TABLET: at 08:54

## 2024-01-06 RX ADMIN — SODIUM CHLORIDE 1 G: 1 TABLET ORAL at 08:54

## 2024-01-06 RX ADMIN — MAGNESIUM SULFATE HEPTAHYDRATE 2 G: 40 INJECTION, SOLUTION INTRAVENOUS at 12:04

## 2024-01-06 RX ADMIN — DOCUSATE SODIUM 100 MG: 100 CAPSULE, LIQUID FILLED ORAL at 08:54

## 2024-01-06 RX ADMIN — CYANOCOBALAMIN TAB 500 MCG 1000 MCG: 500 TAB at 08:54

## 2024-01-06 RX ADMIN — UMECLIDINIUM 1 PUFF: 62.5 AEROSOL, POWDER ORAL at 08:55

## 2024-01-06 RX ADMIN — OXYCODONE HYDROCHLORIDE 5 MG: 5 TABLET ORAL at 04:07

## 2024-01-06 RX ADMIN — BISOPROLOL FUMARATE 10 MG: 5 TABLET ORAL at 08:54

## 2024-01-06 RX ADMIN — DOCUSATE SODIUM 100 MG: 100 CAPSULE, LIQUID FILLED ORAL at 17:16

## 2024-01-06 RX ADMIN — CEFTRIAXONE 1000 MG: 1 INJECTION, SOLUTION INTRAVENOUS at 08:54

## 2024-01-06 RX ADMIN — IPRATROPIUM BROMIDE 0.5 MG: 0.5 SOLUTION RESPIRATORY (INHALATION) at 07:25

## 2024-01-06 RX ADMIN — AMLODIPINE BESYLATE 10 MG: 10 TABLET ORAL at 08:54

## 2024-01-06 RX ADMIN — FOLIC ACID 1 MG: 1 TABLET ORAL at 08:54

## 2024-01-06 RX ADMIN — LEVALBUTEROL HYDROCHLORIDE 1.25 MG: 1.25 SOLUTION RESPIRATORY (INHALATION) at 19:37

## 2024-01-06 RX ADMIN — ALBUTEROL SULFATE 2 PUFF: 90 AEROSOL, METERED RESPIRATORY (INHALATION) at 04:07

## 2024-01-06 RX ADMIN — CLOPIDOGREL BISULFATE 75 MG: 75 TABLET ORAL at 08:54

## 2024-01-06 RX ADMIN — THIAMINE HCL TAB 100 MG 100 MG: 100 TAB at 08:54

## 2024-01-06 RX ADMIN — PRAVASTATIN SODIUM 40 MG: 40 TABLET ORAL at 17:16

## 2024-01-06 RX ADMIN — HEPARIN SODIUM 5000 UNITS: 5000 INJECTION INTRAVENOUS; SUBCUTANEOUS at 22:07

## 2024-01-06 RX ADMIN — HEPARIN SODIUM 5000 UNITS: 5000 INJECTION INTRAVENOUS; SUBCUTANEOUS at 13:14

## 2024-01-06 RX ADMIN — PANTOPRAZOLE SODIUM 20 MG: 20 TABLET, DELAYED RELEASE ORAL at 08:54

## 2024-01-06 RX ADMIN — LEVALBUTEROL HYDROCHLORIDE 1.25 MG: 1.25 SOLUTION RESPIRATORY (INHALATION) at 07:25

## 2024-01-06 RX ADMIN — HEPARIN SODIUM 5000 UNITS: 5000 INJECTION INTRAVENOUS; SUBCUTANEOUS at 05:07

## 2024-01-06 RX ADMIN — OXYCODONE HYDROCHLORIDE 5 MG: 5 TABLET ORAL at 22:08

## 2024-01-06 RX ADMIN — IPRATROPIUM BROMIDE 0.5 MG: 0.5 SOLUTION RESPIRATORY (INHALATION) at 19:37

## 2024-01-06 RX ADMIN — IPRATROPIUM BROMIDE 0.5 MG: 0.5 SOLUTION RESPIRATORY (INHALATION) at 13:42

## 2024-01-06 NOTE — PROGRESS NOTES
[facility]  Progress Note  Name: Emily Berrios I  MRN: [id number]  Unit/Bed#: E2 -02 I Date of Admission: 1/4/2024   Date of Service: 1/6/2024 I Hospital Day: 2    Assessment/Plan   * Hypoxia  Assessment & Plan  Patient is a 78-year-old female with past medical history significant for recent admission 1/1 - 1/2 at [facility] with left humeral fracture and L1 compression fracture after a fall.  Patient was sent to the ER by physical therapy for hypoxia    CT PE study with no definite acute pulmonary emboli, patchy lateral groundglass opacity greater in the right upper lobe infectious versus inflammatory, trace right effusion, pulmonary artery enlargement concerning for pulmonary hypertension  Ultrasound negative for lower extremity DVT  Currently on Rocephin empirically and trend procalcitonin  Appreciate pulmonology recommendations  Echo- ef normal with elevated pulmonary hypertension.  BNP mildly elevated.  Patient does not appear volume overloaded on exam  Titrate oxygen to maintain saturations greater than 88%      Alcohol use  Assessment & Plan  Pt drinks 6 pack of beer or more daily  CIWA scale  Thiamine and folic acid   for alcohol cessation counseling    Closed compression fracture of L1 vertebra (HCC)  Assessment & Plan  lidoderm patch, analgesics, physical therapy    Closed nondisplaced fracture of surgical neck of left humerus  Assessment & Plan  Recently hospitalized 1/1 for this  Continue analgesics sling, nonweightbearing  Outpatient follow-up with orthopedic surgery in 4 weeks    Hyponatremia  Assessment & Plan  Likely multifactorial secondary to prerenal plus SIADH plus beer Poto sophie  Nephrology following    Chronic obstructive pulmonary disease, unspecified COPD type (HCC)  Assessment & Plan  Patient has a history of COPD and follows with [facility] pulmonary  Appreciate pulmonology recommendations  Does not appear to be having  exacerbation    Primary hypertension  Assessment & Plan  Continue home Norvasc 5 mg twice a day and bisoprolol 10 mg daily    Chronic coronary artery disease  Assessment & Plan  No symptoms consistent with acute ischemia  Continue home bisoprolol and Plavix  Continue statin         VTE Pharmacologic Prophylaxis: heparin     Patient Centered Rounds:  Patient care rounds were performed with nursing    Education and Discussions with Family / Patient: Updated Daughter     Time Spent for Care: I have spent a total time of 43 minutes on 24 in caring for this patient including Diagnostic results, Prognosis, Instructions for management, Patient and family education, Impressions, Reviewing / ordering tests, medicine, procedures  , and Obtaining or reviewing history  .      Current Length of Stay: 2 day(s)    Current Patient Status: Inpatient   Certification Statement: The patient will continue to require additional inpatient hospital stay due to need for IV abx     Discharge Plan: Likely discharge in the next 24 hours     Code Status: Level 1 - Full Code      Subjective:   Patient seen and evaluated at bedside. She feels improved. Still requiring oxygen.     Objective:     Vitals:   Temp (24hrs), Av.9 °F (36.6 °C), Min:97.1 °F (36.2 °C), Max:99.6 °F (37.6 °C)    Temp:  [97.1 °F (36.2 °C)-99.6 °F (37.6 °C)] 97.6 °F (36.4 °C)  HR:  [73-90] 76  Resp:  [18-22] 18  BP: (111-146)/(50-70) 113/50  SpO2:  [90 %-98 %] 97 %  Body mass index is 24.94 kg/m².     Input and Output Summary (last 24 hours):       Intake/Output Summary (Last 24 hours) at 2024 1435  Last data filed at 2024 0701  Gross per 24 hour   Intake 780 ml   Output 550 ml   Net 230 ml       Physical Exam:     Physical Exam  Vitals reviewed.   Constitutional:       General: She is not in acute distress.     Appearance: She is well-developed. She is not ill-appearing, toxic-appearing or diaphoretic.   HENT:      Head: Normocephalic and atraumatic.       Mouth/Throat:      Mouth: Mucous membranes are moist.   Eyes:      General: No scleral icterus.     Extraocular Movements: Extraocular movements intact.   Cardiovascular:      Rate and Rhythm: Normal rate and regular rhythm.      Heart sounds: Normal heart sounds.   Pulmonary:      Effort: Pulmonary effort is normal. No respiratory distress.      Breath sounds: Normal breath sounds. No wheezing or rales.   Abdominal:      General: There is no distension.      Palpations: Abdomen is soft.      Tenderness: There is no abdominal tenderness. There is no guarding or rebound.   Musculoskeletal:         General: No swelling, tenderness or deformity.   Skin:     General: Skin is warm and dry.   Neurological:      Mental Status: She is alert. Mental status is at baseline.   Psychiatric:         Mood and Affect: Mood normal.         Behavior: Behavior normal.         Thought Content: Thought content normal.         Judgment: Judgment normal.         Additional Data:     Labs: I have reviewed pertinent results     Results from last 7 days   Lab Units 01/06/24  0500 01/05/24  0528 01/04/24  1316   WBC Thousand/uL 10.83*   < > 13.67*   HEMOGLOBIN g/dL 9.5*   < > 10.4*   HEMATOCRIT % 30.7*   < > 32.3*   PLATELETS Thousands/uL 313   < > 311   NEUTROS PCT %  --   --  78*   LYMPHS PCT %  --   --  10*   MONOS PCT %  --   --  9   EOS PCT %  --   --  1    < > = values in this interval not displayed.     Results from last 7 days   Lab Units 01/06/24  0500 01/01/24  0506 01/01/24  0104   SODIUM mmol/L 131*   < > 124*   POTASSIUM mmol/L 4.4   < > 3.3*   CHLORIDE mmol/L 96   < > 90*   CO2 mmol/L 28   < > 23   BUN mg/dL 5   < > 11   CREATININE mg/dL 0.46*   < > 0.66   ANION GAP mmol/L 7   < > 11   CALCIUM mg/dL 9.1   < > 9.3   ALBUMIN g/dL  --   --  4.0   TOTAL BILIRUBIN mg/dL  --   --  0.28   ALK PHOS U/L  --   --  63   ALT U/L  --   --  14   AST U/L  --   --  19   GLUCOSE RANDOM mg/dL 111   < > 107    < > = values in this interval not  displayed.     Results from last 7 days   Lab Units 01/01/24  0104   INR  0.95             Results from last 7 days   Lab Units 01/06/24  0500 01/04/24  1816 01/02/24  0515 01/01/24  0104   PROCALCITONIN ng/ml 0.28* 0.70* 0.05 <0.05         Imaging: I have reviewed pertinent imaging       Recent Cultures (last 7 days):     Results from last 7 days   Lab Units 01/04/24  2338 01/04/24  2200 01/04/24  1932 01/04/24  1916   BLOOD CULTURE   --   --  No Growth at 24 hrs. No Growth at 24 hrs.   SPUTUM CULTURE   --  Culture too young- will reincubate  --   --    GRAM STAIN RESULT   --  1+ Epithelial cells per low power field*  1+ Polys*  2+ Gram positive cocci in clusters*  1+ Gram positive cocci in chains*  1+ Gram positive rods*  --   --    LEGIONELLA URINARY ANTIGEN  Negative  --   --   --        Last 24 Hours Medication List:   Current Facility-Administered Medications   Medication Dose Route Frequency Provider Last Rate    albuterol  2 puff Inhalation Q6H PRN Gabrielle Garcia MD      ALPRAZolam  0.5 mg Oral HS PRN Gabrielle Garcia MD      amLODIPine  10 mg Oral BID Gabrielle Garcia MD      benzonatate  100 mg Oral TID PRN Gabrielle Garcia MD      bisoprolol  10 mg Oral Daily Gabrielle Garcia MD      cefTRIAXone  1,000 mg Intravenous Q24H Nelson Vance DO 1,000 mg (01/06/24 0854)    clopidogrel  75 mg Oral Daily Gabrielle Garcia MD      vitamin B-12  1,000 mcg Oral Daily Gabrielle Garcia MD      docusate sodium  100 mg Oral BID Gabrielle Garcia MD      Fluticasone Furoate-Vilanterol  1 puff Inhalation Daily Gabrielle Garcia MD      And    umeclidinium  1 puff Inhalation Daily Gabrielle Garcia MD      folic acid  1 mg Oral Daily Gabrielle Garcia MD      heparin (porcine)  5,000 Units Subcutaneous Q8H [facility] Gabrielle Garcia MD      ipratropium  0.5 mg Nebulization TID Nelson Vance DO      levalbuterol  1.25 mg Nebulization TID Nelson Vance DO      multivitamin-minerals  1 tablet Oral Daily Gabrielle Garcia MD       nitroglycerin  0.4 mg Sublingual Q5 Min PRN Gabrielle Garcia MD      ondansetron  4 mg Intravenous Q6H PRN Gabrielle Garcia MD      oxyCODONE  5 mg Oral Q6H PRN Gabrielle Garcia MD      pantoprazole  20 mg Oral Daily Gabrielle Garcia MD      pravastatin  40 mg Oral Daily With Dinner Gabrielle Garcia MD      thiamine  100 mg Oral Daily Gabrielle Garcia MD          Today, Patient Was Seen By: Nelson Vance DO    ** Please Note: Dictation voice to text software may have been used in the creation of this document. **

## 2024-01-06 NOTE — ASSESSMENT & PLAN NOTE
Likely multifactorial secondary to prerenal plus SIADH plus beer Poto sophie  Nephrology following

## 2024-01-06 NOTE — PROGRESS NOTES
NEPHROLOGY PROGRESS NOTE   Emily Berrios 78 y.o. female MRN: [id number]  Unit/Bed#: E2 -02 Encounter: [id number]  Reason for Consult: Hyponatremia      SUMMARY:    78-year-old female with multiple comorbidities including hypertension, chronic hyponatremia alcohol use, COPD, CAD status post recent hospitalization with fracture and L1 compression admitted from [facility] with hypoxia.  Nephrology consulted for evaluation and management of acute hyponatremia.     ASSESSMENT and PLAN:    Acute on chronic hyponatremia--resolved  -- Baseline sodium 130-135  -- Admission sodium 123, with overcorrection that was slight after receiving normal saline  -- Secondary to suspected prerenal azotemia/volume depletion with underlying SIADH poor solute intake and beer P[place] sophie  -- Urine osmolality was 250, urine sodium 56  -- Currently off fluids and salt tablet  -- Sodium this morning is 131 which is back to her baseline.  Goal sodium was no more than 134 this morning and she has not surpassed that.  -- Will give salt tablet 1 g x 1 dose  -- Neurologically intact with no symptoms.  -- Continue fluid restriction      SUBJECTIVE / INTERVAL HISTORY:    No chest pain or shortness of breath.  Reports good oral intake    OBJECTIVE:  Current Weight: Weight - Scale: 59.9 kg (132 lb)  Vitals:    01/05/24 2220 01/06/24 0303 01/06/24 0725 01/06/24 0743   BP: 117/60 127/60  113/50   BP Location: Right arm Right arm  Right arm   Pulse: 73 74  76   Resp: 20 18  18   Temp: (!) 97.2 °F (36.2 °C) (!) 97.1 °F (36.2 °C)  97.6 °F (36.4 °C)   TempSrc: Temporal Temporal  Temporal   SpO2: 96% 95% 95% 95%   Weight:       Height:           Intake/Output Summary (Last 24 hours) at 1/6/2024 0750  Last data filed at 1/6/2024 0701  Gross per 24 hour   Intake 780 ml   Output 1350 ml   Net -570 ml       Review of Systems:    Constitutional: Negative for chills and fever.   HENT: Negative for ear pain and sore throat.    Eyes: Negative for pain  and visual disturbance.   Respiratory: Negative for cough and shortness of breath.    Cardiovascular: Negative for chest pain and palpitations.   Gastrointestinal: Negative for abdominal pain and vomiting.   Genitourinary: Negative for dysuria and hematuria.   Musculoskeletal: Negative for arthralgias and back pain.   Skin: Negative for color change and rash.   Neurological: Negative for seizures and syncope.   12 point ROS has been reviewed.    Physical Exam  Vitals and nursing note reviewed.   Constitutional:       General: She is not in acute distress.     Appearance: She is well-developed.   HENT:      Head: Normocephalic and atraumatic.   Eyes:      General: No scleral icterus.     Conjunctiva/sclera: Conjunctivae normal.      Pupils: Pupils are equal, round, and reactive to light.   Cardiovascular:      Rate and Rhythm: Normal rate and regular rhythm.      Heart sounds: S1 normal and S2 normal. No murmur heard.     No friction rub. No gallop.   Pulmonary:      Effort: Pulmonary effort is normal. No respiratory distress.      Breath sounds: Normal breath sounds. No wheezing or rales.   Abdominal:      General: Bowel sounds are normal.      Palpations: Abdomen is soft.      Tenderness: There is no abdominal tenderness. There is no rebound.   Musculoskeletal:         General: Normal range of motion.      Cervical back: Normal range of motion and neck supple.   Skin:     Findings: No rash.   Neurological:      Mental Status: She is alert and oriented to person, place, and time.   Psychiatric:         Behavior: Behavior normal.         Medications:    Current Facility-Administered Medications:     albuterol (PROVENTIL HFA,VENTOLIN HFA) inhaler 2 puff, 2 puff, Inhalation, Q6H PRN, Gabrielle Garcia MD, 2 puff at 01/06/24 0407    ALPRAZolam (XANAX) tablet 0.5 mg, 0.5 mg, Oral, HS PRN, Gabrielle Garcia MD, 0.5 mg at 01/05/24 1821    amLODIPine (NORVASC) tablet 10 mg, 10 mg, Oral, BID, Gabrielle Garcia MD, 10 mg at  01/05/24 1821    benzonatate (TESSALON PERLES) capsule 100 mg, 100 mg, Oral, TID PRN, Gabrielle Garcia MD    bisoprolol (ZEBETA) tablet 10 mg, 10 mg, Oral, Daily, Gabrielle Garcia MD, 10 mg at 01/05/24 0854    cefTRIAXone (ROCEPHIN) IVPB (premix in dextrose) 1,000 mg 50 mL, 1,000 mg, Intravenous, Q24H, Nelson Vance DO, Last Rate: 100 mL/hr at 01/05/24 0915, 1,000 mg at 01/05/24 0915    clopidogrel (PLAVIX) tablet 75 mg, 75 mg, Oral, Daily, Gabrielle Garcia MD, 75 mg at 01/05/24 0855    cyanocobalamin (VITAMIN B-12) tablet 1,000 mcg, 1,000 mcg, Oral, Daily, Gabrielle Garcia MD, 1,000 mcg at 01/05/24 0855    docusate sodium (COLACE) capsule 100 mg, 100 mg, Oral, BID, Gabrielle Garcia MD, 100 mg at 01/05/24 1821    Fluticasone Furoate-Vilanterol 100-25 mcg/actuation 1 puff, 1 puff, Inhalation, Daily, 1 puff at 01/05/24 0853 **AND** umeclidinium 62.5 mcg/actuation inhaler AEPB 1 puff, 1 puff, Inhalation, Daily, Gabrielle Garcia MD, 1 puff at 01/05/24 0853    folic acid (FOLVITE) tablet 1 mg, 1 mg, Oral, Daily, Gabrielle Garcia MD, 1 mg at 01/05/24 0855    heparin (porcine) subcutaneous injection 5,000 Units, 5,000 Units, Subcutaneous, Q8H KENIA, 5,000 Units at 01/06/24 0507 **AND** Platelet count, , , Once, Gabrielle Garcia MD    ipratropium (ATROVENT) 0.02 % inhalation solution 0.5 mg, 0.5 mg, Nebulization, TID, Nelson Vance DO, 0.5 mg at 01/06/24 0725    levalbuterol (XOPENEX) inhalation solution 1.25 mg, 1.25 mg, Nebulization, TID, Nelson Vance DO, 1.25 mg at 01/06/24 0725    multivitamin-minerals (CENTRUM) tablet 1 tablet, 1 tablet, Oral, Daily, Gabrielle Garcia MD, 1 tablet at 01/05/24 0854    nitroglycerin (NITROSTAT) SL tablet 0.4 mg, 0.4 mg, Sublingual, Q5 Min PRN, Gabrielle Garcia MD    ondansetron (ZOFRAN) injection 4 mg, 4 mg, Intravenous, Q6H PRN, Gabrielle Garcia MD    oxyCODONE (ROXICODONE) IR tablet 5 mg, 5 mg, Oral, Q6H PRN, Gabrielle Garcia MD, 5 mg at 01/06/24 0407    pantoprazole (PROTONIX) EC  tablet 20 mg, 20 mg, Oral, Daily, Gabrielle Garcia MD, 20 mg at 01/05/24 0855    pravastatin (PRAVACHOL) tablet 40 mg, 40 mg, Oral, Daily With Dinner, Gabrielle Garcia MD, 40 mg at 01/05/24 1821    thiamine tablet 100 mg, 100 mg, Oral, Daily, Gabrielle Garcia MD, 100 mg at 01/05/24 0855    Laboratory Results:  Results from last 7 days   Lab Units 01/06/24  0500 01/05/24  2225 01/05/24  1303 01/05/24  0528 01/04/24  2110 01/04/24  1316 01/02/24  0835 01/01/24  1635 01/01/24  0506 01/01/24  0104   WBC Thousand/uL 10.83*  --   --  13.71*  --  13.67*  --   --  14.47* 13.14*   HEMOGLOBIN g/dL 9.5*  --   --  11.1*  --  10.4*  --   --  11.6 11.5   HEMATOCRIT % 30.7*  --   --  34.1*  --  32.3*  --   --  37.6 36.6   PLATELETS Thousands/uL 313  --   --  356  --  311  --   --  376 401*   POTASSIUM mmol/L 4.4 4.3 3.4* 3.8 3.7 3.8 3.6   < > 3.4* 3.3*   CHLORIDE mmol/L 96 95* 95* 97 96 89* 100   < > 94* 90*   CO2 mmol/L 28 30 30 29 28 29 27   < > 20* 23   BUN mg/dL 5 5 4* 5 8 9 6   < > 10 11   CREATININE mg/dL 0.46* 0.41* 0.47* 0.39* 0.49* 0.63 0.49*   < > 0.50* 0.66   CALCIUM mg/dL 9.1 8.8 9.6 9.2 8.9 8.9 8.1*   < > 8.5 9.3   MAGNESIUM mg/dL 1.5*  --   --   --   --   --   --   --   --   --    PHOSPHORUS mg/dL 2.4  --   --   --   --   --   --   --   --   --     < > = values in this interval not displayed.       PLEASE NOTE:  This encounter was completed utilizing the [employer]/Fluency Direct Speech Voice Recognition Software. Grammatical errors, random word insertions, pronoun errors and incomplete sentences are occasional consequences of the system due to software limitations, ambient noise and hardware issues.These may be missed by proof reading prior to affixing electronic signature. Any questions or concerns about the content, text or information contained within the body of this dictation should be directly addressed to the physician for clarification. Please do not hesitate to call me directly if you have any any questions or  concerns.

## 2024-01-06 NOTE — ASSESSMENT & PLAN NOTE
Patient is a 78-year-old female with past medical history significant for recent admission 1/1 - 1/2 at [facility] with left humeral fracture and L1 compression fracture after a fall.  Patient was sent to the ER by physical therapy for hypoxia    CT PE study with no definite acute pulmonary emboli, patchy lateral groundglass opacity greater in the right upper lobe infectious versus inflammatory, trace right effusion, pulmonary artery enlargement concerning for pulmonary hypertension  Ultrasound negative for lower extremity DVT  Currently on Rocephin empirically and trend procalcitonin  Appreciate pulmonology recommendations  Echo- ef normal with elevated pulmonary hypertension.  BNP mildly elevated.  Patient does not appear volume overloaded on exam  Titrate oxygen to maintain saturations greater than 88%

## 2024-01-06 NOTE — RESTORATIVE TECHNICIAN NOTE
Restorative Technician Note      Patient Name: Emily Berrios     Restorative Tech Visit Date: 01/06/24  Note Type: Mobility  Patient Position Upon Consult: Supine  Activity Performed: Ambulated  Patient Position at End of Consult: Supine; All needs within reach

## 2024-01-06 NOTE — CASE MANAGEMENT
Case Management Assessment & Discharge Planning Note    Patient name Emily Berrios  Location [facility] 2 /E2 -* MRN [id number]  : 1945 Date 2024       Current Admission Date: 2024  Current Admission Diagnosis:Hypoxia   Patient Active Problem List    Diagnosis Date Noted    Hypoxia 2024    Insomnia 2024    Alcohol use 2024    Closed compression fracture of L1 vertebra (HCC) 2024    Closed nondisplaced fracture of surgical neck of left humerus 2024    Complete uterovaginal prolapse 2023    Vaginal erosion secondary to pessary use  10/10/2023    Chronic rhinitis 10/04/2023    Ambulatory dysfunction 2023    Pulmonary emphysema (HCC) 2023    Cognitive decline 03/10/2023    Hyponatremia 2023    Hypomagnesemia 2023    Hydronephrosis, bilateral 2023    Cystocele with prolapse 2023    Chronic obstructive pulmonary disease, unspecified COPD type (HCC) 2022    Community acquired pneumonia 2019    Hx of CABG 2018    Hx stent of SVG to the RCA 2018    Asthma 2014    Chronic coronary artery disease 2014    Hiatal hernia 2014    Osteoarthritis 2014    Glaucoma 2013    Hyperlipidemia 2012    Primary hypertension 2012      LOS (days): 2  Geometric Mean LOS (GMLOS) (days):   Days to GMLOS:     OBJECTIVE:  PATIENT READMITTED TO HOSPITAL  Risk of Unplanned Readmission Score: 20.33         Current admission status: Inpatient       Preferred Pharmacy:    [facility] - [address] [place] [address] STREET  [address] [place] [id number]  Phone: [phone] Fax: [phone]    Primary Care Provider: Ramsey Gtz MD    Primary Insurance: [employer]ARE [facility]  Secondary Insurance: [place] [employer] [facility]    ASSESSMENT:  Active Health Care Proxies       Ivanna Mazariegos [facility] Alternate Health Care Agent - Daughter   Primary Phone:  [phone] (Mobile)                 Readmission Root Cause  30 Day Readmission: Yes  Who directed you to return to the hospital?: Self  Did you understand whom to contact if you had questions or problems?: Yes  Did you get your prescriptions before you left the hospital?: Yes  Were you able to get your prescriptions filled when you left the hospital?: Yes  Did you take your medications as prescribed?: Yes  Were you able to get to your follow-up appointments?: No  Reason:: Readmitted prior to appointment  During previous admission, was a post-acute recommendation made?: Yes  What post-acute resources were offered?: [facility]  Patient was readmitted due to: HYPOXIA    Patient Information  Admitted from:: Home  Mental Status: Alert  During Assessment patient was accompanied by: Not accompanied during assessment  Assessment information provided by:: Patient  Primary Caregiver: Self  Support Systems: Children, Daughter, Family members, /, Home care staff (Genesis Morrow, ANDREA )  County of Residence: [place]  What city do you live in?: [place]  Home entry access options. Select all that apply.: Stairs  Number of steps to enter home.: 4  Do the steps have railings?: Yes  Type of Current Residence: Apartment  Floor Level: 1  Upon entering residence, is there a bedroom on the main floor (no further steps)?: Yes  Upon entering residence, is there a bathroom on the main floor (no further steps)?: Yes  Living Arrangements: Lives Alone  Is patient a ?: No    Activities of Daily Living Prior to Admission  Functional Status: Assistance  Completes ADLs independently?: No  Level of ADL dependence: Assistance  Ambulates independently?: No  Level of ambulatory dependence: Assistance  Does patient use assisted devices?: Yes  Assisted Devices (DME) used: Straight Cane  Does patient currently own DME?: Yes  What DME does the patient currently own?: Straight Cane, Walker, Nebulizer  Does patient have a  history of Outpatient Therapy (PT/OT)?: No  Does patient have a history of HHC?: Yes ([facility])  Does patient currently have HHC?: Yes    Current Home Health Care  Type of Current Home Care Services: Home OT, Home PT  [facility]  Current Home Health Follow-Up Provider:: MARTIN    Patient Information Continued  Income Source: Pension/longterm  Does patient have prescription coverage?: Yes  Does patient receive dialysis treatments?: No  Does patient have a history of substance abuse?: Yes  Historical substance use preference: Alcohol/ETOH  History of Withdrawal Symptoms: Denies past symptoms  Is patient currently in treatment for substance abuse?: No. Patient declined treatment information. (Patient reports alcohol consumption but denies addiction and substance abuse)  Does patient have a history of Mental Health Diagnosis?: No    Means of Transportation  Means of Transport to Appts:: Family transport      Housing Stability: Low Risk  (1/2/2024)    Housing Stability Vital Sign     Unable to Pay for Housing in the Last Year: No     Number of Places Lived in the Last Year: 1     Unstable Housing in the Last Year: No   Food Insecurity: No Food Insecurity (1/2/2024)    Hunger Vital Sign     Worried About Running Out of Food in the Last Year: Never true     Ran Out of Food in the Last Year: Never true   Transportation Needs: No Transportation Needs (1/2/2024)    PRAPARE - Transportation     Lack of Transportation (Medical): No     Lack of Transportation (Non-Medical): No   Utilities: Not At Risk (1/2/2024)    AHC Utilities     Threatened with loss of utilities: No       DISCHARGE DETAILS:    Discharge planning discussed with:: Patient  Freedom of Choice: Yes  Comments - Freedom of Choice: Pt prefers to discharge home and start home therapy with [facility]    Were Treatment Team discharge recommendations reviewed with patient/caregiver?: Yes  Did patient/caregiver verbalize understanding of patient  care needs?: Yes  Were patient/caregiver advised of the risks associated with not following Treatment Team discharge recommendations?: Yes    Contacts  Patient Contacts: Ivanna Mazariegos (Daughter) [phone] (M)  Relationship to Patient:: Family  Contact Method: Phone  Phone Number: Ivanna Mazariegos (Daughter) [phone] (M)  Reason/Outcome: Emergency Contact    Requested Home Health Care         Is the patient interested in HHC at discharge?: Yes  Home Health Discipline requested:: Occupational Therapy, Physical Therapy  Home Health Agency Name:: [facility] Follow-Up Provider:: PCP  Home Health Services Needed:: Evaluate Functional Status and Safety, Gait/ADL Training, Strengthening/Theraputic Exercises to Improve Function  Homebound Criteria Met:: Requires the Assistance of Another Person for Safe Ambulation or to Leave the Home, Uses an Assist Device (i.e. cane, walker, etc)  Supporting Clincal Findings:: Limited Endurance, Fatigues Easliy in Short Distances    Treatment Team Recommendation: Home with Home Health Care  Transport at Discharge : Automobile  Accompanied by: Family member    Additional Comments: CM Consult for ETOH/ Drugs/ MH, Pt has a Hx of alcohol use. CM met with Pt to address consult, review previous admission assessment and discuss dcp. Pt reports there are no changes since her previous admission and recent discharge on 1/2/24. Pt denies alcohol abuse and /or addiction stating she gets togather with friends to have a few beers and play cards. Pt does not see a need for intervention related to alcohol use.Pt identified close family and friends as stable support. Pt remains in agreement with referral to [facility] for Home PT/OT, Pt states that during the first PT visit  she was directed to the ER by the therapist for shortness of breath. Pt informed CM that she may need Oxygen all of the time. CM informed Pt of necessary precuations. Pt states she is not a smoker and no one is allowed to  smoke in her apartment. CM sent referral to [facility] to start home Therapy. No other needs identified or noted. CM consult closed. Referral sent to [facility] for PT/OT. CM continues to follow through discharge.

## 2024-01-07 PROBLEM — G93.40 ACUTE ENCEPHALOPATHY: Status: ACTIVE | Noted: 2024-01-07

## 2024-01-07 LAB
ANION GAP SERPL CALCULATED.3IONS-SCNC: 10 MMOL/L
ANION GAP SERPL CALCULATED.3IONS-SCNC: 7 MMOL/L
ANION GAP SERPL CALCULATED.3IONS-SCNC: 7 MMOL/L
BUN SERPL-MCNC: 10 MG/DL (ref 5–25)
BUN SERPL-MCNC: 11 MG/DL (ref 5–25)
BUN SERPL-MCNC: 9 MG/DL (ref 5–25)
CALCIUM SERPL-MCNC: 9.3 MG/DL (ref 8.4–10.2)
CALCIUM SERPL-MCNC: 9.5 MG/DL (ref 8.4–10.2)
CALCIUM SERPL-MCNC: 9.8 MG/DL (ref 8.4–10.2)
CHLORIDE SERPL-SCNC: 94 MMOL/L (ref 96–108)
CHLORIDE SERPL-SCNC: 94 MMOL/L (ref 96–108)
CHLORIDE SERPL-SCNC: 95 MMOL/L (ref 96–108)
CO2 SERPL-SCNC: 26 MMOL/L (ref 21–32)
CO2 SERPL-SCNC: 29 MMOL/L (ref 21–32)
CO2 SERPL-SCNC: 30 MMOL/L (ref 21–32)
CREAT SERPL-MCNC: 0.53 MG/DL (ref 0.6–1.3)
CREAT SERPL-MCNC: 0.65 MG/DL (ref 0.6–1.3)
CREAT SERPL-MCNC: 0.72 MG/DL (ref 0.6–1.3)
ERYTHROCYTE [DISTWIDTH] IN BLOOD BY AUTOMATED COUNT: 15.6 % (ref 11.6–15.1)
GFR SERPL CREATININE-BSD FRML MDRD: 80 ML/MIN/1.73SQ M
GFR SERPL CREATININE-BSD FRML MDRD: 85 ML/MIN/1.73SQ M
GFR SERPL CREATININE-BSD FRML MDRD: 91 ML/MIN/1.73SQ M
GLUCOSE SERPL-MCNC: 110 MG/DL (ref 65–140)
GLUCOSE SERPL-MCNC: 112 MG/DL (ref 65–140)
GLUCOSE SERPL-MCNC: 116 MG/DL (ref 65–140)
HCT VFR BLD AUTO: 33.5 % (ref 34.8–46.1)
HGB BLD-MCNC: 10.3 G/DL (ref 11.5–15.4)
MCH RBC QN AUTO: 25 PG (ref 26.8–34.3)
MCHC RBC AUTO-ENTMCNC: 30.7 G/DL (ref 31.4–37.4)
MCV RBC AUTO: 81 FL (ref 82–98)
PLATELET # BLD AUTO: 351 THOUSANDS/UL (ref 149–390)
PLATELET # BLD AUTO: 359 THOUSANDS/UL (ref 149–390)
PMV BLD AUTO: 9.6 FL (ref 8.9–12.7)
PMV BLD AUTO: 9.8 FL (ref 8.9–12.7)
POTASSIUM SERPL-SCNC: 3.9 MMOL/L (ref 3.5–5.3)
POTASSIUM SERPL-SCNC: 4 MMOL/L (ref 3.5–5.3)
POTASSIUM SERPL-SCNC: 4.1 MMOL/L (ref 3.5–5.3)
PROCALCITONIN SERPL-MCNC: 0.2 NG/ML
RBC # BLD AUTO: 4.12 MILLION/UL (ref 3.81–5.12)
SODIUM SERPL-SCNC: 130 MMOL/L (ref 135–147)
SODIUM SERPL-SCNC: 131 MMOL/L (ref 135–147)
SODIUM SERPL-SCNC: 131 MMOL/L (ref 135–147)
WBC # BLD AUTO: 10.29 THOUSAND/UL (ref 4.31–10.16)

## 2024-01-07 PROCEDURE — 93970 EXTREMITY STUDY: CPT | Performed by: SURGERY

## 2024-01-07 PROCEDURE — 94640 AIRWAY INHALATION TREATMENT: CPT

## 2024-01-07 PROCEDURE — 99232 SBSQ HOSP IP/OBS MODERATE 35: CPT | Performed by: INTERNAL MEDICINE

## 2024-01-07 PROCEDURE — 84145 PROCALCITONIN (PCT): CPT | Performed by: INTERNAL MEDICINE

## 2024-01-07 PROCEDURE — 80048 BASIC METABOLIC PNL TOTAL CA: CPT | Performed by: INTERNAL MEDICINE

## 2024-01-07 PROCEDURE — 94762 N-INVAS EAR/PLS OXIMTRY CONT: CPT

## 2024-01-07 PROCEDURE — 85049 AUTOMATED PLATELET COUNT: CPT | Performed by: INTERNAL MEDICINE

## 2024-01-07 PROCEDURE — 94760 N-INVAS EAR/PLS OXIMETRY 1: CPT

## 2024-01-07 PROCEDURE — 85027 COMPLETE CBC AUTOMATED: CPT | Performed by: INTERNAL MEDICINE

## 2024-01-07 RX ORDER — LORAZEPAM 2 MG/ML
2 INJECTION INTRAMUSCULAR ONCE
Status: COMPLETED | OUTPATIENT
Start: 2024-01-07 | End: 2024-01-07

## 2024-01-07 RX ORDER — SODIUM CHLORIDE 1 G/1
2 TABLET ORAL ONCE
Status: COMPLETED | OUTPATIENT
Start: 2024-01-07 | End: 2024-01-07

## 2024-01-07 RX ORDER — CALCIUM CARBONATE 500 MG/1
500 TABLET, CHEWABLE ORAL 3 TIMES DAILY PRN
Status: DISCONTINUED | OUTPATIENT
Start: 2024-01-07 | End: 2024-01-08 | Stop reason: HOSPADM

## 2024-01-07 RX ADMIN — CEFTRIAXONE 1000 MG: 1 INJECTION, SOLUTION INTRAVENOUS at 08:56

## 2024-01-07 RX ADMIN — AMLODIPINE BESYLATE 10 MG: 10 TABLET ORAL at 09:12

## 2024-01-07 RX ADMIN — PRAVASTATIN SODIUM 40 MG: 40 TABLET ORAL at 17:02

## 2024-01-07 RX ADMIN — CYANOCOBALAMIN TAB 500 MCG 1000 MCG: 500 TAB at 09:12

## 2024-01-07 RX ADMIN — HEPARIN SODIUM 5000 UNITS: 5000 INJECTION INTRAVENOUS; SUBCUTANEOUS at 21:59

## 2024-01-07 RX ADMIN — FOLIC ACID 1 MG: 1 TABLET ORAL at 09:12

## 2024-01-07 RX ADMIN — Medication 1 TABLET: at 09:12

## 2024-01-07 RX ADMIN — LEVALBUTEROL HYDROCHLORIDE 1.25 MG: 1.25 SOLUTION RESPIRATORY (INHALATION) at 19:45

## 2024-01-07 RX ADMIN — CLOPIDOGREL BISULFATE 75 MG: 75 TABLET ORAL at 09:12

## 2024-01-07 RX ADMIN — BISOPROLOL FUMARATE 10 MG: 5 TABLET ORAL at 09:12

## 2024-01-07 RX ADMIN — LEVALBUTEROL HYDROCHLORIDE 1.25 MG: 1.25 SOLUTION RESPIRATORY (INHALATION) at 07:20

## 2024-01-07 RX ADMIN — LEVALBUTEROL HYDROCHLORIDE 1.25 MG: 1.25 SOLUTION RESPIRATORY (INHALATION) at 13:30

## 2024-01-07 RX ADMIN — DOCUSATE SODIUM 100 MG: 100 CAPSULE, LIQUID FILLED ORAL at 17:02

## 2024-01-07 RX ADMIN — THIAMINE HCL TAB 100 MG 100 MG: 100 TAB at 09:12

## 2024-01-07 RX ADMIN — IPRATROPIUM BROMIDE 0.5 MG: 0.5 SOLUTION RESPIRATORY (INHALATION) at 13:30

## 2024-01-07 RX ADMIN — LORAZEPAM 2 MG: 2 INJECTION INTRAMUSCULAR; INTRAVENOUS at 06:10

## 2024-01-07 RX ADMIN — PANTOPRAZOLE SODIUM 20 MG: 20 TABLET, DELAYED RELEASE ORAL at 06:53

## 2024-01-07 RX ADMIN — AMLODIPINE BESYLATE 10 MG: 10 TABLET ORAL at 17:02

## 2024-01-07 RX ADMIN — IPRATROPIUM BROMIDE 0.5 MG: 0.5 SOLUTION RESPIRATORY (INHALATION) at 19:45

## 2024-01-07 RX ADMIN — IPRATROPIUM BROMIDE 0.5 MG: 0.5 SOLUTION RESPIRATORY (INHALATION) at 07:20

## 2024-01-07 RX ADMIN — UMECLIDINIUM 1 PUFF: 62.5 AEROSOL, POWDER ORAL at 09:10

## 2024-01-07 RX ADMIN — SODIUM CHLORIDE 2 G: 1 TABLET ORAL at 00:38

## 2024-01-07 RX ADMIN — HEPARIN SODIUM 5000 UNITS: 5000 INJECTION INTRAVENOUS; SUBCUTANEOUS at 06:03

## 2024-01-07 RX ADMIN — DOCUSATE SODIUM 100 MG: 100 CAPSULE, LIQUID FILLED ORAL at 09:12

## 2024-01-07 RX ADMIN — HEPARIN SODIUM 5000 UNITS: 5000 INJECTION INTRAVENOUS; SUBCUTANEOUS at 13:25

## 2024-01-07 RX ADMIN — FLUTICASONE FUROATE AND VILANTEROL TRIFENATATE 1 PUFF: 100; 25 POWDER RESPIRATORY (INHALATION) at 09:10

## 2024-01-07 NOTE — PLAN OF CARE
Problem: Potential for Falls  Goal: Patient will remain free of falls  Description: INTERVENTIONS:  - Educate patient/family on patient safety including physical limitations  - Instruct patient to call for assistance with activity   - Consult OT/PT to assist with strengthening/mobility   - Keep Call bell within reach  - Keep bed low and locked with side rails adjusted as appropriate  - Keep care items and personal belongings within reach  - Initiate and maintain comfort rounds  - Make Fall Risk Sign visible to staff  - Offer Toileting every 2 Hours, in advance of need  - Initiate/Maintain bed alarm  - Apply yellow socks and bracelet for high fall risk patients  - Consider moving patient to room near nurses station  Outcome: Progressing     Problem: RESPIRATORY - ADULT  Goal: Achieves optimal ventilation and oxygenation  Description: INTERVENTIONS:  - Assess for changes in respiratory status  - Assess for changes in mentation and behavior  - Position to facilitate oxygenation and minimize respiratory effort  - Oxygen administered by appropriate delivery if ordered  - Initiate smoking cessation education as indicated  - Encourage broncho-pulmonary hygiene including cough, deep breathe, Incentive Spirometry  - Assess the need for suctioning and aspirate as needed  - Assess and instruct to report SOB or any respiratory difficulty  - Respiratory Therapy support as indicated  Outcome: Progressing     Problem: Nutrition/Hydration-ADULT  Goal: Nutrient/Hydration intake appropriate for improving, restoring or maintaining nutritional needs  Description: Monitor and assess patient's nutrition/hydration status for malnutrition. Collaborate with interdisciplinary team and initiate plan and interventions as ordered.  Monitor patient's weight and dietary intake as ordered or per policy. Utilize nutrition screening tool and intervene as necessary. Determine patient's food preferences and provide high-protein, high-caloric foods  as appropriate.     INTERVENTIONS:  - Monitor oral intake, urinary output, labs, and treatment plans  - Assess nutrition and hydration status and recommend course of action  - Evaluate amount of meals eaten  - Assist patient with eating if necessary   - Allow adequate time for meals  - Recommend/ encourage appropriate diets, oral nutritional supplements, and vitamin/mineral supplements  - Order, calculate, and assess calorie counts as needed  - Recommend, monitor, and adjust tube feedings and TPN/PPN based on assessed needs  - Assess need for intravenous fluids  - Provide specific nutrition/hydration education as appropriate  - Include patient/family/caregiver in decisions related to nutrition  Outcome: Progressing

## 2024-01-07 NOTE — ASSESSMENT & PLAN NOTE
Patient has episodes of confusion, visual hallucinations, paranoia  Discussed with son who notes that patient has had similar events before when hospitalized presumed due to alcohol withdrawal  He cannot quantify exact amount of alcohol but notes she has been drinking much more than prior  Continue CIWA protocol  Also may be component of underlying dementia  Will have geriatrics evaluate  Will also perform OT cognitive evaluation  Would likely benefit from low-dose antipsychotic at night for insomnia rather than Xanax moving forward

## 2024-01-07 NOTE — PLAN OF CARE
Problem: Potential for Falls  Goal: Patient will remain free of falls  Description: INTERVENTIONS:  - Educate patient/family on patient safety including physical limitations  - Instruct patient to call for assistance with activity   - Consult OT/PT to assist with strengthening/mobility   - Keep Call bell within reach  - Keep bed low and locked with side rails adjusted as appropriate  - Keep care items and personal belongings within reach  - Initiate and maintain comfort rounds  - Make Fall Risk Sign visible to staff  - Offer Toileting every 2 Hours, in advance of need  - Initiate/Maintain bed alarm  - Obtain necessary fall risk management equipment: non slip socks, call bell  - Apply yellow socks and bracelet for high fall risk patients  - Consider moving patient to room near nurses station  Outcome: Progressing     Problem: RESPIRATORY - ADULT  Goal: Achieves optimal ventilation and oxygenation  Description: INTERVENTIONS:  - Assess for changes in respiratory status  - Assess for changes in mentation and behavior  - Position to facilitate oxygenation and minimize respiratory effort  - Oxygen administered by appropriate delivery if ordered  - Initiate smoking cessation education as indicated  - Encourage broncho-pulmonary hygiene including cough, deep breathe, Incentive Spirometry  - Assess the need for suctioning and aspirate as needed  - Assess and instruct to report SOB or any respiratory difficulty  - Respiratory Therapy support as indicated  Outcome: Progressing     Problem: Nutrition/Hydration-ADULT  Goal: Nutrient/Hydration intake appropriate for improving, restoring or maintaining nutritional needs  Description: Monitor and assess patient's nutrition/hydration status for malnutrition. Collaborate with interdisciplinary team and initiate plan and interventions as ordered.  Monitor patient's weight and dietary intake as ordered or per policy. Utilize nutrition screening tool and intervene as necessary.  Determine patient's food preferences and provide high-protein, high-caloric foods as appropriate.     INTERVENTIONS:  - Monitor oral intake, urinary output, labs, and treatment plans  - Assess nutrition and hydration status and recommend course of action  - Evaluate amount of meals eaten  - Assist patient with eating if necessary   - Allow adequate time for meals  - Recommend/ encourage appropriate diets, oral nutritional supplements, and vitamin/mineral supplements  - Order, calculate, and assess calorie counts as needed  - Recommend, monitor, and adjust tube feedings and TPN/PPN based on assessed needs  - Assess need for intravenous fluids  - Provide specific nutrition/hydration education as appropriate  - Include patient/family/caregiver in decisions related to nutrition  Outcome: Progressing

## 2024-01-07 NOTE — ASSESSMENT & PLAN NOTE
Per Son and Daughter. Pt drinks 6 pack of beer or more daily. This has gotten worse since her  passed away  CIWA scale- 9 overnight prompting oral ativan  Thiamine and folic acid

## 2024-01-07 NOTE — PROGRESS NOTES
[facility]  Progress Note  Name: Emily Berrios I  MRN: [id number]  Unit/Bed#: E2 -02 I Date of Admission: 1/4/2024   Date of Service: 1/7/2024 I Hospital Day: 3    Assessment/Plan   * Hypoxia  Assessment & Plan  Patient is a 78-year-old female with past medical history significant for recent admission 1/1 - 1/2 at [facility] with left humeral fracture and L1 compression fracture after a fall.  Patient was sent to the ER by physical therapy for hypoxia    CT PE study with no definite acute pulmonary emboli, patchy lateral groundglass opacity greater in the right upper lobe infectious versus inflammatory, trace right effusion, pulmonary artery enlargement concerning for pulmonary hypertension  Ultrasound negative for lower extremity DVT  Currently on Rocephin empirically for 5 days and trend procalcitonin  Appreciate pulmonology recommendations  Echo- ef normal with elevated pulmonary hypertension.  BNP mildly elevated.  Patient does not appear volume overloaded on exam  Titrate oxygen to maintain saturations greater than 88%  Home o2 eval prior to discharge       Acute encephalopathy  Assessment & Plan  Patient has episodes of confusion, visual hallucinations, paranoia  Discussed with son who notes that patient has had similar events before when hospitalized presumed due to alcohol withdrawal  He cannot quantify exact amount of alcohol but notes she has been drinking much more than prior  Continue CIWA protocol  Also may be component of underlying dementia  Will have geriatrics evaluate  Will also perform OT cognitive evaluation  Would likely benefit from low-dose antipsychotic at night for insomnia rather than Xanax moving forward    Alcohol use  Assessment & Plan  Per Son and Daughter. Pt drinks 6 pack of beer or more daily. This has gotten worse since her  passed away  CIWA scale- 9 overnight prompting oral ativan  Thiamine and folic  acid    Insomnia  Assessment & Plan  Utilizes xanax at home  Given history of alcohol use recommend discontinuation of this medication     Closed compression fracture of L1 vertebra (HCC)  Assessment & Plan  lidoderm patch, analgesics, physical therapy    Closed nondisplaced fracture of surgical neck of left humerus  Assessment & Plan  Recently hospitalized 1/1 for this  Continue analgesics sling, nonweightbearing  Outpatient follow-up with orthopedic surgery in 4 weeks    Hyponatremia  Assessment & Plan  Likely multifactorial secondary to prerenal plus SIADH plus beer [place] soph[place]  Nephrology following    Chronic obstructive pulmonary disease, unspecified COPD type (HCC)  Assessment & Plan  Patient has a history of COPD and follows with [facility] pulmonary  Appreciate pulmonology recommendations  Does not appear to be having exacerbation    Primary hypertension  Assessment & Plan  Continue home Norvasc 5 mg twice a day and bisoprolol 10 mg daily    Chronic coronary artery disease  Assessment & Plan  No symptoms consistent with acute ischemia  Continue home bisoprolol and Plavix  Continue statin         VTE Pharmacologic Prophylaxis: heparin     Patient Centered Rounds:  Patient care rounds were performed with nursing    Education and Discussions with Family / Patient: Albino approx 1330    Time Spent for Care: I have spent a total time of 43 minutes on 01/07/24 in caring for this patient including Diagnostic results, Instructions for management, Patient and family education, Importance of tx compliance, Counseling / Coordination of care, Documenting in the medical record, Reviewing / ordering tests, medicine, procedures  , Obtaining or reviewing history  , and Communicating with other healthcare professionals .      Current Length of Stay: 3 day(s)    Current Patient Status: Inpatient   Certification Statement: The patient will continue to require additional inpatient hospital stay due to need for management of  hypoxia, IV abx, encephalopathy     Discharge Plan: 48 hours     Code Status: Level 1 - Full Code      Subjective:   Patient seen and evaluated at bedside.  Visual hallucinations, agitation, paranoia overnight.  On rounds she is pleasant and sedated.      Objective:     Vitals:   Temp (24hrs), Av.5 °F (36.9 °C), Min:97.9 °F (36.6 °C), Max:99 °F (37.2 °C)    Temp:  [97.9 °F (36.6 °C)-99 °F (37.2 °C)] 97.9 °F (36.6 °C)  HR:  [] 68  Resp:  [18-20] 18  BP: (127-166)/(66-91) 127/66  SpO2:  [92 %-97 %] 94 %  Body mass index is 24.94 kg/m².     Input and Output Summary (last 24 hours):       Intake/Output Summary (Last 24 hours) at 2024 1338  Last data filed at 2024 0919  Gross per 24 hour   Intake 200 ml   Output --   Net 200 ml       Physical Exam:     Physical Exam  Vitals reviewed.   Constitutional:       General: She is not in acute distress.     Appearance: She is well-developed. She is not ill-appearing, toxic-appearing or diaphoretic.      Comments: Sedated, pleasant, awakens to verbal stimuli   HENT:      Head: Normocephalic and atraumatic.      Mouth/Throat:      Mouth: Mucous membranes are moist.   Eyes:      General: No scleral icterus.     Extraocular Movements: Extraocular movements intact.   Cardiovascular:      Rate and Rhythm: Normal rate and regular rhythm.      Heart sounds: Normal heart sounds.   Pulmonary:      Effort: Pulmonary effort is normal. No respiratory distress.      Breath sounds: Normal breath sounds. No wheezing or rales.   Abdominal:      General: There is no distension.      Palpations: Abdomen is soft.      Tenderness: There is no abdominal tenderness. There is no guarding or rebound.   Musculoskeletal:         General: No swelling, tenderness or deformity.   Skin:     General: Skin is warm and dry.   Neurological:      Mental Status: She is alert. Mental status is at baseline.   Psychiatric:         Mood and Affect: Mood normal.         Behavior: Behavior normal.          Thought Content: Thought content normal.         Judgment: Judgment normal.         Additional Data:     Labs: I have reviewed pertinent results     Results from last 7 days   Lab Units 01/07/24  0839 01/05/24  0528 01/04/24  1316   WBC Thousand/uL 10.29*   < > 13.67*   HEMOGLOBIN g/dL 10.3*   < > 10.4*   HEMATOCRIT % 33.5*   < > 32.3*   PLATELETS Thousands/uL 359  351   < > 311   NEUTROS PCT %  --   --  78*   LYMPHS PCT %  --   --  10*   MONOS PCT %  --   --  9   EOS PCT %  --   --  1    < > = values in this interval not displayed.     Results from last 7 days   Lab Units 01/07/24  0838 01/01/24  0506 01/01/24  0104   SODIUM mmol/L 131*   < > 124*   POTASSIUM mmol/L 4.0   < > 3.3*   CHLORIDE mmol/L 94*   < > 90*   CO2 mmol/L 30   < > 23   BUN mg/dL 10   < > 11   CREATININE mg/dL 0.72   < > 0.66   ANION GAP mmol/L 7   < > 11   CALCIUM mg/dL 9.5   < > 9.3   ALBUMIN g/dL  --   --  4.0   TOTAL BILIRUBIN mg/dL  --   --  0.28   ALK PHOS U/L  --   --  63   ALT U/L  --   --  14   AST U/L  --   --  19   GLUCOSE RANDOM mg/dL 116   < > 107    < > = values in this interval not displayed.     Results from last 7 days   Lab Units 01/01/24  0104   INR  0.95             Results from last 7 days   Lab Units 01/07/24  0839 01/06/24  0500 01/04/24  1816 01/02/24  0515 01/01/24  0104   PROCALCITONIN ng/ml 0.20 0.28* 0.70* 0.05 <0.05         Imaging: I have reviewed pertinent imaging       Recent Cultures (last 7 days):     Results from last 7 days   Lab Units 01/04/24  2338 01/04/24  2200 01/04/24  1932 01/04/24  1916   BLOOD CULTURE   --   --  No Growth at 48 hrs. No Growth at 48 hrs.   SPUTUM CULTURE   --  Culture results to follow.  --   --    GRAM STAIN RESULT   --  1+ Epithelial cells per low power field*  1+ Polys*  2+ Gram positive cocci in clusters*  1+ Gram positive cocci in chains*  1+ Gram positive rods*  --   --    LEGIONELLA URINARY ANTIGEN  Negative  --   --   --        Last 24 Hours Medication List:   Current  Facility-Administered Medications   Medication Dose Route Frequency Provider Last Rate    albuterol  2 puff Inhalation Q6H PRN Gabrielle Garcia MD      ALPRAZolam  0.5 mg Oral HS PRN Gabrielle Garcia MD      amLODIPine  10 mg Oral BID Gabrielle Garcia MD      benzonatate  100 mg Oral TID PRN Gabrielle Garcia MD      bisoprolol  10 mg Oral Daily Gabrielle Garcia MD      calcium carbonate  500 mg Oral TID PRN TRINIDAD Greene      cefTRIAXone  1,000 mg Intravenous Q24H Nelson Vance DO 1,000 mg (01/07/24 0856)    clopidogrel  75 mg Oral Daily Gabrielle Garcia MD      vitamin B-12  1,000 mcg Oral Daily Gabreille Garcia MD      docusate sodium  100 mg Oral BID Gabrielle Garcia MD      Fluticasone Furoate-Vilanterol  1 puff Inhalation Daily Gabrielle Garcia MD      And    umeclidinium  1 puff Inhalation Daily Gabrielle Garcia MD      folic acid  1 mg Oral Daily Gabrielle Garcia MD      heparin (porcine)  5,000 Units Subcutaneous Q8H [facility] Gabrielle Garcia MD      ipratropium  0.5 mg Nebulization TID Nelson Vance DO      levalbuterol  1.25 mg Nebulization TID Nelson Vance DO      multivitamin-minerals  1 tablet Oral Daily Gabrielle Garcia MD      nitroglycerin  0.4 mg Sublingual Q5 Min PRN Gabrielle Garcia MD      ondansetron  4 mg Intravenous Q6H PRN Gabrielle Garcia MD      oxyCODONE  5 mg Oral Q6H PRN Gabrielle Garcia MD      pantoprazole  20 mg Oral Daily Gabrielle Garcia MD      pravastatin  40 mg Oral Daily With Dinner Gabrielle Garcia MD      thiamine  100 mg Oral Daily Gabrielle Garcia MD          Today, Patient Was Seen By: Nelson Vance DO    ** Please Note: Dictation voice to text software may have been used in the creation of this document. **

## 2024-01-07 NOTE — PROGRESS NOTES
NEPHROLOGY PROGRESS NOTE   Emily Berrios 78 y.o. female MRN: [id number]  Unit/Bed#: E2 -02 Encounter: [id number]  Reason for Consult: Hyponatremia      SUMMARY:    78-year-old female with multiple comorbidities including hypertension, chronic hyponatremia alcohol use, COPD, CAD status post recent hospitalization with fracture and L1 compression admitted from [facility] with hypoxia.  Nephrology consulted for evaluation and management of acute hyponatremia.      ASSESSMENT and PLAN:     Acute on chronic hyponatremia  -- Baseline sodium 130-135  -- Admission sodium 123, with overcorrection that was slight after receiving normal saline  -- Secondary to suspected prerenal azotemia/volume depletion with underlying SIADH poor solute intake and beer [place]ia  -- Urine osmolality was 250, urine sodium 56  --Received a dose of salt tablet 1 g yesterday and then received 2 g overnight that I had ordered  --Ultimate goal sodium keep above 130  -- Neurologically intact with no symptoms.  -- Continue fluid restriction  -- Follow-up today's BMP      SUBJECTIVE / INTERVAL HISTORY:    Somnolent but arousable.    I ordered her 2 g sodium overnight as the sodium was 129    OBJECTIVE:  Current Weight: Weight - Scale: 59.9 kg (132 lb)  Vitals:    01/06/24 1528 01/06/24 2155 01/07/24 0337 01/07/24 0612   BP: 131/69 166/91 141/71 150/76   BP Location: Right arm Right arm     Pulse: 91 (!) 107 87 89   Resp: 18 18     Temp: 99 °F (37.2 °C) 98.8 °F (37.1 °C)     TempSrc: Temporal Temporal     SpO2: 95% 94% 92%    Weight:       Height:         No intake or output data in the 24 hours ending 01/07/24 0818    Review of Systems:    Unable to get a good review of systems as patient is currently somnolent    Physical Exam  Vitals and nursing note reviewed. Exam conducted with a chaperone present.   Constitutional:       General: She is not in acute distress.     Appearance: She is well-developed.   HENT:      Head:  Normocephalic and atraumatic.   Eyes:      General: No scleral icterus.     Conjunctiva/sclera: Conjunctivae normal.      Pupils: Pupils are equal, round, and reactive to light.   Cardiovascular:      Rate and Rhythm: Normal rate and regular rhythm.      Heart sounds: S1 normal and S2 normal. No murmur heard.     No friction rub. No gallop.   Pulmonary:      Effort: Pulmonary effort is normal. No respiratory distress.      Breath sounds: Normal breath sounds. No wheezing or rales.   Abdominal:      General: Bowel sounds are normal.      Palpations: Abdomen is soft.      Tenderness: There is no abdominal tenderness. There is no rebound.   Musculoskeletal:         General: Normal range of motion.      Cervical back: Normal range of motion and neck supple.   Skin:     Findings: No rash.   Neurological:      Mental Status: She is alert.      Comments: Somnolent but arousable         Medications:    Current Facility-Administered Medications:     albuterol (PROVENTIL HFA,VENTOLIN HFA) inhaler 2 puff, 2 puff, Inhalation, Q6H PRN, Gabrielle Garcia MD, 2 puff at 01/06/24 0407    ALPRAZolam (XANAX) tablet 0.5 mg, 0.5 mg, Oral, HS PRN, Gabrielle Garcia MD, 0.5 mg at 01/05/24 1821    amLODIPine (NORVASC) tablet 10 mg, 10 mg, Oral, BID, Gabrielle Garcia MD, 10 mg at 01/06/24 1716    benzonatate (TESSALON PERLES) capsule 100 mg, 100 mg, Oral, TID PRN, Gbarielle Garcia MD    bisoprolol (ZEBETA) tablet 10 mg, 10 mg, Oral, Daily, Gabrielle Garcia MD, 10 mg at 01/06/24 0854    calcium carbonate (TUMS) chewable tablet 500 mg, 500 mg, Oral, TID PRN, TRINIDAD Greene    cefTRIAXone (ROCEPHIN) IVPB (premix in dextrose) 1,000 mg 50 mL, 1,000 mg, Intravenous, Q24H, Nelson Vance DO, Last Rate: 100 mL/hr at 01/06/24 0854, 1,000 mg at 01/06/24 0854    clopidogrel (PLAVIX) tablet 75 mg, 75 mg, Oral, Daily, Gabrielle Garcia MD, 75 mg at 01/06/24 0854    cyanocobalamin (VITAMIN B-12) tablet 1,000 mcg, 1,000 mcg, Oral, Daily, Gabrielle  MD Radha, 1,000 mcg at 01/06/24 0854    docusate sodium (COLACE) capsule 100 mg, 100 mg, Oral, BID, Gabrielle Garcia MD, 100 mg at 01/06/24 1716    Fluticasone Furoate-Vilanterol 100-25 mcg/actuation 1 puff, 1 puff, Inhalation, Daily, 1 puff at 01/06/24 0855 **AND** umeclidinium 62.5 mcg/actuation inhaler AEPB 1 puff, 1 puff, Inhalation, Daily, Gabrielle Gacria MD, 1 puff at 01/06/24 0855    folic acid (FOLVITE) tablet 1 mg, 1 mg, Oral, Daily, Gabrielle Garcia MD, 1 mg at 01/06/24 0854    heparin (porcine) subcutaneous injection 5,000 Units, 5,000 Units, Subcutaneous, Q8H KENIA, 5,000 Units at 01/07/24 0603 **AND** Platelet count, , , Once, Gabrielle Garcia MD    ipratropium (ATROVENT) 0.02 % inhalation solution 0.5 mg, 0.5 mg, Nebulization, TID, Nelson Vance DO, 0.5 mg at 01/07/24 0720    levalbuterol (XOPENEX) inhalation solution 1.25 mg, 1.25 mg, Nebulization, TID, Nelson Vance DO, 1.25 mg at 01/07/24 0720    multivitamin-minerals (CENTRUM) tablet 1 tablet, 1 tablet, Oral, Daily, Gabrielle Garcia MD, 1 tablet at 01/06/24 0854    nitroglycerin (NITROSTAT) SL tablet 0.4 mg, 0.4 mg, Sublingual, Q5 Min PRN, Gabrielle Garcia MD    ondansetron (ZOFRAN) injection 4 mg, 4 mg, Intravenous, Q6H PRN, Gabrielle Garcia MD    oxyCODONE (ROXICODONE) IR tablet 5 mg, 5 mg, Oral, Q6H PRN, Gabrielle Garcia MD, 5 mg at 01/06/24 2208    pantoprazole (PROTONIX) EC tablet 20 mg, 20 mg, Oral, Daily, Gabrielle Garcia MD, 20 mg at 01/07/24 0653    pravastatin (PRAVACHOL) tablet 40 mg, 40 mg, Oral, Daily With Dinner, Gabrielle Garcia MD, 40 mg at 01/06/24 1716    thiamine tablet 100 mg, 100 mg, Oral, Daily, Gabrielle Garcia MD, 100 mg at 01/06/24 0854    Laboratory Results:  Results from last 7 days   Lab Units 01/06/24  2206 01/06/24  1521 01/06/24  0500 01/05/24  2225 01/05/24  1303 01/05/24  0528 01/04/24  2110 01/04/24  1316 01/01/24  1635 01/01/24  0506 01/01/24  0104   WBC Thousand/uL  --   --  10.83*  --   --  13.71*  --  13.67*   --  14.47* 13.14*   HEMOGLOBIN g/dL  --   --  9.5*  --   --  11.1*  --  10.4*  --  11.6 11.5   HEMATOCRIT %  --   --  30.7*  --   --  34.1*  --  32.3*  --  37.6 36.6   PLATELETS Thousands/uL  --   --  313  --   --  356  --  311  --  376 401*   POTASSIUM mmol/L 4.6 4.2 4.4 4.3 3.4* 3.8 3.7 3.8   < > 3.4* 3.3*   CHLORIDE mmol/L 94* 95* 96 95* 95* 97 96 89*   < > 94* 90*   CO2 mmol/L 28 28 28 30 30 29 28 29   < > 20* 23   BUN mg/dL 7 6 5 5 4* 5 8 9   < > 10 11   CREATININE mg/dL 0.49* 0.48* 0.46* 0.41* 0.47* 0.39* 0.49* 0.63   < > 0.50* 0.66   CALCIUM mg/dL 9.6 9.6 9.1 8.8 9.6 9.2 8.9 8.9   < > 8.5 9.3   MAGNESIUM mg/dL  --   --  1.5*  --   --   --   --   --   --   --   --    PHOSPHORUS mg/dL  --   --  2.4  --   --   --   --   --   --   --   --     < > = values in this interval not displayed.       PLEASE NOTE:  This encounter was completed utilizing the [employer]/Flu[employer] Direct Speech Voice Recognition Software. Grammatical errors, random word insertions, pronoun errors and incomplete sentences are occasional consequences of the system due to software limitations, ambient noise and hardware issues.These may be missed by proof reading prior to affixing electronic signature. Any questions or concerns about the content, text or information contained within the body of this dictation should be directly addressed to the physician for clarification. Please do not hesitate to call me directly if you have any any questions or concerns.

## 2024-01-07 NOTE — ASSESSMENT & PLAN NOTE
Patient is a 78-year-old female with past medical history significant for recent admission 1/1 - 1/2 at [facility] with left humeral fracture and L1 compression fracture after a fall.  Patient was sent to the ER by physical therapy for hypoxia    CT PE study with no definite acute pulmonary emboli, patchy lateral groundglass opacity greater in the right upper lobe infectious versus inflammatory, trace right effusion, pulmonary artery enlargement concerning for pulmonary hypertension  Ultrasound negative for lower extremity DVT  Currently on Rocephin empirically for 5 days and trend procalcitonin  Appreciate pulmonology recommendations  Echo- ef normal with elevated pulmonary hypertension.  BNP mildly elevated.  Patient does not appear volume overloaded on exam  Titrate oxygen to maintain saturations greater than 88%  Home o2 eval prior to discharge

## 2024-01-07 NOTE — ASSESSMENT & PLAN NOTE
Utilizes xanax at home  Given history of alcohol use recommend discontinuation of this medication

## 2024-01-08 VITALS
TEMPERATURE: 97.5 F | SYSTOLIC BLOOD PRESSURE: 114 MMHG | HEART RATE: 89 BPM | DIASTOLIC BLOOD PRESSURE: 60 MMHG | BODY MASS INDEX: 25.06 KG/M2 | WEIGHT: 132.72 LBS | HEIGHT: 61 IN | OXYGEN SATURATION: 90 % | RESPIRATION RATE: 18 BRPM

## 2024-01-08 LAB
ANION GAP SERPL CALCULATED.3IONS-SCNC: 7 MMOL/L
BACTERIA SPT RESP CULT: ABNORMAL
BUN SERPL-MCNC: 10 MG/DL (ref 5–25)
CALCIUM SERPL-MCNC: 9.8 MG/DL (ref 8.4–10.2)
CHLORIDE SERPL-SCNC: 95 MMOL/L (ref 96–108)
CO2 SERPL-SCNC: 30 MMOL/L (ref 21–32)
CREAT SERPL-MCNC: 0.58 MG/DL (ref 0.6–1.3)
ERYTHROCYTE [DISTWIDTH] IN BLOOD BY AUTOMATED COUNT: 15.8 % (ref 11.6–15.1)
GFR SERPL CREATININE-BSD FRML MDRD: 88 ML/MIN/1.73SQ M
GLUCOSE SERPL-MCNC: 108 MG/DL (ref 65–140)
GRAM STN SPEC: ABNORMAL
HCT VFR BLD AUTO: 35.9 % (ref 34.8–46.1)
HGB BLD-MCNC: 11.2 G/DL (ref 11.5–15.4)
MCH RBC QN AUTO: 25.5 PG (ref 26.8–34.3)
MCHC RBC AUTO-ENTMCNC: 31.2 G/DL (ref 31.4–37.4)
MCV RBC AUTO: 82 FL (ref 82–98)
PLATELET # BLD AUTO: 465 THOUSANDS/UL (ref 149–390)
PMV BLD AUTO: 9.6 FL (ref 8.9–12.7)
POTASSIUM SERPL-SCNC: 4.2 MMOL/L (ref 3.5–5.3)
PROCALCITONIN SERPL-MCNC: 0.14 NG/ML
RBC # BLD AUTO: 4.39 MILLION/UL (ref 3.81–5.12)
SODIUM SERPL-SCNC: 132 MMOL/L (ref 135–147)
WBC # BLD AUTO: 12.11 THOUSAND/UL (ref 4.31–10.16)

## 2024-01-08 PROCEDURE — 99223 1ST HOSP IP/OBS HIGH 75: CPT | Performed by: INTERNAL MEDICINE

## 2024-01-08 PROCEDURE — 94760 N-INVAS EAR/PLS OXIMETRY 1: CPT

## 2024-01-08 PROCEDURE — 94762 N-INVAS EAR/PLS OXIMTRY CONT: CPT

## 2024-01-08 PROCEDURE — 94640 AIRWAY INHALATION TREATMENT: CPT

## 2024-01-08 PROCEDURE — 92610 EVALUATE SWALLOWING FUNCTION: CPT

## 2024-01-08 PROCEDURE — 99232 SBSQ HOSP IP/OBS MODERATE 35: CPT | Performed by: NURSE PRACTITIONER

## 2024-01-08 PROCEDURE — 99239 HOSP IP/OBS DSCHRG MGMT >30: CPT | Performed by: STUDENT IN AN ORGANIZED HEALTH CARE EDUCATION/TRAINING PROGRAM

## 2024-01-08 PROCEDURE — 80048 BASIC METABOLIC PNL TOTAL CA: CPT | Performed by: INTERNAL MEDICINE

## 2024-01-08 PROCEDURE — 85027 COMPLETE CBC AUTOMATED: CPT | Performed by: INTERNAL MEDICINE

## 2024-01-08 PROCEDURE — 84145 PROCALCITONIN (PCT): CPT | Performed by: INTERNAL MEDICINE

## 2024-01-08 PROCEDURE — 99232 SBSQ HOSP IP/OBS MODERATE 35: CPT | Performed by: INTERNAL MEDICINE

## 2024-01-08 RX ORDER — SODIUM CHLORIDE 1 G/1
2 TABLET ORAL ONCE
Status: COMPLETED | OUTPATIENT
Start: 2024-01-08 | End: 2024-01-08

## 2024-01-08 RX ORDER — CEFDINIR 300 MG/1
300 CAPSULE ORAL EVERY 12 HOURS SCHEDULED
Qty: 4 CAPSULE | Refills: 0 | Status: SHIPPED | OUTPATIENT
Start: 2024-01-08 | End: 2024-01-10

## 2024-01-08 RX ADMIN — AMLODIPINE BESYLATE 10 MG: 10 TABLET ORAL at 08:26

## 2024-01-08 RX ADMIN — THIAMINE HCL TAB 100 MG 100 MG: 100 TAB at 08:25

## 2024-01-08 RX ADMIN — PANTOPRAZOLE SODIUM 20 MG: 20 TABLET, DELAYED RELEASE ORAL at 08:26

## 2024-01-08 RX ADMIN — Medication 1 TABLET: at 08:26

## 2024-01-08 RX ADMIN — LEVALBUTEROL HYDROCHLORIDE 1.25 MG: 1.25 SOLUTION RESPIRATORY (INHALATION) at 07:28

## 2024-01-08 RX ADMIN — IPRATROPIUM BROMIDE 0.5 MG: 0.5 SOLUTION RESPIRATORY (INHALATION) at 07:28

## 2024-01-08 RX ADMIN — DOCUSATE SODIUM 100 MG: 100 CAPSULE, LIQUID FILLED ORAL at 08:26

## 2024-01-08 RX ADMIN — CYANOCOBALAMIN TAB 500 MCG 1000 MCG: 500 TAB at 08:26

## 2024-01-08 RX ADMIN — UMECLIDINIUM 1 PUFF: 62.5 AEROSOL, POWDER ORAL at 08:18

## 2024-01-08 RX ADMIN — FOLIC ACID 1 MG: 1 TABLET ORAL at 08:25

## 2024-01-08 RX ADMIN — CLOPIDOGREL BISULFATE 75 MG: 75 TABLET ORAL at 08:26

## 2024-01-08 RX ADMIN — SODIUM CHLORIDE 2 G: 1 TABLET ORAL at 10:15

## 2024-01-08 RX ADMIN — HEPARIN SODIUM 5000 UNITS: 5000 INJECTION INTRAVENOUS; SUBCUTANEOUS at 05:37

## 2024-01-08 RX ADMIN — FLUTICASONE FUROATE AND VILANTEROL TRIFENATATE 1 PUFF: 100; 25 POWDER RESPIRATORY (INHALATION) at 08:18

## 2024-01-08 RX ADMIN — BISOPROLOL FUMARATE 10 MG: 5 TABLET ORAL at 08:25

## 2024-01-08 RX ADMIN — CEFTRIAXONE 1000 MG: 1 INJECTION, SOLUTION INTRAVENOUS at 08:20

## 2024-01-08 NOTE — CONSULTS
Consultation - Geriatric Medicine   Emily Berrios 78 y.o. female MRN: [id number]  Unit/Bed#: E2 -02 Encounter: [id number]        Inpatient consult to Gerontology  Consult performed by: Bebo Cadena MD  Consult ordered by: Nelson Vance DO          Assessment/Plan   1.-Cognitive impairment.-Patient scored a 23 out of 30 on the Cameron evaluation.  Her main issues were with executive function.  This would be a predisposing factor for the development of delirium.  Suspect episodes of hypoxia precipitated event.    2.-Delirium.-Patient with apparent episodes of confusion and visual hallucinations and paranoia at present she is fairly clear.  Currently on CIWA protocol.  Would avoid Xanax since this can cause and precipitate falls.    Recommendations.-    A.-If episodes continue would placed on Seroquel 25 mg p.o. at bedtime.  Patient is QTc interval is 418.    3.-Polypharmacy.-Patient's current home medications include albuterol 3 mL via neb every 6 hours as needed for wheezing, Xanax 0.5 mg by mouth at bedtime, amlodipine 5 mg orally daily this has been increased to 10 mg in the hospital, Tessalon Perles 100 mg orally 3 times a day as needed, bisoprolol 10 mg orally daily, calcium carbonate with vitamin D 600-800 mg/unit tab takes 1 tablet by mouth twice daily, co-Q10 30 mg orally twice daily, Prolia injections 60 mg subcu every 6 months, vitamin D2 50,000 international units by mouth once a week, Estrace vaginal 0.1 mg/g uses it twice a day for 14 days, Flonase 1 puff daily, ipratropium-albuterol nebs every 6 hours, Mag-Tab 84 mg by mouth twice daily, patient does have nitroglycerin sublingual 0.4 mg, oxycodone 5 mg by mouth every 4 hours as needed, pantoprazole 20 mg orally daily, Plavix 75 mg orally daily, number and vaginal cream 0.625 g daily, red yeast rice 600 mg twice daily, rosuvastatin 5 mg orally daily, vitamin B12 1000 mcg orally daily.----In hospital they have also started ceftriaxone 1 g IV  every 24 hours, patient on heparin 5000 international units subcu every 8 hours for DVT prophylaxis, Pravachol has replaced Crestor currently given 40 mg of Pravachol.    4.-History of alcohol use.-Patient on thiamine and folic acid at present apparently patient had a CIWA scale-9 overnight and was given oral Ativan.  Might want to use Librium if warranted.    5.-Insomnia.-Sleep hygiene encouraged, consider melatonin 3 to 5 mg at bedtime if it persists.    6.-Coronary artery disease.-Patient with history of previous bypass surgery and stent placement.  She has quit smoking over 30 years encouraged to continue walking on a regular basis.    7.-History of hip replacements.-Patient has use a single-point cane to help her with ambulation    8.-Hyponatremia.-This has been an underlying chronic issue currently seen by nephrology recommendations made.  Initial sodium of 123 has been corrected currently 132.  Being replaced with salt tablets.  Patient is to continue on fluid restriction and salt supplementation.    9.-COPD.-Continue with pulmonary recommendations she appears to be doing well from the standpoint.    10.-Osteoporosis.-Apparently patient is on Prolia occasion has been on hold due to the fact that she was going to have dental work done.  She will resume treatment as soon as this has taken place.    11.-Dysphagia.-Patient does state that she has sometimes difficulty with bulky foods.  Might need an evaluation of her swallowing and imaging of her esophagus.    12.-History of recent fracture of left humerus at the surgical neck, and close compression fracture of L1.-Patient using a sling on her left arm it should be noted patient is right-handed dominant.  Patient also received Lidoderm patch analgesics and physical therapy for her low back pain.    13.-History of B12 deficiency.-Currently on oral replacement would recommend rechecking B12 levels.         History of Present Illness   Physician Requesting Consult:  Nayan Merlos MD  Reason for Consult / Principal Problem: Delirium  Hx and PE limited by: No limitations  Additional history obtained from: Patient was able to elaborate on  History I was able to speak with her son Román Pandey who also provided additional information.  His phone number was [phone]      HPI: Emily Berrios is a 78 y.o. year old female who presents with a history of hypoxia while undergoing physical therapy for a recent left humeral fracture and L1 compression fracture after sustaining a fall in New Year's.  Because of patient's hypoxia patient was referred to the emergency room patient had studies to rule out underlying pulmonary embolism.  Patient was noted to have patchy lateral groundglass opacity greater in the right upper lobe.  Differential diagnosis was infectious versus inflammatory.  He was also noted to have a trace right effusion with evidence of pulmonary artery enlargement which raised concern for pulmonary hypertension.  Patient had ultrasound of lower extremities no evidence of DVT and was placed on Rocephin.  Currently patient is not on any oxygen she was able to ambulate a distance of at least 250 feet without desaturating lowest saturation noted was 90.  Patient apparently during the past 2 admissions when she had a urinary tract infection and current admission she had episode of visual hallucinations, paranoia which were attributed to her previous history of alcohol use.  After first episode when she was [facility].  Patient did undergo a Chinmay cognitive assessment test.  She had finished up to 11th grade.  She scored a 23 out of 30 normal being more or equal to 26/30.  Patient was noted to have deficits in visual-spatial, attention, language, abstraction, orientation.  Interestingly she scored a 5 out of 5 on delayed recall.  No evidence of depression was noted.  Patient did have a B12 level performed on July 21, 2023 which revealed a  value of 163 showing evidence of B12 deficiency.  Patient had been on oral supplementation.  TSH levels were within normal range at 2.961.  Patient other comorbidities include a history of previous tobacco use she  quit smoking for over 30 years, she has evidence of COPD, history of coronary artery disease she did undergo bypass surgery in 2010 and 3 months later she had a stent placed on her right coronary on a previous venous graft.  Patient also has had bilateral hip surgery and uses a single-point cane to assist with her gait.  Patient currently lives independently memory has been fairly good according to her children except for the episodes where she ends up in the hospital and has episodes of paranoia and delusions.  The patient still able to pay her own bills, her daughter assists her with the shopping for food.  She had a recent fall on January 1 where she broke her left humerus.  Her vision has declined and she will need her eyes examined to see if she needs glasses, she has no issues with hearing.  She has developed urinary stress incontinence mainly when she coughs or laughs.  She does wear a pad.  Patient still enjoys all of her basic activities of daily living.  She would like to remain independent as long as possible.  She does tell me that she drinks alcohol regularly but mainly on weekends she has about 6 beers daily.  Patient currently is receiving thiamine and is on the CIWA protocol.    Review of Systems   Constitutional: Negative.    HENT:  Positive for dental problem.    Eyes:  Positive for visual disturbance.   Respiratory:  Positive for shortness of breath.    Cardiovascular: Negative.    Gastrointestinal: Negative.    Endocrine: Negative.    Genitourinary:         History of urinary stress incontinence   Musculoskeletal:  Positive for arthralgias and gait problem.   Skin: Negative.    Allergic/Immunologic: Negative.    Hematological: Negative.    Psychiatric/Behavioral:  Positive for  hallucinations.        Historical Information   Past medical history:   Past Medical History:   Diagnosis Date    Arthritis     Asthma     Chronic obstructive pulmonary disease, unspecified COPD type ([facility]) 2022    Cognitive decline 03/10/2023    Community acquired pneumonia 2019    COPD (chronic obstructive pulmonary disease) ([facility])     Coronary artery disease     DM (diabetes mellitus) ([facility])     HTN (hypertension)     Hydronephrosis, bilateral 2023    Hyperlipidemia     Hyponatremia     Urinary retention       Past surgical history:   Past Surgical History:   Procedure Laterality Date    CARDIAC CATHETERIZATION      CORONARY ARTERY BYPASS GRAFT  2010    with subsequent stent to the saphenous veingraft to the RCA 3 months later    KNEE ARTHROSCOPY      POLYPECTOMY      laryngeal    TOTAL HIP ARTHROPLASTY      TUBAL LIGATION        Social history:  Social History     Socioeconomic History    Marital status: Single     Spouse name: Not on file    Number of children: 5    Years of education: Not on file    Highest education level: Not on file   Occupational History    Occupation: retired   Tobacco Use    Smoking status: Former     Current packs/day: 0.00     Average packs/day: 0.5 packs/day for 32.0 years (16.0 ttl pk-yrs)     Types: Cigarettes     Start date:      Quit date: 2000     Years since quittin.0    Smokeless tobacco: Never    Tobacco comments:     no passive smoke exposure   Vaping Use    Vaping status: Never Used   Substance and Sexual Activity    Alcohol use: Yes     Alcohol/week: 42.0 standard drinks of alcohol     Types: 42 Cans of beer per week     Comment: socially    Drug use: Never    Sexual activity: Not Currently   Other Topics Concern    Not on file   Social History Narrative    Not on file     Social Determinants of Health     Financial Resource Strain: Low Risk  (8/3/2023)    Overall Financial Resource Strain (CARDIA)     Difficulty of Paying Living Expenses:  Not hard at all   Food Insecurity: No Food Insecurity (1/2/2024)    Hunger Vital Sign     Worried About Running Out of Food in the Last Year: Never true     Ran Out of Food in the Last Year: Never true   Transportation Needs: No Transportation Needs (1/2/2024)    PRAPARE - Transportation     Lack of Transportation (Medical): No     Lack of Transportation (Non-Medical): No   Physical Activity: Unknown (3/13/2020)    Exercise Vital Sign     Days of Exercise per Week: Patient declined     Minutes of Exercise per Session: Patient declined   Stress: No Stress Concern Present (3/13/2020)    [employer] - Occupational Stress Questionnaire     Feeling of Stress : Not at all   Social Connections: Unknown (3/13/2020)    Social Connection and Isolation Panel [NHANES]     Frequency of Communication with Friends and Family: Patient declined     Frequency of Social Gatherings with Friends and Family: Patient declined     Attends Hindu Services: Patient declined     Active Member of Clubs or Organizations: Patient declined     Attends Club or Organization Meetings: Patient declined     Marital Status: Patient declined   Intimate Partner Violence: Not At Risk (3/13/2020)    Humiliation, Afraid, Rape, and Kick questionnaire     Fear of Current or Ex-Partner: No     Emotionally Abused: No     Physically Abused: No     Sexually Abused: No   Housing Stability: Low Risk  (1/2/2024)    Housing Stability Vital Sign     Unable to Pay for Housing in the Last Year: No     Number of Places Lived in the Last Year: 1     Unstable Housing in the Last Year: No      Family history:   Family History   Problem Relation Age of Onset    Stroke Mother     Hypertension Mother     Heart disease Mother     Colon cancer Father     No Known Problems Sister     No Known Problems Sister     No Known Problems Sister     No Known Problems Daughter     No Known Problems Daughter     No Known Problems Daughter     No Known  Problems Maternal Grandmother     No Known Problems Maternal Grandfather     No Known Problems Paternal Grandmother     No Known Problems Paternal Grandfather     Heart attack Brother     Heart attack Brother     No Known Problems Maternal Aunt     No Known Problems Maternal Aunt     No Known Problems Paternal Aunt     No Known Problems Paternal Aunt         Meds/Allergies   All current active meds have been reviewed.     Allergies   Allergen Reactions    Ace Inhibitors Other (See Comments)     Includes lisinopril    Angiotensin Receptor Blockers Other (See Comments)     Not specified.  Includes losartan and olmesartan    Ezetimibe Rash    Statins Arthralgia     Tolerates Crestor    Morphine Hives     pt states someone told her she is allergic to it after her hip surgery       Objective blood pressure 114/60, O2 sats 92% on room air, respiratory rate 16 heart rate 80.  Vitals:    01/08/24 0831   BP:    Pulse:    Resp:    Temp:    SpO2: 95%       Intake/Output Summary (Last 24 hours) at 1/8/2024 1125  Last data filed at 1/8/2024 1019  Gross per 24 hour   Intake 700 ml   Output 1100 ml   Net -400 ml     Invasive Devices       None                   Physical Exam  Constitutional:       Appearance: Normal appearance.   HENT:      Head: Atraumatic.      Nose: Nose normal.      Mouth/Throat:      Mouth: Mucous membranes are moist.      Pharynx: Oropharynx is clear.   Eyes:      Pupils: Pupils are equal, round, and reactive to light.   Cardiovascular:      Rate and Rhythm: Normal rate and regular rhythm.   Pulmonary:      Effort: Pulmonary effort is normal.      Breath sounds: Normal breath sounds.   Abdominal:      General: Bowel sounds are normal.      Palpations: Abdomen is soft.   Musculoskeletal:      Cervical back: Neck supple.      Comments: She has left arm on a sling secondary to left humeral fracture   Skin:     General: Skin is dry.   Neurological:      Mental Status: She is oriented to person, place, and time.  Mental status is at baseline.   Psychiatric:         Mood and Affect: Mood normal.         Behavior: Behavior normal.         Thought Content: Thought content normal.         Judgment: Judgment normal.         Lab Results:   I have personally reviewed pertinent lab and imaging results.     VTE Prophylaxis: Heparin    Code Status: Level 1 - Full Code  Advance Directive and Living Will:      Power of :    POLST:      Family and Social Support: Patient gets good social support from daughter and son.  No data recorded

## 2024-01-08 NOTE — PROGRESS NOTES
NEPHROLOGY PROGRESS NOTE   Emily Berrios 78 y.o. female MRN: [id number]  Unit/Bed#: E2 -02 Encounter: [id number]  Reason for Consult: hyponatremia    ASSESSMENT AND PLAN:  78-year-old female with hypertension, COPD, CAD, chronic hyponatremia, status post recent hospitalization with fracture, consulted for hyponatremia.    Acute on chronic hyponatremia, baseline serum sodium 1 30-1 35  -Initial sodium 123 overcorrected after receiving IV normal saline  -Initial hyponatremia suspected prerenal component, volume depletion with underlying SIADH, poor solute intake  -Urine sodium 56, urine osmolality 250  -Status post intermittent salt tablet dosing.  -Serum sodium improving and stable 132 today  -Will give salt tablet 2 g 1 dose again today.  -BMP to monitor  -Currently on fluid restriction, will decrease this to 1.5 L/day    Hypertension, blood pressure overall acceptable  -Currently on amlodipine, bisoprolol.  Change holding parameter for amlodipine    Discussed above plan in detail with primary team regarding additional dose of salt tablet, continue fluid restriction and they agree with above recommendations.      SUBJECTIVE:  Patient seen and examined at bedside.  Denies any nausea or vomiting.    OBJECTIVE:  Current Weight: Weight - Scale: 60.2 kg (132 lb 11.5 oz)  Vitals:    01/08/24 0831   BP:    Pulse:    Resp:    Temp:    SpO2: 95%       Intake/Output Summary (Last 24 hours) at 1/8/2024 0955  Last data filed at 1/8/2024 0830  Gross per 24 hour   Intake 500 ml   Output 1100 ml   Net -600 ml     Wt Readings from Last 3 Encounters:   01/08/24 60.2 kg (132 lb 11.5 oz)   01/01/24 59.9 kg (132 lb 0.9 oz)   12/27/23 60.5 kg (133 lb 6.4 oz)     Temp Readings from Last 3 Encounters:   01/08/24 97.5 °F (36.4 °C) (Temporal)   01/02/24 (!) 97.2 °F (36.2 °C) (Temporal)   10/25/23 97.8 °F (36.6 °C) (Tympanic)     BP Readings from Last 3 Encounters:   01/08/24 114/60   01/02/24 117/63   12/27/23 (!) 178/67     Pulse  Readings from Last 3 Encounters:   01/08/24 89   01/02/24 94   12/27/23 76        Physical Examination:  Eyes: Mild conjunctival pallor present  Neck: No obvious lymphadenopathy appreciated  Respiratory: Bilateral air entry present  CVS: No significant edema  GI: Soft, nondistended  CNS: Active, alert, follows commands  Skin: No new rash  Musculoskeletal: No obvious new gross deformity noted    Medications:    Current Facility-Administered Medications:     albuterol (PROVENTIL HFA,VENTOLIN HFA) inhaler 2 puff, 2 puff, Inhalation, Q6H PRN, Gabrielle Garcia MD, 2 puff at 01/06/24 0407    ALPRAZolam (XANAX) tablet 0.5 mg, 0.5 mg, Oral, HS PRN, Gabrielle Garcia MD, 0.5 mg at 01/05/24 1821    amLODIPine (NORVASC) tablet 10 mg, 10 mg, Oral, BID, Gabrielle Garcia MD, 10 mg at 01/08/24 0826    benzonatate (TESSALON PERLES) capsule 100 mg, 100 mg, Oral, TID PRN, Gabrielle Garcia MD    bisoprolol (ZEBETA) tablet 10 mg, 10 mg, Oral, Daily, Gabrielle Garcia MD, 10 mg at 01/08/24 0825    calcium carbonate (TUMS) chewable tablet 500 mg, 500 mg, Oral, TID PRN, TRINIDAD Greene    cefTRIAXone (ROCEPHIN) IVPB (premix in dextrose) 1,000 mg 50 mL, 1,000 mg, Intravenous, Q24H, Nelson Vance DO, Last Rate: 100 mL/hr at 01/08/24 0820, 1,000 mg at 01/08/24 0820    clopidogrel (PLAVIX) tablet 75 mg, 75 mg, Oral, Daily, Gabrielle Garcia MD, 75 mg at 01/08/24 0826    cyanocobalamin (VITAMIN B-12) tablet 1,000 mcg, 1,000 mcg, Oral, Daily, Gabrielle Garcia MD, 1,000 mcg at 01/08/24 0826    docusate sodium (COLACE) capsule 100 mg, 100 mg, Oral, BID, Gabrielle Garcia MD, 100 mg at 01/08/24 0826    Fluticasone Furoate-Vilanterol 100-25 mcg/actuation 1 puff, 1 puff, Inhalation, Daily, 1 puff at 01/08/24 0818 **AND** umeclidinium 62.5 mcg/actuation inhaler AEPB 1 puff, 1 puff, Inhalation, Daily, Gabrielle Gacria MD, 1 puff at 01/08/24 0818    folic acid (FOLVITE) tablet 1 mg, 1 mg, Oral, Daily, Gabrielle Garcia MD, 1 mg at 01/08/24 0825     heparin (porcine) subcutaneous injection 5,000 Units, 5,000 Units, Subcutaneous, Q8H KENIA, 5,000 Units at 01/08/24 0537 **AND** [COMPLETED] Platelet count, , , Once, Gabrielle Garcia MD    ipratropium (ATROVENT) 0.02 % inhalation solution 0.5 mg, 0.5 mg, Nebulization, TID, Nelson Vance DO, 0.5 mg at 01/08/24 0728    levalbuterol (XOPENEX) inhalation solution 1.25 mg, 1.25 mg, Nebulization, TID, Nelson Vance DO, 1.25 mg at 01/08/24 0728    multivitamin-minerals (CENTRUM) tablet 1 tablet, 1 tablet, Oral, Daily, Gabrielle Garcia MD, 1 tablet at 01/08/24 0826    nitroglycerin (NITROSTAT) SL tablet 0.4 mg, 0.4 mg, Sublingual, Q5 Min PRN, Gabrielle Garcia MD    ondansetron (ZOFRAN) injection 4 mg, 4 mg, Intravenous, Q6H PRN, Gabrielle Garcia MD    oxyCODONE (ROXICODONE) IR tablet 5 mg, 5 mg, Oral, Q6H PRN, Gabrielle Garcia MD, 5 mg at 01/06/24 2208    pantoprazole (PROTONIX) EC tablet 20 mg, 20 mg, Oral, Daily, Gabrielle Garcia MD, 20 mg at 01/08/24 0826    pravastatin (PRAVACHOL) tablet 40 mg, 40 mg, Oral, Daily With Dinner, Gabrielle Garcia MD, 40 mg at 01/07/24 1702    thiamine tablet 100 mg, 100 mg, Oral, Daily, Gabrielle Garcia MD, 100 mg at 01/08/24 0825    Laboratory Results:  Results from last 7 days   Lab Units 01/08/24  0543 01/08/24  0426 01/07/24  2230 01/07/24  1428 01/07/24  0839 01/07/24  0838 01/06/24  2206 01/06/24  1521 01/06/24  0500 01/05/24  1303 01/05/24  0528 01/04/24  2110 01/04/24  1316   WBC Thousand/uL  --  12.11*  --   --  10.29*  --   --   --  10.83*  --  13.71*  --  13.67*   HEMOGLOBIN g/dL  --  11.2*  --   --  10.3*  --   --   --  9.5*  --  11.1*  --  10.4*   HEMATOCRIT %  --  35.9  --   --  33.5*  --   --   --  30.7*  --  34.1*  --  32.3*   PLATELETS Thousands/uL  --  465*  --   --  359  351  --   --   --  313  --  356  --  311   SODIUM mmol/L 132*  --  130* 131*  --  131* 129* 130* 131*   < > 134*   < > 123*   POTASSIUM mmol/L 4.2  --  3.9 4.1  --  4.0 4.6 4.2 4.4   < > 3.8   < > 3.8  "  CHLORIDE mmol/L 95*  --  94* 95*  --  94* 94* 95* 96   < > 97   < > 89*   CO2 mmol/L 30  --  26 29  --  30 28 28 28   < > 29   < > 29   BUN mg/dL 10  --  11 9  --  10 7 6 5   < > 5   < > 9   CREATININE mg/dL 0.58*  --  0.65 0.53*  --  0.72 0.49* 0.48* 0.46*   < > 0.39*   < > 0.63   CALCIUM mg/dL 9.8  --  9.8 9.3  --  9.5 9.6 9.6 9.1   < > 9.2   < > 8.9   MAGNESIUM mg/dL  --   --   --   --   --   --   --   --  1.5*  --   --   --   --    PHOSPHORUS mg/dL  --   --   --   --   --   --   --   --  2.4  --   --   --   --     < > = values in this interval not displayed.       VAS lower limb venous duplex study, complete bilateral   Final Result by May Almanza MD (01/07 1709)      CTA ED chest PE study   Final Result by Chasity Bonilla MD (01/04 6896)      No definite acute pulmonary emboli but interpretation is mildly compromised by motion artifact which creates artifactual filling defects.      Patchy lateral groundglass opacity, greatest in the right upper lobe, infectious or inflammatory.      Trace right effusion.      Pulmonary artery enlargement which can be seen with pulmonary hypertension.      Moderate hiatal hernia.            Workstation performed: [id number]             Portions of the record may have been created with voice recognition software. Occasional wrong word or \"sound a like\" substitutions may have occurred due to the inherent limitations of voice recognition software. Read the chart carefully and recognize, using context, where substitutions have occurred.    "

## 2024-01-08 NOTE — SPEECH THERAPY NOTE
"Speech Language/Pathology  Speech/Language Pathology  Assessment    Patient Name: Emily Berrios  Today's Date: 1/8/2024     Problem List  Principal Problem:    Hypoxia  Active Problems:    Chronic coronary artery disease    Hyperlipidemia    Primary hypertension    Chronic obstructive pulmonary disease, unspecified COPD type (HCC)    Hyponatremia    Closed nondisplaced fracture of surgical neck of left humerus    Closed compression fracture of L1 vertebra (HCC)    Insomnia    Alcohol use    Acute encephalopathy    Past Medical History  Past Medical History:   Diagnosis Date    Arthritis     Asthma     Chronic obstructive pulmonary disease, unspecified COPD type (HCC) 04/08/2022    Cognitive decline 03/10/2023    Community acquired pneumonia 11/13/2019    COPD (chronic obstructive pulmonary disease) (HCC)     Coronary artery disease     DM (diabetes mellitus) (HCC)     HTN (hypertension)     Hydronephrosis, bilateral 03/06/2023    Hyperlipidemia     Hyponatremia     Urinary retention      Past Surgical History  Past Surgical History:   Procedure Laterality Date    CARDIAC CATHETERIZATION  2010    CORONARY ARTERY BYPASS GRAFT  12/06/2010    with subsequent stent to the saphenous veingraft to the RCA 3 months later    KNEE ARTHROSCOPY      POLYPECTOMY      laryngeal    TOTAL HIP ARTHROPLASTY      TUBAL LIGATION            Bedside Swallow Evaluation:    Summary:  Pt presented w/ mild dysphonia. H/o polyp removal from [facility] \"years ago\". Quit smoking 30 years ago.  She is also on a fluid restriction which can contribute to dysphonia.  Pharynx appears reddened.  Patient stated sometimes \"it burns\".  History of hiatal hernia.  Stated sometimes if food is stuck in her stomach she takes a drink and makes it come back up.  Reports she only eats small amounts at a time, takes liquids in between.  Patient stated she would like to lose some weight but this is not how she would like to do it.  Family at bedside agreeing with " "statements patient made.  Patient unable to swallow her salt tablets unless they are dissolved in water for a very long time prior to taking them.  Other meds she prefers crushed.  I discussed the modified diet with her she stated no she wants to stay on a regular diet because she can get some things down okay. Reported she has to chew a long time. missing some teeth. She reports she had an EGD \"a long long time ago\". Seen w/ liquids. No cough or wet vocal qaulity. Swallow prompt. Given current s/s, suspect esophageal dysphaia/gerd. ? Mild oral. Can not r/o risk for bottom up aspiration. Returned at lunch. Ate only the ice cream and took some sips of cranberry juice.    Recommendations:  Diet:Refused modified diet. Advised to choose softer items that are moist. She reported she already alternates w/ liquids to get the food down.   Liquid:thin  Meds: crush or soften  Supervision: distant  Positioning:Upright  Strategies: chew well, slow rate, alternate w/ sips, avoid dry/dense foods, add condiments.smaller more frequent meals, or snacks. Pt keeps hob elevated at rest \"if I lay flat it comes back up\"  Pt to take PO/Meds only when fully alert and upright.   Oral care  Aspiration precautions  Reflux precautions  Therapy Prognosis: seems to need f/u w/ GI  Frequency: ? Brief f/u    Consider consult w/:  Rehab  GI   Nutrition    Goal(s):  Dysphagia LTG  -Patient will demonstrate safe and effective oral intake (without overt s/s significant oral/pharyngeal dysphagia including s/s penetration or aspiration) for the highest appropriate diet level.     1.Pt will tolerate least restrictive diet w/out s/s aspiration or oral/pharyngeal difficulties.   2.Pt will will effectively manipulate/masticate and transfer purees/solids w/out s/s dysphagia/aspiration.   3.Pt will tolerate thin liquids w/out s/s aspiration.   -If indicated, patient will comply with a Video/Modified Barium Swallow study for more complete assessment of " swallowing anatomy/physiology/aspiration risk and to assess efficacy of treatment techniques so as to best guide treatment plan     H&P/Admit info/ pertinent provider notes: (PMH noted above)  HPI:  Emily Berrios is a 78 y.o. female who to the ER by her physical therapist due to concerns of her hypoxia.  Saturations were reportedly in the 80s when exercising, on room air  Patient relates that she has had chest congestion and a cough for the past 2 weeks.  She notes phlegm.  She notes she had dyspnea for the past 2 weeks.  She states at home she was using her nebulizer as well as inhalers with improvement  Patient notes she has pain in her left arm, from her humeral fracture.  She denies any pain anywhere else.  Denies any chest pain.  She denies any abdominal pain.  Denies any nausea, vomiting, diarrhea.  Was noted of hyponatremia.  She notes she drinks only small amounts of liquids during the day.  She however states she drinks approximately 6 beers daily.  Patient's son relates that patient's  passed away recently and she has had poor appetite, poor p.o. intake, and possibly had been drinking more than 6 beers daily due to grief.  Patient denies any abdominal pain, nausea, vomiting, diarrhea or constipation.  Denies any dizziness or lightheadedness.    1/4/24 cta ed chest  No definite acute pulmonary emboli but interpretation is mildly compromised by motion artifact which creates artifactual filling defects.  Patchy lateral groundglass opacity, greatest in the right upper lobe, infectious or inflammatory.  Trace right effusion.  Pulmonary artery enlargement which can be seen with pulmonary hypertension.  Moderate hiatal hernia.    Special Studies:  CTA ed chest: 1/4/24  No definite acute pulmonary emboli but interpretation is mildly compromised by motion artifact which creates artifactual filling defects.  Patchy lateral groundglass opacity, greatest in the right upper lobe, infectious or  "inflammatory.  Trace right effusion.  Pulmonary artery enlargement which can be seen with pulmonary hypertension.  Moderate hiatal hernia.  CT spine: 1/2/24  1.  Acute to subacute right L1 superior plate compression fracture with mild height loss. There is minimal retropulsion without significant canal stenosis.  2.  Degenerative change as detailed pronounced at level L5-S1 with mild canal and moderate bilateral foraminal stenosis.  3.  Partially imaged distended urinary bladder. Correlate for urinary retention.  4.  Cholelithiasis.    Procalcitonin:  0.14   1/8   WBC:  12.11  1/8       Previous MBS:  none    Patient's goal: none stated    Did the pt report pain? no  If yes, was nursing notified/was it addressed? N/a    Reason for consult:  R/o aspiration  Determine safest and least restrictive diet  respiratory compromise  poor intake  weight loss   h/o dysphagia   C/o pill dysphagia  C/o solid food dysphagia    Precautions:  Fall    Food Allergies:  none   Current Diet:  regular   Premorbid diet:  Not much per family   O2 requirement:  1 nc earlier but now off o2   Social/Prior living  Lives w/ daughter who was bedside   Voice/Speech: At least mildly hoarse   Follows commands:  yes   Cognitive status:  Alert and pleasant     Oral [facility] exam:  Dentition:partial natural  Lips (VII):+  Tongue (XII):+  Velum (X):wnl/pharynx red  Secretion management:wnl    Esophageal stage:  H/o GERD  H/o hiatal hernia  EGD \"years ago\"    Results d/w:  Pt, nursing, family, physician         "

## 2024-01-08 NOTE — PROGRESS NOTES
"Progress Note - Pulmonary   Emily Berrios 78 y.o. female MRN: [id number]  Unit/Bed#: E2 -02 Encounter: [id number]    Assessment/Plan:    1.  Acute hypoxic respiratory failure likely multifaceted as listed below       -Currently on 2 L 97%, does not wear home O2       -Continue saturations greater than 88%       -Pulmonary toileting: Deep breathing cough out of bed as tolerated incentive spirometry        -Will need home O2 evaluation prior to discharge    2.  Abnormal chest CT        -Patchy GGO        -Etiology somewhat unclear: Pneumonitis vs infectious vs volume        -Procalcitonin- 0.70--0.28--0.20-- 0.14        -Day # 4/5-ceftriaxone        -Repeat imaging 4 to 6 weeks    3.  Possibly acute on chronic grade 1 diastolic CHF w/ severe PHTN        -1/5/2023-   EF 65%    PA pressure 60 mmHg  BNP  224        -Overall does not appear overtly overloaded        -Consider diuretics as needed    4.  Severe COPD without acute exacerbation        -Paraseptal emphysematous changes throughout imaging        -Inpatient: breo/ anoro        -Will transition nebulizer to as needed        -Home regimen: Trelegy 100-62.5-25 1 pull daily, DuoNeb every 6 as needed    5. Fracture left humerus       -Secondary to fall at home       -No surgical intervention at this time        -Outpatient follow-up per discharge instructions  -Pulmonary will sign off        Chief Complaint:    \" I feel a lot better\"    Subjective:    Emily was comfortably sitting in her bed.  No significant overnight events reported.  Patient reporting significant improvement since admission currently denying any fevers, chills, abscess, headaches, night sweats, pleuritic chest pain, or palpitations.    Objective:    Vitals: Blood pressure 114/60, pulse 89, temperature 97.5 °F (36.4 °C), temperature source Temporal, resp. rate 18, height 5' 1\" (1.549 m), weight 60.2 kg (132 lb 11.5 oz), SpO2 95%.,Body mass index is 25.08 kg/m².      Intake/Output " Summary (Last 24 hours) at 1/8/2024 0938  Last data filed at 1/8/2024 0830  Gross per 24 hour   Intake 500 ml   Output 1100 ml   Net -600 ml       Invasive Devices       Peripheral Intravenous Line  Duration             Peripheral IV 01/08/24 Right Antecubital <1 day                    Physical Exam:     Physical Exam  Constitutional:       General: She is not in acute distress.     Appearance: Normal appearance. She is normal weight. She is not ill-appearing.   HENT:      Head: Normocephalic and atraumatic.      Nose: Nose normal. No congestion or rhinorrhea.      Mouth/Throat:      Mouth: Mucous membranes are dry.      Pharynx: Oropharynx is clear. No oropharyngeal exudate or posterior oropharyngeal erythema.   Cardiovascular:      Rate and Rhythm: Normal rate and regular rhythm.      Pulses: Normal pulses.      Heart sounds: No murmur heard.     No friction rub. No gallop.   Pulmonary:      Effort: Pulmonary effort is normal. No tachypnea, bradypnea, accessory muscle usage or respiratory distress.      Breath sounds: Decreased air movement present. No stridor. Decreased breath sounds present. No wheezing, rhonchi or rales.      Comments: Rales at bases  Chest:      Chest wall: No tenderness.   Abdominal:      General: Abdomen is flat. Bowel sounds are normal. There is no distension.      Palpations: Abdomen is soft. There is no mass.   Musculoskeletal:         General: No swelling or tenderness. Normal range of motion.      Cervical back: Normal range of motion. No rigidity or tenderness.   Skin:     General: Skin is warm and dry.      Coloration: Skin is not jaundiced or pale.   Neurological:      General: No focal deficit present.      Mental Status: She is alert and oriented to person, place, and time. Mental status is at baseline.   Psychiatric:         Mood and Affect: Mood normal.         Behavior: Behavior normal.         Labs: I have personally reviewed pertinent lab results., ABG: No results found for:  "\"PHART\", \"ERX2KXT\", \"PO2ART\", \"KWF0XGS\", \"A4PWBBIF\", \"BEART\", \"SOURCE\", BNP: No results found for: \"BNP\", CMP:   Lab Results   Component Value Date    SODIUM 132 (L) 01/08/2024    K 4.2 01/08/2024    CL 95 (L) 01/08/2024    CO2 30 01/08/2024    BUN 10 01/08/2024    CREATININE 0.58 (L) 01/08/2024    CALCIUM 9.8 01/08/2024    EGFR 88 01/08/2024   , PT/INR: No results found for: \"PT\", \"INR\"      Imaging and other studies: I have personally reviewed pertinent films in PACS    CTA chest filling deficit patchy lateral groundglass opacity  "

## 2024-01-08 NOTE — UTILIZATION REVIEW
Notification of Unplanned, Urgent, or   Emergency Inpatient Admission   AUTHORIZATION REQUEST     Unable to start auth on portal. Portal is not working       [place] [address]  Tax ID: [id number]  NPI: [id number]  Place of Service: [facility]  Admission Level of Care: Inpatient  Place of Service Code: 21     Attending Physician Information  Attending Name and NPI#: Nayan Merlos Md [[id number]]  Phone: [phone]     Admission Information  Inpatient Admission Date/Time: 1/4/24  5:42 PM  Discharge Date/Time: No discharge date for patient encounter.  Admitting Diagnosis Code/Description:  COPD (chronic obstructive pulmonary disease) (HCC) [J44.9]  Hyponatremia [E87.1]  SOB (shortness of breath) [R06.02]  Hypoxia [R09.02]     Utilization Review Contact  Renetta Winchester, Utilization   Phone: [phone]  Fax: [phone]  Email: [email or web address]  Contact for approvals/pending authorizations, clinical reviews, and discharge.     Physician Advisory Services Contact  Medical Necessity Denial & Ioyt-jy-Upto Discussion  Phone: [phone]  Fax: [phone]  Email: [email or web address]     DISCHARGE SUPPORT TEAM:  For Patients Discharge Needs & Updates  Phone: [phone] opt. 2 Fax: [phone]  Email: [email or web address]

## 2024-01-08 NOTE — RESPIRATORY THERAPY NOTE
"Pt saturation 97% on 1 lpm placed on room air saturation = 92@ rest.  Discussed with nursing and pulmonary if indicated proceed with \"check oxygen with ambulation\" order  "

## 2024-01-08 NOTE — CASE MANAGEMENT
Case Management Discharge Planning Note    Patient name Emily Berrios  Location [facility] /E2 -* MRN [id number]  : 1945 Date 2024       Current Admission Date: 2024  Current Admission Diagnosis:Hypoxia   Patient Active Problem List    Diagnosis Date Noted    Acute encephalopathy 2024    Hypoxia 2024    Insomnia 2024    Alcohol use 2024    Closed compression fracture of L1 vertebra (HCC) 2024    Closed nondisplaced fracture of surgical neck of left humerus 2024    Complete uterovaginal prolapse 2023    Vaginal erosion secondary to pessary use  10/10/2023    Chronic rhinitis 10/04/2023    Ambulatory dysfunction 2023    Pulmonary emphysema (HCC) 2023    Cognitive decline 03/10/2023    Hyponatremia 2023    Hypomagnesemia 2023    Hydronephrosis, bilateral 2023    Cystocele with prolapse 2023    Chronic obstructive pulmonary disease, unspecified COPD type (HCC) 2022    Community acquired pneumonia 2019    Hx of CABG 2018    Hx stent of SVG to the RCA 2018    Asthma 2014    Chronic coronary artery disease 2014    Hiatal hernia 2014    Osteoarthritis 2014    Glaucoma 2013    Hyperlipidemia 2012    Primary hypertension 2012      LOS (days): 4  Geometric Mean LOS (GMLOS) (days): 3.5  Days to GMLOS:-0.4     OBJECTIVE:  Risk of Unplanned Readmission Score: 19.93         Current admission status: Inpatient   Preferred Pharmacy:    [facility] [address] [place] [address]  Phone: [phone] Fax: [phone]    Primary Care Provider: Ramsey Gtz MD    Primary Insurance: [employer] [facility]  Secondary Insurance: [place] [employer] [facility]    DISCHARGE DETAILS:    Discharge planning discussed with:: Daughter, Ivanna  Freedom of Choice: Yes  Comments - Freedom of Choice: Agreeable  with [facility]  CM contacted family/caregiver?: Yes  Were Treatment Team discharge recommendations reviewed with patient/caregiver?: Yes  Did patient/caregiver verbalize understanding of patient care needs?: Yes  Were patient/caregiver advised of the risks associated with not following Treatment Team discharge recommendations?: Yes    Contacts  Patient Contacts: Ivanna Delilah (Daughter)  Relationship to Patient:: Family  Contact Method: Phone  Phone Number: [phone] (M)  Reason/Outcome: Discharge Planning    Requested Home Health Care         Is the patient interested in HHC at discharge?: Yes  Home Health Discipline requested:: Occupational Therapy, Physical Therapy  Home Health Agency Name:: [facility]  Home Health Follow-Up Provider:: PCP  Home Health Services Needed:: Evaluate Functional Status and Safety, Gait/ADL Training, Strengthening/Theraputic Exercises to Improve Function  Homebound Criteria Met:: Requires the Assistance of Another Person for Safe Ambulation or to Leave the Home, Uses an Assist Device (i.e. cane, walker, etc)  Supporting Clincal Findings:: Fatigues Easliy in Short Distances, Limited Endurance    DME Referral Provided  Referral made for DME?: No    Other Referral/Resources/Interventions Provided:  Interventions: HHC         Treatment Team Recommendation: Home with Home Health Care  Discharge Destination Plan:: Home with Home Health Care  Transport at Discharge : Family                             IMM Given (Date):: 01/08/24  IMM Given to:: Family  IMM was reviewed with the pt's daughter, Ivanna. Reports understanding of the ability to appeal discharge if feeling as though not medically stable for discharge. Verbal consent obtained and placed in the scan bin.    Family notified:: Ivanna Mazariegos

## 2024-01-08 NOTE — DISCHARGE SUMMARY
[facility]  Discharge- Emily Berrios 1945, 78 y.o. female MRN: [id number]  Unit/Bed#: E2 -02 Encounter: [id number]  Primary Care Provider: Ramsey Gtz MD   Date and time admitted to hospital: 1/4/2024 12:16 PM    Assessment/Plan   *Pneumonia  Assessment & Plan  Patient is a 78-year-old female with past medical history significant for recent admission 1/1 - 1/2 at [facility] with left humeral fracture and L1 compression fracture after a fall.  Patient was sent to the ER by physical therapy for hypoxia     CT PE study with no definite acute pulmonary emboli, patchy lateral groundglass opacity greater in the right upper lobe infectious versus inflammatory, trace right effusion, pulmonary artery enlargement concerning for pulmonary hypertension  Ultrasound negative for lower extremity DVT  Currently on Rocephin empirically for 5 days and trend procalcitonin  Appreciate pulmonology recommendations  Echo- ef normal with elevated pulmonary hypertension.  BNP mildly elevated.  Patient does not appear volume overloaded on exam  Home o2 eval completed, did not require o2 on discharge  Discharged on cefdinir to complete 5 days total of abx  Repeat imaging in 4 to 6 weeks        Acute encephalopathy  Assessment & Plan  Patient has episodes of confusion, visual hallucinations, paranoia  Discussed with son who notes that patient has had similar events before when hospitalized presumed due to alcohol withdrawal  Suspected due to hospital delirum  Appreciate geriatrics evaluation     Alcohol use  Assessment & Plan  Recommend alcohol cessation  Drinks up to 6-8 beers daily, last used on new years day     Insomnia  Assessment & Plan  Utilizes xanax at home  Given history of alcohol use recommend discontinuation of this medication      Closed compression fracture of L1 vertebra (HCC)  Assessment & Plan  lidoderm patch, analgesics, physical therapy     Closed nondisplaced fracture  of surgical neck of left humerus  Assessment & Plan  Recently hospitalized 1/1 for this  Continue analgesics sling, nonweightbearing  Outpatient follow-up with orthopedic surgery in 4 weeks     Hyponatremia  Assessment & Plan  Likely multifactorial secondary to prerenal plus SIADH plus beer [place]  Nephrology following     Chronic obstructive pulmonary disease, unspecified COPD type (HCC)  Assessment & Plan  Patient has a history of COPD and follows with [facility] pulmonary  [facility] pulmonology recommendations  Does not appear to be having exacerbation     Primary hypertension  Assessment & Plan  Continue home Norvasc 5 mg twice a day and bisoprolol 10 mg daily     Chronic coronary artery disease  Assessment & Plan  No symptoms consistent with acute ischemia  Continue home bisoprolol and Plavix  Continue statin    Discharging Physician / Practitioner: Nayan Merlos MD  PCP: Ramsey Gtz MD  Admission Date:   Admission Orders (From admission, onward)       Ordered        01/04/24 1742  Inpatient Admission  Once            01/04/24 1619  Place in Observation  Once,   Status:  Canceled                          Discharge Date: 01/08/24    Medical Problems       Resolved Problems  Date Reviewed: 1/4/2024   None         Consultations During Hospital Stay:  Pulmonology  Geriatrics    Procedures Performed:   2D Echo    Interpretation Summary    Left Ventricle: Left ventricular cavity size is normal. There is mild concentric hypertrophy. The left ventricular ejection fraction is 65% by visual estimation. Systolic function is normal. Wall motion is normal. Diastolic function is mildly abnormal, consistent with grade I (abnormal) relaxation.    Right Ventricle: Right ventricular cavity size is normal. Systolic function is normal.    Left Atrium: The atrium is mildly dilated.    Right Atrium: The atrium is mildly dilated.    Aortic Valve: There is aortic valve sclerosis.    Mitral Valve: There is mild annular  calcification. There is trace regurgitation.    Tricuspid Valve: There is mild regurgitation. Pulmonary artery systolic pressures are estimated at 60 mmHg.    There is no study for comparison.    Significant Findings / Test Results:   CTA ED chest PE study    Result Date: 1/4/2024  Impression: No definite acute pulmonary emboli but interpretation is mildly compromised by motion artifact which creates artifactual filling defects. Patchy lateral groundglass opacity, greatest in the right upper lobe, infectious or inflammatory. Trace right effusion. Pulmonary artery enlargement which can be seen with pulmonary hypertension. Moderate hiatal hernia. Workstation performed: [id number]        Incidental Findings:   none     Test Results Pending at Discharge (will require follow up):   none     Outpatient Tests Requested:  Repeat imaging in 4 to 6 weeks for pneumonia resolution    Complications:  none    Reason for Admission: Hypoxia    Hospital Course:     Emily Berrios is a 78 y.o. female patient who originally presented to the hospital on 1/4/2024 due to pneumonia.  CT scan did show left upper lobe pneumonia.  Started empirically on ceftriaxone for which patient completed 4 days on IV antibiotics.  Transition to cefdinir to complete 5-day course.  Evaluated by pulmonology, recommending repeat imaging in 4 to 6 weeks for resolution.  Patient clinically improved, did have hyponatremia of 123, improved with IV fluids.  Suspect likely prerenal, poor p.o. intake and SIADH with urine sodium elevated.  Resolved and cleared by nephrology for discharge with no change in medication, encouraging increased p.o. intake.  Did have also have episode of delirium, suspected hospital delirium, with history of alcohol use.  Previous provider suspect the patient drinks daily up to 6 beers, possible withdrawals.  Upon further discussion with patient and daughter, patient drinks alcohol about once a week with last drink on New Year's Day  "which she had 6-8 beers.  Patient clinically improved, discharged home.  Did not qualify for oxygen after home O2 eval completed.    Please see above list of diagnoses and related plan for additional information.     Condition at Discharge: fair     Discharge Day Visit / Exam:     Subjective:    No events overnight. Reports doing well overall. Ambulating without difficulty. Denies fevers, chills, cough, SOB. Tolerating diet.  Vitals: Blood Pressure: 114/60 (01/08/24 0803)  Pulse: 89 (01/08/24 0803)  Temperature: 97.5 °F (36.4 °C) (01/08/24 0803)  Temp Source: Temporal (01/08/24 0803)  Respirations: 18 (01/08/24 0803)  Height: 5' 1\" (154.9 cm) (01/06/24 1302)  Weight - Scale: 60.2 kg (132 lb 11.5 oz) (01/08/24 0535)  SpO2: 90 % (01/08/24 1152)  Exam:   Physical Exam  Vitals and nursing note reviewed.   Constitutional:       General: She is not in acute distress.     Appearance: She is normal weight. She is not ill-appearing.   HENT:      Head: Normocephalic.   Eyes:      Conjunctiva/sclera: Conjunctivae normal.      Pupils: Pupils are equal, round, and reactive to light.   Cardiovascular:      Rate and Rhythm: Normal rate.   Pulmonary:      Effort: Pulmonary effort is normal.      Breath sounds: No wheezing.      Comments: Decreased breath sounds bilaterally  Abdominal:      General: Bowel sounds are normal. There is no distension.      Palpations: Abdomen is soft.   Musculoskeletal:         General: No swelling.      Right lower leg: No edema.      Left lower leg: No edema.   Skin:     General: Skin is warm and dry.   Neurological:      General: No focal deficit present.      Mental Status: She is alert. Mental status is at baseline.         Discharge instructions/Information to patient and family:   See after visit summary for information provided to patient and family.      Provisions for Follow-Up Care:  See after visit summary for information related to follow-up care and any pertinent home health orders.  "     Disposition:     Home     Discharge Statement:  I spent 40 minutes discharging the patient. This time was spent on the day of discharge. I had direct contact with the patient on the day of discharge. Greater than 50% of the total time was spent examining patient, answering all patient questions, arranging and discussing plan of care with patient as well as directly providing post-discharge instructions.  Additional time then spent on discharge activities.    Discharge Medications:  See after visit summary for reconciled discharge medications provided to patient and family.      ** Please Note: This note has been constructed using a voice recognition system **

## 2024-01-08 NOTE — PLAN OF CARE
Problem: Potential for Falls  Goal: Patient will remain free of falls  Description: INTERVENTIONS:  - Educate patient/family on patient safety including physical limitations  - Instruct patient to call for assistance with activity   - Consult OT/PT to assist with strengthening/mobility   - Keep Call bell within reach  - Keep bed low and locked with side rails adjusted as appropriate  - Keep care items and personal belongings within reach  - Initiate and maintain comfort rounds  - Make Fall Risk Sign visible to staff  - Offer Toileting every 2 Hours, in advance of need  - Initiate/Maintain bed alarm  - Obtain necessary fall risk management equipment: cane  - Apply yellow socks and bracelet for high fall risk patients  - Consider moving patient to room near nurses station  Outcome: Progressing     Problem: RESPIRATORY - ADULT  Goal: Achieves optimal ventilation and oxygenation  Description: INTERVENTIONS:  - Assess for changes in respiratory status  - Assess for changes in mentation and behavior  - Position to facilitate oxygenation and minimize respiratory effort  - Oxygen administered by appropriate delivery if ordered  - Initiate smoking cessation education as indicated  - Encourage broncho-pulmonary hygiene including cough, deep breathe, Incentive Spirometry  - Assess the need for suctioning and aspirate as needed  - Assess and instruct to report SOB or any respiratory difficulty  - Respiratory Therapy support as indicated  Outcome: Progressing

## 2024-01-09 ENCOUNTER — HOME CARE VISIT (OUTPATIENT)
Dept: HOME HEALTH SERVICES | Facility: HOME HEALTHCARE | Age: 79
End: 2024-01-09

## 2024-01-09 ENCOUNTER — TELEPHONE (OUTPATIENT)
Dept: NEPHROLOGY | Facility: CLINIC | Age: 79
End: 2024-01-09

## 2024-01-09 DIAGNOSIS — E83.42 HYPOMAGNESEMIA: Primary | ICD-10-CM

## 2024-01-09 NOTE — TELEPHONE ENCOUNTER
----- Message from Darron Collier MD sent at 1/8/2024  4:54 PM EST -----  She got discharged from [facility] today.  She needs repeat BMP in 1 week and routine office follow-up with AP in 3-4 months.

## 2024-01-09 NOTE — UTILIZATION REVIEW
NOTIFICATION OF ADMISSION DISCHARGE   This is a Notification of Discharge from [facility]. Please be advised that this patient has been discharge from our facility. Below you will find the admission and discharge date and time including the patient’s disposition.   UTILIZATION REVIEW CONTACT:  Renetta Winchester  Utilization   Network Utilization Review Department  Phone: [phone] x carefully listen to the prompts. All voicemails are confidential.  Email: [email or web address]     ADMISSION INFORMATION  PRESENTATION DATE: 1/4/2024 12:16 PM  OBERVATION ADMISSION DATE:   INPATIENT ADMISSION DATE: 1/4/24  5:42 PM   DISCHARGE DATE: 1/8/2024  4:41 PM   DISPOSITION:Home/Self Care    Network Utilization Review Department  ATTENTION: Please call with any questions or concerns to [phone] and carefully listen to the prompts so that you are directed to the right person. All voicemails are confidential.   For Discharge needs, contact Care Management DC Support Team at [phone] opt. 2  Send all requests for admission clinical reviews, approved or denied determinations and any other requests to dedicated fax number below belonging to the campus where the patient is receiving treatment. List of dedicated fax numbers for the Facilities:  FACILITY NAME UR FAX NUMBER   ADMISSION DENIALS (Administrative/Medical Necessity) [phone]   DISCHARGE SUPPORT TEAM ([facility]) [phone]   PARENT CHILD HEALTH (Maternity/NICU/Pediatrics) [phone]   [facility] [phone]   [facility] [phone]   [facility] [phone]   [facility] [phone]   [facility] [phone]   [facility] [phone]   [facility] [phone]   [facility] [phone]   [facility]  [facility] [phone]   [facility] [phone]   [facility] [phone]

## 2024-01-10 ENCOUNTER — HOME CARE VISIT (OUTPATIENT)
Dept: HOME HEALTH SERVICES | Facility: HOME HEALTHCARE | Age: 79
End: 2024-01-10
Payer: MEDICARE

## 2024-01-10 VITALS — DIASTOLIC BLOOD PRESSURE: 64 MMHG | HEART RATE: 74 BPM | SYSTOLIC BLOOD PRESSURE: 132 MMHG | OXYGEN SATURATION: 94 %

## 2024-01-10 LAB
BACTERIA BLD CULT: NORMAL
BACTERIA BLD CULT: NORMAL

## 2024-01-10 PROCEDURE — G0151 HHCP-SERV OF PT,EA 15 MIN: HCPCS

## 2024-01-10 PROCEDURE — 400013 VN SOC

## 2024-01-10 NOTE — TELEPHONE ENCOUNTER
Spoke to pt's daughter about scheduling a hospital follow up. Pt scheduled to see Dr. Kaye on 3/26/24 in the AO. Pt's daughter aware of BMP to be done in one week.

## 2024-01-11 ENCOUNTER — HOME CARE VISIT (OUTPATIENT)
Dept: HOME HEALTH SERVICES | Facility: HOME HEALTHCARE | Age: 79
End: 2024-01-11
Payer: MEDICARE

## 2024-01-11 VITALS — OXYGEN SATURATION: 95 % | HEART RATE: 86 BPM | DIASTOLIC BLOOD PRESSURE: 56 MMHG | SYSTOLIC BLOOD PRESSURE: 128 MMHG

## 2024-01-11 PROCEDURE — G0152 HHCP-SERV OF OT,EA 15 MIN: HCPCS

## 2024-01-11 NOTE — UTILIZATION REVIEW
NOTIFICATION OF ADMISSION DISCHARGE   This is a Notification of Discharge from [facility]. Please be advised that this patient has been discharge from our facility. Below you will find the admission and discharge date and time including the patient’s disposition.   UTILIZATION REVIEW CONTACT:  Candy Ferrari MA  Utilization   Network Utilization Review Department  Phone: [phone] x carefully listen to the prompts. All voicemails are confidential.  Email: [email or web address]     ADMISSION INFORMATION  PRESENTATION DATE: 1/1/2024 12:40 AM  OBERVATION ADMISSION DATE:   INPATIENT ADMISSION DATE: 1/2/24  9:06 AM   DISCHARGE DATE: 1/2/2024  4:05 PM   DISPOSITION:Home with Home Health Care    Network Utilization Review Department  ATTENTION: Please call with any questions or concerns to [phone] and carefully listen to the prompts so that you are directed to the right person. All voicemails are confidential.   For Discharge needs, contact Care Management DC Support Team at [phone] opt. 2  Send all requests for admission clinical reviews, approved or denied determinations and any other requests to dedicated fax number below belonging to the campus where the patient is receiving treatment. List of dedicated fax numbers for the Facilities:  FACILITY NAME UR FAX NUMBER   ADMISSION DENIALS (Administrative/Medical Necessity) [phone]   DISCHARGE SUPPORT TEAM ([facility]) [phone]   PARENT CHILD HEALTH (Maternity/NICU/Pediatrics) [phone]   [facility] [phone]   General acute hospital [phone]   [facility] [phone]   [facility] [phone]   [facility] [phone]   General acute hospital [phone]   [facility] [phone]   [facility]  [phone]   [facility] [phone]   [facility] [phone]   [facility] [phone]

## 2024-01-11 NOTE — CASE COMMUNICATION
[facility] has admitted your patient to Home Health service with the following disciplines:  PT  and  OT    Primary focus of home health care Range  and  strengthening  ex  to  Left  shoulder  secondary  to  humeral  fx  Strengthening  ex  to  BLEs  and  transfer  and  G.T.  secondary  to  weakness  secondary  to  hospitalization  for  exab  of  COPD  Patient stated goals of care    To  improve  ambulation  and  Left  shoulder  rang e  and  LUE  strength  Anticipated visit pattern and next visit date  2  x  per  week  x  4  See medication list   Drug  interaction  noted  between  Pantoprazole  and  Plavix  and  between  Rosuvastatin  and  Plavix  and  between  Oxycodone  and  Alprazolam  Significant clinical findings  Patient  with  decraesed  range  and  strength  of  Left  shoulder  and  decreased  strength  to  BLEs  Potential barriers to goal achievement  none   noted  Other pertinent information  Patient  motivated  to  improve  and  return  to  prior  level  of  functioning    Thank you for allowing us to participate in the care of your patient.      Jerad Mcnally P.t.

## 2024-01-12 ENCOUNTER — HOME CARE VISIT (OUTPATIENT)
Dept: HOME HEALTH SERVICES | Facility: HOME HEALTHCARE | Age: 79
End: 2024-01-12
Payer: MEDICARE

## 2024-01-15 ENCOUNTER — PATIENT OUTREACH (OUTPATIENT)
Dept: FAMILY MEDICINE CLINIC | Facility: CLINIC | Age: 79
End: 2024-01-15

## 2024-01-15 ENCOUNTER — TELEPHONE (OUTPATIENT)
Dept: FAMILY MEDICINE CLINIC | Facility: CLINIC | Age: 79
End: 2024-01-15

## 2024-01-15 PROCEDURE — G0180 MD CERTIFICATION HHA PATIENT: HCPCS | Performed by: STUDENT IN AN ORGANIZED HEALTH CARE EDUCATION/TRAINING PROGRAM

## 2024-01-15 NOTE — PROGRESS NOTES
Spoke with pt post discharge and follow up. She reports that she had appt today with PCP and she had to cancel because of the cold weather and being in pain. However, she called a few times and said that she couldn't get through to reschedule. Frieda sent to office staff requesting to call pt to reschedule appt. Then pt stated that she has help from her daughters for in home tasks. She has had some difficulty sleeping.Pt has been using Tylenol for pain and ice alternating with heat. She has PT and OT in home.Pt declines need to outreach for care management

## 2024-01-15 NOTE — TELEPHONE ENCOUNTER
----- Message from Courtney Garcia RN sent at 1/15/2024  2:28 PM EST -----  Regarding: Pt had TCM scheduled  Hello  I followed up with pt and she states that she tried calling the office earlier this morning a few times to cancel her TCM and reschedule for another day because of the cold and being in too much pain. She couldn't get through and I saw she was marled as a no show. Would someone please call her to reschedule? She is worried about that  Thank you,  Courtney

## 2024-01-17 ENCOUNTER — HOME CARE VISIT (OUTPATIENT)
Dept: HOME HEALTH SERVICES | Facility: HOME HEALTHCARE | Age: 79
End: 2024-01-17
Payer: MEDICARE

## 2024-01-17 VITALS — OXYGEN SATURATION: 96 % | HEART RATE: 53 BPM | DIASTOLIC BLOOD PRESSURE: 66 MMHG | SYSTOLIC BLOOD PRESSURE: 118 MMHG

## 2024-01-17 NOTE — TELEPHONE ENCOUNTER
Spoke to pt about rescheduled time for 3/26/24 appt with Dr. Kaye. Provider will be at the dialysis center at 1pm. Pt moved to 1:30pm.

## 2024-01-19 ENCOUNTER — HOME CARE VISIT (OUTPATIENT)
Dept: HOME HEALTH SERVICES | Facility: HOME HEALTHCARE | Age: 79
End: 2024-01-19
Payer: MEDICARE

## 2024-01-19 VITALS — HEART RATE: 77 BPM | OXYGEN SATURATION: 94 %

## 2024-01-19 PROCEDURE — G0151 HHCP-SERV OF PT,EA 15 MIN: HCPCS

## 2024-01-22 ENCOUNTER — OFFICE VISIT (OUTPATIENT)
Dept: FAMILY MEDICINE CLINIC | Facility: CLINIC | Age: 79
End: 2024-01-22
Payer: MEDICARE

## 2024-01-22 VITALS
OXYGEN SATURATION: 94 % | RESPIRATION RATE: 14 BRPM | HEIGHT: 61 IN | WEIGHT: 130 LBS | TEMPERATURE: 98.2 F | SYSTOLIC BLOOD PRESSURE: 120 MMHG | HEART RATE: 74 BPM | BODY MASS INDEX: 24.55 KG/M2 | DIASTOLIC BLOOD PRESSURE: 82 MMHG

## 2024-01-22 DIAGNOSIS — I10 ESSENTIAL HYPERTENSION: ICD-10-CM

## 2024-01-22 DIAGNOSIS — J44.9 CHRONIC OBSTRUCTIVE PULMONARY DISEASE, UNSPECIFIED COPD TYPE (HCC): Primary | Chronic | ICD-10-CM

## 2024-01-22 DIAGNOSIS — E78.5 HYPERLIPIDEMIA ASSOCIATED WITH TYPE 2 DIABETES MELLITUS: ICD-10-CM

## 2024-01-22 DIAGNOSIS — R79.89 LOW VITAMIN D LEVEL: ICD-10-CM

## 2024-01-22 DIAGNOSIS — E83.42 HYPOMAGNESEMIA: ICD-10-CM

## 2024-01-22 DIAGNOSIS — E11.69 HYPERLIPIDEMIA ASSOCIATED WITH TYPE 2 DIABETES MELLITUS: ICD-10-CM

## 2024-01-22 DIAGNOSIS — R55 SYNCOPE, UNSPECIFIED SYNCOPE TYPE: ICD-10-CM

## 2024-01-22 DIAGNOSIS — I25.10 CORONARY ARTERY DISEASE DUE TO LIPID RICH PLAQUE: ICD-10-CM

## 2024-01-22 DIAGNOSIS — M81.8 IDIOPATHIC OSTEOPOROSIS: ICD-10-CM

## 2024-01-22 DIAGNOSIS — I25.83 CORONARY ARTERY DISEASE DUE TO LIPID RICH PLAQUE: ICD-10-CM

## 2024-01-22 PROCEDURE — 99496 TRANSJ CARE MGMT HIGH F2F 7D: CPT | Performed by: INTERNAL MEDICINE

## 2024-01-22 RX ORDER — BISOPROLOL FUMARATE 10 MG/1
10 TABLET, FILM COATED ORAL DAILY
Qty: 90 TABLET | Refills: 3 | Status: SHIPPED | OUTPATIENT
Start: 2024-01-22

## 2024-01-22 RX ORDER — CLOPIDOGREL BISULFATE 75 MG
75 TABLET ORAL DAILY
Qty: 90 TABLET | Refills: 3 | Status: SHIPPED | OUTPATIENT
Start: 2024-01-22

## 2024-01-22 RX ORDER — ERGOCALCIFEROL 1.25 MG/1
50000 CAPSULE ORAL WEEKLY
Qty: 13 CAPSULE | Refills: 3 | Status: SHIPPED | OUTPATIENT
Start: 2024-01-22

## 2024-01-22 RX ORDER — CAL/D3/MAG11/ZINC/COP/MANG/BOR 600 MG-800
1 TABLET ORAL 2 TIMES DAILY WITH MEALS
Qty: 200 TABLET | Refills: 6 | Status: SHIPPED | OUTPATIENT
Start: 2024-01-22

## 2024-01-22 RX ORDER — ROSUVASTATIN CALCIUM 5 MG/1
5 TABLET, COATED ORAL DAILY
Qty: 90 TABLET | Refills: 3 | Status: SHIPPED | OUTPATIENT
Start: 2024-01-22

## 2024-01-22 RX ORDER — AMLODIPINE BESYLATE 5 MG/1
5 TABLET ORAL 2 TIMES DAILY
Qty: 180 TABLET | Refills: 3 | Status: SHIPPED | OUTPATIENT
Start: 2024-01-22

## 2024-01-22 RX ORDER — MAGNESIUM L-LACTATE 84 MG
84 TABLET, EXTENDED RELEASE ORAL 2 TIMES DAILY
Qty: 180 TABLET | Refills: 3 | Status: SHIPPED | OUTPATIENT
Start: 2024-01-22

## 2024-01-22 NOTE — PATIENT INSTRUCTIONS

## 2024-01-22 NOTE — PROGRESS NOTES
Name: Emily Berrios      : 1945      MRN: [id number]  Encounter Provider: Ramsey Gtz MD  Encounter Date: 2024   Encounter department: [facility] CARE [facility]    Assessment & Plan     1. Chronic obstructive pulmonary disease, unspecified COPD type (HCC)  Comments:  stable  continue same  Life style mod  RTC in 3 mos w Blood work    2. Hyperlipidemia associated with type 2 diabetes mellitus   -     Comprehensive metabolic panel; Future; Expected date: 2024  -     CBC and differential; Future; Expected date: 2024  -     Lipid Panel with Direct LDL reflex; Future; Expected date: 2024  -     TSH, 3rd generation with Free T4 reflex; Future; Expected date: 2024  -     Microalbumin, Random Urine (W/Creatinine) ([facility] ONLY); Future; Expected date: 2024  -     Microalbumin, Random Urine (W/Creatinine) ([facility] ONLY)  -     rosuvastatin (CRESTOR) 5 mg tablet; Take 1 tablet (5 mg total) by mouth daily    3. Coronary artery disease due to lipid rich plaque  -     Comprehensive metabolic panel; Future; Expected date: 2024  -     CBC and differential; Future; Expected date: 2024  -     Lipid Panel with Direct LDL reflex; Future; Expected date: 2024  -     TSH, 3rd generation with Free T4 reflex; Future; Expected date: 2024  -     Microalbumin, Random Urine (W/Creatinine) ([facility] ONLY); Future; Expected date: 2024  -     Microalbumin, Random Urine (W/Creatinine) ([facility] ONLY)  -     Plavix 75 MG tablet; Take 1 tablet (75 mg total) by mouth daily    4. Hypomagnesemia  -     magnesium (MAGTAB) 84 MG (7MEQ) TBCR; Take 1 tablet (84 mg total) by mouth 2 (two) times a day  -     UA (URINE) with reflex to Scope; Future  -     Magnesium; Future    5. Low vitamin D level  -     ergocalciferol (VITAMIN D2) 50,000 units; Take 1 capsule (50,000 Units total) by mouth once a week    6. Idiopathic osteoporosis  Comments:  Caltrate Plus D  Prolia  Inj Q 6 mos  Orders:  -     Calcium Carbonate-Vit D-Min (Caltrate 600+D Plus Minerals) 600-800 MG-UNIT TABS; Take 1 tablet by mouth 2 (two) times a day with meals    7. Syncope, unspecified syncope type  Comments:  improved nicely  continue same  RTC in 3 mos w Blood work  Orders:  -     bisoprolol (ZEBETA) 10 MG tablet; Take 1 tablet (10 mg total) by mouth daily    8. Essential hypertension  -     amLODIPine (NORVASC) 5 mg tablet; Take 1 tablet (5 mg total) by mouth 2 (two) times a day  -     UA (URINE) with reflex to Scope; Future  -     Magnesium; Future    RTC in 1-2 mos w blood work       Subjective      78 Y O Lady is here for Post hospital / TCM visit, she feels a lot better, d/C summary and med list reviewed , No new symptoms,...      Review of Systems   Constitutional:  Negative for chills, fatigue and fever.   HENT:  Negative for congestion, facial swelling, sore throat, trouble swallowing and voice change.    Eyes:  Negative for pain, discharge and visual disturbance.   Respiratory:  Negative for cough, shortness of breath and wheezing.    Cardiovascular:  Negative for chest pain, palpitations and leg swelling.   Gastrointestinal:  Negative for abdominal pain, blood in stool, constipation, diarrhea and nausea.   Endocrine: Negative for polydipsia, polyphagia and polyuria.   Genitourinary:  Negative for difficulty urinating, hematuria and urgency.   Musculoskeletal:  Negative for arthralgias and myalgias.   Skin:  Negative for rash.   Neurological:  Negative for dizziness, tremors, weakness and headaches.   Hematological:  Negative for adenopathy. Does not bruise/bleed easily.   Psychiatric/Behavioral:  Negative for dysphoric mood, sleep disturbance and suicidal ideas.        Current Outpatient Medications on File Prior to Visit   Medication Sig    albuterol (ACCUNEB) 1.25 MG/3ML nebulizer solution Take 3 mL by nebulization every 6 (six) hours as needed for wheezing    albuterol (PROVENTIL HFA,VENTOLIN  HFA) 90 mcg/act inhaler Inhale 2 puffs every 6 (six) hours as needed for wheezing    ALPRAZolam (XANAX) 0.5 mg tablet Take 1 tablet (0.5 mg total) by mouth daily at bedtime as needed for anxiety    benzonatate (TESSALON PERLES) 100 mg capsule Take 1 capsule (100 mg total) by mouth 3 (three) times a day as needed for cough    co-enzyme Q-10 30 MG capsule Take 1 capsule (30 mg total) by mouth 2 (two) times a day    estradiol (ESTRACE VAGINAL) 0.1 mg/g vaginal cream Insert 1 g into the vagina 2 (two) times a day for 14 days    glucose blood test strip Use 1 each as needed (as needed) Use as instructed    ipratropium-albuterol (DUO-NEB) 0.5-2.5 mg/3 mL nebulizer solution Take 3 mL by nebulization every 6 (six) hours as needed for wheezing or shortness of breath    nitroglycerin (NITROSTAT) 0.4 mg SL tablet Place 1 tablet (0.4 mg total) under the tongue every 5 (five) minutes as needed for chest pain    pantoprazole (PROTONIX) 20 mg tablet Take 1 tablet (20 mg total) by mouth daily    Red Yeast Rice 600 MG CAPS Take 1 capsule (600 mg total) by mouth 2 (two) times a day with meals    vitamin B-12 (VITAMIN B-12) 1,000 mcg tablet Take 1 tablet (1,000 mcg total) by mouth daily    [DISCONTINUED] amLODIPine (NORVASC) 5 mg tablet Take 1 tablet (5 mg total) by mouth 2 (two) times a day    [DISCONTINUED] bisoprolol (ZEBETA) 10 MG tablet Take 1 tablet (10 mg total) by mouth daily    [DISCONTINUED] Calcium Carbonate-Vit D-Min (Caltrate 600+D Plus Minerals) 600-800 MG-UNIT TABS Take 1 tablet by mouth 2 (two) times a day with meals    [DISCONTINUED] ergocalciferol (VITAMIN D2) 50,000 units Take 1 capsule (50,000 Units total) by mouth once a week    [DISCONTINUED] magnesium (MAGTAB) 84 MG (7MEQ) TBCR Take 1 tablet (84 mg total) by mouth 2 (two) times a day    [DISCONTINUED] ondansetron (ZOFRAN-ODT) 4 mg disintegrating tablet Take 1 tablet (4 mg total) by mouth every 8 (eight) hours as needed for nausea or vomiting    [DISCONTINUED]  "Plavix 75 MG tablet Take 1 tablet (75 mg total) by mouth daily    [DISCONTINUED] rosuvastatin (CRESTOR) 5 mg tablet Take 1 tablet (5 mg total) by mouth daily    Air  (Vicks Air Purifier/HEPA) (Device) MISC Use as directed (Patient not taking: Reported on 11/22/2023)    denosumab (PROLIA) 60 mg/mL Inject 1 mL (60 mg total) under the skin once for 1 dose (Patient not taking: Reported on 11/22/2023)    fluticasone-umeclidinium-vilanterol (Trelegy Ellipta) 100-62.5-25 mcg/actuation inhaler Inhale 1 puff daily Rinse mouth after use.    Premarin vaginal cream Insert 0.625 g into the vagina daily (Patient not taking: Reported on 11/22/2023)       Objective     /82 (BP Location: Left arm, Patient Position: Sitting, Cuff Size: Standard)   Pulse 74   Temp 98.2 °F (36.8 °C) (Tympanic)   Resp 14   Ht 5' 1\" (1.549 m)   Wt 59 kg (130 lb)   LMP  (LMP Unknown)   SpO2 94%   BMI 24.56 kg/m²     Physical Exam  Constitutional:       General: She is not in acute distress.  HENT:      Head: Normocephalic.      Mouth/Throat:      Pharynx: No oropharyngeal exudate.   Eyes:      General: No scleral icterus.     Conjunctiva/sclera: Conjunctivae normal.      Pupils: Pupils are equal, round, and reactive to light.   Neck:      Thyroid: No thyromegaly.   Cardiovascular:      Rate and Rhythm: Normal rate and regular rhythm.      Heart sounds: Murmur heard.   Pulmonary:      Effort: Pulmonary effort is normal. No respiratory distress.      Breath sounds: Normal breath sounds. No wheezing or rales.   Abdominal:      General: Bowel sounds are normal. There is no distension.      Palpations: Abdomen is soft.      Tenderness: There is no abdominal tenderness. There is no guarding or rebound.   Musculoskeletal:         General: No tenderness.      Cervical back: Neck supple.   Lymphadenopathy:      Cervical: No cervical adenopathy.   Skin:     Coloration: Skin is not pale.      Findings: No rash.   Neurological:      Mental " Status: She is alert and oriented to person, place, and time.      Sensory: No sensory deficit.      Motor: No weakness.      Comments: Pt uses a cane to support gait       Ramsey Gtz MD

## 2024-01-24 ENCOUNTER — HOME CARE VISIT (OUTPATIENT)
Dept: HOME HEALTH SERVICES | Facility: HOME HEALTHCARE | Age: 79
End: 2024-01-24
Payer: MEDICARE

## 2024-01-24 VITALS — HEART RATE: 64 BPM | OXYGEN SATURATION: 95 %

## 2024-01-24 PROCEDURE — G0151 HHCP-SERV OF PT,EA 15 MIN: HCPCS

## 2024-01-26 ENCOUNTER — HOME CARE VISIT (OUTPATIENT)
Dept: HOME HEALTH SERVICES | Facility: HOME HEALTHCARE | Age: 79
End: 2024-01-26
Payer: MEDICARE

## 2024-01-31 ENCOUNTER — HOME CARE VISIT (OUTPATIENT)
Dept: HOME HEALTH SERVICES | Facility: HOME HEALTHCARE | Age: 79
End: 2024-01-31
Payer: MEDICARE

## 2024-01-31 VITALS — OXYGEN SATURATION: 98 % | HEART RATE: 72 BPM

## 2024-01-31 PROCEDURE — G0151 HHCP-SERV OF PT,EA 15 MIN: HCPCS

## 2024-02-02 ENCOUNTER — OFFICE VISIT (OUTPATIENT)
Dept: PULMONOLOGY | Facility: CLINIC | Age: 79
End: 2024-02-02
Payer: MEDICARE

## 2024-02-02 ENCOUNTER — HOME CARE VISIT (OUTPATIENT)
Dept: HOME HEALTH SERVICES | Facility: HOME HEALTHCARE | Age: 79
End: 2024-02-02
Payer: MEDICARE

## 2024-02-02 VITALS
OXYGEN SATURATION: 97 % | BODY MASS INDEX: 24.56 KG/M2 | HEART RATE: 60 BPM | WEIGHT: 130 LBS | SYSTOLIC BLOOD PRESSURE: 148 MMHG | TEMPERATURE: 98.1 F | DIASTOLIC BLOOD PRESSURE: 72 MMHG

## 2024-02-02 DIAGNOSIS — I27.20 PULMONARY HYPERTENSION (HCC): ICD-10-CM

## 2024-02-02 DIAGNOSIS — J45.909 MODERATE ASTHMA, UNSPECIFIED WHETHER COMPLICATED, UNSPECIFIED WHETHER PERSISTENT: ICD-10-CM

## 2024-02-02 DIAGNOSIS — J44.9 CHRONIC OBSTRUCTIVE PULMONARY DISEASE, UNSPECIFIED COPD TYPE (HCC): Primary | ICD-10-CM

## 2024-02-02 DIAGNOSIS — J30.89 NON-SEASONAL ALLERGIC RHINITIS, UNSPECIFIED TRIGGER: ICD-10-CM

## 2024-02-02 PROCEDURE — 99214 OFFICE O/P EST MOD 30 MIN: CPT | Performed by: INTERNAL MEDICINE

## 2024-02-02 RX ORDER — ALBUTEROL SULFATE 90 UG/1
2 AEROSOL, METERED RESPIRATORY (INHALATION) EVERY 6 HOURS PRN
Qty: 18 G | Refills: 5 | Status: SHIPPED | OUTPATIENT
Start: 2024-02-02

## 2024-02-02 RX ORDER — ALBUTEROL SULFATE 1.25 MG/3ML
3 SOLUTION RESPIRATORY (INHALATION) EVERY 6 HOURS PRN
Qty: 225 ML | Refills: 5 | Status: SHIPPED | OUTPATIENT
Start: 2024-02-02

## 2024-02-02 NOTE — PROGRESS NOTES
Office Progress Note - Pulmonary    Emily Berrios 78 y.o. female MRN: [id number]    Encounter: [id number]      Assessment:  Obstructive pulmonary disease.  Dyspnea on exertion.   Allergic rhinitis.  Abnormal CT scan  Pulmonary hypertension, likely group 3    Plan:   Continue Trelegy Ellipta 100 once a day.  Albuterol rescue inhaler 2 inhalations 4 times a day as needed. Refilled this time  Regular walks to improve stamina.  Claritin 10 mg once a day  The abnormal CT scan might be triggered by some sort of viral infection. Since the symptoms are improving there is no need to pursue any further workup   Follow-up in 6 months.    Discussion:   The patient's COPD is better treated. Her hypoxia has resolved. We discussed the likely etiology of abnormal CT scan and since it is improving we do not need to pursue any further workup unless there is worsening symptoms. She is compliant with Trelegy and albuterol PRN. We will continue the regimen. She will maintain on Claritin for her allergy. She will continue to stay active to improve her overall strength. We will see her in 6 months.     Subjective:   The patient is here for a follow-up visit. After last visit she has an episode of hypoxia leading to hospitalization in 1/2024. She had a CT chest done at that time showing RUL mosaic ground glass opacity. She had no fever but was surrounded by family who were sick. She was treated with antibiotics and eventually was discharged home without any oxygen supplement. Ever since discharge she felt she is back to her baseline. She continues to use Trelegy Elipta and albuterol, that she still needs albuterol about twice daily which was unchanged. She has no cough, no worsening shortness of breath, and no sputum production. She is still taking Claritin 10mg QD for her allergy.    She smokes at the age 20, she smokes up to a pack but no more than that, she stopped smoking at the age 50.    1/2024 ECHO EF 65% PASP 60mmHg    Review  of systems:  A 12 point system review is done and aside from what is stated above the rest of the review of systems is negative.      Family history and social history are reviewed.    Medications list is reviewed.      Vitals: Blood pressure 148/72, pulse 60, temperature 98.1 °F (36.7 °C), temperature source Tympanic, weight 59 kg (130 lb), SpO2 97%.,     Physical Exam  Gen: Awake, alert, oriented x 3, no acute distress  HEENT: Mucous membranes moist, no oral lesions, no thrush  NECK: No accessory muscle use, JVP not elevated  Cardiac: Regular, single S1, single S2, no murmurs, no rubs, no gallops  Lungs: Decreased breath sounds.  No wheezing or rhonchi.  Abdomen: normoactive bowel sounds, soft nontender, nondistended, no rebound or rigidity, no guarding  Extremities: no cyanosis, no clubbing, no edema  Neuro:  Grossly nonfocal.  Skin:  No rash.

## 2024-02-04 NOTE — CASE COMMUNICATION
Visit  for  today  canceled  secondary  to  patient  having  MD  appointment.  Plan  to  re visit  week  of  2/5/24

## 2024-02-05 ENCOUNTER — TELEPHONE (OUTPATIENT)
Age: 79
End: 2024-02-05

## 2024-02-05 NOTE — TELEPHONE ENCOUNTER
Spoke with daughter, Ivanna Mazariegos ([phone]) to confirm follow up appointment for 2/7/24.  Ivanna advised patient does not want to come.  I cancelled appointment.  Ivanna advised she will call back to reschedule if needed.

## 2024-02-08 ENCOUNTER — HOME CARE VISIT (OUTPATIENT)
Dept: HOME HEALTH SERVICES | Facility: HOME HEALTHCARE | Age: 79
End: 2024-02-08
Payer: MEDICARE

## 2024-02-08 PROCEDURE — G0151 HHCP-SERV OF PT,EA 15 MIN: HCPCS

## 2024-02-09 ENCOUNTER — TELEPHONE (OUTPATIENT)
Dept: OBGYN CLINIC | Facility: CLINIC | Age: 79
End: 2024-02-09

## 2024-02-09 ENCOUNTER — HOME CARE VISIT (OUTPATIENT)
Dept: HOME HEALTH SERVICES | Facility: HOME HEALTHCARE | Age: 79
End: 2024-02-09
Payer: MEDICARE

## 2024-02-09 ENCOUNTER — OFFICE VISIT (OUTPATIENT)
Dept: OBGYN CLINIC | Facility: MEDICAL CENTER | Age: 79
End: 2024-02-09
Payer: MEDICARE

## 2024-02-09 ENCOUNTER — APPOINTMENT (OUTPATIENT)
Dept: RADIOLOGY | Facility: MEDICAL CENTER | Age: 79
End: 2024-02-09
Payer: MEDICARE

## 2024-02-09 VITALS
DIASTOLIC BLOOD PRESSURE: 74 MMHG | BODY MASS INDEX: 24.55 KG/M2 | HEART RATE: 73 BPM | SYSTOLIC BLOOD PRESSURE: 166 MMHG | HEIGHT: 61 IN | WEIGHT: 130 LBS

## 2024-02-09 DIAGNOSIS — S42.215A CLOSED NONDISPLACED FRACTURE OF SURGICAL NECK OF LEFT HUMERUS, UNSPECIFIED FRACTURE MORPHOLOGY, INITIAL ENCOUNTER: Primary | ICD-10-CM

## 2024-02-09 DIAGNOSIS — S42.215A CLOSED NONDISPLACED FRACTURE OF SURGICAL NECK OF LEFT HUMERUS, UNSPECIFIED FRACTURE MORPHOLOGY, INITIAL ENCOUNTER: ICD-10-CM

## 2024-02-09 PROCEDURE — 73030 X-RAY EXAM OF SHOULDER: CPT

## 2024-02-09 PROCEDURE — 99203 OFFICE O/P NEW LOW 30 MIN: CPT | Performed by: ORTHOPAEDIC SURGERY

## 2024-02-09 NOTE — TELEPHONE ENCOUNTER
PT was scheduled 2/28/24 at 10:30am with Dr. Frances Laureano, and I also also sent the message to the doctor, Dr. Kelly and Leslie .

## 2024-02-09 NOTE — PROGRESS NOTES
"Orthopaedic Surgery - Office Note  Emily Berrios (78 y.o. female)   : 1945   MRN: [id number]  Encounter Date: 2024    Chief Complaint   Patient presents with    Left Shoulder - Follow-up       Assessment / Plan  Left proximal humerus surgical neck fracture, with early healing seen on XR    New PT script given today: WBAT, work on AROM/PROM, begin light strengthening, no heavy lifting   Continue to wean out of sling  Ordered and reviewed XR during the visit  Reviewed in-home PT notes  Reviewed notes from the ED and orthopedic consulting PA-C  NEXT VISIT: left shoulder, trauma series  Return in about 6 weeks (around 3/22/2024) for follow up with Dr. Garcia.    History of Present Illness  Emily Berrios is a RHD 78 y.o. female who presents for evaluation of left proximal humerus fracture. She was seen in the ED after a mechanical fall on 2024 at which time orthopedics were consulted and recommended non-operative treatment. She presents today with her daughter. She states she wore her sling for about 4 weeks then this week she began weaning out of it. She began in-home PT on 1/10/2024 which she feels has been helpful. She states her pain is mild and increases with repeitive motion and rotation. She does have her daughters helping her with some ADL's but she is able to do many things on her own.     Review of Systems  Pertinent items are noted in HPI.  All other systems were reviewed and are negative.    Physical Exam  /74   Pulse 73   Ht 5' 1\" (1.549 m)   Wt 59 kg (130 lb)   LMP  (LMP Unknown)   BMI 24.56 kg/m²   Cons: Appears well.  No apparent distress.  Psych: Alert. Oriented x3.  Mood and affect normal.  Eyes: PERRLA, EOMI  Resp: Normal effort.  No audible wheezing or stridor.  CV: Palpable pulse.  No discernable arrhythmia.  No LE edema.  Lymph:  No palpable cervical, axillary, or inguinal lymphadenopathy.  Skin: Warm.  No palpable masses.  No visible lesions.  Neuro: Normal " muscle tone.  Normal and symmetric DTR's.     Left Shoulder Exam  Alignment / Posture:  Normal shoulder posture.  Inspection:  No swelling. No ecchymosis.  Palpation:  No tenderness. No effusion.  ROM:  Shoulder FE PROM 120, AROM 100. Shoulder ER 30. Shoulder IR not tested.  Strength:  Not tested.  Stability:  Not tested.  Tests:  Not tested  Neurovascular:  Sensation intact in Ax/R/M/U nerve distributions. 2+ radial pulse.     Studies Reviewed  I have personally reviewed pertinent films in PACS.  XR of left shoulder - images from 02/09/2024 showing bony callus formation over fracture site, acceptable alignment     Procedures  No procedures today.    Medical, Surgical, Family, and Social History  The patient's medical history, family history, and social history, were reviewed and updated as appropriate.    Past Medical History:   Diagnosis Date    Arthritis     Asthma     Chronic obstructive pulmonary disease, unspecified COPD type ([facility]) 04/08/2022    Cognitive decline 03/10/2023    Community acquired pneumonia 11/13/2019    COPD (chronic obstructive pulmonary disease) ([facility])     Coronary artery disease     DM (diabetes mellitus) ([facility])     HTN (hypertension)     Hydronephrosis, bilateral 03/06/2023    Hyperlipidemia     Hyponatremia     Urinary retention        Past Surgical History:   Procedure Laterality Date    CARDIAC CATHETERIZATION  2010    CORONARY ARTERY BYPASS GRAFT  12/06/2010    with subsequent stent to the saphenous veingraft to the RCA 3 months later    KNEE ARTHROSCOPY      POLYPECTOMY      laryngeal    TOTAL HIP ARTHROPLASTY      TUBAL LIGATION         Family History   Problem Relation Age of Onset    Stroke Mother     Hypertension Mother     Heart disease Mother     Colon cancer Father     No Known Problems Sister     No Known Problems Sister     No Known Problems Sister     No Known Problems Daughter     No Known Problems Daughter     No Known Problems Daughter     No Known Problems Maternal  Grandmother     No Known Problems Maternal Grandfather     No Known Problems Paternal Grandmother     No Known Problems Paternal Grandfather     Heart attack Brother     Heart attack Brother     No Known Problems Maternal Aunt     No Known Problems Maternal Aunt     No Known Problems Paternal Aunt     No Known Problems Paternal Aunt        Social History     Occupational History    Occupation: retired   Tobacco Use    Smoking status: Former     Current packs/day: 0.00     Average packs/day: 0.5 packs/day for 32.0 years (16.0 ttl pk-yrs)     Types: Cigarettes     Start date:      Quit date:      Years since quittin.1    Smokeless tobacco: Never    Tobacco comments:     no passive smoke exposure   Vaping Use    Vaping status: Never Used   Substance and Sexual Activity    Alcohol use: Yes     Alcohol/week: 42.0 standard drinks of alcohol     Types: 42 Cans of beer per week     Comment: socially    Drug use: Never    Sexual activity: Not Currently       Allergies   Allergen Reactions    Ace Inhibitors Other (See Comments)     Includes lisinopril    Angiotensin Receptor Blockers Other (See Comments)     Not specified.  Includes losartan and olmesartan    Ezetimibe Rash    Statins Arthralgia     Tolerates Crestor    Morphine Hives     pt states someone told her she is allergic to it after her hip surgery         Current Outpatient Medications:     Air  (Vicks Air Purifier/HEPA) (Device) MISC, Use as directed, Disp: 1 each, Rfl: 0    albuterol (ACCUNEB) 1.25 MG/3ML nebulizer solution, Take 3 mL by nebulization every 6 (six) hours as needed for wheezing, Disp: 225 mL, Rfl: 5    albuterol (PROVENTIL HFA,VENTOLIN HFA) 90 mcg/act inhaler, Inhale 2 puffs every 6 (six) hours as needed for wheezing, Disp: 18 g, Rfl: 5    ALPRAZolam (XANAX) 0.5 mg tablet, Take 1 tablet (0.5 mg total) by mouth daily at bedtime as needed for anxiety, Disp: 30 tablet, Rfl: 3    amLODIPine (NORVASC) 5 mg tablet, Take 1 tablet (5  mg total) by mouth 2 (two) times a day, Disp: 180 tablet, Rfl: 3    bisoprolol (ZEBETA) 10 MG tablet, Take 1 tablet (10 mg total) by mouth daily, Disp: 90 tablet, Rfl: 3    Calcium Carbonate-Vit D-Min (Caltrate 600+D Plus Minerals) 600-800 MG-UNIT TABS, Take 1 tablet by mouth 2 (two) times a day with meals, Disp: 200 tablet, Rfl: 6    co-enzyme Q-10 30 MG capsule, Take 1 capsule (30 mg total) by mouth 2 (two) times a day, Disp: 200 capsule, Rfl: 3    ergocalciferol (VITAMIN D2) 50,000 units, Take 1 capsule (50,000 Units total) by mouth once a week, Disp: 13 capsule, Rfl: 3    estradiol (ESTRACE VAGINAL) 0.1 mg/g vaginal cream, Insert 1 g into the vagina 2 (two) times a day for 14 days, Disp: 42.5 g, Rfl: 1    glucose blood test strip, Use 1 each as needed (as needed) Use as instructed, Disp: 100 each, Rfl: 5    ipratropium-albuterol (DUO-NEB) 0.5-2.5 mg/3 mL nebulizer solution, Take 3 mL by nebulization every 6 (six) hours as needed for wheezing or shortness of breath, Disp: 270 mL, Rfl: 3    magnesium (MAGTAB) 84 MG (7MEQ) TBCR, Take 1 tablet (84 mg total) by mouth 2 (two) times a day, Disp: 180 tablet, Rfl: 3    nitroglycerin (NITROSTAT) 0.4 mg SL tablet, Place 1 tablet (0.4 mg total) under the tongue every 5 (five) minutes as needed for chest pain, Disp: 30 tablet, Rfl: 5    pantoprazole (PROTONIX) 20 mg tablet, Take 1 tablet (20 mg total) by mouth daily, Disp: 90 tablet, Rfl: 3    Plavix 75 MG tablet, Take 1 tablet (75 mg total) by mouth daily, Disp: 90 tablet, Rfl: 3    Red Yeast Rice 600 MG CAPS, Take 1 capsule (600 mg total) by mouth 2 (two) times a day with meals, Disp: 200 capsule, Rfl: 3    rosuvastatin (CRESTOR) 5 mg tablet, Take 1 tablet (5 mg total) by mouth daily, Disp: 90 tablet, Rfl: 3    vitamin B-12 (VITAMIN B-12) 1,000 mcg tablet, Take 1 tablet (1,000 mcg total) by mouth daily, Disp: 90 tablet, Rfl: 3    benzonatate (TESSALON PERLES) 100 mg capsule, Take 1 capsule (100 mg total) by mouth 3 (three)  times a day as needed for cough (Patient not taking: Reported on 2/2/2024), Disp: 20 capsule, Rfl: 0    denosumab (PROLIA) 60 mg/mL, Inject 1 mL (60 mg total) under the skin once for 1 dose, Disp: 1 mL, Rfl: 1    fluticasone-umeclidinium-vilanterol (Trelegy Ellipta) 100-62.5-25 mcg/actuation inhaler, Inhale 1 puff daily Rinse mouth after use., Disp: 3 each, Rfl: 3    Premarin vaginal cream, Insert 0.625 g into the vagina daily, Disp: 30 g, Rfl: 0      Jessica Bassett    Scribe Attestation      I,:  Jessica Bassett am acting as a scribe while in the presence of the attending physician.:       I,:  Shane Garcia MD personally performed the services described in this documentation    as scribed in my presence.:

## 2024-02-10 VITALS — HEART RATE: 74 BPM | OXYGEN SATURATION: 97 %

## 2024-02-12 ENCOUNTER — HOME CARE VISIT (OUTPATIENT)
Dept: HOME HEALTH SERVICES | Facility: HOME HEALTHCARE | Age: 79
End: 2024-02-12
Payer: MEDICARE

## 2024-02-12 ENCOUNTER — NURSE TRIAGE (OUTPATIENT)
Age: 79
End: 2024-02-12

## 2024-02-12 VITALS — OXYGEN SATURATION: 98 % | HEART RATE: 82 BPM

## 2024-02-12 DIAGNOSIS — R10.84 GENERALIZED ABDOMINAL PAIN: Primary | ICD-10-CM

## 2024-02-12 PROCEDURE — G0151 HHCP-SERV OF PT,EA 15 MIN: HCPCS

## 2024-02-12 RX ORDER — METRONIDAZOLE 250 MG/1
250 TABLET ORAL EVERY 8 HOURS SCHEDULED
Qty: 21 TABLET | Refills: 0 | Status: SHIPPED | OUTPATIENT
Start: 2024-02-12 | End: 2024-02-19

## 2024-02-12 RX ORDER — CIPROFLOXACIN 500 MG/1
500 TABLET, FILM COATED ORAL EVERY 12 HOURS SCHEDULED
Qty: 14 TABLET | Refills: 0 | Status: SHIPPED | OUTPATIENT
Start: 2024-02-12 | End: 2024-02-19

## 2024-02-12 NOTE — TELEPHONE ENCOUNTER
"Reason for Disposition   Abnormal color vaginal discharge (i.e., yellow, green, gray)    Answer Assessment - Initial Assessment Questions  1. DISCHARGE: \"Describe the discharge.\" (e.g., white, yellow, green, gray, foamy, cottage cheese-like)      green  2. ODOR: \"Is there a bad odor?\"      Yes, there is a smell  3. ONSET: \"When did the discharge begin?\"      About 2 weeks ago  4. RASH: \"Is there a rash in that area?\" If Yes, ask: \"Describe it.\" (e.g., redness, blisters, sores, bumps)      no  5. ABDOMINAL PAIN: \"Are you having any abdominal pain?\" If Yes, ask: \"What does it feel like? \" (e.g., crampy, dull, intermittent, constant)       no  6. ABDOMINAL PAIN SEVERITY: If present, ask: \"How bad is it?\"  (e.g., mild, moderate, severe)   - MILD - doesn't interfere with normal activities    - MODERATE - interferes with normal activities or awakens from sleep    - SEVERE - patient doesn't want to move (R/O peritonitis)       no  7. CAUSE: \"What do you think is causing the discharge?\" \"Have you had the same problem before? What happened then?\"      Yes, states that she was recently hospitalized for sepsis  8. OTHER SYMPTOMS: \"Do you have any other symptoms?\" (e.g., fever, itching, vaginal bleeding, pain with urination, injury to genital area, vaginal foreign body)      Back pain, slight itching  9. PREGNANCY: \"Is there any chance you are pregnant?\" \"When was your last menstrual period?\"      no    Protocols used: Vaginal Discharge-ADULT-[place]    "

## 2024-02-12 NOTE — TELEPHONE ENCOUNTER
Received call from patient reporting that she is having foul-smelling, green vaginal discharge x 2 weeks.  She denies any fever but does endorse back pain. States that she called her GYN but they were not able to see her until 2/28.  She is requesting MD call in a prescription for her.  Advised that she should be seen in the office for evaluation. She does have a h/o pessary.   Appointment made for 2/14 with Dr Gzt.

## 2024-02-16 ENCOUNTER — HOME CARE VISIT (OUTPATIENT)
Dept: HOME HEALTH SERVICES | Facility: HOME HEALTHCARE | Age: 79
End: 2024-02-16
Payer: MEDICARE

## 2024-02-16 PROCEDURE — G0151 HHCP-SERV OF PT,EA 15 MIN: HCPCS

## 2024-02-17 VITALS — OXYGEN SATURATION: 98 % | HEART RATE: 72 BPM

## 2024-02-18 ENCOUNTER — HOME CARE VISIT (OUTPATIENT)
Dept: HOME HEALTH SERVICES | Facility: HOME HEALTHCARE | Age: 79
End: 2024-02-18
Payer: MEDICARE

## 2024-02-19 ENCOUNTER — HOSPITAL ENCOUNTER (OUTPATIENT)
Dept: CT IMAGING | Facility: HOSPITAL | Age: 79
Discharge: HOME | End: 2024-02-19
Payer: MEDICARE

## 2024-02-19 DIAGNOSIS — R10.84 GENERALIZED ABDOMINAL PAIN: ICD-10-CM

## 2024-02-19 PROCEDURE — 74177 CT ABD & PELVIS W/CONTRAST: CPT

## 2024-02-19 PROCEDURE — G1004 CDSM NDSC: HCPCS

## 2024-02-19 RX ADMIN — IOHEXOL 80 ML: 350 INJECTION, SOLUTION INTRAVENOUS at 11:07

## 2024-02-21 PROBLEM — J18.9 COMMUNITY ACQUIRED PNEUMONIA: Status: RESOLVED | Noted: 2019-11-13 | Resolved: 2024-02-21

## 2024-02-23 DIAGNOSIS — K21.9 GASTROESOPHAGEAL REFLUX DISEASE WITHOUT ESOPHAGITIS: ICD-10-CM

## 2024-02-23 RX ORDER — PANTOPRAZOLE SODIUM 20 MG/1
20 TABLET, DELAYED RELEASE ORAL DAILY
Qty: 90 TABLET | Refills: 1 | Status: SHIPPED | OUTPATIENT
Start: 2024-02-23

## 2024-02-27 ENCOUNTER — TELEPHONE (OUTPATIENT)
Age: 79
End: 2024-02-27

## 2024-02-27 DIAGNOSIS — S32.018A OTHER CLOSED FRACTURE OF FIRST LUMBAR VERTEBRA, INITIAL ENCOUNTER (HCC): Primary | ICD-10-CM

## 2024-02-27 NOTE — TELEPHONE ENCOUNTER
Brace on at most of times. Orthopedic consult asap     Informed the patient.  She will  RX for back brace and she has an appointment with ortho already scheduled.

## 2024-02-28 ENCOUNTER — OFFICE VISIT (OUTPATIENT)
Dept: OBGYN CLINIC | Facility: CLINIC | Age: 79
End: 2024-02-28

## 2024-02-28 VITALS
WEIGHT: 129 LBS | SYSTOLIC BLOOD PRESSURE: 171 MMHG | HEART RATE: 76 BPM | DIASTOLIC BLOOD PRESSURE: 70 MMHG | BODY MASS INDEX: 24.37 KG/M2

## 2024-02-28 DIAGNOSIS — Z46.89 PESSARY MAINTENANCE: Primary | ICD-10-CM

## 2024-02-28 DIAGNOSIS — B37.9 YEAST INFECTION: ICD-10-CM

## 2024-02-28 LAB
BV WHIFF TEST VAG QL: ABNORMAL
CLUE CELLS SPEC QL WET PREP: ABNORMAL
PH SMN: 4.5 [PH]
T VAGINALIS VAG QL WET PREP: ABNORMAL
YEAST VAG QL WET PREP: ABNORMAL

## 2024-02-28 PROCEDURE — 87210 SMEAR WET MOUNT SALINE/INK: CPT | Performed by: OBSTETRICS & GYNECOLOGY

## 2024-02-28 PROCEDURE — 99214 OFFICE O/P EST MOD 30 MIN: CPT | Performed by: OBSTETRICS & GYNECOLOGY

## 2024-02-28 RX ORDER — FLUCONAZOLE 150 MG/1
150 TABLET ORAL ONCE
Qty: 1 TABLET | Refills: 0 | Status: SHIPPED | OUTPATIENT
Start: 2024-02-28 | End: 2024-02-28

## 2024-02-28 NOTE — PROGRESS NOTES
Subjective:     Emily Berrios is a 78 y.o.  female who presents for pessary follow up. She has noticed some discharge over the last month with her pessary. She also feels like it has slipped out of place although it hasn't fallen out.     Objective:    Vitals: Blood pressure (!) 171/70, pulse 76, weight 58.5 kg (129 lb).Body mass index is 24.37 kg/m².    Physical Exam  Vitals reviewed.   Constitutional:       Appearance: Normal appearance.   Cardiovascular:      Rate and Rhythm: Normal rate.   Pulmonary:      Effort: Pulmonary effort is normal.   Abdominal:      Palpations: Abdomen is soft.   Genitourinary:     Labia:         Right: No rash, tenderness or lesion.         Left: No rash, tenderness or lesion.       Vagina: No signs of injury. Vaginal discharge present. No erythema or bleeding.   Musculoskeletal:         General: No swelling or tenderness.   Skin:     General: Skin is warm and dry.   Neurological:      Mental Status: She is alert and oriented to person, place, and time.   Psychiatric:         Mood and Affect: Mood normal.         Pessary    Date/Time: 2/28/2024 10:45 AM    Performed by: Frances Laureano MD  Authorized by: Frances Laureano MD  Universal Protocol:  Procedure performed by:  Consent: Verbal consent obtained.  Risks and benefits: risks, benefits and alternatives were discussed  Consent given by: patient  Required items: required blood products, implants, devices, and special equipment available    Indication:     Indication for pessary: uterine prolapse and cystocele    Pre-procedure:     Pessary procedure type:  Cleaning/check  Problems:     Pessary complications: vaginal discharge    Procedure:     Pessary type:  Ring    Pessary size:  7    Patient tolerance of procedure:  Tolerated well, no immediate complications      Recent Results (from the past 1 hour(s))   POCT wet mount    Collection Time: 02/28/24 11:26 AM   Result Value Ref Range    Yeast, Wet Prep pos     pH 4.5     Whiff Test neg      Clue Cells neg     Trich, Wet Prep neg        Assessment/Plan:    A/P:  Pt is a 78 y.o. No obstetric history on file. with      Emily was seen today for pessary check.    Diagnoses and all orders for this visit:    Yeast infection  -     fluconazole (DIFLUCAN) 150 mg tablet; Take 1 tablet (150 mg total) by mouth once for 1 dose  -     POCT wet mount    Other orders  -     Pessary              Frances Laureano MD  2/28/2024  11:17 AM

## 2024-03-18 ENCOUNTER — TELEPHONE (OUTPATIENT)
Dept: NEPHROLOGY | Facility: CLINIC | Age: 79
End: 2024-03-18

## 2024-03-18 NOTE — TELEPHONE ENCOUNTER
Called patient and spoke in Swedish reminding to please complete labwork prior to 3/26 appointment with Dr. Kaye.

## 2024-03-20 NOTE — TELEPHONE ENCOUNTER
Left a voicemail to patient  in Portuguese reminding to please complete labwork prior to 3/26  appointment with Dr. Kaye. Advised patient to call back with any questions or concerns.

## 2024-03-22 NOTE — TELEPHONE ENCOUNTER
Called patient and spoke in Kazakh with Ivanna reminding to please complete labwork prior to 3/26 appointment with Dr. Kaye.   Ivanna stating that they will do lab on Monday.

## 2024-03-25 ENCOUNTER — APPOINTMENT (OUTPATIENT)
Dept: LAB | Facility: HOSPITAL | Age: 79
End: 2024-03-25
Payer: MEDICARE

## 2024-03-25 ENCOUNTER — OFFICE VISIT (OUTPATIENT)
Dept: OBGYN CLINIC | Facility: MEDICAL CENTER | Age: 79
End: 2024-03-25
Payer: MEDICARE

## 2024-03-25 VITALS
WEIGHT: 129 LBS | DIASTOLIC BLOOD PRESSURE: 66 MMHG | HEART RATE: 64 BPM | HEIGHT: 61 IN | BODY MASS INDEX: 24.35 KG/M2 | SYSTOLIC BLOOD PRESSURE: 166 MMHG

## 2024-03-25 DIAGNOSIS — S32.018A OTHER CLOSED FRACTURE OF FIRST LUMBAR VERTEBRA, INITIAL ENCOUNTER (HCC): ICD-10-CM

## 2024-03-25 DIAGNOSIS — I25.83 CORONARY ARTERY DISEASE DUE TO LIPID RICH PLAQUE: ICD-10-CM

## 2024-03-25 DIAGNOSIS — I25.10 CORONARY ARTERY DISEASE DUE TO LIPID RICH PLAQUE: ICD-10-CM

## 2024-03-25 DIAGNOSIS — E83.42 HYPOMAGNESEMIA: ICD-10-CM

## 2024-03-25 DIAGNOSIS — E78.5 HYPERLIPIDEMIA ASSOCIATED WITH TYPE 2 DIABETES MELLITUS  (HCC): ICD-10-CM

## 2024-03-25 DIAGNOSIS — I10 ESSENTIAL HYPERTENSION: ICD-10-CM

## 2024-03-25 DIAGNOSIS — S42.215A CLOSED NONDISPLACED FRACTURE OF SURGICAL NECK OF LEFT HUMERUS, UNSPECIFIED FRACTURE MORPHOLOGY, INITIAL ENCOUNTER: Primary | ICD-10-CM

## 2024-03-25 DIAGNOSIS — E11.69 HYPERLIPIDEMIA ASSOCIATED WITH TYPE 2 DIABETES MELLITUS  (HCC): ICD-10-CM

## 2024-03-25 LAB
ALBUMIN SERPL BCP-MCNC: 4.4 G/DL (ref 3.5–5)
ALP SERPL-CCNC: 88 U/L (ref 34–104)
ALT SERPL W P-5'-P-CCNC: 12 U/L (ref 7–52)
ANION GAP SERPL CALCULATED.3IONS-SCNC: 9 MMOL/L (ref 4–13)
AST SERPL W P-5'-P-CCNC: 13 U/L (ref 13–39)
BASOPHILS # BLD AUTO: 0.09 THOUSANDS/ÂΜL (ref 0–0.1)
BASOPHILS NFR BLD AUTO: 1 % (ref 0–1)
BILIRUB SERPL-MCNC: 0.38 MG/DL (ref 0.2–1)
BUN SERPL-MCNC: 12 MG/DL (ref 5–25)
CALCIUM SERPL-MCNC: 10 MG/DL (ref 8.4–10.2)
CHLORIDE SERPL-SCNC: 99 MMOL/L (ref 96–108)
CHOLEST SERPL-MCNC: 267 MG/DL
CO2 SERPL-SCNC: 30 MMOL/L (ref 21–32)
CREAT SERPL-MCNC: 0.7 MG/DL (ref 0.6–1.3)
EOSINOPHIL # BLD AUTO: 0.32 THOUSAND/ÂΜL (ref 0–0.61)
EOSINOPHIL NFR BLD AUTO: 4 % (ref 0–6)
ERYTHROCYTE [DISTWIDTH] IN BLOOD BY AUTOMATED COUNT: 16.9 % (ref 11.6–15.1)
GFR SERPL CREATININE-BSD FRML MDRD: 83 ML/MIN/1.73SQ M
GLUCOSE P FAST SERPL-MCNC: 96 MG/DL (ref 65–99)
HCT VFR BLD AUTO: 41.4 % (ref 34.8–46.1)
HDLC SERPL-MCNC: 58 MG/DL
HGB BLD-MCNC: 13.2 G/DL (ref 11.5–15.4)
IMM GRANULOCYTES # BLD AUTO: 0.03 THOUSAND/UL (ref 0–0.2)
IMM GRANULOCYTES NFR BLD AUTO: 0 % (ref 0–2)
LDLC SERPL CALC-MCNC: 186 MG/DL (ref 0–100)
LYMPHOCYTES # BLD AUTO: 2.75 THOUSANDS/ÂΜL (ref 0.6–4.47)
LYMPHOCYTES NFR BLD AUTO: 33 % (ref 14–44)
MAGNESIUM SERPL-MCNC: 1.8 MG/DL (ref 1.9–2.7)
MCH RBC QN AUTO: 25.8 PG (ref 26.8–34.3)
MCHC RBC AUTO-ENTMCNC: 31.9 G/DL (ref 31.4–37.4)
MCV RBC AUTO: 81 FL (ref 82–98)
MONOCYTES # BLD AUTO: 0.71 THOUSAND/ÂΜL (ref 0.17–1.22)
MONOCYTES NFR BLD AUTO: 9 % (ref 4–12)
NEUTROPHILS # BLD AUTO: 4.39 THOUSANDS/ÂΜL (ref 1.85–7.62)
NEUTS SEG NFR BLD AUTO: 53 % (ref 43–75)
NRBC BLD AUTO-RTO: 0 /100 WBCS
PLATELET # BLD AUTO: 361 THOUSANDS/UL (ref 149–390)
PMV BLD AUTO: 9.4 FL (ref 8.9–12.7)
POTASSIUM SERPL-SCNC: 4.4 MMOL/L (ref 3.5–5.3)
PROT SERPL-MCNC: 7.8 G/DL (ref 6.4–8.4)
RBC # BLD AUTO: 5.12 MILLION/UL (ref 3.81–5.12)
SODIUM SERPL-SCNC: 138 MMOL/L (ref 135–147)
TRIGL SERPL-MCNC: 117 MG/DL
TSH SERPL DL<=0.05 MIU/L-ACNC: 2.04 UIU/ML (ref 0.45–4.5)
WBC # BLD AUTO: 8.29 THOUSAND/UL (ref 4.31–10.16)

## 2024-03-25 PROCEDURE — 80053 COMPREHEN METABOLIC PANEL: CPT

## 2024-03-25 PROCEDURE — 99213 OFFICE O/P EST LOW 20 MIN: CPT | Performed by: ORTHOPAEDIC SURGERY

## 2024-03-25 PROCEDURE — 83735 ASSAY OF MAGNESIUM: CPT

## 2024-03-25 PROCEDURE — 80061 LIPID PANEL: CPT

## 2024-03-25 PROCEDURE — 84443 ASSAY THYROID STIM HORMONE: CPT

## 2024-03-25 PROCEDURE — 36415 COLL VENOUS BLD VENIPUNCTURE: CPT

## 2024-03-25 PROCEDURE — 85025 COMPLETE CBC W/AUTO DIFF WBC: CPT

## 2024-03-25 NOTE — PROGRESS NOTES
"Orthopaedic Surgery - Office Note  Emily Berrios (78 y.o. female)   : 1945   MRN: [id number]  Encounter Date: 3/25/2024    Chief Complaint   Patient presents with    Left Shoulder - Follow-up       Assessment / Plan  Left proximal humerus surgical neck fracture, with early healing seen on XR     Continue with stretching at home as she will continue to see improvements  Continue to use arm as tolerated for ADL's no formal restrictions   Ordered and reviewed XR during the visit   Ice, heat and anti-inflammatories prn   Return if symptoms worsen or fail to improve.    History of Present Illness  Emily Berrios is a 78 y.o. female who presents for follow up of left proximal humerus fracture, DOI 2024 from a mechanical fall. She has completed PT and is doing her stretching on her own. She is using her arm for ADL's without any issues. She denies any pain. She is very happy with her outcomes at this time.      Review of Systems  Pertinent items are noted in HPI.  All other systems were reviewed and are negative.    Physical Exam  /66   Pulse 64   Ht 5' 1\" (1.549 m)   Wt 58.5 kg (129 lb)   LMP  (LMP Unknown)   BMI 24.37 kg/m²   Cons: Appears well.  No apparent distress.  Psych: Alert. Oriented x3.  Mood and affect normal.  Eyes: PERRLA, EOMI  Resp: Normal effort.  No audible wheezing or stridor.  CV: Palpable pulse.  No discernable arrhythmia.  No LE edema.  Lymph:  No palpable cervical, axillary, or inguinal lymphadenopathy.  Skin: Warm.  No palpable masses.  No visible lesions.  Neuro: Normal muscle tone.  Normal and symmetric DTR's.     Left Shoulder Exam  Alignment / Posture:  Normal shoulder posture.  Inspection:  No swelling. No ecchymosis.  Palpation:  No tenderness. No effusion.  ROM:  Shoulder . Shoulder ER 60. Shoulder IR T10. Able to reach back of head   Strength:  Supraspinatus 4/5. Infraspinatus 4+/5. Subscapularis 4+/5.  Stability:  No objective shoulder " instability.  Tests:  not tested  Neurovascular:  Sensation intact in Ax/R/M/U nerve distributions. 2+ radial pulse.     Studies Reviewed  I have personally reviewed pertinent films in PACS.  XR of left shoulder - images from 03/25/2024 showing bony callus formation and stable acceptable alignment       Procedures  No procedures today.    Medical, Surgical, Family, and Social History  The patient's medical history, family history, and social history, were reviewed and updated as appropriate.    Past Medical History:   Diagnosis Date    Arthritis     Asthma     Chronic obstructive pulmonary disease, unspecified COPD type ([facility]) 04/08/2022    Cognitive decline 03/10/2023    Community acquired pneumonia 11/13/2019    COPD (chronic obstructive pulmonary disease) ([facility])     Coronary artery disease     DM (diabetes mellitus) ([facility])     HTN (hypertension)     Hydronephrosis, bilateral 03/06/2023    Hyperlipidemia     Hyponatremia     Urinary retention        Past Surgical History:   Procedure Laterality Date    CARDIAC CATHETERIZATION  2010    CORONARY ARTERY BYPASS GRAFT  12/06/2010    with subsequent stent to the saphenous veingraft to the RCA 3 months later    KNEE ARTHROSCOPY      POLYPECTOMY      laryngeal    TOTAL HIP ARTHROPLASTY      TUBAL LIGATION         Family History   Problem Relation Age of Onset    Stroke Mother     Hypertension Mother     Heart disease Mother     Colon cancer Father     No Known Problems Sister     No Known Problems Sister     No Known Problems Sister     No Known Problems Daughter     No Known Problems Daughter     No Known Problems Daughter     No Known Problems Maternal Grandmother     No Known Problems Maternal Grandfather     No Known Problems Paternal Grandmother     No Known Problems Paternal Grandfather     Heart attack Brother     Heart attack Brother     No Known Problems Maternal Aunt     No Known Problems Maternal Aunt     No Known Problems Paternal Aunt     No Known Problems  Paternal Aunt        Social History     Occupational History    Occupation: retired   Tobacco Use    Smoking status: Former     Current packs/day: 0.00     Average packs/day: 0.5 packs/day for 32.0 years (16.0 ttl pk-yrs)     Types: Cigarettes     Start date:      Quit date: 2000     Years since quittin.2    Smokeless tobacco: Never    Tobacco comments:     no passive smoke exposure   Vaping Use    Vaping status: Never Used   Substance and Sexual Activity    Alcohol use: Yes     Alcohol/week: 42.0 standard drinks of alcohol     Types: 42 Cans of beer per week     Comment: socially    Drug use: Never    Sexual activity: Not Currently       Allergies   Allergen Reactions    Ace Inhibitors Other (See Comments)     Includes lisinopril    Angiotensin Receptor Blockers Other (See Comments)     Not specified.  Includes losartan and olmesartan    Ezetimibe Rash    Statins Arthralgia     Tolerates Crestor    Morphine Hives     pt states someone told her she is allergic to it after her hip surgery         Current Outpatient Medications:     albuterol (ACCUNEB) 1.25 MG/3ML nebulizer solution, Take 3 mL by nebulization every 6 (six) hours as needed for wheezing, Disp: 225 mL, Rfl: 5    albuterol (PROVENTIL HFA,VENTOLIN HFA) 90 mcg/act inhaler, Inhale 2 puffs every 6 (six) hours as needed for wheezing, Disp: 18 g, Rfl: 5    ALPRAZolam (XANAX) 0.5 mg tablet, Take 1 tablet (0.5 mg total) by mouth daily at bedtime as needed for anxiety, Disp: 30 tablet, Rfl: 3    amLODIPine (NORVASC) 5 mg tablet, Take 1 tablet (5 mg total) by mouth 2 (two) times a day, Disp: 180 tablet, Rfl: 3    bisoprolol (ZEBETA) 10 MG tablet, Take 1 tablet (10 mg total) by mouth daily, Disp: 90 tablet, Rfl: 3    Calcium Carbonate-Vit D-Min (Caltrate 600+D Plus Minerals) 600-800 MG-UNIT TABS, Take 1 tablet by mouth 2 (two) times a day with meals, Disp: 200 tablet, Rfl: 6    co-enzyme Q-10 30 MG capsule, Take 1 capsule (30 mg total) by mouth 2 (two)  times a day, Disp: 200 capsule, Rfl: 3    ergocalciferol (VITAMIN D2) 50,000 units, Take 1 capsule (50,000 Units total) by mouth once a week, Disp: 13 capsule, Rfl: 3    ipratropium-albuterol (DUO-NEB) 0.5-2.5 mg/3 mL nebulizer solution, Take 3 mL by nebulization every 6 (six) hours as needed for wheezing or shortness of breath, Disp: 270 mL, Rfl: 3    magnesium (MAGTAB) 84 MG (7MEQ) TBCR, Take 1 tablet (84 mg total) by mouth 2 (two) times a day, Disp: 180 tablet, Rfl: 3    pantoprazole (PROTONIX) 20 mg tablet, TAKE ONE TABLET BY MOUTH EVERY DAY., Disp: 90 tablet, Rfl: 1    Plavix 75 MG tablet, Take 1 tablet (75 mg total) by mouth daily, Disp: 90 tablet, Rfl: 3    Red Yeast Rice 600 MG CAPS, Take 1 capsule (600 mg total) by mouth 2 (two) times a day with meals, Disp: 200 capsule, Rfl: 3    rosuvastatin (CRESTOR) 5 mg tablet, Take 1 tablet (5 mg total) by mouth daily, Disp: 90 tablet, Rfl: 3    vitamin B-12 (VITAMIN B-12) 1,000 mcg tablet, Take 1 tablet (1,000 mcg total) by mouth daily, Disp: 90 tablet, Rfl: 3    Air  (Vicks Air Purifier/HEPA) (Device) MISC, Use as directed (Patient not taking: Reported on 2/28/2024), Disp: 1 each, Rfl: 0    benzonatate (TESSALON PERLES) 100 mg capsule, Take 1 capsule (100 mg total) by mouth 3 (three) times a day as needed for cough (Patient not taking: Reported on 3/25/2024), Disp: 20 capsule, Rfl: 0    denosumab (PROLIA) 60 mg/mL, Inject 1 mL (60 mg total) under the skin once for 1 dose, Disp: 1 mL, Rfl: 1    estradiol (ESTRACE VAGINAL) 0.1 mg/g vaginal cream, Insert 1 g into the vagina 2 (two) times a day for 14 days (Patient not taking: Reported on 3/25/2024), Disp: 42.5 g, Rfl: 1    fluticasone-umeclidinium-vilanterol (Trelegy Ellipta) 100-62.5-25 mcg/actuation inhaler, Inhale 1 puff daily Rinse mouth after use., Disp: 3 each, Rfl: 3    glucose blood test strip, Use 1 each as needed (as needed) Use as instructed (Patient not taking: Reported on 3/25/2024), Disp: 100  each, Rfl: 5    nitroglycerin (NITROSTAT) 0.4 mg SL tablet, Place 1 tablet (0.4 mg total) under the tongue every 5 (five) minutes as needed for chest pain (Patient not taking: Reported on 3/25/2024), Disp: 30 tablet, Rfl: 5    Premarin vaginal cream, Insert 0.625 g into the vagina daily, Disp: 30 g, Rfl: 0      Jessica Bassett    Scribe Attestation      I,:   am acting as a scribe while in the presence of the attending physician.:       I,:   personally performed the services described in this documentation    as scribed in my presence.:

## 2024-03-26 ENCOUNTER — TELEPHONE (OUTPATIENT)
Age: 79
End: 2024-03-26

## 2024-03-26 ENCOUNTER — OFFICE VISIT (OUTPATIENT)
Dept: NEPHROLOGY | Facility: CLINIC | Age: 79
End: 2024-03-26
Payer: MEDICARE

## 2024-03-26 ENCOUNTER — TELEPHONE (OUTPATIENT)
Dept: FAMILY MEDICINE CLINIC | Facility: CLINIC | Age: 79
End: 2024-03-26

## 2024-03-26 VITALS
WEIGHT: 128 LBS | BODY MASS INDEX: 24.17 KG/M2 | SYSTOLIC BLOOD PRESSURE: 146 MMHG | HEIGHT: 61 IN | DIASTOLIC BLOOD PRESSURE: 75 MMHG

## 2024-03-26 DIAGNOSIS — I25.10 CHRONIC CORONARY ARTERY DISEASE: Chronic | ICD-10-CM

## 2024-03-26 DIAGNOSIS — E11.69 HYPERLIPIDEMIA ASSOCIATED WITH TYPE 2 DIABETES MELLITUS  (HCC): Primary | ICD-10-CM

## 2024-03-26 DIAGNOSIS — I10 PRIMARY HYPERTENSION: Chronic | ICD-10-CM

## 2024-03-26 DIAGNOSIS — S42.215A CLOSED NONDISPLACED FRACTURE OF SURGICAL NECK OF LEFT HUMERUS, UNSPECIFIED FRACTURE MORPHOLOGY, INITIAL ENCOUNTER: ICD-10-CM

## 2024-03-26 DIAGNOSIS — E87.1 HYPONATREMIA: Primary | ICD-10-CM

## 2024-03-26 DIAGNOSIS — E78.00 PURE HYPERCHOLESTEROLEMIA: Chronic | ICD-10-CM

## 2024-03-26 DIAGNOSIS — E78.5 HYPERLIPIDEMIA ASSOCIATED WITH TYPE 2 DIABETES MELLITUS  (HCC): Primary | ICD-10-CM

## 2024-03-26 DIAGNOSIS — Z78.9 ALCOHOL USE: ICD-10-CM

## 2024-03-26 DIAGNOSIS — Z95.1 HX OF CABG: ICD-10-CM

## 2024-03-26 PROCEDURE — 99204 OFFICE O/P NEW MOD 45 MIN: CPT | Performed by: INTERNAL MEDICINE

## 2024-03-26 NOTE — PATIENT INSTRUCTIONS
As we discussed in the office visit and after reviewing most recent blood test your salt level in your blood normalized.  Recommend to continue with low-salt diet given elevated blood pressure.  No need for fluid restriction.  Continue close follow-up with your primary care doctor.  Recommend to decrease or minimize alcohol intake.  I will be more than happy to see you back in the future if needed

## 2024-03-26 NOTE — PROGRESS NOTES
OFFICE FOLLOW UP - Nephrology   Emily Berrios 78 y.o. female MRN: [id number]       ASSESSMENT and PLAN:  Emily was seen today for follow-up and hyponatremia.    Diagnoses and all orders for this visit:    Hyponatremia    Primary hypertension    Chronic coronary artery disease    Hx of CABG    Pure hypercholesterolemia    Alcohol use    Closed nondisplaced fracture of surgical neck of left humerus, unspecified fracture morphology, initial encounter        This is a 78-year-old lady with history of COPD/asthma, CAD status post CABG, history of mild chronic hyponatremia with previous sodium around 130-135, hypertension, who presents to the office for hospital follow-up noted patient was hospitalized in January 2024 with acute on chronic hyponatremia, sodium on admission 123 at that time she received IV fluids, was started on fluid restriction and salt tablets, serum sodium eventually improved, today presents to the office for hospital follow-up with her daughter.    1 prior episode of acute on chronic hyponatremia, as per previous notes baseline serum sodium around 130-135.  Most recent serum sodium from yesterday normalized at 138.  Patient reports currently is not taking any salt tablets or following any fluid restriction.  Advised to continue follow-up with primary care doctor.  Blood pressure elevated on arrival but improved after recheck.  Also discussed about importance to minimize alcohol intake, she reports drinks on weekends 6 beers every day.  Discussed about recent blood test showing normal sodium level, advised to continue follow-up with primary care doctor and I will be more than happy to see her back in the future if needed    2 hypertension, blood pressure elevated on arrival, improved after recheck, continue with amlodipine and bisoprolol, discussed how important to follow low-salt diet.  Continue close follow-up with primary care doctor    3 COPD/asthma    4 alcohol use, patient reports drinks  6 beers on weekends    5 hyperlipidemia, management as per primary care doctor    Patient Instructions   As we discussed in the office visit and after reviewing most recent blood test your salt level in your blood normalized.  Recommend to continue with low-salt diet given elevated blood pressure.  No need for fluid restriction.  Continue close follow-up with your primary care doctor.  Recommend to decrease or minimize alcohol intake.  I will be more than happy to see you back in the future if needed      HPI: Emily Berrios is a 78 y.o. female who is here for Follow-up and Hyponatremia  .    Returns to the office for hospital follow-up, she was initially seen by my colleagues while hospitalized on January 2024 with acute on chronic hyponatremia, sodium on admission 123, received IV fluid for, was treated with salt tablets and fluid restriction, serum sodium eventually improved.    Today presents to the office with her daughter    Patient currently has no complaints at this time and is feeling well.   Patient denies any chest pain, shortness of breath and swelling.   Denies any abdominal pain, no nausea, vomiting, no diarrhea or constipation.  Denies any urinary problems, no burning sensation or gross hematuria, reported has prolapse and use of pessary.  Close reports is not following fluid restriction not taking salt tablets.  She reports drinks on weekends approximately 6 beers every day.  Used to smoke but quit several years ago.    The last blood work was done on 3/25/2024, which we have reviewed together.  Recent serum sodium 138, creatinine 0.70, estimated GFR 83    ROS:   All the systems were reviewed and were negative except as documented on the HPI.    Allergies: Ace inhibitors, Angiotensin receptor blockers, Ezetimibe, Statins, and Morphine    Medications:   Current Outpatient Medications:     albuterol (ACCUNEB) 1.25 MG/3ML nebulizer solution, Take 3 mL by nebulization every 6 (six) hours as needed for  wheezing, Disp: 225 mL, Rfl: 5    albuterol (PROVENTIL HFA,VENTOLIN HFA) 90 mcg/act inhaler, Inhale 2 puffs every 6 (six) hours as needed for wheezing, Disp: 18 g, Rfl: 5    ALPRAZolam (XANAX) 0.5 mg tablet, Take 1 tablet (0.5 mg total) by mouth daily at bedtime as needed for anxiety, Disp: 30 tablet, Rfl: 3    amLODIPine (NORVASC) 5 mg tablet, Take 1 tablet (5 mg total) by mouth 2 (two) times a day, Disp: 180 tablet, Rfl: 3    bisoprolol (ZEBETA) 10 MG tablet, Take 1 tablet (10 mg total) by mouth daily, Disp: 90 tablet, Rfl: 3    Calcium Carbonate-Vit D-Min (Caltrate 600+D Plus Minerals) 600-800 MG-UNIT TABS, Take 1 tablet by mouth 2 (two) times a day with meals, Disp: 200 tablet, Rfl: 6    co-enzyme Q-10 30 MG capsule, Take 1 capsule (30 mg total) by mouth 2 (two) times a day, Disp: 200 capsule, Rfl: 3    ergocalciferol (VITAMIN D2) 50,000 units, Take 1 capsule (50,000 Units total) by mouth once a week, Disp: 13 capsule, Rfl: 3    ipratropium-albuterol (DUO-NEB) 0.5-2.5 mg/3 mL nebulizer solution, Take 3 mL by nebulization every 6 (six) hours as needed for wheezing or shortness of breath, Disp: 270 mL, Rfl: 3    magnesium (MAGTAB) 84 MG (7MEQ) TBCR, Take 1 tablet (84 mg total) by mouth 2 (two) times a day, Disp: 180 tablet, Rfl: 3    pantoprazole (PROTONIX) 20 mg tablet, TAKE ONE TABLET BY MOUTH EVERY DAY., Disp: 90 tablet, Rfl: 1    Plavix 75 MG tablet, Take 1 tablet (75 mg total) by mouth daily, Disp: 90 tablet, Rfl: 3    Red Yeast Rice 600 MG CAPS, Take 1 capsule (600 mg total) by mouth 2 (two) times a day with meals, Disp: 200 capsule, Rfl: 3    rosuvastatin (CRESTOR) 5 mg tablet, Take 1 tablet (5 mg total) by mouth daily, Disp: 90 tablet, Rfl: 3    vitamin B-12 (VITAMIN B-12) 1,000 mcg tablet, Take 1 tablet (1,000 mcg total) by mouth daily, Disp: 90 tablet, Rfl: 3    Air  (Vicks Air Purifier/HEPA) (Device) MISC, Use as directed (Patient not taking: Reported on 2/28/2024), Disp: 1 each, Rfl: 0     benzonatate (TESSALON PERLES) 100 mg capsule, Take 1 capsule (100 mg total) by mouth 3 (three) times a day as needed for cough (Patient not taking: Reported on 3/25/2024), Disp: 20 capsule, Rfl: 0    denosumab (PROLIA) 60 mg/mL, Inject 1 mL (60 mg total) under the skin once for 1 dose (Patient not taking: Reported on 3/26/2024), Disp: 1 mL, Rfl: 1    estradiol (ESTRACE VAGINAL) 0.1 mg/g vaginal cream, Insert 1 g into the vagina 2 (two) times a day for 14 days (Patient not taking: Reported on 3/25/2024), Disp: 42.5 g, Rfl: 1    fluticasone-umeclidinium-vilanterol (Trelegy Ellipta) 100-62.5-25 mcg/actuation inhaler, Inhale 1 puff daily Rinse mouth after use., Disp: 3 each, Rfl: 3    glucose blood test strip, Use 1 each as needed (as needed) Use as instructed (Patient not taking: Reported on 3/25/2024), Disp: 100 each, Rfl: 5    nitroglycerin (NITROSTAT) 0.4 mg SL tablet, Place 1 tablet (0.4 mg total) under the tongue every 5 (five) minutes as needed for chest pain (Patient not taking: Reported on 3/25/2024), Disp: 30 tablet, Rfl: 5    Premarin vaginal cream, Insert 0.625 g into the vagina daily, Disp: 30 g, Rfl: 0    Past Medical History:   Diagnosis Date    Arthritis     Asthma     Chronic obstructive pulmonary disease, unspecified COPD type ([facility]) 04/08/2022    Cognitive decline 03/10/2023    Community acquired pneumonia 11/13/2019    COPD (chronic obstructive pulmonary disease) ([facility])     Coronary artery disease     DM (diabetes mellitus) ([facility])     HTN (hypertension)     Hydronephrosis, bilateral 03/06/2023    Hyperlipidemia     Hyponatremia     Urinary retention      Past Surgical History:   Procedure Laterality Date    CARDIAC CATHETERIZATION  2010    CORONARY ARTERY BYPASS GRAFT  12/06/2010    with subsequent stent to the saphenous veingraft to the RCA 3 months later    KNEE ARTHROSCOPY      POLYPECTOMY      laryngeal    TOTAL HIP ARTHROPLASTY      TUBAL LIGATION       Family History   Problem Relation Age of  "Onset    Stroke Mother     Hypertension Mother     Heart disease Mother     Colon cancer Father     No Known Problems Sister     No Known Problems Sister     No Known Problems Sister     No Known Problems Daughter     No Known Problems Daughter     No Known Problems Daughter     No Known Problems Maternal Grandmother     No Known Problems Maternal Grandfather     No Known Problems Paternal Grandmother     No Known Problems Paternal Grandfather     Heart attack Brother     Heart attack Brother     No Known Problems Maternal Aunt     No Known Problems Maternal Aunt     No Known Problems Paternal Aunt     No Known Problems Paternal Aunt       reports that she quit smoking about 24 years ago. Her smoking use included cigarettes. She started smoking about 56 years ago. She has a 16 pack-year smoking history. She has never used smokeless tobacco. She reports current alcohol use of about 42.0 standard drinks of alcohol per week. She reports that she does not use drugs.      Physical Exam:   Vitals:    03/26/24 1318 03/26/24 1354   BP: (!) 180/70 146/75   BP Location: Right arm Right arm   Patient Position: Sitting Sitting   Cuff Size: Standard Standard   Weight: 58.1 kg (128 lb)    Height: 5' 1\" (1.549 m)      Body mass index is 24.19 kg/m².    General: cooperative, in not acute distress  Eyes: conjunctivae pink, anicteric sclerae  ENT: lips and mucous membranes moist  Neck: supple, no JVD  Chest: clear breath sounds bilateral, no crackles, ronchus or wheezings  CVS: distinct S1 & S2, normal rate, regular rhythm  Abdomen: soft, non-tender, non-distended, normoactive bowel sounds  Back: no CVA tenderness  Extremities: no edema of both legs  Skin: no rash  Neuro: awake, alert, oriented      Lab Results:    Results from last 7 days   Lab Units 03/25/24  0924   WBC Thousand/uL 8.29   HEMOGLOBIN g/dL 13.2   HEMATOCRIT % 41.4   PLATELETS Thousands/uL 361   SODIUM mmol/L 138   POTASSIUM mmol/L 4.4   CHLORIDE mmol/L 99   CO2 " "mmol/L 30   BUN mg/dL 12   CREATININE mg/dL 0.70   CALCIUM mg/dL 10.0   MAGNESIUM mg/dL 1.8*           Portions of the record may have been created with voice recognition software. Occasional wrong word or \"sound a like\" substitutions may have occurred due to the inherent limitations of voice recognition software. Read the chart carefully and recognize, using context, where substitutions have occurred.If you have any questions, please contact the dictating provider.  "

## 2024-03-26 NOTE — TELEPHONE ENCOUNTER
----- Message from Ramsey Gtz MD sent at 3/26/2024  7:28 AM EDT -----  [facility] Consult  ----- Message -----  From: Lab, Background User  Sent: 3/25/2024  10:41 AM EDT  To: Ramsey Gtz MD

## 2024-03-26 NOTE — TELEPHONE ENCOUNTER
Spoke with the patient.  She stated that she is seeing Dr. Gtz tomorrow and will get info for the doctor then.

## 2024-03-26 NOTE — TELEPHONE ENCOUNTER
PA for ergocalciferol (VITAMIN D2) 50,000 units     Submitted via    [x]CMM-KEY [id number]  []Surescripts-Case ID #   []Faxed to plan   []Other website   []Phone call Case ID #     Office notes sent, clinical questions answered. Awaiting determination    Turnaround time for your insurance to make a decision on your Prior Authorization can take 7-21 business days.

## 2024-03-27 ENCOUNTER — OFFICE VISIT (OUTPATIENT)
Dept: FAMILY MEDICINE CLINIC | Facility: CLINIC | Age: 79
End: 2024-03-27
Payer: MEDICARE

## 2024-03-27 ENCOUNTER — TELEPHONE (OUTPATIENT)
Dept: OBGYN CLINIC | Facility: CLINIC | Age: 79
End: 2024-03-27

## 2024-03-27 VITALS
WEIGHT: 130 LBS | HEIGHT: 61 IN | HEART RATE: 67 BPM | OXYGEN SATURATION: 98 % | DIASTOLIC BLOOD PRESSURE: 80 MMHG | BODY MASS INDEX: 24.55 KG/M2 | SYSTOLIC BLOOD PRESSURE: 130 MMHG | TEMPERATURE: 98.4 F | RESPIRATION RATE: 14 BRPM

## 2024-03-27 DIAGNOSIS — I10 ESSENTIAL HYPERTENSION: ICD-10-CM

## 2024-03-27 DIAGNOSIS — B37.31 VAGINAL CANDIDIASIS: Primary | ICD-10-CM

## 2024-03-27 DIAGNOSIS — E55.9 VITAMIN D DEFICIENCY: ICD-10-CM

## 2024-03-27 DIAGNOSIS — E78.5 HYPERLIPIDEMIA ASSOCIATED WITH TYPE 2 DIABETES MELLITUS  (HCC): ICD-10-CM

## 2024-03-27 DIAGNOSIS — Z12.11 SCREEN FOR COLON CANCER: Primary | ICD-10-CM

## 2024-03-27 DIAGNOSIS — E11.69 HYPERLIPIDEMIA ASSOCIATED WITH TYPE 2 DIABETES MELLITUS  (HCC): ICD-10-CM

## 2024-03-27 PROCEDURE — G2211 COMPLEX E/M VISIT ADD ON: HCPCS | Performed by: INTERNAL MEDICINE

## 2024-03-27 PROCEDURE — 99214 OFFICE O/P EST MOD 30 MIN: CPT | Performed by: INTERNAL MEDICINE

## 2024-03-27 RX ORDER — ROSUVASTATIN CALCIUM 5 MG/1
5 TABLET, COATED ORAL DAILY
Qty: 90 TABLET | Refills: 3 | Status: SHIPPED | OUTPATIENT
Start: 2024-03-27

## 2024-03-27 RX ORDER — ERGOCALCIFEROL 1.25 MG/1
50000 CAPSULE ORAL WEEKLY
Qty: 12 CAPSULE | Refills: 3 | Status: SHIPPED | OUTPATIENT
Start: 2024-03-27

## 2024-03-27 RX ORDER — FLUCONAZOLE 150 MG/1
150 TABLET ORAL ONCE
Qty: 1 TABLET | Refills: 0 | Status: SHIPPED | OUTPATIENT
Start: 2024-03-27 | End: 2024-03-27

## 2024-03-27 RX ORDER — AMLODIPINE BESYLATE 5 MG/1
5 TABLET ORAL 2 TIMES DAILY
Qty: 180 TABLET | Refills: 3 | Status: SHIPPED | OUTPATIENT
Start: 2024-03-27

## 2024-03-27 NOTE — TELEPHONE ENCOUNTER
I will send Rx for yeast infection to her pharmacy.  If symptoms do not resolve within a week then she should come in for evaluation

## 2024-03-27 NOTE — TELEPHONE ENCOUNTER
PA for ergocalciferol (VITAMIN D2) 50,000 units  Denied    Reason:                  Message sent to provider pool Yes    Denial letter scanned into Media Yes    Appeal started No

## 2024-03-27 NOTE — PROGRESS NOTES
Name: Emily Berrios      : 1945      MRN: [id number]  Encounter Provider: Ramsey Gtz MD  Encounter Date: 3/27/2024   Encounter department: [facility]    Assessment & Plan     1. Screen for colon cancer  -     Ambulatory referral to Gastroenterology; Future; Expected date: 2024    2. Hyperlipidemia associated with type 2 diabetes mellitus   Comments:  pt  was advised to start Crestor  RTC in 3 mos w Blood work  Orders:  -     Comprehensive metabolic panel; Future; Expected date: 2024  -     Lipid Panel with Direct LDL reflex; Future; Expected date: 2024  -     CBC and differential; Future; Expected date: 2024  -     Hemoglobin A1c (w/out EAG) ([facility] ONLY); Future; Expected date: 2024  -     Hemoglobin A1c (w/out EAG) ([facility] ONLY)  -     rosuvastatin (CRESTOR) 5 mg tablet; Take 1 tablet (5 mg total) by mouth daily  -     UA (URINE) with reflex to Scope; Future  -     Magnesium; Future    3. Essential hypertension  -     amLODIPine (NORVASC) 5 mg tablet; Take 1 tablet (5 mg total) by mouth 2 (two) times a day  -     UA (URINE) with reflex to Scope; Future  -     Magnesium; Future    4. Vitamin D deficiency  -     ergocalciferol (VITAMIN D2) 50,000 units; Take 1 capsule (50,000 Units total) by mouth once a week    RTC in 2-3 mos w Blood work       Subjective      78 Y O Lady is here for Regular check up, she feels OK, recent blood work and med list reviewed, she is Not taking Crestor ,...      Review of Systems   Constitutional:  Negative for chills, fatigue and fever.   HENT:  Negative for congestion, facial swelling, sore throat, trouble swallowing and voice change.    Eyes:  Negative for pain, discharge and visual disturbance.   Respiratory:  Negative for cough, shortness of breath and wheezing.    Cardiovascular:  Negative for chest pain, palpitations and leg swelling.   Gastrointestinal:  Negative for abdominal pain, blood in stool,  constipation, diarrhea and nausea.   Endocrine: Negative for polydipsia, polyphagia and polyuria.   Genitourinary:  Negative for difficulty urinating, hematuria and urgency.   Musculoskeletal:  Negative for arthralgias and myalgias.   Skin:  Negative for rash.   Neurological:  Negative for dizziness, tremors, weakness and headaches.   Hematological:  Negative for adenopathy. Does not bruise/bleed easily.   Psychiatric/Behavioral:  Negative for dysphoric mood, sleep disturbance and suicidal ideas.        Current Outpatient Medications on File Prior to Visit   Medication Sig    albuterol (ACCUNEB) 1.25 MG/3ML nebulizer solution Take 3 mL by nebulization every 6 (six) hours as needed for wheezing    albuterol (PROVENTIL HFA,VENTOLIN HFA) 90 mcg/act inhaler Inhale 2 puffs every 6 (six) hours as needed for wheezing    ALPRAZolam (XANAX) 0.5 mg tablet Take 1 tablet (0.5 mg total) by mouth daily at bedtime as needed for anxiety    bisoprolol (ZEBETA) 10 MG tablet Take 1 tablet (10 mg total) by mouth daily    Calcium Carbonate-Vit D-Min (Caltrate 600+D Plus Minerals) 600-800 MG-UNIT TABS Take 1 tablet by mouth 2 (two) times a day with meals    co-enzyme Q-10 30 MG capsule Take 1 capsule (30 mg total) by mouth 2 (two) times a day    ipratropium-albuterol (DUO-NEB) 0.5-2.5 mg/3 mL nebulizer solution Take 3 mL by nebulization every 6 (six) hours as needed for wheezing or shortness of breath    magnesium (MAGTAB) 84 MG (7MEQ) TBCR Take 1 tablet (84 mg total) by mouth 2 (two) times a day    pantoprazole (PROTONIX) 20 mg tablet TAKE ONE TABLET BY MOUTH EVERY DAY.    Plavix 75 MG tablet Take 1 tablet (75 mg total) by mouth daily    Red Yeast Rice 600 MG CAPS Take 1 capsule (600 mg total) by mouth 2 (two) times a day with meals    vitamin B-12 (VITAMIN B-12) 1,000 mcg tablet Take 1 tablet (1,000 mcg total) by mouth daily    [DISCONTINUED] amLODIPine (NORVASC) 5 mg tablet Take 1 tablet (5 mg total) by mouth 2 (two) times a day     "[DISCONTINUED] ergocalciferol (VITAMIN D2) 50,000 units Take 1 capsule (50,000 Units total) by mouth once a week    [DISCONTINUED] rosuvastatin (CRESTOR) 5 mg tablet Take 1 tablet (5 mg total) by mouth daily    denosumab (PROLIA) 60 mg/mL Inject 1 mL (60 mg total) under the skin once for 1 dose (Patient not taking: Reported on 3/26/2024)    estradiol (ESTRACE VAGINAL) 0.1 mg/g vaginal cream Insert 1 g into the vagina 2 (two) times a day for 14 days (Patient not taking: Reported on 3/25/2024)    fluticasone-umeclidinium-vilanterol (Trelegy Ellipta) 100-62.5-25 mcg/actuation inhaler Inhale 1 puff daily Rinse mouth after use.    glucose blood test strip Use 1 each as needed (as needed) Use as instructed (Patient not taking: Reported on 3/25/2024)    nitroglycerin (NITROSTAT) 0.4 mg SL tablet Place 1 tablet (0.4 mg total) under the tongue every 5 (five) minutes as needed for chest pain (Patient not taking: Reported on 3/25/2024)    Premarin vaginal cream Insert 0.625 g into the vagina daily    [DISCONTINUED] Air  (Vicks Air Purifier/HEPA) (Device) MISC Use as directed (Patient not taking: Reported on 2/28/2024)    [DISCONTINUED] benzonatate (TESSALON PERLES) 100 mg capsule Take 1 capsule (100 mg total) by mouth 3 (three) times a day as needed for cough (Patient not taking: Reported on 3/25/2024)       Objective     /80 (BP Location: Right arm, Patient Position: Sitting, Cuff Size: Standard)   Pulse 67   Temp 98.4 °F (36.9 °C) (Tympanic)   Resp 14   Ht 5' 1\" (1.549 m)   Wt 59 kg (130 lb)   LMP  (LMP Unknown)   SpO2 98%   BMI 24.56 kg/m²     Physical Exam  Constitutional:       General: She is not in acute distress.  HENT:      Head: Normocephalic.      Mouth/Throat:      Pharynx: No oropharyngeal exudate.   Eyes:      General: No scleral icterus.     Conjunctiva/sclera: Conjunctivae normal.      Pupils: Pupils are equal, round, and reactive to light.   Neck:      Thyroid: No thyromegaly. "   Cardiovascular:      Rate and Rhythm: Normal rate and regular rhythm.      Heart sounds: Normal heart sounds. No murmur heard.  Pulmonary:      Effort: Pulmonary effort is normal. No respiratory distress.      Breath sounds: Normal breath sounds. No wheezing or rales.   Abdominal:      General: Bowel sounds are normal. There is no distension.      Palpations: Abdomen is soft.      Tenderness: There is no abdominal tenderness. There is no guarding or rebound.   Musculoskeletal:         General: No tenderness.      Cervical back: Neck supple.   Lymphadenopathy:      Cervical: No cervical adenopathy.   Skin:     Coloration: Skin is not pale.      Findings: No rash.   Neurological:      Mental Status: She is alert and oriented to person, place, and time.      Sensory: No sensory deficit.      Motor: No weakness.      Comments: Pt uses a walker to support gait       Ramsey Gtz MD

## 2024-04-03 NOTE — PROGRESS NOTES
Outpatient Consultation - General Cardiology   Emily Berrios 78 y.o. female   MRN: [id number]  Encounter: [id number]      PCP: Ramsey Gtz MD      History of Present Illness   Physician Requesting Consult: Consults   Reason for Consult / Principal Problem: CAD and HLD    HPI: Emily Berrios is a 78 y.o. year old female, who was referred to [facility] outpatient Cardiology by Dr Gtz. She has a history of CAD s/p CABG x4 in 2010 s/p stent of SVG to RCA 3 months later, COPD, HTN, HLD, preDM.     Overall Emily is doing very well.  She has no complaints today and is here for follow-up as well as preop clearance for cataract surgery and upcoming urologic surgery.  She denies having any chest pain with activity.  She does get some shortness of breath however this is consistent with her COPD.  She sometimes gets lower extremity swelling with sitting for too long but denies significant weight changes.  She does not get much activity and only really walks around the house and does not get out much.  She does do her doctors appointments and has no issues with this.  In the office today she does appear somewhat unsteady on her feet and uses a cane.    Of note she was off of her statin for about a year prior to seeing her PCP last week.  At time of her last lipid panel she is not taking her Crestor.  She was restarted on Crestor 5 mg at her PCP office visit last week.    Tobacco use: Former tobacco user, 1 PPD for 20 years, quit 20 years ago  Alcohol use: Socially drinks on the weekends, about 6 beers on the weekends  Drug use: Denies  METS: Performs limited METS, does not climb greater than 7 stairs however she does this regularly to get to her home and has had issues with this, she states she will be able to walk around a block without stopping.  Social: Lives with first-floor for home with her eldest daughter.  She has 3 daughters and 2 sons LIVE in the [place] area.   History of anesthesia: She has had  prior surgeries including both hips she has not had issues with anesthesia at this time.    Reviewed Prior Cardiac studies:  Echo 24: mild concentric hypertrophy, LVEF 65%, atria mildly dilated, no significant valvular abnormality, PASP 60.  Nuclear stress test 2018: fixed breast attenuation artifact, overall normal study, LVEF 65%.    Review of Systems  Review of system was conducted and was negative except for as stated in the HPI.      Historical Information   Past Medical History:   Diagnosis Date    Arthritis     Asthma     Chronic obstructive pulmonary disease, unspecified COPD type ([facility]) 2022    Cognitive decline 03/10/2023    Community acquired pneumonia 2019    COPD (chronic obstructive pulmonary disease) ([facility])     Coronary artery disease     DM (diabetes mellitus) ([facility])     HTN (hypertension)     Hydronephrosis, bilateral 2023    Hyperlipidemia     Hyponatremia     Urinary retention      Past Surgical History:   Procedure Laterality Date    CARDIAC CATHETERIZATION      CORONARY ARTERY BYPASS GRAFT  2010    with subsequent stent to the saphenous veingraft to the RCA 3 months later    KNEE ARTHROSCOPY      POLYPECTOMY      laryngeal    TOTAL HIP ARTHROPLASTY      TUBAL LIGATION       Social History     Substance and Sexual Activity   Alcohol Use Yes    Alcohol/week: 42.0 standard drinks of alcohol    Types: 42 Cans of beer per week    Comment: socially     Social History     Substance and Sexual Activity   Drug Use Never     Social History     Tobacco Use   Smoking Status Former    Current packs/day: 0.00    Average packs/day: 0.5 packs/day for 32.0 years (16.0 ttl pk-yrs)    Types: Cigarettes    Start date:     Quit date: 2000    Years since quittin.2   Smokeless Tobacco Never   Tobacco Comments    no passive smoke exposure     Family History: non-contributory    Meds/Allergies   Home Medications:   Current Outpatient Medications:     albuterol (ACCUNEB) 1.25 MG/3ML  nebulizer solution, Take 3 mL by nebulization every 6 (six) hours as needed for wheezing, Disp: 225 mL, Rfl: 5    albuterol (PROVENTIL HFA,VENTOLIN HFA) 90 mcg/act inhaler, Inhale 2 puffs every 6 (six) hours as needed for wheezing, Disp: 18 g, Rfl: 5    ALPRAZolam (XANAX) 0.5 mg tablet, Take 1 tablet (0.5 mg total) by mouth daily at bedtime as needed for anxiety, Disp: 30 tablet, Rfl: 3    amLODIPine (NORVASC) 5 mg tablet, Take 1 tablet (5 mg total) by mouth 2 (two) times a day, Disp: 180 tablet, Rfl: 3    bisoprolol (ZEBETA) 10 MG tablet, Take 1 tablet (10 mg total) by mouth daily, Disp: 90 tablet, Rfl: 3    Calcium Carbonate-Vit D-Min (Caltrate 600+D Plus Minerals) 600-800 MG-UNIT TABS, Take 1 tablet by mouth 2 (two) times a day with meals, Disp: 200 tablet, Rfl: 6    co-enzyme Q-10 30 MG capsule, Take 1 capsule (30 mg total) by mouth 2 (two) times a day, Disp: 200 capsule, Rfl: 3    fluticasone-umeclidinium-vilanterol (Trelegy Ellipta) 100-62.5-25 mcg/actuation inhaler, Inhale 1 puff daily Rinse mouth after use., Disp: 3 each, Rfl: 3    ipratropium-albuterol (DUO-NEB) 0.5-2.5 mg/3 mL nebulizer solution, Take 3 mL by nebulization every 6 (six) hours as needed for wheezing or shortness of breath, Disp: 270 mL, Rfl: 3    magnesium (MAGTAB) 84 MG (7MEQ) TBCR, Take 1 tablet (84 mg total) by mouth 2 (two) times a day, Disp: 180 tablet, Rfl: 3    pantoprazole (PROTONIX) 20 mg tablet, TAKE ONE TABLET BY MOUTH EVERY DAY., Disp: 90 tablet, Rfl: 1    Plavix 75 MG tablet, Take 1 tablet (75 mg total) by mouth daily, Disp: 90 tablet, Rfl: 3    Premarin vaginal cream, Insert 0.625 g into the vagina daily, Disp: 30 g, Rfl: 0    Red Yeast Rice 600 MG CAPS, Take 1 capsule (600 mg total) by mouth 2 (two) times a day with meals, Disp: 200 capsule, Rfl: 3    rosuvastatin (CRESTOR) 5 mg tablet, Take 1 tablet (5 mg total) by mouth daily, Disp: 90 tablet, Rfl: 3    vitamin B-12 (VITAMIN B-12) 1,000 mcg tablet, Take 1 tablet (1,000 mcg  "total) by mouth daily, Disp: 90 tablet, Rfl: 3    denosumab (PROLIA) 60 mg/mL, Inject 1 mL (60 mg total) under the skin once for 1 dose (Patient not taking: Reported on 3/26/2024), Disp: 1 mL, Rfl: 1    ergocalciferol (VITAMIN D2) 50,000 units, Take 1 capsule (50,000 Units total) by mouth once a week (Patient not taking: Reported on 4/4/2024), Disp: 12 capsule, Rfl: 3    estradiol (ESTRACE VAGINAL) 0.1 mg/g vaginal cream, Insert 1 g into the vagina 2 (two) times a day for 14 days (Patient not taking: Reported on 3/25/2024), Disp: 42.5 g, Rfl: 1    glucose blood test strip, Use 1 each as needed (as needed) Use as instructed (Patient not taking: Reported on 3/25/2024), Disp: 100 each, Rfl: 5    nitroglycerin (NITROSTAT) 0.4 mg SL tablet, Place 1 tablet (0.4 mg total) under the tongue every 5 (five) minutes as needed for chest pain (Patient not taking: Reported on 3/25/2024), Disp: 30 tablet, Rfl: 5    Allergies   Allergen Reactions    Ace Inhibitors Other (See Comments)     Includes lisinopril    Angiotensin Receptor Blockers Other (See Comments)     Not specified.  Includes losartan and olmesartan    Ezetimibe Rash    Statins Arthralgia     Tolerates Crestor    Morphine Hives     pt states someone told her she is allergic to it after her hip surgery         Objective   Vitals: Blood pressure 164/70, pulse 62, height 5' 1\" (1.549 m), weight 59.9 kg (132 lb), SpO2 99%.      Physical Exam  GEN: Emily Berrios appears well, alert and oriented x 3, pleasant and cooperative   HEENT:  Normocephalic, atraumatic, anicteric, moist mucous membranes  NECK: No JVD  HEART: Regular rhythm, regular rate, normal S1 and S2, no murmurs, clicks, gallops or rubs   LUNGS: Clear to auscultation bilaterally; no wheezes, rales, or rhonchi; respiration nonlabored   ABDOMEN:  Normoactive bowel sounds, soft, no tenderness, no distention  EXTREMITIES: peripheral pulses palpable; no edema  NEURO: no gross focal findings; cranial nerves grossly " intact   SKIN:  Dry, intact, warm to touch    Lab Results: I have personally reviewed pertinent lab results.    Lab Results   Component Value Date    HSTNI0 6 06/17/2023    HSTNI2 6 06/17/2023     Lab Results   Component Value Date    NTBNP 133 11/12/2019     Lab Results   Component Value Date    CHOL 254 (H) 06/02/2018    TRIG 117 03/25/2024    HDL 58 03/25/2024    LDLCALC 186 (H) 03/25/2024    LDLDIRECT 107.01 (H) 12/08/2018     Lab Results   Component Value Date     06/02/2018    K 4.4 03/25/2024    CO2 30 03/25/2024    CL 99 03/25/2024    BUN 12 03/25/2024    CREATININE 0.70 03/25/2024    ALT 12 03/25/2024    AST 13 03/25/2024     Lab Results   Component Value Date    WBC 8.29 03/25/2024    HGB 13.2 03/25/2024    HCT 41.4 03/25/2024    MCV 81 (L) 03/25/2024     03/25/2024     Lab Results   Component Value Date    INR 0.95 01/01/2024    INR 1.00 03/06/2023    INR 0.88 (L) 11/12/2019     Lab Results   Component Value Date    HGBA1C 5.8 10/25/2023        Imaging: I have personally reviewed pertinent reports.        EKG:   Date: 4/4/2024  Interpretation: Normal sinus rhythm, poor R wave progression suggesting prior anterior infarct.    ECHO:  No results found for this or any previous visit.    Results for orders placed during the hospital encounter of 01/04/24    Echo complete w/ contrast if indicated    Interpretation Summary    Left Ventricle: Left ventricular cavity size is normal. There is mild concentric hypertrophy. The left ventricular ejection fraction is 65% by visual estimation. Systolic function is normal. Wall motion is normal. Diastolic function is mildly abnormal, consistent with grade I (abnormal) relaxation.    Right Ventricle: Right ventricular cavity size is normal. Systolic function is normal.    Left Atrium: The atrium is mildly dilated.    Right Atrium: The atrium is mildly dilated.    Aortic Valve: There is aortic valve sclerosis.    Mitral Valve: There is mild annular  calcification. There is trace regurgitation.    Tricuspid Valve: There is mild regurgitation. Pulmonary artery systolic pressures are estimated at 60 mmHg.    There is no study for comparison.      The 10-year ASCVD risk score (Hakeem BUTLER, et al., 2019) is: 65.4%    Values used to calculate the score:      Age: 78 years      Sex: Female      Is Non- : No      Diabetic: Yes      Tobacco smoker: No      Systolic Blood Pressure: 164 mmHg      Is BP treated: Yes      HDL Cholesterol: 58 mg/dL      Total Cholesterol: 267 mg/dL      Assessment/Plan     Assessment/Plan:    Preoperative cardiac risk assessment  CAD s/p CABG 2010 and subsequent stent to SVG to RCA- EF 65% 1/4/24, no significant valvular abnormality.  Currently taking Plavix 75 mg daily  HTN-on amlodipine 5 mg and bisoprolol 10 mg daily  HLD- , , , HDL 58.  preDM-  Last A1c 5.8 in 2023.    Recent echo with noral EF and no significant valvular abnormality. Most recent Lipid panel without adequate control of cholesterol.  This is in the setting of being off Crestor for about a year due to her stopping taking the medication for no specific reason.  We will focus on controlling her cardiac risk factors going forward.  We discussed increasing her dose of Crestor which she is agreeable to.    In terms of her cardiac risk assessment she is at low to intermediate cardiac risk given her prior CABG x 4 and stent along with her cardiac risk factors including hypertension, hyperlipidemia, prediabetes.  Regarding her cataract surgery this is a low risk noncardiac surgery and no further testing is required for this procedure.  Regarding her upcoming urologic surgery she requires no further testing and there is no cardiac contraindication to going forward with surgery at this time.    Plan:  - Increasing Crestor in incremental dosing.  She will increase to taking 2 tablets of the 5 mg of Crestor (10 mg) for 2 weeks and then   her prescription for 20 mg daily.  Our goal is to increase to 40 mg daily at her next follow-up appointment if she is tolerating this.  -Continue amlodipine 5 mg daily and bisoprolol 10 mg daily for blood pressure control  -Continue Plavix 75 mg daily  -Continue to avoid salt in her diet and increase activity  -No contraindication to going forward with cataract surgery or urologic surgery at this time and no further testing needed.      Case discussed and reviewed with Dr. Ludwig who agrees with my assessment and plan.    Thank you for involving us in the care of your patient.      Rajeev Deluna, DO  Cardiology Fellow   PGY-4      ==========================================================================================    Epic/ [employer]/Care Everywhere records reviewed    ** Please Note: Fluency DirectDictation voice to text software may have been used in the creation of this document. **

## 2024-04-04 ENCOUNTER — CONSULT (OUTPATIENT)
Dept: CARDIOLOGY CLINIC | Facility: CLINIC | Age: 79
End: 2024-04-04
Payer: MEDICARE

## 2024-04-04 VITALS
HEART RATE: 62 BPM | SYSTOLIC BLOOD PRESSURE: 135 MMHG | HEIGHT: 61 IN | DIASTOLIC BLOOD PRESSURE: 68 MMHG | OXYGEN SATURATION: 99 % | BODY MASS INDEX: 24.92 KG/M2 | WEIGHT: 132 LBS

## 2024-04-04 DIAGNOSIS — E78.5 HYPERLIPIDEMIA ASSOCIATED WITH TYPE 2 DIABETES MELLITUS  (HCC): ICD-10-CM

## 2024-04-04 DIAGNOSIS — Z95.5 HX OF HEART ARTERY STENT: ICD-10-CM

## 2024-04-04 DIAGNOSIS — E78.00 PURE HYPERCHOLESTEROLEMIA: Chronic | ICD-10-CM

## 2024-04-04 DIAGNOSIS — Z95.1 HX OF CABG: ICD-10-CM

## 2024-04-04 DIAGNOSIS — E11.69 HYPERLIPIDEMIA ASSOCIATED WITH TYPE 2 DIABETES MELLITUS  (HCC): ICD-10-CM

## 2024-04-04 DIAGNOSIS — I25.10 CHRONIC CORONARY ARTERY DISEASE: Primary | Chronic | ICD-10-CM

## 2024-04-04 DIAGNOSIS — I10 PRIMARY HYPERTENSION: Chronic | ICD-10-CM

## 2024-04-04 PROCEDURE — 99204 OFFICE O/P NEW MOD 45 MIN: CPT | Performed by: INTERNAL MEDICINE

## 2024-04-04 PROCEDURE — 93000 ELECTROCARDIOGRAM COMPLETE: CPT | Performed by: INTERNAL MEDICINE

## 2024-04-04 RX ORDER — ROSUVASTATIN CALCIUM 20 MG/1
20 TABLET, COATED ORAL DAILY
Qty: 30 TABLET | Refills: 4 | Status: SHIPPED | OUTPATIENT
Start: 2024-04-04

## 2024-04-04 NOTE — PATIENT INSTRUCTIONS
Double your crestor to 2 tablets of 5 mg tablets for a total of 10mg daily for 2 weeks. If you are doing well with that [facility] (20mg) daily until I see you again.  I will see you again in 3 months. At that time I would like to increasew you to 40mg daily.

## 2024-04-09 ENCOUNTER — RA CDI HCC (OUTPATIENT)
Dept: OTHER | Facility: HOSPITAL | Age: 79
End: 2024-04-09

## 2024-04-16 ENCOUNTER — CONSULT (OUTPATIENT)
Dept: FAMILY MEDICINE CLINIC | Facility: CLINIC | Age: 79
End: 2024-04-16
Payer: MEDICARE

## 2024-04-16 VITALS
WEIGHT: 133.6 LBS | BODY MASS INDEX: 25.22 KG/M2 | HEIGHT: 61 IN | DIASTOLIC BLOOD PRESSURE: 74 MMHG | HEART RATE: 62 BPM | SYSTOLIC BLOOD PRESSURE: 126 MMHG | OXYGEN SATURATION: 97 % | RESPIRATION RATE: 14 BRPM | TEMPERATURE: 97.8 F

## 2024-04-16 DIAGNOSIS — R21 RASH: Primary | ICD-10-CM

## 2024-04-16 DIAGNOSIS — Z01.818 PRE-OP EXAMINATION: ICD-10-CM

## 2024-04-16 PROCEDURE — G2211 COMPLEX E/M VISIT ADD ON: HCPCS | Performed by: INTERNAL MEDICINE

## 2024-04-16 PROCEDURE — 99214 OFFICE O/P EST MOD 30 MIN: CPT | Performed by: INTERNAL MEDICINE

## 2024-04-16 RX ORDER — KETOROLAC TROMETHAMINE 5 MG/ML
SOLUTION OPHTHALMIC
COMMUNITY
Start: 2024-04-05

## 2024-04-16 RX ORDER — PREDNISOLONE ACETATE 10 MG/ML
SUSPENSION/ DROPS OPHTHALMIC
COMMUNITY
Start: 2024-04-05

## 2024-04-16 RX ORDER — OFLOXACIN 3 MG/ML
SOLUTION/ DROPS OPHTHALMIC
COMMUNITY
Start: 2024-04-05

## 2024-04-16 RX ORDER — TRIAMCINOLONE ACETONIDE 5 MG/G
CREAM TOPICAL 3 TIMES DAILY
Qty: 45 G | Refills: 1 | Status: SHIPPED | OUTPATIENT
Start: 2024-04-16

## 2024-04-16 NOTE — PROGRESS NOTES
Name: Emily Berrios      : 1945      MRN: [id number]  Encounter Provider: Ramsey Gtz MD  Encounter Date: 2024   Encounter department: [facility]    Assessment & Plan     1. Rash  Comments:  ??insect Bites ?/ try; Triamcinolone 0.5% Cream  Orders:  -     triamcinolone (KENALOG) 0.5 % cream; Apply topically 3 (three) times a day    2. Pre-op examination  Comments:  pt is clinically stable at this time for eye surgery  RTC in 3 mos w Blood work    RTC in 3 mos w Blood work       Subjective      78 Y O Lady is here for Regular check Up, and for pre eye surgery clearance, she feels ok, recent Blood work and med list reviewed,...      Review of Systems   Constitutional:  Negative for chills, fatigue and fever.   HENT:  Negative for congestion, facial swelling, sore throat, trouble swallowing and voice change.    Eyes:  Negative for pain, discharge and visual disturbance.   Respiratory:  Negative for cough, shortness of breath and wheezing.    Cardiovascular:  Negative for chest pain, palpitations and leg swelling.   Gastrointestinal:  Negative for abdominal pain, blood in stool, constipation, diarrhea and nausea.   Endocrine: Negative for polydipsia, polyphagia and polyuria.   Genitourinary:  Negative for difficulty urinating, hematuria and urgency.   Musculoskeletal:  Negative for arthralgias and myalgias.   Skin:  Positive for rash.   Neurological:  Negative for dizziness, tremors, weakness and headaches.   Hematological:  Negative for adenopathy. Does not bruise/bleed easily.   Psychiatric/Behavioral:  Negative for dysphoric mood, sleep disturbance and suicidal ideas.        Current Outpatient Medications on File Prior to Visit   Medication Sig    albuterol (ACCUNEB) 1.25 MG/3ML nebulizer solution Take 3 mL by nebulization every 6 (six) hours as needed for wheezing    albuterol (PROVENTIL HFA,VENTOLIN HFA) 90 mcg/act inhaler Inhale 2 puffs every 6 (six) hours as  needed for wheezing    ALPRAZolam (XANAX) 0.5 mg tablet Take 1 tablet (0.5 mg total) by mouth daily at bedtime as needed for anxiety    amLODIPine (NORVASC) 5 mg tablet Take 1 tablet (5 mg total) by mouth 2 (two) times a day    bisoprolol (ZEBETA) 10 MG tablet Take 1 tablet (10 mg total) by mouth daily    Calcium Carbonate-Vit D-Min (Caltrate 600+D Plus Minerals) 600-800 MG-UNIT TABS Take 1 tablet by mouth 2 (two) times a day with meals    co-enzyme Q-10 30 MG capsule Take 1 capsule (30 mg total) by mouth 2 (two) times a day    ipratropium-albuterol (DUO-NEB) 0.5-2.5 mg/3 mL nebulizer solution Take 3 mL by nebulization every 6 (six) hours as needed for wheezing or shortness of breath    ketorolac (ACULAR) 0.5 % ophthalmic solution instill 1 drop into left eye four times a day START 04/22/24 and ...  (REFER TO PRESCRIPTION NOTES).    magnesium (MAGTAB) 84 MG (7MEQ) TBCR Take 1 tablet (84 mg total) by mouth 2 (two) times a day    ofloxacin (OCUFLOX) 0.3 % ophthalmic solution instill 1 drop four times a day INTO SURGICAL EYE START LEFT EYE ...  (REFER TO PRESCRIPTION NOTES).    pantoprazole (PROTONIX) 20 mg tablet TAKE ONE TABLET BY MOUTH EVERY DAY.    Plavix 75 MG tablet Take 1 tablet (75 mg total) by mouth daily    prednisoLONE acetate (PRED FORTE) 1 % ophthalmic suspension instill 1 drop SURGICAL EYE four times a day START AFTER SURGERY    Red Yeast Rice 600 MG CAPS Take 1 capsule (600 mg total) by mouth 2 (two) times a day with meals    rosuvastatin (CRESTOR) 20 MG tablet Take 1 tablet (20 mg total) by mouth daily    rosuvastatin (CRESTOR) 5 mg tablet Take 1 tablet (5 mg total) by mouth daily    vitamin B-12 (VITAMIN B-12) 1,000 mcg tablet Take 1 tablet (1,000 mcg total) by mouth daily    denosumab (PROLIA) 60 mg/mL Inject 1 mL (60 mg total) under the skin once for 1 dose (Patient not taking: Reported on 3/26/2024)    ergocalciferol (VITAMIN D2) 50,000 units Take 1 capsule (50,000 Units total) by mouth once a week  "(Patient not taking: Reported on 4/4/2024)    estradiol (ESTRACE VAGINAL) 0.1 mg/g vaginal cream Insert 1 g into the vagina 2 (two) times a day for 14 days (Patient not taking: Reported on 3/25/2024)    fluticasone-umeclidinium-vilanterol (Trelegy Ellipta) 100-62.5-25 mcg/actuation inhaler Inhale 1 puff daily Rinse mouth after use.    glucose blood test strip Use 1 each as needed (as needed) Use as instructed (Patient not taking: Reported on 3/25/2024)    nitroglycerin (NITROSTAT) 0.4 mg SL tablet Place 1 tablet (0.4 mg total) under the tongue every 5 (five) minutes as needed for chest pain (Patient not taking: Reported on 3/25/2024)    Premarin vaginal cream Insert 0.625 g into the vagina daily       Objective     /74 (BP Location: Left arm, Patient Position: Sitting, Cuff Size: Standard)   Pulse 62   Temp 97.8 °F (36.6 °C) (Tympanic)   Resp 14   Ht 5' 1\" (1.549 m)   Wt 60.6 kg (133 lb 9.6 oz)   LMP  (LMP Unknown)   SpO2 97%   BMI 25.24 kg/m²     Physical Exam  Constitutional:       General: She is not in acute distress.  HENT:      Head: Normocephalic.      Mouth/Throat:      Pharynx: No oropharyngeal exudate.   Eyes:      General: No scleral icterus.     Conjunctiva/sclera: Conjunctivae normal.      Pupils: Pupils are equal, round, and reactive to light.   Neck:      Thyroid: No thyromegaly.   Cardiovascular:      Rate and Rhythm: Normal rate and regular rhythm.      Heart sounds: Normal heart sounds. No murmur heard.  Pulmonary:      Effort: Pulmonary effort is normal. No respiratory distress.      Breath sounds: Normal breath sounds. No wheezing or rales.   Abdominal:      General: Bowel sounds are normal. There is no distension.      Palpations: Abdomen is soft.      Tenderness: There is no abdominal tenderness. There is no guarding or rebound.   Musculoskeletal:         General: No tenderness.      Cervical back: Neck supple.   Lymphadenopathy:      Cervical: No cervical adenopathy.   Skin:     " Coloration: Skin is not pale.      Findings: Lesion and rash present.   Neurological:      Mental Status: She is alert and oriented to person, place, and time.      Sensory: No sensory deficit.      Motor: No weakness.       Ramsey Gtz MD

## 2024-04-22 ENCOUNTER — TELEPHONE (OUTPATIENT)
Age: 79
End: 2024-04-22

## 2024-04-22 NOTE — TELEPHONE ENCOUNTER
[facility] calling on behalf of patient. States they called last week about getting an H&P, list of medication, allergies, and last OV faxed over to [phone]. They still have not received information needed, patient scheduled for surgery this Thursday.

## 2024-04-25 DIAGNOSIS — F41.9 ANXIETY: ICD-10-CM

## 2024-04-25 RX ORDER — ALPRAZOLAM 0.5 MG/1
0.5 TABLET ORAL
Qty: 30 TABLET | Refills: 0 | Status: SHIPPED | OUTPATIENT
Start: 2024-04-25

## 2024-04-25 NOTE — TELEPHONE ENCOUNTER
Reason for call:   [x] Refill   [] Prior Auth  [] Other:     Office:   [x] PCP/Provider - Ramsey Gtz MD   [] Specialty/Provider -     Medication: ALPRAZolam (XANAX) 0.5 mg tablet     Dose/Frequency:     0.5 mg, Oral, Daily at bedtime PRN       Quantity: 30    Pharmacy:   [id number] - [place] [address]       Does the patient have enough for 3 days?   [x] Yes   [] No - Send as HP to POD

## 2024-05-10 ENCOUNTER — OFFICE VISIT (OUTPATIENT)
Dept: OBGYN CLINIC | Facility: CLINIC | Age: 79
End: 2024-05-10

## 2024-05-10 VITALS
WEIGHT: 134.4 LBS | SYSTOLIC BLOOD PRESSURE: 179 MMHG | HEART RATE: 74 BPM | DIASTOLIC BLOOD PRESSURE: 63 MMHG | HEIGHT: 61 IN | BODY MASS INDEX: 25.37 KG/M2

## 2024-05-10 DIAGNOSIS — N39.3 SUI (STRESS URINARY INCONTINENCE, FEMALE): ICD-10-CM

## 2024-05-10 DIAGNOSIS — N89.8 VAGINAL EROSION SECONDARY TO PESSARY USE, SEQUELA: ICD-10-CM

## 2024-05-10 DIAGNOSIS — N81.6 CYSTOCELE WITH RECTOCELE: Primary | ICD-10-CM

## 2024-05-10 DIAGNOSIS — T83.89XS VAGINAL EROSION SECONDARY TO PESSARY USE, SEQUELA: ICD-10-CM

## 2024-05-10 DIAGNOSIS — N81.10 CYSTOCELE WITH RECTOCELE: Primary | ICD-10-CM

## 2024-05-10 DIAGNOSIS — N81.2 INCOMPLETE UTEROVAGINAL PROLAPSE: ICD-10-CM

## 2024-05-10 DIAGNOSIS — Z87.448 HISTORY OF CYSTOCELE: ICD-10-CM

## 2024-05-10 PROCEDURE — 99215 OFFICE O/P EST HI 40 MIN: CPT | Performed by: STUDENT IN AN ORGANIZED HEALTH CARE EDUCATION/TRAINING PROGRAM

## 2024-05-10 RX ORDER — CONJUGATED ESTROGENS 0.62 MG/G
0.62 CREAM VAGINAL DAILY
Qty: 30 G | Refills: 0 | Status: SHIPPED | OUTPATIENT
Start: 2024-05-10 | End: 2024-05-14

## 2024-05-10 NOTE — PROGRESS NOTES
Urogynecology - New Patient Visit  Emily Berrios   [id number]    Japanese-speaking: yes Phone  used: no    Chief complaint: POP and TERRENCE    HPI: 79 y.o. presents for symptoms of POP for the last several years. She was fitted for a pessary over a year ago which had to be replaced to different type 3 months. She does not self manage and follows up in clinic for cleaning every 3 months. She notes a brown discharge but denies any foul odor.     Referred by gynecologist:  yes  History of prolapse surgery: no  Primary care doctor: yes    Prolapse symptoms  Bulge: yes, several years  Pressure: yes  Rubbing on clothing: no  Stenting for urine: no  Stenting for stool: no  Pessary use: yes Type: ring w support , 1 year   Hormonal replacement therapy: yes      Urinary symptoms  Urgency: yes  Frequency: no 4 / day  Incontinence with stress (exercise, valsalva, laugh, sneeze, cough): yes  Incontinence with urge: no  #pads used/24 hours: 3  Liners/pads/diapers   Postural incontinence: no  Nocturia:yes 2 / night  Dysuria: no  Hematuria:no  Incomplete emptying: no  Slow stream:no  Hesitancy: no  Straining with urination: no    Defecatory symptoms  Constipation:yes, sometimes  Frequency:no  1/day  Urgency: no  Fecal Incontinence: yes, smearing  Gas incontinence:  no  Loose stools:  no  Bowel regimen:  no None currently    Gynecologic history  Pap history: Normal   Postmenopausal bleeding if applicable: no  Sexually active: no, but possibly interested   Dyspareunia: N/A   OB History    Para Term  AB Living                 SAB IAB Ectopic Multiple Live Births           5       Past Medical History:   Diagnosis Date    Arthritis     Asthma     Chronic obstructive pulmonary disease, unspecified COPD type ([facility]) 2022    Cognitive decline 03/10/2023    Community acquired pneumonia 2019    COPD (chronic obstructive pulmonary disease) ([facility])     Coronary artery disease     DM (diabetes mellitus) ([facility])      HTN (hypertension)     Hydronephrosis, bilateral 2023    Hyperlipidemia     Hyponatremia     Urinary retention        Past Surgical History:   Procedure Laterality Date    CARDIAC CATHETERIZATION      CORONARY ARTERY BYPASS GRAFT  2010    with subsequent stent to the saphenous veingraft to the RCA 3 months later    KNEE ARTHROSCOPY      POLYPECTOMY      laryngeal    TOTAL HIP ARTHROPLASTY      TUBAL LIGATION         Family History   Problem Relation Age of Onset    Stroke Mother     Hypertension Mother     Heart disease Mother     Colon cancer Father     No Known Problems Sister     No Known Problems Sister     No Known Problems Sister     No Known Problems Daughter     No Known Problems Daughter     No Known Problems Daughter     No Known Problems Maternal Grandmother     No Known Problems Maternal Grandfather     No Known Problems Paternal Grandmother     No Known Problems Paternal Grandfather     Heart attack Brother     Heart attack Brother     No Known Problems Maternal Aunt     No Known Problems Maternal Aunt     No Known Problems Paternal Aunt     No Known Problems Paternal Aunt        Social History     Socioeconomic History    Marital status: Single     Spouse name: None    Number of children: 5    Years of education: None    Highest education level: None   Occupational History    Occupation: retired   Tobacco Use    Smoking status: Former     Current packs/day: 0.00     Average packs/day: 0.5 packs/day for 32.0 years (16.0 ttl pk-yrs)     Types: Cigarettes     Start date:      Quit date: 2000     Years since quittin.3    Smokeless tobacco: Never    Tobacco comments:     no passive smoke exposure   Vaping Use    Vaping status: Never Used   Substance and Sexual Activity    Alcohol use: Yes     Alcohol/week: 42.0 standard drinks of alcohol     Types: 42 Cans of beer per week     Comment: socially    Drug use: Never    Sexual activity: Not Currently   Other Topics Concern    None    Social History Narrative    None     Social Determinants of Health     Financial Resource Strain: Low Risk  (5/10/2024)    Overall Financial Resource Strain (CARDIA)     Difficulty of Paying Living Expenses: Not hard at all   Food Insecurity: No Food Insecurity (5/10/2024)    Hunger Vital Sign     Worried About Running Out of Food in the Last Year: Never true     Ran Out of Food in the Last Year: Never true   Transportation Needs: No Transportation Needs (5/10/2024)    PRAPARE - Transportation     Lack of Transportation (Medical): No     Lack of Transportation (Non-Medical): No   Physical Activity: Unknown (3/13/2020)    Exercise Vital Sign     Days of Exercise per Week: Patient declined     Minutes of Exercise per Session: Patient declined   Stress: No Stress Concern Present (3/13/2020)    [employer] - Occupational Stress Questionnaire     Feeling of Stress : Not at all   Social Connections: Unknown (3/13/2020)    Social Connection and Isolation Panel [NHANES]     Frequency of Communication with Friends and Family: Patient declined     Frequency of Social Gatherings with Friends and Family: Patient declined     Attends Druze Services: Patient declined     Active Member of Clubs or Organizations: Patient declined     Attends Club or Organization Meetings: Patient declined     Marital Status: Patient declined   Intimate Partner Violence: Not At Risk (3/13/2020)    Humiliation, Afraid, Rape, and Kick questionnaire     Fear of Current or Ex-Partner: No     Emotionally Abused: No     Physically Abused: No     Sexually Abused: No   Housing Stability: Low Risk  (5/10/2024)    Housing Stability Vital Sign     Unable to Pay for Housing in the Last Year: No     Number of Places Lived in the Last Year: 1     Unstable Housing in the Last Year: No       Current Outpatient Medications on File Prior to Visit   Medication Sig Dispense Refill    albuterol (ACCUNEB) 1.25 MG/3ML nebulizer solution  Take 3 mL by nebulization every 6 (six) hours as needed for wheezing 225 mL 5    albuterol (PROVENTIL HFA,VENTOLIN HFA) 90 mcg/act inhaler Inhale 2 puffs every 6 (six) hours as needed for wheezing 18 g 5    ALPRAZolam (XANAX) 0.5 mg tablet Take 1 tablet (0.5 mg total) by mouth daily at bedtime as needed for anxiety 30 tablet 0    amLODIPine (NORVASC) 5 mg tablet Take 1 tablet (5 mg total) by mouth 2 (two) times a day 180 tablet 3    bisoprolol (ZEBETA) 10 MG tablet Take 1 tablet (10 mg total) by mouth daily 90 tablet 3    Calcium Carbonate-Vit D-Min (Caltrate 600+D Plus Minerals) 600-800 MG-UNIT TABS Take 1 tablet by mouth 2 (two) times a day with meals 200 tablet 6    co-enzyme Q-10 30 MG capsule Take 1 capsule (30 mg total) by mouth 2 (two) times a day 200 capsule 3    glucose blood test strip Use 1 each as needed (as needed) Use as instructed 100 each 5    ipratropium-albuterol (DUO-NEB) 0.5-2.5 mg/3 mL nebulizer solution Take 3 mL by nebulization every 6 (six) hours as needed for wheezing or shortness of breath 270 mL 3    ketorolac (ACULAR) 0.5 % ophthalmic solution instill 1 drop into left eye four times a day START 04/22/24 and ...  (REFER TO PRESCRIPTION NOTES).      magnesium (MAGTAB) 84 MG (7MEQ) TBCR Take 1 tablet (84 mg total) by mouth 2 (two) times a day 180 tablet 3    ofloxacin (OCUFLOX) 0.3 % ophthalmic solution instill 1 drop four times a day INTO SURGICAL EYE START LEFT EYE ...  (REFER TO PRESCRIPTION NOTES).      pantoprazole (PROTONIX) 20 mg tablet TAKE ONE TABLET BY MOUTH EVERY DAY. 90 tablet 1    Plavix 75 MG tablet Take 1 tablet (75 mg total) by mouth daily 90 tablet 3    prednisoLONE acetate (PRED FORTE) 1 % ophthalmic suspension instill 1 drop SURGICAL EYE four times a day START AFTER SURGERY      Red Yeast Rice 600 MG CAPS Take 1 capsule (600 mg total) by mouth 2 (two) times a day with meals 200 capsule 3    rosuvastatin (CRESTOR) 5 mg tablet Take 1 tablet (5 mg total) by mouth daily 90  "tablet 3    vitamin B-12 (VITAMIN B-12) 1,000 mcg tablet Take 1 tablet (1,000 mcg total) by mouth daily 90 tablet 3    denosumab (PROLIA) 60 mg/mL Inject 1 mL (60 mg total) under the skin once for 1 dose (Patient not taking: Reported on 3/26/2024) 1 mL 1    ergocalciferol (VITAMIN D2) 50,000 units Take 1 capsule (50,000 Units total) by mouth once a week (Patient not taking: Reported on 4/4/2024) 12 capsule 3    estradiol (ESTRACE VAGINAL) 0.1 mg/g vaginal cream Insert 1 g into the vagina 2 (two) times a day for 14 days (Patient not taking: Reported on 3/25/2024) 42.5 g 1    fluticasone-umeclidinium-vilanterol (Trelegy Ellipta) 100-62.5-25 mcg/actuation inhaler Inhale 1 puff daily Rinse mouth after use. 3 each 3    nitroglycerin (NITROSTAT) 0.4 mg SL tablet Place 1 tablet (0.4 mg total) under the tongue every 5 (five) minutes as needed for chest pain (Patient not taking: Reported on 3/25/2024) 30 tablet 5    Premarin vaginal cream Insert 0.625 g into the vagina daily 30 g 0    rosuvastatin (CRESTOR) 20 MG tablet Take 1 tablet (20 mg total) by mouth daily (Patient not taking: Reported on 5/10/2024) 30 tablet 4    triamcinolone (KENALOG) 0.5 % cream Apply topically 3 (three) times a day (Patient not taking: Reported on 5/10/2024) 45 g 1     No current facility-administered medications on file prior to visit.       Allergies   Allergen Reactions    Ace Inhibitors Other (See Comments)     Includes lisinopril    Angiotensin Receptor Blockers Other (See Comments)     Not specified.  Includes losartan and olmesartan    Ezetimibe Rash    Statins Arthralgia     Tolerates Crestor    Morphine Hives     pt states someone told her she is allergic to it after her hip surgery       Pertinent items are noted in HPI.    Physical exam:  BP (!) 179/63 (BP Location: Right arm, Patient Position: Sitting)   Pulse 74   Ht 5' 1\" (1.549 m)   Wt 61 kg (134 lb 6.4 oz)   LMP  (LMP Unknown)   BMI 25.39 kg/m²   Cardiovascular: regular rate " and rhythm  Lungs:  clear to auscultation bilaterally  Abdomen: soft, non-tender, without masses or organomegaly  Pelvic: BUS normal. Introitus normal. Moderate atroph, brown tinged discharge without odor  Extremities: No edema  Neurologic:  alert, oriented, normal speech, no focal findings or movement disorder noted Clitoral-Bulbocavernosus reflex     Vulvovaginal atrophy:  yes moderate  Urethral caruncle:  yes mild    POP-Q Assessment  Aa    +2 Ba   +4 C   -4   gh   4 pb   2.5 TVL    10   Ap   +1 Bp    +1 D   -6         Kegel strength: 1/5    Q-tip test: Resting degree of 40 with straining degree of 80    Q-tip with decreased resistance: yes    Posterior wall relaxation: yes     Assessment:  79 y.o. with TERRENCE and stage 3 cystocele with grade 2 uterovaginal prolapse and rectocele.     Patient has been using a pessary for the past year and is interested in surgical treatment options to treat her pelvic organ prolapse. We discussed continued conservative pessary management but the patient does not want to continue to use it as it causes her to have a lot of discharge. We discussed surgical treatment options and will proceed with EUA, VEC, anterior and posterior colporrhapy, PV sling, cystoscopy. Discussed risks including but not limited to bleeding, infection, injury to surrounding organs such as bladder and bowel, and urinary retention requiring Mayo catheter placement.     Plan:  Surgery: yes Procedure: EUA, VEC, anterior and posterior colporrhaphy, PV sling, cystoscopy  Topical estrogen: yes Type: Premarin  Frequency: M, W, F HS  OAB medications: no  OAB counseling (timed voids, avoiding triggers, fluid intake management): no  Kegel exercise handout: no  Urodynamics: no  Pessary yes Type ring w support    Follow up for pre-op H&P    Preoperative visit: yes  Preoperative clearance: yes, PCP and cardiology     Over 50% of time spent on counseling and coordination of care.    Ramon Fam MD

## 2024-05-14 ENCOUNTER — TELEPHONE (OUTPATIENT)
Dept: OTHER | Facility: HOSPITAL | Age: 79
End: 2024-05-14

## 2024-05-14 ENCOUNTER — TELEPHONE (OUTPATIENT)
Dept: OBGYN CLINIC | Facility: CLINIC | Age: 79
End: 2024-05-14

## 2024-05-14 DIAGNOSIS — N95.2 VAGINAL ATROPHY: Primary | ICD-10-CM

## 2024-05-14 RX ORDER — CONJUGATED ESTROGENS 0.62 MG/G
0.5 CREAM VAGINAL 3 TIMES WEEKLY
Qty: 30 G | Refills: 0 | Status: SHIPPED | OUTPATIENT
Start: 2024-05-15

## 2024-05-14 NOTE — TELEPHONE ENCOUNTER
Patient called again stated instructions on how to take medication were not given. Patient would like a call back to clarify on how she should take medication .   
Spontaneous, unlabored and symmetrical

## 2024-05-14 NOTE — TELEPHONE ENCOUNTER
"Spoke to patient re: premarin Rx, pharmacy unable to fill prescription due to \"incorrect instructions\". Will resubmit.  "

## 2024-05-24 ENCOUNTER — TELEPHONE (OUTPATIENT)
Dept: GYNECOLOGY | Facility: CLINIC | Age: 79
End: 2024-05-24

## 2024-05-24 DIAGNOSIS — K21.9 GASTROESOPHAGEAL REFLUX DISEASE WITHOUT ESOPHAGITIS: ICD-10-CM

## 2024-05-24 DIAGNOSIS — F41.9 ANXIETY: ICD-10-CM

## 2024-05-24 RX ORDER — ALPRAZOLAM 0.5 MG/1
0.5 TABLET ORAL
Qty: 30 TABLET | Refills: 0 | Status: SHIPPED | OUTPATIENT
Start: 2024-05-24

## 2024-05-24 RX ORDER — PANTOPRAZOLE SODIUM 20 MG/1
20 TABLET, DELAYED RELEASE ORAL DAILY
Qty: 90 TABLET | Refills: 0 | Status: SHIPPED | OUTPATIENT
Start: 2024-05-24

## 2024-05-24 NOTE — TELEPHONE ENCOUNTER
----- Message from Ramon Fam MD sent at 5/10/2024 12:48 PM EDT -----  Regarding: surgery  Hello, this surgery will need to be with Dr. Abdoulaye Troncoso.    [facility] GYN Department  Surgery Scheduling Sheet    Patient Name: Emily Berrios  : 1945    Provider: Ramon Fam MD,     Needed: yes; Language: [place] and N/A    Procedure: EUA, VEC, anterior and posterior colporrhaphy, PV sling, cystoscopy    Diagnosis: cystocele, rectocele, incomplete uterovaginal prolapse, TERRENCE    Special Needs or Equipment: none    Anesthesia: IV sedation with anesthesia, epidural    Length of stay: 23 hour stay  Does patient have comorbid conditions that will require close perioperative monitoring prior to safe discharge: no    The patient has comorbid conditions that will require close perioperative monitoring prior to safe discharge, including N/A.   This may require acute care beyond the usual and routine recovery period. As such, inpatient admission post-operatively is expected and appropriate, and anticipated hospital length of stay will be >2 midnights.    Pre-Admission Testing Needed: yes   Labs that should be ordered: cbc, cmp, and EKG    Order PAT that is recommended in prep for procedure?: No    Medical Clearance Needed: yes; Provider: PCP, cardiology    MA Form Signed (tubals/hysterectomy): Not Indicated    Surgical Drink Given: no     How many days out of work: 7 day(s)     How many days no drivin day(s)       Is pre op appt needed?  yes  Interval for post op appt: 2 week(s)         For Surgical Scheduler:     Surgery Scheduled On:  [place]: [facility]    Pre-op Appt:   Post op Appt:  Consult/Medical clearance appt:

## 2024-05-24 NOTE — TELEPHONE ENCOUNTER
----- Message from Ramon Fam MD sent at 5/10/2024 12:48 PM EDT -----  Regarding: surgery  Hello, this surgery will need to be with Dr. Abdoulaye Troncoso.    [facility] GYN Department  Surgery Scheduling Sheet    Patient Name: Emily Berrios  : 1945    Provider: Ramon Fam MD,     Needed: yes; Language: Finnish and N/A    Procedure: EUA, VEC, anterior and posterior colporrhaphy, PV sling, cystoscopy    Diagnosis: cystocele, rectocele, incomplete uterovaginal prolapse, TERRENCE    Special Needs or Equipment: none    Anesthesia: IV sedation with anesthesia, epidural    Length of stay: 23 hour stay  Does patient have comorbid conditions that will require close perioperative monitoring prior to safe discharge: no    The patient has comorbid conditions that will require close perioperative monitoring prior to safe discharge, including N/A.   This may require acute care beyond the usual and routine recovery period. As such, inpatient admission post-operatively is expected and appropriate, and anticipated hospital length of stay will be >2 midnights.    Pre-Admission Testing Needed: yes   Labs that should be ordered: cbc, cmp, and EKG    Order PAT that is recommended in prep for procedure?: No    Medical Clearance Needed: yes; Provider: PCP, cardiology    MA Form Signed (tubals/hysterectomy): Not Indicated    Surgical Drink Given: no     How many days out of work: 7 day(s)     How many days no drivin day(s)       Is pre op appt needed?  yes  Interval for post op appt: 2 week(s)         For Surgical Scheduler:     Surgery Scheduled On:  [place]: [facility]    Pre-op Appt:   Post op Appt:  Consult/Medical clearance appt:

## 2024-05-24 NOTE — TELEPHONE ENCOUNTER
Reason for call:   [x] Refill   [] Prior Auth  [] Other:     Office:   [x] PCP/Provider -   [] Specialty/Provider -         Does the patient have enough for 3 days?   [] Yes   [x] No - Send as HP to POD

## 2024-05-24 NOTE — TELEPHONE ENCOUNTER
----- Message from Ramon Fam MD sent at 5/10/2024 12:48 PM EDT -----  Regarding: surgery  Hello, this surgery will need to be with Dr. Abdoulaye Troncoso.    [facility] GYN Department  Surgery Scheduling Sheet    Patient Name: Emily Berrios  : 1945    Provider: Ramon Fam MD,     Needed: yes; Language: Czech and N/A    Procedure: EUA, VEC, anterior and posterior colporrhaphy, PV sling, cystoscopy    Diagnosis: cystocele, rectocele, incomplete uterovaginal prolapse, TERRENCE    Special Needs or Equipment: none    Anesthesia: IV sedation with anesthesia, epidural    Length of stay: 23 hour stay  Does patient have comorbid conditions that will require close perioperative monitoring prior to safe discharge: no    The patient has comorbid conditions that will require close perioperative monitoring prior to safe discharge, including N/A.   This may require acute care beyond the usual and routine recovery period. As such, inpatient admission post-operatively is expected and appropriate, and anticipated hospital length of stay will be >2 midnights.    Pre-Admission Testing Needed: yes   Labs that should be ordered: cbc, cmp, and EKG    Order PAT that is recommended in prep for procedure?: No    Medical Clearance Needed: yes; Provider: PCP, cardiology    MA Form Signed (tubals/hysterectomy): Not Indicated    Surgical Drink Given: no     How many days out of work: 7 day(s)     How many days no drivin day(s)       Is pre op appt needed?  yes  Interval for post op appt: 2 week(s)         For Surgical Scheduler:     Surgery Scheduled On:  [place]: [facility]    Pre-op Appt:   Post op Appt:  Consult/Medical clearance appt:

## 2024-05-29 ENCOUNTER — TELEPHONE (OUTPATIENT)
Dept: OBGYN CLINIC | Facility: CLINIC | Age: 79
End: 2024-05-29

## 2024-06-27 DIAGNOSIS — F41.9 ANXIETY: ICD-10-CM

## 2024-06-27 RX ORDER — ALPRAZOLAM 0.5 MG/1
0.5 TABLET ORAL
Qty: 30 TABLET | Refills: 0 | Status: SHIPPED | OUTPATIENT
Start: 2024-06-27

## 2024-07-15 ENCOUNTER — APPOINTMENT (OUTPATIENT)
Dept: LAB | Facility: HOSPITAL | Age: 79
End: 2024-07-15
Payer: MEDICARE

## 2024-07-15 DIAGNOSIS — Z87.448 HISTORY OF CYSTOCELE: ICD-10-CM

## 2024-07-15 DIAGNOSIS — I10 ESSENTIAL HYPERTENSION: ICD-10-CM

## 2024-07-15 DIAGNOSIS — E11.69 HYPERLIPIDEMIA ASSOCIATED WITH TYPE 2 DIABETES MELLITUS  (HCC): ICD-10-CM

## 2024-07-15 DIAGNOSIS — E78.5 HYPERLIPIDEMIA ASSOCIATED WITH TYPE 2 DIABETES MELLITUS  (HCC): ICD-10-CM

## 2024-07-15 DIAGNOSIS — E83.42 HYPOMAGNESEMIA: Primary | ICD-10-CM

## 2024-07-15 DIAGNOSIS — N81.2 INCOMPLETE UTEROVAGINAL PROLAPSE: ICD-10-CM

## 2024-07-15 LAB
ALBUMIN SERPL BCG-MCNC: 4.3 G/DL (ref 3.5–5)
ALP SERPL-CCNC: 68 U/L (ref 34–104)
ALT SERPL W P-5'-P-CCNC: 9 U/L (ref 7–52)
ANION GAP SERPL CALCULATED.3IONS-SCNC: 10 MMOL/L (ref 4–13)
AST SERPL W P-5'-P-CCNC: 13 U/L (ref 13–39)
BASOPHILS # BLD AUTO: 0.05 THOUSANDS/ÂΜL (ref 0–0.1)
BASOPHILS NFR BLD AUTO: 1 % (ref 0–1)
BILIRUB SERPL-MCNC: 0.41 MG/DL (ref 0.2–1)
BUN SERPL-MCNC: 14 MG/DL (ref 5–25)
CALCIUM SERPL-MCNC: 9.6 MG/DL (ref 8.4–10.2)
CHLORIDE SERPL-SCNC: 99 MMOL/L (ref 96–108)
CHOLEST SERPL-MCNC: 166 MG/DL
CO2 SERPL-SCNC: 28 MMOL/L (ref 21–32)
CREAT SERPL-MCNC: 0.72 MG/DL (ref 0.6–1.3)
EOSINOPHIL # BLD AUTO: 0.25 THOUSAND/ÂΜL (ref 0–0.61)
EOSINOPHIL NFR BLD AUTO: 3 % (ref 0–6)
ERYTHROCYTE [DISTWIDTH] IN BLOOD BY AUTOMATED COUNT: 15.9 % (ref 11.6–15.1)
GFR SERPL CREATININE-BSD FRML MDRD: 79 ML/MIN/1.73SQ M
GLUCOSE P FAST SERPL-MCNC: 97 MG/DL (ref 65–99)
HCT VFR BLD AUTO: 37.8 % (ref 34.8–46.1)
HDLC SERPL-MCNC: 63 MG/DL
HGB BLD-MCNC: 12.2 G/DL (ref 11.5–15.4)
IMM GRANULOCYTES # BLD AUTO: 0.03 THOUSAND/UL (ref 0–0.2)
IMM GRANULOCYTES NFR BLD AUTO: 0 % (ref 0–2)
LDLC SERPL CALC-MCNC: 81 MG/DL (ref 0–100)
LYMPHOCYTES # BLD AUTO: 2.62 THOUSANDS/ÂΜL (ref 0.6–4.47)
LYMPHOCYTES NFR BLD AUTO: 29 % (ref 14–44)
MAGNESIUM SERPL-MCNC: 1.6 MG/DL (ref 1.9–2.7)
MCH RBC QN AUTO: 27.1 PG (ref 26.8–34.3)
MCHC RBC AUTO-ENTMCNC: 32.3 G/DL (ref 31.4–37.4)
MCV RBC AUTO: 84 FL (ref 82–98)
MONOCYTES # BLD AUTO: 0.7 THOUSAND/ÂΜL (ref 0.17–1.22)
MONOCYTES NFR BLD AUTO: 8 % (ref 4–12)
NEUTROPHILS # BLD AUTO: 5.39 THOUSANDS/ÂΜL (ref 1.85–7.62)
NEUTS SEG NFR BLD AUTO: 59 % (ref 43–75)
NRBC BLD AUTO-RTO: 0 /100 WBCS
PLATELET # BLD AUTO: 313 THOUSANDS/UL (ref 149–390)
PMV BLD AUTO: 9.5 FL (ref 8.9–12.7)
POTASSIUM SERPL-SCNC: 4.3 MMOL/L (ref 3.5–5.3)
PROT SERPL-MCNC: 7.8 G/DL (ref 6.4–8.4)
RBC # BLD AUTO: 4.5 MILLION/UL (ref 3.81–5.12)
SODIUM SERPL-SCNC: 137 MMOL/L (ref 135–147)
TRIGL SERPL-MCNC: 109 MG/DL
WBC # BLD AUTO: 9.04 THOUSAND/UL (ref 4.31–10.16)

## 2024-07-15 PROCEDURE — 36415 COLL VENOUS BLD VENIPUNCTURE: CPT

## 2024-07-15 PROCEDURE — 80061 LIPID PANEL: CPT

## 2024-07-15 PROCEDURE — 85025 COMPLETE CBC W/AUTO DIFF WBC: CPT

## 2024-07-15 PROCEDURE — 80053 COMPREHEN METABOLIC PANEL: CPT

## 2024-07-15 PROCEDURE — 83735 ASSAY OF MAGNESIUM: CPT

## 2024-07-15 RX ORDER — LANOLIN ALCOHOL/MO/W.PET/CERES
400 CREAM (GRAM) TOPICAL 2 TIMES DAILY
Qty: 200 TABLET | Refills: 3 | Status: SHIPPED | OUTPATIENT
Start: 2024-07-15

## 2024-07-17 ENCOUNTER — TELEPHONE (OUTPATIENT)
Dept: PULMONOLOGY | Facility: CLINIC | Age: 79
End: 2024-07-17

## 2024-07-18 ENCOUNTER — OFFICE VISIT (OUTPATIENT)
Dept: CARDIOLOGY CLINIC | Facility: CLINIC | Age: 79
End: 2024-07-18
Payer: MEDICARE

## 2024-07-18 VITALS
HEIGHT: 61 IN | SYSTOLIC BLOOD PRESSURE: 148 MMHG | WEIGHT: 136.6 LBS | HEART RATE: 59 BPM | DIASTOLIC BLOOD PRESSURE: 62 MMHG | BODY MASS INDEX: 25.79 KG/M2

## 2024-07-18 DIAGNOSIS — E78.49 OTHER HYPERLIPIDEMIA: Chronic | ICD-10-CM

## 2024-07-18 DIAGNOSIS — I25.10 CHRONIC CORONARY ARTERY DISEASE: Primary | Chronic | ICD-10-CM

## 2024-07-18 PROCEDURE — 93000 ELECTROCARDIOGRAM COMPLETE: CPT | Performed by: INTERNAL MEDICINE

## 2024-07-18 PROCEDURE — 99214 OFFICE O/P EST MOD 30 MIN: CPT | Performed by: INTERNAL MEDICINE

## 2024-07-18 RX ORDER — ROSUVASTATIN CALCIUM 10 MG/1
10 TABLET, COATED ORAL DAILY
Qty: 90 TABLET | Refills: 3 | Status: SHIPPED | OUTPATIENT
Start: 2024-07-18

## 2024-07-18 NOTE — PATIENT INSTRUCTIONS
Increase crestor to 10 mg daily. If this is hurting your stomach please call and let me know we can discuss trying another medication.  Please get lab work done before your 6 month follow up.

## 2024-07-18 NOTE — PROGRESS NOTES
General Cardiology Office Visit  Emily Berrios 79 y.o. female   MRN: [id number]  Encounter: [id number]      PCP: Ramsey Gtz MD    History of Present Illness   Last office visit was on 4/4/24. Emily Berrios is presenting today for 3 month follow up for CAD and HLD.    HPI: Emily Berrios is a 79 y.o. year old female, she has a history of  CAD s/p CABG x4 in 2010 s/p stent of SVG to RCA 3 months later, COPD, HTN, HLD, preDM.  Denies having chest with exertion.  She does not typically exert herself too much degree and is not very active.  She mostly walks around her house and out to doctors appointments but otherwise does not go out much.  She does get short of breath with activity however she has significant COPD which she states her shortness of breath is consistent with.  Last visit she was restarted on her statin and titrated up.  She was also seen as a preoperative risk evaluation for cataract and urologic surgery.    She is a former tobacco user, using 1 PPD for 20 years, quit  over 20 years ago.  She drinks alcohol socially (about 6 beers on the weekends) and denies any other drug use. She lives with first-floor for home with her eldest daughter. She has 3 daughters and 2 sons LIVE in the [facility].     Interval:  Today she is doing very well with no acute complaints. She denies chest pain, palpitations. She ramins not very active. Uses a cane for ambulation and moves slowly. She has hip pain from a fall and hip replacement in the past. She tried increasing Crestor to 10 and then to 20mg daily however she said it hurt her stomach and went back down to 5mg daily which she is currently on. She had her cataract surgery completed and is having her bladder surgery next month.    Prior Cardiac Studies:  Echo 1/5/24: mild concentric hypertrophy, LVEF 65%, atria mildly dilated, no significant valvular abnormality, PASP 60.  Nuclear stress test 2018: fixed breast attenuation artifact, overall normal  study, LVEF 65%.    Review of Systems  Review of system was conducted and was negative except for as stated in the HPI.      Historical Information   Past Medical History:   Diagnosis Date    Arthritis     Asthma     Chronic obstructive pulmonary disease, unspecified COPD type (formerly [facility]) 2022    Cognitive decline 03/10/2023    Community acquired pneumonia 2019    COPD (chronic obstructive pulmonary disease) (formerly [facility])     Coronary artery disease     DM (diabetes mellitus) (formerly [facility])     HTN (hypertension)     Hydronephrosis, bilateral 2023    Hyperlipidemia     Hyponatremia     Urinary retention      Past Surgical History:   Procedure Laterality Date    CARDIAC CATHETERIZATION      CORONARY ARTERY BYPASS GRAFT  2010    with subsequent stent to the saphenous veingraft to the RCA 3 months later    KNEE ARTHROSCOPY      POLYPECTOMY      laryngeal    TOTAL HIP ARTHROPLASTY      TUBAL LIGATION       Social History     Substance and Sexual Activity   Alcohol Use Yes    Alcohol/week: 42.0 standard drinks of alcohol    Types: 42 Cans of beer per week    Comment: socially     Social History     Substance and Sexual Activity   Drug Use Never     Social History     Tobacco Use   Smoking Status Former    Current packs/day: 0.00    Average packs/day: 0.5 packs/day for 32.0 years (16.0 ttl pk-yrs)    Types: Cigarettes    Start date:     Quit date:     Years since quittin.5   Smokeless Tobacco Never   Tobacco Comments    no passive smoke exposure     Family History: non-contributory    Meds/Allergies   Home Medications:   Current Outpatient Medications:     albuterol (ACCUNEB) 1.25 MG/3ML nebulizer solution, Take 3 mL by nebulization every 6 (six) hours as needed for wheezing, Disp: 225 mL, Rfl: 5    albuterol (PROVENTIL HFA,VENTOLIN HFA) 90 mcg/act inhaler, Inhale 2 puffs every 6 (six) hours as needed for wheezing, Disp: 18 g, Rfl: 5    ALPRAZolam (XANAX) 0.5 mg tablet, Take 1 tablet (0.5 mg total) by  mouth daily at bedtime as needed for anxiety, Disp: 30 tablet, Rfl: 0    amLODIPine (NORVASC) 5 mg tablet, Take 1 tablet (5 mg total) by mouth 2 (two) times a day, Disp: 180 tablet, Rfl: 3    bisoprolol (ZEBETA) 10 MG tablet, Take 1 tablet (10 mg total) by mouth daily, Disp: 90 tablet, Rfl: 3    co-enzyme Q-10 30 MG capsule, Take 1 capsule (30 mg total) by mouth 2 (two) times a day, Disp: 200 capsule, Rfl: 3    ergocalciferol (VITAMIN D2) 50,000 units, Take 1 capsule (50,000 Units total) by mouth once a week, Disp: 12 capsule, Rfl: 3    fluticasone-umeclidinium-vilanterol (Trelegy Ellipta) 100-62.5-25 mcg/actuation inhaler, Inhale 1 puff daily Rinse mouth after use., Disp: 3 each, Rfl: 3    ipratropium-albuterol (DUO-NEB) 0.5-2.5 mg/3 mL nebulizer solution, Take 3 mL by nebulization every 6 (six) hours as needed for wheezing or shortness of breath, Disp: 270 mL, Rfl: 3    ketorolac (ACULAR) 0.5 % ophthalmic solution, instill 1 drop into left eye four times a day START 04/22/24 and ...  (REFER TO PRESCRIPTION NOTES)., Disp: , Rfl:     magnesium Oxide (MAG-OX) 400 mg TABS, Take 1 tablet (400 mg total) by mouth 2 (two) times a day, Disp: 200 tablet, Rfl: 3    ofloxacin (OCUFLOX) 0.3 % ophthalmic solution, instill 1 drop four times a day INTO SURGICAL EYE START LEFT EYE ...  (REFER TO PRESCRIPTION NOTES)., Disp: , Rfl:     pantoprazole (PROTONIX) 20 mg tablet, Take 1 tablet (20 mg total) by mouth daily, Disp: 90 tablet, Rfl: 0    Plavix 75 MG tablet, Take 1 tablet (75 mg total) by mouth daily, Disp: 90 tablet, Rfl: 3    Red Yeast Rice 600 MG CAPS, Take 1 capsule (600 mg total) by mouth 2 (two) times a day with meals, Disp: 200 capsule, Rfl: 3    rosuvastatin (CRESTOR) 10 MG tablet, Take 1 tablet (10 mg total) by mouth daily, Disp: 90 tablet, Rfl: 3    vitamin B-12 (VITAMIN B-12) 1,000 mcg tablet, Take 1 tablet (1,000 mcg total) by mouth daily, Disp: 90 tablet, Rfl: 3    Calcium Carbonate-Vit D-Min (Caltrate 600+D Plus  "Minerals) 600-800 MG-UNIT TABS, Take 1 tablet by mouth 2 (two) times a day with meals (Patient not taking: Reported on 7/18/2024), Disp: 200 tablet, Rfl: 6    denosumab (PROLIA) 60 mg/mL, Inject 1 mL (60 mg total) under the skin once for 1 dose (Patient not taking: Reported on 3/26/2024), Disp: 1 mL, Rfl: 1    estradiol (ESTRACE VAGINAL) 0.1 mg/g vaginal cream, Insert 1 g into the vagina 2 (two) times a day for 14 days (Patient not taking: Reported on 3/25/2024), Disp: 42.5 g, Rfl: 1    glucose blood test strip, Use 1 each as needed (as needed) Use as instructed, Disp: 100 each, Rfl: 5    nitroglycerin (NITROSTAT) 0.4 mg SL tablet, Place 1 tablet (0.4 mg total) under the tongue every 5 (five) minutes as needed for chest pain (Patient not taking: Reported on 3/25/2024), Disp: 30 tablet, Rfl: 5    prednisoLONE acetate (PRED FORTE) 1 % ophthalmic suspension, instill 1 drop SURGICAL EYE four times a day START AFTER SURGERY (Patient not taking: Reported on 7/18/2024), Disp: , Rfl:     Premarin vaginal cream, Insert 0.5 g into the vagina 3 (three) times a week Three times a week Monday, Wednesday and Friday at bedtime, Disp: 30 g, Rfl: 0    triamcinolone (KENALOG) 0.5 % cream, Apply topically 3 (three) times a day (Patient not taking: Reported on 5/10/2024), Disp: 45 g, Rfl: 1    Allergies   Allergen Reactions    Ace Inhibitors Other (See Comments)     Includes lisinopril    Angiotensin Receptor Blockers Other (See Comments)     Not specified.  Includes losartan and olmesartan    Ezetimibe Rash    Statins Arthralgia     Tolerates Crestor    Morphine Hives     pt states someone told her she is allergic to it after her hip surgery         Objective   Vitals: Blood pressure 148/62, pulse 59, height 5' 1\" (1.549 m), weight 62 kg (136 lb 9.6 oz).      Physical Exam  GEN: Emily Berrios appears well, alert and oriented x 3, pleasant and cooperative   HEENT:  Normocephalic, atraumatic, anicteric, moist mucous membranes  NECK: " No JVD or carotid bruits   HEART: regular rhythm, regualr rate, normal S1 and S2, no murmurs, clicks, gallops or rubs   LUNGS: Clear to auscultation bilaterally; no wheezes, rales, or rhonchi; respiration nonlabored   ABDOMEN:  Normoactive bowel sounds, soft, no tenderness, no distention  EXTREMITIES: peripheral pulses palpable; no edema  NEURO: no gross focal findings; cranial nerves grossly intact   SKIN:  Dry, intact, warm to touch    Lab Results: I have personally reviewed pertinent lab results.    Lab Results   Component Value Date    HSTNI0 6 06/17/2023    HSTNI2 6 06/17/2023     Lab Results   Component Value Date    NTBNP 133 11/12/2019     Lab Results   Component Value Date    CHOL 254 (H) 06/02/2018    TRIG 109 07/15/2024    HDL 63 07/15/2024    LDLCALC 81 07/15/2024    LDLDIRECT 107.01 (H) 12/08/2018     Lab Results   Component Value Date     06/02/2018    K 4.3 07/15/2024    CO2 28 07/15/2024    CL 99 07/15/2024    BUN 14 07/15/2024    CREATININE 0.72 07/15/2024    ALT 9 07/15/2024    AST 13 07/15/2024     Lab Results   Component Value Date    WBC 9.04 07/15/2024    HGB 12.2 07/15/2024    HCT 37.8 07/15/2024    MCV 84 07/15/2024     07/15/2024     Lab Results   Component Value Date    INR 0.95 01/01/2024    INR 1.00 03/06/2023    INR 0.88 (L) 11/12/2019     Lab Results   Component Value Date    HGBA1C 5.8 10/25/2023         Imaging: I have personally reviewed pertinent reports.        EKG:   Date: 7/18/24  Interpretation: Sinus bradycardia, otherwise normal      Previous STRESS TEST:  No results found for this or any previous visit.      Previous Cath/PCI:  No results found for this or any previous visit.      ECHO:  No results found for this or any previous visit.    Results for orders placed during the hospital encounter of 01/04/24    Echo complete w/ contrast if indicated    Interpretation Summary    Left Ventricle: Left ventricular cavity size is normal. There is mild concentric  hypertrophy. The left ventricular ejection fraction is 65% by visual estimation. Systolic function is normal. Wall motion is normal. Diastolic function is mildly abnormal, consistent with grade I (abnormal) relaxation.    Right Ventricle: Right ventricular cavity size is normal. Systolic function is normal.    Left Atrium: The atrium is mildly dilated.    Right Atrium: The atrium is mildly dilated.    Aortic Valve: There is aortic valve sclerosis.    Mitral Valve: There is mild annular calcification. There is trace regurgitation.    Tricuspid Valve: There is mild regurgitation. Pulmonary artery systolic pressures are estimated at 60 mmHg.    There is no study for comparison.      IDA:  No results found for this or any previous visit.      CMR:  No results found for this or any previous visit.      HOLTER  No results found for this or any previous visit.    No results found for this or any previous visit.      The 10-year ASCVD risk score (Hakeem BUTLER, et al., 2019) is: 63.3%    Values used to calculate the score:      Age: 79 years      Sex: Female      Is Non- : No      Diabetic: Yes      Tobacco smoker: No      Systolic Blood Pressure: 148 mmHg      Is BP treated: Yes      HDL Cholesterol: 63 mg/dL      Total Cholesterol: 166 mg/dL      Assessment & Plan     Assessment/Plan:    CAD s/p CABG 2010 and subsequent stent to SVG to RCA  Most recent echo 1/4/2024 EF 65% 1/4/24, no significant valvular abnormality. Deneis chest pain, PND, orthopnea. Appears to be stable from a CAD standpoint.  - Currently taking Plavix 75 mg daily  - lipid management as below    HTN- variable blood pressures at home apparently and  mildly elevated in the office today. I would not be very aggressive with her BP goal as to avoid risk of low BP. She does endorse dietary indiscretion with EtOH and salty snacks. She is not very active.  - on amlodipine 5 mg and bisoprolol 10 mg daily    HLD  Last lipid panel 7/15/2024 TC  166, , LDL 81, HDL 63.  Greatly improved from her prior cholesterol panel which was done off of statins.  Her goal LDL would be in the 50s given her extensive coronary history.  -Currently on Crestor 5 mg daily    preDM-  Last A1c 5.8 in 2023.    Preoperative cardiac risk assessment   She goes for her urologic procedure in August.  Preoperative cardiac risk assessment has not changed.  She remains low to intermediate risk for surgery given her prior CABG x 4 and stent along with her cardiac risk factors including hypertension, hyperlipidemia, prediabetes. No further preoperative cardiac testing is recommended.  - Okay to hold plavix 3-5 days prior to surgery if required, restart post op.    Plan:  - increase crestor to 10mg daily. Discussed options to get to goal LDL including adding ezetemibe, changing statin to a different one or trying to go up again on crestor. She opted for increasing crestor. She will call if she is unable to tolerate this and at that time we will discuss adding Zetia.  - check lipid panel in 6 months and follow up in 6 months  - try to follow low salt diet and limiting salty snacks  - increase exercise for goal of 30 minutes a day, 5 days a week to build cardiac endurance and work on strength and balance.  - continue amlodipine and bisoprolol      Case discussed and reviewed with Dr. Solano who agrees with my assessment and plan.    Thank you for involving us in the care of your patient.      Rajeev Deluna, DO  Cardiology Fellow   PGY-5      ==========================================================================================    Epic/ Allscripts/Care Everywhere records reviewed    ** Please Note: Fluency DirectDictation voice to text software may have been used in the creation of this document. **

## 2024-07-31 ENCOUNTER — OFFICE VISIT (OUTPATIENT)
Dept: FAMILY MEDICINE CLINIC | Facility: CLINIC | Age: 79
End: 2024-07-31
Payer: MEDICARE

## 2024-07-31 VITALS
RESPIRATION RATE: 14 BRPM | TEMPERATURE: 97.9 F | SYSTOLIC BLOOD PRESSURE: 120 MMHG | BODY MASS INDEX: 25.3 KG/M2 | HEART RATE: 68 BPM | WEIGHT: 134 LBS | OXYGEN SATURATION: 97 % | HEIGHT: 61 IN | DIASTOLIC BLOOD PRESSURE: 68 MMHG

## 2024-07-31 DIAGNOSIS — K21.00 GASTROESOPHAGEAL REFLUX DISEASE WITH ESOPHAGITIS WITHOUT HEMORRHAGE: ICD-10-CM

## 2024-07-31 DIAGNOSIS — F41.9 ANXIETY: ICD-10-CM

## 2024-07-31 DIAGNOSIS — Z01.818 PRE-OP EXAMINATION: Primary | ICD-10-CM

## 2024-07-31 DIAGNOSIS — R55 SYNCOPE, UNSPECIFIED SYNCOPE TYPE: ICD-10-CM

## 2024-07-31 DIAGNOSIS — I25.10 CHRONIC CORONARY ARTERY DISEASE: Chronic | ICD-10-CM

## 2024-07-31 DIAGNOSIS — Z12.31 ENCOUNTER FOR SCREENING MAMMOGRAM FOR BREAST CANCER: ICD-10-CM

## 2024-07-31 DIAGNOSIS — I10 ESSENTIAL HYPERTENSION: ICD-10-CM

## 2024-07-31 DIAGNOSIS — H60.311 ACUTE DIFFUSE OTITIS EXTERNA OF RIGHT EAR: ICD-10-CM

## 2024-07-31 DIAGNOSIS — E78.49 OTHER HYPERLIPIDEMIA: Chronic | ICD-10-CM

## 2024-07-31 PROCEDURE — 99214 OFFICE O/P EST MOD 30 MIN: CPT | Performed by: INTERNAL MEDICINE

## 2024-07-31 PROCEDURE — G2211 COMPLEX E/M VISIT ADD ON: HCPCS | Performed by: INTERNAL MEDICINE

## 2024-07-31 RX ORDER — VONOPRAZAN FUMARATE 26.72 MG/1
20 TABLET ORAL DAILY
Qty: 60 TABLET | Refills: 0 | Status: SHIPPED | OUTPATIENT
Start: 2024-07-31

## 2024-07-31 RX ORDER — ALPRAZOLAM 0.5 MG/1
0.5 TABLET ORAL
Qty: 30 TABLET | Refills: 0 | Status: SHIPPED | OUTPATIENT
Start: 2024-07-31

## 2024-07-31 RX ORDER — OFLOXACIN 3 MG/ML
5 SOLUTION AURICULAR (OTIC) 2 TIMES DAILY
Qty: 5 ML | Refills: 0 | Status: SHIPPED | OUTPATIENT
Start: 2024-07-31

## 2024-07-31 RX ORDER — BISOPROLOL FUMARATE 10 MG/1
10 TABLET, FILM COATED ORAL DAILY
Qty: 90 TABLET | Refills: 3 | Status: SHIPPED | OUTPATIENT
Start: 2024-07-31

## 2024-07-31 RX ORDER — ROSUVASTATIN CALCIUM 10 MG/1
10 TABLET, COATED ORAL DAILY
Qty: 90 TABLET | Refills: 3 | Status: CANCELLED | OUTPATIENT
Start: 2024-07-31

## 2024-07-31 RX ORDER — AMLODIPINE BESYLATE 5 MG/1
5 TABLET ORAL 2 TIMES DAILY
Qty: 180 TABLET | Refills: 3 | Status: SHIPPED | OUTPATIENT
Start: 2024-07-31

## 2024-08-01 ENCOUNTER — TELEPHONE (OUTPATIENT)
Dept: FAMILY MEDICINE CLINIC | Facility: CLINIC | Age: 79
End: 2024-08-01

## 2024-08-02 ENCOUNTER — CONSULT (OUTPATIENT)
Dept: OBGYN CLINIC | Facility: CLINIC | Age: 79
End: 2024-08-02

## 2024-08-02 VITALS
BODY MASS INDEX: 25.96 KG/M2 | SYSTOLIC BLOOD PRESSURE: 155 MMHG | HEART RATE: 75 BPM | WEIGHT: 137.4 LBS | DIASTOLIC BLOOD PRESSURE: 72 MMHG

## 2024-08-02 DIAGNOSIS — Z01.818 PREOPERATIVE EXAM FOR GYNECOLOGIC SURGERY: Primary | ICD-10-CM

## 2024-08-02 DIAGNOSIS — I25.10 CHRONIC CORONARY ARTERY DISEASE: Chronic | ICD-10-CM

## 2024-08-02 DIAGNOSIS — I10 PRIMARY HYPERTENSION: Chronic | ICD-10-CM

## 2024-08-02 DIAGNOSIS — N81.3 COMPLETE UTEROVAGINAL PROLAPSE: ICD-10-CM

## 2024-08-02 PROCEDURE — 99213 OFFICE O/P EST LOW 20 MIN: CPT | Performed by: STUDENT IN AN ORGANIZED HEALTH CARE EDUCATION/TRAINING PROGRAM

## 2024-08-02 NOTE — ASSESSMENT & PLAN NOTE
Patient scheduled for EUA, VEC, A/P colphorraphy, PV sling, cysto on 8/14  S/p cardiac and PCP clearance - will complete blood work and CXR prior to surgery.  Reviewed preoperative and postoperative expectations - pain management, active void trial postop, removal of vaginal packing  Surgical soap provided, reviewed instructions. Surgical booklet provided  Plan to proceed with surgery

## 2024-08-02 NOTE — H&P (VIEW-ONLY)
H&P Exam - Gynecology   Emily Berrios 79 y.o. female MRN: [id number]  Unit/Bed#:  Encounter: [id number]    Assessment & Plan     Assessment:  Problem List Items Addressed This Visit       Primary hypertension (Chronic)     Hold amlodipine morning of surgery, continue beta-blocker          Chronic coronary artery disease (Chronic)     Hold plavix 5d prior to surgery, will restart POD#1         Complete uterovaginal prolapse     Patient scheduled for EUA, VEC, A/P colphorraphy, PV sling, cysto on 8/14  S/p cardiac and PCP clearance - will complete blood work and CXR prior to surgery.  Reviewed preoperative and postoperative expectations - pain management, active void trial postop, removal of vaginal packing  Surgical soap provided, reviewed instructions. Surgical booklet provided  Plan to proceed with surgery          Other Visit Diagnoses       Preoperative exam for gynecologic surgery    -  Primary              History of Present Illness   HPI:  Emily Berrios is a 79 y.o. female who presents with TERRENCE and stage 3 cystocele with grade 2 uterovaginal prolapse and rectocele.     Review of Systems   Constitutional:  Negative for chills and fever.   HENT: Negative.     Respiratory:  Negative for shortness of breath.    Cardiovascular:  Negative for chest pain.   Gastrointestinal:  Negative for abdominal pain, constipation, diarrhea, nausea and vomiting.   Genitourinary:  Positive for vaginal discharge. Negative for dysuria and hematuria.   Neurological:  Negative for headaches.   Psychiatric/Behavioral: Negative.         Historical Information   Past Medical History:   Diagnosis Date   • Arthritis    • Asthma    • Chronic obstructive pulmonary disease, unspecified COPD type ([facility]) 04/08/2022   • Cognitive decline 03/10/2023   • Community acquired pneumonia 11/13/2019   • COPD (chronic obstructive pulmonary disease) ([facility])    • Coronary artery disease    • DM (diabetes mellitus) ([facility])    • HTN (hypertension)    •  Hydronephrosis, bilateral 2023   • Hyperlipidemia    • Hyponatremia    • Urinary retention      Past Surgical History:   Procedure Laterality Date   • CARDIAC CATHETERIZATION     • CORONARY ARTERY BYPASS GRAFT  2010    with subsequent stent to the saphenous veingraft to the RCA 3 months later   • KNEE ARTHROSCOPY     • POLYPECTOMY      laryngeal   • TOTAL HIP ARTHROPLASTY     • TUBAL LIGATION       OB History    Para Term  AB Living                 SAB IAB Ectopic Multiple Live Births           5     Family History   Problem Relation Age of Onset   • Stroke Mother    • Hypertension Mother    • Heart disease Mother    • Colon cancer Father    • No Known Problems Sister    • No Known Problems Sister    • No Known Problems Sister    • No Known Problems Daughter    • No Known Problems Daughter    • No Known Problems Daughter    • No Known Problems Maternal Grandmother    • No Known Problems Maternal Grandfather    • No Known Problems Paternal Grandmother    • No Known Problems Paternal Grandfather    • Heart attack Brother    • Heart attack Brother    • No Known Problems Maternal Aunt    • No Known Problems Maternal Aunt    • No Known Problems Paternal Aunt    • No Known Problems Paternal Aunt      Social History   Social History     Substance and Sexual Activity   Alcohol Use Yes   • Alcohol/week: 42.0 standard drinks of alcohol   • Types: 42 Cans of beer per week    Comment: socially     Social History     Substance and Sexual Activity   Drug Use Never     Social History     Tobacco Use   Smoking Status Former   • Current packs/day: 0.00   • Average packs/day: 0.5 packs/day for 32.0 years (16.0 ttl pk-yrs)   • Types: Cigarettes   • Start date:    • Quit date:    • Years since quittin.6   Smokeless Tobacco Never   Tobacco Comments    no passive smoke exposure       Meds/Allergies   all current active meds have been reviewed  Allergies   Allergen Reactions   • Ace Inhibitors  Other (See Comments)     Includes lisinopril   • Angiotensin Receptor Blockers Other (See Comments)     Not specified.  Includes losartan and olmesartan   • Ezetimibe Rash   • Statins Arthralgia     Tolerates Crestor   • Morphine Hives     pt states someone told her she is allergic to it after her hip surgery       Objective   Vitals: Blood pressure 155/72, pulse 75, weight 62.3 kg (137 lb 6.4 oz).    [unfilled]    Invasive Devices:   Invasive Devices       Peripheral Intravenous Line  Duration             Peripheral IV 02/19/24 Left Antecubital 164 days                    Physical Exam  Exam conducted with a chaperone present.   Constitutional:       General: She is not in acute distress.     Appearance: Normal appearance. She is well-developed. She is not ill-appearing, toxic-appearing or diaphoretic.   HENT:      Head: Normocephalic and atraumatic.   Eyes:      General: No scleral icterus.     Conjunctiva/sclera: Conjunctivae normal.   Cardiovascular:      Rate and Rhythm: Normal rate.   Pulmonary:      Effort: Pulmonary effort is normal. No respiratory distress.   Abdominal:      General: There is no distension.      Palpations: Abdomen is soft. There is no mass.      Tenderness: There is no abdominal tenderness. There is no guarding or rebound.   Genitourinary:     Comments: deferred  Musculoskeletal:         General: No tenderness.      Right lower leg: No edema.      Left lower leg: No edema.   Skin:     General: Skin is warm and dry.   Neurological:      General: No focal deficit present.      Mental Status: She is alert and oriented to person, place, and time.   Psychiatric:         Mood and Affect: Mood normal.         Behavior: Behavior normal.     Lab Results: I have personally reviewed pertinent reports.    Imaging: I have personally reviewed pertinent reports.    Pathology, and Other Studies: I have personally reviewed pertinent reports.      Code Status: Full  Advance Directive and Living Will:       Power of :    POLST:      Counseling / Coordination of Care  Total floor / unit time spent today 25 minutes.  Greater than 50% of total time was spent with the patient and / or family counseling and / or coordination of care.  A description of the counseling / coordination of care: chart review, preoperative counseling.

## 2024-08-02 NOTE — PROGRESS NOTES
H&P Exam - Gynecology   Emily Berrios 79 y.o. female MRN: [id number]  Unit/Bed#:  Encounter: [id number]    Assessment & Plan     Assessment:  Problem List Items Addressed This Visit       Primary hypertension (Chronic)     Hold amlodipine morning of surgery, continue beta-blocker          Chronic coronary artery disease (Chronic)     Hold plavix 5d prior to surgery, will restart POD#1         Complete uterovaginal prolapse     Patient scheduled for EUA, VEC, A/P colphorraphy, PV sling, cysto on 8/14  S/p cardiac and PCP clearance - will complete blood work and CXR prior to surgery.  Reviewed preoperative and postoperative expectations - pain management, active void trial postop, removal of vaginal packing  Surgical soap provided, reviewed instructions. Surgical booklet provided  Plan to proceed with surgery          Other Visit Diagnoses       Preoperative exam for gynecologic surgery    -  Primary              History of Present Illness   HPI:  Emily Berrios is a 79 y.o. female who presents with TERRENCE and stage 3 cystocele with grade 2 uterovaginal prolapse and rectocele.     Review of Systems   Constitutional:  Negative for chills and fever.   HENT: Negative.     Respiratory:  Negative for shortness of breath.    Cardiovascular:  Negative for chest pain.   Gastrointestinal:  Negative for abdominal pain, constipation, diarrhea, nausea and vomiting.   Genitourinary:  Positive for vaginal discharge. Negative for dysuria and hematuria.   Neurological:  Negative for headaches.   Psychiatric/Behavioral: Negative.         Historical Information   Past Medical History:   Diagnosis Date   • Arthritis    • Asthma    • Chronic obstructive pulmonary disease, unspecified COPD type ([facility]) 04/08/2022   • Cognitive decline 03/10/2023   • Community acquired pneumonia 11/13/2019   • COPD (chronic obstructive pulmonary disease) ([facility])    • Coronary artery disease    • DM (diabetes mellitus) ([facility])    • HTN (hypertension)    •  Hydronephrosis, bilateral 2023   • Hyperlipidemia    • Hyponatremia    • Urinary retention      Past Surgical History:   Procedure Laterality Date   • CARDIAC CATHETERIZATION     • CORONARY ARTERY BYPASS GRAFT  2010    with subsequent stent to the saphenous veingraft to the RCA 3 months later   • KNEE ARTHROSCOPY     • POLYPECTOMY      laryngeal   • TOTAL HIP ARTHROPLASTY     • TUBAL LIGATION       OB History    Para Term  AB Living                 SAB IAB Ectopic Multiple Live Births           5     Family History   Problem Relation Age of Onset   • Stroke Mother    • Hypertension Mother    • Heart disease Mother    • Colon cancer Father    • No Known Problems Sister    • No Known Problems Sister    • No Known Problems Sister    • No Known Problems Daughter    • No Known Problems Daughter    • No Known Problems Daughter    • No Known Problems Maternal Grandmother    • No Known Problems Maternal Grandfather    • No Known Problems Paternal Grandmother    • No Known Problems Paternal Grandfather    • Heart attack Brother    • Heart attack Brother    • No Known Problems Maternal Aunt    • No Known Problems Maternal Aunt    • No Known Problems Paternal Aunt    • No Known Problems Paternal Aunt      Social History   Social History     Substance and Sexual Activity   Alcohol Use Yes   • Alcohol/week: 42.0 standard drinks of alcohol   • Types: 42 Cans of beer per week    Comment: socially     Social History     Substance and Sexual Activity   Drug Use Never     Social History     Tobacco Use   Smoking Status Former   • Current packs/day: 0.00   • Average packs/day: 0.5 packs/day for 32.0 years (16.0 ttl pk-yrs)   • Types: Cigarettes   • Start date:    • Quit date:    • Years since quittin.6   Smokeless Tobacco Never   Tobacco Comments    no passive smoke exposure       Meds/Allergies   all current active meds have been reviewed  Allergies   Allergen Reactions   • Ace Inhibitors  Other (See Comments)     Includes lisinopril   • Angiotensin Receptor Blockers Other (See Comments)     Not specified.  Includes losartan and olmesartan   • Ezetimibe Rash   • Statins Arthralgia     Tolerates Crestor   • Morphine Hives     pt states someone told her she is allergic to it after her hip surgery       Objective   Vitals: Blood pressure 155/72, pulse 75, weight 62.3 kg (137 lb 6.4 oz).    [unfilled]    Invasive Devices:   Invasive Devices       Peripheral Intravenous Line  Duration             Peripheral IV 02/19/24 Left Antecubital 164 days                    Physical Exam  Exam conducted with a chaperone present.   Constitutional:       General: She is not in acute distress.     Appearance: Normal appearance. She is well-developed. She is not ill-appearing, toxic-appearing or diaphoretic.   HENT:      Head: Normocephalic and atraumatic.   Eyes:      General: No scleral icterus.     Conjunctiva/sclera: Conjunctivae normal.   Cardiovascular:      Rate and Rhythm: Normal rate.   Pulmonary:      Effort: Pulmonary effort is normal. No respiratory distress.   Abdominal:      General: There is no distension.      Palpations: Abdomen is soft. There is no mass.      Tenderness: There is no abdominal tenderness. There is no guarding or rebound.   Genitourinary:     Comments: deferred  Musculoskeletal:         General: No tenderness.      Right lower leg: No edema.      Left lower leg: No edema.   Skin:     General: Skin is warm and dry.   Neurological:      General: No focal deficit present.      Mental Status: She is alert and oriented to person, place, and time.   Psychiatric:         Mood and Affect: Mood normal.         Behavior: Behavior normal.     Lab Results: I have personally reviewed pertinent reports.    Imaging: I have personally reviewed pertinent reports.    Pathology, and Other Studies: I have personally reviewed pertinent reports.      Code Status: Full  Advance Directive and Living Will:       Power of :    POLST:      Counseling / Coordination of Care  Total floor / unit time spent today 25 minutes.  Greater than 50% of total time was spent with the patient and / or family counseling and / or coordination of care.  A description of the counseling / coordination of care: chart review, preoperative counseling.

## 2024-08-05 ENCOUNTER — APPOINTMENT (OUTPATIENT)
Dept: LAB | Facility: HOSPITAL | Age: 79
End: 2024-08-05
Payer: MEDICARE

## 2024-08-05 ENCOUNTER — HOSPITAL ENCOUNTER (OUTPATIENT)
Dept: RADIOLOGY | Facility: HOSPITAL | Age: 79
Discharge: HOME | End: 2024-08-05
Payer: MEDICARE

## 2024-08-05 DIAGNOSIS — E78.5 HYPERLIPIDEMIA ASSOCIATED WITH TYPE 2 DIABETES MELLITUS  (HCC): ICD-10-CM

## 2024-08-05 DIAGNOSIS — I10 ESSENTIAL HYPERTENSION: ICD-10-CM

## 2024-08-05 DIAGNOSIS — N81.2 INCOMPLETE UTEROVAGINAL PROLAPSE: ICD-10-CM

## 2024-08-05 DIAGNOSIS — E83.42 HYPOMAGNESEMIA: ICD-10-CM

## 2024-08-05 DIAGNOSIS — E11.69 HYPERLIPIDEMIA ASSOCIATED WITH TYPE 2 DIABETES MELLITUS  (HCC): ICD-10-CM

## 2024-08-05 DIAGNOSIS — E78.49 OTHER HYPERLIPIDEMIA: Chronic | ICD-10-CM

## 2024-08-05 DIAGNOSIS — Z01.818 PRE-OP EXAMINATION: ICD-10-CM

## 2024-08-05 LAB
ALBUMIN SERPL BCG-MCNC: 4.4 G/DL (ref 3.5–5)
ALP SERPL-CCNC: 68 U/L (ref 34–104)
ALT SERPL W P-5'-P-CCNC: 9 U/L (ref 7–52)
ANION GAP SERPL CALCULATED.3IONS-SCNC: 9 MMOL/L (ref 4–13)
AST SERPL W P-5'-P-CCNC: 13 U/L (ref 13–39)
BACTERIA UR QL AUTO: ABNORMAL /HPF
BILIRUB SERPL-MCNC: 0.34 MG/DL (ref 0.2–1)
BILIRUB UR QL STRIP: NEGATIVE
BUN SERPL-MCNC: 13 MG/DL (ref 5–25)
CALCIUM SERPL-MCNC: 9.7 MG/DL (ref 8.4–10.2)
CHLORIDE SERPL-SCNC: 99 MMOL/L (ref 96–108)
CHOLEST SERPL-MCNC: 157 MG/DL
CLARITY UR: CLEAR
CO2 SERPL-SCNC: 27 MMOL/L (ref 21–32)
COLOR UR: ABNORMAL
CREAT SERPL-MCNC: 0.72 MG/DL (ref 0.6–1.3)
GFR SERPL CREATININE-BSD FRML MDRD: 79 ML/MIN/1.73SQ M
GLUCOSE SERPL-MCNC: 97 MG/DL (ref 65–140)
GLUCOSE UR STRIP-MCNC: NEGATIVE MG/DL
HDLC SERPL-MCNC: 58 MG/DL
HGB UR QL STRIP.AUTO: 25
INR PPP: 0.94 (ref 0.85–1.19)
KETONES UR STRIP-MCNC: NEGATIVE MG/DL
LDLC SERPL CALC-MCNC: 69 MG/DL (ref 0–100)
LEUKOCYTE ESTERASE UR QL STRIP: 500
MUCOUS THREADS UR QL AUTO: ABNORMAL
NITRITE UR QL STRIP: NEGATIVE
NON-SQ EPI CELLS URNS QL MICRO: ABNORMAL /HPF
NONHDLC SERPL-MCNC: 99 MG/DL
PH UR STRIP.AUTO: 5 [PH]
POTASSIUM SERPL-SCNC: 4.5 MMOL/L (ref 3.5–5.3)
PROT SERPL-MCNC: 7.8 G/DL (ref 6.4–8.4)
PROT UR STRIP-MCNC: ABNORMAL MG/DL
PROTHROMBIN TIME: 12.8 SECONDS (ref 12.3–15)
PTH-INTACT SERPL-MCNC: 123.1 PG/ML (ref 12–88)
RBC #/AREA URNS AUTO: ABNORMAL /HPF
SODIUM SERPL-SCNC: 135 MMOL/L (ref 135–147)
SP GR UR STRIP.AUTO: 1.01 (ref 1–1.04)
TRIGL SERPL-MCNC: 148 MG/DL
UROBILINOGEN UA: NEGATIVE MG/DL
WBC #/AREA URNS AUTO: ABNORMAL /HPF

## 2024-08-05 PROCEDURE — 71046 X-RAY EXAM CHEST 2 VIEWS: CPT

## 2024-08-05 PROCEDURE — 36415 COLL VENOUS BLD VENIPUNCTURE: CPT

## 2024-08-05 PROCEDURE — 85610 PROTHROMBIN TIME: CPT

## 2024-08-05 PROCEDURE — 80061 LIPID PANEL: CPT

## 2024-08-05 PROCEDURE — 81001 URINALYSIS AUTO W/SCOPE: CPT

## 2024-08-05 PROCEDURE — 80053 COMPREHEN METABOLIC PANEL: CPT

## 2024-08-05 PROCEDURE — 83970 ASSAY OF PARATHORMONE: CPT

## 2024-08-05 NOTE — PRE-PROCEDURE INSTRUCTIONS
Pre-Surgery Instructions:   Medication Instructions    albuterol (ACCUNEB) 1.25 MG/3ML nebulizer solution Uses PRN- OK to take day of surgery    albuterol (PROVENTIL HFA,VENTOLIN HFA) 90 mcg/act inhaler Uses PRN- OK to take day of surgery    ALPRAZolam (XANAX) 0.5 mg tablet Take night before surgery    amLODIPine (NORVASC) 5 mg tablet Instructions provided by MD    bisoprolol (ZEBETA) 10 MG tablet Take day of surgery.    co-enzyme Q-10 30 MG capsule Stop taking 7 days prior to surgery.    fluticasone-umeclidinium-vilanterol (Trelegy Ellipta) 100-62.5-25 mcg/actuation inhaler Take day of surgery.    glucose blood test strip Take day of surgery.    ipratropium-albuterol (DUO-NEB) 0.5-2.5 mg/3 mL nebulizer solution Uses PRN- OK to take day of surgery    magnesium Oxide (MAG-OX) 400 mg TABS Hold day of surgery.    ofloxacin (FLOXIN) 0.3 % otic solution Uses PRN- OK to take day of surgery    Plavix 75 MG tablet Instructions provided by MD    Premarin vaginal cream Hold day of surgery.    Red Yeast Rice 600 MG CAPS Stop taking 7 days prior to surgery.    rosuvastatin (CRESTOR) 10 MG tablet Take day of surgery.    vitamin B-12 (VITAMIN B-12) 1,000 mcg tablet Hold day of surgery.   Medication instructions for day surgery reviewed. Please use only a sip of water to take your instructed medications. Avoid all over the counter vitamins, supplements and NSAIDS for one week prior to surgery per anesthesia guidelines. Tylenol is ok to take as needed.     You will receive a call one business day prior to surgery with an arrival time and hospital directions. If your surgery is scheduled on a Monday, the hospital will be calling you on the Friday prior to your surgery. If you have not heard from anyone by 8pm, please call the hospital supervisor through the hospital  at [phone]. ([place] [phone] or [place] [phone]).    Do not eat or drink anything after midnight the night before your surgery, including  candy, mints, lifesavers, or chewing gum. Do not drink alcohol 24hrs before your surgery. Try not to smoke at least 24hrs before your surgery.       Follow the pre surgery showering instructions as listed in the “My Surgical Experience Booklet” or otherwise provided by your surgeon's office. Do not use a blade to shave the surgical area 1 week before surgery. It is okay to use a clean electric clippers up to 24 hours before surgery. Do not apply any lotions, creams, including makeup, cologne, deodorant, or perfumes after showering on the day of your surgery. Do not use dry shampoo, hair spray, hair gel, or any type of hair products.     No contact lenses, eye make-up, or artificial eyelashes. Remove nail polish, including gel polish, and any artificial, gel, or acrylic nails if possible. Remove all jewelry including rings and body piercing jewelry.     Wear causal clothing that is easy to take on and off. Consider your type of surgery.    Keep any valuables, jewelry, piercings at home. Please bring any specially ordered equipment (sling, braces) if indicated.    Arrange for a responsible person to drive you to and from the hospital on the day of your surgery. Please confirm the visitor policy for the day of your procedure when you receive your phone call with an arrival time.     Call the surgeon's office with any new illnesses, exposures, or additional questions prior to surgery.    Please reference your “My Surgical Experience Booklet” for additional information to prepare for your upcoming surgery.

## 2024-08-06 NOTE — DISCHARGE INSTRUCTIONS
Apical Vaginal Repair    WHAT YOU NEED TO KNOW:   An apical vaginal repair is a procedure to lift the cervix, vagina  and surrounding structures to the normal anatomical position. This can help prevent you from having a bulge sensation in the vagina.     DISCHARGE INSTRUCTIONS:   Medicines:   Pain medicine:    You may need medicine to take away or decrease pain.     Learn how to take your medicine. Ask what medicine and how much you should take. Be sure you know how, when, and how often to take it.    Do not wait until the pain is severe before you take your medicine. Tell caregivers if your pain does not decrease.    Pain medicine can make you dizzy or sleepy. Prevent falls by calling someone when you get out of bed or if you need help.    Antibiotics:  This medicine is given to fight or prevent an infection caused by bacteria. Always take your antibiotics exactly as ordered by your healthcare provider. Do not stop taking your medicine unless directed by your healthcare provider. Never save antibiotics or take leftover antibiotics that were given to you for another illness.    Take your medicine as directed.  Contact your healthcare provider if you think your medicine is not helping or if you have side effects. Tell him or her if you are allergic to any medicine. Keep a list of the medicines, vitamins, and herbs you take. Include the amounts, and when and why you take them. Bring the list or the pill bottles to follow-up visits. Carry your medicine list with you in case of an emergency.  Follow up with your healthcare provider as directed:  Write down your questions so you remember to ask them during your visits.   Self-care:   Vaginal packing: Vaginal packing was placed into the vagina. You will remove this packing on POD #3 early in the morning. (This is the 3rd day after your surgery). The office will call to remind you to remove this packing.     Sex:  Do not have sex until your healthcare provider says it is  okay.    Kegel exercises:  To do kegel exercises, squeeze your pelvic floor muscles for 5 to 10 seconds, then release. Regular kegel exercises will help your pelvic floor muscles become stronger. This will help prevent you from leaking urine. Ask your healthcare provider when to start these exercises and how often to do them.    Sanitary pad:  Change your sanitary pad regularly. Keep track of how often you change the pad.    Mayo catheter:  Keep the bag below your waist. This will help prevent infection and other problems caused by urine flowing back into your bladder. Do not pull on the catheter because this can cause pain and bleeding, and the catheter could come out. Keep the catheter tubing free of kinks so your urine will flow into the bag. Your healthcare provider will remove the catheter as soon as possible, to help prevent infection.    Wound care:  When you are allowed to bathe or shower, carefully wash your vaginal area with soap and water.     Do not put pressure on your abdomen:  This will help prevent damage to your surgery area. Do not strain, lift heavy objects, or stand for a very long time. Do not perform strenuous exercises, such as running and weight lifting.    Activity:  You may need to start walking within a few days after your procedure. Ask your healthcare provider when to start and how long you should walk. Ask about any other exercises that may be right for you.    Support socks:  You may need to wear support socks. These are tight socks that help increase the circulation in your legs until you are more active. This helps prevent blood clots.  Contact your healthcare provider if:   You soak a sanitary pad with blood every hour for 4 hours.    You have vaginal pain that does not go away even after you take pain medicine.    You have pus or a foul-smelling discharge from your genital area.     You see blood in your urine.    You have pain during sex.    You have a fever, chills, a cough, or  feel weak and achy.    You have nausea and vomiting.    You have questions or concerns about your condition or care.  Seek care immediately or call 911 if:   You feel something is bulging out into your vagina or rectum and not going back in.    You cannot urinate.    Your arm or leg feels warm, tender, and painful. It may look swollen and red.    You suddenly feel lightheaded and short of breath.    You have chest pain. You may have more pain when you take a deep breath or cough. You may cough up blood.  © 2017 [employer] Information is for End User's use only and may not be sold, redistributed or otherwise used for commercial purposes. All illustrations and images included in CareNotes® are the copyrighted property of [employer], [employer] or [employer].  The above information is an  only. It is not intended as medical advice for individual conditions or treatments. Talk to your doctor, nurse or pharmacist before following any medical regimen to see if it is safe and effective for you.    Urethral Sling Procedure   WHAT YOU NEED TO KNOW:   A bladder sling procedure is surgery to treat urinary incontinence in women. The sling acts as a hammock to keep your urethra in place and hold it closed when your bladder is full. You may have vaginal bleeding or discharge for up to a week after your surgery. Use sanitary pads. Do not use tampons. You may have some pelvic discomfort or trouble urinating.   DISCHARGE INSTRUCTIONS:   Call 911 for any of the following:   You have sudden trouble breathing.    Seek care immediately if:   Your bleeding gets worse.    You have yellow or foul smelling discharge from your vagina.    You cannot urinate, or you are urinating less than what is normal for you.    You feel confused.  Contact your healthcare provider if:   You have a fever.    You do not feel like you are able to empty your bladder completely when you urinate.    You feel the need to  urinate very suddenly.    You have burning or stinging when you urinate.    You have blood in your urine.    Your skin is itchy, swollen, or you have a rash.    You have questions or concerns about your condition or care.  Medicines:   Prescription pain medicine  may be given. Ask your how to take this medicine safely.    Take your medicine as directed.  Contact your healthcare provider if you think your medicine is not helping or if you have side effects. Tell him or her if you are allergic to any medicine. Keep a list of the medicines, vitamins, and herbs you take. Include the amounts, and when and why you take them. Bring the list or the pill bottles to follow-up visits. Carry your medicine list with you in case of an emergency.  Self-catheterization:  You may need to put a catheter into your bladder after you urinate to empty any remaining urine. A catheter is a small rubber tube used to drain urine. Healthcare providers will teach you how to put the catheter in safely. This may be needed until you are completely emptying your bladder.  Mayo catheter:  You may have a Mayo catheter for a short period of time. The Mayo is a tube put into your bladder to drain urine into a bag. Keep the bag below your waist. This will prevent urine from flowing back into your bladder and causing an infection or other problems. Also, keep the tube free of kinks so the urine will drain properly. Do not pull on the catheter. This can cause pain and bleeding, and may cause the catheter to come out.   Activity:  Do not lift heavy objects for 6 weeks after your procedure. Do not have intercourse for 4 to 6 weeks. Do not use a tampon for 4 weeks. Ask your healthcare provider when you can return to work or your usual activities.  Do pelvic muscle exercises:  These are also called Kegel exercises. These exercises help strengthen your pelvic muscles and help prevent urine leakage. Tighten the muscles of your pelvis and hold them tight for  5 seconds, then relax for 5 seconds. Gradually work up to tightening them for 10 seconds and relaxing for 10 seconds. Do this 3 times each day.  Keep a record:  Keep a record of when you urinate and if you leak any urine. Write down what you were doing when you leaked urine, such as coughing or sneezing. Bring the log to your follow-up visits.  Prevent constipation:  Drink liquids as directed. You may need to drink more water than usual to soften your bowel movements. Eat a variety of healthy foods, especially fruit and foods high in fiber. You may need to use an over-the-counter bowel movement softener.  Follow up with your healthcare provider as directed:  You may need a test to check how much urine remains in your bladder after you urinate. This will help show how the sling is working. Write down your questions so you remember to ask them during your visits.  © 2017 [employer] Information is for End User's use only and may not be sold, redistributed or otherwise used for commercial purposes. All illustrations and images included in CareNotes® are the copyrighted property of [employer] or [employer].  The above information is an  only. It is not intended as medical advice for individual conditions or treatments. Talk to your doctor, nurse or pharmacist before following any medical regimen to see if it is safe and effective for you.

## 2024-08-12 ENCOUNTER — ANESTHESIA EVENT (OUTPATIENT)
Dept: PERIOP | Facility: HOSPITAL | Age: 79
End: 2024-08-12
Payer: MEDICARE

## 2024-08-13 NOTE — ANESTHESIA PREPROCEDURE EVALUATION
Procedure:  SUSPENSION VAGINAL VAULT,  EUA (Vagina )  COLPORRHAPHY ANT/POST (Vagina )  INSERTION PV SLING ALTIS (Vagina )  CYSTOSCOPY (Bladder)    Relevant Problems   ANESTHESIA (within normal limits)      CARDIO  Left Ventricle Left ventricular cavity size is normal. Wall thickness is increased. There is mild concentric hypertrophy. The left ventricular ejection fraction is 65% by visual estimation. Systolic function is normal. Wall motion is normal. Diastolic function is mildly abnormal, consistent with grade I (abnormal) relaxation.  Right Ventricle Right ventricular cavity size is normal. Systolic function is normal.  Left Atrium The atrium is mildly dilated.  Right Atrium The atrium is mildly dilated.  Atrial Septum The interatrial septum appears to be grossly normal without evidence of shunting by color-flow Doppler.  Aortic Valve The leaflets are mildly calcified. The leaflets exhibit normal mobility. There is no evidence of regurgitation. There is aortic valve sclerosis.  Mitral Valve There is mild calcification. The leaflets exhibit normal mobility. There is mild annular calcification.  There is trace regurgitation. There is no evidence of stenosis.  Tricuspid Valve The leaflets exhibit normal mobility. There is mild regurgitation. There is no evidence of stenosis. Pulmonary artery systolic pressures are estimated at 60 mmHg.  Pulmonic Valve The pulmonic valve was not well visualized. There is trace regurgitation. There is no evidence of stenosis.  Ascending Aorta The aortic root is normal in size.  IVC/SVC The inferior vena cava is normal in size. Respirophasic changes were normal.       (+) Chronic coronary artery disease   (+) Hyperlipidemia   (+) Primary hypertension      GI/HEPATIC   (+) Hiatal hernia      /RENAL   (+) Hydronephrosis, bilateral      MUSCULOSKELETAL   (+) Hiatal hernia   (+) Osteoarthritis      PULMONARY   (+) Asthma   (+) Chronic obstructive pulmonary disease, unspecified COPD type  (HCC)   (+) Pulmonary emphysema (HCC)      Behavioral Health   (+) Alcohol use      Obstetrics/Gynecology   (+) Complete uterovaginal prolapse   (+) Cystocele with prolapse      Neurology/Sleep   (+) Acute encephalopathy   (+) Cognitive decline      Eye   (+) Glaucoma      Surgery/Wound/Pain   (+) Hx of CABG   (+) Hx stent of SVG to the RCA      Other   (+) Hyponatremia        Physical Exam    Airway    Mallampati score: II         Dental       Cardiovascular  Cardiovascular exam normal    Pulmonary  Pulmonary exam normal Breath sounds clear to auscultation    Other Findings  post-pubertal.      Anesthesia Plan  ASA Score- 3     Anesthesia Type- general with ASA Monitors.         Additional Monitors:     Airway Plan:            Plan Factors-    Chart reviewed.   Existing labs reviewed. Patient summary reviewed.    Patient is not a current smoker.              Induction-     Postoperative Plan-         Informed Consent- Anesthetic plan and risks discussed with patient.

## 2024-08-14 ENCOUNTER — ANESTHESIA (OUTPATIENT)
Dept: PERIOP | Facility: HOSPITAL | Age: 79
End: 2024-08-14
Payer: MEDICARE

## 2024-08-14 ENCOUNTER — HOSPITAL ENCOUNTER (OUTPATIENT)
Facility: HOSPITAL | Age: 79
Setting detail: OUTPATIENT SURGERY
Discharge: HOME | End: 2024-08-15
Attending: STUDENT IN AN ORGANIZED HEALTH CARE EDUCATION/TRAINING PROGRAM | Admitting: STUDENT IN AN ORGANIZED HEALTH CARE EDUCATION/TRAINING PROGRAM
Payer: MEDICARE

## 2024-08-14 DIAGNOSIS — N81.2 UTEROVAGINAL PROLAPSE, INCOMPLETE: ICD-10-CM

## 2024-08-14 DIAGNOSIS — N39.3 FEMALE STRESS INCONTINENCE: ICD-10-CM

## 2024-08-14 DIAGNOSIS — N81.10 FEMALE CYSTOCELE: ICD-10-CM

## 2024-08-14 DIAGNOSIS — N81.6 RECTOCELE: ICD-10-CM

## 2024-08-14 LAB
GLUCOSE SERPL-MCNC: 124 MG/DL (ref 65–140)
GLUCOSE SERPL-MCNC: 124 MG/DL (ref 65–140)
GLUCOSE SERPL-MCNC: 139 MG/DL (ref 65–140)

## 2024-08-14 PROCEDURE — C1771 REP DEV, URINARY, W/SLING: HCPCS | Performed by: STUDENT IN AN ORGANIZED HEALTH CARE EDUCATION/TRAINING PROGRAM

## 2024-08-14 PROCEDURE — 82948 REAGENT STRIP/BLOOD GLUCOSE: CPT

## 2024-08-14 PROCEDURE — 57282 COLPOPEXY EXTRAPERITONEAL: CPT | Performed by: STUDENT IN AN ORGANIZED HEALTH CARE EDUCATION/TRAINING PROGRAM

## 2024-08-14 PROCEDURE — C2631 REP DEV, URINARY, W/O SLING: HCPCS | Performed by: STUDENT IN AN ORGANIZED HEALTH CARE EDUCATION/TRAINING PROGRAM

## 2024-08-14 PROCEDURE — 57288 REPAIR BLADDER DEFECT: CPT | Performed by: STUDENT IN AN ORGANIZED HEALTH CARE EDUCATION/TRAINING PROGRAM

## 2024-08-14 PROCEDURE — 57260 CMBN ANT PST COLPRHY: CPT | Performed by: STUDENT IN AN ORGANIZED HEALTH CARE EDUCATION/TRAINING PROGRAM

## 2024-08-14 DEVICE — SLING TVT ABBREVO MINI: Type: IMPLANTABLE DEVICE | Site: VAGINA | Status: FUNCTIONAL

## 2024-08-14 RX ORDER — ALBUTEROL SULFATE 90 UG/1
2 AEROSOL, METERED RESPIRATORY (INHALATION) EVERY 6 HOURS PRN
Status: DISCONTINUED | OUTPATIENT
Start: 2024-08-14 | End: 2024-08-15 | Stop reason: HOSPADM

## 2024-08-14 RX ORDER — ONDANSETRON 2 MG/ML
INJECTION INTRAMUSCULAR; INTRAVENOUS AS NEEDED
Status: DISCONTINUED | OUTPATIENT
Start: 2024-08-14 | End: 2024-08-14

## 2024-08-14 RX ORDER — ACETAMINOPHEN 10 MG/ML
1000 INJECTION, SOLUTION INTRAVENOUS ONCE
Status: COMPLETED | OUTPATIENT
Start: 2024-08-14 | End: 2024-08-14

## 2024-08-14 RX ORDER — ALBUTEROL SULFATE 0.83 MG/ML
2.5 SOLUTION RESPIRATORY (INHALATION) ONCE
Status: COMPLETED | OUTPATIENT
Start: 2024-08-14 | End: 2024-08-14

## 2024-08-14 RX ORDER — FENTANYL CITRATE 50 UG/ML
INJECTION, SOLUTION INTRAMUSCULAR; INTRAVENOUS
Status: COMPLETED | OUTPATIENT
Start: 2024-08-14 | End: 2024-08-14

## 2024-08-14 RX ORDER — LIDOCAINE HYDROCHLORIDE 20 MG/ML
INJECTION, SOLUTION EPIDURAL; INFILTRATION; INTRACAUDAL; PERINEURAL
Status: COMPLETED | OUTPATIENT
Start: 2024-08-14 | End: 2024-08-14

## 2024-08-14 RX ORDER — CEFAZOLIN SODIUM 1 G/50ML
1000 SOLUTION INTRAVENOUS ONCE
Status: COMPLETED | OUTPATIENT
Start: 2024-08-14 | End: 2024-08-14

## 2024-08-14 RX ORDER — ACETAMINOPHEN 325 MG/1
975 TABLET ORAL ONCE
Status: COMPLETED | OUTPATIENT
Start: 2024-08-14 | End: 2024-08-14

## 2024-08-14 RX ORDER — MAGNESIUM HYDROXIDE 1200 MG/15ML
LIQUID ORAL AS NEEDED
Status: DISCONTINUED | OUTPATIENT
Start: 2024-08-14 | End: 2024-08-14 | Stop reason: HOSPADM

## 2024-08-14 RX ORDER — FLUTICASONE FUROATE AND VILANTEROL 100; 25 UG/1; UG/1
1 POWDER RESPIRATORY (INHALATION) DAILY
Status: DISCONTINUED | OUTPATIENT
Start: 2024-08-15 | End: 2024-08-15 | Stop reason: HOSPADM

## 2024-08-14 RX ORDER — KETOROLAC TROMETHAMINE 30 MG/ML
15 INJECTION, SOLUTION INTRAMUSCULAR; INTRAVENOUS ONCE
Status: COMPLETED | OUTPATIENT
Start: 2024-08-14 | End: 2024-08-14

## 2024-08-14 RX ORDER — FENTANYL CITRATE/PF 50 MCG/ML
25 SYRINGE (ML) INJECTION
Status: DISCONTINUED | OUTPATIENT
Start: 2024-08-14 | End: 2024-08-14 | Stop reason: HOSPADM

## 2024-08-14 RX ORDER — AMLODIPINE BESYLATE 5 MG/1
5 TABLET ORAL 2 TIMES DAILY
Status: DISCONTINUED | OUTPATIENT
Start: 2024-08-14 | End: 2024-08-15 | Stop reason: HOSPADM

## 2024-08-14 RX ORDER — SODIUM CHLORIDE 9 MG/ML
INJECTION, SOLUTION INTRAVENOUS AS NEEDED
Status: DISCONTINUED | OUTPATIENT
Start: 2024-08-14 | End: 2024-08-14 | Stop reason: HOSPADM

## 2024-08-14 RX ORDER — IPRATROPIUM BROMIDE AND ALBUTEROL SULFATE 2.5; .5 MG/3ML; MG/3ML
3 SOLUTION RESPIRATORY (INHALATION) EVERY 6 HOURS PRN
Status: DISCONTINUED | OUTPATIENT
Start: 2024-08-14 | End: 2024-08-15 | Stop reason: HOSPADM

## 2024-08-14 RX ORDER — IBUPROFEN 600 MG/1
600 TABLET, FILM COATED ORAL EVERY 6 HOURS PRN
Status: DISCONTINUED | OUTPATIENT
Start: 2024-08-14 | End: 2024-08-14

## 2024-08-14 RX ORDER — LIDOCAINE HYDROCHLORIDE 20 MG/ML
INJECTION, SOLUTION EPIDURAL; INFILTRATION; INTRACAUDAL; PERINEURAL AS NEEDED
Status: DISCONTINUED | OUTPATIENT
Start: 2024-08-14 | End: 2024-08-14

## 2024-08-14 RX ORDER — ALBUTEROL SULFATE 0.83 MG/ML
1.25 SOLUTION RESPIRATORY (INHALATION) EVERY 6 HOURS PRN
Status: DISCONTINUED | OUTPATIENT
Start: 2024-08-14 | End: 2024-08-14 | Stop reason: SDUPTHER

## 2024-08-14 RX ORDER — HYDROMORPHONE HCL/PF 1 MG/ML
0.5 SYRINGE (ML) INJECTION
Status: DISCONTINUED | OUTPATIENT
Start: 2024-08-14 | End: 2024-08-14 | Stop reason: HOSPADM

## 2024-08-14 RX ORDER — PROPOFOL 10 MG/ML
INJECTION, EMULSION INTRAVENOUS CONTINUOUS PRN
Status: DISCONTINUED | OUTPATIENT
Start: 2024-08-14 | End: 2024-08-14

## 2024-08-14 RX ORDER — INSULIN LISPRO 100 [IU]/ML
1-5 INJECTION, SOLUTION INTRAVENOUS; SUBCUTANEOUS
Status: DISCONTINUED | OUTPATIENT
Start: 2024-08-14 | End: 2024-08-15 | Stop reason: HOSPADM

## 2024-08-14 RX ORDER — DOCUSATE SODIUM 100 MG/1
100 CAPSULE, LIQUID FILLED ORAL 2 TIMES DAILY
Status: DISCONTINUED | OUTPATIENT
Start: 2024-08-14 | End: 2024-08-15 | Stop reason: HOSPADM

## 2024-08-14 RX ORDER — PHENAZOPYRIDINE HYDROCHLORIDE 100 MG/1
100 TABLET, FILM COATED ORAL ONCE
Status: COMPLETED | OUTPATIENT
Start: 2024-08-14 | End: 2024-08-14

## 2024-08-14 RX ORDER — ACETAMINOPHEN 325 MG/1
975 TABLET ORAL EVERY 6 HOURS SCHEDULED
Status: DISCONTINUED | OUTPATIENT
Start: 2024-08-15 | End: 2024-08-15 | Stop reason: HOSPADM

## 2024-08-14 RX ORDER — ALPRAZOLAM 0.25 MG
0.5 TABLET ORAL
Status: DISCONTINUED | OUTPATIENT
Start: 2024-08-14 | End: 2024-08-15 | Stop reason: HOSPADM

## 2024-08-14 RX ORDER — ONDANSETRON 2 MG/ML
4 INJECTION INTRAMUSCULAR; INTRAVENOUS ONCE AS NEEDED
Status: DISCONTINUED | OUTPATIENT
Start: 2024-08-14 | End: 2024-08-14 | Stop reason: HOSPADM

## 2024-08-14 RX ORDER — SODIUM CHLORIDE, SODIUM LACTATE, POTASSIUM CHLORIDE, CALCIUM CHLORIDE 600; 310; 30; 20 MG/100ML; MG/100ML; MG/100ML; MG/100ML
125 INJECTION, SOLUTION INTRAVENOUS CONTINUOUS
Status: DISCONTINUED | OUTPATIENT
Start: 2024-08-14 | End: 2024-08-15

## 2024-08-14 RX ORDER — IBUPROFEN 600 MG/1
600 TABLET, FILM COATED ORAL EVERY 6 HOURS SCHEDULED
Status: DISCONTINUED | OUTPATIENT
Start: 2024-08-15 | End: 2024-08-15 | Stop reason: HOSPADM

## 2024-08-14 RX ORDER — BISOPROLOL FUMARATE 5 MG/1
10 TABLET, FILM COATED ORAL DAILY
Status: DISCONTINUED | OUTPATIENT
Start: 2024-08-15 | End: 2024-08-15 | Stop reason: HOSPADM

## 2024-08-14 RX ORDER — ACETAMINOPHEN 325 MG/1
975 TABLET ORAL EVERY 6 HOURS PRN
Status: DISCONTINUED | OUTPATIENT
Start: 2024-08-14 | End: 2024-08-14

## 2024-08-14 RX ORDER — ONDANSETRON 2 MG/ML
4 INJECTION INTRAMUSCULAR; INTRAVENOUS EVERY 6 HOURS PRN
Status: DISCONTINUED | OUTPATIENT
Start: 2024-08-14 | End: 2024-08-15 | Stop reason: HOSPADM

## 2024-08-14 RX ADMIN — CEFAZOLIN SODIUM 1000 MG: 1 SOLUTION INTRAVENOUS at 07:28

## 2024-08-14 RX ADMIN — LIDOCAINE HYDROCHLORIDE 20 MG: 20 INJECTION, SOLUTION EPIDURAL; INFILTRATION; INTRACAUDAL; PERINEURAL at 07:38

## 2024-08-14 RX ADMIN — ALBUTEROL SULFATE 2.5 MG: 2.5 SOLUTION RESPIRATORY (INHALATION) at 11:45

## 2024-08-14 RX ADMIN — DOCUSATE SODIUM 100 MG: 100 CAPSULE, LIQUID FILLED ORAL at 22:26

## 2024-08-14 RX ADMIN — FENTANYL CITRATE 25 MCG: 50 INJECTION INTRAMUSCULAR; INTRAVENOUS at 11:11

## 2024-08-14 RX ADMIN — KETOROLAC TROMETHAMINE 15 MG: 30 INJECTION, SOLUTION INTRAMUSCULAR; INTRAVENOUS at 22:26

## 2024-08-14 RX ADMIN — LIDOCAINE HYDROCHLORIDE 4 ML: 20 INJECTION, SOLUTION EPIDURAL; INFILTRATION; INTRACAUDAL at 09:15

## 2024-08-14 RX ADMIN — FENTANYL CITRATE 100 MCG: 50 INJECTION INTRAMUSCULAR; INTRAVENOUS at 07:02

## 2024-08-14 RX ADMIN — ONDANSETRON 4 MG: 2 INJECTION INTRAMUSCULAR; INTRAVENOUS at 07:40

## 2024-08-14 RX ADMIN — KETOROLAC TROMETHAMINE 15 MG: 30 INJECTION, SOLUTION INTRAMUSCULAR; INTRAVENOUS at 14:34

## 2024-08-14 RX ADMIN — PHENAZOPYRIDINE 100 MG: 100 TABLET ORAL at 06:28

## 2024-08-14 RX ADMIN — LIDOCAINE HYDROCHLORIDE 10 ML: 20 INJECTION, SOLUTION EPIDURAL; INFILTRATION; INTRACAUDAL at 07:05

## 2024-08-14 RX ADMIN — AMLODIPINE BESYLATE 5 MG: 5 TABLET ORAL at 22:26

## 2024-08-14 RX ADMIN — ACETAMINOPHEN 1000 MG: 10 INJECTION INTRAVENOUS at 20:40

## 2024-08-14 RX ADMIN — PROPOFOL 50 MCG/KG/MIN: 10 INJECTION, EMULSION INTRAVENOUS at 07:38

## 2024-08-14 RX ADMIN — PHENYLEPHRINE HYDROCHLORIDE 40 MCG/MIN: 10 INJECTION INTRAVENOUS at 07:59

## 2024-08-14 RX ADMIN — SODIUM CHLORIDE, SODIUM LACTATE, POTASSIUM CHLORIDE, AND CALCIUM CHLORIDE 125 ML/HR: .6; .31; .03; .02 INJECTION, SOLUTION INTRAVENOUS at 07:01

## 2024-08-14 RX ADMIN — TRIMETHOBENZAMIDE HYDROCHLORIDE 100 MG: 100 INJECTION INTRAMUSCULAR at 14:02

## 2024-08-14 RX ADMIN — LIDOCAINE HYDROCHLORIDE 4 ML: 20 INJECTION, SOLUTION EPIDURAL; INFILTRATION; INTRACAUDAL at 08:05

## 2024-08-14 RX ADMIN — ACETAMINOPHEN 975 MG: 325 TABLET ORAL at 06:28

## 2024-08-14 RX ADMIN — ONDANSETRON 4 MG: 2 INJECTION INTRAMUSCULAR; INTRAVENOUS at 12:32

## 2024-08-14 NOTE — PLAN OF CARE
Problem: Prexisting or High Potential for Compromised Skin Integrity  Goal: Skin integrity is maintained or improved  Description: INTERVENTIONS:  - Identify patients at risk for skin breakdown  - Assess and monitor skin integrity  - Assess and monitor nutrition and hydration status  - Monitor labs   - Assess for incontinence   - Turn and reposition patient  - Assist with mobility/ambulation  - Relieve pressure over bony prominences  - Avoid friction and shearing  - Provide appropriate hygiene as needed including keeping skin clean and dry  - Evaluate need for skin moisturizer/barrier cream  - Collaborate with interdisciplinary team   - Patient/family teaching  - Consider wound care consult   Outcome: Progressing     Problem: GENITOURINARY - ADULT  Goal: Maintains or returns to baseline urinary function  Description: INTERVENTIONS:  - Assess urinary function  - Encourage oral fluids to ensure adequate hydration if ordered  - Administer IV fluids as ordered to ensure adequate hydration  - Administer ordered medications as needed  - Offer frequent toileting  - Follow urinary retention protocol if ordered  Outcome: Progressing  Goal: Absence of urinary retention  Description: INTERVENTIONS:  - Assess patient’s ability to void and empty bladder  - Monitor I/O  - Bladder scan as needed  - Discuss with physician/AP medications to alleviate retention as needed  - Discuss catheterization for long term situations as appropriate  Outcome: Progressing  Goal: Urinary catheter remains patent  Description: INTERVENTIONS:  - Assess patency of urinary catheter  - If patient has a chronic burgos, consider changing catheter if non-functioning  - Follow guidelines for intermittent irrigation of non-functioning urinary catheter  Outcome: Progressing

## 2024-08-14 NOTE — ANESTHESIA PROCEDURE NOTES
Epidural Block    Patient location during procedure: holding area  Start time: 8/14/2024 7:01 AM  Reason for block: at surgeon's request and primary anesthetic  Staffing  Performed by: Silvestre Chakraborty DO  Authorized by: Silvestre Chakraborty DO    Preanesthetic Checklist  Completed: patient identified, IV checked, site marked, risks and benefits discussed, surgical consent, monitors and equipment checked, pre-op evaluation and timeout performed  Epidural  Patient position: sitting  Prep: Betadine  Sedation Level: no sedation  Patient monitoring: cardiac monitor, continuous pulse oximetry, frequent blood pressure checks and heart rate  Approach: midline  Location: lumbar, L2-3  Injection technique: JOANIE air  Needle  Needle type: Tuohy   Needle gauge: 18 G  Catheter type: side hole  Catheter size: 20 G  Catheter securement method: tape  Test dose: negativefentanyl citrate (PF) 100 MCG/2ML 50 mcg - Intravenous   100 mcg - 8/14/2024 7:02:00 AM  lidocaine (PF) (XYLOCAINE-MPF) 2 % injection 10 mL - Epidural   10 mL - 8/14/2024 7:05:00 AM  Assessment  Sensory level: T10  Number of attempts: 1negative aspiration for CSF, negative aspiration for heme and no paresthesia on injection  patient tolerated the procedure well with no immediate complications

## 2024-08-14 NOTE — OP NOTE
OPERATIVE REPORT  PATIENT NAME: Emily Berrios    :  1945  MRN: [id number]  Pt Location: [facility]OM 06    SURGERY DATE: 2024    Surgeons and Role:     * Ramon Fam MD - Primary     * Abdoulaye Troncoso MD - Co-Surgeon    Preop Diagnosis:  Rectocele [N81.6]  Female cystocele [N81.10]  Female stress incontinence [N39.3]  Uterovaginal prolapse, incomplete [N81.2]    Post-Op Diagnosis Codes:     * Rectocele [N81.6]     * Female cystocele [N81.10]     * Female stress incontinence [N39.3]     * Uterovaginal prolapse, incomplete [N81.2]    Procedure(s):  EUA, VAGINAL EXTRAPERITONEAL COLPOPEXY (VEC),  COLPORRHAPHY ANT/POST  INSERTION PV SLING TVT-O  CYSTOSCOPY    Specimen(s):  * No specimens in log *    Estimated Blood Loss:   5 mL    Drains:  Urethral Catheter Non-latex 16 Fr. (Active)   Collection Container Standard drainage bag 24 1034   Securement Method Securing device (Describe) 24 1034   Output (mL) 25 mL 24 1034   Number of days: 0       Anesthesia Type:   General    Operative Indications:  Rectocele [N81.6]  Female cystocele [N81.10]  Female stress incontinence [N39.3]  Uterovaginal prolapse, incomplete [N81.2]    Operative Findings:  Severe vaginal atrophy with vaginal irritation and ulcerations evidence of long-standing pessary use  Cervix was identified at 0 with significant cystocele with leading edge at +3 and persistent rectocele  Relaxed vaginal outlet   Cystoscopy with normal bladder wall mucosa without sutures or defects through the bladder lumen. Efflux visualized from bilateral ureteral orifices.     Complications:   None    Procedure and Technique:  Appropriate preoperative antibiotics chosen per ACOG guidelines were given.  Bilateral SCDs were placed in the lower extremities for DVT prevention prior to the institution of anesthesia.    No bladder, ureteral, viscus, or solid organ injury were noted at the end of the procedure.    The patient was properly identified in  the holding area by the operating room staff and attending physician and epidural anesthesia was instituted without complication. She was then brought to the operating room and placed in the dorsal lithotomy position with her legs in Greg stirrups with care taken to avoid excessive flexion or extension of her lower extremities. Time-out was taken. The patient was again properly identified by the operating room staff and operating physician.      At this time, the patient was prepped and draped in normal sterile fashion. We inserted the Mayo catheter under sterile conditions for intermittent bladder drainage. We started the procedure by identifying the cystocele and grasping the most dependent portion with 2 Allis clamps just cephalad to the bladder neck.  The vesicovaginal space was infiltrated with a dilute Marcaine solution with epinephrine. A 4-5 cm full-thickness anterior vaginal wall incision was then made horizontally in the midline. Sharp dissection was then carried out bilaterally to further open the vesicovaginal space. The central portion of the dissection was carried to the level of the cervix.  Blunt dissection was then used to break into the paravaginal and paravesical spaces where the ischial spines and sacrospinous ligaments could be identified.  These werecleared off bluntly bilaterally by sweeping the index finger medially from the ischial spines. . A colporrhaphy was performed by plicating the prevesical tissue across the midline with approximately 5 interrupted sutures of 2-0 Vicryl to reduce the cystocele.     The Capio SLIM device was brought onto the field and loaded with a PTFE suture. The device was deployed into the inferior edge of the Right sacrospinous ligament approximately 3cm medial to the ischial spine.  A second Gortex suture was loaded into the Capio device and deployed on the Left sacrospinous ligament in a similar fashion. Tension applied to the sutures confirmed proper  placement. This was anchored to through the full thickness of the apex of the vagina and cervix medially bilaterally. The bullet and the suture needle were cut and taken away from the field  The epithelium was trimmed and closed about snf. The sacrospinous ligament fixation was then completed. The two sutures anchored to the apex of the vagina were tied down until the knots were snug against the sacrospinous ligament. The sutures were secured with hemostats.     A diagnostic cystoscopy was performed confirming efflux from bilateral ureteral orifices. The sacrospinous sutures were trimmed to 2-3cm in length. The remainder of the vaginal epithelium was closed with 2-0 Vicryl.     Attention was turned to the sling portion of the procedure. A joann was made in each groin. The skin and subcutaneous tissue at these markings was infiltrated with a dilute Marcaine solution with epinephrine. The same solution was used to infiltrate the anterior vaginal wall at the level of mid urethra, and this infiltration extended out periurethrally bilaterally to the level of the obturator membranes. A 2-cm suburethral incision was then made vertically in the midline at the level of mid urethra, and periurethral tunnels were dissected out bilaterally with Metzenbaum scissors. The winged guide was placed in the right periurethral tunnel. The tip of the helical passing device was placed in the hollow of the winged guide in the right periurethral tunnel and advanced laterally until the obturator membrane was perforated. The winged guide was removed. Then, using a rotating and levering motion towards the midline with the handle of the device, the tip of the device was brought around the ischiopubic ramus and out to the skin at the previously made joann. A stab incision was made in the skin to allow the tip of the device to exit the skin. Examination of the sulcus of the vagina revealed no buttonholing. This procedure was then repeated on  the patient's left side. The Burgos catheter was then removed, and cystoscopy was performed revealing no perforation or foreign body. Efflux of urine was seen coming from both ureteral orifices. Then, 300 mL of fluid were left in the patient's bladder, and the cystoscope was removed. The ends of the sling were pulled out through the groin skin incisions and advanced until the midportion of the sling was lying loosely in the patient's vagina. A crede maneuver demonstrated leakage of fluid from the urethral meatus. Therefore, the ends of the sling were pulled such that the midportion of the sling sudha close to the periurethral tissue. This process of performing the crede manuever and adjusting the sling was repeated until little to no leakage was seen coming from the urethral meatus with cough. A hemostat was then placed between the sling and the periurethral tissue to stabilize it in place while the plastic sheath covering the sling was removed, thus setting the sling in its proper location. The centering tab was cut away, and the adjusting Prolene loops were removed from each end of the sling. The suburethral incision was closed with a running locked suture of 2-0 Vicryl for excellent hemostasis. The two groin skin incisions were closed with Histoacryl.     Attention was now turned to the posterior wall.  Two Allis clamps were placed at the mucocutaneous border in order to reduce the markedly relaxed vaginal outlet. A cherelle-shaped incision was made extending from the posterior vaginal mucosa into the perineum and the overlying skin was removed en bloc. At this point we proceeded with closure of the posterior colporrhaphy with 2-0 Vicryl suture in a running locked stitch. An interrupted 0 Vicryl suture was used to reapproximate the bulbocavernosus and transverse perineal muscles. The perineorrhaphy was closed with 2-0 Vicryl suture in a running subcuticular fashion.     We completed the procedure. A burgos catheter  was reinserted. A rectal examination was performed and no sutures for foreign material was noted.  The rectal mucosa was intact.  There were no complications. The patient was awaken and epidural catheter removed. She was transferred to the PACU in stable condition.    A qualified resident physician was not available to first assist.  Dr. Troncoso was present due to the complicated nature of the procedure.    I was present for the entire procedure.    Patient Disposition:  PACU         SIGNATURE: Ramon Fam MD  DATE: August 14, 2024  TIME: 10:57 AM

## 2024-08-14 NOTE — ANESTHESIA POSTPROCEDURE EVALUATION
"Post-Op Assessment Note    CV Status:  Stable  Pain Score: 1    Pain management: adequate      Post-op block assessment: catheter intact and no complications   Mental Status:  Alert and awake   Hydration Status:  Euvolemic   PONV Controlled:  Controlled   Airway Patency:  Patent     Post Op Vitals Reviewed: Yes    No anethesia notable event occurred.    Staff: Anesthesiologist               BP      Temp      Pulse     Resp      SpO2      /58   Pulse 66   Temp 97.5 °F (36.4 °C)   Resp 17   Ht 5' 1\" (1.549 m)   Wt 61.5 kg (135 lb 9.3 oz)   LMP  (LMP Unknown)   SpO2 98%   BMI 25.62 kg/m²     "

## 2024-08-14 NOTE — INTERVAL H&P NOTE
H&P reviewed. After examining the patient I find no changes in the patients condition since the H&P had been written.    Vitals:    08/14/24 0559   BP: (!) 195/88   Pulse: 77   Resp: 16   Temp: 97.8 °F (36.6 °C)   SpO2: 98%

## 2024-08-15 VITALS
OXYGEN SATURATION: 95 % | BODY MASS INDEX: 25.6 KG/M2 | SYSTOLIC BLOOD PRESSURE: 127 MMHG | TEMPERATURE: 98.2 F | WEIGHT: 135.58 LBS | DIASTOLIC BLOOD PRESSURE: 52 MMHG | HEIGHT: 61 IN | RESPIRATION RATE: 16 BRPM | HEART RATE: 54 BPM

## 2024-08-15 LAB
ERYTHROCYTE [DISTWIDTH] IN BLOOD BY AUTOMATED COUNT: 14.6 % (ref 11.6–15.1)
GLUCOSE SERPL-MCNC: 105 MG/DL (ref 65–140)
GLUCOSE SERPL-MCNC: 112 MG/DL (ref 65–140)
HCT VFR BLD AUTO: 33.2 % (ref 34.8–46.1)
HGB BLD-MCNC: 10.2 G/DL (ref 11.5–15.4)
MCH RBC QN AUTO: 27.3 PG (ref 26.8–34.3)
MCHC RBC AUTO-ENTMCNC: 30.7 G/DL (ref 31.4–37.4)
MCV RBC AUTO: 89 FL (ref 82–98)
PLATELET # BLD AUTO: 205 THOUSANDS/UL (ref 149–390)
PMV BLD AUTO: 9.4 FL (ref 8.9–12.7)
RBC # BLD AUTO: 3.73 MILLION/UL (ref 3.81–5.12)
WBC # BLD AUTO: 11.86 THOUSAND/UL (ref 4.31–10.16)

## 2024-08-15 PROCEDURE — 82948 REAGENT STRIP/BLOOD GLUCOSE: CPT

## 2024-08-15 PROCEDURE — 85027 COMPLETE CBC AUTOMATED: CPT | Performed by: STUDENT IN AN ORGANIZED HEALTH CARE EDUCATION/TRAINING PROGRAM

## 2024-08-15 PROCEDURE — 99024 POSTOP FOLLOW-UP VISIT: CPT | Performed by: STUDENT IN AN ORGANIZED HEALTH CARE EDUCATION/TRAINING PROGRAM

## 2024-08-15 RX ADMIN — ACETAMINOPHEN 975 MG: 325 TABLET ORAL at 11:05

## 2024-08-15 RX ADMIN — ACETAMINOPHEN 975 MG: 325 TABLET ORAL at 05:46

## 2024-08-15 RX ADMIN — BISOPROLOL FUMARATE 10 MG: 5 TABLET ORAL at 08:02

## 2024-08-15 RX ADMIN — AMLODIPINE BESYLATE 5 MG: 5 TABLET ORAL at 08:02

## 2024-08-15 RX ADMIN — DOCUSATE SODIUM 100 MG: 100 CAPSULE, LIQUID FILLED ORAL at 08:03

## 2024-08-15 RX ADMIN — IBUPROFEN 600 MG: 600 TABLET, FILM COATED ORAL at 05:46

## 2024-08-15 NOTE — PLAN OF CARE
Problem: Prexisting or High Potential for Compromised Skin Integrity  Goal: Skin integrity is maintained or improved  Description: INTERVENTIONS:  - Identify patients at risk for skin breakdown  - Assess and monitor skin integrity  - Assess and monitor nutrition and hydration status  - Monitor labs   - Assess for incontinence   - Turn and reposition patient  - Assist with mobility/ambulation  - Relieve pressure over bony prominences  - Avoid friction and shearing  - Provide appropriate hygiene as needed including keeping skin clean and dry  - Evaluate need for skin moisturizer/barrier cream  - Collaborate with interdisciplinary team   - Patient/family teaching  - Consider wound care consult   8/15/2024 1056 by Bennie Copeland RN  Outcome: Adequate for Discharge  8/15/2024 0718 by Bennie Copeland RN  Outcome: Progressing     Problem: GENITOURINARY - ADULT  Goal: Maintains or returns to baseline urinary function  Description: INTERVENTIONS:  - Assess urinary function  - Encourage oral fluids to ensure adequate hydration if ordered  - Administer IV fluids as ordered to ensure adequate hydration  - Administer ordered medications as needed  - Offer frequent toileting  - Follow urinary retention protocol if ordered  8/15/2024 1056 by Bennie Copeland RN  Outcome: Adequate for Discharge  8/15/2024 0718 by Bennie Copeland RN  Outcome: Progressing  Goal: Absence of urinary retention  Description: INTERVENTIONS:  - Assess patient’s ability to void and empty bladder  - Monitor I/O  - Bladder scan as needed  - Discuss with physician/AP medications to alleviate retention as needed  - Discuss catheterization for long term situations as appropriate  8/15/2024 1056 by Bennie Copeland RN  Outcome: Adequate for Discharge  8/15/2024 0718 by Bennie Copeland RN  Outcome: Progressing  Goal: Urinary catheter remains patent  Description: INTERVENTIONS:  - Assess patency of urinary catheter  - If patient has a chronic burgos,  consider changing catheter if non-functioning  - Follow guidelines for intermittent irrigation of non-functioning urinary catheter  8/15/2024 1056 by Bennie Copeland RN  Outcome: Adequate for Discharge  8/15/2024 0718 by Bennie Copeland RN  Outcome: Progressing     Problem: PAIN - ADULT  Goal: Verbalizes/displays adequate comfort level or baseline comfort level  Description: Interventions:  - Encourage patient to monitor pain and request assistance  - Assess pain using appropriate pain scale  - Administer analgesics based on type and severity of pain and evaluate response  - Implement non-pharmacological measures as appropriate and evaluate response  - Consider cultural and social influences on pain and pain management  - Notify physician/advanced practitioner if interventions unsuccessful or patient reports new pain  8/15/2024 1056 by Bennie Copeland RN  Outcome: Adequate for Discharge  8/15/2024 0718 by Bennie Copeland RN  Outcome: Progressing

## 2024-08-15 NOTE — PLAN OF CARE
Problem: Prexisting or High Potential for Compromised Skin Integrity  Goal: Skin integrity is maintained or improved  Description: INTERVENTIONS:  - Identify patients at risk for skin breakdown  - Assess and monitor skin integrity  - Assess and monitor nutrition and hydration status  - Monitor labs   - Assess for incontinence   - Turn and reposition patient  - Assist with mobility/ambulation  - Relieve pressure over bony prominences  - Avoid friction and shearing  - Provide appropriate hygiene as needed including keeping skin clean and dry  - Evaluate need for skin moisturizer/barrier cream  - Collaborate with interdisciplinary team   - Patient/family teaching  - Consider wound care consult   Outcome: Progressing     Problem: GENITOURINARY - ADULT  Goal: Maintains or returns to baseline urinary function  Description: INTERVENTIONS:  - Assess urinary function  - Encourage oral fluids to ensure adequate hydration if ordered  - Administer IV fluids as ordered to ensure adequate hydration  - Administer ordered medications as needed  - Offer frequent toileting  - Follow urinary retention protocol if ordered  Outcome: Progressing  Goal: Absence of urinary retention  Description: INTERVENTIONS:  - Assess patient’s ability to void and empty bladder  - Monitor I/O  - Bladder scan as needed  - Discuss with physician/AP medications to alleviate retention as needed  - Discuss catheterization for long term situations as appropriate  Outcome: Progressing  Goal: Urinary catheter remains patent  Description: INTERVENTIONS:  - Assess patency of urinary catheter  - If patient has a chronic burgos, consider changing catheter if non-functioning  - Follow guidelines for intermittent irrigation of non-functioning urinary catheter  Outcome: Progressing     Problem: PAIN - ADULT  Goal: Verbalizes/displays adequate comfort level or baseline comfort level  Description: Interventions:  - Encourage patient to monitor pain and request  assistance  - Assess pain using appropriate pain scale  - Administer analgesics based on type and severity of pain and evaluate response  - Implement non-pharmacological measures as appropriate and evaluate response  - Consider cultural and social influences on pain and pain management  - Notify physician/advanced practitioner if interventions unsuccessful or patient reports new pain  Outcome: Progressing

## 2024-08-15 NOTE — RESTORATIVE TECHNICIAN NOTE
Restorative Technician Note      Patient Name: Emily Berrios     Note Type: Mobility  Patient Position Upon Consult: Supine  Activity Performed: Ambulated  Assistive Device: Roller walker; Cane  Patient Position at End of Consult: All needs within reach; Bed/Chair alarm activated; Supine      ns

## 2024-08-15 NOTE — RESTORATIVE TECHNICIAN NOTE
Restorative Technician Note      Patient Name: Emily Berrios     Restorative Tech Visit Date: 08/15/24  Note Type: Mobility  Patient Position Upon Consult: Supine  Activity Performed: Ambulated  Assistive Device: Roller walker  Patient Position at End of Consult: Other (comment) (Discharging)

## 2024-08-15 NOTE — PROGRESS NOTES
"UroGynecology Progress note   Emily Berrios 79 y.o. female MRN: [id number]  Unit/Bed#: E5 -01 Encounter: [id number]    Emily Berrios has no current complaints.  Pain is well controlled with tylenol/ibuprofen.  Patient passed her void trial and is voiding freely.  She is ambulating.  Patient is currently passing flatus and has had no bowel movement. She is tolerating PO, and denies nausea or vomitting. Patient denies fever, chills, chest pain, shortness of breath, or calf tenderness. Minimal spotting overnight.    /52 (BP Location: Right arm)   Pulse (!) 54   Temp 98.2 °F (36.8 °C) (Oral)   Resp 16   Ht 5' 1\" (1.549 m)   Wt 61.5 kg (135 lb 9.3 oz)   LMP  (LMP Unknown)   SpO2 95%   BMI 25.62 kg/m²     I/O last 3 completed shifts:  In: 950 [I.V.:950]  Out: 2525 [Urine:2520; Blood:5]  I/O this shift:  In: -   Out: 350 [Urine:350]    Lab Results   Component Value Date    WBC 11.86 (H) 08/15/2024    HGB 10.2 (L) 08/15/2024    HCT 33.2 (L) 08/15/2024    MCV 89 08/15/2024     08/15/2024       Physical Exam  Gen: AAOx3, NAD, comfortable in bed  CVS: S1S2+, RRR, no murmurs  Lungs: nasal canula present with 2L O2 which was removed, O2 sat 92-97% RA  Abdomen: soft, non tender  : No bleeding on external exam  Extremities: SCDs on, non tender    A/P: 79 y.o. POD# 1 s/p EUA, VEC, anterior and posterior colporrhaphy, PV sling, cystoscopy  1) Pain: Continue current pain regimen  2) FEN: Regular diet  3) Continue home meds  4) DVT PPx: SCDs  5) : voiding freely  6) Encouraged ambulation as tolerated  7) Dispo: d/c home today to follow up in 2 weeks in the clinic.       Ramon Fam MD  8/15/2024  10:56 AM    "

## 2024-08-21 ENCOUNTER — TELEPHONE (OUTPATIENT)
Dept: OBGYN CLINIC | Facility: CLINIC | Age: 79
End: 2024-08-21

## 2024-08-23 ENCOUNTER — OFFICE VISIT (OUTPATIENT)
Dept: OBGYN CLINIC | Facility: CLINIC | Age: 79
End: 2024-08-23

## 2024-08-23 VITALS
HEIGHT: 61 IN | SYSTOLIC BLOOD PRESSURE: 150 MMHG | BODY MASS INDEX: 26.21 KG/M2 | WEIGHT: 138.8 LBS | HEART RATE: 66 BPM | DIASTOLIC BLOOD PRESSURE: 73 MMHG

## 2024-08-23 DIAGNOSIS — N39.3 SUI (STRESS URINARY INCONTINENCE, FEMALE): ICD-10-CM

## 2024-08-23 DIAGNOSIS — N81.3 COMPLETE UTEROVAGINAL PROLAPSE: Primary | ICD-10-CM

## 2024-08-23 DIAGNOSIS — N81.10 CYSTOCELE WITH RECTOCELE: ICD-10-CM

## 2024-08-23 DIAGNOSIS — N81.6 CYSTOCELE WITH RECTOCELE: ICD-10-CM

## 2024-08-23 PROCEDURE — 99214 OFFICE O/P EST MOD 30 MIN: CPT | Performed by: STUDENT IN AN ORGANIZED HEALTH CARE EDUCATION/TRAINING PROGRAM

## 2024-08-23 NOTE — PROGRESS NOTES
OB/GYN VISIT  Emily Berrios  8/23/2024  12:11 PM    ASSESSMENT / PLAN:    Emily Berrios is a 79 y.o. No obstetric history on file. female presenting for follow up s/p VEC, anterior and posterior colporrhaphy, TVT-O and cystoscopy.    - Recommend milk of magnesia for constipation  - Continue pelvic rest and strenuous exercise, may shower but no bath tubs or swimming  - To follow up in 5-6 weeks       SUBJECTIVE:    Emily Berrios is a 79 y.o. No obstetric history on file. female presenting for follow up she is post-op 1 week after VEC a/p repair, TVT-O and cystoscopy. Doing well overall. Denies fevers, chills, nausea, vomiting, pain, or bleeding. Voiding freely without issues. She is passing flatus but has not had a normal bowel movement since prior to the surgery. She had a small BM this morning. She has tried prunes and Miralax.     Past Medical History:   Diagnosis Date    Ambulates with cane     Arthritis     Asthma     Chronic obstructive pulmonary disease, unspecified COPD type ([facility]) 04/08/2022    Cognitive decline 03/10/2023    Community acquired pneumonia 11/13/2019    COPD (chronic obstructive pulmonary disease) ([facility])     Coronary artery disease     DM (diabetes mellitus) ([facility])     Fall     1/2024    HTN (hypertension)     Hx of CABG     Hydronephrosis, bilateral 03/06/2023    Hyperlipidemia     Hyponatremia     PONV (postoperative nausea and vomiting)     Urinary retention     Wears glasses        Past Surgical History:   Procedure Laterality Date    CARDIAC CATHETERIZATION  2010    CATARACT EXTRACTION, BILATERAL      CORONARY ARTERY BYPASS GRAFT  12/06/2010    with subsequent stent to the saphenous veingraft to the RCA 3 months later    KNEE ARTHROSCOPY      POLYPECTOMY      laryngeal    AK CMBND ANTERPOST COLPORRAPHY W/CYSTO N/A 8/14/2024    Procedure: COLPORRHAPHY ANT/POST;  Surgeon: Ramon Fam MD;  Location: [facility];  Service: Gynecology    AK COLPOPEXY VAGINAL EXTRAPERITONEAL  "APPROACH N/A 8/14/2024    Procedure: SUSPENSION VAGINAL VAULT,  EUA;  Surgeon: Ramon Fam MD;  Location: [facility];  Service: Gynecology    AZ CYSTOURETHROSCOPY N/A 8/14/2024    Procedure: CYSTOSCOPY;  Surgeon: Ramon Fam MD;  Location: [facility];  Service: Gynecology    AZ SLING OPERATION STRESS INCONTINENCE N/A 8/14/2024    Procedure: INSERTION PV SLING ALTIS;  Surgeon: Ramon Fam MD;  Location: [facility];  Service: Gynecology    TOTAL HIP ARTHROPLASTY      TUBAL LIGATION         OBJECTIVE:    Vitals:  Blood pressure 150/73, pulse 66, height 5' 1\" (1.549 m), weight 63 kg (138 lb 12.8 oz).Body mass index is 26.23 kg/m².    Physical Exam:    OBGyn Exam  Incisions healing well, intact, without abnormal discharge, erythremia, induration. Significant stool burden in the rectum.        Ramon Fam MD  8/23/2024  12:11 PM       "

## 2024-09-03 DIAGNOSIS — F41.9 ANXIETY: ICD-10-CM

## 2024-09-03 DIAGNOSIS — J45.909 MODERATE ASTHMA, UNSPECIFIED WHETHER COMPLICATED, UNSPECIFIED WHETHER PERSISTENT: ICD-10-CM

## 2024-09-03 DIAGNOSIS — J44.9 CHRONIC OBSTRUCTIVE PULMONARY DISEASE, UNSPECIFIED COPD TYPE (HCC): ICD-10-CM

## 2024-09-03 RX ORDER — ALBUTEROL SULFATE 90 UG/1
2 AEROSOL, METERED RESPIRATORY (INHALATION) EVERY 6 HOURS PRN
Qty: 18 G | Refills: 5 | Status: SHIPPED | OUTPATIENT
Start: 2024-09-03

## 2024-09-03 RX ORDER — ALPRAZOLAM 0.5 MG
0.5 TABLET ORAL
Qty: 30 TABLET | Refills: 0 | Status: SHIPPED | OUTPATIENT
Start: 2024-09-03

## 2024-09-03 RX ORDER — ALBUTEROL SULFATE 1.25 MG/3ML
3 SOLUTION RESPIRATORY (INHALATION) EVERY 6 HOURS PRN
Qty: 225 ML | Refills: 5 | Status: SHIPPED | OUTPATIENT
Start: 2024-09-03

## 2024-09-03 NOTE — TELEPHONE ENCOUNTER
Reason for call:   [x] Refill   [] Prior Auth  [] Other:     Office:   [x] PCP/Provider -  Ramsey Gtz MD   [] Specialty/Provider -     Medication:     ALPRAZolam (XANAX) 0.5 mg tablet       Dose/Frequency:     Take 1 tablet (0.5 mg total) by mouth daily at bedtime as needed for anxiety       Quantity: 30    Pharmacy: [id number]  [address]  [address] [phone]     Does the patient have enough for 3 days?   [] Yes   [x] No - Send as HP to POD

## 2024-09-03 NOTE — TELEPHONE ENCOUNTER
Reason for call:   [x] Refill   [] Prior Auth  [] Other:     Office:   [] PCP/Provider -   [x] Specialty/Provider - pulm/Randell Banegas MD     Medication:     Does the patient have enough for 3 days?   [] Yes   [x] No - Send as HP to POD

## 2024-10-03 DIAGNOSIS — F41.9 ANXIETY: ICD-10-CM

## 2024-10-03 RX ORDER — ALPRAZOLAM 0.5 MG
0.5 TABLET ORAL
Qty: 30 TABLET | Refills: 0 | Status: SHIPPED | OUTPATIENT
Start: 2024-10-03

## 2024-10-03 NOTE — TELEPHONE ENCOUNTER
Reason for call:   [x] Refill   [] Prior Auth  [] Other:     Office:   [x] PCP/Provider - Ramsey Gtz  [] Specialty/Provider -     Medication:   Alprazolam .5 mg, 1 hs, 30      Pharmacy:   [facility]    Does the patient have enough for 3 days?   [] Yes   [x] No - Send as HP to POD

## 2024-10-10 ENCOUNTER — OFFICE VISIT (OUTPATIENT)
Dept: FAMILY MEDICINE CLINIC | Facility: CLINIC | Age: 79
End: 2024-10-10
Payer: MEDICARE

## 2024-10-10 VITALS
DIASTOLIC BLOOD PRESSURE: 80 MMHG | SYSTOLIC BLOOD PRESSURE: 138 MMHG | HEIGHT: 61 IN | RESPIRATION RATE: 14 BRPM | TEMPERATURE: 98.7 F | WEIGHT: 138 LBS | BODY MASS INDEX: 26.06 KG/M2 | HEART RATE: 87 BPM | OXYGEN SATURATION: 96 %

## 2024-10-10 DIAGNOSIS — J30.89 SEASONAL ALLERGIC RHINITIS DUE TO OTHER ALLERGIC TRIGGER: ICD-10-CM

## 2024-10-10 DIAGNOSIS — K21.00 GASTROESOPHAGEAL REFLUX DISEASE WITH ESOPHAGITIS WITHOUT HEMORRHAGE: ICD-10-CM

## 2024-10-10 DIAGNOSIS — J44.9 CHRONIC OBSTRUCTIVE PULMONARY DISEASE, UNSPECIFIED COPD TYPE (HCC): Chronic | ICD-10-CM

## 2024-10-10 DIAGNOSIS — S32.010D CLOSED COMPRESSION FRACTURE OF L1 LUMBAR VERTEBRA WITH ROUTINE HEALING, SUBSEQUENT ENCOUNTER: ICD-10-CM

## 2024-10-10 DIAGNOSIS — M25.462 PAIN AND SWELLING OF LEFT KNEE: ICD-10-CM

## 2024-10-10 DIAGNOSIS — H10.33 ACUTE BACTERIAL CONJUNCTIVITIS OF BOTH EYES: ICD-10-CM

## 2024-10-10 DIAGNOSIS — Z23 NEED FOR INFLUENZA VACCINATION: ICD-10-CM

## 2024-10-10 DIAGNOSIS — M25.562 PAIN AND SWELLING OF LEFT KNEE: ICD-10-CM

## 2024-10-10 DIAGNOSIS — Z00.01 ENCOUNTER FOR GENERAL ADULT MEDICAL EXAMINATION WITH ABNORMAL FINDINGS: Primary | ICD-10-CM

## 2024-10-10 PROBLEM — N89.8 VAGINAL EROSION SECONDARY TO PESSARY USE  (HCC): Status: RESOLVED | Noted: 2023-10-10 | Resolved: 2024-10-10

## 2024-10-10 PROBLEM — T83.89XA VAGINAL EROSION SECONDARY TO PESSARY USE  (HCC): Status: RESOLVED | Noted: 2023-10-10 | Resolved: 2024-10-10

## 2024-10-10 PROBLEM — S32.018A: Status: RESOLVED | Noted: 2024-02-27 | Resolved: 2024-10-10

## 2024-10-10 PROBLEM — J43.9 PULMONARY EMPHYSEMA (HCC): Status: RESOLVED | Noted: 2023-03-20 | Resolved: 2024-10-10

## 2024-10-10 PROCEDURE — G0008 ADMIN INFLUENZA VIRUS VAC: HCPCS

## 2024-10-10 PROCEDURE — 90662 IIV NO PRSV INCREASED AG IM: CPT

## 2024-10-10 PROCEDURE — 99214 OFFICE O/P EST MOD 30 MIN: CPT | Performed by: INTERNAL MEDICINE

## 2024-10-10 PROCEDURE — G0439 PPPS, SUBSEQ VISIT: HCPCS | Performed by: INTERNAL MEDICINE

## 2024-10-10 RX ORDER — FLUTICASONE PROPIONATE 50 MCG
2 SPRAY, SUSPENSION (ML) NASAL 2 TIMES DAILY
Qty: 16 G | Refills: 2 | Status: SHIPPED | OUTPATIENT
Start: 2024-10-10

## 2024-10-10 RX ORDER — PREDNISONE 5 MG/1
5 TABLET ORAL
Qty: 30 TABLET | Refills: 0 | Status: SHIPPED | OUTPATIENT
Start: 2024-10-10

## 2024-10-10 RX ORDER — CETIRIZINE HYDROCHLORIDE 10 MG/1
10 TABLET ORAL DAILY
Qty: 90 TABLET | Refills: 1 | Status: SHIPPED | OUTPATIENT
Start: 2024-10-10

## 2024-10-10 RX ORDER — METHYLPREDNISOLONE SODIUM SUCCINATE 125 MG/2ML
125 INJECTION, POWDER, LYOPHILIZED, FOR SOLUTION INTRAMUSCULAR; INTRAVENOUS ONCE
Status: DISCONTINUED | OUTPATIENT
Start: 2024-10-10 | End: 2024-10-10

## 2024-10-10 RX ORDER — NEOMYCIN SULFATE, POLYMYXIN B SULFATE AND DEXAMETHASONE 3.5; 10000; 1 MG/ML; [USP'U]/ML; MG/ML
1 SUSPENSION/ DROPS OPHTHALMIC 3 TIMES DAILY
Qty: 5 ML | Refills: 0 | Status: SHIPPED | OUTPATIENT
Start: 2024-10-10

## 2024-10-10 RX ORDER — PANTOPRAZOLE SODIUM 40 MG/1
40 TABLET, DELAYED RELEASE ORAL
Qty: 90 TABLET | Refills: 3 | Status: SHIPPED | OUTPATIENT
Start: 2024-10-10 | End: 2025-10-05

## 2024-10-10 RX ORDER — DENOSUMAB 60 MG/ML
INJECTION SUBCUTANEOUS
COMMUNITY
Start: 2024-09-23

## 2024-10-10 NOTE — PROGRESS NOTES
Ambulatory Visit  Name: Emily Berrios      : 1945      MRN: [id number]  Encounter Provider: Ramsey Gtz MD  Encounter Date: 10/10/2024   Encounter department: [facility]    Assessment & Plan  Need for influenza vaccination    Orders:    influenza vaccine, high-dose, PF 0.5 mL (Fluzone High Dose)    Gastroesophageal reflux disease with esophagitis without hemorrhage    Orders:    pantoprazole (PROTONIX) 40 mg tablet; Take 1 tablet (40 mg total) by mouth daily before breakfast    Encounter for general adult medical examination with abnormal findings  Done in Detail  Life style mod  Continue same  RTC in 3 mos w  Blood  work       Seasonal allergic rhinitis due to other allergic trigger    Orders:    predniSONE 5 mg tablet; Take 1 tablet (5 mg total) by mouth daily with breakfast    cetirizine (ZyrTEC) 10 mg tablet; Take 1 tablet (10 mg total) by mouth daily In the evening    fluticasone (FLONASE) 50 mcg/act nasal spray; 2 sprays into each nostril 2 (two) times a day    Acute bacterial conjunctivitis of both eyes    Orders:    predniSONE 5 mg tablet; Take 1 tablet (5 mg total) by mouth daily with breakfast    neomycin-polymyxin-dexamethasone (MAXITROL) ophthalmic suspension; Apply 1 drop to eye 3 (three) times a day    Pain and swelling of left knee    Orders:    XR knee 4+ vw left injury; Future    Knee Sleeves    predniSONE 5 mg tablet; Take 1 tablet (5 mg total) by mouth daily with breakfast    Chronic obstructive pulmonary disease, unspecified COPD type (HCC)  Stable  Continue same  RTC in 3 msow  Blood work         Closed compression fracture of L1 lumbar vertebra with routine healing, subsequent encounter  Improved Nicely  Continue same  Use a  cane to support gait            History of Present Illness     79 Y O Lady is here for AWV and Regular check Up, she has few symptoms, recent Blood work and med list reviewed,...          Review of Systems  "  Constitutional:  Negative for chills, fatigue and fever.   HENT:  Positive for congestion and postnasal drip. Negative for facial swelling, sore throat, trouble swallowing and voice change.    Eyes:  Positive for redness and itching. Negative for pain, discharge and visual disturbance.   Respiratory:  Negative for cough, shortness of breath and wheezing.    Cardiovascular:  Negative for chest pain, palpitations and leg swelling.   Gastrointestinal:  Negative for abdominal pain, blood in stool, constipation, diarrhea and nausea.   Endocrine: Negative for polydipsia, polyphagia and polyuria.   Genitourinary:  Negative for difficulty urinating, hematuria and urgency.   Musculoskeletal:  Positive for arthralgias and joint swelling. Negative for myalgias.   Skin:  Negative for rash.   Neurological:  Negative for dizziness, tremors, weakness and headaches.   Hematological:  Negative for adenopathy. Does not bruise/bleed easily.   Psychiatric/Behavioral:  Negative for dysphoric mood, sleep disturbance and suicidal ideas.            Objective     /80 (BP Location: Left arm, Patient Position: Sitting, Cuff Size: Standard)   Pulse 87   Temp 98.7 °F (37.1 °C) (Tympanic)   Resp 14   Ht 5' 1\" (1.549 m)   Wt 62.6 kg (138 lb)   LMP  (LMP Unknown)   SpO2 96%   BMI 26.07 kg/m²     Physical Exam  Constitutional:       General: She is not in acute distress.     Appearance: She is well-developed. She is not diaphoretic.   HENT:      Head: Normocephalic.      Right Ear: External ear normal.      Left Ear: External ear normal.      Nose: Nose normal.   Eyes:      General:         Right eye: No discharge.         Left eye: No discharge.      Extraocular Movements: EOM normal.      Conjunctiva/sclera: Conjunctivae normal.      Pupils: Pupils are equal, round, and reactive to light.   Neck:      Thyroid: No thyromegaly.      Trachea: No tracheal deviation.   Cardiovascular:      Rate and Rhythm: Normal rate and regular " rhythm.      Heart sounds: Normal heart sounds. No murmur heard.     No friction rub.   Pulmonary:      Effort: Pulmonary effort is normal. No respiratory distress.      Breath sounds: Normal breath sounds. No stridor. No wheezing or rales.   Abdominal:      General: Bowel sounds are normal. There is no distension.      Palpations: Abdomen is soft.      Tenderness: There is no abdominal tenderness. There is no guarding.   Musculoskeletal:         General: Swelling and tenderness present. No deformity or edema. Normal range of motion.      Cervical back: Normal range of motion and neck supple.      Comments: Left knee   Lymphadenopathy:      Cervical: No cervical adenopathy.   Skin:     General: Skin is warm.      Coloration: Skin is not pale.      Findings: No erythema or rash.   Neurological:      Mental Status: She is alert and oriented to person, place, and time.      Cranial Nerves: No cranial nerve deficit.      Coordination: Coordination normal.   Psychiatric:         Mood and Affect: Mood and affect normal.         Behavior: Behavior normal.

## 2024-10-10 NOTE — ASSESSMENT & PLAN NOTE
Orders:    XR knee 4+ vw left injury; Future    Knee Sleeves    predniSONE 5 mg tablet; Take 1 tablet (5 mg total) by mouth daily with breakfast

## 2024-10-10 NOTE — PROGRESS NOTES
Ambulatory Visit  Name: Emily Berrios      : 1945      MRN: [id number]  Encounter Provider: Ramsey Gtz MD  Encounter Date: 10/10/2024   Encounter department: [facility]    Assessment & Plan       Preventive health issues were discussed with patient, and age appropriate screening tests were ordered as noted in patient's After Visit Summary. Personalized health advice and appropriate referrals for health education or preventive services given if needed, as noted in patient's After Visit Summary.    History of Present Illness     HPI   Patient Care Team:  Ramsey Gtz MD as PCP - General (Internal Medicine)  González Kaye MD (Nephrology)  Samantha Alonso (Gynecology)    Review of Systems  Medical History Reviewed by provider this encounter:       Annual Wellness Visit Questionnaire   Emily is here for her Subsequent Wellness visit. Last Medicare Wellness visit information reviewed, patient interviewed and updates made to the record today.      Health Risk Assessment:   Patient rates overall health as good. Patient feels that their physical health rating is slightly worse. Patient is satisfied with their life. Eyesight was rated as same. Hearing was rated as same. Patient feels that their emotional and mental health rating is same. Patients states they are never, rarely angry. Patient states they are sometimes unusually tired/fatigued. Pain experienced in the last 7 days has been some. Patient's pain rating has been 4/10. Patient states that she has experienced no weight loss or gain in last 6 months.     Depression Screening:   PHQ-2 Score: 0      Fall Risk Screening:   In the past year, patient has experienced: no history of falling in past year      Urinary Incontinence Screening:   Patient has not leaked urine accidently in the last six months.     Home Safety:  Patient has trouble with stairs inside or outside of their home. Patient has working smoke alarms  and has working carbon monoxide detector. Home safety hazards include: none.     Nutrition:   Current diet is Regular.     Medications:   Patient is not currently taking any over-the-counter supplements. Patient is able to manage medications.     Activities of Daily Living (ADLs)/Instrumental Activities of Daily Living (IADLs):   Walk and transfer into and out of bed and chair?: Yes  Dress and groom yourself?: Yes    Bathe or shower yourself?: Yes    Feed yourself? Yes  Do your laundry/housekeeping?: Yes  Manage your money, pay your bills and track your expenses?: Yes  Make your own meals?: Yes    Do your own shopping?: Yes    Previous Hospitalizations:   Any hospitalizations or ED visits within the last 12 months?: Yes    How many hospitalizations have you had in the last year?: 3-4    Advance Care Planning:   Living will: No    Durable POA for healthcare: No    Advanced directive counseling given: No    Five wishes given: No    End of Life Decisions reviewed with patient: No      PREVENTIVE SCREENINGS      Cardiovascular Screening:    General: Screening Not Indicated, History Lipid Disorder and Risks and Benefits Discussed      Diabetes Screening:     General: Screening Not Indicated, History Diabetes, Screening Current and Risks and Benefits Discussed      Colorectal Cancer Screening:     General: Risks and Benefits Discussed      Breast Cancer Screening:     General: Screening Current and Risks and Benefits Discussed      Cervical Cancer Screening:    General: Screening Not Indicated and Risks and Benefits Discussed      Osteoporosis Screening:    General: Screening Not Indicated, History Osteoporosis and Risks and Benefits Discussed      Abdominal Aortic Aneurysm (AAA) Screening:        General: Risks and Benefits Discussed      Lung Cancer Screening:     General: Screening Not Indicated and Risks and Benefits Discussed      Hepatitis C Screening:    General: Screening Current and Risks and Benefits  "Discussed    Screening, Brief Intervention, and Referral to Treatment (SBIRT)    Screening      Single Item Drug Screening:  How often have you used an illegal drug (including marijuana) or a prescription medication for non-medical reasons in the past year? never    Single Item Drug Screen Score: 0  Interpretation: Negative screen for possible drug use disorder    Other Counseling Topics:   Car/seat belt/driving safety, skin self-exam, sunscreen and calcium and vitamin D intake and regular weightbearing exercise.     Social Determinants of Health     Financial Resource Strain: Low Risk  (8/23/2024)    Overall Financial Resource Strain (CARDIA)     Difficulty of Paying Living Expenses: Not hard at all   Food Insecurity: No Food Insecurity (8/23/2024)    Hunger Vital Sign     Worried About Running Out of Food in the Last Year: Never true     Ran Out of Food in the Last Year: Never true   Transportation Needs: No Transportation Needs (8/23/2024)    PRAPARE - Transportation     Lack of Transportation (Medical): No     Lack of Transportation (Non-Medical): No   Housing Stability: Low Risk  (8/23/2024)    Housing Stability Vital Sign     Unable to Pay for Housing in the Last Year: No     Number of Times Moved in the Last Year: 0     Homeless in the Last Year: No   Utilities: Not At Risk (8/23/2024)    [facility] Utilities     Threatened with loss of utilities: No     No results found.    Objective     Ht 5' 1\" (1.549 m)   LMP  (LMP Unknown)   BMI 26.23 kg/m²     Physical Exam    "

## 2024-10-10 NOTE — PATIENT INSTRUCTIONS
Medicare Preventive Visit Patient Instructions  Thank you for completing your Welcome to Medicare Visit or Medicare Annual Wellness Visit today. Your next wellness visit will be due in one year (10/11/2025).  The screening/preventive services that you may require over the next 5-10 years are detailed below. Some tests may not apply to you based off risk factors and/or age. Screening tests ordered at today's visit but not completed yet may show as past due. Also, please note that scanned in results may not display below.  Preventive Screenings:  Service Recommendations Previous Testing/Comments   Colorectal Cancer Screening  * Colonoscopy    * Fecal Occult Blood Test (FOBT)/Fecal Immunochemical Test (FIT)  * Fecal DNA/Cologuard Test  * Flexible Sigmoidoscopy Age: 45-75 years old   Colonoscopy: every 10 years (may be performed more frequently if at higher risk)  OR  FOBT/FIT: every 1 year  OR  Cologuard: every 3 years  OR  Sigmoidoscopy: every 5 years  Screening may be recommended earlier than age 45 if at higher risk for colorectal cancer. Also, an individualized decision between you and your healthcare provider will decide whether screening between the ages of 76-85 would be appropriate. Colonoscopy: Not on file  FOBT/FIT: Not on file  Cologuard: Not on file  Sigmoidoscopy: Not on file          Breast Cancer Screening Age: 40+ years old  Frequency: every 1-2 years  Not required if history of left and right mastectomy Mammogram: 07/26/2023        Cervical Cancer Screening Between the ages of 21-29, pap smear recommended once every 3 years.   Between the ages of 30-65, can perform pap smear with HPV co-testing every 5 years.   Recommendations may differ for women with a history of total hysterectomy, cervical cancer, or abnormal pap smears in past. Pap Smear: Not on file        Hepatitis C Screening Once for adults born between 1945 and 1965  More frequently in patients at high risk for Hepatitis C Hep C Antibody:  08/30/2019        Diabetes Screening 1-2 times per year if you're at risk for diabetes or have pre-diabetes Fasting glucose: 97 mg/dL (7/15/2024)  A1C: 5.8 (10/25/2023)      Cholesterol Screening Once every 5 years if you don't have a lipid disorder. May order more often based on risk factors. Lipid panel: 08/05/2024          Other Preventive Screenings Covered by Medicare:  Abdominal Aortic Aneurysm (AAA) Screening: covered once if your at risk. You're considered to be at risk if you have a family history of AAA.  Lung Cancer Screening: covers low dose CT scan once per year if you meet all of the following conditions: (1) Age 55-77; (2) No signs or symptoms of lung cancer; (3) Current smoker or have quit smoking within the last 15 years; (4) You have a tobacco smoking history of at least 20 pack years (packs per day multiplied by number of years you smoked); (5) You get a written order from a healthcare provider.  Glaucoma Screening: covered annually if you're considered high risk: (1) You have diabetes OR (2) Family history of glaucoma OR (3)  aged 50 and older OR (4)  American aged 65 and older  Osteoporosis Screening: covered every 2 years if you meet one of the following conditions: (1) You're estrogen deficient and at risk for osteoporosis based off medical history and other findings; (2) Have a vertebral abnormality; (3) On glucocorticoid therapy for more than 3 months; (4) Have primary hyperparathyroidism; (5) On osteoporosis medications and need to assess response to drug therapy.   Last bone density test (DXA Scan): 04/03/2023.  HIV Screening: covered annually if you're between the age of 15-65. Also covered annually if you are younger than 15 and older than 65 with risk factors for HIV infection. For pregnant patients, it is covered up to 3 times per pregnancy.    Immunizations:  Immunization Recommendations   Influenza Vaccine Annual influenza vaccination during flu season is  recommended for all persons aged >= 6 months who do not have contraindications   Pneumococcal Vaccine   * Pneumococcal conjugate vaccine = PCV13 (Prevnar 13), PCV15 (Vaxneuvance), PCV20 (Prevnar 20)  * Pneumococcal polysaccharide vaccine = PPSV23 (Pneumovax) Adults 19-63 yo with certain risk factors or if 65+ yo  If never received any pneumonia vaccine: recommend Prevnar 20 (PCV20)  Give PCV20 if previously received 1 dose of PCV13 or PPSV23   Hepatitis B Vaccine 3 dose series if at intermediate or high risk (ex: diabetes, end stage renal disease, liver disease)   Respiratory syncytial virus (RSV) Vaccine - COVERED BY MEDICARE PART D  * RSVPreF3 (Arexvy) CDC recommends that adults 60 years of age and older may receive a single dose of RSV vaccine using shared clinical decision-making (SCDM)   Tetanus (Td) Vaccine - COST NOT COVERED BY MEDICARE PART B Following completion of primary series, a booster dose should be given every 10 years to maintain immunity against tetanus. Td may also be given as tetanus wound prophylaxis.   Tdap Vaccine - COST NOT COVERED BY MEDICARE PART B Recommended at least once for all adults. For pregnant patients, recommended with each pregnancy.   Shingles Vaccine (Shingrix) - COST NOT COVERED BY MEDICARE PART B  2 shot series recommended in those 19 years and older who have or will have weakened immune systems or those 50 years and older     Health Maintenance Due:      Topic Date Due   • Breast Cancer Screening: Mammogram  07/26/2024   • Hepatitis C Screening  Completed     Immunizations Due:      Topic Date Due   • Hepatitis A Vaccine (1 of 2 - Risk 2-dose series) Never done   • Influenza Vaccine (1) 09/01/2024   • COVID-19 Vaccine (4 - 2023-24 season) 09/01/2024     Advance Directives   What are advance directives?  Advance directives are legal documents that state your wishes and plans for medical care. These plans are made ahead of time in case you lose your ability to make decisions  for yourself. Advance directives can apply to any medical decision, such as the treatments you want, and if you want to donate organs.   What are the types of advance directives?  There are many types of advance directives, and each state has rules about how to use them. You may choose a combination of any of the following:  Living will:  This is a written record of the treatment you want. You can also choose which treatments you do not want, which to limit, and which to stop at a certain time. This includes surgery, medicine, IV fluid, and tube feedings.   Durable power of  for healthcare (DPAHC):  This is a written record that states who you want to make healthcare choices for you when you are unable to make them for yourself. This person, called a proxy, is usually a family member or a friend. You may choose more than 1 proxy.  Do not resuscitate (DNR) order:  A DNR order is used in case your heart stops beating or you stop breathing. It is a request not to have certain forms of treatment, such as CPR. A DNR order may be included in other types of advance directives.  Medical directive:  This covers the care that you want if you are in a coma, near death, or unable to make decisions for yourself. You can list the treatments you want for each condition. Treatment may include pain medicine, surgery, blood transfusions, dialysis, IV or tube feedings, and a ventilator (breathing machine).  Values history:  This document has questions about your views, beliefs, and how you feel and think about life. This information can help others choose the care that you would choose.  Why are advance directives important?  An advance directive helps you control your care. Although spoken wishes may be used, it is better to have your wishes written down. Spoken wishes can be misunderstood, or not followed. Treatments may be given even if you do not want them. An advance directive may make it easier for your family to make  difficult choices about your care.   Weight Management   Why it is important to manage your weight:  Being overweight increases your risk of health conditions such as heart disease, high blood pressure, type 2 diabetes, and certain types of cancer. It can also increase your risk for osteoarthritis, sleep apnea, and other respiratory problems. Aim for a slow, steady weight loss. Even a small amount of weight loss can lower your risk of health problems.  How to lose weight safely:  A safe and healthy way to lose weight is to eat fewer calories and get regular exercise. You can lose up about 1 pound a week by decreasing the number of calories you eat by 500 calories each day.   Healthy meal plan for weight management:  A healthy meal plan includes a variety of foods, contains fewer calories, and helps you stay healthy. A healthy meal plan includes the following:  Eat whole-grain foods more often.  A healthy meal plan should contain fiber. Fiber is the part of grains, fruits, and vegetables that is not broken down by your body. Whole-grain foods are healthy and provide extra fiber in your diet. Some examples of whole-grain foods are whole-wheat breads and pastas, oatmeal, brown rice, and bulgur.  Eat a variety of vegetables every day.  Include dark, leafy greens such as spinach, kale, layla greens, and mustard greens. Eat yellow and orange vegetables such as carrots, sweet potatoes, and winter squash.   Eat a variety of fruits every day.  Choose fresh or canned fruit (canned in its own juice or light syrup) instead of juice. Fruit juice has very little or no fiber.  Eat low-fat dairy foods.  Drink fat-free (skim) milk or 1% milk. Eat fat-free yogurt and low-fat cottage cheese. Try low-fat cheeses such as mozzarella and other reduced-fat cheeses.  Choose meat and other protein foods that are low in fat.  Choose beans or other legumes such as split peas or lentils. Choose fish, skinless poultry (chicken or turkey), or  lean cuts of red meat (beef or pork). Before you cook meat or poultry, cut off any visible fat.   Use less fat and oil.  Try baking foods instead of frying them. Add less fat, such as margarine, sour cream, regular salad dressing and mayonnaise to foods. Eat fewer high-fat foods. Some examples of high-fat foods include french fries, doughnuts, ice cream, and cakes.  Eat fewer sweets.  Limit foods and drinks that are high in sugar. This includes candy, cookies, regular soda, and sweetened drinks.  Exercise:  Exercise at least 30 minutes per day on most days of the week. Some examples of exercise include walking, biking, dancing, and swimming. You can also fit in more physical activity by taking the stairs instead of the elevator or parking farther away from stores. Ask your healthcare provider about the best exercise plan for you.      © Copyright [employer] 2018 Information is for End User's use only and may not be sold, redistributed or otherwise used for commercial purposes. All illustrations and images included in CareNotes® are the copyrighted property of [employer].A[employer]., Inc. or [employer]

## 2024-10-10 NOTE — ASSESSMENT & PLAN NOTE
Call to patient. Patient aware of upcoming appointment and does not want to change it to virtual.     Appointment confirmed with patient.     Appointments in Next Year    Aug 12, 2022  8:30 AM  (Arrive by 8:15 AM)  ED/Hospital Follow Up with Raisa Davidson MD  [facility] ([facility] ) [phone]        Susi FUNES RN   [facility]     Improved Nicely  Continue same  Use a  cane to support gait

## 2024-10-11 ENCOUNTER — OFFICE VISIT (OUTPATIENT)
Dept: OBGYN CLINIC | Facility: CLINIC | Age: 79
End: 2024-10-11

## 2024-10-11 VITALS
HEART RATE: 88 BPM | BODY MASS INDEX: 26.28 KG/M2 | SYSTOLIC BLOOD PRESSURE: 184 MMHG | HEIGHT: 61 IN | DIASTOLIC BLOOD PRESSURE: 74 MMHG | WEIGHT: 139.2 LBS

## 2024-10-11 DIAGNOSIS — N39.3 SUI (STRESS URINARY INCONTINENCE, FEMALE): ICD-10-CM

## 2024-10-11 DIAGNOSIS — N81.6 CYSTOCELE WITH RECTOCELE: ICD-10-CM

## 2024-10-11 DIAGNOSIS — N81.3 COMPLETE UTEROVAGINAL PROLAPSE: Primary | ICD-10-CM

## 2024-10-11 DIAGNOSIS — N81.10 CYSTOCELE WITH RECTOCELE: ICD-10-CM

## 2024-10-11 PROCEDURE — 99024 POSTOP FOLLOW-UP VISIT: CPT | Performed by: STUDENT IN AN ORGANIZED HEALTH CARE EDUCATION/TRAINING PROGRAM

## 2024-10-11 NOTE — PROGRESS NOTES
OB/GYN VISIT  Emily Berrios  10/11/2024  11:59 AM    ASSESSMENT / PLAN:    Emily Berrios is a 79 y.o. No obstetric history on file. female presenting for follow up s/p VEC, anterior and posterior colporrhaphy, TVT-O and cystoscopy.    - doing well. Offers no complaints.   - may resume all normal activities  - recommend fiber gummers for constipation and increased PO hydration  - may resume regular GYN care, may f/u PRN      SUBJECTIVE:    Emily Berrios is a 79 y.o. No obstetric history on file. female presenting for follow up she is post-op 8 weeks after VEC a/p repair, TVT-O and cystoscopy. Doing well overall. Denies fevers, chills, nausea, vomiting, pain, or bleeding. Voiding freely without issues. Has a normal bowel movement most days but at times skips a day. Offers no other complaints.    Past Medical History:   Diagnosis Date    Ambulates with cane     Arthritis     Asthma     Chronic obstructive pulmonary disease, unspecified COPD type ([facility]) 04/08/2022    Cognitive decline 03/10/2023    Community acquired pneumonia 11/13/2019    COPD (chronic obstructive pulmonary disease) ([facility])     Coronary artery disease     DM (diabetes mellitus) ([facility])     Fall     1/2024    HTN (hypertension)     Hx of CABG     Hydronephrosis, bilateral 03/06/2023    Hyperlipidemia     Hyponatremia     PONV (postoperative nausea and vomiting)     Urinary retention     Wears glasses        Past Surgical History:   Procedure Laterality Date    CARDIAC CATHETERIZATION  2010    CATARACT EXTRACTION, BILATERAL      CORONARY ARTERY BYPASS GRAFT  12/06/2010    with subsequent stent to the saphenous veingraft to the RCA 3 months later    KNEE ARTHROSCOPY      POLYPECTOMY      laryngeal    NC CMBND ANTERPOST COLPORRAPHY W/CYSTO N/A 8/14/2024    Procedure: COLPORRHAPHY ANT/POST;  Surgeon: Ramon Fam MD;  [facility];  Service: Gynecology    NC COLPOPEXY VAGINAL EXTRAPERITONEAL APPROACH N/A 8/14/2024    Procedure: SUSPENSION  "VAGINAL VAULT,  EUA;  Surgeon: Ramon Fam MD;  [facility];  Service: Gynecology    NE CYSTOURETHROSCOPY N/A 8/14/2024    Procedure: CYSTOSCOPY;  Surgeon: Ramon Fam MD;  [facility];  Service: Gynecology    NE SLING OPERATION STRESS INCONTINENCE N/A 8/14/2024    Procedure: INSERTION PV SLING ALTIS;  Surgeon: Ramon Fam MD;  [facility];  Service: Gynecology    TOTAL HIP ARTHROPLASTY      TUBAL LIGATION         OBJECTIVE:    Vitals:  Blood pressure (!) 184/74, pulse 88, height 5' 1\" (1.549 m), weight 63.1 kg (139 lb 3.2 oz).Body mass index is 26.3 kg/m².    Physical Exam:    Physical Exam  Constitutional:       Appearance: Normal appearance. She is normal weight.   Genitourinary:      Genitourinary Comments: Vaginal incisions healed well. No pelvic floor support defects.   Cardiovascular:      Rate and Rhythm: Normal rate.   Pulmonary:      Effort: Pulmonary effort is normal.   Neurological:      Mental Status: She is alert.   Skin:     General: Skin is warm and dry.   Psychiatric:         Mood and Affect: Mood normal.         Behavior: Behavior normal.         Thought Content: Thought content normal.             Ramon Fam MD  10/11/2024  11:59 AM       "

## 2024-11-01 ENCOUNTER — HOSPITAL ENCOUNTER (OUTPATIENT)
Dept: RADIOLOGY | Facility: HOSPITAL | Age: 79
Discharge: HOME | End: 2024-11-01
Payer: MEDICARE

## 2024-11-01 DIAGNOSIS — M25.462 PAIN AND SWELLING OF LEFT KNEE: ICD-10-CM

## 2024-11-01 DIAGNOSIS — M25.562 PAIN AND SWELLING OF LEFT KNEE: ICD-10-CM

## 2024-11-01 PROCEDURE — 73564 X-RAY EXAM KNEE 4 OR MORE: CPT

## 2024-11-04 ENCOUNTER — TELEPHONE (OUTPATIENT)
Dept: FAMILY MEDICINE CLINIC | Facility: CLINIC | Age: 79
End: 2024-11-04

## 2024-11-04 DIAGNOSIS — M25.562 PAIN AND SWELLING OF LEFT KNEE: Primary | ICD-10-CM

## 2024-11-04 DIAGNOSIS — M25.462 PAIN AND SWELLING OF LEFT KNEE: Primary | ICD-10-CM

## 2024-11-04 NOTE — TELEPHONE ENCOUNTER
----- Message from Ramsey Gtz MD sent at 11/1/2024  8:25 PM EDT -----  Orthopedic consult  ----- Message -----  From: Interface, Radiology Results In  Sent: 11/1/2024   2:48 PM EDT  To: Ramsey Gtz MD

## 2024-11-04 NOTE — TELEPHONE ENCOUNTER
Spoke with the patient and her Daughter.  Informed of results.  She will make appointment with Orthopedic.

## 2024-11-06 DIAGNOSIS — F41.9 ANXIETY: ICD-10-CM

## 2024-11-06 RX ORDER — ALPRAZOLAM 0.5 MG
0.5 TABLET ORAL
Qty: 30 TABLET | Refills: 3 | Status: SHIPPED | OUTPATIENT
Start: 2024-11-06

## 2024-11-06 RX ORDER — ALPRAZOLAM 0.5 MG
0.5 TABLET ORAL
Qty: 30 TABLET | Refills: 0 | Status: CANCELLED | OUTPATIENT
Start: 2024-11-06

## 2024-11-06 NOTE — TELEPHONE ENCOUNTER
Reason for call:   [x] Refill   [] Prior Auth  [] Other:     Office:  [facility]  [x] PCP/Provider - Ramsey Gtz   [] Specialty/Provider -     Medication: alprazolam     Dose/Frequency: 0.5 mg/ daily PRN     Quantity: 30 day supply     Pharmacy: [facility] in [place]     Does the patient have enough for 3 days?   [] Yes   [x] No - Send as HP to POD- patient is out completely.

## 2025-01-03 ENCOUNTER — APPOINTMENT (OUTPATIENT)
Dept: RADIOLOGY | Facility: MEDICAL CENTER | Age: 80
End: 2025-01-03
Payer: MEDICARE

## 2025-01-03 ENCOUNTER — OFFICE VISIT (OUTPATIENT)
Dept: OBGYN CLINIC | Facility: MEDICAL CENTER | Age: 80
End: 2025-01-03
Payer: MEDICARE

## 2025-01-03 VITALS — BODY MASS INDEX: 27.19 KG/M2 | HEIGHT: 61 IN | WEIGHT: 144 LBS

## 2025-01-03 DIAGNOSIS — M17.12 PRIMARY OSTEOARTHRITIS OF LEFT KNEE: ICD-10-CM

## 2025-01-03 DIAGNOSIS — T84.84XA: ICD-10-CM

## 2025-01-03 DIAGNOSIS — M25.562 LEFT KNEE PAIN, UNSPECIFIED CHRONICITY: ICD-10-CM

## 2025-01-03 DIAGNOSIS — Z01.89 ENCOUNTER FOR LOWER EXTREMITY COMPARISON IMAGING STUDY: ICD-10-CM

## 2025-01-03 DIAGNOSIS — Z96.649: ICD-10-CM

## 2025-01-03 DIAGNOSIS — M17.12 PRIMARY OSTEOARTHRITIS OF LEFT KNEE: Primary | ICD-10-CM

## 2025-01-03 DIAGNOSIS — M89.9 LESION OF BONE OF LEFT LOWER LEG: ICD-10-CM

## 2025-01-03 PROCEDURE — 73564 X-RAY EXAM KNEE 4 OR MORE: CPT

## 2025-01-03 PROCEDURE — 99214 OFFICE O/P EST MOD 30 MIN: CPT | Performed by: ORTHOPAEDIC SURGERY

## 2025-01-03 PROCEDURE — 73560 X-RAY EXAM OF KNEE 1 OR 2: CPT

## 2025-01-03 PROCEDURE — 73552 X-RAY EXAM OF FEMUR 2/>: CPT

## 2025-01-03 PROCEDURE — 73502 X-RAY EXAM HIP UNI 2-3 VIEWS: CPT

## 2025-01-03 NOTE — PROGRESS NOTES
Assessment & Plan     1. Primary osteoarthritis of left knee    2. Encounter for lower extremity comparison imaging study    3. Lesion of bone of left lower leg    4. Pain in hip region after total hip replacement, initial encounter  ([facility])      Orders Placed This Encounter   Procedures    XR knee 4+ vw left injury    XR knee 1 or 2 vw right    XR femur 2 vw left    XR hip/pelv 2-3 vws left if performed    NM bone scan 3 phase    CBC and differential    C-reactive protein    Sedimentation rate, automated    Ambulatory Referral to Orthopedic Surgery    Ambulatory Referral to Physical Therapy       The patient has mild left knee arthritis.  We discussed treatment options as well as risks and benefits of treatment options.   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  Bone scan and labs were ordered at today's visit to evaluate loosening of left VANDANA.  I discussed with patient given that she has bony infarction seen on x-ray, I recommend patient follow-up with Dr. Mays.   Referral for patient to see Dr. Mays was given to today.  Physical therapy referral was given to patient at today's visit.  Can take Tylenol 1,000mg by mouth every 8 hours as needed for pain.  Do not exceed 3,000mg per day.  No NSAIDs due to being on Plavix.  Continue activity as tolerated.    Return for f/u with Dr. Buck after tests, f/u with Dr. Mays.    I answered all of the patient's questions during the visit and provided education of the patient's condition during the visit.  The patient verbalized understanding of the information given and agrees with the plan.  This note was dictated using [employer] software.  It may contain errors including improperly dictated words.  Please contact physician directly for any questions.    History of Present Illness   Chief complaint:   Chief Complaint   Patient presents with     Left Knee - Pain       HPI: Emily Berrios is a 79 y.o. female that c/o left knee pain.    Length of time knee pain has been present: 2 years  Any falls or trauma associated with onset of pain: no rolf   Location of pain: generalized,   Intermittent or constant: intermittent  Description of pain: sharp and achy  Aggravating factors: prolonged walking   Instability or locking: reports instability   Pain medication that has been tried: tylenol   Topical mediation that has been tried: yes  Has heat/ice/elevation been tried: no  Can NSAIDs be taken?  If not why?: no due to being on plavix   Has PT or home exercises been tried?: no  Has bracing been tried? OTC or rx?  No because insurance didn't cover it   Have injections been tried?  Steroid/visco?: had CSI 13 years ago before she had her left VANDANA, she does not remember how much relief she got from this.  Any history of surgery on that knee?:  no    Has bilateral hip replacements that was performed over 13 years ago.       ROS:    See HPI for musculoskeletal review.   All other systems reviewed are negative     Historical Information   Past Medical History:   Diagnosis Date    Ambulates with cane     Arthritis     Asthma     Chronic obstructive pulmonary disease, unspecified COPD type ([facility]) 04/08/2022    Cognitive decline 03/10/2023    Community acquired pneumonia 11/13/2019    COPD (chronic obstructive pulmonary disease) ([facility])     Coronary artery disease     DM (diabetes mellitus) ([facility])     Fall     1/2024    HTN (hypertension)     Hx of CABG     Hydronephrosis, bilateral 03/06/2023    Hyperlipidemia     Hyponatremia     PONV (postoperative nausea and vomiting)     Urinary retention     Wears glasses      Past Surgical History:   Procedure Laterality Date    CARDIAC CATHETERIZATION  2010    CATARACT EXTRACTION, BILATERAL      CORONARY ARTERY BYPASS GRAFT  12/06/2010    with subsequent stent to the saphenous veingraft to the RCA 3 months later    KNEE ARTHROSCOPY       POLYPECTOMY      laryngeal    GA CMBND ANTERPOST COLPORRAPHY W/CYSTO N/A 2024    Procedure: COLPORRHAPHY ANT/POST;  Surgeon: Ramon Fam MD;  [facility];  Service: Gynecology    GA COLPOPEXY VAGINAL EXTRAPERITONEAL APPROACH N/A 2024    Procedure: SUSPENSION VAGINAL VAULT,  EUA;  Surgeon: Ramon Fam MD;  [facility];  Service: Gynecology    GA CYSTOURETHROSCOPY N/A 2024    Procedure: CYSTOSCOPY;  Surgeon: Ramon Fam MD;  [facility];  Service: Gynecology    GA SLING OPERATION STRESS INCONTINENCE N/A 2024    Procedure: INSERTION PV SLING ALTIS;  Surgeon: Ramon Fam MD;  [facility];  Service: Gynecology    TOTAL HIP ARTHROPLASTY      TUBAL LIGATION       Social History   Social History     Substance and Sexual Activity   Alcohol Use Yes    Alcohol/week: 12.0 standard drinks of alcohol    Types: 12 Cans of beer per week    Comment: socially     Social History     Substance and Sexual Activity   Drug Use Never     Social History     Tobacco Use   Smoking Status Former    Current packs/day: 0.00    Average packs/day: 0.5 packs/day for 32.0 years (16.0 ttl pk-yrs)    Types: Cigarettes    Start date:     Quit date:     Years since quittin.0   Smokeless Tobacco Never   Tobacco Comments    no passive smoke exposure     Family History:   Family History   Problem Relation Age of Onset    Stroke Mother     Hypertension Mother     Heart disease Mother     Colon cancer Father     No Known Problems Sister     No Known Problems Sister     No Known Problems Sister     No Known Problems Daughter     No Known Problems Daughter     No Known Problems Daughter     No Known Problems Maternal Grandmother     No Known Problems Maternal Grandfather     No Known Problems Paternal Grandmother     No Known Problems Paternal Grandfather     Heart attack Brother     Heart attack Brother     No Known Problems Maternal Aunt     No Known Problems Maternal  Aunt     No Known Problems Paternal Aunt     No Known Problems Paternal Aunt        Current Outpatient Medications on File Prior to Visit   Medication Sig Dispense Refill    albuterol (ACCUNEB) 1.25 MG/3ML nebulizer solution Take 3 mL by nebulization every 6 (six) hours as needed for wheezing 225 mL 5    albuterol (PROVENTIL HFA,VENTOLIN HFA) 90 mcg/act inhaler Inhale 2 puffs every 6 (six) hours as needed for wheezing 18 g 5    ALPRAZolam (XANAX) 0.5 mg tablet Take 1 tablet (0.5 mg total) by mouth daily at bedtime as needed for anxiety 30 tablet 3    amLODIPine (NORVASC) 5 mg tablet Take 1 tablet (5 mg total) by mouth 2 (two) times a day 180 tablet 3    bisoprolol (ZEBETA) 10 MG tablet Take 1 tablet (10 mg total) by mouth daily 90 tablet 3    cetirizine (ZyrTEC) 10 mg tablet Take 1 tablet (10 mg total) by mouth daily In the evening 90 tablet 1    co-enzyme Q-10 30 MG capsule Take 1 capsule (30 mg total) by mouth 2 (two) times a day 200 capsule 3    fluticasone (FLONASE) 50 mcg/act nasal spray 2 sprays into each nostril 2 (two) times a day 16 g 2    fluticasone-umeclidinium-vilanterol (Trelegy Ellipta) 100-62.5-25 mcg/actuation inhaler Inhale 1 puff daily Rinse mouth after use. 3 each 3    glucose blood test strip Use 1 each as needed (as needed) Use as instructed 100 each 5    ipratropium-albuterol (DUO-NEB) 0.5-2.5 mg/3 mL nebulizer solution Take 3 mL by nebulization every 6 (six) hours as needed for wheezing or shortness of breath 270 mL 3    magnesium Oxide (MAG-OX) 400 mg TABS Take 1 tablet (400 mg total) by mouth 2 (two) times a day 200 tablet 3    neomycin-polymyxin-dexamethasone (MAXITROL) ophthalmic suspension Apply 1 drop to eye 3 (three) times a day 5 mL 0    ofloxacin (FLOXIN) 0.3 % otic solution Administer 5 drops to the right ear 2 (two) times a day 5 mL 0    pantoprazole (PROTONIX) 40 mg tablet Take 1 tablet (40 mg total) by mouth daily before breakfast 90 tablet 3    Plavix 75 MG tablet Take 1 tablet  (75 mg total) by mouth daily 90 tablet 3    predniSONE 5 mg tablet Take 1 tablet (5 mg total) by mouth daily with breakfast 30 tablet 0    Premarin vaginal cream Insert 0.5 g into the vagina 3 (three) times a week Three times a week Monday, Wednesday and Friday at bedtime 30 g 0    Prolia 60 MG/ML inject 1 MILLILITER UNDER SKIN ONCE for 1 dose      Red Yeast Rice 600 MG CAPS Take 1 capsule (600 mg total) by mouth 2 (two) times a day with meals 200 capsule 3    rosuvastatin (CRESTOR) 10 MG tablet Take 1 tablet (10 mg total) by mouth daily 90 tablet 3    vitamin B-12 (VITAMIN B-12) 1,000 mcg tablet Take 1 tablet (1,000 mcg total) by mouth daily 90 tablet 3     No current facility-administered medications on file prior to visit.     Allergies   Allergen Reactions    Ace Inhibitors Other (See Comments)     Includes lisinopril    Angiotensin Receptor Blockers Other (See Comments)     Not specified.  Includes losartan and olmesartan    Ezetimibe Rash    Morphine Hives     pt states someone told her she is allergic to it after her hip surgery    Statins Arthralgia     Tolerates Crestor       Current Outpatient Medications on File Prior to Visit   Medication Sig Dispense Refill    albuterol (ACCUNEB) 1.25 MG/3ML nebulizer solution Take 3 mL by nebulization every 6 (six) hours as needed for wheezing 225 mL 5    albuterol (PROVENTIL HFA,VENTOLIN HFA) 90 mcg/act inhaler Inhale 2 puffs every 6 (six) hours as needed for wheezing 18 g 5    ALPRAZolam (XANAX) 0.5 mg tablet Take 1 tablet (0.5 mg total) by mouth daily at bedtime as needed for anxiety 30 tablet 3    amLODIPine (NORVASC) 5 mg tablet Take 1 tablet (5 mg total) by mouth 2 (two) times a day 180 tablet 3    bisoprolol (ZEBETA) 10 MG tablet Take 1 tablet (10 mg total) by mouth daily 90 tablet 3    cetirizine (ZyrTEC) 10 mg tablet Take 1 tablet (10 mg total) by mouth daily In the evening 90 tablet 1    co-enzyme Q-10 30 MG capsule Take 1 capsule (30 mg total) by mouth 2  "(two) times a day 200 capsule 3    fluticasone (FLONASE) 50 mcg/act nasal spray 2 sprays into each nostril 2 (two) times a day 16 g 2    fluticasone-umeclidinium-vilanterol (Trelegy Ellipta) 100-62.5-25 mcg/actuation inhaler Inhale 1 puff daily Rinse mouth after use. 3 each 3    glucose blood test strip Use 1 each as needed (as needed) Use as instructed 100 each 5    ipratropium-albuterol (DUO-NEB) 0.5-2.5 mg/3 mL nebulizer solution Take 3 mL by nebulization every 6 (six) hours as needed for wheezing or shortness of breath 270 mL 3    magnesium Oxide (MAG-OX) 400 mg TABS Take 1 tablet (400 mg total) by mouth 2 (two) times a day 200 tablet 3    neomycin-polymyxin-dexamethasone (MAXITROL) ophthalmic suspension Apply 1 drop to eye 3 (three) times a day 5 mL 0    ofloxacin (FLOXIN) 0.3 % otic solution Administer 5 drops to the right ear 2 (two) times a day 5 mL 0    pantoprazole (PROTONIX) 40 mg tablet Take 1 tablet (40 mg total) by mouth daily before breakfast 90 tablet 3    Plavix 75 MG tablet Take 1 tablet (75 mg total) by mouth daily 90 tablet 3    predniSONE 5 mg tablet Take 1 tablet (5 mg total) by mouth daily with breakfast 30 tablet 0    Premarin vaginal cream Insert 0.5 g into the vagina 3 (three) times a week Three times a week Monday, Wednesday and Friday at bedtime 30 g 0    Prolia 60 MG/ML inject 1 MILLILITER UNDER SKIN ONCE for 1 dose      Red Yeast Rice 600 MG CAPS Take 1 capsule (600 mg total) by mouth 2 (two) times a day with meals 200 capsule 3    rosuvastatin (CRESTOR) 10 MG tablet Take 1 tablet (10 mg total) by mouth daily 90 tablet 3    vitamin B-12 (VITAMIN B-12) 1,000 mcg tablet Take 1 tablet (1,000 mcg total) by mouth daily 90 tablet 3     No current facility-administered medications on file prior to visit.       Objective   Vitals: Height 5' 1\" (1.549 m), weight 65.3 kg (144 lb).,Body mass index is 27.21 kg/m².    PE:  AAOx 3  WDWN  Hearing intact, no drainage from eyes  Regular rate  no audible " wheezing  no abdominal distension  LE compartments soft, skin intact    leftknee:    Appearance:  Mild swelling   No ecchymosis  no obvious joint deformity   No effusion  Palpation/Tenderness:  +TTP over medial joint line  No TTP over lateral joint line   + TTP over patella  + TTP over patellar tendon  + TTP over pes anserine bursa  TTP over distal femur  TTP over tibial tubercle  Active Range of Motion:  AROM: 0-115  Special Tests:  Medial Radha's Test:  Positive with thigh pain  Lateral Radha's Test:  Positive with thigh pain   Anterior Drawer Test:  Negative  Patellar grind:  Negative  Valgus Stress Test:  negative  Varus Stress Test:  negative     No ipsilateral hip pain with ROM    bilateralLE:    Sensation grossly intact  Palpable 2+ pulse  AT/GS/EHL intact    Imaging Studies: Results Review Statement: I personally reviewed the following image studies in PACS and associated radiology reports: xray(s). My interpretation of the radiology images/reports is: mild left knee osteoarthritis, with possible bony infarction along distal femur and proximal tibia.      Scribe Attestation      I,:  Chin Mitchell am acting as a scribe while in the presence of the attending physician.:       I,:  Sandra Buck DO personally performed the services described in this documentation    as scribed in my presence.:

## 2025-01-03 NOTE — LETTER
January 23, 2025     Ramsey Gtz MD  [facility] [place] [id number]    Patient: Emily Berrios   YOB: 1945   Date of Visit: 1/3/2025       Dear Dr. Gtz:    Thank you for referring Emily Berrios to me for evaluation. Below are my notes for this consultation.    If you have questions, please do not hesitate to call me. I look forward to following your patient along with you.         Sincerely,        Sandra Buck DO        CC: No Recipients    Sandra Buck DO  1/23/2025 12:02 PM  Sign when Signing Visit   Assessment & Plan    1. Primary osteoarthritis of left knee    2. Encounter for lower extremity comparison imaging study    3. Lesion of bone of left lower leg    4. Pain in hip region after total hip replacement, initial encounter  (Formerly [facility])      Orders Placed This Encounter   Procedures   • XR knee 4+ vw left injury   • XR knee 1 or 2 vw right   • XR femur 2 vw left   • XR hip/pelv 2-3 vws left if performed   • NM bone scan 3 phase   • CBC and differential   • C-reactive protein   • Sedimentation rate, automated   • Ambulatory Referral to Orthopedic Surgery   • Ambulatory Referral to Physical Therapy       The patient has mild left knee arthritis.  We discussed treatment options as well as risks and benefits of treatment options.   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  Bone scan and labs were ordered at today's visit to evaluate loosening of left VANDANA.  I discussed with patient given that she has bony infarction seen on x-ray, I recommend patient follow-up with Dr. Mays.   Referral for patient to see Dr. Mays was given to today.  Physical therapy referral was given to patient at today's visit.  Can take Tylenol 1,000mg by mouth every 8 hours as needed for pain.  Do not exceed 3,000mg per day.  No NSAIDs due to being on  Plavix.  Continue activity as tolerated.    Return for f/u with Dr. Buck after tests, f/u with Dr. Mays.    I answered all of the patient's questions during the visit and provided education of the patient's condition during the visit.  The patient verbalized understanding of the information given and agrees with the plan.  This note was dictated using [employer] software.  It may contain errors including improperly dictated words.  Please contact physician directly for any questions.    History of Present Illness  Chief complaint:   Chief Complaint   Patient presents with   • Left Knee - Pain       HPI: Emily Berrios is a 79 y.o. female that c/o left knee pain.    Length of time knee pain has been present: 2 years  Any falls or trauma associated with onset of pain: no rolf   Location of pain: generalized,   Intermittent or constant: intermittent  Description of pain: sharp and achy  Aggravating factors: prolonged walking   Instability or locking: reports instability   Pain medication that has been tried: tylenol   Topical mediation that has been tried: yes  Has heat/ice/elevation been tried: no  Can NSAIDs be taken?  If not why?: no due to being on plavix   Has PT or home exercises been tried?: no  Has bracing been tried? OTC or rx?  No because insurance didn't cover it   Have injections been tried?  Steroid/visco?: had CSI 13 years ago before she had her left VANDANA, she does not remember how much relief she got from this.  Any history of surgery on that knee?:  no    Has bilateral hip replacements that was performed over 13 years ago.       ROS:    See HPI for musculoskeletal review.   All other systems reviewed are negative     Historical Information  Past Medical History:   Diagnosis Date   • Ambulates with cane    • Arthritis    • Asthma    • Chronic obstructive pulmonary disease, unspecified COPD type (HCC) 04/08/2022   • Cognitive decline 03/10/2023   • Community acquired pneumonia 11/13/2019   • COPD  (chronic obstructive pulmonary disease) ([facility])    • Coronary artery disease    • DM (diabetes mellitus) ([facility])    • Fall     2024   • HTN (hypertension)    • Hx of CABG    • Hydronephrosis, bilateral 2023   • Hyperlipidemia    • Hyponatremia    • PONV (postoperative nausea and vomiting)    • Urinary retention    • Wears glasses      Past Surgical History:   Procedure Laterality Date   • CARDIAC CATHETERIZATION     • CATARACT EXTRACTION, BILATERAL     • CORONARY ARTERY BYPASS GRAFT  2010    with subsequent stent to the saphenous veingraft to the RCA 3 months later   • KNEE ARTHROSCOPY     • POLYPECTOMY      laryngeal   • OK CMBND ANTERPOST COLPORRAPHY W/CYSTO N/A 2024    Procedure: COLPORRHAPHY ANT/POST;  Surgeon: Ramon Fam MD;  Location: [facility];  Service: Gynecology   • OK COLPOPEXY VAGINAL EXTRAPERITONEAL APPROACH N/A 2024    Procedure: SUSPENSION VAGINAL VAULT,  EUA;  Surgeon: Ramon Fam MD;  [facility];  Service: Gynecology   • OK CYSTOURETHROSCOPY N/A 2024    Procedure: CYSTOSCOPY;  Surgeon: Ramon Fam MD;  [facility];  Service: Gynecology   • OK SLING OPERATION STRESS INCONTINENCE N/A 2024    Procedure: INSERTION PV SLING ALTIS;  Surgeon: Ramon Fam MD;  [facility];  Service: Gynecology   • TOTAL HIP ARTHROPLASTY     • TUBAL LIGATION       Social History  Social History     Substance and Sexual Activity   Alcohol Use Yes   • Alcohol/week: 12.0 standard drinks of alcohol   • Types: 12 Cans of beer per week    Comment: socially     Social History     Substance and Sexual Activity   Drug Use Never     Social History     Tobacco Use   Smoking Status Former   • Current packs/day: 0.00   • Average packs/day: 0.5 packs/day for 32.0 years (16.0 ttl pk-yrs)   • Types: Cigarettes   • Start date:    • Quit date:    • Years since quittin.0   Smokeless Tobacco Never   Tobacco Comments    no passive smoke exposure      Family History:   Family History   Problem Relation Age of Onset   • Stroke Mother    • Hypertension Mother    • Heart disease Mother    • Colon cancer Father    • No Known Problems Sister    • No Known Problems Sister    • No Known Problems Sister    • No Known Problems Daughter    • No Known Problems Daughter    • No Known Problems Daughter    • No Known Problems Maternal Grandmother    • No Known Problems Maternal Grandfather    • No Known Problems Paternal Grandmother    • No Known Problems Paternal Grandfather    • Heart attack Brother    • Heart attack Brother    • No Known Problems Maternal Aunt    • No Known Problems Maternal Aunt    • No Known Problems Paternal Aunt    • No Known Problems Paternal Aunt        Current Outpatient Medications on File Prior to Visit   Medication Sig Dispense Refill   • albuterol (ACCUNEB) 1.25 MG/3ML nebulizer solution Take 3 mL by nebulization every 6 (six) hours as needed for wheezing 225 mL 5   • albuterol (PROVENTIL HFA,VENTOLIN HFA) 90 mcg/act inhaler Inhale 2 puffs every 6 (six) hours as needed for wheezing 18 g 5   • ALPRAZolam (XANAX) 0.5 mg tablet Take 1 tablet (0.5 mg total) by mouth daily at bedtime as needed for anxiety 30 tablet 3   • amLODIPine (NORVASC) 5 mg tablet Take 1 tablet (5 mg total) by mouth 2 (two) times a day 180 tablet 3   • bisoprolol (ZEBETA) 10 MG tablet Take 1 tablet (10 mg total) by mouth daily 90 tablet 3   • cetirizine (ZyrTEC) 10 mg tablet Take 1 tablet (10 mg total) by mouth daily In the evening 90 tablet 1   • co-enzyme Q-10 30 MG capsule Take 1 capsule (30 mg total) by mouth 2 (two) times a day 200 capsule 3   • fluticasone (FLONASE) 50 mcg/act nasal spray 2 sprays into each nostril 2 (two) times a day 16 g 2   • fluticasone-umeclidinium-vilanterol (Trelegy Ellipta) 100-62.5-25 mcg/actuation inhaler Inhale 1 puff daily Rinse mouth after use. 3 each 3   • glucose blood test strip Use 1 each as needed (as needed) Use as instructed 100  each 5   • ipratropium-albuterol (DUO-NEB) 0.5-2.5 mg/3 mL nebulizer solution Take 3 mL by nebulization every 6 (six) hours as needed for wheezing or shortness of breath 270 mL 3   • magnesium Oxide (MAG-OX) 400 mg TABS Take 1 tablet (400 mg total) by mouth 2 (two) times a day 200 tablet 3   • neomycin-polymyxin-dexamethasone (MAXITROL) ophthalmic suspension Apply 1 drop to eye 3 (three) times a day 5 mL 0   • ofloxacin (FLOXIN) 0.3 % otic solution Administer 5 drops to the right ear 2 (two) times a day 5 mL 0   • pantoprazole (PROTONIX) 40 mg tablet Take 1 tablet (40 mg total) by mouth daily before breakfast 90 tablet 3   • Plavix 75 MG tablet Take 1 tablet (75 mg total) by mouth daily 90 tablet 3   • predniSONE 5 mg tablet Take 1 tablet (5 mg total) by mouth daily with breakfast 30 tablet 0   • Premarin vaginal cream Insert 0.5 g into the vagina 3 (three) times a week Three times a week Monday, Wednesday and Friday at bedtime 30 g 0   • Prolia 60 MG/ML inject 1 MILLILITER UNDER SKIN ONCE for 1 dose     • Red Yeast Rice 600 MG CAPS Take 1 capsule (600 mg total) by mouth 2 (two) times a day with meals 200 capsule 3   • rosuvastatin (CRESTOR) 10 MG tablet Take 1 tablet (10 mg total) by mouth daily 90 tablet 3   • vitamin B-12 (VITAMIN B-12) 1,000 mcg tablet Take 1 tablet (1,000 mcg total) by mouth daily 90 tablet 3     No current facility-administered medications on file prior to visit.     Allergies   Allergen Reactions   • Ace Inhibitors Other (See Comments)     Includes lisinopril   • Angiotensin Receptor Blockers Other (See Comments)     Not specified.  Includes losartan and olmesartan   • Ezetimibe Rash   • Morphine Hives     pt states someone told her she is allergic to it after her hip surgery   • Statins Arthralgia     Tolerates Crestor       Current Outpatient Medications on File Prior to Visit   Medication Sig Dispense Refill   • albuterol (ACCUNEB) 1.25 MG/3ML nebulizer solution Take 3 mL by nebulization  every 6 (six) hours as needed for wheezing 225 mL 5   • albuterol (PROVENTIL HFA,VENTOLIN HFA) 90 mcg/act inhaler Inhale 2 puffs every 6 (six) hours as needed for wheezing 18 g 5   • ALPRAZolam (XANAX) 0.5 mg tablet Take 1 tablet (0.5 mg total) by mouth daily at bedtime as needed for anxiety 30 tablet 3   • amLODIPine (NORVASC) 5 mg tablet Take 1 tablet (5 mg total) by mouth 2 (two) times a day 180 tablet 3   • bisoprolol (ZEBETA) 10 MG tablet Take 1 tablet (10 mg total) by mouth daily 90 tablet 3   • cetirizine (ZyrTEC) 10 mg tablet Take 1 tablet (10 mg total) by mouth daily In the evening 90 tablet 1   • co-enzyme Q-10 30 MG capsule Take 1 capsule (30 mg total) by mouth 2 (two) times a day 200 capsule 3   • fluticasone (FLONASE) 50 mcg/act nasal spray 2 sprays into each nostril 2 (two) times a day 16 g 2   • fluticasone-umeclidinium-vilanterol (Trelegy Ellipta) 100-62.5-25 mcg/actuation inhaler Inhale 1 puff daily Rinse mouth after use. 3 each 3   • glucose blood test strip Use 1 each as needed (as needed) Use as instructed 100 each 5   • ipratropium-albuterol (DUO-NEB) 0.5-2.5 mg/3 mL nebulizer solution Take 3 mL by nebulization every 6 (six) hours as needed for wheezing or shortness of breath 270 mL 3   • magnesium Oxide (MAG-OX) 400 mg TABS Take 1 tablet (400 mg total) by mouth 2 (two) times a day 200 tablet 3   • neomycin-polymyxin-dexamethasone (MAXITROL) ophthalmic suspension Apply 1 drop to eye 3 (three) times a day 5 mL 0   • ofloxacin (FLOXIN) 0.3 % otic solution Administer 5 drops to the right ear 2 (two) times a day 5 mL 0   • pantoprazole (PROTONIX) 40 mg tablet Take 1 tablet (40 mg total) by mouth daily before breakfast 90 tablet 3   • Plavix 75 MG tablet Take 1 tablet (75 mg total) by mouth daily 90 tablet 3   • predniSONE 5 mg tablet Take 1 tablet (5 mg total) by mouth daily with breakfast 30 tablet 0   • Premarin vaginal cream Insert 0.5 g into the vagina 3 (three) times a week Three times a week  "Monday, Wednesday and Friday at bedtime 30 g 0   • Prolia 60 MG/ML inject 1 MILLILITER UNDER SKIN ONCE for 1 dose     • Red Yeast Rice 600 MG CAPS Take 1 capsule (600 mg total) by mouth 2 (two) times a day with meals 200 capsule 3   • rosuvastatin (CRESTOR) 10 MG tablet Take 1 tablet (10 mg total) by mouth daily 90 tablet 3   • vitamin B-12 (VITAMIN B-12) 1,000 mcg tablet Take 1 tablet (1,000 mcg total) by mouth daily 90 tablet 3     No current facility-administered medications on file prior to visit.       Objective  Vitals: Height 5' 1\" (1.549 m), weight 65.3 kg (144 lb).,Body mass index is 27.21 kg/m².    PE:  AAOx 3  WDWN  Hearing intact, no drainage from eyes  Regular rate  no audible wheezing  no abdominal distension  LE compartments soft, skin intact    leftknee:    Appearance:  Mild swelling   No ecchymosis  no obvious joint deformity   No effusion  Palpation/Tenderness:  +TTP over medial joint line  No TTP over lateral joint line   + TTP over patella  + TTP over patellar tendon  + TTP over pes anserine bursa  TTP over distal femur  TTP over tibial tubercle  Active Range of Motion:  AROM: 0-115  Special Tests:  Medial Radha's Test:  Positive with thigh pain  Lateral Radha's Test:  Positive with thigh pain   Anterior Drawer Test:  Negative  Patellar grind:  Negative  Valgus Stress Test:  negative  Varus Stress Test:  negative     No ipsilateral hip pain with ROM    bilateralLE:    Sensation grossly intact  Palpable 2+ pulse  AT/GS/EHL intact    Imaging Studies: Results Review Statement: I personally reviewed the following image studies in PACS and associated radiology reports: xray(s). My interpretation of the radiology images/reports is: mild left knee osteoarthritis, with possible bony infarction along distal femur and proximal tibia.      Scribe Attestation      I,:  Chin Mitchell am acting as a scribe while in the presence of the attending physician.:       I,:  Sandra Buck, DO personally " performed the services described in this documentation    as scribed in my presence.:

## 2025-01-14 ENCOUNTER — HOSPITAL ENCOUNTER (OUTPATIENT)
Dept: RADIOLOGY | Facility: HOSPITAL | Age: 80
Discharge: HOME | End: 2025-01-14
Attending: ORTHOPAEDIC SURGERY
Payer: MEDICARE

## 2025-01-14 DIAGNOSIS — Z96.649: ICD-10-CM

## 2025-01-14 DIAGNOSIS — T84.84XA: ICD-10-CM

## 2025-01-14 PROCEDURE — 78315 BONE IMAGING 3 PHASE: CPT

## 2025-01-14 PROCEDURE — A9503 TC99M MEDRONATE: HCPCS

## 2025-02-14 ENCOUNTER — RA CDI HCC (OUTPATIENT)
Dept: OTHER | Facility: HOSPITAL | Age: 80
End: 2025-02-14

## 2025-02-14 NOTE — PROGRESS NOTES
HCC coding opportunities       Chart reviewed, no opportunity found: CHART REVIEWED, NO OPPORTUNITY FOUND        Patients Insurance     Medicare Insurance: [employer]          This is a reminder to assess ((resolve/update/assess)  all HCC (risk adjustment) codes for the year 2025 as patients [place] scores reset to zero with the New year.    Also, just a reminder to please review and assess all other chronic conditions for 2025.

## 2025-02-19 ENCOUNTER — APPOINTMENT (OUTPATIENT)
Dept: LAB | Facility: HOSPITAL | Age: 80
End: 2025-02-19
Payer: MEDICARE

## 2025-02-19 LAB
BASOPHILS # BLD AUTO: 0.04 THOUSANDS/ΜL (ref 0–0.1)
BASOPHILS NFR BLD AUTO: 0 % (ref 0–1)
CRP SERPL QL: 1.1 MG/L
EOSINOPHIL # BLD AUTO: 0.33 THOUSAND/ΜL (ref 0–0.61)
EOSINOPHIL NFR BLD AUTO: 3 % (ref 0–6)
ERYTHROCYTE [DISTWIDTH] IN BLOOD BY AUTOMATED COUNT: 16.7 % (ref 11.6–15.1)
ERYTHROCYTE [SEDIMENTATION RATE] IN BLOOD: 51 MM/HOUR (ref 0–29)
HCT VFR BLD AUTO: 40.1 % (ref 34.8–46.1)
HGB BLD-MCNC: 11.7 G/DL (ref 11.5–15.4)
IMM GRANULOCYTES # BLD AUTO: 0.05 THOUSAND/UL (ref 0–0.2)
IMM GRANULOCYTES NFR BLD AUTO: 1 % (ref 0–2)
LYMPHOCYTES # BLD AUTO: 2.91 THOUSANDS/ΜL (ref 0.6–4.47)
LYMPHOCYTES NFR BLD AUTO: 28 % (ref 14–44)
MCH RBC QN AUTO: 24.7 PG (ref 26.8–34.3)
MCHC RBC AUTO-ENTMCNC: 29.2 G/DL (ref 31.4–37.4)
MCV RBC AUTO: 85 FL (ref 82–98)
MONOCYTES # BLD AUTO: 0.77 THOUSAND/ΜL (ref 0.17–1.22)
MONOCYTES NFR BLD AUTO: 7 % (ref 4–12)
NEUTROPHILS # BLD AUTO: 6.3 THOUSANDS/ΜL (ref 1.85–7.62)
NEUTS SEG NFR BLD AUTO: 61 % (ref 43–75)
NRBC BLD AUTO-RTO: 0 /100 WBCS
PLATELET # BLD AUTO: 281 THOUSANDS/UL (ref 149–390)
PMV BLD AUTO: 9.7 FL (ref 8.9–12.7)
RBC # BLD AUTO: 4.73 MILLION/UL (ref 3.81–5.12)
WBC # BLD AUTO: 10.4 THOUSAND/UL (ref 4.31–10.16)

## 2025-02-20 ENCOUNTER — OFFICE VISIT (OUTPATIENT)
Dept: OBGYN CLINIC | Facility: MEDICAL CENTER | Age: 80
End: 2025-02-20
Payer: MEDICARE

## 2025-02-20 VITALS — HEIGHT: 61 IN | WEIGHT: 144 LBS | BODY MASS INDEX: 27.19 KG/M2

## 2025-02-20 DIAGNOSIS — M87.052 INFARCTION OF DISTAL END OF LEFT FEMUR (HCC): Primary | ICD-10-CM

## 2025-02-20 DIAGNOSIS — M87.051 BONE INFARCT OF DISTAL FEMUR, RIGHT (HCC): ICD-10-CM

## 2025-02-20 PROCEDURE — 99214 OFFICE O/P EST MOD 30 MIN: CPT | Performed by: STUDENT IN AN ORGANIZED HEALTH CARE EDUCATION/TRAINING PROGRAM

## 2025-02-20 NOTE — PROGRESS NOTES
Orthopedic Oncology Surgery Office Note  Emily Berrios (79 y.o. female)  : 1945 Encounter Date: 2025  Dr. Jg Mays DO, Orthopedic Surgeon  Orthopedic Oncology & Sarcoma Surgery   Phone:[phone] Fax:[phone]    Assessment, Plan, & Discussion:   Emily Berrios is a 79 y.o. 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  These look like bone infarcts, they are not hot on the bone scan which is typical for a bone infarct.  I am not concerned that these are lesions or malignancies.  They are bilateral in both the distal femur and proximal tibia.  Patient also has a history of COPD with significant exacerbations, so likely these are due to steroid infusions during this time.'s.  Patient to be full weightbearing to BLE (Bilateral Lower Extremity)  ROM as tolerated to BLE (Bilateral Lower Extremity)  Discussed with patient there is nothing of concern.  She may continue care with Dr. Buck  Patient to continue at home analgesic regimen with Tylenol   Patient to follow up as needed.        Comorbidity, including: COPD  - continue current management     Surgical Planning:   No surgery planned at this time    Follow Up & Tasks:     Return if symptoms worsen or fail to improve.     Tasks:  None   ___________________________________________________________________________    History of Present Illness:     Emily Berrios is a 79 y.o. female with history of Left total hip arthroplasty who presents for consultation at the request of Sandra Buck,*  regarding bony infarction seen on x-ray of bilateral femurs and tibias. Pain is located into the left thigh and is described as sharp and achy. The patient has cramping into her left lower extremity. She feels as though her leg is going to give out on  "her. Pain is managed with Tylenol as needed.         At baseline patient gaits with assistance.  Denies constitutional symptoms such as fever, chills, night sweats, fatigue, weight losses.   Reports weight gain, shortness of breath (secondary to COPD)   Denies  chest pain.  Patient Denies  personal history of cancer.    Occupation: retired     Review of Systems:   Allergies, medications, past medical/surgical/family/social history have been reviewed.  Complete 12 system review performed and found to be negative except: except as per mentioned in HPI.    Oncology and Treatment History:      Review of referring provider's records:  Referring provider: Sandra Buck,*  Date: 1/3/2025  Impression:   X-ray of left femur and left hip were obtained at today's visit and reviewed with patient demonstrating bony infarction as well as loosening of left VANDANA done 13 years ago.  Bone scan and labs were ordered at today's visit to evaluate loosening of left VANDANA.  I discussed with patient given that she has bony infarction seen on x-ray, I recommend patient follow-up with Dr. Mays.   Referral for patient to see Dr. Mays was given to today.    Patient Care team:   Patient Care Team:  Ramsey Gtz MD as PCP - General (Internal Medicine)  González Kaye MD (Nephrology)     Oncology History    No history exists.       Physical Examination:     Height: 5' 1\" (154.9 cm)  Weight - Scale: 65.3 kg (144 lb)  BMI (Calculated): 27.2  BSA (Calculated - m2): 1.64 sq meters     There were no vitals filed for this visit.  Body mass index is 27.21 kg/m².    General: alert and oriented x 3; well nourished/well developed; no apparent distress.   Present with daughter   Psychiatric: normal mood and affect  HEENT: NCAT. Head/neck - full range of motion.   Lungs: breathing comfortably; equal symmetric chest expansion.   Abdomen: soft, non-tender, non-distended.   Skin: warm; dry; no lesions, rashes, petechiae or purpura; no clubbing, no " cyanosis, no edema, no palpable masses.    Extremity: Left Hip:  Patient sits comfortably with hips flexed to 80 degrees in no apparent distress   TTP negative   Internal rotation to 10  External rotation to 30  Smooth circumduction of the hip    Able to perform straight leg raise with resistance   Able to perform hip flexion with resistance   Able to perform hip abduction with resistance   Able to resist dorsiflexion   Stinchfield testing is negative  ROXANNA/FADIR testing is negative  Patient ambulates with single point cane as aid.   Calf is supple and non-tender.   Sensation intact to L2-S1 dermatomes   Extremity warm and well perfused           Imaging Results:   All images personally review today by Dr. Mays    Study: NM bone scan 3 phase  Date: 1/14/2025  Report: I have read and agree with the radiologist report. and I have personally reviewed the imaging in PACS and my impression is as follows:  1. Hips: Negative flow and blood pool phases. Note is made of subtle focal tracer activity about the distal femoral tips of the bilateral hip prostheses only seen on anterior imaging and not definitively verified on oblique imaging suggestive of but not definitive for possible loosening. There is no scintigraphic evidence for acute fracture or infection.     2. Knees: No significant focal tracer activity involving either knee on delayed imaging.    Study: XR left knee  Date: 1/3/2025  Report: I have read and agree with the radiologist report. and I have personally reviewed the imaging in PACS and my impression is as follows:  No fracture or significant degenerative change of the left knee. Small suprapatellar effusion. Sclerotic lesions of the distal femur and proximal tibia as above, likely bone infarcts or perhaps chondroid lesions    Study: XR left femur  Date: 1/3/2025  Report: I have read and agree with the radiologist report. and I have personally reviewed the imaging in PACS and my impression is as  follows:  Sclerotic focus in the distal fibular diaphysis is unchanged from the comparative study.         Medical, Surgical, Family, and Social History      Past Medical History:   Diagnosis Date    Ambulates with cane     Arthritis     Asthma     Chronic obstructive pulmonary disease, unspecified COPD type ([facility]) 04/08/2022    Cognitive decline 03/10/2023    Community acquired pneumonia 11/13/2019    COPD (chronic obstructive pulmonary disease) ([facility])     Coronary artery disease     DM (diabetes mellitus) ([facility])     Fall     1/2024    HTN (hypertension)     Hx of CABG     Hydronephrosis, bilateral 03/06/2023    Hyperlipidemia     Hyponatremia     PONV (postoperative nausea and vomiting)     Urinary retention     Wears glasses      Past Surgical History:   Procedure Laterality Date    CARDIAC CATHETERIZATION  2010    CATARACT EXTRACTION, BILATERAL      CORONARY ARTERY BYPASS GRAFT  12/06/2010    with subsequent stent to the saphenous veingraft to the RCA 3 months later    KNEE ARTHROSCOPY      POLYPECTOMY      laryngeal    WI CMBND ANTERPOST COLPORRAPHY W/CYSTO N/A 8/14/2024    Procedure: COLPORRHAPHY ANT/POST;  Surgeon: Ramon Fam MD;  Location: [facility];  Service: Gynecology    WI COLPOPEXY VAGINAL EXTRAPERITONEAL APPROACH N/A 8/14/2024    Procedure: SUSPENSION VAGINAL VAULT,  EUA;  Surgeon: Ramon Fam MD;  Location: [facility];  Service: Gynecology    WI CYSTOURETHROSCOPY N/A 8/14/2024    Procedure: CYSTOSCOPY;  Surgeon: Ramon Fam MD;  Location: [facility];  Service: Gynecology    WI SLING OPERATION STRESS INCONTINENCE N/A 8/14/2024    Procedure: INSERTION PV SLING ALTIS;  Surgeon: Ramon Fam MD;  Location: [facility];  Service: Gynecology    TOTAL HIP ARTHROPLASTY      TUBAL LIGATION         Current Outpatient Medications:     acetaminophen (TYLENOL) 325 mg suppository, Insert 325 mg into the rectum every 4 (four) hours as needed for mild pain, Disp: , Rfl:     albuterol (ACCUNEB) 1.25  MG/3ML nebulizer solution, Take 3 mL by nebulization every 6 (six) hours as needed for wheezing, Disp: 225 mL, Rfl: 5    albuterol (PROVENTIL HFA,VENTOLIN HFA) 90 mcg/act inhaler, Inhale 2 puffs every 6 (six) hours as needed for wheezing, Disp: 18 g, Rfl: 5    ALPRAZolam (XANAX) 0.5 mg tablet, Take 1 tablet (0.5 mg total) by mouth daily at bedtime as needed for anxiety, Disp: 30 tablet, Rfl: 3    amLODIPine (NORVASC) 5 mg tablet, Take 1 tablet (5 mg total) by mouth 2 (two) times a day, Disp: 180 tablet, Rfl: 3    bisoprolol (ZEBETA) 10 MG tablet, Take 1 tablet (10 mg total) by mouth daily, Disp: 90 tablet, Rfl: 3    cetirizine (ZyrTEC) 10 mg tablet, Take 1 tablet (10 mg total) by mouth daily In the evening, Disp: 90 tablet, Rfl: 1    co-enzyme Q-10 30 MG capsule, Take 1 capsule (30 mg total) by mouth 2 (two) times a day, Disp: 200 capsule, Rfl: 3    fluticasone (FLONASE) 50 mcg/act nasal spray, 2 sprays into each nostril 2 (two) times a day, Disp: 16 g, Rfl: 2    glucose blood test strip, Use 1 each as needed (as needed) Use as instructed, Disp: 100 each, Rfl: 5    ipratropium-albuterol (DUO-NEB) 0.5-2.5 mg/3 mL nebulizer solution, Take 3 mL by nebulization every 6 (six) hours as needed for wheezing or shortness of breath, Disp: 270 mL, Rfl: 3    magnesium Oxide (MAG-OX) 400 mg TABS, Take 1 tablet (400 mg total) by mouth 2 (two) times a day, Disp: 200 tablet, Rfl: 3    neomycin-polymyxin-dexamethasone (MAXITROL) ophthalmic suspension, Apply 1 drop to eye 3 (three) times a day, Disp: 5 mL, Rfl: 0    pantoprazole (PROTONIX) 40 mg tablet, Take 1 tablet (40 mg total) by mouth daily before breakfast, Disp: 90 tablet, Rfl: 3    Plavix 75 MG tablet, Take 1 tablet (75 mg total) by mouth daily, Disp: 90 tablet, Rfl: 3    Premarin vaginal cream, Insert 0.5 g into the vagina 3 (three) times a week Three times a week Monday, Wednesday and Friday at bedtime, Disp: 30 g, Rfl: 0    Prolia 60 MG/ML, inject 1 MILLILITER UNDER SKIN  ONCE for 1 dose, Disp: , Rfl:     Red Yeast Rice 600 MG CAPS, Take 1 capsule (600 mg total) by mouth 2 (two) times a day with meals, Disp: 200 capsule, Rfl: 3    rosuvastatin (CRESTOR) 10 MG tablet, Take 1 tablet (10 mg total) by mouth daily, Disp: 90 tablet, Rfl: 3    vitamin B-12 (VITAMIN B-12) 1,000 mcg tablet, Take 1 tablet (1,000 mcg total) by mouth daily, Disp: 90 tablet, Rfl: 3    fluticasone-umeclidinium-vilanterol (Trelegy Ellipta) 100-62.5-25 mcg/actuation inhaler, Inhale 1 puff daily Rinse mouth after use., Disp: 3 each, Rfl: 3    ofloxacin (FLOXIN) 0.3 % otic solution, Administer 5 drops to the right ear 2 (two) times a day (Patient not taking: Reported on 2/20/2025), Disp: 5 mL, Rfl: 0    predniSONE 5 mg tablet, Take 1 tablet (5 mg total) by mouth daily with breakfast (Patient not taking: Reported on 2/20/2025), Disp: 30 tablet, Rfl: 0  Allergies   Allergen Reactions    Ace Inhibitors Other (See Comments)     Includes lisinopril    Angiotensin Receptor Blockers Other (See Comments)     Not specified.  Includes losartan and olmesartan    Ezetimibe Rash    Morphine Hives     pt states someone told her she is allergic to it after her hip surgery    Statins Arthralgia     Tolerates Crestor     Family History   Problem Relation Age of Onset    Stroke Mother     Hypertension Mother     Heart disease Mother     Colon cancer Father     No Known Problems Sister     No Known Problems Sister     No Known Problems Sister     No Known Problems Daughter     No Known Problems Daughter     No Known Problems Daughter     No Known Problems Maternal Grandmother     No Known Problems Maternal Grandfather     No Known Problems Paternal Grandmother     No Known Problems Paternal Grandfather     Heart attack Brother     Heart attack Brother     No Known Problems Maternal Aunt     No Known Problems Maternal Aunt     No Known Problems Paternal Aunt     No Known Problems Paternal Aunt      Social History     Socioeconomic  History    Marital status: Single     Spouse name: Not on file    Number of children: 5    Years of education: Not on file    Highest education level: Not on file   Occupational History    Occupation: retired   Tobacco Use    Smoking status: Former     Current packs/day: 0.00     Average packs/day: 0.5 packs/day for 32.0 years (16.0 ttl pk-yrs)     Types: Cigarettes     Start date:      Quit date:      Years since quittin.1    Smokeless tobacco: Never    Tobacco comments:     no passive smoke exposure   Vaping Use    Vaping status: Never Used   Substance and Sexual Activity    Alcohol use: Yes     Alcohol/week: 12.0 standard drinks of alcohol     Types: 12 Cans of beer per week     Comment: socially    Drug use: Never    Sexual activity: Not Currently   Other Topics Concern    Not on file   Social History Narrative    Not on file     Social Drivers of Health     Financial Resource Strain: Low Risk  (10/11/2024)    Overall Financial Resource Strain (CARDIA)     Difficulty of Paying Living Expenses: Not hard at all   Food Insecurity: No Food Insecurity (10/10/2024)    Nursing - Inadequate Food Risk Classification     Worried About Running Out of Food in the Last Year: Never true     Ran Out of Food in the Last Year: Never true     Ran Out of Food in the Last Year: Not on file   Transportation Needs: No Transportation Needs (10/10/2024)    PRAPARE - Transportation     Lack of Transportation (Medical): No     Lack of Transportation (Non-Medical): No   Physical Activity: Unknown (3/13/2020)    Exercise Vital Sign     Days of Exercise per Week: Patient declined     Minutes of Exercise per Session: Patient declined   Stress: No Stress Concern Present (3/13/2020)    [employer] - Occupational Stress Questionnaire     Feeling of Stress : Not at all   Social Connections: Unknown (3/13/2020)    Social Connection and Isolation Panel [NHANES]     Frequency of Communication with Friends  and Family: Patient declined     Frequency of Social Gatherings with Friends and Family: Patient declined     Attends L[employer] Services: Patient declined     Active Member of Clubs or Organizations: Patient declined     Attends Club or Organization Meetings: Patient declined     Marital Status: Patient declined   Intimate Partner Violence: Not At Risk (3/13/2020)    Humiliation, Afraid, Rape, and Kick questionnaire     Fear of Current or Ex-Partner: No     Emotionally Abused: No     Physically Abused: No     Sexually Abused: No   Housing Stability: Low Risk  (10/10/2024)    Housing Stability Vital Sign     Unable to Pay for Housing in the Last Year: No     Number of Times Moved in the Last Year: 1     Homeless in the Last Year: No       This Visit:       Scribe Attestation      I,:   am acting as a scribe while in the presence of the attending physician.:       I,:   personally performed the services described in this documentation    as scribed in my presence.:              Janet Null   2/20/2025 11:44 AM       Associated Orders:     Problem List Items Addressed This Visit    None  Visit Diagnoses         Infarction of distal end of left femur (HCC)    -  Primary      Bone infarct of distal femur, right (HCC)

## 2025-02-21 ENCOUNTER — OFFICE VISIT (OUTPATIENT)
Dept: FAMILY MEDICINE CLINIC | Facility: CLINIC | Age: 80
End: 2025-02-21
Payer: MEDICARE

## 2025-02-21 VITALS
WEIGHT: 145 LBS | TEMPERATURE: 98.2 F | HEART RATE: 82 BPM | RESPIRATION RATE: 14 BRPM | BODY MASS INDEX: 27.4 KG/M2 | SYSTOLIC BLOOD PRESSURE: 142 MMHG | DIASTOLIC BLOOD PRESSURE: 82 MMHG | OXYGEN SATURATION: 98 %

## 2025-02-21 DIAGNOSIS — I25.10 CHRONIC CORONARY ARTERY DISEASE: ICD-10-CM

## 2025-02-21 DIAGNOSIS — E11.8 DIABETIC COMPLICATION (HCC): ICD-10-CM

## 2025-02-21 DIAGNOSIS — J44.9 CHRONIC OBSTRUCTIVE PULMONARY DISEASE, UNSPECIFIED COPD TYPE (HCC): ICD-10-CM

## 2025-02-21 DIAGNOSIS — F41.9 ANXIETY: ICD-10-CM

## 2025-02-21 DIAGNOSIS — E11.69 HYPERLIPIDEMIA ASSOCIATED WITH TYPE 2 DIABETES MELLITUS  (HCC): Primary | ICD-10-CM

## 2025-02-21 DIAGNOSIS — I10 ESSENTIAL HYPERTENSION: ICD-10-CM

## 2025-02-21 DIAGNOSIS — E78.5 HYPERLIPIDEMIA ASSOCIATED WITH TYPE 2 DIABETES MELLITUS  (HCC): Primary | ICD-10-CM

## 2025-02-21 DIAGNOSIS — Z12.11 SCREEN FOR COLON CANCER: ICD-10-CM

## 2025-02-21 LAB — SL AMB POCT HEMOGLOBIN AIC: 6.3 (ref ?–6.5)

## 2025-02-21 PROCEDURE — 99214 OFFICE O/P EST MOD 30 MIN: CPT | Performed by: INTERNAL MEDICINE

## 2025-02-21 PROCEDURE — 83036 HEMOGLOBIN GLYCOSYLATED A1C: CPT | Performed by: INTERNAL MEDICINE

## 2025-02-21 RX ORDER — ALPRAZOLAM 0.5 MG
0.5 TABLET ORAL
Qty: 30 TABLET | Refills: 3 | Status: SHIPPED | OUTPATIENT
Start: 2025-02-21

## 2025-02-21 RX ORDER — ALBUTEROL SULFATE 90 UG/1
2 INHALANT RESPIRATORY (INHALATION) EVERY 6 HOURS PRN
Qty: 18 G | Refills: 5 | Status: SHIPPED | OUTPATIENT
Start: 2025-02-21

## 2025-02-21 RX ORDER — FLUTICASONE FUROATE, UMECLIDINIUM BROMIDE AND VILANTEROL TRIFENATATE 100; 62.5; 25 UG/1; UG/1; UG/1
1 POWDER RESPIRATORY (INHALATION) DAILY
Qty: 3 EACH | Refills: 3 | Status: SHIPPED | OUTPATIENT
Start: 2025-02-21 | End: 2025-05-22

## 2025-02-21 RX ORDER — ROSUVASTATIN CALCIUM 10 MG/1
10 TABLET, COATED ORAL DAILY
Qty: 90 TABLET | Refills: 3 | Status: SHIPPED | OUTPATIENT
Start: 2025-02-21

## 2025-02-21 NOTE — ASSESSMENT & PLAN NOTE
Orders:    albuterol (PROVENTIL HFA,VENTOLIN HFA) 90 mcg/act inhaler; Inhale 2 puffs every 6 (six) hours as needed for wheezing    fluticasone-umeclidinium-vilanterol (Trelegy Ellipta) 100-62.5-25 mcg/actuation inhaler; Inhale 1 puff daily Rinse mouth after use.    UA (URINE) with reflex to Scope; Future    Vitamin B12; Future    Magnesium; Future    Vitamin D 25 hydroxy; Future

## 2025-02-21 NOTE — ASSESSMENT & PLAN NOTE
Life style Mod  Continue same  RTC in 3 mos w Blood  work  Renew :  Orders:    rosuvastatin (CRESTOR) 10 MG tablet; Take 1 tablet (10 mg total) by mouth daily

## 2025-02-21 NOTE — ASSESSMENT & PLAN NOTE
Orders:    POCT hemoglobin A1c    Comprehensive metabolic panel; Future    CBC and differential; Future    Lipid Panel with Direct LDL reflex; Future    TSH, 3rd generation with Free T4 reflex; Future    rosuvastatin (CRESTOR) 10 MG tablet; Take 1 tablet (10 mg total) by mouth daily    UA (URINE) with reflex to Scope; Future    Vitamin B12; Future    Magnesium; Future    Vitamin D 25 hydroxy; Future

## 2025-02-21 NOTE — PROGRESS NOTES
Name: Emily Berrios      : 1945      MRN: [id number]  Encounter Provider: Ramsey Gtz MD  Encounter Date: 2025   Encounter department: [facility] CARE [facility]  :  Assessment & Plan  Anxiety    Orders:    ALPRAZolam (XANAX) 0.5 mg tablet; Take 1 tablet (0.5 mg total) by mouth daily at bedtime as needed for anxiety    Essential hypertension    Orders:    POCT hemoglobin A1c    Comprehensive metabolic panel; Future    CBC and differential; Future    Lipid Panel with Direct LDL reflex; Future    TSH, 3rd generation with Free T4 reflex; Future    UA (URINE) with reflex to Scope; Future    Vitamin B12; Future    Magnesium; Future    Vitamin D 25 hydroxy; Future    Chronic coronary artery disease    Orders:    POCT hemoglobin A1c    Comprehensive metabolic panel; Future    CBC and differential; Future    Lipid Panel with Direct LDL reflex; Future    TSH, 3rd generation with Free T4 reflex; Future    rosuvastatin (CRESTOR) 10 MG tablet; Take 1 tablet (10 mg total) by mouth daily    UA (URINE) with reflex to Scope; Future    Vitamin B12; Future    Magnesium; Future    Vitamin D 25 hydroxy; Future    Hyperlipidemia associated with type 2 diabetes mellitus  (HCC)    Lab Results   Component Value Date    HGBA1C 6.3 2025       Orders:    POCT hemoglobin A1c    Comprehensive metabolic panel; Future    CBC and differential; Future    Lipid Panel with Direct LDL reflex; Future    TSH, 3rd generation with Free T4 reflex; Future    UA (URINE) with reflex to Scope; Future    Vitamin B12; Future    Magnesium; Future    Vitamin D 25 hydroxy; Future    Screen for colon cancer         Chronic obstructive pulmonary disease, unspecified COPD type (HCC)    Orders:    albuterol (PROVENTIL HFA,VENTOLIN HFA) 90 mcg/act inhaler; Inhale 2 puffs every 6 (six) hours as needed for wheezing    fluticasone-umeclidinium-vilanterol (Trelegy Ellipta) 100-62.5-25 mcg/actuation inhaler; Inhale 1 puff daily  Rinse mouth after use.    UA (URINE) with reflex to Scope; Future    Vitamin B12; Future    Magnesium; Future    Vitamin D 25 hydroxy; Future    BMI 27.0-27.9,adult  Life style Mod  Continue same  RTC in 3 mos w Blood  work  Renew :  Orders:    rosuvastatin (CRESTOR) 10 MG tablet; Take 1 tablet (10 mg total) by mouth daily           History of Present Illness   79 Y O lady is here for Regular check up, she feels OK , recent Blood work and  med list reviewed,...      Review of Systems   Constitutional:  Negative for chills, fatigue and fever.   HENT:  Negative for congestion, ear pain, facial swelling, sore throat, trouble swallowing and voice change.    Eyes:  Negative for pain, discharge and visual disturbance.   Respiratory:  Negative for cough, shortness of breath and wheezing.    Cardiovascular:  Negative for chest pain, palpitations and leg swelling.   Gastrointestinal:  Negative for abdominal pain, blood in stool, constipation, diarrhea, nausea and vomiting.   Endocrine: Negative for polydipsia, polyphagia and polyuria.   Genitourinary:  Negative for difficulty urinating, dysuria, hematuria and urgency.   Musculoskeletal:  Negative for arthralgias, back pain and myalgias.   Skin:  Negative for color change and rash.   Neurological:  Negative for dizziness, tremors, seizures, syncope, weakness and headaches.   Hematological:  Negative for adenopathy. Does not bruise/bleed easily.   Psychiatric/Behavioral:  Negative for dysphoric mood, sleep disturbance and suicidal ideas.    All other systems reviewed and are negative.      Objective   /82 (BP Location: Left arm, Patient Position: Sitting, Cuff Size: Standard)   Pulse 82   Temp 98.2 °F (36.8 °C) (Tympanic)   Resp 14   Wt 65.8 kg (145 lb)   LMP  (LMP Unknown)   SpO2 98%   BMI 27.40 kg/m²      Physical Exam  Vitals and nursing note reviewed.   Constitutional:       General: She is not in acute distress.     Appearance: She is well-developed. She is  not diaphoretic.   HENT:      Head: Normocephalic and atraumatic.      Right Ear: External ear normal.      Left Ear: External ear normal.      Nose: Nose normal.   Eyes:      General:         Right eye: No discharge.         Left eye: No discharge.      Conjunctiva/sclera: Conjunctivae normal.      Pupils: Pupils are equal, round, and reactive to light.   Neck:      Thyroid: No thyromegaly.      Trachea: No tracheal deviation.   Cardiovascular:      Rate and Rhythm: Normal rate and regular rhythm.      Heart sounds: Murmur heard.      No friction rub.   Pulmonary:      Effort: Pulmonary effort is normal. No respiratory distress.      Breath sounds: Normal breath sounds. No stridor. No wheezing or rales.   Abdominal:      General: Bowel sounds are normal. There is no distension.      Palpations: Abdomen is soft.      Tenderness: There is no abdominal tenderness. There is no guarding.   Musculoskeletal:         General: No swelling, tenderness or deformity. Normal range of motion.      Cervical back: Normal range of motion and neck supple.   Lymphadenopathy:      Cervical: No cervical adenopathy.   Skin:     General: Skin is warm and dry.      Capillary Refill: Capillary refill takes less than 2 seconds.      Coloration: Skin is not pale.      Findings: No erythema or rash.   Neurological:      Mental Status: She is alert and oriented to person, place, and time.      Cranial Nerves: No cranial nerve deficit.      Coordination: Coordination normal.      Comments: Pt uses a cane to support gait   Psychiatric:         Mood and Affect: Mood normal.         Behavior: Behavior normal.

## 2025-02-28 ENCOUNTER — OFFICE VISIT (OUTPATIENT)
Dept: OBGYN CLINIC | Facility: MEDICAL CENTER | Age: 80
End: 2025-02-28
Payer: MEDICARE

## 2025-02-28 VITALS — BODY MASS INDEX: 27.03 KG/M2 | HEIGHT: 61 IN | WEIGHT: 143.2 LBS

## 2025-02-28 DIAGNOSIS — T84.84XA: Primary | ICD-10-CM

## 2025-02-28 DIAGNOSIS — M87.052 INFARCTION OF DISTAL END OF LEFT FEMUR (HCC): ICD-10-CM

## 2025-02-28 DIAGNOSIS — Z96.649: Primary | ICD-10-CM

## 2025-02-28 DIAGNOSIS — M17.12 PRIMARY OSTEOARTHRITIS OF LEFT KNEE: ICD-10-CM

## 2025-02-28 PROCEDURE — 99213 OFFICE O/P EST LOW 20 MIN: CPT | Performed by: ORTHOPAEDIC SURGERY

## 2025-02-28 NOTE — PROGRESS NOTES
Name: Emily Berrios      : 1945      MRN: [id number]  Encounter Provider: Sandra Buck DO  Encounter Date: 2025   Encounter department: [facility]  :  Assessment & Plan  Pain in hip region after total hip replacement, initial encounter  (HCC)  1.  Left thigh pain status post left total hip arthroplasty Dr. Jerez with evidence of subsidence that occurred sometime around .  Multiple images were reviewed over the years.  It appeared the subsidence occurred sometime around .  This has been stable for the last 10 years with no change in position of the stem as it relates to the femur.  2.  Dr. Mays did see the patient as well for bony infarcts in the bilateral distal femurs and proximal tibias.  No evidence of malignancy.  He felt this could possibly related to secondary to steroid use as she has had COPD in the past.  Bone scan completed  was suggestive of but not definitive diagnosis of loosening about the distal stem on the left.  3.  Previous x-rays lumbar spine were also reviewed which did show significant DDD L5-S1 and slight disc bulges per CT scan that was completed several years ago.  Patient currently denies back pain  4.  Clinical exam the patient continues to have discomfort in her thigh specifically with internal rotation and deep flexion.  It is possible the patient could have an effusion responsible for the discomfort.  5.  It was discussed with the patient treatment options.  At this point she does not want to consider any operation at all given her age and her medical problems.  She has had a open heart bypass in the past.  After discussion she would like to try physical therapy first.  Referral was put in the try to help her with strengthening.  6.  We will follow-up in a year with repeat x-rays.  She was instructed if for any reason her pain became worse we would see the patient back to reevaluate.  But it appears from  x-rays that her stem is been in the same position for about the last 10 years with no new evidence of any fracture or further subsidence.  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    I answered all of the patient's questions during the visit and provided education of the patient's condition during the visit.  The patient verbalized understanding of the information given and agrees with the plan.  This note was dictated using Effic[employer] software.  It may contain errors including improperly dictated words.  Please contact physician directly for any questions.    Subjective   History of Present Illness   HPI  Chief Complaint:   Chief Complaint   Patient presents with    Left Knee - Follow-up       HPI:  Emily Berrios is a 79 y.o. female who presents for follow up for left thigh pain.  Status post left VANDANA Dr. Jerez approximately 13 years ago.  Last office visit it was discovered it looks like the patient's left femoral stem and possibly subsided.  Patient also had bony infarcts in the distal femurs bilaterally and proximal tibias.  She was sent to Dr. Mays for an evaluation as well.  Patient states she continues to have thigh pain on the left more with movement and ambulation.    Review of Systems  See HPI for musculoskeletal review.   All other systems reviewed are negative     History:  Past Medical History:   Diagnosis Date    Ambulates with cane     Arthritis     Asthma     Chronic obstructive pulmonary disease, unspecified COPD type ([facility]) 04/08/2022    Cognitive decline 03/10/2023    Community acquired pneumonia 11/13/2019    COPD (chronic obstructive pulmonary disease) ([facility])     Coronary artery disease     DM (diabetes mellitus) ([facility])     Fall     1/2024    HTN (hypertension)     Hx of CABG      Hydronephrosis, bilateral 2023    Hyperlipidemia     Hyponatremia     PONV (postoperative nausea and vomiting)     Urinary retention     Wears glasses      Past Surgical History:   Procedure Laterality Date    CARDIAC CATHETERIZATION      CATARACT EXTRACTION, BILATERAL      CORONARY ARTERY BYPASS GRAFT  2010    with subsequent stent to the saphenous veingraft to the RCA 3 months later    KNEE ARTHROSCOPY      POLYPECTOMY      laryngeal    UT CMBND ANTERPOST COLPORRAPHY W/CYSTO N/A 2024    Procedure: COLPORRHAPHY ANT/POST;  Surgeon: Ramon Fam MD;  Location: [facility];  Service: Gynecology    UT COLPOPEXY VAGINAL EXTRAPERITONEAL APPROACH N/A 2024    Procedure: SUSPENSION VAGINAL VAULT,  EUA;  Surgeon: Ramon Fam MD;  Location: [facility];  Service: Gynecology    UT CYSTOURETHROSCOPY N/A 2024    Procedure: CYSTOSCOPY;  Surgeon: Ramon Fam MD;  Location: [facility];  Service: Gynecology    UT SLING OPERATION STRESS INCONTINENCE N/A 2024    Procedure: INSERTION PV SLING ALTIS;  Surgeon: Ramon Fam MD;  Location: [facility];  Service: Gynecology    TOTAL HIP ARTHROPLASTY      TUBAL LIGATION       Social History   Social History     Substance and Sexual Activity   Alcohol Use Yes    Alcohol/week: 12.0 standard drinks of alcohol    Types: 12 Cans of beer per week    Comment: socially     Social History     Substance and Sexual Activity   Drug Use Never     Social History     Tobacco Use   Smoking Status Former    Current packs/day: 0.00    Average packs/day: 0.5 packs/day for 32.0 years (16.0 ttl pk-yrs)    Types: Cigarettes    Start date:     Quit date:     Years since quittin.1   Smokeless Tobacco Never   Tobacco Comments    no passive smoke exposure     Family History:   Family History   Problem Relation Age of Onset    Stroke Mother     Hypertension Mother     Heart disease Mother     Colon cancer Father     No Known Problems Sister     No Known  Problems Sister     No Known Problems Sister     No Known Problems Daughter     No Known Problems Daughter     No Known Problems Daughter     No Known Problems Maternal Grandmother     No Known Problems Maternal Grandfather     No Known Problems Paternal Grandmother     No Known Problems Paternal Grandfather     Heart attack Brother     Heart attack Brother     No Known Problems Maternal Aunt     No Known Problems Maternal Aunt     No Known Problems Paternal Aunt     No Known Problems Paternal Aunt        Current Outpatient Medications on File Prior to Visit   Medication Sig Dispense Refill    acetaminophen (TYLENOL) 325 mg suppository Insert 325 mg into the rectum every 4 (four) hours as needed for mild pain      albuterol (ACCUNEB) 1.25 MG/3ML nebulizer solution Take 3 mL by nebulization every 6 (six) hours as needed for wheezing 225 mL 5    albuterol (PROVENTIL HFA,VENTOLIN HFA) 90 mcg/act inhaler Inhale 2 puffs every 6 (six) hours as needed for wheezing 18 g 5    ALPRAZolam (XANAX) 0.5 mg tablet Take 1 tablet (0.5 mg total) by mouth daily at bedtime as needed for anxiety 30 tablet 3    amLODIPine (NORVASC) 5 mg tablet Take 1 tablet (5 mg total) by mouth 2 (two) times a day 180 tablet 3    bisoprolol (ZEBETA) 10 MG tablet Take 1 tablet (10 mg total) by mouth daily 90 tablet 3    cetirizine (ZyrTEC) 10 mg tablet Take 1 tablet (10 mg total) by mouth daily In the evening 90 tablet 1    co-enzyme Q-10 30 MG capsule Take 1 capsule (30 mg total) by mouth 2 (two) times a day 200 capsule 3    fluticasone (FLONASE) 50 mcg/act nasal spray 2 sprays into each nostril 2 (two) times a day 16 g 2    fluticasone-umeclidinium-vilanterol (Trelegy Ellipta) 100-62.5-25 mcg/actuation inhaler Inhale 1 puff daily Rinse mouth after use. 3 each 3    glucose blood test strip Use 1 each as needed (as needed) Use as instructed 100 each 5    ipratropium-albuterol (DUO-NEB) 0.5-2.5 mg/3 mL nebulizer solution Take 3 mL by nebulization every 6  "(six) hours as needed for wheezing or shortness of breath 270 mL 3    magnesium Oxide (MAG-OX) 400 mg TABS Take 1 tablet (400 mg total) by mouth 2 (two) times a day 200 tablet 3    neomycin-polymyxin-dexamethasone (MAXITROL) ophthalmic suspension Apply 1 drop to eye 3 (three) times a day 5 mL 0    pantoprazole (PROTONIX) 40 mg tablet Take 1 tablet (40 mg total) by mouth daily before breakfast 90 tablet 3    Plavix 75 MG tablet Take 1 tablet (75 mg total) by mouth daily 90 tablet 3    Premarin vaginal cream Insert 0.5 g into the vagina 3 (three) times a week Three times a week Monday, Wednesday and Friday at bedtime 30 g 0    Prolia 60 MG/ML inject 1 MILLILITER UNDER SKIN ONCE for 1 dose      Red Yeast Rice 600 MG CAPS Take 1 capsule (600 mg total) by mouth 2 (two) times a day with meals 200 capsule 3    rosuvastatin (CRESTOR) 10 MG tablet Take 1 tablet (10 mg total) by mouth daily 90 tablet 3    vitamin B-12 (VITAMIN B-12) 1,000 mcg tablet Take 1 tablet (1,000 mcg total) by mouth daily 90 tablet 3     No current facility-administered medications on file prior to visit.     Allergies   Allergen Reactions    Ace Inhibitors Other (See Comments)     Includes lisinopril    Angiotensin Receptor Blockers Other (See Comments)     Not specified.  Includes losartan and olmesartan    Ezetimibe Rash    Morphine Hives     pt states someone told her she is allergic to it after her hip surgery    Statins Arthralgia     Tolerates Crestor          Objective   Ht 5' 1\" (1.549 m)   Wt 65 kg (143 lb 3.2 oz)   LMP  (LMP Unknown)   BMI 27.06 kg/m²         PE:  AAOx 3  WDWN  Hearing intact, no drainage from eyes  no audible wheezing  no abdominal distension  LE compartments soft, skin intact    Ortho Exam:  left hip:   No dislocation/deformity  positive Stinchfield  ROM: Flexion 100, internal rotation 20 with groin pain, external rotation 45  positive Fernando Test  positive Impingement test  Yes  TTP over greater trochanter  Abduction: " 5/5  negative Keiko's test  No  TTP over SIJ    leftLE:    Sensation grossly intact   Palpable DP pulse  AT/GS intact    Imaging Studies: Results Review Statement: I personally reviewed the following image studies/reports in PACS and discussed pertinent findings with Radiology: Bone Scan. My interpretation of the radiology images/reports is: Not definitive for loosening.      Procedures    Scribe Attestation      I,:   am acting as a scribe while in the presence of the attending physician.:       I,:   personally performed the services described in this documentation    as scribed in my presence.:

## 2025-03-10 DIAGNOSIS — I25.10 CORONARY ARTERY DISEASE DUE TO LIPID RICH PLAQUE: ICD-10-CM

## 2025-03-10 DIAGNOSIS — I25.83 CORONARY ARTERY DISEASE DUE TO LIPID RICH PLAQUE: ICD-10-CM

## 2025-03-11 RX ORDER — CLOPIDOGREL BISULFATE 75 MG
75 TABLET ORAL DAILY
Qty: 90 TABLET | Refills: 1 | Status: SHIPPED | OUTPATIENT
Start: 2025-03-11

## 2025-03-12 ENCOUNTER — EVALUATION (OUTPATIENT)
Dept: PHYSICAL THERAPY | Facility: CLINIC | Age: 80
End: 2025-03-12
Payer: MEDICARE

## 2025-03-12 DIAGNOSIS — T84.84XA: Primary | ICD-10-CM

## 2025-03-12 DIAGNOSIS — Z96.649: Primary | ICD-10-CM

## 2025-03-12 PROCEDURE — 97110 THERAPEUTIC EXERCISES: CPT

## 2025-03-12 PROCEDURE — 97112 NEUROMUSCULAR REEDUCATION: CPT

## 2025-03-12 PROCEDURE — 97161 PT EVAL LOW COMPLEX 20 MIN: CPT

## 2025-03-12 NOTE — PROGRESS NOTES
PT Evaluation     Today's date: 3/12/2025  Patient name: Emily Berrios  : 1945  MRN: [id number]  Referring provider: Jg Delgado PA*  Dx:   Encounter Diagnosis     ICD-10-CM    1. Pain in hip region after total hip replacement, initial encounter  ([facility])  T84.84XA Ambulatory referral to Physical Therapy    Z96.649           Start Time: 1040  Stop Time: 1115  Total time in clinic (min): 35 minutes    Assessment  Impairments: abnormal muscle firing, abnormal or restricted ROM, activity intolerance, impaired physical strength, lacks appropriate home exercise program, pain with function and poor body mechanics  Symptom irritability: moderate    Assessment details: Pt is a 79 y.o. year old female presenting to physical therapy for pain in left hip region after total hip replacement, She presents with the following impairments: hypomobility of lumbar spine, decreased lumbar ROM in all directions, decreased b/l hip and knee musculature strength and endurance, increased tension in lumbar paraspinals, hamstrings, ITB, quad, and piriformis, poor activation and endurance of TA and core musculature, improper firing of multifidi, gait dysfunction, and poor squatting mechanics  affecting her function with prolonged walking, standing, transferring from sitting to standing, squatting, bending forward, and navigating stairs.  Pt will benefit from skilled physical therapy to address functional limitations noted in evaluation and meet patient goals.   Understanding of Dx/Px/POC: good     Prognosis: good    Goals  ST. Pt will be independent with HEP.  2. Pt will improve lumbar mobility deficits by 50%   3. Pt will improve FOTO score from baseline set during initial evaluation  LT. Pt will be able to squat 10 times with proper form and no increase in symptoms  2. Pt will improve left LE strength grossly by 2 grades or more  3. Pt will be able to navigate 3 flights of stairs without increase in symptoms  4. Pt  "will reach FOTO goal set by annia during initial evaluation     Plan  Patient would benefit from: PT eval and skilled physical therapy  Planned modality interventions: biofeedback, manual electrical stimulation, microcurrent electrical stimulation, TENS, electrical stimulation/Russian stimulation, thermotherapy: hydrocollator packs, cryotherapy and unattended electrical stimulation    Planned therapy interventions: abdominal trunk stabilization, joint mobilization, manual therapy, massage, ADL retraining, neuromuscular re-education, body mechanics training, patient education, postural training, strengthening, stretching, therapeutic activities, therapeutic exercise, flexibility, functional ROM exercises and home exercise program    Frequency: 2x week  Duration in weeks: 6  Treatment plan discussed with: patient        Subjective Evaluation    History of Present Illness  Mechanism of injury: Pt is a 79 y.o. female presenting with chronic left hip and thigh.. Pt received a left VANDANA around 13 years ago with no specific date available. Pt received x-rays of her left hip on 1/3/2025 which showed \"postop changes of left VANDANA. Components appear grossly well seated.\" On that same day pt also received b/l x-rays of her knees which showed degenerative changes, normal with ageing and no acute abnormalities. Pt reports that this pain has been present for several years now, with no DANIE. Pt noted their pain is usually located globally throughout her left hip and thigh. She also notes that whenever she walks or stands for too long her thighs hurt and feel very weak. She notes that her pain last for a few hours to a day, but then goes away. Pt reports that prolonged walking, standing, transferring from sitting to standing, squatting, bending forward, and navigating stairs is what causes them the most pain and are the most difficult movements for them to perfrom. Pt reports that medications helps relieve the pain. Pt stated that their " main goals for therapy are pain reduction and improved function with  prolonged walking, standing, transferring from sitting to standing, squatting, bending forward, and navigating stairs.   Quality of life: good    Patient Goals  Patient goals for therapy: decreased edema, decreased pain, improved balance, increased motion, increased strength and independence with ADLs/IADLs    Pain  Current pain ratin  At best pain ratin  At worst pain ratin  Quality: dull ache  Relieving factors: medications  Aggravating factors: stair climbing and walking          Objective     Active Range of Motion     Lumbar   Flexion:  Restriction level: maximal  Extension:  Restriction level: maximal  Left lateral flexion:  Restriction level: minimal  Right lateral flexion:  Restriction level: minimal  Left rotation:  Restriction level: moderate  Right rotation:  Restriction level: moderate  Left Hip   Flexion: WFL  Abduction: WFL    Right Hip   Flexion: WFL  Abduction: WFL    Joint Play   Left Hip   Hypomobile in the posterior hip capsule, anterior hip capsule and lateral hip capsule.    Right Hip   Joints within functional limits are the posterior hip capsule, anterior hip capsule, long axis distraction and long axis distraction.     Hypomobile: L1, L2, L3, L4, L5 and S1     Strength/Myotome Testing     Left Hip   Planes of Motion   Flexion: 3+  Extension: 3+  Abduction: 3+  External rotation: 3+  Internal rotation: 3+    Right Hip   Planes of Motion   Flexion: 4-  Extension: 4-  Abduction: 4-  External rotation: 4-  Internal rotation: 4-    Left Knee   Flexion: 3+  Extension: 3+    Right Knee   Flexion: 4-  Extension: 4-    Left Ankle/Foot   Dorsiflexion: 5  Plantar flexion: 5    Right Ankle/Foot   Dorsiflexion: 5  Plantar flexion: 5    Tests     Lumbar     Left   Negative crossed SLR, passive SLR and slump test.     Right   Negative crossed SLR, passive SLR and slump test.     Left Pelvic Girdle/Sacrum   Negative: thigh  "thrust.     Right Pelvic Girdle/Sacrum   Negative: thigh thrust.     Left Hip   Positive ROXANNA and FADIR.     Right Hip   Negative ROXANNA and FADIR.     General Comments:      Hip Comments   Gait: using SPC, decreased gait speed, weight shifted to the right, asymmetric step length  Squatting: poor depth, weight forward, weight shifted right   Increased tension in lumbar paraspinals bilaterally.   Increased tension in b/l hamstring, quad, ITB, and piriformis  Poor activation and endurance of TA and core musculature  Improper activation of multifidi       Flowsheet Rows      Flowsheet Row Most Recent Value   PT/OT G-Codes    Current Score 49   Projected Score 65               Precautions: Yi Speaking, Falls Risk, L VANDANA Dr. Jerez approximately 13 years ago, SOB w/ activity and prolonged supine, HX of heart sx      Date 3/12            Visit # IE            FOTO IE             Re-eval IE               Manuals 3/12            Paraspinal STM             Lumbar Gapping                                       Neuro Re-Ed 3/12            TA activation 10x3\" seated            Bridges             Clamshell             Hamstring str             3-way hip kick 10x ea abd/ext b/l            Marching 20x             Hamstring Curl             TKE             SB Rollout             Birdbog             Deadbug                          Ther Ex 9/21            Bike             LTR 10x3\" ea long sit            LAQ 10x3\" ea            SLR             S/L Hip Abd             Dl Leg Press             SL leg press             Pallof Press             Side-Stepping             Monster Walks                          Ther Activity 9/21            Squatting             Step-Ups             Gait Training 9/21                                      Modalities 9/21                                              "

## 2025-03-12 NOTE — LETTER
2025    Jg Delgado PA-C  [address] [address]    Patient: Emily Berrios   YOB: 1945   Date of Visit: 3/12/2025     Encounter Diagnosis     ICD-10-CM    1. Pain in hip region after total hip replacement, initial encounter  (HCC)  T84.84XA Ambulatory referral to Physical Therapy    Z96.649           Dear Dr. Delgado:    Thank you for your recent referral of Emily Berrios. Please review the attached evaluation summary from Emily's recent visit.     Please verify that you agree with the plan of care by signing the attached order.     If you have any questions or concerns, please do not hesitate to call.     I sincerely appreciate the opportunity to share in the care of one of your patients and hope to have another opportunity to work with you in the near future.       Sincerely,    Alfonso Talley, PT      Referring Provider:      I certify that I have read the below Plan of Care and certify the need for these services furnished under this plan of treatment while under my care.                    Jg Delgado PA-C  [address] [address]  Via In Basket          PT Evaluation     Today's date: 3/12/2025  Patient name: Emily Berrios  : 1945  MRN: [id number]  Referring provider: Jg Delgado PA*  Dx:   Encounter Diagnosis     ICD-10-CM    1. 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 year old female presenting to physical therapy for pain in  left hip region after total hip replacement, She presents with the following impairments: hypomobility of lumbar spine, decreased lumbar ROM in all directions, decreased b/l hip and knee musculature strength and endurance, increased tension in lumbar paraspinals, hamstrings, ITB, quad, and piriformis, poor activation and endurance of TA and core musculature, improper firing of multifidi, gait dysfunction, and poor squatting mechanics  affecting her function with prolonged walking, standing, transferring from sitting to standing, squatting, bending forward, and navigating stairs.  Pt will benefit from skilled physical therapy to address functional limitations noted in evaluation and meet patient goals.   Understanding of Dx/Px/POC: good     Prognosis: good    Goals  ST. Pt will be independent with HEP.  2. Pt will improve lumbar mobility deficits by 50%   3. Pt will improve FOTO score from baseline set during initial evaluation  LT. Pt will be able to squat 10 times with proper form and no increase in symptoms  2. Pt will improve left LE strength grossly by 2 grades or more  3. Pt will be able to navigate 3 flights of stairs without increase in symptoms  4. Pt will reach FOTO goal set by annia during initial evaluation     Plan  Patient would benefit from: PT eval and skilled physical therapy  Planned modality interventions: biofeedback, manual electrical stimulation, microcurrent electrical stimulation, TENS, electrical stimulation/Russian stimulation, thermotherapy: hydrocollator packs, cryotherapy and unattended electrical stimulation    Planned therapy interventions: abdominal trunk stabilization, joint mobilization, manual therapy, massage, ADL retraining, neuromuscular re-education, body mechanics training, patient education, postural training, strengthening, stretching, therapeutic activities, therapeutic exercise, flexibility, functional ROM exercises and home exercise program    Frequency: 2x  "week  Duration in weeks: 6  Treatment plan discussed with: patient        Subjective Evaluation    History of Present Illness  Mechanism of injury: Pt is a 79 y.o. female presenting with chronic left hip and thigh.. Pt received a left VANDANA around 13 years ago with no specific date available. Pt received x-rays of her left hip on 1/3/2025 which showed \"postop changes of left VANDANA. Components appear grossly well seated.\" On that same day pt also received b/l x-rays of her knees which showed degenerative changes, normal with ageing and no acute abnormalities. Pt reports that this pain has been present for several years now, with no DANIE. Pt noted their pain is usually located globally throughout her left hip and thigh. She also notes that whenever she walks or stands for too long her thighs hurt and feel very weak. She notes that her pain last for a few hours to a day, but then goes away. Pt reports that prolonged walking, standing, transferring from sitting to standing, squatting, bending forward, and navigating stairs is what causes them the most pain and are the most difficult movements for them to perfrom. Pt reports that medications helps relieve the pain. Pt stated that their main goals for therapy are pain reduction and improved function with  prolonged walking, standing, transferring from sitting to standing, squatting, bending forward, and navigating stairs.   Quality of life: good    Patient Goals  Patient goals for therapy: decreased edema, decreased pain, improved balance, increased motion, increased strength and independence with ADLs/IADLs    Pain  Current pain ratin  At best pain ratin  At worst pain ratin  Quality: dull ache  Relieving factors: medications  Aggravating factors: stair climbing and walking          Objective     Active Range of Motion     Lumbar   Flexion:  Restriction level: maximal  Extension:  Restriction level: maximal  Left lateral flexion:  Restriction level: minimal  Right " lateral flexion:  Restriction level: minimal  Left rotation:  Restriction level: moderate  Right rotation:  Restriction level: moderate  Left Hip   Flexion: WFL  Abduction: WFL    Right Hip   Flexion: WFL  Abduction: WFL    Joint Play   Left Hip   Hypomobile in the posterior hip capsule, anterior hip capsule and lateral hip capsule.    Right Hip   Joints within functional limits are the posterior hip capsule, anterior hip capsule, long axis distraction and long axis distraction.     Hypomobile: L1, L2, L3, L4, L5 and S1     Strength/Myotome Testing     Left Hip   Planes of Motion   Flexion: 3+  Extension: 3+  Abduction: 3+  External rotation: 3+  Internal rotation: 3+    Right Hip   Planes of Motion   Flexion: 4-  Extension: 4-  Abduction: 4-  External rotation: 4-  Internal rotation: 4-    Left Knee   Flexion: 3+  Extension: 3+    Right Knee   Flexion: 4-  Extension: 4-    Left Ankle/Foot   Dorsiflexion: 5  Plantar flexion: 5    Right Ankle/Foot   Dorsiflexion: 5  Plantar flexion: 5    Tests     Lumbar     Left   Negative crossed SLR, passive SLR and slump test.     Right   Negative crossed SLR, passive SLR and slump test.     Left Pelvic Girdle/Sacrum   Negative: thigh thrust.     Right Pelvic Girdle/Sacrum   Negative: thigh thrust.     Left Hip   Positive ROXANNA and FADIR.     Right Hip   Negative ROXANNA and FADIR.     General Comments:      Hip Comments   Gait: using SPC, decreased gait speed, weight shifted to the right, asymmetric step length  Squatting: poor depth, weight forward, weight shifted right   Increased tension in lumbar paraspinals bilaterally.   Increased tension in b/l hamstring, quad, ITB, and piriformis  Poor activation and endurance of TA and core musculature  Improper activation of multifidi       Flowsheet Rows      Flowsheet Row Most Recent Value   PT/OT G-Codes    Current Score 49   Projected Score 65               Precautions: Faroese Speaking, Falls Risk, L VANDANA Dr. Jerez approximately  "13 years ago, SOB w/ activity and prolonged supine, HX of heart sx      Date 3/12            Visit # IE            FOTO IE             Re-eval IE               Manuals 3/12            Paraspinal STM             Lumbar Gapping                                       Neuro Re-Ed 3/12            TA activation 10x3\" seated            Bridges             Clamshell             Hamstring str             3-way hip kick 10x ea abd/ext b/l            Marching 20x             Hamstring Curl             TKE             SB Rollout             Birdbog             Deadbug                          Ther Ex 9/21            Bike             LTR 10x3\" ea long sit            LAQ 10x3\" ea            SLR             S/L Hip Abd             Dl Leg Press             SL leg press             Pallof Press             Side-Stepping             Monster Walks                          Ther Activity 9/21            Squatting             Step-Ups             Gait Training 9/21                                      Modalities 9/21                                                              "

## 2025-03-14 ENCOUNTER — HOSPITAL ENCOUNTER (OUTPATIENT)
Dept: MAMMOGRAPHY | Facility: CLINIC | Age: 80
End: 2025-03-14
Payer: MEDICARE

## 2025-03-14 VITALS — BODY MASS INDEX: 27 KG/M2 | WEIGHT: 143 LBS | HEIGHT: 61 IN

## 2025-03-14 DIAGNOSIS — Z12.31 ENCOUNTER FOR SCREENING MAMMOGRAM FOR BREAST CANCER: ICD-10-CM

## 2025-03-14 PROCEDURE — 77063 BREAST TOMOSYNTHESIS BI: CPT

## 2025-03-14 PROCEDURE — 77067 SCR MAMMO BI INCL CAD: CPT

## 2025-03-15 DIAGNOSIS — M81.8 IDIOPATHIC OSTEOPOROSIS: Primary | ICD-10-CM

## 2025-03-17 RX ORDER — DENOSUMAB 60 MG/ML
INJECTION SUBCUTANEOUS
Qty: 1 ML | Refills: 1 | Status: SHIPPED | OUTPATIENT
Start: 2025-03-17

## 2025-03-26 ENCOUNTER — OFFICE VISIT (OUTPATIENT)
Dept: PHYSICAL THERAPY | Facility: CLINIC | Age: 80
End: 2025-03-26
Payer: MEDICARE

## 2025-03-26 DIAGNOSIS — T84.84XA: Primary | ICD-10-CM

## 2025-03-26 DIAGNOSIS — Z96.649: Primary | ICD-10-CM

## 2025-03-26 PROCEDURE — 97112 NEUROMUSCULAR REEDUCATION: CPT

## 2025-03-26 PROCEDURE — 97110 THERAPEUTIC EXERCISES: CPT

## 2025-03-26 NOTE — PROGRESS NOTES
Daily Note     Today's date: 3/26/2025  Patient name: Emily Berrios  : 1945  MRN: [id number]  Referring provider: Jg Delgado PA*  Dx:   Encounter Diagnosis     ICD-10-CM    1. 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       Objective: See treatment diary below      Assessment: Pt tolerated treatment well. Attempted to perform stationary bike at start of PT session, to improve overall blood flow and begin activation of LE musculature. Pt was challenged cardiovascularly with warm up bike, being able to complete 2 minutes before needing to stop exercise due to shortness of breath. Pt was given a cup of water to drink, adequate rest break, and she self used her inhaler. After rest breaks, water, and inhaler were used, pt reported that she felt much better overall and that she gets SOB frequently due to COPD. Added ball/belt isometric holds in seated position to improve the overall strength and endurance to all hip, LE, and posterior chain musculature. Added 3-way SB rollout to improve the lumbar mobility and decrease tension in associated musculature. Added sit to stands to improve the overall functional strength and endurance, as well as improving function and tolerance to ADLs. After warm up bike pt responded well to all other exercises, with no exacerbation of symptoms of SOB noted. Patient exhibited good technique with therapeutic exercises and would benefit from continued PT      Plan: Continue per plan of care.  Progress treatment as tolerated.       Precautions: Ukrainian Speaking, Falls Risk, L VANDANA Dr. Jerez approximately 13 years ago, SOB w/ activity and prolonged supine, HX of heart sx, COPD      Date 3/12 3/26           Visit # IE 2           FOTO IE            "  Re-eval IE               Manuals 3/12 3/26           Paraspinal STM             Lumbar Gapping                                       Neuro Re-Ed 3/12 3/26           TA activation 10x3\" seated 20x3\" seated           Bridges             Ball Isometric Squeeze  20x3\" seated           Belt Isometric Pull  20x3\" seated           Clamshell             Hamstring str             3-way hip kick 10x ea abd/ext b/l 15x ea3-way            Marching 20x  30x ea seated           Hamstring Curl             TKE             SB Rollout  10x3\" 3-way            Birdbog             Deadbug                          Ther Ex 3/12 3/26           Bike  2' DC           LTR 10x3\" ea long sit            LAQ 10x3\" ea 20x3\" ea           SLR             S/L Hip Abd             Dl Leg Press             SL leg press             Pallof Press             Side-Stepping             Monster Walks             Sit to Stand  5x           Ther Activity 3/12 3/25           Squatting             Step-Ups             Gait Training 3/12                                      Modalities 3/12                                              "

## 2025-03-28 ENCOUNTER — OFFICE VISIT (OUTPATIENT)
Dept: PHYSICAL THERAPY | Facility: CLINIC | Age: 80
End: 2025-03-28
Payer: MEDICARE

## 2025-03-28 DIAGNOSIS — T84.84XA: Primary | ICD-10-CM

## 2025-03-28 DIAGNOSIS — Z96.649: Primary | ICD-10-CM

## 2025-03-28 PROCEDURE — 97112 NEUROMUSCULAR REEDUCATION: CPT

## 2025-03-28 PROCEDURE — 97530 THERAPEUTIC ACTIVITIES: CPT

## 2025-03-28 PROCEDURE — 97110 THERAPEUTIC EXERCISES: CPT

## 2025-03-28 NOTE — PROGRESS NOTES
"Daily Note     Today's date: 3/28/2025  Patient name: Emily Berrios  : 1945  MRN: [id number]  Referring provider: Jg Delgado PA*  Dx:   Encounter Diagnosis     ICD-10-CM    1. 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  She reports soreness in L thigh which she states may be from exercises.  She reports compliance with HEP and states no problems or questions.       Objective: See treatment diary below      Assessment: Pt demonstrates good to fair tolerance to progressions with no complaints throughout session with exception of fatigue.  Pt requires a few short rest breaks secondary to  being SOB. She was able to side step with no hand holds and appropriate balance.  Patient demonstrated fatigue post treatment, exhibited good technique with therapeutic exercises, and would benefit from continued PT to increase flexibility, strength and function.      Plan: Continue per plan of care.      Precautions: Puerto Rican Speaking, Falls Risk, L VANDANA Dr. Jerez approximately 13 years ago, SOB w/ activity and prolonged supine, HX of heart sx, COPD      Date 3/12 3/26 3/28          Visit # IE 2 3          FOTO IE             Re-eval IE               Manuals 3/12 3/26 3/28          Paraspinal STM             Lumbar Gapping                                       Neuro Re-Ed 3/12 3/26 3/28          TA activation 10x3\" seated 20x3\" seated 20x3\" seated          Bridges             Ball Isometric Squeeze  20x3\" seated 20x3\" seated          Belt Isometric Pull  20x3\" seated 20x3\" seated          Clamshell             Hamstring str             3-way hip kick 10x ea abd/ext b/l 15x ea3-way  10x2 ea 3-way           Marching 20x  30x ea seated Sitting 30x ea          Hamstring Curl             TKE             SB Rollout  10x3\" 3-way  10x3\" 3-way           Birdbog           " "  Deadbug                          Ther Ex 3/12 3/26 3/28          Bike  2' DC --          LTR 10x3\" ea long sit            LAQ 10x3\" ea 20x3\" ea 20x3\" ea          SLR             S/L Hip Abd             Dl Leg Press             SL leg press             Pallof Press             Side-Stepping   3 lap no hand hold          Monster Walks             Sit to Stand  5x 8x          Ther Activity 3/12 3/25 3/28          Squatting   15x           Step-Ups             Gait Training 3/12                                      Modalities 3/12                                                "

## 2025-04-01 ENCOUNTER — HOSPITAL ENCOUNTER (INPATIENT)
Facility: HOSPITAL | Age: 80
LOS: 8 days | Discharge: HOME | DRG: 190 | End: 2025-04-09
Attending: EMERGENCY MEDICINE | Admitting: HOSPITALIST
Payer: MEDICARE

## 2025-04-01 ENCOUNTER — APPOINTMENT (EMERGENCY)
Dept: CT IMAGING | Facility: HOSPITAL | Age: 80
DRG: 190 | End: 2025-04-01
Payer: MEDICARE

## 2025-04-01 DIAGNOSIS — R09.02 HYPOXIA: ICD-10-CM

## 2025-04-01 DIAGNOSIS — J44.1 COPD WITH ACUTE EXACERBATION (HCC): ICD-10-CM

## 2025-04-01 DIAGNOSIS — J21.9 BRONCHIOLITIS: ICD-10-CM

## 2025-04-01 DIAGNOSIS — R41.0 DELIRIUM: ICD-10-CM

## 2025-04-01 DIAGNOSIS — R05.9 COUGH: ICD-10-CM

## 2025-04-01 DIAGNOSIS — Z95.1 HX OF CABG: ICD-10-CM

## 2025-04-01 DIAGNOSIS — E83.42 HYPOMAGNESEMIA: ICD-10-CM

## 2025-04-01 DIAGNOSIS — I25.10 CHRONIC CORONARY ARTERY DISEASE: Chronic | ICD-10-CM

## 2025-04-01 DIAGNOSIS — J18.9 PNEUMONIA: ICD-10-CM

## 2025-04-01 DIAGNOSIS — R06.00 DYSPNEA: Primary | ICD-10-CM

## 2025-04-01 DIAGNOSIS — I10 PRIMARY HYPERTENSION: Chronic | ICD-10-CM

## 2025-04-01 DIAGNOSIS — J44.1 COPD EXACERBATION (HCC): ICD-10-CM

## 2025-04-01 PROBLEM — R94.31 ABNORMAL EKG: Status: ACTIVE | Noted: 2025-04-01

## 2025-04-01 PROBLEM — J96.01 ACUTE HYPOXEMIC RESPIRATORY FAILURE (HCC): Status: ACTIVE | Noted: 2025-04-01

## 2025-04-01 LAB
2HR DELTA HS TROPONIN: 0 NG/L
4HR DELTA HS TROPONIN: 2 NG/L
ALBUMIN SERPL BCG-MCNC: 4.3 G/DL (ref 3.5–5)
ALP SERPL-CCNC: 61 U/L (ref 34–104)
ALT SERPL W P-5'-P-CCNC: 14 U/L (ref 7–52)
ANION GAP SERPL CALCULATED.3IONS-SCNC: 11 MMOL/L (ref 4–13)
APTT PPP: 37 SECONDS (ref 23–34)
AST SERPL W P-5'-P-CCNC: 13 U/L (ref 13–39)
ATRIAL RATE: 112 BPM
ATRIAL RATE: 112 BPM
ATRIAL RATE: 93 BPM
BASE EX.OXY STD BLDV CALC-SCNC: 68.3 % (ref 60–80)
BASE EXCESS BLDV CALC-SCNC: 3.1 MMOL/L
BASOPHILS # BLD AUTO: 0.06 THOUSANDS/ÂΜL (ref 0–0.1)
BASOPHILS NFR BLD AUTO: 0 % (ref 0–1)
BILIRUB SERPL-MCNC: 0.68 MG/DL (ref 0.2–1)
BNP SERPL-MCNC: 151 PG/ML (ref 0–100)
BUN SERPL-MCNC: 9 MG/DL (ref 5–25)
CALCIUM SERPL-MCNC: 9.1 MG/DL (ref 8.4–10.2)
CARDIAC TROPONIN I PNL SERPL HS: 10 NG/L (ref ?–50)
CARDIAC TROPONIN I PNL SERPL HS: 8 NG/L (ref ?–50)
CARDIAC TROPONIN I PNL SERPL HS: 8 NG/L (ref ?–50)
CHLORIDE SERPL-SCNC: 94 MMOL/L (ref 96–108)
CO2 SERPL-SCNC: 29 MMOL/L (ref 21–32)
CREAT SERPL-MCNC: 0.8 MG/DL (ref 0.6–1.3)
EOSINOPHIL # BLD AUTO: 0.18 THOUSAND/ÂΜL (ref 0–0.61)
EOSINOPHIL NFR BLD AUTO: 1 % (ref 0–6)
ERYTHROCYTE [DISTWIDTH] IN BLOOD BY AUTOMATED COUNT: 16.9 % (ref 11.6–15.1)
FLUAV RNA RESP QL NAA+PROBE: NEGATIVE
FLUBV RNA RESP QL NAA+PROBE: NEGATIVE
GFR SERPL CREATININE-BSD FRML MDRD: 70 ML/MIN/1.73SQ M
GLUCOSE SERPL-MCNC: 179 MG/DL (ref 65–140)
HCO3 BLDV-SCNC: 29.2 MMOL/L (ref 24–30)
HCT VFR BLD AUTO: 38.2 % (ref 34.8–46.1)
HGB BLD-MCNC: 12 G/DL (ref 11.5–15.4)
IMM GRANULOCYTES # BLD AUTO: 0.05 THOUSAND/UL (ref 0–0.2)
IMM GRANULOCYTES NFR BLD AUTO: 0 % (ref 0–2)
INR PPP: 1.1 (ref 0.85–1.19)
LACTATE SERPL-SCNC: 1 MMOL/L (ref 0.5–2)
LYMPHOCYTES # BLD AUTO: 2.27 THOUSANDS/ÂΜL (ref 0.6–4.47)
LYMPHOCYTES NFR BLD AUTO: 15 % (ref 14–44)
MAGNESIUM SERPL-MCNC: 1 MG/DL (ref 1.9–2.7)
MAGNESIUM SERPL-MCNC: 2.5 MG/DL (ref 1.9–2.7)
MCH RBC QN AUTO: 25.5 PG (ref 26.8–34.3)
MCHC RBC AUTO-ENTMCNC: 31.4 G/DL (ref 31.4–37.4)
MCV RBC AUTO: 81 FL (ref 82–98)
MONOCYTES # BLD AUTO: 1.52 THOUSAND/ÂΜL (ref 0.17–1.22)
MONOCYTES NFR BLD AUTO: 10 % (ref 4–12)
NEUTROPHILS # BLD AUTO: 10.95 THOUSANDS/ÂΜL (ref 1.85–7.62)
NEUTS SEG NFR BLD AUTO: 74 % (ref 43–75)
NRBC BLD AUTO-RTO: 0 /100 WBCS
O2 CT BLDV-SCNC: 12.3 ML/DL
P AXIS: 66 DEGREES
P AXIS: 68 DEGREES
P AXIS: 74 DEGREES
PCO2 BLDV: 51.2 MM HG (ref 42–50)
PH BLDV: 7.37 [PH] (ref 7.3–7.4)
PLATELET # BLD AUTO: 311 THOUSANDS/UL (ref 149–390)
PMV BLD AUTO: 10 FL (ref 8.9–12.7)
PO2 BLDV: 36.7 MM HG (ref 35–45)
POTASSIUM SERPL-SCNC: 3.5 MMOL/L (ref 3.5–5.3)
PR INTERVAL: 156 MS
PR INTERVAL: 160 MS
PR INTERVAL: 166 MS
PROCALCITONIN SERPL-MCNC: 0.33 NG/ML
PROT SERPL-MCNC: 8 G/DL (ref 6.4–8.4)
PROTHROMBIN TIME: 14.4 SECONDS (ref 12.3–15)
QRS AXIS: 55 DEGREES
QRS AXIS: 68 DEGREES
QRS AXIS: 72 DEGREES
QRSD INTERVAL: 108 MS
QRSD INTERVAL: 110 MS
QRSD INTERVAL: 120 MS
QT INTERVAL: 320 MS
QT INTERVAL: 332 MS
QT INTERVAL: 406 MS
QTC INTERVAL: 436 MS
QTC INTERVAL: 453 MS
QTC INTERVAL: 505 MS
RBC # BLD AUTO: 4.71 MILLION/UL (ref 3.81–5.12)
RSV RNA RESP QL NAA+PROBE: NEGATIVE
SARS-COV-2 RNA RESP QL NAA+PROBE: NEGATIVE
SODIUM SERPL-SCNC: 134 MMOL/L (ref 135–147)
T WAVE AXIS: -84 DEGREES
T WAVE AXIS: 22 DEGREES
T WAVE AXIS: 233 DEGREES
VENTRICULAR RATE: 112 BPM
VENTRICULAR RATE: 112 BPM
VENTRICULAR RATE: 93 BPM
WBC # BLD AUTO: 15.03 THOUSAND/UL (ref 4.31–10.16)

## 2025-04-01 PROCEDURE — 94664 DEMO&/EVAL PT USE INHALER: CPT

## 2025-04-01 PROCEDURE — 94760 N-INVAS EAR/PLS OXIMETRY 1: CPT

## 2025-04-01 PROCEDURE — 0241U HB NFCT DS VIR RESP RNA 4 TRGT: CPT

## 2025-04-01 PROCEDURE — 96375 TX/PRO/DX INJ NEW DRUG ADDON: CPT

## 2025-04-01 PROCEDURE — 87040 BLOOD CULTURE FOR BACTERIA: CPT

## 2025-04-01 PROCEDURE — 71275 CT ANGIOGRAPHY CHEST: CPT

## 2025-04-01 PROCEDURE — 93005 ELECTROCARDIOGRAM TRACING: CPT

## 2025-04-01 PROCEDURE — 94640 AIRWAY INHALATION TREATMENT: CPT

## 2025-04-01 PROCEDURE — 96366 THER/PROPH/DIAG IV INF ADDON: CPT

## 2025-04-01 PROCEDURE — 83735 ASSAY OF MAGNESIUM: CPT

## 2025-04-01 PROCEDURE — 84145 PROCALCITONIN (PCT): CPT | Performed by: STUDENT IN AN ORGANIZED HEALTH CARE EDUCATION/TRAINING PROGRAM

## 2025-04-01 PROCEDURE — 85025 COMPLETE CBC W/AUTO DIFF WBC: CPT

## 2025-04-01 PROCEDURE — 93010 ELECTROCARDIOGRAM REPORT: CPT

## 2025-04-01 PROCEDURE — 83605 ASSAY OF LACTIC ACID: CPT

## 2025-04-01 PROCEDURE — 96365 THER/PROPH/DIAG IV INF INIT: CPT

## 2025-04-01 PROCEDURE — 85730 THROMBOPLASTIN TIME PARTIAL: CPT

## 2025-04-01 PROCEDURE — 36415 COLL VENOUS BLD VENIPUNCTURE: CPT

## 2025-04-01 PROCEDURE — 84484 ASSAY OF TROPONIN QUANT: CPT | Performed by: HOSPITALIST

## 2025-04-01 PROCEDURE — 97167 OT EVAL HIGH COMPLEX 60 MIN: CPT

## 2025-04-01 PROCEDURE — 85610 PROTHROMBIN TIME: CPT

## 2025-04-01 PROCEDURE — 97163 PT EVAL HIGH COMPLEX 45 MIN: CPT

## 2025-04-01 PROCEDURE — 93010 ELECTROCARDIOGRAM REPORT: CPT | Performed by: INTERNAL MEDICINE

## 2025-04-01 PROCEDURE — 83880 ASSAY OF NATRIURETIC PEPTIDE: CPT

## 2025-04-01 PROCEDURE — 83735 ASSAY OF MAGNESIUM: CPT | Performed by: HOSPITALIST

## 2025-04-01 PROCEDURE — 99285 EMERGENCY DEPT VISIT HI MDM: CPT | Performed by: EMERGENCY MEDICINE

## 2025-04-01 PROCEDURE — 94644 CONT INHLJ TX 1ST HOUR: CPT

## 2025-04-01 PROCEDURE — 80053 COMPREHEN METABOLIC PANEL: CPT

## 2025-04-01 PROCEDURE — 84484 ASSAY OF TROPONIN QUANT: CPT

## 2025-04-01 PROCEDURE — 99222 1ST HOSP IP/OBS MODERATE 55: CPT | Performed by: HOSPITALIST

## 2025-04-01 PROCEDURE — 82805 BLOOD GASES W/O2 SATURATION: CPT

## 2025-04-01 PROCEDURE — 84145 PROCALCITONIN (PCT): CPT

## 2025-04-01 PROCEDURE — 99285 EMERGENCY DEPT VISIT HI MDM: CPT

## 2025-04-01 RX ORDER — FLUTICASONE PROPIONATE 50 MCG
2 SPRAY, SUSPENSION (ML) NASAL 2 TIMES DAILY
Status: DISCONTINUED | OUTPATIENT
Start: 2025-04-01 | End: 2025-04-09 | Stop reason: HOSPADM

## 2025-04-01 RX ORDER — ALPRAZOLAM 0.5 MG
0.5 TABLET ORAL
Status: DISCONTINUED | OUTPATIENT
Start: 2025-04-01 | End: 2025-04-09 | Stop reason: HOSPADM

## 2025-04-01 RX ORDER — METHYLPREDNISOLONE SOD SUCC 125 MG
1 VIAL (EA) INJECTION ONCE
Status: COMPLETED | OUTPATIENT
Start: 2025-04-01 | End: 2025-04-01

## 2025-04-01 RX ORDER — BISOPROLOL FUMARATE 5 MG/1
10 TABLET, FILM COATED ORAL DAILY
Status: DISCONTINUED | OUTPATIENT
Start: 2025-04-01 | End: 2025-04-09 | Stop reason: HOSPADM

## 2025-04-01 RX ORDER — DOXYCYCLINE 100 MG/1
100 CAPSULE ORAL EVERY 12 HOURS SCHEDULED
Status: DISCONTINUED | OUTPATIENT
Start: 2025-04-01 | End: 2025-04-03

## 2025-04-01 RX ORDER — CLOPIDOGREL BISULFATE 75 MG/1
75 TABLET ORAL DAILY
Status: DISCONTINUED | OUTPATIENT
Start: 2025-04-01 | End: 2025-04-09 | Stop reason: HOSPADM

## 2025-04-01 RX ORDER — ENOXAPARIN SODIUM 100 MG/ML
40 INJECTION SUBCUTANEOUS DAILY
Status: DISCONTINUED | OUTPATIENT
Start: 2025-04-01 | End: 2025-04-07

## 2025-04-01 RX ORDER — LANOLIN ALCOHOL/MO/W.PET/CERES
400 CREAM (GRAM) TOPICAL 2 TIMES DAILY
Status: DISCONTINUED | OUTPATIENT
Start: 2025-04-01 | End: 2025-04-09 | Stop reason: HOSPADM

## 2025-04-01 RX ORDER — PRAVASTATIN SODIUM 80 MG/1
80 TABLET ORAL
Status: DISCONTINUED | OUTPATIENT
Start: 2025-04-01 | End: 2025-04-09 | Stop reason: HOSPADM

## 2025-04-01 RX ORDER — PANTOPRAZOLE SODIUM 40 MG/1
40 TABLET, DELAYED RELEASE ORAL
Status: DISCONTINUED | OUTPATIENT
Start: 2025-04-01 | End: 2025-04-09 | Stop reason: HOSPADM

## 2025-04-01 RX ORDER — AMLODIPINE BESYLATE 5 MG/1
5 TABLET ORAL 2 TIMES DAILY
Status: DISCONTINUED | OUTPATIENT
Start: 2025-04-01 | End: 2025-04-09 | Stop reason: HOSPADM

## 2025-04-01 RX ORDER — MAGNESIUM SULFATE HEPTAHYDRATE 40 MG/ML
2 INJECTION, SOLUTION INTRAVENOUS ONCE
Status: COMPLETED | OUTPATIENT
Start: 2025-04-01 | End: 2025-04-01

## 2025-04-01 RX ORDER — ALBUTEROL SULFATE 5 MG/ML
10 SOLUTION RESPIRATORY (INHALATION) ONCE
Status: COMPLETED | OUTPATIENT
Start: 2025-04-01 | End: 2025-04-01

## 2025-04-01 RX ORDER — ACETAMINOPHEN 325 MG/1
650 TABLET ORAL EVERY 6 HOURS PRN
Status: DISCONTINUED | OUTPATIENT
Start: 2025-04-01 | End: 2025-04-09 | Stop reason: HOSPADM

## 2025-04-01 RX ORDER — GUAIFENESIN 600 MG/1
600 TABLET, EXTENDED RELEASE ORAL EVERY 12 HOURS SCHEDULED
Status: DISCONTINUED | OUTPATIENT
Start: 2025-04-01 | End: 2025-04-04

## 2025-04-01 RX ORDER — LORATADINE 10 MG/1
10 TABLET ORAL DAILY
Status: DISCONTINUED | OUTPATIENT
Start: 2025-04-01 | End: 2025-04-07

## 2025-04-01 RX ORDER — ALBUTEROL SULFATE 90 UG/1
2 INHALANT RESPIRATORY (INHALATION) EVERY 4 HOURS PRN
Status: DISCONTINUED | OUTPATIENT
Start: 2025-04-01 | End: 2025-04-09 | Stop reason: HOSPADM

## 2025-04-01 RX ORDER — SODIUM CHLORIDE FOR INHALATION 0.9 %
12 VIAL, NEBULIZER (ML) INHALATION ONCE
Status: COMPLETED | OUTPATIENT
Start: 2025-04-01 | End: 2025-04-01

## 2025-04-01 RX ORDER — IPRATROPIUM BROMIDE AND ALBUTEROL SULFATE 2.5; .5 MG/3ML; MG/3ML
3 SOLUTION RESPIRATORY (INHALATION)
Status: DISCONTINUED | OUTPATIENT
Start: 2025-04-01 | End: 2025-04-09 | Stop reason: HOSPADM

## 2025-04-01 RX ORDER — IPRATROPIUM BROMIDE AND ALBUTEROL SULFATE .5; 3 MG/3ML; MG/3ML
1 SOLUTION RESPIRATORY (INHALATION) ONCE
Status: COMPLETED | OUTPATIENT
Start: 2025-04-01 | End: 2025-04-01

## 2025-04-01 RX ORDER — NEOMYCIN SULFATE, POLYMYXIN B SULFATE AND DEXAMETHASONE 3.5; 10000; 1 MG/ML; [USP'U]/ML; MG/ML
1 SUSPENSION/ DROPS OPHTHALMIC 3 TIMES DAILY
Status: DISCONTINUED | OUTPATIENT
Start: 2025-04-01 | End: 2025-04-09 | Stop reason: HOSPADM

## 2025-04-01 RX ORDER — IPRATROPIUM BROMIDE AND ALBUTEROL SULFATE 2.5; .5 MG/3ML; MG/3ML
3 SOLUTION RESPIRATORY (INHALATION)
Status: DISCONTINUED | OUTPATIENT
Start: 2025-04-01 | End: 2025-04-01

## 2025-04-01 RX ORDER — METHYLPREDNISOLONE SODIUM SUCCINATE 125 MG/2ML
125 INJECTION, POWDER, LYOPHILIZED, FOR SOLUTION INTRAMUSCULAR; INTRAVENOUS ONCE
Status: COMPLETED | OUTPATIENT
Start: 2025-04-01 | End: 2025-04-01

## 2025-04-01 RX ORDER — METHYLPREDNISOLONE SODIUM SUCCINATE 40 MG/ML
40 INJECTION, POWDER, LYOPHILIZED, FOR SOLUTION INTRAMUSCULAR; INTRAVENOUS EVERY 6 HOURS SCHEDULED
Status: DISCONTINUED | OUTPATIENT
Start: 2025-04-01 | End: 2025-04-02

## 2025-04-01 RX ADMIN — CLOPIDOGREL 75 MG: 75 TABLET ORAL at 09:15

## 2025-04-01 RX ADMIN — METHYLPREDNISOLONE SODIUM SUCCINATE 40 MG: 40 INJECTION, POWDER, FOR SOLUTION INTRAMUSCULAR; INTRAVENOUS at 17:26

## 2025-04-01 RX ADMIN — AMLODIPINE BESYLATE 5 MG: 5 TABLET ORAL at 09:15

## 2025-04-01 RX ADMIN — DOXYCYCLINE HYCLATE 100 MG: 100 CAPSULE ORAL at 09:15

## 2025-04-01 RX ADMIN — AMLODIPINE BESYLATE 5 MG: 5 TABLET ORAL at 17:26

## 2025-04-01 RX ADMIN — MAGNESIUM SULFATE HEPTAHYDRATE 2 G: 40 INJECTION, SOLUTION INTRAVENOUS at 05:39

## 2025-04-01 RX ADMIN — Medication 400 MG: at 17:26

## 2025-04-01 RX ADMIN — GUAIFENESIN 600 MG: 600 TABLET ORAL at 09:14

## 2025-04-01 RX ADMIN — ALBUTEROL SULFATE 10 MG: 2.5 SOLUTION RESPIRATORY (INHALATION) at 02:17

## 2025-04-01 RX ADMIN — IPRATROPIUM BROMIDE AND ALBUTEROL SULFATE 3 ML: 2.5; .5 SOLUTION RESPIRATORY (INHALATION) at 13:44

## 2025-04-01 RX ADMIN — NEOMYCIN SULFATE, POLYMYXIN B SULFATE AND DEXAMETHASONE 1 DROP: 3.5; 10000; 1 SUSPENSION/ DROPS OPHTHALMIC at 17:32

## 2025-04-01 RX ADMIN — FLUTICASONE PROPIONATE 2 SPRAY: 50 SPRAY, METERED NASAL at 17:34

## 2025-04-01 RX ADMIN — Medication 1000 MCG: at 09:15

## 2025-04-01 RX ADMIN — MAGNESIUM SULFATE HEPTAHYDRATE 2 G: 40 INJECTION, SOLUTION INTRAVENOUS at 02:12

## 2025-04-01 RX ADMIN — ENOXAPARIN SODIUM 40 MG: 40 INJECTION SUBCUTANEOUS at 09:16

## 2025-04-01 RX ADMIN — METHYLPREDNISOLONE SODIUM SUCCINATE 40 MG: 40 INJECTION, POWDER, FOR SOLUTION INTRAMUSCULAR; INTRAVENOUS at 09:19

## 2025-04-01 RX ADMIN — METHYLPREDNISOLONE SODIUM SUCCINATE 125 MG: 125 INJECTION, POWDER, FOR SOLUTION INTRAMUSCULAR; INTRAVENOUS at 02:14

## 2025-04-01 RX ADMIN — BISOPROLOL FUMARATE 10 MG: 5 TABLET ORAL at 09:15

## 2025-04-01 RX ADMIN — SODIUM CHLORIDE 1000 ML: 0.9 INJECTION, SOLUTION INTRAVENOUS at 02:10

## 2025-04-01 RX ADMIN — PANTOPRAZOLE SODIUM 40 MG: 40 TABLET, DELAYED RELEASE ORAL at 06:36

## 2025-04-01 RX ADMIN — LORATADINE 10 MG: 10 TABLET ORAL at 09:15

## 2025-04-01 RX ADMIN — IPRATROPIUM BROMIDE AND ALBUTEROL SULFATE 3 ML: 2.5; .5 SOLUTION RESPIRATORY (INHALATION) at 18:49

## 2025-04-01 RX ADMIN — IPRATROPIUM BROMIDE 1 MG: 0.5 SOLUTION RESPIRATORY (INHALATION) at 02:17

## 2025-04-01 RX ADMIN — ISODIUM CHLORIDE 12 ML: 0.03 SOLUTION RESPIRATORY (INHALATION) at 02:17

## 2025-04-01 RX ADMIN — FLUTICASONE PROPIONATE 2 SPRAY: 50 SPRAY, METERED NASAL at 09:19

## 2025-04-01 RX ADMIN — PRAVASTATIN SODIUM 80 MG: 80 TABLET ORAL at 17:26

## 2025-04-01 RX ADMIN — ALBUTEROL SULFATE 2 PUFF: 90 AEROSOL, METERED RESPIRATORY (INHALATION) at 12:15

## 2025-04-01 RX ADMIN — IPRATROPIUM BROMIDE AND ALBUTEROL SULFATE 3 ML: .5; 3 SOLUTION RESPIRATORY (INHALATION) at 07:18

## 2025-04-01 RX ADMIN — IOHEXOL 85 ML: 350 INJECTION, SOLUTION INTRAVENOUS at 03:41

## 2025-04-01 RX ADMIN — NEOMYCIN SULFATE, POLYMYXIN B SULFATE AND DEXAMETHASONE 1 DROP: 3.5; 10000; 1 SUSPENSION/ DROPS OPHTHALMIC at 09:19

## 2025-04-01 RX ADMIN — Medication 400 MG: at 09:14

## 2025-04-01 NOTE — PLAN OF CARE
Problem: PHYSICAL THERAPY ADULT  Goal: Performs mobility at highest level of function for planned discharge setting.  See evaluation for individualized goals.  Description: Treatment/Interventions: Functional transfer training, LE strengthening/ROM, Elevations, Therapeutic exercise, Endurance training, Patient/family training, Bed mobility, Gait training, Spoke to nursing, OT, Family  Equipment Recommended: Walker (pt has)       See flowsheet documentation for full assessment, interventions and recommendations.  4/1/2025 1302 by Jb Garcia PT  Note: Prognosis: Good  Problem List: Decreased strength, Decreased endurance, Impaired balance, Decreased mobility  Assessment: Pt. 79 y.o.female presented w/ c/o SOB & cough. Pt admitted for COPD with acute exacerbation (HCC) w/ acute hypoxemic respiratory failure, hypomagnesemia & abnormal EKG. Pt referred to PT for mobility assessment & D/C planning. Please see above for information re: home set-up & PLOF as well as objective findings during PT assessment. PTA, pt reports being I w/ SPC however require assistance w/ ADL's & IADL's at baseline. On eval, pt functioning minimally below baseline hence will continue skilled PT to improve function & safety. Pt require modified I w/ bed mobility & transfers however require S for amb w/ RW + cues for techniques & safety. Gait deviations as above but no gross LOB noted. During amb noted HR  bpm hence limit amb deferred stair assessment. Pt asymptomatic t/o session. Pt on 4.5L O2 NC w/ resting SpO2 95%. During amb, SpO2 88-95% w/ 4L O2 NC. The patient's AM-PAC Basic Mobility Inpatient Short Form Raw Score is 22. A Raw score of greater than 16 suggests the patient may benefit from discharge to home. Please also refer to the recommendation of the Physical Therapist for safe discharge planning. From PT standpoint, will recommend Level III (minimum resource intensity) rehab services and inc family support at D/C. No SOB &  "dizziness reported t/o session. Nsg staff most recent vital signs as follows: /54   Pulse 94   Temp 98 °F (36.7 °C)   Resp 20   Ht 5' 1\" (1.549 m)   Wt 67 kg (147 lb 11.3 oz)   LMP  (LMP Unknown)   SpO2 96%   BMI 27.91 kg/m² . At end of session, pt back in bed in stable condition, call bell & phone in reach, bed alarm activated, all lines intact. Fall precautions reinforced w/ good understanding. CM to follow. Nsg staff to continue to mobilized pt (OOB in chair for all meals & ambulate in room/unit) as tolerated to prevent further decline in function. Will also recommend [facility] for daily amb &/or daily OOB in chair as appropriate. Nsg notified. Co-eval was necessary to complete this PT eval for the pt's best interest given pt's medical acuity & complexity.    Barriers to Discharge: None     Rehab Resource Intensity Level, PT: III (Minimum Resource Intensity) (pt prefers OPPT)    See flowsheet documentation for full assessment.        "

## 2025-04-01 NOTE — OCCUPATIONAL THERAPY NOTE
Occupational Therapy Evaluation     Patient Name: Emily Berrios  Today's Date: 4/1/2025  Problem List  Principal Problem:    COPD with acute exacerbation (HCC)  Active Problems:    Chronic coronary artery disease    Glaucoma    Hyperlipidemia    Hypomagnesemia    Cognitive decline    Acute hypoxemic respiratory failure (HCC)    Abnormal EKG    Past Medical History  Past Medical History:   Diagnosis Date    Ambulates with cane     Arthritis     Asthma     Chronic obstructive pulmonary disease, unspecified COPD type (HCC) 04/08/2022    Cognitive decline 03/10/2023    Community acquired pneumonia 11/13/2019    COPD (chronic obstructive pulmonary disease) (HCC)     Coronary artery disease     DM (diabetes mellitus) (HCC)     Fall     1/2024    HTN (hypertension)     Hx of CABG     Hydronephrosis, bilateral 03/06/2023    Hyperlipidemia     Hyponatremia     PONV (postoperative nausea and vomiting)     Urinary retention     Wears glasses      Past Surgical History  Past Surgical History:   Procedure Laterality Date    CARDIAC CATHETERIZATION  2010    CATARACT EXTRACTION, BILATERAL      CORONARY ARTERY BYPASS GRAFT  12/06/2010    with subsequent stent to the saphenous veingraft to the RCA 3 months later    KNEE ARTHROSCOPY      POLYPECTOMY      laryngeal    MS CMBND ANTERPOST COLPORRAPHY W/CYSTO N/A 8/14/2024    Procedure: COLPORRHAPHY ANT/POST;  Surgeon: Ramon Fam MD;  [facility];  Service: Gynecology    MS COLPOPEXY VAGINAL EXTRAPERITONEAL APPROACH N/A 8/14/2024    Procedure: SUSPENSION VAGINAL VAULT,  EUA;  Surgeon: Ramon Fam MD;  [facility];  Service: Gynecology    MS CYSTOURETHROSCOPY N/A 8/14/2024    Procedure: CYSTOSCOPY;  Surgeon: Ramon Fam MD;  [facility];  Service: Gynecology    MS SLING OPERATION STRESS INCONTINENCE N/A 8/14/2024    Procedure: INSERTION PV SLING ALTIS;  Surgeon: Ramon Fam MD;  [facility];  Service: Gynecology    TOTAL HIP  ARTHROPLASTY      TUBAL LIGATION               04/01/25 0952   OT Last Visit   OT Visit Date 04/01/25   Note Type   Note type Evaluation   Pain Assessment   Pain Assessment Tool 0-10   Pain Score No Pain   Restrictions/Precautions   Weight Bearing Precautions Per Order No   Other Precautions Chair Alarm;Bed Alarm;Multiple lines;Telemetry;O2;Fall Risk  (4.5L NC)   Home Living   Type of Home House   Home Layout One level;Performs ADLs on one level;Able to live on main level with bedroom/bathroom;Stairs to enter with rails  (7 CARRI)   Bathroom Shower/Tub Tub/shower unit   Bathroom Toilet Standard   Bathroom Equipment Shower chair;Grab bars around toilet   Home Equipment Cane   Prior Function   Level of [place] Needs assistance with ADLs;Independent with functional mobility;Needs assistance with IADLS   Lives With Daughter   Receives Help From Home health;Family;Other (Comment)  (Daughter is paid care giver ~40 hrs a week.)   IADLs Family/Friend/Other provides transportation;Family/Friend/Other provides meals;Family/Friend/Other provides medication management   Falls in the last 6 months 0   Vocational Retired   Comments (+) home alone, SPC at baseline. Daughter reports that she has appts for OP PT.   Lifestyle   Autonomy PTA, required (A) with ADLs, required (A) with IADLs, and completed functional mobility/transfers with Mary utilizing SPC .  (-) Patient has had 0 falls in the last 6 months. Patient lives in a 1 story home with 7 CARRI with 1 HR.  Pt can live on one level, bedroom on 1st floor, bathroom on 1st floor, tub-shower, and standard toilet Pt home DME includes: Shower Chair, Grab Bars, and SPC .   Reciprocal Relationships daughter   General   Additional Pertinent History Comorbidities affecting pt’s functional performance include a significant PMH of: Arthritis   COPD, Cognitive decline, Coronary artery disease, DM, HTN, Hx of CABG, Hyperlipidemia.  Patient with active OT orders and activity orders  for Activity as tolerated.   Additional General Comments Co treatment with PT secondary to complex medical condition of pt, possible A of 2 required to achieve and maintain transitional movements, requiring the need of skilled therapeutic intervention of 2 therapists to achieve delivery of services.   Subjective   Subjective Pt agreeable to OT session   ADL   Where Assessed Edge of bed   Eating Assistance 5  Supervision/Setup   Grooming Assistance 5  Supervision/Setup   UB Bathing Assistance 5  Supervision/Setup   LB Bathing Assistance 4  Minimal Assistance   UB Dressing Assistance 5  Supervision/Setup   LB Dressing Assistance 3  Moderate Assistance   Toileting Assistance  4  Minimal Assistance   Bed Mobility   Supine to Sit 6  Modified independent   Additional items Bedrails;Increased time required;HOB elevated   Sit to Supine 6  Modified independent   Additional items Bedrails;Increased time required   Transfers   Sit to Stand 6  Modified independent   Additional items Bedrails;Other  (RW)   Stand to Sit 6  Modified independent   Additional items Bedrails;Other  (RW)   Functional Mobility   Functional Mobility 5  Supervision   Additional Comments ~40 ft, further progression limited due to pt going into V-tach.   Additional items Rolling walker   Balance   Static Sitting Good   Dynamic Sitting Fair +   Static Standing Fair  (RW)   Dynamic Standing Fair -  (RW)   Ambulatory Fair -  (RW)   Activity Tolerance   Activity Tolerance Patient tolerated treatment well;Treatment limited secondary to medical complications (Comment);Other (Comment)  (SPO2: 88-95%, HR: , V- tach during functional mobility. Pt denied any chest pain or SOB)   Medical Staff Made Aware PT   Nurse Made Aware yes   RUE Assessment   RUE Assessment WFL   LUE Assessment   LUE Assessment WFL   Hand Function   Gross Motor Coordination Functional   Fine Motor Coordination Functional   Sensation   Light Touch No apparent deficits   Proprioception    Proprioception No apparent deficits   Vision-Basic Assessment   Current Vision Wears glasses only for reading   Vision - Complex Assessment   Ocular Range of Motion Intact   Additional Comments scans all visual fields   Psychosocial   Psychosocial (WDL) WDL   Perception   Inattention/Neglect Appears intact   Cognition   Overall Cognitive Status WFL   Arousal/Participation Alert;Cooperative;Responsive   Attention Within functional limits   Orientation Level Oriented X4   Memory Within functional limits   Following Commands Follows one step commands without difficulty   Comments h/o cognitive decline   Assessment   Limitation Decreased ADL status;Decreased endurance;Decreased self-care trans;Decreased high-level ADLs  (decreased balance, decreased coordination, decreased functional reach)   Prognosis Fair   Assessment Patient is a 79 y.o. year old female seen for OT eval s/p admit to [facility] on 4/1/2025 with Cough, Hypomagnesemia, Bronchiolitis, Primary hypertension, Dyspnea, SOB, Hypoxia,COPD exacerbation,Chronic coronary artery disease, Hx of CABG   . OT consulted to assess ADLs/IADLs/functional mobility and assist w/ D/C planning.  Pt's CLOF as follows: eating/grooming: supervision , UB ADLs: supervision , LB ADLs: modA, toileting: David, bed mobility: Ladan, functional transfers: Ladan, functional mobility: supervision , sitting/standing tolerance: 5 min/ 2-3 mins. Pt would benefit from continued skilled OT while in acute setting to address deficits as defined above and to maximize (I) w/ ADLs/functional mobility. Occupational performance areas to address include: grooming, bathing/shower, toilet hygiene, dressing, socialization, health maintenance, functional mobility, community mobility, clothing management, and social participation. Patient demonstrates the following deficits impacting occupational performance: weakness, decreased strength , decreased balance, decreased activity tolerance, limited functional reach,  increased body habitus , and decreased cardiovascular endurance. These impairments, as well at pt’s CARRI home environment, limited home support, difficulty performing ADLs, difficulty performing IADLs, difficulty performing transfers/mobility, limited insight into deficits, fall risk , functional decline , new O2 requirements, increased reliance on DME , new use of AD for functional transfers/mobility, and advanced age, limit pt’s ability to safely engage in all baseline areas of occupation. Based on the aforementioned evaluation, functional performance deficits, and assessments, pt has been identified as a high complexity evaluation. At this time, recommendation for pt to receive post-acute rehabilitation services at a Level III (minimum resource intensity) due to above deficits and CLOF. OT will continue to follow pt 2-3x/wk to address the goals listed below to  w/in 10-14 days.   Goals   Patient Goals to go home   LTG Time Frame 10-   Plan   Treatment Interventions ADL retraining;Functional transfer training;Endurance training;Patient/family training;Equipment evaluation/education;Compensatory technique education;Continued evaluation;Energy conservation;Activityengagement   Goal Expiration Date 04/15/25   OT Treatment Day 0   OT Frequency 2-3x/wk   Discharge Recommendation   Rehab Resource Intensity Level, OT III (Minimum Resource Intensity)   AM-PAC Daily Activity Inpatient   Lower Body Dressing 2   Bathing 3   Toileting 3   Upper Body Dressing 4   Grooming 4   Eating 4   Daily Activity Raw Score 20   Daily Activity Standardized Score (Calc for Raw Score >=11) 42.03   AM-PAC Applied Cognition Inpatient   Following a Speech/Presentation 3   Understanding Ordinary Conversation 4   Taking Medications 3   Remembering Where Things Are Placed or Put Away 3   Remembering List of 4-5 Errands 3   Taking Care of Complicated Tasks 2   Applied Cognition Raw Score 18   Applied Cognition Standardized Score 38.07   End  of Consult   Patient Position at End of Consult Supine;Bed/Chair alarm activated;All needs within reach   Nurse Communication Other (comment);Nurse aware of consult  (notified of V-tach during functional mobility)     OT goals to be met in 10-14 days:    Pt will be Mod I for UB dressing/bathing to increase independence with ADLs.   Pt will be S for LB dressing/bathing using LHAE prn to increase independence with ADLs.    Pt will complete toileting routine w/ Mod I using G safety techniques   Pt will improve dynamic standing balance to GOOD to perform grooming tasks standing at sink   Pt will increase activity tolerance to 10 mins to increase engagement in ADLs/IADLs and leisure tasks  Pt will be Mod I w/ functional mobility,utilizing DME as appropriate, in a home-like environment to improve participation in ADL/IADLs and leisure activities  Pt will demonstrate G carryover of pt/caregiver education w/ no cues to increase safety w/ functional tasks.  Pt will attend to ongoing cognitive assessment w/ G participation for safe d/c planning     Sharri HAGAN

## 2025-04-01 NOTE — ASSESSMENT & PLAN NOTE
Likely from profound hypomagnesemia  Will repeat EKG in the morning  Telemetry monitoring  Consider cardiology consultation if warranted

## 2025-04-01 NOTE — ASSESSMENT & PLAN NOTE
Patient presented to hospital with complaint of shortness of breath and coughing, found to be wheezing with hypoxia  Admit for COPD exacerbation  Continue scheduled DuoNebs, respiratory protocol  Continue IV Solu-Medrol  Continue Mucinex and doxycycline  CTA chest results noted, bronchiolitis without pneumonia

## 2025-04-01 NOTE — ASSESSMENT & PLAN NOTE
Continue DuoNebs Q4 around-the-clock  Continue IV Solu-Medrol  Start oral doxycycline  Start Mucinex  CTA chest results noted  Consider pulmonary consultation if no improvement

## 2025-04-01 NOTE — CASE MANAGEMENT
Case Management Assessment & Discharge Planning Note    Patient name Emily Berrios  Location [facility] 2 M* MRN [id number]  : 1945 Date 2025       Current Admission Date: 2025  Current Admission Diagnosis:COPD with acute exacerbation (HCC)   Patient Active Problem List    Diagnosis Date Noted Date Diagnosed    Acute hypoxemic respiratory failure (HCC) 2025     Abnormal EKG 2025     Pain and swelling of left knee 10/10/2024     Uterovaginal prolapse, incomplete 2024     Female stress incontinence 2024     Female cystocele 2024     Rectocele 2024     Acute encephalopathy 2024     Hypoxia 2024     Insomnia 2024     Alcohol use 2024     Closed compression fracture of L1 vertebra (HCC) 2024     Closed nondisplaced fracture of surgical neck of left humerus 2024     Complete uterovaginal prolapse 2023     Chronic rhinitis 10/04/2023     Ambulatory dysfunction 2023     Cognitive decline 03/10/2023     Hyponatremia 2023     Hypomagnesemia 2023     Hydronephrosis, bilateral 2023     Cystocele with prolapse 2023     COPD with acute exacerbation (HCC) 2022     Hx of CABG 2018     Hx stent of SVG to the RCA 2018     Asthma 2014     Chronic coronary artery disease 2014     Hiatal hernia 2014     Osteoarthritis 2014     Glaucoma 2013     Hyperlipidemia 2012     Primary hypertension 2012       LOS (days): 0  Geometric Mean LOS (GMLOS) (days):   Days to GMLOS:     OBJECTIVE:    Risk of Unplanned Readmission Score: 13.68         Current admission status: Inpatient       Preferred Pharmacy:   [id number] - [place] - [address]  [place] [address]  Phone: [phone] Fax: [phone]    [facility] [place] - [id number]  [address]  [address]  [address]  [place]  [id number]  Phone: [phone] Fax: [phone]    Primary Care Provider: Ramsey Gtz MD    Primary Insurance: [employer] MEDICARE [facility]  Secondary Insurance: [place] [employer] AND [employer] [facility]    ASSESSMENT:  Active Health Care Proxies       Ivanna Mazariegos Health Care Representative - Daughter   Primary Phone: [phone] (Mobile)                 Advance Directives  Does patient have a Health Care POA?: No  Was patient offered paperwork?: No  Does patient currently have a Health Care decision maker?: Yes, please see Health Care Proxy section  Does patient have Advance Directives?: No  Was patient offered paperwork?: No  Primary Contact: Ivanna Mazariegos- Daughter         Readmission Root Cause  30 Day Readmission: No    Patient Information  Admitted from:: Home  Mental Status: Alert ([place] speaking needing intrepretter- asks for cm to speak to her dtr Ivanna)  During Assessment patient was accompanied by: Not accompanied during assessment  Assessment information provided by:: Daughter  Primary Caregiver: Child  Caregiver's Name:: Ivanna Mazariegos- Daughter- paid caregiver 48hrs/wk  Support Systems: Daughter, Son  [facility] of Residence: [place]  What [facility] do you live in?: [place]  Home entry access options. Select all that apply.: Stairs  Number of steps to enter home.: 4  Type of Current Residence: Apartment  Floor Level: 1  Upon entering residence, is there a bedroom on the main floor (no further steps)?: Yes  Living Arrangements: Lives Alone (daughter comes by and stays with pt at times)  Is patient a ?: No    Activities of Daily Living Prior to Admission  Functional Status: Assistance (Ivanna paid caregiver-47hrs /wk does IADL's and occassionally assists with ADL's depending on pt's energy levels)  Completes ADLs independently?: Yes (does need CGA at times)  Ambulates independently?: Yes  Does patient use assisted devices?: Yes  Assisted Devices (DME) used: Straight Cane, Walker, Nebulizer (SPC in the  home using RW for longer community based ambulation e.g. mall)  DME Company Name (respiratory supplies): [employer]  Does patient currently own DME?: Yes  What DME does the patient currently own?: Straight Cane, Walker, Nebulizer  Does patient have a history of Outpatient Therapy (PT/OT)?: No  Does the patient have a history of Short-Term Rehab?: No  Does patient have a history of HHC?: Yes  Does patient currently have HHC?: No         Patient Information Continued  Income Source: SSI/SSD  Does patient have prescription coverage?: Yes (Uses [facility] [address])  Can the patient afford their medications and any related supplies (such as glucometers or test strips)?: Yes  Does patient have a history of substance abuse?: No  Does patient have a history of Mental Health Diagnosis?: No         Means of Transportation  Means of Transport to Appts:: Family transport          DISCHARGE DETAILS:    Discharge planning discussed with:: pt's dtr Ivanna  [place] of Choice: Yes  Comments - Freedom of Choice: Pt currently involved in OPPT with her wishing to have her continue with this on d/c (and as recommended by therapy)  CM contacted family/caregiver?: Yes  Were Treatment Team discharge recommendations reviewed with patient/caregiver?: Yes  Did patient/caregiver verbalize understanding of patient care needs?: Yes       Contacts  Patient Contacts: Ivanna Mazariegos (Daughter)  Relationship to Patient:: Family  Contact Method: Phone  Phone Number: [phone] (M)  Reason/Outcome: Continuity of Care, Emergency Contact, Discharge Planning    Requested Home Health Care         Is the patient interested in HHC at discharge?: No    DME Referral Provided  Referral made for DME?: No         Would you like to participate in our [facility] service program?  : No - Declined    Treatment Team Recommendation: Home (with OPPT)  Discharge Destination Plan:: Home (with OPPT)  Transport at Discharge : Auto with designated , Family         Transported by (Company and Unit #): Saira Goncalves

## 2025-04-01 NOTE — PLAN OF CARE
Problem: Potential for Falls  Goal: Patient will remain free of falls  Description: INTERVENTIONS:- Educate patient/family on patient safety including physical limitations- Instruct patient to call for assistance with activity - Consult OT/PT to assist with strengthening/mobility - Keep Call bell within reach- Keep bed low and locked with side rails adjusted as appropriate- Keep care items and personal belongings within reach- Initiate and maintain comfort rounds- Make Fall Risk Sign visible to staff- Offer Toileting every 2 Hours, in advance of need- Initiate/Maintain bed alarm- Obtain necessary fall risk management equipment: walker- Apply yellow socks and bracelet for high fall risk patients- Consider moving patient to room near nurses station  Outcome: Progressing     Problem: PAIN - ADULT  Goal: Verbalizes/displays adequate comfort level or baseline comfort level  Description: Interventions:- Encourage patient to monitor pain and request assistance- Assess pain using appropriate pain scale- Administer analgesics based on type and severity of pain and evaluate response- Implement non-pharmacological measures as appropriate and evaluate response- Consider cultural and social influences on pain and pain management- Notify physician/advanced practitioner if interventions unsuccessful or patient reports new pain  Outcome: Progressing     Problem: INFECTION - ADULT  Goal: Absence or prevention of progression during hospitalization  Description: INTERVENTIONS:- Assess and monitor for signs and symptoms of infection- Monitor lab/diagnostic results- Monitor all insertion sites, i.e. indwelling lines, tubes, and drains- Monitor endotracheal if appropriate and nasal secretions for changes in amount and color- Outing appropriate cooling/warming therapies per order- Administer medications as ordered- Instruct and encourage patient and family to use good hand hygiene technique- Identify and instruct in appropriate  isolation precautions for identified infection/condition  Outcome: Progressing  Goal: Absence of fever/infection during neutropenic period  Description: INTERVENTIONS:- Monitor WBC  Outcome: Progressing     Problem: SAFETY ADULT  Goal: Maintain or return to baseline ADL function  Description: INTERVENTIONS:-  Assess patient's ability to carry out ADLs; assess patient's baseline for ADL function and identify physical deficits which impact ability to perform ADLs (bathing, care of mouth/teeth, toileting, grooming, dressing, etc.)- Assess/evaluate cause of self-care deficits - Assess range of motion- Assess patient's mobility; develop plan if impaired- Assess patient's need for assistive devices and provide as appropriate- Encourage maximum independence but intervene and supervise when necessary- Involve family in performance of ADLs- Assess for home care needs following discharge - Consider OT consult to assist with ADL evaluation and planning for discharge- Provide patient education as appropriate  Outcome: Progressing  Goal: Maintains/Returns to pre admission functional level  Description: INTERVENTIONS:- Perform AM-PAC 6 Click Basic Mobility/ Daily Activity assessment daily.- Set and communicate daily mobility goal to care team and patient/family/caregiver. - Collaborate with rehabilitation services on mobility goals if consulted- Perform Range of Motion 3 times a day.- Reposition patient every 2 hours.- Dangle patient 3 times a day- Stand patient 3 times a day- Ambulate patient 3 times a day- Out of bed to chair 3 times a day - Out of bed for meals 3 times a day- Out of bed for toileting- Record patient progress and toleration of activity level   Outcome: Progressing     Problem: DISCHARGE PLANNING  Goal: Discharge to home or other facility with appropriate resources  Description: INTERVENTIONS:- Identify barriers to discharge w/patient and caregiver- Arrange for needed discharge resources and transportation as  appropriate- Identify discharge learning needs (meds, wound care, etc.)- Arrange for interpretive services to assist at discharge as needed- Refer to Case Management Department for coordinating discharge planning if the patient needs post-hospital services based on physician/advanced practitioner order or complex needs related to functional status, cognitive ability, or social support system  Outcome: Progressing     Problem: Knowledge Deficit  Goal: Patient/family/caregiver demonstrates understanding of disease process, treatment plan, medications, and discharge instructions  Description: Complete learning assessment and assess knowledge base.Interventions:- Provide teaching at level of understanding- Provide teaching via preferred learning methods  Outcome: Progressing     Problem: CARDIOVASCULAR - ADULT  Goal: Maintains optimal cardiac output and hemodynamic stability  Description: INTERVENTIONS:- Monitor I/O, vital signs and rhythm- Monitor for S/S and trends of decreased cardiac output- Administer and titrate ordered vasoactive medications to optimize hemodynamic stability- Assess quality of pulses, skin color and temperature- Assess for signs of decreased coronary artery perfusion- Instruct patient to report change in severity of symptoms  Outcome: Progressing  Goal: Absence of cardiac dysrhythmias or at baseline rhythm  Description: INTERVENTIONS:- Continuous cardiac monitoring, vital signs, obtain 12 lead EKG if ordered- Administer antiarrhythmic and heart rate control medications as ordered- Monitor electrolytes and administer replacement therapy as ordered  Outcome: Progressing     Problem: RESPIRATORY - ADULT  Goal: Achieves optimal ventilation and oxygenation  Description: INTERVENTIONS:- Assess for changes in respiratory status- Assess for changes in mentation and behavior- Position to facilitate oxygenation and minimize respiratory effort- Oxygen administered by appropriate delivery if ordered-  Initiate smoking cessation education as indicated- Encourage broncho-pulmonary hygiene including cough, deep breathe, Incentive Spirometry- Assess the need for suctioning and aspirate as needed- Assess and instruct to report SOB or any respiratory difficulty- Respiratory Therapy support as indicated  Outcome: Progressing

## 2025-04-01 NOTE — PHYSICAL THERAPY NOTE
PT EVALUATION    Pt. Name: Emily Berrios  Pt. Age: 79 y.o.  MRN: [id number]  LENGTH OF STAY: 0      Admitting Diagnoses:   Cough [R05.9]  Hypomagnesemia [E83.42]  Bronchiolitis [J21.9]  Primary hypertension [I10]  Dyspnea [R06.00]  SOB (shortness of breath) [R06.02]  Hypoxia [R09.02]  COPD exacerbation (HCC) [J44.1]  Chronic coronary artery disease [I25.10]  Hx of CABG [Z95.1]    Past Medical History:   Diagnosis Date    Ambulates with cane     Arthritis     Asthma     Chronic obstructive pulmonary disease, unspecified COPD type (HCC) 04/08/2022    Cognitive decline 03/10/2023    Community acquired pneumonia 11/13/2019    COPD (chronic obstructive pulmonary disease) (HCC)     Coronary artery disease     DM (diabetes mellitus) (HCC)     Fall     1/2024    HTN (hypertension)     Hx of CABG     Hydronephrosis, bilateral 03/06/2023    Hyperlipidemia     Hyponatremia     PONV (postoperative nausea and vomiting)     Urinary retention     Wears glasses        Past Surgical History:   Procedure Laterality Date    CARDIAC CATHETERIZATION  2010    CATARACT EXTRACTION, BILATERAL      CORONARY ARTERY BYPASS GRAFT  12/06/2010    with subsequent stent to the saphenous veingraft to the RCA 3 months later    KNEE ARTHROSCOPY      POLYPECTOMY      laryngeal    UT CMBND ANTERPOST COLPORRAPHY W/CYSTO N/A 8/14/2024    Procedure: COLPORRHAPHY ANT/POST;  Surgeon: Ramon Fam MD;  [facility];  Service: Gynecology    UT COLPOPEXY VAGINAL EXTRAPERITONEAL APPROACH N/A 8/14/2024    Procedure: SUSPENSION VAGINAL VAULT,  EUA;  Surgeon: Ramon Fam MD;  [facility];  Service: Gynecology    UT CYSTOURETHROSCOPY N/A 8/14/2024    Procedure: CYSTOSCOPY;  Surgeon: Ramon Fam MD;  [facility];  Service: Gynecology    UT SLING OPERATION STRESS INCONTINENCE N/A 8/14/2024    Procedure: INSERTION PV SLING ALTIS;  Surgeon: Ramon Fam MD;  [facility];  Service: Gynecology    TOTAL HIP  ARTHROPLASTY      TUBAL LIGATION         Imaging Studies:  CTA chest pe study   Final Result by Ashish Ferrell DO (04/01 0349)      No acute pulmonary embolus identified to the level of the segmental pulmonary arteries.      Patchy groundglass nodular opacities throughout the lungs suggestive of bronchiolitis.            The study was marked in EPIC for immediate notification.      Workstation performed: [id number]               04/01/25 1005   PT Last Visit   PT Visit Date 04/01/25   Note Type   Note type Evaluation   Pain Assessment   Pain Score No Pain   Restrictions/Precautions   Weight Bearing Precautions Per Order No   Other Precautions O2;Fall Risk;Telemetry  (4.5L O2 NC)   Home Living   Type of Home Apartment   Home Layout One level;Stairs to enter with rails;Other (Comment)  (7STE)   Bathroom Shower/Tub Tub/shower unit   Bathroom Toilet Standard   Bathroom Equipment Shower chair;Grab bars around toilet   Home Equipment Walker;Cane   Prior Function   Level of [place] Independent with functional mobility;Needs assistance with ADLs;Needs assistance with IADLS  (ambulates w/ SPC)   Lives With Alone   Receives Help From Family  (daughter is paid caregiver x 47 hrs/wk)   Falls in the last 6 months 0   Vocational Retired   Comments (+) times home alone; gets [id number]; (-) ; daughter provides transportation   General   Family/Caregiver Present No   Cognition   Overall Cognitive Status WFL   Arousal/Participation Alert   Orientation Level Oriented X4   Following Commands Follows one step commands without difficulty   Comments pleasant & cooperative   Subjective   Subjective Pt agreeable to PT/OT evals.   RUE Assessment   RUE Assessment   (refer to OT)   LUE Assessment   LUE Assessment   (refer to OT)   RLE Assessment   RLE Assessment WFL  (4-/5 grossly)   LLE Assessment   LLE Assessment WFL  (4-/5 grossly)   Coordination   Movements are Fluid and Coordinated 1   Sensation WFL   Bed Mobility   Supine  to Sit 6  Modified independent   Additional items HOB elevated   Sit to Supine 6  Modified independent   Transfers   Sit to Stand 6  Modified independent   Additional items HOB elevated;Bedrails   Stand to Sit 6  Modified independent   Additional items Bedrails   Additional Comments w/ RW   Ambulation/Elevation   Gait pattern Decreased foot clearance;Excessively slow   Gait Assistance 5  Supervision   Additional items Verbal cues   Assistive Device Rolling walker   Distance 120'x1   Ambulation/Elevation Additional Comments no gross LOB noted; limit amb 2* to elevated HR during amb   Balance   Static Sitting Good   Dynamic Sitting Fair +   Static Standing Fair  (w/ RW)   Dynamic Standing Fair -  (w/ RW)   Ambulatory Fair -  (w/ RW)   Activity Tolerance   Activity Tolerance Patient tolerated treatment well   Medical Staff Made Aware OTR Ghada; DANYA Harrell   Nurse Made Aware yes   Assessment   Prognosis Good   Problem List Decreased strength;Decreased endurance;Impaired balance;Decreased mobility   Assessment Pt. 79 y.o.female presented w/ c/o SOB & cough. Pt admitted for COPD with acute exacerbation (HCC) w/ acute hypoxemic respiratory failure, hypomagnesemia & abnormal EKG. Pt referred to PT for mobility assessment & D/C planning. Please see above for information re: home set-up & PLOF as well as objective findings during PT assessment. PTA, pt reports being I w/ SPC however require assistance w/ ADL's & IADL's at baseline. On eval, pt functioning minimally below baseline hence will continue skilled PT to improve function & safety. Pt require modified I w/ bed mobility & transfers however require S for amb w/ RW + cues for techniques & safety. Gait deviations as above but no gross LOB noted. During amb noted HR  bpm hence limit amb deferred stair assessment. Pt asymptomatic t/o session. Pt on 4.5L O2 NC w/ resting SpO2 95%. During amb, SpO2 88-95% w/ 4L O2 NC. The patient's AM-PAC Basic Mobility Inpatient  "Short Form Raw Score is 22. A Raw score of greater than 16 suggests the patient may benefit from discharge to home. Please also refer to the recommendation of the Physical Therapist for safe discharge planning. From PT standpoint, will recommend Level III (minimum resource intensity) rehab services and inc family support at D/C. No SOB & dizziness reported t/o session. Nsg staff most recent vital signs as follows: /54   Pulse 94   Temp 98 °F (36.7 °C)   Resp 20   Ht 5' 1\" (1.549 m)   Wt 67 kg (147 lb 11.3 oz)   LMP  (LMP Unknown)   SpO2 96%   BMI 27.91 kg/m² . At end of session, pt back in bed in stable condition, call bell & phone in reach, bed alarm activated, all lines intact. Fall precautions reinforced w/ good understanding. CM to follow. Nsg staff to continue to mobilized pt (OOB in chair for all meals & ambulate in room/unit) as tolerated to prevent further decline in function. Will also recommend Restorative for daily amb &/or daily OOB in chair as appropriate. Nsg notified. Co-eval was necessary to complete this PT eval for the pt's best interest given pt's medical acuity & complexity.   Barriers to Discharge None   Goals   Patient Goals to go home   STG Expiration Date 04/11/25   Short Term Goal #1 Goals to be met in 10 days; pt will be able to: 1) inc strength & balance by 1/2 grade to improve overall functional mobility & dec fall risk; 2) inc bed mobility to I for pt to be able to get in/OOB safely w/ proper techniques 100% of the time, to dec caregiver burden & safely function at home; 3) inc transfers to I for pt to transition safely from one surface to another w/o % of the time, to dec caregiver burden & safely function at home; 4) inc amb w/ appropriate AD approx. >350' w/ modified I for pt to ambulate community distances w/o any % of the time, to dec caregiver burden & safely function at home; 5) negotiate stairs w/ modified I for inc safety during stair mgt " inside/outside of home & dec caregiver burden; 6) pt/caregiver ed   PT Treatment Day 0   Plan   Treatment/Interventions Functional transfer training;LE strengthening/ROM;Elevations;Therapeutic exercise;Endurance training;Patient/family training;Bed mobility;Gait training;Spoke to nursing;OT;Family   PT Frequency 2-3x/wk   Discharge Recommendation   Rehab Resource Intensity Level, PT III (Minimum Resource Intensity)  (pt prefers OPPT)   Equipment Recommended Walker  (pt has)   Additional Comments restorative for daily amb   AM-PAC Basic Mobility Inpatient   Turning in Flat Bed Without Bedrails 4   Lying on Back to Sitting on Edge of Flat Bed Without Bedrails 4   Moving Bed to Chair 4   Standing Up From Chair Using Arms 4   Walk in Room 3   Climb 3-5 Stairs With Railing 3   Basic Mobility Inpatient Raw Score 22   Basic Mobility Standardized Score 47.4   [facility] Highest Level Of Mobility   -HLM Goal 7: Walk 25 feet or more   -HLM Achieved 7: Walk 25 feet or more   End of Consult   Patient Position at End of Consult Supine;Bed/Chair alarm activated;All needs within reach   End of Consult Comments Pt in stable condition. All needs in reach. Bed alarm activated. All lines intact.   Hx/personal factors: co-morbidities, inaccessible home, dec caregiver support, advanced age, mutliple lines, telemetry, use of AD, fall risk, assist w/ ADL's, and O2  Examination: dec mobility, dec balance, dec endurance, dec amb, risk for falls  Clinical: unpredictable (ongoing medical status, abnormal lab values, risk for falls, and pain mgt)  Complexity: high    Jb Radha

## 2025-04-01 NOTE — ASSESSMENT & PLAN NOTE
Will order an additional magnesium sulfate.  2 g ordered in the ED  Telemetry monitoring  Repeat magnesium level in the afternoon, then again in the morning

## 2025-04-01 NOTE — PHYSICAL THERAPY NOTE
"ASSESSMENT: Pt. 79 y.o.female presented w/ c/o SOB & cough. Pt admitted for COPD with acute exacerbation (HCC) w/ acute hypoxemic respiratory failure, hypomagnesemia & abnormal EKG. Pt referred to PT for mobility assessment & D/C planning. Please see above for information re: home set-up & PLOF as well as objective findings during PT assessment. PTA, pt reports being I w/ SPC however require assistance w/ ADL's & IADL's at baseline. On eval, pt functioning below baseline hence will continue skilled PT to improve function & safety. Pt require modified I w/ bed mobility & transfers however require S for amb w/ RW + cues for techniques & safety. Gait deviations as above but no gross LOB noted. During amb noted  BPM.  The patient's AM-PAC Basic Mobility Inpatient Short Form Raw Score is 22. A Raw score of {greater than/less than or equal to:94360} 16 suggests the patient may benefit from discharge to {home/post-acute rehab services:42253}. Please also refer to the recommendation of the Physical Therapist for safe discharge planning. From PT standpoint, will recommend {DRDCREC:16287} at D/C. No SOB & dizziness reported t/o session. Nsg staff most recent vital signs as follows: /54   Pulse 94   Temp 98 °F (36.7 °C)   Resp 20   Ht 5' 1\" (1.549 m)   Wt 67 kg (147 lb 11.3 oz)   LMP  (LMP Unknown)   SpO2 96%   BMI 27.91 kg/m² . At end of session, pt {DRENDOFSESSION:35842}. Fall precautions reinforced w/ good understanding. CM to follow. Nsg staff to continue to mobilized pt (OOB in chair for all meals & ambulate in room/unit) as tolerated to prevent further decline in function. Will also recommend Restorative for daily amb &/or daily OOB in chair as appropriate. Nsg notified. Co-eval was necessary to complete this PT eval for the pt's best interest given pt's medical acuity & complexity.         PT standpoint, given pt's dementia, it will be more beneficial for pt to return to previous facility w/ rehab " services when medically cleared. Pt may need STR, if facility unable to take pt back at her current level of function.     Pt's varies from being impulsive to being reluctant to move depending on pt's willingness to perform certain tasks. Above mentioned deficits appears mostly due to pt's behavior. I think that pt will return to his PLOF once back to his familiar environment. Moving pt to a different facility will just increase pt's anxiety & may result to more behavioral issues.    On eval, pt demonstrate dec mobility, balance, endurance & amb. Pt require {DRLEVELOFASSIST:40609} for most functional mobility + cues for techniques. Gait deviations as above, *** but no gross LOB noted.    Pt instructed on general energy conservation techniques, activity pacing, breathing techniques & easing back to normal activity w/ good understanding.    with impairments and limitations including weakness, impaired balance, decreased endurance, gait deviations, decreased functional mobility tolerance and fall risk. Pt's clinical presentation is currently unstable/unpredictable seen in pt's presentation    despite AM-PAC score, given above deficits & safety concerns, will recommend inpt rehab at D/C. {rweactivity:18305}    Self Selected Walking Speed Test Score *** m/sec, indicating that pt is currently a {dwalkingspeedindicator:24751}.

## 2025-04-01 NOTE — H&P
H&P - Hospitalist   Name: Emily Berrios 79 y.o. female I MRN: [id number]  Unit/Bed#: ED-03 I Date of Admission: 4/1/2025   Date of Service: 4/1/2025 I Hospital Day: 0     Assessment & Plan  COPD with acute exacerbation (HCC)  Continue DuoNebs Q4 around-the-clock  Continue IV Solu-Medrol  Start oral doxycycline  Start Mucinex  CTA chest results noted  Consider pulmonary consultation if no improvement  Acute hypoxemic respiratory failure (HCC)  Oxygen protocol and titrate as needed  Hypomagnesemia  Will order an additional magnesium sulfate.  2 g ordered in the ED  Telemetry monitoring  Repeat magnesium level in the afternoon, then again in the morning  Chronic coronary artery disease  Continue Plavix, Crestor, bisoprolol  Glaucoma  Continue eyedrops  Hyperlipidemia  Continue statin  Cognitive decline  Fall/aspiration precautions  Abnormal EKG  Likely from profound hypomagnesemia  Will repeat EKG in the morning  Telemetry monitoring  Consider cardiology consultation if warranted      VTE Pharmacologic Prophylaxis:   Lovenox  Code Status: Level 1 - Full Code     Anticipated Length of Stay: Patient will be admitted on an inpatient basis with an anticipated length of stay of greater than 2 midnights secondary to COPD.    History of Present Illness   Chief Complaint: Shortness of breath    Emily Berrios is a 79 y.o. female with a PMH of COPD, tobacco use in the past, coronary artery disease status post CABG, hypertension presented to the emergency room with complaints of shortness of breath and cough.  The patient has a known history of COPD and she used to smoke in the past.  The patient denied smoking recently.  The patient stated that the cough was productive and greenish in color.  EMS stated that the patient was hypoxic at 80%..  On physical examination the patient was noted to have expiratory wheezing.  She was placed on 4 L nasal cannula.  The patient has never used oxygen in the past.  The patient was  given nebulizer treatments and IV Solu-Medrol and she stated that she does feel an improvement of her symptoms.  CTA of the chest was negative for pulmonary embolism but it did show bronchiolitis.  On labs, the patient was found to have significant hypomagnesemia which was 1.0, was repleted. EKG did show some ectopy and inferolateral T wave inversions.  The patient denied any fevers/chills.     Review of Systems   Respiratory:  Positive for cough and shortness of breath.        Historical Information   Past Medical History:   Diagnosis Date    Ambulates with cane     Arthritis     Asthma     Chronic obstructive pulmonary disease, unspecified COPD type ([facility]) 04/08/2022    Cognitive decline 03/10/2023    Community acquired pneumonia 11/13/2019    COPD (chronic obstructive pulmonary disease) ([facility])     Coronary artery disease     DM (diabetes mellitus) ([facility])     Fall     1/2024    HTN (hypertension)     Hx of CABG     Hydronephrosis, bilateral 03/06/2023    Hyperlipidemia     Hyponatremia     PONV (postoperative nausea and vomiting)     Urinary retention     Wears glasses      Past Surgical History:   Procedure Laterality Date    CARDIAC CATHETERIZATION  2010    CATARACT EXTRACTION, BILATERAL      CORONARY ARTERY BYPASS GRAFT  12/06/2010    with subsequent stent to the saphenous veingraft to the RCA 3 months later    KNEE ARTHROSCOPY      POLYPECTOMY      laryngeal    CT CMBND ANTERPOST COLPORRAPHY W/CYSTO N/A 8/14/2024    Procedure: COLPORRHAPHY ANT/POST;  Surgeon: Ramon Fam MD;  [facility];  Service: Gynecology    CT COLPOPEXY VAGINAL EXTRAPERITONEAL APPROACH N/A 8/14/2024    Procedure: SUSPENSION VAGINAL VAULT,  EUA;  Surgeon: Ramon Fam MD;  [facility];  Service: Gynecology    CT CYSTOURETHROSCOPY N/A 8/14/2024    Procedure: CYSTOSCOPY;  Surgeon: Ramon Fam MD;  [facility];  Service: Gynecology    CT SLING OPERATION STRESS INCONTINENCE N/A 8/14/2024    Procedure:  INSERTION PV TANO PHILLIP;  Surgeon: Ramon Fam MD;  [facility];  Service: Gynecology    TOTAL HIP ARTHROPLASTY      TUBAL LIGATION       Social History     Tobacco Use    Smoking status: Former     Current packs/day: 0.00     Average packs/day: 0.5 packs/day for 32.0 years (16.0 ttl pk-yrs)     Types: Cigarettes     Start date:      Quit date:      Years since quittin.2    Smokeless tobacco: Never    Tobacco comments:     no passive smoke exposure   Vaping Use    Vaping status: Never Used   Substance and Sexual Activity    Alcohol use: Yes     Alcohol/week: 12.0 standard drinks of alcohol     Types: 12 Cans of beer per week     Comment: socially    Drug use: Never    Sexual activity: Not Currently     E-Cigarette/Vaping    E-Cigarette Use Never User      E-Cigarette/Vaping Substances    Nicotine No     THC No     CBD No     Flavoring No     Other No     Unknown No      Social History:  Marital Status: Single       Meds/Allergies   I have reviewed home medications with patient personally.  Prior to Admission medications    Medication Sig Start Date End Date Taking? Authorizing Provider   acetaminophen (TYLENOL) 325 mg suppository Insert 325 mg into the rectum every 4 (four) hours as needed for mild pain    Historical Provider, MD   albuterol (ACCUNEB) 1.25 MG/3ML nebulizer solution Take 3 mL by nebulization every 6 (six) hours as needed for wheezing 9/3/24   Randell Banegas MD   albuterol (PROVENTIL HFA,VENTOLIN HFA) 90 mcg/act inhaler Inhale 2 puffs every 6 (six) hours as needed for wheezing 25   Ramsey Gtz MD   ALPRAZolam (XANAX) 0.5 mg tablet Take 1 tablet (0.5 mg total) by mouth daily at bedtime as needed for anxiety 25   Ramsey Gtz MD   amLODIPine (NORVASC) 5 mg tablet Take 1 tablet (5 mg total) by mouth 2 (two) times a day 24   Ramsey Gtz MD   bisoprolol (ZEBETA) 10 MG tablet Take 1 tablet (10 mg total) by mouth daily 24   Ramsey Gtz MD   cetirizine  (ZyrTEC) 10 mg tablet Take 1 tablet (10 mg total) by mouth daily In the evening 10/10/24   Ramsey Gtz MD   co-enzyme Q-10 30 MG capsule Take 1 capsule (30 mg total) by mouth 2 (two) times a day 3/20/23   Ramsey Gtz MD   denosumab (Prolia) 60 mg/mL inject 1 MILLILITER UNDER SKIN ONCE for 1 dose 3/17/25   Ramsey Gtz MD   fluticasone (FLONASE) 50 mcg/act nasal spray 2 sprays into each nostril 2 (two) times a day 10/10/24   Ramsey Gtz MD   fluticasone-umeclidinium-vilanterol (Trelegy Ellipta) 100-62.5-25 mcg/actuation inhaler Inhale 1 puff daily Rinse mouth after use. 2/21/25 5/22/25  Ramsey Gtz MD   glucose blood test strip Use 1 each as needed (as needed) Use as instructed 2/21/25   Ramsey Gtz MD   ipratropium-albuterol (DUO-NEB) 0.5-2.5 mg/3 mL nebulizer solution Take 3 mL by nebulization every 6 (six) hours as needed for wheezing or shortness of breath 3/20/23   Ramsey Gtz MD   magnesium Oxide (MAG-OX) 400 mg TABS Take 1 tablet (400 mg total) by mouth 2 (two) times a day 7/15/24   Ramsey Gtz MD   neomycin-polymyxin-dexamethasone (MAXITROL) ophthalmic suspension Apply 1 drop to eye 3 (three) times a day 10/10/24   Ramsey Gtz MD   pantoprazole (PROTONIX) 40 mg tablet Take 1 tablet (40 mg total) by mouth daily before breakfast 10/10/24 10/5/25  Ramsey Gtz MD   Plavix 75 MG tablet take 1 tablet by mouth once daily 3/11/25   Ramsey Gtz MD   Premarin vaginal cream Insert 0.5 g into the vagina 3 (three) times a week Three times a week Monday, Wednesday and Friday at bedtime 5/15/24   Ramon Fam MD   Red Yeast Rice 600 MG CAPS Take 1 capsule (600 mg total) by mouth 2 (two) times a day with meals 3/20/23   Ramsey Gtz MD   rosuvastatin (CRESTOR) 10 MG tablet Take 1 tablet (10 mg total) by mouth daily 2/21/25   Ramsey Gtz MD   vitamin B-12 (VITAMIN B-12) 1,000 mcg tablet Take 1 tablet (1,000 mcg total) by mouth daily 8/3/23   Ramsey Gtz MD     Allergies   Allergen Reactions    Ace Inhibitors  Other (See Comments)     Includes lisinopril    Angiotensin Receptor Blockers Other (See Comments)     Not specified.  Includes losartan and olmesartan    Ezetimibe Rash    Morphine Hives     pt states someone told her she is allergic to it after her hip surgery    Statins Arthralgia     Tolerates Crestor       Objective :  Temp:  [98.1 °F (36.7 °C)] 98.1 °F (36.7 °C)  HR:  [] 111  BP: (111-152)/(61-70) 111/61  Resp:  [22-35] 29  SpO2:  [80 %-100 %] 96 %  O2 Device: None (Room air)  Nasal Cannula O2 Flow Rate (L/min):  [4 L/min] 4 L/min    Physical Exam  Constitutional:       Appearance: Normal appearance.   HENT:      Head: Normocephalic and atraumatic.   Eyes:      Extraocular Movements: Extraocular movements intact.      Pupils: Pupils are equal, round, and reactive to light.   Cardiovascular:      Rate and Rhythm: Normal rate and regular rhythm.   Pulmonary:      Effort: Pulmonary effort is normal.      Breath sounds: Rhonchi present.   Abdominal:      General: Bowel sounds are normal.      Palpations: Abdomen is soft.   Musculoskeletal:         General: Normal range of motion.      Cervical back: Neck supple.   Skin:     General: Skin is warm and dry.   Neurological:      Mental Status: She is alert and oriented to person, place, and time.   Psychiatric:         Behavior: Behavior normal.                Lab Results: I have reviewed the following results:  Results from last 7 days   Lab Units 04/01/25  0152   WBC Thousand/uL 15.03*   HEMOGLOBIN g/dL 12.0   HEMATOCRIT % 38.2   PLATELETS Thousands/uL 311   SEGS PCT % 74   LYMPHO PCT % 15   MONO PCT % 10   EOS PCT % 1     Results from last 7 days   Lab Units 04/01/25  0152   SODIUM mmol/L 134*   POTASSIUM mmol/L 3.5   CHLORIDE mmol/L 94*   CO2 mmol/L 29   BUN mg/dL 9   CREATININE mg/dL 0.80   ANION GAP mmol/L 11   CALCIUM mg/dL 9.1   ALBUMIN g/dL 4.3   TOTAL BILIRUBIN mg/dL 0.68   ALK PHOS U/L 61   ALT U/L 14   AST U/L 13   GLUCOSE RANDOM mg/dL 179*      Results from last 7 days   Lab Units 04/01/25  0149   INR  1.10         Lab Results   Component Value Date    HGBA1C 6.3 02/21/2025    HGBA1C 5.8 10/25/2023    HGBA1C 6.3 03/20/2023     Results from last 7 days   Lab Units 04/01/25  0149   LACTIC ACID mmol/L 1.0   PROCALCITONIN ng/ml 0.33*       ** Please Note: This note has been constructed using a voice recognition system. **

## 2025-04-01 NOTE — ED PROVIDER NOTES
Time reflects when diagnosis was documented in both MDM as applicable and the Disposition within this note       Time User Action Codes Description Comment    4/1/2025  2:04 AM Sky White [R06.00] Dyspnea     4/1/2025  2:04 AM Sky White [R05.9] Cough     4/1/2025  2:04 AM Sky White [R09.02] Hypoxia     4/1/2025  2:04 AM Sky White [J44.1] COPD exacerbation (HCC)     4/1/2025  2:04 AM Sky White Add [I10] Primary hypertension     4/1/2025  2:04 AM Sky White [I25.10] Chronic coronary artery disease     4/1/2025  2:04 AM Sky White [Z95.1] Hx of CABG     4/1/2025  2:33 AM Sky White [E83.42] Hypomagnesemia     4/1/2025  4:06 AM Sky White [J21.9] Bronchiolitis           ED Disposition       ED Disposition   Admit    Condition   Stable    Date/Time   Tue Apr 1, 2025  2:04 AM    Comment   --             Assessment & Plan       Medical Decision Making  Patient is a 79-year-old female presenting for evaluation of shortness of breath.  Likely COPD exacerbation will assess for pneumonia versus PE as well.  Doubt ACS.  Doubt CHF.  Will assess for anemia or electrolyte abnormalities.  Plan EKG labs symptomatic treatment CT PE study admission    See ED course    Amount and/or Complexity of Data Reviewed  Labs: ordered. Decision-making details documented in ED Course.  Radiology: ordered. Decision-making details documented in ED Course.  ECG/medicine tests:  Decision-making details documented in ED Course.    Risk  Prescription drug management.  Decision regarding hospitalization.        ED Course as of 04/01/25 0407   Tue Apr 01, 2025   0147 ECG 12 lead  Sinus tachycardia 112, ischemic changes T wave inversions in inferior and lateral leads.  QRS does not appear widened normal axis no ST elevations or depressions.  Questionable PVCs.  Change from prior January 2024 there were no ischemic changes at that time   0148 Blood Pressure: 152/70    0148 Temperature: 98.1 °F (36.7 °C)   0148 Temp Source: Oral   0148 Pulse(!): 112   0148 Respirations: 22   0148 SpO2(!): 80 %  In room air   0148 SpO2: 95 %  With 4 L nasal cannula   0205 WBC(!): 15.03   0205 Hemoglobin: 12.0   0206 Platelet Count: 311   0231 BNP(!): 151   0232 hs TnI 0hr: 8   0232 Procalcitonin(!): 0.33   0232 LACTIC ACID: 1.0   0232 pH, Ck: 7.374   0232 pCO2, Ck(!): 51.2   0232 Sodium(!): 134   0232 Potassium: 3.5   0232 Carbon Dioxide: 29   0232 ANION GAP: 11   0232 BUN: 9   0232 Creatinine: 0.80   0232 GFR, Calculated: 70   0232 MAGNESIUM(!!): 1.0  Unclear etiology but probably contributing to pt's abnormal EKG. Repletion already ordered as part of pt's COPD exacerbation treatments. Will repeat another EKG, may need further mag   0314 FLU/RSV/COVID - if FLU/RSV clinically relevant  All negative   0406 CTA chest pe study  No PE   0407 D/w medicine attending who accepts pt for admission       Medications   magnesium sulfate 2 g/50 mL IVPB (premix) 2 g (2 g Intravenous New Bag 4/1/25 0212)   magnesium sulfate 2 g/50 mL IVPB (premix) 2 g (has no administration in time range)   ipratropium-albuterol (DUO-NEB) 0.5-2.5 mg/3 mL inhalation solution 3 mL (has no administration in time range)   methylPREDNISolone sodium succinate (Solu-MEDROL) injection 40 mg (has no administration in time range)   guaiFENesin (MUCINEX) 12 hr tablet 600 mg (has no administration in time range)   ipratropium-albuterol (FOR EMS ONLY) (DUO-NEB) 0.5-2.5 mg/3 mL inhalation solution 3 mL (0 mL Does not apply Given to EMS 4/1/25 0134)   methylPREDNISolone sodium succinate (FOR EMS ONLY) (Solu-MEDROL) 125 MG injection 125 mg (0 mg Does not apply Given to EMS 4/1/25 0134)   albuterol inhalation solution 10 mg (10 mg Nebulization Given 4/1/25 0217)   ipratropium (ATROVENT) 0.02 % inhalation solution 1 mg (1 mg Nebulization Given 4/1/25 0217)   sodium chloride 0.9 % inhalation solution 12 mL (12 mL Nebulization Given 4/1/25  0217)   methylPREDNISolone sodium succinate (Solu-MEDROL) injection 125 mg (125 mg Intravenous Given 4/1/25 0214)   sodium chloride 0.9 % bolus 1,000 mL (1,000 mL Intravenous New Bag 4/1/25 0210)   iohexol (OMNIPAQUE) 350 MG/ML injection (MULTI-DOSE) 85 mL (85 mL Intravenous Given 4/1/25 0341)       ED Risk Strat Scores                            SBIRT 20yo+      Flowsheet Row Most Recent Value   Initial Alcohol Screen: US AUDIT-C     1. How often do you have a drink containing alcohol? 0 Filed at: 04/01/2025 0144   2. How many drinks containing alcohol do you have on a typical day you are drinking?  0 Filed at: 04/01/2025 0144   3b. FEMALE Any Age, or MALE 65+: How often do you have 4 or more drinks on one occassion? 0 Filed at: 04/01/2025 0144   Audit-C Score 0 Filed at: 04/01/2025 0144   VENITA: How many times in the past year have you...    Used an illegal drug or used a prescription medication for non-medical reasons? Never Filed at: 04/01/2025 0144                            History of Present Illness       Chief Complaint   Patient presents with    Shortness of Breath     Pt arrived via EMS. SOB, cough, HA started Saturday. Hx of COPD. Denies CP, N/V/D, or dizziness.        Past Medical History:   Diagnosis Date    Ambulates with cane     Arthritis     Asthma     Chronic obstructive pulmonary disease, unspecified COPD type (HCC) 04/08/2022    Cognitive decline 03/10/2023    Community acquired pneumonia 11/13/2019    COPD (chronic obstructive pulmonary disease) (HCC)     Coronary artery disease     DM (diabetes mellitus) (HCC)     Fall     1/2024    HTN (hypertension)     Hx of CABG     Hydronephrosis, bilateral 03/06/2023    Hyperlipidemia     Hyponatremia     PONV (postoperative nausea and vomiting)     Urinary retention     Wears glasses       Past Surgical History:   Procedure Laterality Date    CARDIAC CATHETERIZATION  2010    CATARACT EXTRACTION, BILATERAL      CORONARY ARTERY BYPASS GRAFT  12/06/2010     with subsequent stent to the saphenous veingraft to the RCA 3 months later    KNEE ARTHROSCOPY      POLYPECTOMY      laryngeal    KS CMBND ANTERPOST COLPORRAPHY W/CYSTO N/A 2024    Procedure: COLPORRHAPHY ANT/POST;  Surgeon: Ramon Fam MD;  Location: [facility];  Service: Gynecology    KS COLPOPEXY VAGINAL EXTRAPERITONEAL APPROACH N/A 2024    Procedure: SUSPENSION VAGINAL VAULT,  EUA;  Surgeon: Ramon Fam MD;  Location: [facility];  Service: Gynecology    KS CYSTOURETHROSCOPY N/A 2024    Procedure: CYSTOSCOPY;  Surgeon: Ramon Fam MD;  Location: [facility];  Service: Gynecology    KS SLING OPERATION STRESS INCONTINENCE N/A 2024    Procedure: INSERTION PV SLING ALTIS;  Surgeon: Ramon Fam MD;  Location: [facility];  Service: Gynecology    TOTAL HIP ARTHROPLASTY      TUBAL LIGATION        Family History   Problem Relation Age of Onset    Stroke Mother     Hypertension Mother     Heart disease Mother     Colon cancer Father     No Known Problems Sister     No Known Problems Sister     No Known Problems Sister     No Known Problems Daughter     No Known Problems Daughter     No Known Problems Daughter     No Known Problems Maternal Grandmother     No Known Problems Maternal Grandfather     No Known Problems Paternal Grandmother     No Known Problems Paternal Grandfather     Heart attack Brother     Heart attack Brother     No Known Problems Maternal Aunt     No Known Problems Maternal Aunt     No Known Problems Paternal Aunt     No Known Problems Paternal Aunt     No Known Problems Other       Social History     Tobacco Use    Smoking status: Former     Current packs/day: 0.00     Average packs/day: 0.5 packs/day for 32.0 years (16.0 ttl pk-yrs)     Types: Cigarettes     Start date:      Quit date: 2000     Years since quittin.2    Smokeless tobacco: Never    Tobacco comments:     no passive smoke exposure   Vaping Use    Vaping status: Never Used   Substance Use Topics     Alcohol use: Yes     Alcohol/week: 12.0 standard drinks of alcohol     Types: 12 Cans of beer per week     Comment: socially    Drug use: Never      E-Cigarette/Vaping    E-Cigarette Use Never User       E-Cigarette/Vaping Substances    Nicotine No     THC No     CBD No     Flavoring No     Other No     Unknown No       I have reviewed and agree with the history as documented.     Patient 79-year-old female past medical history of COPD CAD diabetes hypertension presents for evaluation via EMS of shortness of breath.  Reports that her symptoms began several days ago been worsening.  Also with associated cough and headache.  Denying chest pain fever chills abdominal pain nausea vomiting.  Does not wear oxygen at baseline.  No other complaints at this time          Review of Systems   Constitutional:  Negative for chills and fever.   HENT:  Negative for congestion.    Respiratory:  Positive for cough and shortness of breath.    Cardiovascular:  Negative for chest pain.   Gastrointestinal:  Negative for abdominal pain, nausea and vomiting.   Genitourinary:  Negative for dysuria, frequency, hematuria, vaginal bleeding and vaginal discharge.   Musculoskeletal:  Negative for back pain.   Skin:  Negative for rash.   Neurological:  Positive for headaches. Negative for weakness and numbness.   All other systems reviewed and are negative.          Objective       ED Triage Vitals [04/01/25 0132]   Temperature Pulse Blood Pressure Respirations SpO2 Patient Position - Orthostatic VS   98.1 °F (36.7 °C) (!) 112 152/70 22 (!) 80 % Lying      Temp Source Heart Rate Source BP Location FiO2 (%) Pain Score    Oral Monitor Right arm -- --      Vitals      Date and Time Temp Pulse SpO2 Resp BP Pain Score FACES Pain Rating User   04/01/25 0300 -- 106 100 % 35 134/61 -- -- AA   04/01/25 0220 -- 99 98 % 23 148/65 -- -- AA   04/01/25 0218 -- -- 97 % -- -- -- -- RK   04/01/25 0206 -- 105 96 % 27 -- -- -- MB   04/01/25 0133 -- -- 95 % --  -- -- --    04/01/25 0132 98.1 °F (36.7 °C) 112 80 % 22 152/70 -- --             Physical Exam  Vitals and nursing note reviewed.   Constitutional:       General: She is not in acute distress.     Appearance: She is well-developed. She is ill-appearing.      Interventions: Nasal cannula in place.   HENT:      Head: Normocephalic and atraumatic.      Mouth/Throat:      Mouth: Mucous membranes are moist.   Eyes:      Extraocular Movements: Extraocular movements intact.      Conjunctiva/sclera: Conjunctivae normal.   Cardiovascular:      Rate and Rhythm: Regular rhythm. Tachycardia present.      Heart sounds: No murmur heard.  Pulmonary:      Effort: Pulmonary effort is normal. Tachypnea present. No respiratory distress.      Breath sounds: Decreased breath sounds, wheezing and rhonchi present.   Abdominal:      Palpations: Abdomen is soft.      Tenderness: There is no abdominal tenderness. There is no guarding or rebound.   Musculoskeletal:         General: Normal range of motion.      Cervical back: Normal range of motion and neck supple.      Right lower leg: No edema.      Left lower leg: No edema.   Skin:     General: Skin is warm and dry.      Capillary Refill: Capillary refill takes less than 2 seconds.   Neurological:      General: No focal deficit present.      Mental Status: She is alert.         Results Reviewed       Procedure Component Value Units Date/Time    HS Troponin I 4hr [[id number]]     Lab Status: No result Specimen: Blood     FLU/RSV/COVID - if FLU/RSV clinically relevant [[id number]]  (Normal) Collected: 04/01/25 0205    Lab Status: Final result Specimen: Nares from Nasopharyngeal Swab Updated: 04/01/25 0303     SARS-CoV-2 Negative     INFLUENZA A PCR Negative     INFLUENZA B PCR Negative     RSV PCR Negative    Narrative:      This test has been performed using the CoV-2/Flu/RSV plus assay on the [employer] GeneXpert platform. This test has been validated by the  and verified by the  performing laboratory.     This test is designed to amplify and detect the following: nucleocapsid (N), envelope (E), and RNA-dependent RNA polymerase (RdRP) genes of the SARS-CoV-2 genome; matrix (M), basic polymerase (PB2), and acidic protein (PA) segments of the influenza A genome; matrix (M) and non-structural protein (NS) segments of the influenza B genome, and the nucleocapsid genes of RSV A and RSV B.     Positive results are indicative of the presence of Flu A, Flu B, RSV, and/or SARS-CoV-2 RNA. Positive results for SARS-CoV-2 or suspected novel influenza should be reported to state, local, or federal health departments according to local reporting requirements.      All results should be assessed in conjunction with clinical presentation and other laboratory markers for clinical management.     FOR PEDIATRIC PATIENTS - copy/paste COVID Guidelines URL to browser: [email or web address]       Protime-INR [[id number]]  (Normal) Collected: 04/01/25 0149    Lab Status: Final result Specimen: Blood from Arm, Left Updated: 04/01/25 0230     Protime 14.4 seconds      INR 1.10    Narrative:      INR Therapeutic Range    Indication                                             INR Range      Atrial Fibrillation                                               2.0-3.0  Hypercoagulable State                                    2.0.2.3  Left Ventricular Asist Device                            2.0-3.0  Mechanical Heart Valve                                  -    Aortic(with afib, MI, embolism, HF, LA enlargement,    and/or coagulopathy)                                     2.0-3.0 (2.5-3.5)     Mitral                                                             2.5-3.5  Prosthetic/Bioprosthetic Heart Valve               2.0-3.0  Venous thromboembolism (VTE: VT, PE        2.0-3.0    APTT [[id number]]  (Abnormal) Collected: 04/01/25 0149    Lab Status: Final result Specimen: Blood  from Arm, Left Updated: 04/01/25 0230     PTT 37 seconds     Magnesium [[id number]]  (Abnormal) Collected: 04/01/25 0152    Lab Status: Final result Specimen: Blood from Arm, Left Updated: 04/01/25 0230     Magnesium 1.0 mg/dL     Comprehensive metabolic panel [[id number]]  (Abnormal) Collected: 04/01/25 0152    Lab Status: Final result Specimen: Blood from Arm, Left Updated: 04/01/25 0228     Sodium 134 mmol/L      Potassium 3.5 mmol/L      Chloride 94 mmol/L      CO2 29 mmol/L      ANION GAP 11 mmol/L      BUN 9 mg/dL      Creatinine 0.80 mg/dL      Glucose 179 mg/dL      Calcium 9.1 mg/dL      AST 13 U/L      ALT 14 U/L      Alkaline Phosphatase 61 U/L      Total Protein 8.0 g/dL      Albumin 4.3 g/dL      Total Bilirubin 0.68 mg/dL      eGFR 70 ml/min/1.73sq m     Narrative:      [employer] Kid[employer] guidelines for Chronic Kidney Disease (CKD):     Stage 1 with normal or high GFR (GFR > 90 mL/min/1.73 square meters)    Stage 2 Mild CKD (GFR = 60-89 mL/min/1.73 square meters)    Stage 3A Moderate CKD (GFR = 45-59 mL/min/1.73 square meters)    Stage 3B Moderate CKD (GFR = 30-44 mL/min/1.73 square meters)    Stage 4 Severe CKD (GFR = 15-29 mL/min/1.73 square meters)    Stage 5 End Stage CKD (GFR <15 mL/min/1.73 square meters)  Note: GFR calculation is accurate only with a steady state creatinine    Lactic acid [[id number]]  (Normal) Collected: 04/01/25 0149    Lab Status: Final result Specimen: Blood from Arm, Left Updated: 04/01/25 0228     LACTIC ACID 1.0 mmol/L     Narrative:      Result may be elevated if tourniquet was used during collection.    Procalcitonin [[id number]]  (Abnormal) Collected: 04/01/25 0149    Lab Status: Final result Specimen: Blood from Arm, Left Updated: 04/01/25 0227     Procalcitonin 0.33 ng/ml     HS Troponin I 2hr [[id number]]     Lab Status: No result Specimen: Blood     HS Troponin 0hr (reflex protocol) [[id number]]  (Normal) Collected: 04/01/25 0149    Lab Status: Final  result Specimen: Blood from Arm, Left Updated: 04/01/25 0223     hs TnI 0hr 8 ng/L     Blood culture #2 [[id number]] Collected: 04/01/25 0203    Lab Status: In process Specimen: Blood from Arm, Right Updated: 04/01/25 0223    Blood culture #1 [[id number]] Collected: 04/01/25 0216    Lab Status: In process Specimen: Blood from Arm, Left Updated: 04/01/25 0223    B-Type Natriuretic Peptide(BNP) [[id number]]  (Abnormal) Collected: 04/01/25 0149    Lab Status: Final result Specimen: Blood from Arm, Left Updated: 04/01/25 0223      pg/mL     CBC and differential [[id number]]  (Abnormal) Collected: 04/01/25 0152    Lab Status: Final result Specimen: Blood from Arm, Left Updated: 04/01/25 0201     WBC 15.03 Thousand/uL      RBC 4.71 Million/uL      Hemoglobin 12.0 g/dL      Hematocrit 38.2 %      MCV 81 fL      MCH 25.5 pg      MCHC 31.4 g/dL      RDW 16.9 %      MPV 10.0 fL      Platelets 311 Thousands/uL      nRBC 0 /100 WBCs      Segmented % 74 %      Immature Grans % 0 %      Lymphocytes % 15 %      Monocytes % 10 %      Eosinophils Relative 1 %      Basophils Relative 0 %      Absolute Neutrophils 10.95 Thousands/µL      Absolute Immature Grans 0.05 Thousand/uL      Absolute Lymphocytes 2.27 Thousands/µL      Absolute Monocytes 1.52 Thousand/µL      Eosinophils Absolute 0.18 Thousand/µL      Basophils Absolute 0.06 Thousands/µL     Blood gas, Venous [[id number]]  (Abnormal) Collected: 04/01/25 0152    Lab Status: Final result Specimen: Blood from Arm, Left Updated: 04/01/25 0200     pH, Ck 7.374     pCO2, Ck 51.2 mm Hg      pO2, Ck 36.7 mm Hg      HCO3, Ck 29.2 mmol/L      Base Excess, Kc 3.1 mmol/L      O2 Content, Ck 12.3 ml/dL      O2 HGB, VENOUS 68.3 %             CTA chest pe study   Final Interpretation by Ashish Ferrell DO (04/01 0349)      No acute pulmonary embolus identified to the level of the segmental pulmonary arteries.      Patchy groundglass nodular opacities throughout the lungs suggestive of  bronchiolitis.            The study was marked in EPIC for immediate notification.      Workstation performed: [id number]             Procedures    ED Medication and Procedure Management   Prior to Admission Medications   Prescriptions Last Dose Informant Patient Reported? Taking?   ALPRAZolam (XANAX) 0.5 mg tablet   No No   Sig: Take 1 tablet (0.5 mg total) by mouth daily at bedtime as needed for anxiety   Plavix 75 MG tablet   No No   Sig: take 1 tablet by mouth once daily   Premarin vaginal cream  Self No No   Sig: Insert 0.5 g into the vagina 3 (three) times a week Three times a week Monday, Wednesday and Friday at bedtime   Red Yeast Rice 600 MG CAPS  Self No No   Sig: Take 1 capsule (600 mg total) by mouth 2 (two) times a day with meals   acetaminophen (TYLENOL) 325 mg suppository  Self Yes No   Sig: Insert 325 mg into the rectum every 4 (four) hours as needed for mild pain   albuterol (ACCUNEB) 1.25 MG/3ML nebulizer solution   No No   Sig: Take 3 mL by nebulization every 6 (six) hours as needed for wheezing   albuterol (PROVENTIL HFA,VENTOLIN HFA) 90 mcg/act inhaler   No No   Sig: Inhale 2 puffs every 6 (six) hours as needed for wheezing   amLODIPine (NORVASC) 5 mg tablet   No No   Sig: Take 1 tablet (5 mg total) by mouth 2 (two) times a day   bisoprolol (ZEBETA) 10 MG tablet   No No   Sig: Take 1 tablet (10 mg total) by mouth daily   cetirizine (ZyrTEC) 10 mg tablet   No No   Sig: Take 1 tablet (10 mg total) by mouth daily In the evening   co-enzyme Q-10 30 MG capsule  Self No No   Sig: Take 1 capsule (30 mg total) by mouth 2 (two) times a day   denosumab (Prolia) 60 mg/mL   No No   Sig: inject 1 MILLILITER UNDER SKIN ONCE for 1 dose   fluticasone (FLONASE) 50 mcg/act nasal spray   No No   Si sprays into each nostril 2 (two) times a day   fluticasone-umeclidinium-vilanterol (Trelegy Ellipta) 100-62.5-25 mcg/actuation inhaler   No No   Sig: Inhale 1 puff daily Rinse mouth after use.   glucose blood test  strip   No No   Sig: Use 1 each as needed (as needed) Use as instructed   ipratropium-albuterol (DUO-NEB) 0.5-2.5 mg/3 mL nebulizer solution  Self No No   Sig: Take 3 mL by nebulization every 6 (six) hours as needed for wheezing or shortness of breath   magnesium Oxide (MAG-OX) 400 mg TABS  Self No No   Sig: Take 1 tablet (400 mg total) by mouth 2 (two) times a day   neomycin-polymyxin-dexamethasone (MAXITROL) ophthalmic suspension   No No   Sig: Apply 1 drop to eye 3 (three) times a day   pantoprazole (PROTONIX) 40 mg tablet   No No   Sig: Take 1 tablet (40 mg total) by mouth daily before breakfast   rosuvastatin (CRESTOR) 10 MG tablet   No No   Sig: Take 1 tablet (10 mg total) by mouth daily   vitamin B-12 (VITAMIN B-12) 1,000 mcg tablet  Self No No   Sig: Take 1 tablet (1,000 mcg total) by mouth daily      Facility-Administered Medications: None     Patient's Medications   Discharge Prescriptions    No medications on file     No discharge procedures on file.  ED SEPSIS DOCUMENTATION   Time reflects when diagnosis was documented in both MDM as applicable and the Disposition within this note       Time User Action Codes Description Comment    4/1/2025  2:04 AM Sky White [R06.00] Dyspnea     4/1/2025  2:04 AM Sky White [R05.9] Cough     4/1/2025  2:04 AM Sky White [R09.02] Hypoxia     4/1/2025  2:04 AM Sky White [J44.1] COPD exacerbation (HCC)     4/1/2025  2:04 AM Sky White [I10] Primary hypertension     4/1/2025  2:04 AM Sky White [I25.10] Chronic coronary artery disease     4/1/2025  2:04 AM Sky White [Z95.1] Hx of CABG     4/1/2025  2:33 AM Sky White [E83.42] Hypomagnesemia     4/1/2025  4:06 AM Sky White [J21.9] Bronchiolitis                  Sky White DO  04/03/25 1602

## 2025-04-01 NOTE — PROGRESS NOTES
Progress Note - Hospitalist   Name: Emily Berrios 79 y.o. female I MRN: [id number]  Unit/Bed#: [place] [address] -01 I Date of Admission: 4/1/2025   Date of Service: 4/1/2025 I Hospital Day: 0    Assessment & Plan  COPD with acute exacerbation (HCC)  Patient presented to hospital with complaint of shortness of breath and coughing, found to be wheezing with hypoxia  Admit for COPD exacerbation  Continue scheduled DuoNebs, respiratory protocol  Continue IV Solu-Medrol  Continue Mucinex and doxycycline  CTA chest results noted, bronchiolitis without pneumonia  Acute hypoxemic respiratory failure (HCC)  Patient hypoxic to 80% at time of presentation  Secondary to COPD saturation  Requiring 4 L nasal cannula at time of exam today, wean as tolerated  Continue treatment for COPD with IV steroids and scheduled nebulizers  Hypomagnesemia  Will order an additional magnesium sulfate.  2 g ordered in the ED  Telemetry monitoring  Repeat level 2.5  Recheck in a.m.  Chronic coronary artery disease  Continue Plavix, Crestor, bisoprolol  Glaucoma  Continue eyedrops  Hyperlipidemia  Continue statin  Cognitive decline  Fall/aspiration precautions  Abnormal EKG  Likely from profound hypomagnesemia  Repeat EKG with improvement  Telemetry monitoring 24 hours    VTE Pharmacologic Prophylaxis:   High Risk (Score >/= 5) - Pharmacological DVT Prophylaxis Ordered: enoxaparin (Lovenox). Sequential Compression Devices Ordered.    Mobility:   Basic Mobility Inpatient Raw Score: 22  JH-HLM Goal: 7: Walk 25 feet or more  JH-HLM Achieved: 7: Walk 25 feet or more  JH-HLM Goal achieved. Continue to encourage appropriate mobility.    Patient Centered Rounds: I performed bedside rounds with nursing staff today.   Discussions with Specialists or Other Care Team Provider: None    Education and Discussions with Family / Patient: Updated  (daughter) at bedside.    Current Length of Stay: 0 day(s)  Current Patient Status: Inpatient    Certification Statement: The patient will continue to require additional inpatient hospital stay due to COPD exacerbation requiring IV steroids scheduled nebulizers  Discharge Plan: Anticipate discharge in 48 hrs to home.    Code Status: Level 1 - Full Code    Subjective   Patient seen and examined at bedside.  Notes she is feeling better today than she was at time of admission.  Still requiring supplemental oxygen still with wheezing on exam.  Does still get short of breath with ambulation.  Continues to have cough but notes improvement.     Objective :  Temp:  [98 °F (36.7 °C)-98.1 °F (36.7 °C)] 98 °F (36.7 °C)  HR:  [] 94  BP: (111-152)/(54-70) 130/54  Resp:  [18-35] 20  SpO2:  [80 %-100 %] 95 %  O2 Device: Nasal cannula  Nasal Cannula O2 Flow Rate (L/min):  [4 L/min] 4 L/min    Body mass index is 27.91 kg/m².     Input and Output Summary (last 24 hours):     Intake/Output Summary (Last 24 hours) at 4/1/2025 1257  Last data filed at 4/1/2025 0914  Gross per 24 hour   Intake 370 ml   Output --   Net 370 ml       Physical Exam  Vitals reviewed.   Constitutional:       General: She is not in acute distress.     Comments: NC   HENT:      Head: Normocephalic and atraumatic.   Eyes:      General: No scleral icterus.     Conjunctiva/sclera: Conjunctivae normal.   Cardiovascular:      Rate and Rhythm: Normal rate and regular rhythm.      Heart sounds: No murmur heard.  Pulmonary:      Effort: Pulmonary effort is normal. No respiratory distress.      Breath sounds: Wheezing present.   Abdominal:      General: Bowel sounds are normal. There is no distension.      Palpations: Abdomen is soft.      Tenderness: There is no abdominal tenderness.   Musculoskeletal:      Cervical back: Neck supple.      Right lower leg: No edema.      Left lower leg: No edema.   Skin:     General: Skin is warm and dry.   Neurological:      Mental Status: She is alert and oriented to person, place, and time.   Psychiatric:         Mood  and Affect: Mood normal.         Behavior: Behavior normal.           Lines/Drains:        Telemetry:  Telemetry Orders (From admission, onward)               24 Hour Telemetry Monitoring  Continuous x 24 Hours (Telem)        Question:  Reason for 24 Hour Telemetry  Answer:  Decompensated CHF- and any one of the following: continuous diuretic infusion or total diuretic dose >200 mg daily, associated electrolyte derangement (I.e. K < 3.0), inotropic drip (continuous infusion), hx of ventricular arrhythmia, or new EF < 35%                     Telemetry Reviewed: Normal Sinus Rhythm  Indication for Continued Telemetry Use: Metabolic/electrolyte disturbance with high probability of dysrhythmia               Lab Results: I have reviewed the following results:   Results from last 7 days   Lab Units 04/01/25  0152   WBC Thousand/uL 15.03*   HEMOGLOBIN g/dL 12.0   HEMATOCRIT % 38.2   PLATELETS Thousands/uL 311   SEGS PCT % 74   LYMPHO PCT % 15   MONO PCT % 10   EOS PCT % 1     Results from last 7 days   Lab Units 04/01/25  0152   SODIUM mmol/L 134*   POTASSIUM mmol/L 3.5   CHLORIDE mmol/L 94*   CO2 mmol/L 29   BUN mg/dL 9   CREATININE mg/dL 0.80   ANION GAP mmol/L 11   CALCIUM mg/dL 9.1   ALBUMIN g/dL 4.3   TOTAL BILIRUBIN mg/dL 0.68   ALK PHOS U/L 61   ALT U/L 14   AST U/L 13   GLUCOSE RANDOM mg/dL 179*     Results from last 7 days   Lab Units 04/01/25  0149   INR  1.10             Results from last 7 days   Lab Units 04/01/25  0149   LACTIC ACID mmol/L 1.0   PROCALCITONIN ng/ml 0.33*       Recent Cultures (last 7 days):   Results from last 7 days   Lab Units 04/01/25  0216 04/01/25  0203   BLOOD CULTURE  Received in Microbiology Lab. Culture in Progress. Received in Microbiology Lab. Culture in Progress.             Last 24 Hours Medication List:     Current Facility-Administered Medications:     acetaminophen (TYLENOL) tablet 650 mg, Q6H PRN    albuterol (PROVENTIL HFA,VENTOLIN HFA) inhaler 2 puff, Q4H PRN    ALPRAZolam  (XANAX) tablet 0.5 mg, HS PRN    amLODIPine (NORVASC) tablet 5 mg, BID    bisoprolol (ZEBETA) tablet 10 mg, Daily    clopidogrel (PLAVIX) tablet 75 mg, Daily    cyanocobalamin (VITAMIN B-12) tablet 1,000 mcg, Daily    doxycycline hyclate (VIBRAMYCIN) capsule 100 mg, Q12H KENIA    enoxaparin (LOVENOX) subcutaneous injection 40 mg, Daily    estrogens, conjugated (Premarin) vaginal cream 0.5 g, Once per day on Monday Wednesday Friday    fluticasone (FLONASE) 50 mcg/act nasal spray 2 spray, BID    guaiFENesin (MUCINEX) 12 hr tablet 600 mg, Q12H KENIA    ipratropium-albuterol (DUO-NEB) 0.5-2.5 mg/3 mL inhalation solution 3 mL, TID    loratadine (CLARITIN) tablet 10 mg, Daily    magnesium Oxide (MAG-OX) tablet 400 mg, BID    methylPREDNISolone sodium succinate (Solu-MEDROL) injection 40 mg, Q6H KENIA    neomycin-polymyxin-dexamethasone (MAXITROL) ophthalmic suspension 1 drop, TID    pantoprazole (PROTONIX) EC tablet 40 mg, Daily Before Breakfast    pravastatin (PRAVACHOL) tablet 80 mg, Daily With Dinner    Administrative Statements   Today, Patient Was Seen By: Kyung Anne PA-C      **Please Note: This note may have been constructed using a voice recognition system.**

## 2025-04-01 NOTE — ASSESSMENT & PLAN NOTE
Will order an additional magnesium sulfate.  2 g ordered in the ED  Telemetry monitoring  Repeat level 2.5  Recheck in a.m.

## 2025-04-01 NOTE — PLAN OF CARE
Problem: Potential for Falls  Goal: Patient will remain free of falls  Description: INTERVENTIONS:- Educate patient/family on patient safety including physical limitations- Instruct patient to call for assistance with activity - Consult OT/PT to assist with strengthening/mobility - Keep Call bell within reach- Keep bed low and locked with side rails adjusted as appropriate- Keep care items and personal belongings within reach- Initiate and maintain comfort rounds- Make Fall Risk Sign visible to staff- Offer Toileting every 2 Hours, in advance of need- Initiate/Maintain bed alarm- Obtain necessary fall risk management equipment: walker- Apply yellow socks and bracelet for high fall risk patients- Consider moving patient to room near nurses station  Outcome: Progressing     Problem: PAIN - ADULT  Goal: Verbalizes/displays adequate comfort level or baseline comfort level  Description: Interventions:- Encourage patient to monitor pain and request assistance- Assess pain using appropriate pain scale- Administer analgesics based on type and severity of pain and evaluate response- Implement non-pharmacological measures as appropriate and evaluate response- Consider cultural and social influences on pain and pain management- Notify physician/advanced practitioner if interventions unsuccessful or patient reports new pain  Outcome: Progressing     Problem: INFECTION - ADULT  Goal: Absence or prevention of progression during hospitalization  Description: INTERVENTIONS:- Assess and monitor for signs and symptoms of infection- Monitor lab/diagnostic results- Monitor all insertion sites, i.e. indwelling lines, tubes, and drains- Monitor endotracheal if appropriate and nasal secretions for changes in amount and color- [place] appropriate cooling/warming therapies per order- Administer medications as ordered- Instruct and encourage patient and family to use good hand hygiene technique- Identify and instruct in appropriate  isolation precautions for identified infection/condition  Outcome: Progressing  Goal: Absence of fever/infection during neutropenic period  Description: INTERVENTIONS:- Monitor WBC  Outcome: Progressing     Problem: SAFETY ADULT  Goal: Maintain or return to baseline ADL function  Description: INTERVENTIONS:-  Assess patient's ability to carry out ADLs; assess patient's baseline for ADL function and identify physical deficits which impact ability to perform ADLs (bathing, care of mouth/teeth, toileting, grooming, dressing, etc.)- Assess/evaluate cause of self-care deficits - Assess range of motion- Assess patient's mobility; develop plan if impaired- Assess patient's need for assistive devices and provide as appropriate- Encourage maximum independence but intervene and supervise when necessary- Involve family in performance of ADLs- Assess for home care needs following discharge - Consider OT consult to assist with ADL evaluation and planning for discharge- Provide patient education as appropriate  Outcome: Progressing  Goal: Maintains/Returns to pre admission functional level  Description: INTERVENTIONS:- Perform AM-PAC 6 Click Basic Mobility/ Daily Activity assessment daily.- Set and communicate daily mobility goal to care team and patient/family/caregiver. - Collaborate with rehabilitation services on mobility goals if consulted- Perform Range of Motion 3 times a day.- Reposition patient every 2 hours.- Dangle patient 3 times a day- Stand patient 3 times a day- Ambulate patient 3 times a day- Out of bed to chair 3 times a day - Out of bed for meals 3 times a day- Out of bed for toileting- Record patient progress and toleration of activity level   Outcome: Progressing     Problem: DISCHARGE PLANNING  Goal: Discharge to home or other facility with appropriate resources  Description: INTERVENTIONS:- Identify barriers to discharge w/patient and caregiver- Arrange for needed discharge resources and transportation as  appropriate- Identify discharge learning needs (meds, wound care, etc.)- Arrange for interpretive services to assist at discharge as needed- Refer to Case Management Department for coordinating discharge planning if the patient needs post-hospital services based on physician/advanced practitioner order or complex needs related to functional status, cognitive ability, or social support system  Outcome: Progressing     Problem: Knowledge Deficit  Goal: Patient/family/caregiver demonstrates understanding of disease process, treatment plan, medications, and discharge instructions  Description: Complete learning assessment and assess knowledge base.Interventions:- Provide teaching at level of understanding- Provide teaching via preferred learning methods  Outcome: Progressing     Problem: CARDIOVASCULAR - ADULT  Goal: Maintains optimal cardiac output and hemodynamic stability  Description: INTERVENTIONS:- Monitor I/O, vital signs and rhythm- Monitor for S/S and trends of decreased cardiac output- Administer and titrate ordered vasoactive medications to optimize hemodynamic stability- Assess quality of pulses, skin color and temperature- Assess for signs of decreased coronary artery perfusion- Instruct patient to report change in severity of symptoms  Outcome: Progressing  Goal: Absence of cardiac dysrhythmias or at baseline rhythm  Description: INTERVENTIONS:- Continuous cardiac monitoring, vital signs, obtain 12 lead EKG if ordered- Administer antiarrhythmic and heart rate control medications as ordered- Monitor electrolytes and administer replacement therapy as ordered  Outcome: Progressing     Problem: RESPIRATORY - ADULT  Goal: Achieves optimal ventilation and oxygenation  Description: INTERVENTIONS:- Assess for changes in respiratory status- Assess for changes in mentation and behavior- Position to facilitate oxygenation and minimize respiratory effort- Oxygen administered by appropriate delivery if ordered-  Initiate smoking cessation education as indicated- Encourage broncho-pulmonary hygiene including cough, deep breathe, Incentive Spirometry- Assess the need for suctioning and aspirate as needed- Assess and instruct to report SOB or any respiratory difficulty- Respiratory Therapy support as indicated  Outcome: Progressing

## 2025-04-01 NOTE — ASSESSMENT & PLAN NOTE
Patient hypoxic to 80% at time of presentation  Secondary to COPD saturation  Requiring 4 L nasal cannula at time of exam today, wean as tolerated  Continue treatment for COPD with IV steroids and scheduled nebulizers

## 2025-04-01 NOTE — ED ATTENDING ATTESTATION
4/1/2025  I, Isidro Ferrari Jr, DO, saw and evaluated the patient. I have discussed the patient with the resident/non-physician practitioner and agree with the resident's/non-physician practitioner's findings, Plan of Care, and MDM as documented in the resident's/non-physician practitioner's note, except where noted. All available labs and Radiology studies were reviewed.  I was present for key portions of any procedure(s) performed by the resident/non-physician practitioner and I was immediately available to provide assistance.       At this point I agree with the current assessment done in the Emergency Department.  I have conducted an independent evaluation of this patient a history and physical is as follows:    Final Diagnosis:  1. Dyspnea    2. Cough    3. Hypoxia    4. COPD exacerbation (HCC)    5. Primary hypertension    6. Chronic coronary artery disease    7. Hx of CABG    8. Hypomagnesemia    9. Bronchiolitis            MDM       DDX including but not limited to: pneumonia, viral respiratory infection, COPD exacerbation, CHF, PE.    - Given patient's concerns, will do a combined septic, respiratory and cardiac workup.   -   - Will do an EKG for strain; arrhythmia, heart block, ST changes to rule out STEMI, pericarditis.  - Troponin for same as per protocol for evaluation of ACS.   - CBC to evaluate for anemia, evaluate for leukocytosis as sign of infectious source of symptoms.  - CMP to evaluate for electrolyte derangement, assess renal and hepatic function.  - BNP to evaluate for CHF and fluid overload.   - VBG    - Viral Respiratory Panel   - Will obtain a CT to evaluate for underlying causes   - Start treatment with Nebs, steroids, Mg and IVF for tachycardia and respiratory losses  - Disposition per workup.     No evidence of respiratory acidosis  Magnesium severely low which would explain her abnormal EKG.  This was already replaced at the start of her evaluation.  Will continue with replacement  and repeat EKG  Leukocytosis which in this setting could be an indication of infection.  Antibiotics given.  CT negative for PE  Admitted to [facility]            Lab Results:   Abnormal Labs Reviewed   CBC AND DIFFERENTIAL - Abnormal; Notable for the following components:       Result Value    WBC 15.03 (*)     MCV 81 (*)     MCH 25.5 (*)     RDW 16.9 (*)     Absolute Neutrophils 10.95 (*)     Absolute Monocytes 1.52 (*)     All other components within normal limits   COMPREHENSIVE METABOLIC PANEL - Abnormal; Notable for the following components:    Sodium 134 (*)     Chloride 94 (*)     Glucose 179 (*)     All other components within normal limits    Narrative:     National Kidney Disease Foundation guidelines for Chronic Kidney Disease (CKD):     Stage 1 with normal or high GFR (GFR > 90 mL/min/1.73 square meters)    Stage 2 Mild CKD (GFR = 60-89 mL/min/1.73 square meters)    Stage 3A Moderate CKD (GFR = 45-59 mL/min/1.73 square meters)    Stage 3B Moderate CKD (GFR = 30-44 mL/min/1.73 square meters)    Stage 4 Severe CKD (GFR = 15-29 mL/min/1.73 square meters)    Stage 5 End Stage CKD (GFR <15 mL/min/1.73 square meters)  Note: GFR calculation is accurate only with a steady state creatinine   PROCALCITONIN TEST - Abnormal; Notable for the following components:    Procalcitonin 0.33 (*)     All other components within normal limits   APTT - Abnormal; Notable for the following components:    PTT 37 (*)     All other components within normal limits   BLOOD GAS, VENOUS - Abnormal; Notable for the following components:    pCO2, Ck 51.2 (*)     All other components within normal limits   MAGNESIUM - Abnormal; Notable for the following components:    Magnesium 1.0 (*)     All other components within normal limits   B-TYPE NATRIURETIC PEPTIDE (BNP) - Abnormal; Notable for the following components:     (*)     All other components within normal limits     Lab Results: I have personally reviewed pertinent lab results.     Imaging:   CTA chest pe study   Final Result      No acute pulmonary embolus identified to the level of the segmental pulmonary arteries.      Patchy groundglass nodular opacities throughout the lungs suggestive of bronchiolitis.            The study was marked in EPIC for immediate notification.      Workstation performed: [id number]           I have personally reviewed pertinent reports.    EKG, Pathology, and Other Studies: I have personally reviewed pertinent films in PACS    Clinical Impression:    Final diagnoses:   Dyspnea   Cough   Hypoxia   COPD exacerbation (HCC)   Primary hypertension   Chronic coronary artery disease   Hx of CABG   Hypomagnesemia   Bronchiolitis         Disposition    admitted to the hospital           New Prescriptions:    New Prescriptions    No medications on file            Follow-up Instructions:    No follow-up provider specified.        History of Present Illness   Emily Berrios is a 79 y.o. female who presents with Shortness of Breath (Pt arrived via EMS. SOB, cough, HA started Saturday. Hx of COPD. Denies CP, N/V/D, or dizziness. )    has a past medical history of Ambulates with cane, Arthritis, Asthma, Chronic obstructive pulmonary disease, unspecified COPD type (HCC) (04/08/2022), Cognitive decline (03/10/2023), Community acquired pneumonia (11/13/2019), COPD (chronic obstructive pulmonary disease) (HCC), Coronary artery disease, DM (diabetes mellitus) (HCC), Fall, HTN (hypertension), CABG, Hydronephrosis, bilateral (03/06/2023), Hyperlipidemia, Hyponatremia, PONV (postoperative nausea and vomiting), Urinary retention, and Wears glasses..         Objective     Vitals:    04/01/25 0218 04/01/25 0220 04/01/25 0300 04/01/25 0415   BP:  148/65 134/61 111/61   BP Location:  Left arm Right arm Right arm   Pulse:  99 (!) 106 (!) 111   Resp:  (!) 23 (!) 35 (!) 29   Temp:       TempSrc:       SpO2: 97% 98% 100% 96%   Weight:         Body mass index is 28.74  kg/m².    Intake/Output Summary (Last 24 hours) at 4/1/2025 0420  Last data filed at 4/1/2025 0412  Gross per 24 hour   Intake 50 ml   Output --   Net 50 ml     Invasive Devices       Peripheral Intravenous Line  Duration             Peripheral IV 04/01/25 Left Arm <1 day    Peripheral IV 04/01/25 Right Antecubital <1 day                    ED Course         Critical Care Time  Procedures

## 2025-04-01 NOTE — PLAN OF CARE
Problem: OCCUPATIONAL THERAPY ADULT  Goal: Performs self-care activities at highest level of function for planned discharge setting.  See evaluation for individualized goals.  Description: Treatment Interventions: ADL retraining, Functional transfer training, Endurance training, Patient/family training, Equipment evaluation/education, Compensatory technique education, Continued evaluation, Energy conservation, Activityengagement          See flowsheet documentation for full assessment, interventions and recommendations.   Note: Limitation: Decreased ADL status, Decreased endurance, Decreased self-care trans, Decreased high-level ADLs (decreased balance, decreased coordination, decreased functional reach)  Prognosis: Fair  Assessment: Patient is a 79 y.o. year old female seen for OT eval s/p admit to [facility] on 4/1/2025 with Cough, Hypomagnesemia, Bronchiolitis, Primary hypertension, Dyspnea, SOB, Hypoxia,COPD exacerbation,Chronic coronary artery disease, Hx of CABG   . OT consulted to assess ADLs/IADLs/functional mobility and assist w/ D/C planning.  Pt's CLOF as follows: eating/grooming: supervision , UB ADLs: supervision , LB ADLs: modA, toileting: David, bed mobility: Ladan, functional transfers: Ladan, functional mobility: supervision , sitting/standing tolerance: 5 min/ 2-3 mins. Pt would benefit from continued skilled OT while in acute setting to address deficits as defined above and to maximize (I) w/ ADLs/functional mobility. Occupational performance areas to address include: grooming, bathing/shower, toilet hygiene, dressing, socialization, health maintenance, functional mobility, community mobility, clothing management, and social participation. Patient demonstrates the following deficits impacting occupational performance: weakness, decreased strength , decreased balance, decreased activity tolerance, limited functional reach, increased body habitus , and decreased cardiovascular endurance. These impairments,  as well at pt’s [place] home environment, limited home support, difficulty performing ADLs, difficulty performing IADLs, difficulty performing transfers/mobility, limited insight into deficits, fall risk , functional decline , new O2 requirements, increased reliance on DME , new use of AD for functional transfers/mobility, and advanced age, limit pt’s ability to safely engage in all baseline areas of occupation. Based on the aforementioned evaluation, functional performance deficits, and assessments, pt has been identified as a high complexity evaluation. At this time, recommendation for pt to receive post-acute rehabilitation services at a Level III (minimum resource intensity) due to above deficits and CLOF. OT will continue to follow pt 2-3x/wk to address the goals listed below to  w/in 10-14 days.     Rehab Resource Intensity Level, OT: III (Minimum Resource Intensity)

## 2025-04-02 PROBLEM — G93.41 ACUTE METABOLIC ENCEPHALOPATHY: Status: ACTIVE | Noted: 2025-04-02

## 2025-04-02 PROBLEM — K21.9 GASTROESOPHAGEAL REFLUX DISEASE WITHOUT ESOPHAGITIS: Status: ACTIVE | Noted: 2025-04-02

## 2025-04-02 PROBLEM — R93.89 ABNORMAL CT OF THE CHEST: Status: ACTIVE | Noted: 2025-04-02

## 2025-04-02 LAB
ANION GAP SERPL CALCULATED.3IONS-SCNC: 8 MMOL/L (ref 4–13)
ATRIAL RATE: 90 BPM
ATRIAL RATE: 91 BPM
BUN SERPL-MCNC: 17 MG/DL (ref 5–25)
CALCIUM SERPL-MCNC: 9.3 MG/DL (ref 8.4–10.2)
CHLORIDE SERPL-SCNC: 93 MMOL/L (ref 96–108)
CO2 SERPL-SCNC: 28 MMOL/L (ref 21–32)
CREAT SERPL-MCNC: 0.78 MG/DL (ref 0.6–1.3)
ERYTHROCYTE [DISTWIDTH] IN BLOOD BY AUTOMATED COUNT: 16.4 % (ref 11.6–15.1)
GFR SERPL CREATININE-BSD FRML MDRD: 72 ML/MIN/1.73SQ M
GLUCOSE SERPL-MCNC: 254 MG/DL (ref 65–140)
HCT VFR BLD AUTO: 33.8 % (ref 34.8–46.1)
HGB BLD-MCNC: 10.6 G/DL (ref 11.5–15.4)
L PNEUMO1 AG UR QL IA.RAPID: NEGATIVE
MAGNESIUM SERPL-MCNC: 2.6 MG/DL (ref 1.9–2.7)
MCH RBC QN AUTO: 25.2 PG (ref 26.8–34.3)
MCHC RBC AUTO-ENTMCNC: 31.4 G/DL (ref 31.4–37.4)
MCV RBC AUTO: 81 FL (ref 82–98)
OSMOLALITY UR: 380 MMOL/KG (ref 250–900)
P AXIS: 73 DEGREES
P AXIS: 79 DEGREES
PLATELET # BLD AUTO: 307 THOUSANDS/UL (ref 149–390)
PMV BLD AUTO: 9.5 FL (ref 8.9–12.7)
POTASSIUM SERPL-SCNC: 4.3 MMOL/L (ref 3.5–5.3)
PR INTERVAL: 150 MS
PR INTERVAL: 152 MS
PROCALCITONIN SERPL-MCNC: 0.37 NG/ML
QRS AXIS: 42 DEGREES
QRS AXIS: 42 DEGREES
QRSD INTERVAL: 102 MS
QRSD INTERVAL: 106 MS
QT INTERVAL: 378 MS
QT INTERVAL: 380 MS
QTC INTERVAL: 465 MS
QTC INTERVAL: 466 MS
RBC # BLD AUTO: 4.2 MILLION/UL (ref 3.81–5.12)
S PNEUM AG UR QL: NEGATIVE
SODIUM SERPL-SCNC: 129 MMOL/L (ref 135–147)
SODIUM UR-SCNC: <10 MMOL/L
T WAVE AXIS: 38 DEGREES
T WAVE AXIS: 66 DEGREES
VENTRICULAR RATE: 90 BPM
VENTRICULAR RATE: 91 BPM
WBC # BLD AUTO: 11.43 THOUSAND/UL (ref 4.31–10.16)

## 2025-04-02 PROCEDURE — 93010 ELECTROCARDIOGRAM REPORT: CPT | Performed by: STUDENT IN AN ORGANIZED HEALTH CARE EDUCATION/TRAINING PROGRAM

## 2025-04-02 PROCEDURE — 85027 COMPLETE CBC AUTOMATED: CPT | Performed by: HOSPITALIST

## 2025-04-02 PROCEDURE — 87184 SC STD DISK METHOD PER PLATE: CPT | Performed by: STUDENT IN AN ORGANIZED HEALTH CARE EDUCATION/TRAINING PROGRAM

## 2025-04-02 PROCEDURE — 87449 NOS EACH ORGANISM AG IA: CPT | Performed by: STUDENT IN AN ORGANIZED HEALTH CARE EDUCATION/TRAINING PROGRAM

## 2025-04-02 PROCEDURE — 84300 ASSAY OF URINE SODIUM: CPT

## 2025-04-02 PROCEDURE — 80048 BASIC METABOLIC PNL TOTAL CA: CPT | Performed by: HOSPITALIST

## 2025-04-02 PROCEDURE — 83735 ASSAY OF MAGNESIUM: CPT | Performed by: HOSPITALIST

## 2025-04-02 PROCEDURE — 87077 CULTURE AEROBIC IDENTIFY: CPT | Performed by: STUDENT IN AN ORGANIZED HEALTH CARE EDUCATION/TRAINING PROGRAM

## 2025-04-02 PROCEDURE — 94760 N-INVAS EAR/PLS OXIMETRY 1: CPT

## 2025-04-02 PROCEDURE — 93005 ELECTROCARDIOGRAM TRACING: CPT

## 2025-04-02 PROCEDURE — 87205 SMEAR GRAM STAIN: CPT | Performed by: STUDENT IN AN ORGANIZED HEALTH CARE EDUCATION/TRAINING PROGRAM

## 2025-04-02 PROCEDURE — 87070 CULTURE OTHR SPECIMN AEROBIC: CPT | Performed by: STUDENT IN AN ORGANIZED HEALTH CARE EDUCATION/TRAINING PROGRAM

## 2025-04-02 PROCEDURE — 99232 SBSQ HOSP IP/OBS MODERATE 35: CPT | Performed by: HOSPITALIST

## 2025-04-02 PROCEDURE — 94640 AIRWAY INHALATION TREATMENT: CPT

## 2025-04-02 PROCEDURE — 99223 1ST HOSP IP/OBS HIGH 75: CPT | Performed by: INTERNAL MEDICINE

## 2025-04-02 PROCEDURE — 87185 SC STD ENZYME DETCJ PER NZM: CPT | Performed by: STUDENT IN AN ORGANIZED HEALTH CARE EDUCATION/TRAINING PROGRAM

## 2025-04-02 PROCEDURE — 83935 ASSAY OF URINE OSMOLALITY: CPT

## 2025-04-02 RX ORDER — HYDROXYZINE HYDROCHLORIDE 25 MG/1
25 TABLET, FILM COATED ORAL EVERY 6 HOURS PRN
Status: DISCONTINUED | OUTPATIENT
Start: 2025-04-02 | End: 2025-04-07

## 2025-04-02 RX ORDER — METHYLPREDNISOLONE SODIUM SUCCINATE 40 MG/ML
20 INJECTION, POWDER, LYOPHILIZED, FOR SOLUTION INTRAMUSCULAR; INTRAVENOUS ONCE
Status: COMPLETED | OUTPATIENT
Start: 2025-04-02 | End: 2025-04-02

## 2025-04-02 RX ORDER — METHYLPREDNISOLONE SODIUM SUCCINATE 125 MG/2ML
60 INJECTION, POWDER, LYOPHILIZED, FOR SOLUTION INTRAMUSCULAR; INTRAVENOUS DAILY
Status: DISCONTINUED | OUTPATIENT
Start: 2025-04-03 | End: 2025-04-03

## 2025-04-02 RX ORDER — QUETIAPINE FUMARATE 25 MG/1
12.5 TABLET, FILM COATED ORAL
Status: DISCONTINUED | OUTPATIENT
Start: 2025-04-02 | End: 2025-04-09 | Stop reason: HOSPADM

## 2025-04-02 RX ORDER — BUDESONIDE 0.5 MG/2ML
0.5 INHALANT ORAL
Status: DISCONTINUED | OUTPATIENT
Start: 2025-04-02 | End: 2025-04-05

## 2025-04-02 RX ORDER — FORMOTEROL FUMARATE 20 UG/2ML
20 SOLUTION RESPIRATORY (INHALATION)
Status: DISCONTINUED | OUTPATIENT
Start: 2025-04-02 | End: 2025-04-05

## 2025-04-02 RX ADMIN — HYDROXYZINE HYDROCHLORIDE 25 MG: 25 TABLET, FILM COATED ORAL at 10:52

## 2025-04-02 RX ADMIN — AMLODIPINE BESYLATE 5 MG: 5 TABLET ORAL at 17:53

## 2025-04-02 RX ADMIN — IPRATROPIUM BROMIDE AND ALBUTEROL SULFATE 3 ML: 2.5; .5 SOLUTION RESPIRATORY (INHALATION) at 07:50

## 2025-04-02 RX ADMIN — PRAVASTATIN SODIUM 80 MG: 80 TABLET ORAL at 16:29

## 2025-04-02 RX ADMIN — Medication 1000 MCG: at 07:21

## 2025-04-02 RX ADMIN — ENOXAPARIN SODIUM 40 MG: 40 INJECTION SUBCUTANEOUS at 07:23

## 2025-04-02 RX ADMIN — ALBUTEROL SULFATE 2 PUFF: 90 AEROSOL, METERED RESPIRATORY (INHALATION) at 11:38

## 2025-04-02 RX ADMIN — ALBUTEROL SULFATE 2 PUFF: 90 AEROSOL, METERED RESPIRATORY (INHALATION) at 04:47

## 2025-04-02 RX ADMIN — BISOPROLOL FUMARATE 10 MG: 5 TABLET ORAL at 07:25

## 2025-04-02 RX ADMIN — NEOMYCIN SULFATE, POLYMYXIN B SULFATE AND DEXAMETHASONE 1 DROP: 3.5; 10000; 1 SUSPENSION/ DROPS OPHTHALMIC at 16:30

## 2025-04-02 RX ADMIN — FLUTICASONE PROPIONATE 2 SPRAY: 50 SPRAY, METERED NASAL at 17:53

## 2025-04-02 RX ADMIN — GUAIFENESIN 600 MG: 600 TABLET ORAL at 07:21

## 2025-04-02 RX ADMIN — PANTOPRAZOLE SODIUM 40 MG: 40 TABLET, DELAYED RELEASE ORAL at 04:57

## 2025-04-02 RX ADMIN — DOXYCYCLINE HYCLATE 100 MG: 100 CAPSULE ORAL at 07:21

## 2025-04-02 RX ADMIN — Medication 400 MG: at 07:21

## 2025-04-02 RX ADMIN — ALBUTEROL SULFATE 2 PUFF: 90 AEROSOL, METERED RESPIRATORY (INHALATION) at 17:53

## 2025-04-02 RX ADMIN — METHYLPREDNISOLONE SODIUM SUCCINATE 20 MG: 40 INJECTION, POWDER, FOR SOLUTION INTRAMUSCULAR; INTRAVENOUS at 09:30

## 2025-04-02 RX ADMIN — BUDESONIDE 0.5 MG: 0.5 INHALANT RESPIRATORY (INHALATION) at 19:56

## 2025-04-02 RX ADMIN — FORMOTEROL FUMARATE DIHYDRATE 20 MCG: 20 SOLUTION RESPIRATORY (INHALATION) at 13:37

## 2025-04-02 RX ADMIN — FORMOTEROL FUMARATE DIHYDRATE 20 MCG: 20 SOLUTION RESPIRATORY (INHALATION) at 19:56

## 2025-04-02 RX ADMIN — LORATADINE 10 MG: 10 TABLET ORAL at 07:21

## 2025-04-02 RX ADMIN — ALPRAZOLAM 0.5 MG: 0.5 TABLET ORAL at 23:31

## 2025-04-02 RX ADMIN — NEOMYCIN SULFATE, POLYMYXIN B SULFATE AND DEXAMETHASONE 1 DROP: 3.5; 10000; 1 SUSPENSION/ DROPS OPHTHALMIC at 07:22

## 2025-04-02 RX ADMIN — METHYLPREDNISOLONE SODIUM SUCCINATE 40 MG: 40 INJECTION, POWDER, FOR SOLUTION INTRAMUSCULAR; INTRAVENOUS at 02:08

## 2025-04-02 RX ADMIN — CLOPIDOGREL 75 MG: 75 TABLET ORAL at 07:21

## 2025-04-02 RX ADMIN — IPRATROPIUM BROMIDE AND ALBUTEROL SULFATE 3 ML: 2.5; .5 SOLUTION RESPIRATORY (INHALATION) at 18:31

## 2025-04-02 RX ADMIN — FLUTICASONE PROPIONATE 2 SPRAY: 50 SPRAY, METERED NASAL at 07:22

## 2025-04-02 RX ADMIN — IPRATROPIUM BROMIDE AND ALBUTEROL SULFATE 3 ML: 2.5; .5 SOLUTION RESPIRATORY (INHALATION) at 13:37

## 2025-04-02 RX ADMIN — Medication 400 MG: at 17:53

## 2025-04-02 RX ADMIN — BUDESONIDE 0.5 MG: 0.5 INHALANT RESPIRATORY (INHALATION) at 13:38

## 2025-04-02 RX ADMIN — AMLODIPINE BESYLATE 5 MG: 5 TABLET ORAL at 07:25

## 2025-04-02 NOTE — ASSESSMENT & PLAN NOTE
Hyponatremia plus relative bradycardia in the setting of possible infection may reflect Legionella infection.

## 2025-04-02 NOTE — ASSESSMENT & PLAN NOTE
Likely from profound hypomagnesemia  Concern for V. tach overnight, reviewed telemetry today, V. tach not noted in all leads, likely artifact  Repeat EKG with improvement  Telemetry monitoring 24 hours

## 2025-04-02 NOTE — ASSESSMENT & PLAN NOTE
Patient noncompliant with Trelegy as she has been having difficulty using this inhaler.    She has had no difficulty with HFA such as albuterol.  Productive cough, increased sputum production, SOB  Has been using albuterol more than 3 times per day.  Likely in setting of acute bronchitis.

## 2025-04-02 NOTE — ASSESSMENT & PLAN NOTE
Sodium fell to 129 this morning  Urine studies ordered  Continue treatment for acute illness  Encourage oral intake  Recheck in a.m.

## 2025-04-02 NOTE — PLAN OF CARE
Problem: Potential for Falls  Goal: Patient will remain free of falls  Description: INTERVENTIONS:- Educate patient/family on patient safety including physical limitations- Instruct patient to call for assistance with activity - Consult OT/PT to assist with strengthening/mobility - Keep Call bell within reach- Keep bed low and locked with side rails adjusted as appropriate- Keep care items and personal belongings within reach- Initiate and maintain comfort rounds- Make Fall Risk Sign visible to staff- Offer Toileting every 2 Hours, in advance of need- Initiate/Maintain bed alarm- Obtain necessary fall risk management equipment: walker- Apply yellow socks and bracelet for high fall risk patients- Consider moving patient to room near nurses station  Outcome: Progressing     Problem: PAIN - ADULT  Goal: Verbalizes/displays adequate comfort level or baseline comfort level  Description: Interventions:- Encourage patient to monitor pain and request assistance- Assess pain using appropriate pain scale- Administer analgesics based on type and severity of pain and evaluate response- Implement non-pharmacological measures as appropriate and evaluate response- Consider cultural and social influences on pain and pain management- Notify physician/advanced practitioner if interventions unsuccessful or patient reports new pain  Outcome: Progressing     Problem: INFECTION - ADULT  Goal: Absence or prevention of progression during hospitalization  Description: INTERVENTIONS:- Assess and monitor for signs and symptoms of infection- Monitor lab/diagnostic results- Monitor all insertion sites, i.e. indwelling lines, tubes, and drains- Monitor endotracheal if appropriate and nasal secretions for changes in amount and color- [place] appropriate cooling/warming therapies per order- Administer medications as ordered- Instruct and encourage patient and family to use good hand hygiene technique- Identify and instruct in appropriate  isolation precautions for identified infection/condition  Outcome: Progressing  Goal: Absence of fever/infection during neutropenic period  Description: INTERVENTIONS:- Monitor WBC  Outcome: Progressing     Problem: SAFETY ADULT  Goal: Maintain or return to baseline ADL function  Description: INTERVENTIONS:-  Assess patient's ability to carry out ADLs; assess patient's baseline for ADL function and identify physical deficits which impact ability to perform ADLs (bathing, care of mouth/teeth, toileting, grooming, dressing, etc.)- Assess/evaluate cause of self-care deficits - Assess range of motion- Assess patient's mobility; develop plan if impaired- Assess patient's need for assistive devices and provide as appropriate- Encourage maximum independence but intervene and supervise when necessary- Involve family in performance of ADLs- Assess for home care needs following discharge - Consider OT consult to assist with ADL evaluation and planning for discharge- Provide patient education as appropriate  Outcome: Progressing  Goal: Maintains/Returns to pre admission functional level  Description: INTERVENTIONS:- Perform AM-PAC 6 Click Basic Mobility/ Daily Activity assessment daily.- Set and communicate daily mobility goal to care team and patient/family/caregiver. - Collaborate with rehabilitation services on mobility goals if consulted- Perform Range of Motion 3 times a day.- Reposition patient every 2 hours.- Dangle patient 3 times a day- Stand patient 3 times a day- Ambulate patient 3 times a day- Out of bed to chair 3 times a day - Out of bed for meals 3 times a day- Out of bed for toileting- Record patient progress and toleration of activity level   Outcome: Progressing     Problem: DISCHARGE PLANNING  Goal: Discharge to home or other facility with appropriate resources  Description: INTERVENTIONS:- Identify barriers to discharge w/patient and caregiver- Arrange for needed discharge resources and transportation as  appropriate- Identify discharge learning needs (meds, wound care, etc.)- Arrange for interpretive services to assist at discharge as needed- Refer to Case Management Department for coordinating discharge planning if the patient needs post-hospital services based on physician/advanced practitioner order or complex needs related to functional status, cognitive ability, or social support system  Outcome: Progressing     Problem: Knowledge Deficit  Goal: Patient/family/caregiver demonstrates understanding of disease process, treatment plan, medications, and discharge instructions  Description: Complete learning assessment and assess knowledge base.Interventions:- Provide teaching at level of understanding- Provide teaching via preferred learning methods  Outcome: Progressing     Problem: CARDIOVASCULAR - ADULT  Goal: Maintains optimal cardiac output and hemodynamic stability  Description: INTERVENTIONS:- Monitor I/O, vital signs and rhythm- Monitor for S/S and trends of decreased cardiac output- Administer and titrate ordered vasoactive medications to optimize hemodynamic stability- Assess quality of pulses, skin color and temperature- Assess for signs of decreased coronary artery perfusion- Instruct patient to report change in severity of symptoms  Outcome: Progressing  Goal: Absence of cardiac dysrhythmias or at baseline rhythm  Description: INTERVENTIONS:- Continuous cardiac monitoring, vital signs, obtain 12 lead EKG if ordered- Administer antiarrhythmic and heart rate control medications as ordered- Monitor electrolytes and administer replacement therapy as ordered  Outcome: Progressing     Problem: RESPIRATORY - ADULT  Goal: Achieves optimal ventilation and oxygenation  Description: INTERVENTIONS:- Assess for changes in respiratory status- Assess for changes in mentation and behavior- Position to facilitate oxygenation and minimize respiratory effort- Oxygen administered by appropriate delivery if ordered-  Initiate smoking cessation education as indicated- Encourage broncho-pulmonary hygiene including cough, deep breathe, Incentive Spirometry- Assess the need for suctioning and aspirate as needed- Assess and instruct to report SOB or any respiratory difficulty- Respiratory Therapy support as indicated  Outcome: Progressing

## 2025-04-02 NOTE — APP STUDENT NOTE
"CLAIRE STUDENT  Inpatient Progress Note for TRAINING ONLY  Not Part of Legal Medical Record       Progress Note - Emily Berrios 79 y.o. female MRN: [id number]    Unit/Bed#: [address] 220-01 Encounter: [id number]      Assessment & Plan:  COPD exacerbation, consult pulmonology, prednisone 20mg, continue budesonide, formoterol  Chronic artery disease, continue limit iv fluids   Hyperlipidemia, continue pravastatin        Subjective:   Patient is feeling better today, last night was having difficult breathing. Patient still c/o difficultly breathing but has improved. On auscultation, R side of lungs wheezing is noted. Patient is able to ambulate.     Objective:     Vitals: Blood pressure 152/61, pulse 85, temperature 97.6 °F (36.4 °C), resp. rate 20, height 5' 1\" (1.549 m), weight 67 kg (147 lb 11.3 oz), SpO2 95%.,Body mass index is 27.91 kg/m².      Intake/Output Summary (Last 24 hours) at 4/2/2025 0954  Last data filed at 4/2/2025 0849  Gross per 24 hour   Intake 560 ml   Output 300 ml   Net 260 ml       Physical Exam: {Exam, Complete:45456}     Invasive Devices       Peripheral Intravenous Line  Duration             Peripheral IV 04/01/25 Right Antecubital 1 day                    Lab, Imaging and other studies: {Results Review Statement:66840::\"No pertinent imaging studies reviewed.\"}  VTE Pharmacologic Prophylaxis: {Pharmacologic VTE Prophylaxis:[id number]}  VTE Mechanical Prophylaxis: {Mechanical VTE Prophylaxis:98739}    "

## 2025-04-02 NOTE — ASSESSMENT & PLAN NOTE
Patient presented to hospital with complaint of shortness of breath and coughing, found to be wheezing with hypoxia  Admit for COPD exacerbation  Continue scheduled [place], respiratory protocol  Start scheduled budesonide and formoterol  Did develop some hallucinations and psychosis overnight, will give a dose of steroids this morning but try to hold off on evening dosing  Given persistent hypoxia and wheezing, will ask pulmonology to see  Continue Mucinex and doxycycline  CTA chest results noted, bronchiolitis without pneumonia  Continue weaning oxygen as tolerated

## 2025-04-02 NOTE — PLAN OF CARE
Problem: Potential for Falls  Goal: Patient will remain free of falls  Description: INTERVENTIONS:- Educate patient/family on patient safety including physical limitations- Instruct patient to call for assistance with activity - Consult OT/PT to assist with strengthening/mobility - Keep Call bell within reach- Keep bed low and locked with side rails adjusted as appropriate- Keep care items and personal belongings within reach- Initiate and maintain comfort rounds- Make Fall Risk Sign visible to staff- Offer Toileting every 2 Hours, in advance of need- Initiate/Maintain bed alarm- Obtain necessary fall risk management equipment: walker- Apply yellow socks and bracelet for high fall risk patients- Consider moving patient to room near nurses station  Outcome: Progressing     Problem: PAIN - ADULT  Goal: Verbalizes/displays adequate comfort level or baseline comfort level  Description: Interventions:- Encourage patient to monitor pain and request assistance- Assess pain using appropriate pain scale- Administer analgesics based on type and severity of pain and evaluate response- Implement non-pharmacological measures as appropriate and evaluate response- Consider cultural and social influences on pain and pain management- Notify physician/advanced practitioner if interventions unsuccessful or patient reports new pain  Outcome: Progressing     Problem: INFECTION - ADULT  Goal: Absence or prevention of progression during hospitalization  Description: INTERVENTIONS:- Assess and monitor for signs and symptoms of infection- Monitor lab/diagnostic results- Monitor all insertion sites, i.e. indwelling lines, tubes, and drains- Monitor endotracheal if appropriate and nasal secretions for changes in amount and color- [place] appropriate cooling/warming therapies per order- Administer medications as ordered- Instruct and encourage patient and family to use good hand hygiene technique- Identify and instruct in appropriate  isolation precautions for identified infection/condition  Outcome: Progressing  Goal: Absence of fever/infection during neutropenic period  Description: INTERVENTIONS:- Monitor WBC  Outcome: Progressing     Problem: SAFETY ADULT  Goal: Maintain or return to baseline ADL function  Description: INTERVENTIONS:-  Assess patient's ability to carry out ADLs; assess patient's baseline for ADL function and identify physical deficits which impact ability to perform ADLs (bathing, care of mouth/teeth, toileting, grooming, dressing, etc.)- Assess/evaluate cause of self-care deficits - Assess range of motion- Assess patient's mobility; develop plan if impaired- Assess patient's need for assistive devices and provide as appropriate- Encourage maximum independence but intervene and supervise when necessary- Involve family in performance of ADLs- Assess for home care needs following discharge - Consider OT consult to assist with ADL evaluation and planning for discharge- Provide patient education as appropriate  Outcome: Progressing  Goal: Maintains/Returns to pre admission functional level  Description: INTERVENTIONS:- Perform AM-PAC 6 Click Basic Mobility/ Daily Activity assessment daily.- Set and communicate daily mobility goal to care team and patient/family/caregiver. - Collaborate with rehabilitation services on mobility goals if consulted- Perform Range of Motion 3 times a day.- Reposition patient every 2 hours.- Dangle patient 3 times a day- Stand patient 3 times a day- Ambulate patient 3 times a day- Out of bed to chair 3 times a day - Out of bed for meals 3 times a day- Out of bed for toileting- Record patient progress and toleration of activity level   Outcome: Progressing     Problem: DISCHARGE PLANNING  Goal: Discharge to home or other facility with appropriate resources  Description: INTERVENTIONS:- Identify barriers to discharge w/patient and caregiver- Arrange for needed discharge resources and transportation as  appropriate- Identify discharge learning needs (meds, wound care, etc.)- Arrange for interpretive services to assist at discharge as needed- Refer to Case Management Department for coordinating discharge planning if the patient needs post-hospital services based on physician/advanced practitioner order or complex needs related to functional status, cognitive ability, or social support system  Outcome: Progressing     Problem: Knowledge Deficit  Goal: Patient/family/caregiver demonstrates understanding of disease process, treatment plan, medications, and discharge instructions  Description: Complete learning assessment and assess knowledge base.Interventions:- Provide teaching at level of understanding- Provide teaching via preferred learning methods  Outcome: Progressing     Problem: CARDIOVASCULAR - ADULT  Goal: Maintains optimal cardiac output and hemodynamic stability  Description: INTERVENTIONS:- Monitor I/O, vital signs and rhythm- Monitor for S/S and trends of decreased cardiac output- Administer and titrate ordered vasoactive medications to optimize hemodynamic stability- Assess quality of pulses, skin color and temperature- Assess for signs of decreased coronary artery perfusion- Instruct patient to report change in severity of symptoms  Outcome: Progressing  Goal: Absence of cardiac dysrhythmias or at baseline rhythm  Description: INTERVENTIONS:- Continuous cardiac monitoring, vital signs, obtain 12 lead EKG if ordered- Administer antiarrhythmic and heart rate control medications as ordered- Monitor electrolytes and administer replacement therapy as ordered  Outcome: Progressing     Problem: RESPIRATORY - ADULT  Goal: Achieves optimal ventilation and oxygenation  Description: INTERVENTIONS:- Assess for changes in respiratory status- Assess for changes in mentation and behavior- Position to facilitate oxygenation and minimize respiratory effort- Oxygen administered by appropriate delivery if ordered-  Initiate smoking cessation education as indicated- Encourage broncho-pulmonary hygiene including cough, deep breathe, Incentive Spirometry- Assess the need for suctioning and aspirate as needed- Assess and instruct to report SOB or any respiratory difficulty- Respiratory Therapy support as indicated  Outcome: Progressing

## 2025-04-02 NOTE — NURSING NOTE
Patient telemetry showed run of VTach. RN attempted to obtain EKG from patient and patient refused. RN educated patient on topic, patient still refused. Provider notified. AM labs requested now by provider, patient agreed to lab draw.

## 2025-04-02 NOTE — ASSESSMENT & PLAN NOTE
Patient hypoxic to 80% at time of presentation  Secondary to COPD saturation  Able to wean from 4 L nasal cannula to 3 L nasal cannula today  Continue weaning as tolerated with COPD treatment   general

## 2025-04-02 NOTE — ASSESSMENT & PLAN NOTE
Patient developed acute onset hallucinations, paranoia and psychosis overnight  Does have prior history of hospital dosed delirium  Attempt to limit steroids, will dose in the morning rather than the evening  Continue antibiotic treatment  Will start low-dose at bedtime Seroquel tonight and monitor response

## 2025-04-02 NOTE — CONSULTS
Consultation - Pulmonology   Name: Emily Berrios 79 y.o. female I MRN: [id number]  Unit/Bed#: [place] [address] -01 I Date of Admission: 4/1/2025   Date of Service: 4/2/2025 I Hospital Day: 1       Inpatient consult to Pulmonology     Date/Time  4/2/2025 9:03 AM     Performed by  Otoniel Meade DO   Authorized by  Kyung Anne PA-C         Physician Requesting Evaluation: Ousmane Munoz DO   Reason for Evaluation / Principal Problem: COPD exacerbation    Assessment & Plan  Acute hypoxemic respiratory failure (HCC)  Does not use home oxygen  Follows with Dr. Lama as outpatient.  CTA chest 4/2025: No pulmonary embolus, patchy nodular opacities which may reflect atypical/viral infection.  Paraseptal emphysema most prominent in the right upper lobe.  Diffuse air trapping mosaicism suggestive of small airways disease.  Small hiatal hernia.  COPD with acute exacerbation (HCC)  Patient noncompliant with Trelegy as she has been having difficulty using this inhaler.    She has had no difficulty with HFA such as albuterol.  Productive cough, increased sputum production, SOB  Has been using albuterol more than 3 times per day.  Likely in setting of acute bronchitis.    Abnormal CT of the chest    Hyponatremia  Hyponatremia plus relative bradycardia in the setting of possible infection may reflect Legionella infection.  Hiatal hernia  CT chest showing small hiatal hernia  Gastroesophageal reflux disease without esophagitis  On PPI  Abnormal EKG    Acute metabolic encephalopathy    Plan:  Maintain SpO2 88-92%, wean off supplemental oxygen as tolerated  Patient will require ambulatory pulse oximetry testing 24-48 hours prior to discharge  Would favor Breztri over Trelegy as patient has difficulty with mechanism of Ellipta  Continue with Solu-Medrol  60 mg (~1mg/kg) IV daily  Continue with doxycycline 100 mg twice daily  Follow-up sputum culture, urine strep/Legionella, procalcitonin  Continue with PPI in setting of  GERD/hiatal hernia    History of Present Illness   Eimly Berrios is a 79 y.o. female who presents with shortness of breath and productive cough over the past couple days.  Patient may have had a sick contact, but she is uncertain of this.  Patient has had increased sputum production, but no fevers or chills.  Patient notes that her oxygen was very low at home.  Per documentation she required 4 L nasal cannula upon admission and during EMS ride.  She states that she has not been using her Trelegy because it has been difficult to use, and that she has been using her albuterol inhaler more than 3 times per day in the last few days.  Patient denies difficulty swallowing, choking while eating.  She denies fevers.    Review of Systems   Constitutional:  Negative for activity change, chills and fever.   HENT:  Negative for congestion.    Eyes:  Negative for photophobia and pain.   Respiratory:  Positive for cough (increased sputum production, change in color, and worse dyspnea) and shortness of breath.    Cardiovascular:  Negative for chest pain, palpitations and leg swelling.   Gastrointestinal:  Negative for abdominal distention.   Genitourinary:  Negative for difficulty urinating.   Neurological:  Negative for dizziness, seizures, syncope and weakness.       Historical Information   Medical History Review: I have reviewed the patient's PMH, PSH, Social History, Family History, Meds, and Allergies     Objective :  Temp:  [97.6 °F (36.4 °C)-98.1 °F (36.7 °C)] 97.6 °F (36.4 °C)  HR:  [] 85  BP: (124-159)/(53-89) 152/61  Resp:  [20] 20  SpO2:  [91 %-98 %] 95 %  O2 Device: Nasal cannula  Nasal Cannula O2 Flow Rate (L/min):  [3 L/min-4 L/min] 3 L/min    Physical Exam  Eyes:      Pupils: Pupils are equal, round, and reactive to light.   Cardiovascular:      Rate and Rhythm: Normal rate and regular rhythm.   Pulmonary:      Effort: Pulmonary effort is normal. No tachypnea, bradypnea, accessory muscle usage or  respiratory distress.      Breath sounds: Wheezing and rhonchi present. No decreased breath sounds or rales.   Chest:      Chest wall: No mass.   Abdominal:      Palpations: Abdomen is soft.   Genitourinary:     Rectum: Guaiac result negative.   Musculoskeletal:         General: Normal range of motion.      Right lower leg: No edema.      Left lower leg: No edema.   Skin:     General: Skin is warm.      Capillary Refill: Capillary refill takes less than 2 seconds.   Neurological:      General: No focal deficit present.      Mental Status: She is alert and oriented to person, place, and time.   Psychiatric:         Mood and Affect: Mood normal.         Lab Results: I have reviewed the following results:  .     04/02/25  0236 04/02/25  0236 04/02/25 0236   WBC 11.43*  --   --    HGB 10.6*  --   --    HCT 33.8*  --   --      --   --    SODIUM  --  129*  --    K  --  4.3  --    CL  --  93*  --    CO2  --  28  --    BUN  --  17  --    CREATININE  --  0.78  --    GLUC  --  254*  --    MG  --   --  2.6     ABG: No new results in last 24 hours.    Imaging Results Review: I personally reviewed the following image studies in PACS and associated radiology reports: CT chest. My interpretation of the radiology images/reports is: As above.  Other Study Results Review: Other studies reviewed include: EKG as above  PFT Results Reviewed:     Spirometry in office:  Severe airflow limitation per Dr Kelby Meade DO PGY-V  Pulmonary Critical Care Fellow  [facility]

## 2025-04-02 NOTE — ASSESSMENT & PLAN NOTE
Magnesium 1.0 at time of admission  Repleted orally and IV, continue oral twice daily  Stable this morning

## 2025-04-02 NOTE — PROGRESS NOTES
Progress Note - Hospitalist   Name: Emily Berrios 79 y.o. female I MRN: [id number]  Unit/Bed#: [place] [address] -01 I Date of Admission: 4/1/2025   Date of Service: 4/2/2025 I Hospital Day: 1    Assessment & Plan  COPD with acute exacerbation (HCC)  Patient presented to hospital with complaint of shortness of breath and coughing, found to be wheezing with hypoxia  Admit for COPD exacerbation  Continue scheduled DuoNebs, respiratory protocol  Start scheduled budesonide and formoterol  Did develop some hallucinations and psychosis overnight, will give a dose of steroids this morning but try to hold off on evening dosing  Given persistent hypoxia and wheezing, will ask pulmonology to see  Continue Mucinex and doxycycline  CTA chest results noted, bronchiolitis without pneumonia  Continue weaning oxygen as tolerated  Acute hypoxemic respiratory failure (HCC)  Patient hypoxic to 80% at time of presentation  Secondary to COPD saturation  Able to wean from 4 L nasal cannula to 3 L nasal cannula today  Continue weaning as tolerated with COPD treatment  Hypomagnesemia  Magnesium 1.0 at time of admission  Repleted orally and IV, continue oral twice daily  Stable this morning  Chronic coronary artery disease  Continue Plavix, Crestor, bisoprolol  Glaucoma  Continue eyedrops  Hyperlipidemia  Continue statin  Cognitive decline  Fall/aspiration precautions  Abnormal EKG  Likely from profound hypomagnesemia  Concern for V. tach overnight, reviewed telemetry today, V. tach not noted in all leads, likely artifact  Repeat EKG with improvement  Telemetry monitoring 24 hours  Acute metabolic encephalopathy  Patient developed acute onset hallucinations, paranoia and psychosis overnight  Does have prior history of hospital dosed delirium  Attempt to limit steroids, will dose in the morning rather than the evening  Continue antibiotic treatment  Will start low-dose at bedtime Seroquel tonight and monitor response  Hyponatremia  Sodium  fell to 129 this morning  Urine studies ordered  Continue treatment for acute illness  Encourage oral intake  Recheck in a.m.    VTE Pharmacologic Prophylaxis:   High Risk (Score >/= 5) - Pharmacological DVT Prophylaxis Ordered: enoxaparin (Lovenox). Sequential Compression Devices Ordered.    Mobility:   Basic Mobility Inpatient Raw Score: 22  JH-HLM Goal: 7: Walk 25 feet or more  JH-HLM Achieved: 7: Walk 25 feet or more  JH-HLM Goal achieved. Continue to encourage appropriate mobility.    Patient Centered Rounds: I performed bedside rounds with nursing staff today.   Discussions with Specialists or Other Care Team Provider: Pulmonology    Education and Discussions with Family / Patient:  Will update family when they arrive to the hospital.     Current Length of Stay: 1 day(s)  Current Patient Status: Inpatient   Certification Statement: The patient will continue to require additional inpatient hospital stay due to COPD exacerbation requiring submental oxygen  Discharge Plan: Anticipate discharge in 48-72 hrs to home.    Code Status: Level 1 - Full Code    Subjective   Patient seen and examined at bedside.  Notes she continues to have shortness of breath and wheezing.  Did not sleep well overnight, reportedly had some hallucinations and paranoia, patient notes she does not remember these events.    Objective :  Temp:  [97.6 °F (36.4 °C)-98.1 °F (36.7 °C)] 97.6 °F (36.4 °C)  HR:  [] 85  BP: (124-159)/(53-89) 152/61  Resp:  [20] 20  SpO2:  [91 %-98 %] 95 %  O2 Device: Nasal cannula  Nasal Cannula O2 Flow Rate (L/min):  [3 L/min-4 L/min] 3 L/min    Body mass index is 27.91 kg/m².     Input and Output Summary (last 24 hours):     Intake/Output Summary (Last 24 hours) at 4/2/2025 0906  Last data filed at 4/2/2025 0849  Gross per 24 hour   Intake 880 ml   Output 300 ml   Net 580 ml       Physical Exam  Vitals reviewed.   Constitutional:       General: She is not in acute distress.     Comments: 3 L nasal cannula    HENT:      Head: Normocephalic and atraumatic.   Eyes:      General: No scleral icterus.     Conjunctiva/sclera: Conjunctivae normal.   Cardiovascular:      Rate and Rhythm: Normal rate and regular rhythm.      Heart sounds: No murmur heard.  Pulmonary:      Effort: Pulmonary effort is normal. No respiratory distress.      Breath sounds: Wheezing present.   Abdominal:      General: Bowel sounds are normal. There is no distension.      Palpations: Abdomen is soft.      Tenderness: There is no abdominal tenderness.   Musculoskeletal:      Cervical back: Neck supple.      Right lower leg: No edema.      Left lower leg: No edema.   Skin:     General: Skin is warm and dry.   Neurological:      Mental Status: She is alert and oriented to person, place, and time.   Psychiatric:         Mood and Affect: Mood normal.         Behavior: Behavior normal.           Lines/Drains:        Telemetry:  Telemetry Orders (From admission, onward)               24 Hour Telemetry Monitoring  Continuous x 24 Hours (Telem)        Expiring   Question:  Reason for 24 Hour Telemetry  Answer:  Decompensated CHF- and any one of the following: continuous diuretic infusion or total diuretic dose >200 mg daily, associated electrolyte derangement (I.e. K < 3.0), inotropic drip (continuous infusion), hx of ventricular arrhythmia, or new EF < 35%                     Telemetry Reviewed: Normal Sinus Rhythm  Indication for Continued Telemetry Use: No indication for continued use. Will discontinue.                Lab Results: I have reviewed the following results:   Results from last 7 days   Lab Units 04/02/25  0236 04/01/25  0152   WBC Thousand/uL 11.43* 15.03*   HEMOGLOBIN g/dL 10.6* 12.0   HEMATOCRIT % 33.8* 38.2   PLATELETS Thousands/uL 307 311   SEGS PCT %  --  74   LYMPHO PCT %  --  15   MONO PCT %  --  10   EOS PCT %  --  1     Results from last 7 days   Lab Units 04/02/25  0236 04/01/25  0152   SODIUM mmol/L 129* 134*   POTASSIUM mmol/L 4.3  3.5   CHLORIDE mmol/L 93* 94*   CO2 mmol/L 28 29   BUN mg/dL 17 9   CREATININE mg/dL 0.78 0.80   ANION GAP mmol/L 8 11   CALCIUM mg/dL 9.3 9.1   ALBUMIN g/dL  --  4.3   TOTAL BILIRUBIN mg/dL  --  0.68   ALK PHOS U/L  --  61   ALT U/L  --  14   AST U/L  --  13   GLUCOSE RANDOM mg/dL 254* 179*     Results from last 7 days   Lab Units 04/01/25  0149   INR  1.10             Results from last 7 days   Lab Units 04/01/25  0149   LACTIC ACID mmol/L 1.0   PROCALCITONIN ng/ml 0.33*       Recent Cultures (last 7 days):   Results from last 7 days   Lab Units 04/01/25  0216 04/01/25  0203   BLOOD CULTURE  No Growth at 24 hrs. No Growth at 24 hrs.             Last 24 Hours Medication List:     Current Facility-Administered Medications:     acetaminophen (TYLENOL) tablet 650 mg, Q6H PRN    albuterol (PROVENTIL HFA,VENTOLIN HFA) inhaler 2 puff, Q4H PRN    ALPRAZolam (XANAX) tablet 0.5 mg, HS PRN    amLODIPine (NORVASC) tablet 5 mg, BID    bisoprolol (ZEBETA) tablet 10 mg, Daily    budesonide (PULMICORT) inhalation solution 0.5 mg, Q12H    clopidogrel (PLAVIX) tablet 75 mg, Daily    cyanocobalamin (VITAMIN B-12) tablet 1,000 mcg, Daily    doxycycline hyclate (VIBRAMYCIN) capsule 100 mg, Q12H KENIA    enoxaparin (LOVENOX) subcutaneous injection 40 mg, Daily    estrogens, conjugated (Premarin) vaginal cream 0.5 g, Once per day on Monday Wednesday Friday    fluticasone (FLONASE) 50 mcg/act nasal spray 2 spray, BID    formoterol (PERFOROMIST) nebulizer solution 20 mcg, Q12H    guaiFENesin (MUCINEX) 12 hr tablet 600 mg, Q12H KENIA    ipratropium-albuterol (DUO-NEB) 0.5-2.5 mg/3 mL inhalation solution 3 mL, TID    loratadine (CLARITIN) tablet 10 mg, Daily    magnesium Oxide (MAG-OX) tablet 400 mg, BID    methylPREDNISolone sodium succinate (Solu-MEDROL) injection 20 mg, Once    neomycin-polymyxin-dexamethasone (MAXITROL) ophthalmic suspension 1 drop, TID    pantoprazole (PROTONIX) EC tablet 40 mg, Daily Before Breakfast    pravastatin  (PRAVACHOL) tablet 80 mg, Daily With Dinner    Administrative Statements   Today, Patient Was Seen By: Kyung Anne PA-C      **Please Note: This note may have been constructed using a voice recognition system.**

## 2025-04-02 NOTE — NURSING NOTE
RN attempted to administer evening medications to patient. RN reviewed medications with patient. Patient stated that she did not want to take any medications until she talked to her own doctor. RN asked patient why, patient stated that she doesn't trust anyone here. Patient talked about a prior hospital visit in which she states she was taking antibiotics that made her hallucinate and act differently. Patient stated that she thinks that staff may be treating her differently because of how she acted during prior hospital stay. RN not familiar with patient from alleged prior visit; RN attempted to reassure patient without success. RN educated patient on medications being refused and why they are currently prescribed. Patient refused medications. Provider made aware.

## 2025-04-02 NOTE — UTILIZATION REVIEW
Initial Clinical Review    Admission: Date/Time/Statement:   Admission Orders (From admission, onward)       Ordered        04/01/25 0406  INPATIENT ADMISSION  Once                          Orders Placed This Encounter   Procedures    INPATIENT ADMISSION     Standing Status:   Standing     Number of Occurrences:   1     Level of Care:   Med Surg [16]     Estimated length of stay:   More than 2 Midnights     Certification:   I certify that inpatient services are medically necessary for this patient for a duration of greater than two midnights. See H&P and MD Progress Notes for additional information about the patient's course of treatment.     ED Arrival Information       Expected   -    Arrival   4/1/2025 01:27    Acuity   Emergent              Means of arrival   Ambulance    Escorted by   [facility] ([facility])    Service   Hospitalist    Admission type   Emergency              Arrival complaint   SOB             Chief Complaint   Patient presents with    Shortness of Breath     Pt arrived via EMS. SOB, cough, HA started Saturday. Hx of COPD. Denies CP, N/V/D, or dizziness.        Initial Presentation: 79 y.o. female who presented by EMS to [facility] ED.  Pertinent PMHx: COPD, CAD s/p CABG, T2DM, GERD, HTN, HLD. Presented w/ SOB and cough. EMS noted pt hypoxic 80%, placed on 4L NC (baseline room air). EXAM: expiratory wheezing, rhonchi. ED gave nebs and IV solu-medrol w/ some improvement in symptoms noted. CTA showed bronchiolitis. Mag 1.0. EKG ectopy w/ inferolateral T wave inversions. Procal 0.33. Admitted as Inpatient for evaluation and treatment of COPD, electrolyte imbalance.  ED gave 2g IV mag, additional 2g IV mag ordered in the morning.  PLAN: nebs q4h, IV solu-medrol, doxycycline, start mucinex, Supplemental O2, weaned as tolerated; continue PTA meds, Trend labs, replete electrolytes as needed; telemetry, supportive care. DW, I&O.     Anticipated Length of Stay/Certification  Statement: Patient will be admitted on an inpatient basis with an anticipated length of stay of greater than 2 midnights secondary to COPD.       Date: 04/02/25   Day 2: Pt declined all evening medications scheduled for 2100, pt educated on each medication and the indications of use. Despite this education, pt continued to decline medications; provider notified. Overnight, telemetry showed run of Vtach, attempted EKG, pt refused despite education. Provider requested labs to be drawn at that time instead of with other morning labs. Telemetry reviewed and showed vtach was not noted in all leads, so likely was artifact. Endorses continued SOB and wheezing. Pt had some paranoia overnight, pt does not recall this. Exam: 3L O2 NC, wheezing. Telemetry NSR this morning. Plan: continue nebs but decrease to TID, scheduled budesonide and formoterol, give steroid dosing in morning to prevent nighttime hallucinations/paranoia, continue other current meds, Supplemental O2, weaned as tolerated. Trend labs, replete electrolytes as needed. Check urine studies as Na 129 today, from 134. Pulmonology consulted.     Pulmonology: Pt notes prior to admission had not been using Trelegy as it was difficult to use; but had been using albuterol inhaler more than 3 times daily for a few days. Wheezing and rhonchi on exam. Wean supplemental O2 w/ goal SpO2 88-92%. IV solu-medrol 60 mg daily, doxycycline 100 mg BID. Check sputum culture, urine strep/Legionella, procal. PPI.       Date: 04/03/25  Day 3: Has surpassed a 2nd midnight with active treatments and services. Overnight, pt w/ increased SOB and hypoxia. Eval at that time: pt awake but appears distracted, does not respond to voice, increased work of breathing, expiratory wheezing. 6L simple face mask w/ SpO2 97%. CXR pulmonary vascular congestion. BiPAP initiated w/ albuterol neb and additional dose of IV steroids, IV doxycycline ordered, try 1x dose IV lasix 40 mg, check ABG in 2 hours.   Nods head this morning in answer to feeling okay. Shrugs shoulders when asked if improved on BiPAP vs NC. Exam: wheezing, on BiPAP. Na 128. Plan: continue nebs, IV doxycycline, continue current meds, BiPAP, continuous pulse ox, DW, Trend labs, replete electrolytes as needed. Supportive care. Follow pending studies for urine and cultures.       ED Treatment-Medication Administration from 04/01/2025 0127 to 04/01/2025 0430         Date/Time Order Dose Route Action     04/01/2025 0134 ipratropium-albuterol (FOR EMS ONLY) (DUO-NEB) 0.5-2.5 mg/3 mL inhalation solution 3 mL 0 mL Does not apply Given to EMS     04/01/2025 0134 methylPREDNISolone sodium succinate (FOR EMS ONLY) (Solu-MEDROL) 125 MG injection 125 mg 0 mg Does not apply Given to EMS     04/01/2025 0217 albuterol inhalation solution 10 mg 10 mg Nebulization Given     04/01/2025 0217 ipratropium (ATROVENT) 0.02 % inhalation solution 1 mg 1 mg Nebulization Given     04/01/2025 0217 sodium chloride 0.9 % inhalation solution 12 mL 12 mL Nebulization Given     04/01/2025 0214 methylPREDNISolone sodium succinate (Solu-MEDROL) injection 125 mg 125 mg Intravenous Given     04/01/2025 0212 magnesium sulfate 2 g/50 mL IVPB (premix) 2 g 2 g Intravenous New Bag     04/01/2025 0210 sodium chloride 0.9 % bolus 1,000 mL 1,000 mL Intravenous New Bag     04/01/2025 0341 iohexol (OMNIPAQUE) 350 MG/ML injection (MULTI-DOSE) 85 mL 85 mL Intravenous Given            Scheduled Medications:  amLODIPine, 5 mg, Oral, BID  bisoprolol, 10 mg, Oral, Daily  budesonide, 0.5 mg, Nebulization, Q12H  clopidogrel, 75 mg, Oral, Daily  vitamin B-12, 1,000 mcg, Oral, Daily  doxycycline, 100 mg, Intravenous, Q12H  enoxaparin, 40 mg, Subcutaneous, Daily  estrogens, conjugated, 0.5 g, Vaginal, Once per day on Monday Wednesday Friday  fluticasone, 2 spray, Nasal, BID  formoterol, 20 mcg, Nebulization, Q12H  guaiFENesin, 600 mg, Oral, Q12H KENIA  ipratropium-albuterol, 3 mL, Nebulization,  TID  loratadine, 10 mg, Oral, Daily  magnesium Oxide, 400 mg, Oral, BID  methylPREDNISolone sodium succinate, 60 mg, Intravenous, Daily  neomycin-polymyxin-dexamethasone, 1 drop, Ophthalmic, TID  pantoprazole, 40 mg, Oral, Daily Before Breakfast  pravastatin, 80 mg, Oral, Daily With Dinner    Continuous IV Infusions: none    PRN Meds:  acetaminophen, 650 mg, Oral, Q6H PRN  albuterol, 2 puff, Inhalation, Q4H PRN; 4/1 x1, 4/2 x3  ALPRAZolam, 0.5 mg, Oral, HS PRN; 4/2 x1  hydrOXYzine HCL, 25 mg, Oral, Q6H PRN; 4/2 x1  ipratropium-albuterol, 3 mL, Nebulization, Q6H PRN  magnesium sulfate, 2 g, Intravenous; 4/1 x1  QUEtiapine, 12.5 mg, Oral, HS PRN    albuterol inhalation solution 10 mg  Dose: 10 mg Freq: Once Route: NEBULIZATION  Start: 04/03/25 0245 End: 04/03/25 0253  furosemide (LASIX) injection 40 mg  Dose: 40 mg Freq: Once Route: IV  Start: 04/03/25 0400 End: 04/03/25 0436  ipratropium-albuterol (DUO-NEB) 0.5-2.5 mg/3 mL inhalation solution 3 mL  Dose: 3 mL Freq: Every 4 hours (RESP) Route: NEBULIZATION  Start: 04/01/25 0415 End: 04/01/25 1134  methylPREDNISolone sodium succinate (Solu-MEDROL) injection 20 mg  Dose: 20 mg Freq: Once Route: IV  Start: 04/03/25 1000 End: 04/03/25 1026  methylPREDNISolone sodium succinate (Solu-MEDROL) injection 20 mg  Dose: 20 mg Freq: Once Route: IV  Start: 04/02/25 0930 End: 04/02/25 0933  methylPREDNISolone sodium succinate (Solu-MEDROL) injection 40 mg  Dose: 40 mg Freq: Once Route: IV  Start: 04/03/25 0245 End: 04/03/25 0247  methylPREDNISolone sodium succinate (Solu-MEDROL) injection 40 mg  Dose: 40 mg Freq: Every 6 hours scheduled Route: IV  Start: 04/01/25 0800 End: 04/02/25 0834    ED Triage Vitals   Temperature Pulse Respirations Blood Pressure SpO2 Pain Score   04/01/25 0132 04/01/25 0132 04/01/25 0132 04/01/25 0132 04/01/25 0132 04/01/25 0452   98.1 °F (36.7 °C) (!) 112 22 152/70 (!) 80 % No Pain     Weight (last 2 days)       Date/Time Weight    04/01/25 04:52:09 67  (147.71)    04/01/25 0132 69 (152.12)            Vital Signs (last 3 days)       Date/Time Temp Pulse Resp BP MAP (mmHg) SpO2 Calculated FIO2 (%) - Nasal Cannula Nasal Cannula O2 Flow Rate (L/min) O2 Device O2 Interface Device Patient Position - Orthostatic VS Jeyson Coma Scale Score Pain    04/03/25 0730 -- -- -- -- -- -- -- -- -- -- -- -- No Pain    04/03/25 0715 -- -- -- -- -- 98 % -- -- -- Full face mask -- -- --    04/03/25 0315 -- 88 -- 159/58 92 96 % -- -- BiPAP -- Lying -- --    04/03/25 0258 -- 83 -- -- -- 96 % -- -- -- -- -- -- --    04/03/25 0256 -- -- -- -- -- 96 % -- -- -- Full face mask -- -- --    04/03/25 0233 -- -- -- 146/64 -- -- -- -- -- -- Lying -- --    04/03/25 02:12:53 -- 88 -- 171/72 105 86 % -- -- -- -- -- -- --    04/03/25 02:09:26 -- 88 -- 89/65 73 92 % -- -- -- -- -- -- --    04/02/25 22:19:26 97.4 °F (36.3 °C) 89 20 132/74 93 93 % 36 4 L/min Nasal cannula -- Lying -- --    04/02/25 1956 -- -- -- -- -- 98 % -- -- -- -- -- -- --    04/02/25 1936 -- -- -- -- -- -- 36 4 L/min Nasal cannula -- -- -- No Pain    04/02/25 1832 -- -- -- -- -- -- 36 4 L/min Nasal cannula -- -- -- --    04/02/25 1831 -- -- -- -- -- 97 % -- -- -- -- -- -- --    04/02/25 15:32:41 97.2 °F (36.2 °C) 88 22 139/73 95 96 % -- -- -- -- -- -- --    04/02/25 1340 -- -- -- -- -- 95 % 32 3 L/min Nasal cannula -- -- -- --    04/02/25 11:16:30 97.6 °F (36.4 °C) 78 20 148/62 91 95 % -- -- -- -- -- -- --    04/02/25 0751 -- -- -- -- -- -- 32 3 L/min Nasal cannula -- -- -- --    04/02/25 0750 -- -- -- -- -- 95 % -- -- -- -- -- -- --    04/02/25 07:48:23 97.6 °F (36.4 °C) 85 20 152/61 91 94 % -- -- -- -- -- -- --    04/02/25 0738 -- -- -- -- -- -- -- -- -- -- -- 15 --    04/02/25 07:26:21 97.9 °F (36.6 °C) 87 -- 126/61 83 94 % 32 3 L/min Nasal cannula -- Lying -- No Pain    04/01/25 22:55:59 97.9 °F (36.6 °C) 92 20 136/64 88 96 % 36 4 L/min Nasal cannula -- Lying -- --    04/01/25 2000 -- -- -- -- -- -- 36 4 L/min Nasal cannula --  -- 15 No Pain    04/01/25 1927 -- -- -- -- -- 98 % -- -- -- -- -- -- --    04/01/25 19:06:05 98.1 °F (36.7 °C) 107 20 159/89 112 97 % -- -- -- -- -- -- --    04/01/25 1850 -- -- -- -- -- 96 % -- -- -- -- -- -- --    04/01/25 15:10:29 98.1 °F (36.7 °C) 84 20 124/53 77 91 % -- -- -- -- -- -- --    04/01/25 1348 -- -- -- -- -- -- 36 4 L/min Nasal cannula -- -- -- --    04/01/25 1347 -- -- -- -- -- 95 % -- -- -- -- -- -- --    04/01/25 1215 -- -- -- -- -- 95 % -- -- -- -- -- -- --    04/01/25 1005 -- -- -- -- -- -- -- -- -- -- -- -- No Pain    04/01/25 0952 -- -- -- -- -- -- -- -- -- -- -- -- No Pain    04/01/25 0924 -- -- -- -- -- -- 36 4 L/min Nasal cannula -- -- -- No Pain    04/01/25 08:08:12 98 °F (36.7 °C) 94 20 130/54 79 96 % -- -- -- -- -- -- --    04/01/25 0720 -- -- -- -- -- -- 36 4 L/min Nasal cannula -- -- -- --    04/01/25 0719 -- -- -- -- -- 94 % -- -- -- -- -- -- --    04/01/25 0455 -- -- -- -- -- -- -- -- -- -- -- -- No Pain    04/01/25 04:52:09 98.1 °F (36.7 °C) 105 18 139/61 87 92 % 36 4 L/min Nasal cannula -- Lying 15 No Pain    04/01/25 0415 -- 111 29 111/61 81 96 % -- -- None (Room air) -- Sitting -- --    04/01/25 0300 -- 106 35 134/61 88 100 % -- -- None (Room air) -- Sitting -- --    04/01/25 0220 -- 99 23 148/65 94 98 % 36 4 L/min Nasal cannula -- Sitting -- --    04/01/25 0218 -- -- -- -- -- 97 % -- -- -- -- -- -- --    04/01/25 0206 -- 105 27 -- -- 96 % -- -- -- -- -- -- --    04/01/25 0143 -- -- -- -- -- -- -- -- -- -- -- 15 --    04/01/25 0133 -- -- -- -- -- 95 % 36 4 L/min Nasal cannula -- -- -- --    04/01/25 0132 98.1 °F (36.7 °C) 112 22 152/70 -- 80 % -- -- None (Room air) -- Lying -- --              Pertinent Labs/Diagnostic Test Results:   Radiology:  XR chest portable   Final Interpretation by Eros Caicedo MD (04/03 1021)      Increasing pulmonary infiltrates most evident in the right lower lobes concerning for worsening pneumonia. Mild emphysema noted.             Workstation performed: [id number]         CTA chest pe study   Final Interpretation by Ashish Ferrell DO (04/01 0349)      No acute pulmonary embolus identified to the level of the segmental pulmonary arteries.      Patchy groundglass nodular opacities throughout the lungs suggestive of bronchiolitis.            The study was marked in EPIC for immediate notification.      Workstation performed: [id number]           Cardiology:  ECG 12 lead   Final Result by Castillo Browning MD (04/02 0756)   Normal sinus rhythm   Septal infarct (cited on or before 01-Apr-2025)   Abnormal ECG   When compared with ECG of 02-Apr-2025 07:16, (unconfirmed)   T wave inversion more evident in Inferior leads   Confirmed by Castillo Browning ([id number]) on 4/2/2025 7:56:49 AM      ECG 12 lead   Final Result by Castillo Browning MD (04/02 0756)   Normal sinus rhythm   Septal infarct (cited on or before 01-Apr-2025)   Abnormal ECG   When compared with ECG of 01-Apr-2025 09:05,   T wave inversion less evident in Inferior leads   Nonspecific T wave abnormality, improved in Anterolateral leads   Confirmed by Castillo Browning ([id number]) on 4/2/2025 7:56:52 AM      ECG 12 lead   Final Result by Merrill Solano DO (04/01 1157)   Normal sinus rhythm   ST & T wave abnormality, consider inferior ischemia   Abnormal ECG   When compared with ECG of 01-Apr-2025 04:13,   No significant change was found      Confirmed by Merrill Solano ([id number]) on 4/1/2025 11:57:38 AM      ECG 12 lead   Final Result by Jerad Chris DO (04/01 0639)   Sinus tachycardia   Septal infarct , age undetermined   Marked ST abnormality, possible lateral subendocardial injury   Abnormal ECG   Confirmed by Jerad Chris ([id number]) on 4/1/2025 6:39:34 AM      ECG 12 lead   Final Result by Jerad Chris DO (04/01 0639)   Sinus tachycardia   ST & T wave abnormality, consider inferolateral ischemia   Abnormal ECG   Confirmed by Jerad Chris ([id number]) on 4/1/2025 6:39:10 AM            Results from last 7 days   Lab Units 04/01/25  0205   SARS-COV-2  Negative     Results from last 7 days   Lab Units 04/03/25  0243 04/02/25  0236 04/01/25  0152   WBC Thousand/uL 14.09* 11.43* 15.03*   HEMOGLOBIN g/dL 10.9* 10.6* 12.0   HEMATOCRIT % 36.3 33.8* 38.2   PLATELETS Thousands/uL 417* 307 311   TOTAL NEUT ABS Thousands/µL  --   --  10.95*         Results from last 7 days   Lab Units 04/03/25  0243 04/02/25  0236 04/02/25  0236 04/01/25  1159 04/01/25  0152   SODIUM mmol/L 128*  --  129*  --  134*   POTASSIUM mmol/L 4.9  --  4.3  --  3.5   CHLORIDE mmol/L 91*  --  93*  --  94*   CO2 mmol/L 31  --  28  --  29   ANION GAP mmol/L 6  --  8  --  11   BUN mg/dL 24  --  17  --  9   CREATININE mg/dL 0.86  --  0.78  --  0.80   EGFR ml/min/1.73sq m 64  --  72  --  70   CALCIUM mg/dL 9.5  --  9.3  --  9.1   MAGNESIUM mg/dL  --  2.6  --  2.5 1.0*     Results from last 7 days   Lab Units 04/01/25  0152   AST U/L 13   ALT U/L 14   ALK PHOS U/L 61   TOTAL PROTEIN g/dL 8.0   ALBUMIN g/dL 4.3   TOTAL BILIRUBIN mg/dL 0.68         Results from last 7 days   Lab Units 04/03/25 0243 04/02/25 0236 04/01/25  0152   GLUCOSE RANDOM mg/dL 221* 254* 179*      Results from last 7 days   Lab Units 04/03/25  0514   PH ART  7.275*   PCO2 ART mm Hg 59.1*   PO2 ART mm Hg 75.4   HCO3 ART mmol/L 26.8   BASE EXC ART mmol/L -0.8   O2 CONTENT ART mL/dL 14.8*   O2 HGB, ARTERIAL % 93.4*   ABG SOURCE  Radial, Left     Results from last 7 days   Lab Units 04/03/25 0243 04/01/25  0152   PH YANDEL  7.225* 7.374   PCO2 YANDEL mm Hg 79.1* 51.2*   PO2 YANDEL mm Hg 127.2* 36.7   HCO3 YANDEL mmol/L 32.0* 29.2   BASE EXC YANDEL mmol/L 2.4 3.1   O2 CONTENT YANDEL ml/dL 16.6 12.3   O2 HGB, VENOUS % 97.4* 68.3      Results from last 7 days   Lab Units 04/01/25  0545 04/01/25  0412 04/01/25  0149   HS TNI 0HR ng/L  --   --  8   HS TNI 2HR ng/L  --  8  --    HSTNI D2 ng/L  --  0  --    HS TNI 4HR ng/L 10  --   --    HSTNI D4 ng/L 2  --   --       Results from last 7  days   Lab Units 04/01/25  0149   PROTIME seconds 14.4   INR  1.10   PTT seconds 37*      Results from last 7 days   Lab Units 04/03/25  0243 04/01/25  1159 04/01/25  0149   PROCALCITONIN ng/ml 0.16 0.37* 0.33*     Results from last 7 days   Lab Units 04/01/25  0149   LACTIC ACID mmol/L 1.0          Results from last 7 days   Lab Units 04/01/25  0149   BNP pg/mL 151*      Results from last 7 days   Lab Units 04/02/25  1230   OSMO UR mmol/     Results from last 7 days   Lab Units 04/02/25  1230   SODIUM UR mmol/L <10.0     Results from last 7 days   Lab Units 04/02/25  1230 04/01/25  0205   STREP PNEUMONIAE ANTIGEN, URINE  Negative  --    LEGIONELLA URINARY ANTIGEN  Negative  --    INFLUENZA A PCR   --  Negative   INFLUENZA B PCR   --  Negative   RSV PCR   --  Negative      Results from last 7 days   Lab Units 04/02/25  1129 04/01/25  0216 04/01/25  0203   BLOOD CULTURE   --  No Growth at 48 hrs. No Growth at 48 hrs.   GRAM STAIN RESULT  3+ Polys*  3+ Gram positive cocci in pairs*  4+ Gram negative coccobacilli*  1+ Gram negative diplococci*  2+ Epithelial cells per low power field*  --   --         Past Medical History:   Diagnosis Date    Ambulates with cane     Arthritis     Asthma     Chronic obstructive pulmonary disease, unspecified COPD type ([facility]) 04/08/2022    Cognitive decline 03/10/2023    Community acquired pneumonia 11/13/2019    COPD (chronic obstructive pulmonary disease) ([facility])     Coronary artery disease     DM (diabetes mellitus) ([facility])     Fall     1/2024    Gastroesophageal reflux disease without esophagitis 4/2/2025    HTN (hypertension)     Hx of CABG     Hydronephrosis, bilateral 03/06/2023    Hyperlipidemia     Hyponatremia     PONV (postoperative nausea and vomiting)     Urinary retention     Wears glasses      Present on Admission:   COPD with acute exacerbation ([facility])   Chronic coronary artery disease   Hypomagnesemia   Hyperlipidemia   Cognitive decline   Glaucoma   Hiatal hernia    Hyponatremia      Admitting Diagnosis: Cough [R05.9]  Hypomagnesemia [E83.42]  Bronchiolitis [J21.9]  Primary hypertension [I10]  Dyspnea [R06.00]  SOB (shortness of breath) [R06.02]  Hypoxia [R09.02]  COPD exacerbation (HCC) [J44.1]  Chronic coronary artery disease [I25.10]  Hx of CABG [Z95.1]  Age/Sex: 79 y.o. female    Network Utilization Review Department  ATTENTION: Please call with any questions or concerns to [phone] and carefully listen to the prompts so that you are directed to the right person. All voicemails are confidential.   For Discharge needs, contact Care Management DC Support Team at [phone] opt. [phone]  Send all requests for admission clinical reviews, approved or denied determinations and any other requests to dedicated fax number below belonging to the campus where the patient is receiving treatment. List of dedicated fax numbers for the Facilities:  FACILITY NAME UR FAX NUMBER   ADMISSION DENIALS (Administrative/Medical Necessity) [phone]   DISCHARGE SUPPORT TEAM (NETWORK) [phone]   PARENT CHILD HEALTH (Maternity/NICU/Pediatrics) [phone]   [facility] [phone]   General acute hospital [phone]   [facility] [phone]   [facility] [phone]   [facility] [phone]   [facility] [phone]   [facility] [phone]   [facility] [phone]   [facility] [phone]   [facility] [phone]   [facility] [phone]   [facility] [phone]

## 2025-04-02 NOTE — ASSESSMENT & PLAN NOTE
Does not use home oxygen  Follows with Dr. Lama as outpatient.  CTA chest 4/2025: No pulmonary embolus, patchy nodular opacities which may reflect atypical/viral infection.  Paraseptal emphysema most prominent in the right upper lobe.  Diffuse air trapping mosaicism suggestive of small airways disease.  Small hiatal hernia.

## 2025-04-03 ENCOUNTER — APPOINTMENT (INPATIENT)
Dept: RADIOLOGY | Facility: HOSPITAL | Age: 80
DRG: 190 | End: 2025-04-03
Payer: MEDICARE

## 2025-04-03 PROBLEM — J96.22 ACUTE ON CHRONIC RESPIRATORY FAILURE WITH HYPERCAPNIA (HCC): Status: ACTIVE | Noted: 2025-04-01

## 2025-04-03 PROBLEM — J96.01 ACUTE HYPOXIC RESPIRATORY FAILURE (HCC): Status: ACTIVE | Noted: 2025-04-03

## 2025-04-03 LAB
ANION GAP SERPL CALCULATED.3IONS-SCNC: 6 MMOL/L (ref 4–13)
BASE EX.OXY STD BLDV CALC-SCNC: 97.4 % (ref 60–80)
BASE EXCESS BLDA CALC-SCNC: -0.8 MMOL/L
BASE EXCESS BLDV CALC-SCNC: 2.4 MMOL/L
BUN SERPL-MCNC: 24 MG/DL (ref 5–25)
CALCIUM SERPL-MCNC: 9.5 MG/DL (ref 8.4–10.2)
CHLORIDE SERPL-SCNC: 91 MMOL/L (ref 96–108)
CO2 SERPL-SCNC: 31 MMOL/L (ref 21–32)
CREAT SERPL-MCNC: 0.86 MG/DL (ref 0.6–1.3)
ERYTHROCYTE [DISTWIDTH] IN BLOOD BY AUTOMATED COUNT: 16.3 % (ref 11.6–15.1)
GFR SERPL CREATININE-BSD FRML MDRD: 64 ML/MIN/1.73SQ M
GLUCOSE SERPL-MCNC: 221 MG/DL (ref 65–140)
HCO3 BLDA-SCNC: 26.8 MMOL/L (ref 22–28)
HCO3 BLDV-SCNC: 32 MMOL/L (ref 24–30)
HCT VFR BLD AUTO: 36.3 % (ref 34.8–46.1)
HGB BLD-MCNC: 10.9 G/DL (ref 11.5–15.4)
MCH RBC QN AUTO: 25.3 PG (ref 26.8–34.3)
MCHC RBC AUTO-ENTMCNC: 30 G/DL (ref 31.4–37.4)
MCV RBC AUTO: 84 FL (ref 82–98)
O2 CT BLDA-SCNC: 14.8 ML/DL (ref 16–23)
O2 CT BLDV-SCNC: 16.6 ML/DL
OXYHGB MFR BLDA: 93.4 % (ref 94–97)
PCO2 BLDA: 59.1 MM HG (ref 36–44)
PCO2 BLDV: 79.1 MM HG (ref 42–50)
PH BLDA: 7.28 [PH] (ref 7.35–7.45)
PH BLDV: 7.22 [PH] (ref 7.3–7.4)
PLATELET # BLD AUTO: 417 THOUSANDS/UL (ref 149–390)
PMV BLD AUTO: 10 FL (ref 8.9–12.7)
PO2 BLDA: 75.4 MM HG (ref 75–129)
PO2 BLDV: 127.2 MM HG (ref 35–45)
POTASSIUM SERPL-SCNC: 4.9 MMOL/L (ref 3.5–5.3)
PROCALCITONIN SERPL-MCNC: 0.16 NG/ML
RBC # BLD AUTO: 4.31 MILLION/UL (ref 3.81–5.12)
SODIUM SERPL-SCNC: 128 MMOL/L (ref 135–147)
SPECIMEN SOURCE: ABNORMAL
WBC # BLD AUTO: 14.09 THOUSAND/UL (ref 4.31–10.16)

## 2025-04-03 PROCEDURE — 94002 VENT MGMT INPAT INIT DAY: CPT

## 2025-04-03 PROCEDURE — 85027 COMPLETE CBC AUTOMATED: CPT

## 2025-04-03 PROCEDURE — 71045 X-RAY EXAM CHEST 1 VIEW: CPT

## 2025-04-03 PROCEDURE — 94640 AIRWAY INHALATION TREATMENT: CPT

## 2025-04-03 PROCEDURE — 82805 BLOOD GASES W/O2 SATURATION: CPT

## 2025-04-03 PROCEDURE — 99232 SBSQ HOSP IP/OBS MODERATE 35: CPT | Performed by: INTERNAL MEDICINE

## 2025-04-03 PROCEDURE — 94760 N-INVAS EAR/PLS OXIMETRY 1: CPT

## 2025-04-03 PROCEDURE — 94644 CONT INHLJ TX 1ST HOUR: CPT

## 2025-04-03 PROCEDURE — 84145 PROCALCITONIN (PCT): CPT

## 2025-04-03 PROCEDURE — 80048 BASIC METABOLIC PNL TOTAL CA: CPT

## 2025-04-03 PROCEDURE — 99232 SBSQ HOSP IP/OBS MODERATE 35: CPT | Performed by: HOSPITALIST

## 2025-04-03 RX ORDER — FUROSEMIDE 10 MG/ML
40 INJECTION INTRAMUSCULAR; INTRAVENOUS ONCE
Status: COMPLETED | OUTPATIENT
Start: 2025-04-03 | End: 2025-04-03

## 2025-04-03 RX ORDER — ALBUTEROL SULFATE 0.83 MG/ML
10 SOLUTION RESPIRATORY (INHALATION) ONCE
Status: COMPLETED | OUTPATIENT
Start: 2025-04-03 | End: 2025-04-03

## 2025-04-03 RX ORDER — METHYLPREDNISOLONE SODIUM SUCCINATE 40 MG/ML
40 INJECTION, POWDER, LYOPHILIZED, FOR SOLUTION INTRAMUSCULAR; INTRAVENOUS ONCE
Status: COMPLETED | OUTPATIENT
Start: 2025-04-03 | End: 2025-04-03

## 2025-04-03 RX ORDER — METHYLPREDNISOLONE SODIUM SUCCINATE 40 MG/ML
20 INJECTION, POWDER, LYOPHILIZED, FOR SOLUTION INTRAMUSCULAR; INTRAVENOUS ONCE
Status: COMPLETED | OUTPATIENT
Start: 2025-04-03 | End: 2025-04-03

## 2025-04-03 RX ORDER — METHYLPREDNISOLONE SODIUM SUCCINATE 40 MG/ML
40 INJECTION, POWDER, LYOPHILIZED, FOR SOLUTION INTRAMUSCULAR; INTRAVENOUS DAILY
Status: DISCONTINUED | OUTPATIENT
Start: 2025-04-04 | End: 2025-04-05

## 2025-04-03 RX ORDER — IPRATROPIUM BROMIDE AND ALBUTEROL SULFATE 2.5; .5 MG/3ML; MG/3ML
3 SOLUTION RESPIRATORY (INHALATION) EVERY 6 HOURS PRN
Status: DISCONTINUED | OUTPATIENT
Start: 2025-04-03 | End: 2025-04-09 | Stop reason: HOSPADM

## 2025-04-03 RX ADMIN — NEOMYCIN SULFATE, POLYMYXIN B SULFATE AND DEXAMETHASONE 1 DROP: 3.5; 10000; 1 SUSPENSION/ DROPS OPHTHALMIC at 21:42

## 2025-04-03 RX ADMIN — ENOXAPARIN SODIUM 40 MG: 40 INJECTION SUBCUTANEOUS at 09:49

## 2025-04-03 RX ADMIN — FORMOTEROL FUMARATE DIHYDRATE 20 MCG: 20 SOLUTION RESPIRATORY (INHALATION) at 19:35

## 2025-04-03 RX ADMIN — ALBUTEROL SULFATE 10 MG: 2.5 SOLUTION RESPIRATORY (INHALATION) at 02:53

## 2025-04-03 RX ADMIN — FORMOTEROL FUMARATE DIHYDRATE 20 MCG: 20 SOLUTION RESPIRATORY (INHALATION) at 07:15

## 2025-04-03 RX ADMIN — BUDESONIDE 0.5 MG: 0.5 INHALANT RESPIRATORY (INHALATION) at 19:35

## 2025-04-03 RX ADMIN — IPRATROPIUM BROMIDE AND ALBUTEROL SULFATE 3 ML: 2.5; .5 SOLUTION RESPIRATORY (INHALATION) at 07:15

## 2025-04-03 RX ADMIN — GUAIFENESIN 600 MG: 600 TABLET ORAL at 21:40

## 2025-04-03 RX ADMIN — SODIUM CHLORIDE 100 MG: 9 INJECTION, SOLUTION INTRAVENOUS at 15:10

## 2025-04-03 RX ADMIN — METHYLPREDNISOLONE SODIUM SUCCINATE 20 MG: 40 INJECTION, POWDER, FOR SOLUTION INTRAMUSCULAR; INTRAVENOUS at 10:23

## 2025-04-03 RX ADMIN — IPRATROPIUM BROMIDE AND ALBUTEROL SULFATE 3 ML: 2.5; .5 SOLUTION RESPIRATORY (INHALATION) at 13:40

## 2025-04-03 RX ADMIN — SODIUM CHLORIDE 100 MG: 9 INJECTION, SOLUTION INTRAVENOUS at 03:41

## 2025-04-03 RX ADMIN — Medication 400 MG: at 17:24

## 2025-04-03 RX ADMIN — PRAVASTATIN SODIUM 80 MG: 80 TABLET ORAL at 17:24

## 2025-04-03 RX ADMIN — IPRATROPIUM BROMIDE AND ALBUTEROL SULFATE 3 ML: 2.5; .5 SOLUTION RESPIRATORY (INHALATION) at 19:36

## 2025-04-03 RX ADMIN — AMLODIPINE BESYLATE 5 MG: 5 TABLET ORAL at 17:24

## 2025-04-03 RX ADMIN — METHYLPREDNISOLONE SODIUM SUCCINATE 40 MG: 40 INJECTION, POWDER, FOR SOLUTION INTRAMUSCULAR; INTRAVENOUS at 02:44

## 2025-04-03 RX ADMIN — FUROSEMIDE 40 MG: 10 INJECTION, SOLUTION INTRAVENOUS at 04:36

## 2025-04-03 RX ADMIN — BUDESONIDE 0.5 MG: 0.5 INHALANT RESPIRATORY (INHALATION) at 07:15

## 2025-04-03 NOTE — ASSESSMENT & PLAN NOTE
Patient developed acute onset hallucinations, paranoia and psychosis overnight  Does have prior history of hospital dosed delirium  Attempt to limit steroids, will dose in the morning rather than the evening  Continue antibiotic treatment  Will start low-dose at bedtime Seroquel tonight as needed and monitor response  Likely component of hypercapnia, monitor with BiPAP  Family also notes patient continues to drink alcohol daily, placed on CIWA protocol

## 2025-04-03 NOTE — ASSESSMENT & PLAN NOTE
Patient hypoxic to 80% at time of presentation  Secondary to COPD saturation  Was stable on 3 L nasal cannula yesterday but developed worsening shortness of breath, confusion and hypoxia overnight and was placed on BiPAP  Continue BiPAP for now  Appears more alert and awake on the BiPAP this morning, suspect we can wean back to nasal cannula later today  Appreciate pulmonology recommendations

## 2025-04-03 NOTE — SEPSIS NOTE
[facility] Advisories       Sepsis Time Zero - SLIM       Date Triggers    04/03/25 0249 Enter result  Rule - CER: Hospitalist Service  [[id number]]  Rule - CER: Is Not Comfort Care [[id number]]  Flowsheet Row - ERNIE: DDS Pulse [[id number]] (Value: 99)  Lab Component - LRR: WHITE BLOOD CELL COUNT [[id number]] (Result 1: 14.09, Extension - LPP: GREATER THAN (USE MOST RECENT RESULT) [03188], Result 1 Unit: Thousand/uL, Result 1 Order: CBC and Platelet [[id number]])  Flowsheet Row - ERNIE: BP [5] (Value: 89)                      Sepsis Time Zero Documentation: The patient was evaluated and does NOT meet criteria for severe sepsis/septic shock     Sepsis alert secondary to tachycardia, leukocytosis of 14.09, and SBP 89. BP checked manually with BP actually 146/64. Pt with persistent leukocytosis and tachycardia over hospitalization due to COPD exacerbation with steroids. Do not suspect severe sepsis/septic shock at this time.

## 2025-04-03 NOTE — PROGRESS NOTES
Progress Note - Pulmonology   Name: Emily Berrios 79 y.o. female I MRN: [id number]  Unit/Bed#: [place] [address] -01 I Date of Admission: 4/1/2025   Date of Service: 4/3/2025 I Hospital Day: 2    Assessment & Plan  Acute hypoxic respiratory failure (HCC)  Does not use home oxygen  Follows with Dr. Lama as outpatient.  CTA chest 4/2025: No pulmonary embolus, patchy nodular opacities which may reflect atypical/viral infection.  Paraseptal emphysema most prominent in the right upper lobe.  Diffuse air trapping mosaicism suggestive of small airways disease.  Small hiatal hernia.  Acute on chronic respiratory failure with hypercapnia (HCC)    COPD with acute exacerbation (HCC)  Severe COPD, Group E  Patient noncompliant with Trelegy as she has been having difficulty using this inhaler.   Spirometry in office with severe obstruction  She has had no difficulty with HFA such as albuterol.  Productive cough, increased sputum production, SOB  Has been using albuterol more than 3 times per day.  Likely in setting of acute bronchitis.  Sputum culture: 2+ epithelial cells likely indicating contamination    Abnormal CT of the chest    Hyponatremia  Hyponatremia plus relative bradycardia in the setting of possible infection may reflect Legionella infection.  Hiatal hernia  CT chest showing small hiatal hernia  Gastroesophageal reflux disease without esophagitis  On PPI    Plan:  Maintain SpO2 88-92%, wean off supplemental oxygen as tolerated  Patient will require ambulatory pulse oximetry testing 24-48 hours prior to discharge  Cw BIPAP qHS on settings 18/6 30%, RR16.   When close to discharge, will require overnight pulse oximetry testing and AM ABG to qualify for BiPAP.  BiPAP ordered to treat hypercapnia, CPAP will not treat hypercapnia.  Would favor Breztri over Trelegy prior to discharge as patient has difficulty with mechanism of Ellipta  Continue with Solu-Medrol  60 mg (~1mg/kg) IV daily today, then taper to 40mg  tomorrow.  Continue with doxycycline 100 mg twice daily for 10 doses (dose 4/10) in setting of increased sputum production, change in color of sputum and increased dyspnea.  Continue with PPI in setting of GERD/hiatal hernia    24 Hour Events : Patient delirious overnight, required BiPAP 18/6 30%, . In b/l mits, 1:1 at bedside.     Objective :  Temp:  [97.2 °F (36.2 °C)-97.6 °F (36.4 °C)] 97.4 °F (36.3 °C)  HR:  [78-89] 88  BP: ()/(58-74) 159/58  Resp:  [20-22] 20  SpO2:  [86 %-98 %] 98 %  O2 Device: BiPAP  Nasal Cannula O2 Flow Rate (L/min):  [3 L/min-4 L/min] 4 L/min    Physical Exam  HENT:      Head: Normocephalic.   Cardiovascular:      Rate and Rhythm: Normal rate and regular rhythm.   Pulmonary:      Effort: Pulmonary effort is normal. No tachypnea, bradypnea, accessory muscle usage or respiratory distress.      Breath sounds: No decreased breath sounds, wheezing, rhonchi or rales.      Comments: On BiPAP  Abdominal:      Palpations: Abdomen is soft.   Musculoskeletal:         General: Normal range of motion.      Cervical back: Normal range of motion.      Right lower leg: No edema.      Left lower leg: No edema.   Skin:     General: Skin is warm.      Capillary Refill: Capillary refill takes less than 2 seconds.   Neurological:      General: No focal deficit present.      Mental Status: She is alert.   Psychiatric:         Mood and Affect: Mood normal.           Lab Results: I have reviewed the following results:   .     04/03/25  0243   WBC 14.09*   HGB 10.9*   HCT 36.3   *   SODIUM 128*   K 4.9   CL 91*   CO2 31   BUN 24   CREATININE 0.86   GLUC 221*     ABG:   .     04/03/25  0514   PHART 7.275*   MOR1FME 59.1*   PO2ART 75.4   PDX9MBO 26.8   BEART -0.8       Imaging Results Review: I personally reviewed the following image studies in PACS and associated radiology reports: CT chest. My interpretation of the radiology images/reports is: As above.  Other Study Results Review: Other studies  reviewed include: EKG as above  PFT Results Reviewed:      Spirometry in office:  Severe airflow limitation per Dr Kelby Meade DO PGY-V  Pulmonary Critical Care Fellow  [facility]

## 2025-04-03 NOTE — QUICK NOTE
Nursing reached out that patient with increased SOB and hypoxia.  Pt evaluated at the bedside. Awake, but appears distracted. Does respond to voice, not answering other questions. Pt with increased work of breathing. Mild expiratory wheezing in upper lobes, otherwise reduced breath sounds throughout. HRRR. Currently on 6L simple face mask, O2 97%.  CXR obtained. Appears to have pulm vascular congestion R>L, official read pending.  VBG obtained   pCO2 79.1  pH 7.225  Procalc decreasing this AM, now 0.16 from 0.37  Pt placed on the BiPAP due to increased work of breathing and hypercarbia.   Albuterol neb ordered. Will give additional 40 mg dose IV steroids now.  Pt refused oral dose of doxycycline last evening. Dose of IV doxycycline ordered now.  One dose of 40 mg IV lasix ordered  Discussed with ICU provider, pt evaluated at the bedside, recommendations appreciated  Continue BiPAP for now  Repeat ABG ordered for 0500

## 2025-04-03 NOTE — ASSESSMENT & PLAN NOTE
Severe COPD, Group E  Patient noncompliant with Trelegy as she has been having difficulty using this inhaler.   Spirometry in office with severe obstruction  She has had no difficulty with HFA such as albuterol.  Productive cough, increased sputum production, SOB  Has been using albuterol more than 3 times per day.  Likely in setting of acute bronchitis.  Sputum culture: 2+ epithelial cells likely indicating contamination

## 2025-04-03 NOTE — PROGRESS NOTES
Progress Note - Hospitalist   Name: Emily Berrios 79 y.o. female I MRN: [id number]  Unit/Bed#: [place] [address] -01 I Date of Admission: 4/1/2025   Date of Service: 4/3/2025 I Hospital Day: 2    Assessment & Plan  COPD with acute exacerbation (HCC)  Patient presented to hospital with complaint of shortness of breath and coughing, found to be wheezing with hypoxia  Admitted for COPD exacerbation  Continue scheduled DuoNebs, respiratory protocol  Started scheduled budesonide and formoterol  Appreciate pulmonology recommendations  Continue Mucinex and doxycycline-switch doxycycline to IV as she was refusing oral  CTA chest results noted, bronchiolitis without pneumonia  Overnight developed worsening hypoxia and confusion, hypercarbia noted on ABG and placed on BiPAP  Appears to be tolerating BiPAP at this time, continue, can wean to nasal cannula when more awake  Acute hypoxemic respiratory failure (HCC)  Patient hypoxic to 80% at time of presentation  Secondary to COPD saturation  Was stable on 3 L nasal cannula yesterday but developed worsening shortness of breath, confusion and hypoxia overnight and was placed on BiPAP  Continue BiPAP for now  Appears more alert and awake on the BiPAP this morning, suspect we can wean back to nasal cannula later today  Appreciate pulmonology recommendations  Hypomagnesemia  Magnesium 1.0 at time of admission  Repleted orally and IV, continue oral twice daily  Stable  Chronic coronary artery disease  Continue Plavix, Crestor, bisoprolol  Glaucoma  Continue eyedrops  Hyperlipidemia  Continue statin  Cognitive decline  Fall/aspiration precautions  Abnormal EKG  Likely from profound hypomagnesemia  Concern for V. tach overnight, reviewed telemetry today, V. tach not noted in all leads, likely artifact  Repeat EKG with improvement  Monitored on telemetry, has since been discontinued  Acute metabolic encephalopathy  Patient developed acute onset hallucinations, paranoia and psychosis  overnight  Does have prior history of hospital dosed delirium  Attempt to limit steroids, will dose in the morning rather than the evening  Continue antibiotic treatment  Will start low-dose at bedtime Seroquel tonight as needed and monitor response  Likely component of hypercapnia, monitor with BiPAP  Family also notes patient continues to drink alcohol daily, placed on CIWA protocol  Hyponatremia  Sodium continues to fall to 128 today  Urine studies reviewed  Did require IV diuretics overnight for hypoxia  Continue to encourage oral intake  Monitor daily weights  Check labs daily  Abnormal CT of the chest      VTE Pharmacologic Prophylaxis:   High Risk (Score >/= 5) - Pharmacological DVT Prophylaxis Ordered: enoxaparin (Lovenox). Sequential Compression Devices Ordered.    Mobility:   Basic Mobility Inpatient Raw Score: 22  JH-HLM Goal: 7: Walk 25 feet or more  JH-HLM Achieved: 7: Walk 25 feet or more  JH-HLM Goal achieved. Continue to encourage appropriate mobility.    Patient Centered Rounds: I performed bedside rounds with nursing staff today.   Discussions with Specialists or Other Care Team Provider: none    Education and Discussions with Family / Patient: Updated  (son) via phone.    Current Length of Stay: 2 day(s)  Current Patient Status: Inpatient   Certification Statement: The patient will continue to require additional inpatient hospital stay due to COPD exacerbation with acute hypoxia requiring BiPAP  Discharge Plan: Anticipate discharge in 48-72 hrs to home.    Code Status: Level 1 - Full Code    Subjective   Patient seen and examined at bedside.  Required BiPAP placement overnight for hypoxia.  Does not her head yes when asked if she is feeling okay.  Shrugs her shoulders when asked if she feels better on the BiPAP compared to the nasal cannula.    Objective :  Temp:  [97.2 °F (36.2 °C)-97.6 °F (36.4 °C)] 97.4 °F (36.3 °C)  HR:  [78-89] 88  BP: ()/(58-74) 159/58  Resp:  [20-22]  20  SpO2:  [86 %-98 %] 98 %  O2 Device: BiPAP  Nasal Cannula O2 Flow Rate (L/min):  [3 L/min-4 L/min] 4 L/min    Body mass index is 27.91 kg/m².     Input and Output Summary (last 24 hours):     Intake/Output Summary (Last 24 hours) at 4/3/2025 0836  Last data filed at 4/3/2025 0817  Gross per 24 hour   Intake 720 ml   Output 1225 ml   Net -505 ml       Physical Exam  Vitals reviewed.   Constitutional:       Appearance: She is ill-appearing.      Comments: bipap   HENT:      Head: Normocephalic and atraumatic.   Eyes:      General: No scleral icterus.     Conjunctiva/sclera: Conjunctivae normal.   Cardiovascular:      Rate and Rhythm: Normal rate and regular rhythm.      Heart sounds: No murmur heard.  Pulmonary:      Breath sounds: Wheezing present.   Abdominal:      General: Bowel sounds are normal. There is no distension.      Palpations: Abdomen is soft.      Tenderness: There is no abdominal tenderness.   Musculoskeletal:      Cervical back: Neck supple.      Right lower leg: No edema.      Left lower leg: No edema.   Skin:     General: Skin is warm and dry.   Neurological:      Mental Status: She is alert.      Comments: Opens eyes when spoken to, nods head yes and no on BiPAP   Psychiatric:         Mood and Affect: Mood normal.         Behavior: Behavior normal.           Lines/Drains:  Lines/Drains/Airways       Active Status       Name Placement date Placement time Site Days    External Urinary Catheter 04/03/25  0500  -- less than 1                            Lab Results: I have reviewed the following results:   Results from last 7 days   Lab Units 04/03/25  0243 04/02/25  0236 04/01/25  0152   WBC Thousand/uL 14.09*   < > 15.03*   HEMOGLOBIN g/dL 10.9*   < > 12.0   HEMATOCRIT % 36.3   < > 38.2   PLATELETS Thousands/uL 417*   < > 311   SEGS PCT %  --   --  74   LYMPHO PCT %  --   --  15   MONO PCT %  --   --  10   EOS PCT %  --   --  1    < > = values in this interval not displayed.     Results from last 7  days   Lab Units 04/03/25  0243 04/02/25  0236 04/01/25  0152   SODIUM mmol/L 128*   < > 134*   POTASSIUM mmol/L 4.9   < > 3.5   CHLORIDE mmol/L 91*   < > 94*   CO2 mmol/L 31   < > 29   BUN mg/dL 24   < > 9   CREATININE mg/dL 0.86   < > 0.80   ANION GAP mmol/L 6   < > 11   CALCIUM mg/dL 9.5   < > 9.1   ALBUMIN g/dL  --   --  4.3   TOTAL BILIRUBIN mg/dL  --   --  0.68   ALK PHOS U/L  --   --  61   ALT U/L  --   --  14   AST U/L  --   --  13   GLUCOSE RANDOM mg/dL 221*   < > 179*    < > = values in this interval not displayed.     Results from last 7 days   Lab Units 04/01/25  0149   INR  1.10             Results from last 7 days   Lab Units 04/03/25  0243 04/01/25  1159 04/01/25  0149   LACTIC ACID mmol/L  --   --  1.0   PROCALCITONIN ng/ml 0.16 0.37* 0.33*       Recent Cultures (last 7 days):   Results from last 7 days   Lab Units 04/02/25  1230 04/02/25  1129 04/01/25  0216 04/01/25  0203   BLOOD CULTURE   --   --  No Growth at 48 hrs. No Growth at 48 hrs.   GRAM STAIN RESULT   --  3+ Polys*  3+ Gram positive cocci in pairs*  4+ Gram negative coccobacilli*  1+ Gram negative diplococci*  2+ Epithelial cells per low power field*  --   --    LEGIONELLA URINARY ANTIGEN  Negative  --   --   --              Last 24 Hours Medication List:     Current Facility-Administered Medications:     acetaminophen (TYLENOL) tablet 650 mg, Q6H PRN    albuterol (PROVENTIL HFA,VENTOLIN HFA) inhaler 2 puff, Q4H PRN    ALPRAZolam (XANAX) tablet 0.5 mg, HS PRN    amLODIPine (NORVASC) tablet 5 mg, BID    bisoprolol (ZEBETA) tablet 10 mg, Daily    budesonide (PULMICORT) inhalation solution 0.5 mg, Q12H    clopidogrel (PLAVIX) tablet 75 mg, Daily    cyanocobalamin (VITAMIN B-12) tablet 1,000 mcg, Daily    doxycycline (VIBRAMYCIN) 100 mg in sodium chloride 0.9 % 100 mL IVPB, Q12H, Last Rate: 100 mg (04/03/25 0341)    enoxaparin (LOVENOX) subcutaneous injection 40 mg, Daily    estrogens, conjugated (Premarin) vaginal cream 0.5 g, Once per  day on Monday Wednesday Friday    fluticasone (FLONASE) 50 mcg/act nasal spray 2 spray, BID    formoterol (PERFOROMIST) nebulizer solution 20 mcg, Q12H    guaiFENesin (MUCINEX) 12 hr tablet 600 mg, Q12H KENIA    hydrOXYzine HCL (ATARAX) tablet 25 mg, Q6H PRN    ipratropium-albuterol (DUO-NEB) 0.5-2.5 mg/3 mL inhalation solution 3 mL, TID    ipratropium-albuterol (DUO-NEB) 0.5-2.5 mg/3 mL inhalation solution 3 mL, Q6H PRN    loratadine (CLARITIN) tablet 10 mg, Daily    magnesium Oxide (MAG-OX) tablet 400 mg, BID    methylPREDNISolone sodium succinate (Solu-MEDROL) injection 60 mg, Daily    neomycin-polymyxin-dexamethasone (MAXITROL) ophthalmic suspension 1 drop, TID    pantoprazole (PROTONIX) EC tablet 40 mg, Daily Before Breakfast    pravastatin (PRAVACHOL) tablet 80 mg, Daily With Dinner    QUEtiapine (SEROquel) tablet 12.5 mg, HS PRN    Administrative Statements   Today, Patient Was Seen By: Kyung Anne PA-C      **Please Note: This note may have been constructed using a voice recognition system.**

## 2025-04-03 NOTE — ASSESSMENT & PLAN NOTE
Sodium continues to fall to 128 today  Urine studies reviewed  Did require IV diuretics overnight for hypoxia  Continue to encourage oral intake  Monitor daily weights  Check labs daily

## 2025-04-03 NOTE — ASSESSMENT & PLAN NOTE
Patient presented to hospital with complaint of shortness of breath and coughing, found to be wheezing with hypoxia  Admitted for COPD exacerbation  Continue scheduled DuoNebs, respiratory protocol  Started scheduled budesonide and formoterol  Appreciate pulmonology recommendations  Continue Mucinex and doxycycline-switch doxycycline to IV as she was refusing oral  CTA chest results noted, bronchiolitis without pneumonia  Overnight developed worsening hypoxia and confusion, hypercarbia noted on ABG and placed on BiPAP  Appears to be tolerating BiPAP at this time, continue, can wean to nasal cannula when more awake

## 2025-04-03 NOTE — PHYSICAL THERAPY NOTE
PHYSICAL THERAPY NOTE      Patient Name: Emily Berrios  Today's Date: 4/3/2025    Attempted to see pt for PT treatment, pt reporting increased difficulty breathing today in addition to increased fatigue. Not able to participate in therapy at this time. Will continue to treat as appropriate.    Mick Pathak PT, DPT

## 2025-04-03 NOTE — PLAN OF CARE
Problem: Potential for Falls  Goal: Patient will remain free of falls  Description: INTERVENTIONS:- Educate patient/family on patient safety including physical limitations- Instruct patient to call for assistance with activity - Consult OT/PT to assist with strengthening/mobility - Keep Call bell within reach- Keep bed low and locked with side rails adjusted as appropriate- Keep care items and personal belongings within reach- Initiate and maintain comfort rounds- Make Fall Risk Sign visible to staff- Offer Toileting every 2 Hours, in advance of need- Initiate/Maintain bed alarm- Obtain necessary fall risk management equipment: walker- Apply yellow socks and bracelet for high fall risk patients- Consider moving patient to room near nurses station  Outcome: Progressing

## 2025-04-03 NOTE — ASSESSMENT & PLAN NOTE
Likely from profound hypomagnesemia  Concern for V. tach overnight, reviewed telemetry today, V. tach not noted in all leads, likely artifact  Repeat EKG with improvement  Monitored on telemetry, has since been discontinued

## 2025-04-03 NOTE — CASE MANAGEMENT
Case Management Discharge Planning Note    Patient name Emily Berrios  [facility] 2 M* MRN [id number]  : 1945 Date 4/3/2025       Current Admission Date: 2025  Current Admission Diagnosis:COPD with acute exacerbation (HCC)   Patient Active Problem List    Diagnosis Date Noted Date Diagnosed    Acute hypoxic respiratory failure (HCC) 2025     Gastroesophageal reflux disease without esophagitis 2025     Acute metabolic encephalopathy 2025     Abnormal CT of the chest 2025     Acute on chronic respiratory failure with hypercapnia (HCC) 2025     Abnormal EKG 2025     Pain and swelling of left knee 10/10/2024     Uterovaginal prolapse, incomplete 2024     Female stress incontinence 2024     Female cystocele 2024     Rectocele 2024     Acute encephalopathy 2024     Hypoxia 2024     Insomnia 2024     Alcohol use 2024     Closed compression fracture of L1 vertebra (HCC) 2024     Closed nondisplaced fracture of surgical neck of left humerus 2024     Complete uterovaginal prolapse 2023     Chronic rhinitis 10/04/2023     Ambulatory dysfunction 2023     Cognitive decline 03/10/2023     Hyponatremia 2023     Hypomagnesemia 2023     Hydronephrosis, bilateral 2023     Cystocele with prolapse 2023     COPD with acute exacerbation (HCC) 2022     Hx of CABG 2018     Hx stent of SVG to the RCA 2018     Asthma 2014     Chronic coronary artery disease 2014     Hiatal hernia 2014     Osteoarthritis 2014     Glaucoma 2013     Hyperlipidemia 2012     Primary hypertension 2012       LOS (days): 2  Geometric Mean LOS (GMLOS) (days):   Days to GMLOS:     OBJECTIVE:  Risk of Unplanned Readmission Score: 19.65         Current admission status: Inpatient   Preferred Pharmacy:   [id number] - [id number]  [address]  [place] [address]  Phone: [phone] Fax: [phone]    [facility] [place] - [id number]  [address]  [address]  [place] [id number]  Phone: [phone] Fax: [phone]    Primary Care Provider: Ramsey Gtz MD    Primary Insurance: [employer] [facility]  Secondary Insurance: [facility]    DISCHARGE DETAILS:    Additional Comments: Patient reviewed during care coordination rounds, pt required bipap overnight and will likely require an additional 48-72 hours inpatient. Plan remains for home with OPPT and daughter as caregiver. CM department to remain available for additional discharge concerns or needs.

## 2025-04-03 NOTE — PLAN OF CARE
Problem: Potential for Falls  Goal: Patient will remain free of falls  Description: INTERVENTIONS:- Educate patient/family on patient safety including physical limitations- Instruct patient to call for assistance with activity - Consult OT/PT to assist with strengthening/mobility - Keep Call bell within reach- Keep bed low and locked with side rails adjusted as appropriate- Keep care items and personal belongings within reach- Initiate and maintain comfort rounds- Make Fall Risk Sign visible to staff- Offer Toileting every 2 Hours, in advance of need- Initiate/Maintain bed alarm- Obtain necessary fall risk management equipment: walker- Apply yellow socks and bracelet for high fall risk patients- Consider moving patient to room near nurses station  Outcome: Progressing     Problem: PAIN - ADULT  Goal: Verbalizes/displays adequate comfort level or baseline comfort level  Description: Interventions:- Encourage patient to monitor pain and request assistance- Assess pain using appropriate pain scale- Administer analgesics based on type and severity of pain and evaluate response- Implement non-pharmacological measures as appropriate and evaluate response- Consider cultural and social influences on pain and pain management- Notify physician/advanced practitioner if interventions unsuccessful or patient reports new pain  Outcome: Progressing     Problem: INFECTION - ADULT  Goal: Absence or prevention of progression during hospitalization  Description: INTERVENTIONS:- Assess and monitor for signs and symptoms of infection- Monitor lab/diagnostic results- Monitor all insertion sites, i.e. indwelling lines, tubes, and drains- Monitor endotracheal if appropriate and nasal secretions for changes in amount and color- [place] appropriate cooling/warming therapies per order- Administer medications as ordered- Instruct and encourage patient and family to use good hand hygiene technique- Identify and instruct in appropriate  isolation precautions for identified infection/condition  Outcome: Progressing  Goal: Absence of fever/infection during neutropenic period  Description: INTERVENTIONS:- Monitor WBC  Outcome: Progressing     Problem: SAFETY ADULT  Goal: Maintain or return to baseline ADL function  Description: INTERVENTIONS:-  Assess patient's ability to carry out ADLs; assess patient's baseline for ADL function and identify physical deficits which impact ability to perform ADLs (bathing, care of mouth/teeth, toileting, grooming, dressing, etc.)- Assess/evaluate cause of self-care deficits - Assess range of motion- Assess patient's mobility; develop plan if impaired- Assess patient's need for assistive devices and provide as appropriate- Encourage maximum independence but intervene and supervise when necessary- Involve family in performance of ADLs- Assess for home care needs following discharge - Consider OT consult to assist with ADL evaluation and planning for discharge- Provide patient education as appropriate  Outcome: Progressing  Goal: Maintains/Returns to pre admission functional level  Description: INTERVENTIONS:- Perform AM-PAC 6 Click Basic Mobility/ Daily Activity assessment daily.- Set and communicate daily mobility goal to care team and patient/family/caregiver. - Collaborate with rehabilitation services on mobility goals if consulted- Perform Range of Motion 3 times a day.- Reposition patient every 2 hours.- Dangle patient 3 times a day- Stand patient 3 times a day- Ambulate patient 3 times a day- Out of bed to chair 3 times a day - Out of bed for meals 3 times a day- Out of bed for toileting- Record patient progress and toleration of activity level   Outcome: Progressing     Problem: DISCHARGE PLANNING  Goal: Discharge to home or other facility with appropriate resources  Description: INTERVENTIONS:- Identify barriers to discharge w/patient and caregiver- Arrange for needed discharge resources and transportation as  appropriate- Identify discharge learning needs (meds, wound care, etc.)- Arrange for interpretive services to assist at discharge as needed- Refer to Case Management Department for coordinating discharge planning if the patient needs post-hospital services based on physician/advanced practitioner order or complex needs related to functional status, cognitive ability, or social support system  Outcome: Progressing     Problem: Knowledge Deficit  Goal: Patient/family/caregiver demonstrates understanding of disease process, treatment plan, medications, and discharge instructions  Description: Complete learning assessment and assess knowledge base.Interventions:- Provide teaching at level of understanding- Provide teaching via preferred learning methods  Outcome: Progressing     Problem: CARDIOVASCULAR - ADULT  Goal: Maintains optimal cardiac output and hemodynamic stability  Description: INTERVENTIONS:- Monitor I/O, vital signs and rhythm- Monitor for S/S and trends of decreased cardiac output- Administer and titrate ordered vasoactive medications to optimize hemodynamic stability- Assess quality of pulses, skin color and temperature- Assess for signs of decreased coronary artery perfusion- Instruct patient to report change in severity of symptoms  Outcome: Progressing  Goal: Absence of cardiac dysrhythmias or at baseline rhythm  Description: INTERVENTIONS:- Continuous cardiac monitoring, vital signs, obtain 12 lead EKG if ordered- Administer antiarrhythmic and heart rate control medications as ordered- Monitor electrolytes and administer replacement therapy as ordered  Outcome: Progressing     Problem: RESPIRATORY - ADULT  Goal: Achieves optimal ventilation and oxygenation  Description: INTERVENTIONS:- Assess for changes in respiratory status- Assess for changes in mentation and behavior- Position to facilitate oxygenation and minimize respiratory effort- Oxygen administered by appropriate delivery if ordered-  Initiate smoking cessation education as indicated- Encourage broncho-pulmonary hygiene including cough, deep breathe, Incentive Spirometry- Assess the need for suctioning and aspirate as needed- Assess and instruct to report SOB or any respiratory difficulty- Respiratory Therapy support as indicated  Outcome: Progressing     Problem: CONFUSION/THOUGHT DISTURBANCE  Goal: Thought disturbances (confusion, delirium, depression, dementia or psychosis) are managed to maintain or return to baseline mental status and functional level  Description: INTERVENTIONS:- Assess for possible contributors to  thought disturbance, including but not limited to medications, infection, impaired vision or hearing, underlying metabolic abnormalities, dehydration, respiratory compromise,  psychiatric diagnoses and notify attending PHYSICAN/AP- Monitor and intervene to maintain adequate nutrition, hydration, elimination, sleep and activity- Decrease environmental stimuli, including noise as appropriate.- Provide frequent contacts to provide refocusing, direction and reassurance as needed. Approach patient calmly with eye contact and at their level.- Standard high risk fall precautions, aspiration precautions and other safety measures, as indicated- If delirium suspected, notify physician/AP of change in condition and request immediate in-person evaluation- Pursue consults as appropriate including Geriatric (campus dependent), OT for cognitive evaluation/activity planning, psychiatric, pastoral care, etc.  Outcome: Progressing

## 2025-04-04 LAB — GLUCOSE SERPL-MCNC: 235 MG/DL (ref 65–140)

## 2025-04-04 PROCEDURE — 97535 SELF CARE MNGMENT TRAINING: CPT

## 2025-04-04 PROCEDURE — 94760 N-INVAS EAR/PLS OXIMETRY 1: CPT

## 2025-04-04 PROCEDURE — 99232 SBSQ HOSP IP/OBS MODERATE 35: CPT | Performed by: PHYSICIAN ASSISTANT

## 2025-04-04 PROCEDURE — 94640 AIRWAY INHALATION TREATMENT: CPT

## 2025-04-04 PROCEDURE — 94660 CPAP INITIATION&MGMT: CPT

## 2025-04-04 PROCEDURE — 99232 SBSQ HOSP IP/OBS MODERATE 35: CPT | Performed by: INTERNAL MEDICINE

## 2025-04-04 PROCEDURE — 82948 REAGENT STRIP/BLOOD GLUCOSE: CPT

## 2025-04-04 RX ORDER — CEFDINIR 300 MG/1
300 CAPSULE ORAL EVERY 12 HOURS SCHEDULED
Status: DISCONTINUED | OUTPATIENT
Start: 2025-04-04 | End: 2025-04-09 | Stop reason: HOSPADM

## 2025-04-04 RX ORDER — GUAIFENESIN 100 MG/5ML
400 SOLUTION ORAL EVERY 6 HOURS PRN
Status: DISCONTINUED | OUTPATIENT
Start: 2025-04-04 | End: 2025-04-09 | Stop reason: HOSPADM

## 2025-04-04 RX ADMIN — METHYLPREDNISOLONE SODIUM SUCCINATE 40 MG: 40 INJECTION, POWDER, FOR SOLUTION INTRAMUSCULAR; INTRAVENOUS at 09:14

## 2025-04-04 RX ADMIN — AMLODIPINE BESYLATE 5 MG: 5 TABLET ORAL at 10:32

## 2025-04-04 RX ADMIN — ENOXAPARIN SODIUM 40 MG: 40 INJECTION SUBCUTANEOUS at 10:40

## 2025-04-04 RX ADMIN — BUDESONIDE 0.5 MG: 0.5 INHALANT RESPIRATORY (INHALATION) at 20:25

## 2025-04-04 RX ADMIN — BUDESONIDE 0.5 MG: 0.5 INHALANT RESPIRATORY (INHALATION) at 07:18

## 2025-04-04 RX ADMIN — IPRATROPIUM BROMIDE AND ALBUTEROL SULFATE 3 ML: 2.5; .5 SOLUTION RESPIRATORY (INHALATION) at 14:02

## 2025-04-04 RX ADMIN — NEOMYCIN SULFATE, POLYMYXIN B SULFATE AND DEXAMETHASONE 1 DROP: 3.5; 10000; 1 SUSPENSION/ DROPS OPHTHALMIC at 10:39

## 2025-04-04 RX ADMIN — LORATADINE 10 MG: 10 TABLET ORAL at 10:31

## 2025-04-04 RX ADMIN — FORMOTEROL FUMARATE DIHYDRATE 20 MCG: 20 SOLUTION RESPIRATORY (INHALATION) at 07:18

## 2025-04-04 RX ADMIN — IPRATROPIUM BROMIDE AND ALBUTEROL SULFATE 3 ML: 2.5; .5 SOLUTION RESPIRATORY (INHALATION) at 07:18

## 2025-04-04 RX ADMIN — SODIUM CHLORIDE 100 MG: 9 INJECTION, SOLUTION INTRAVENOUS at 04:27

## 2025-04-04 RX ADMIN — PRAVASTATIN SODIUM 80 MG: 80 TABLET ORAL at 17:44

## 2025-04-04 RX ADMIN — IPRATROPIUM BROMIDE AND ALBUTEROL SULFATE 3 ML: 2.5; .5 SOLUTION RESPIRATORY (INHALATION) at 20:25

## 2025-04-04 RX ADMIN — BISOPROLOL FUMARATE 10 MG: 5 TABLET ORAL at 10:32

## 2025-04-04 RX ADMIN — Medication 1000 MCG: at 10:31

## 2025-04-04 RX ADMIN — AMLODIPINE BESYLATE 5 MG: 5 TABLET ORAL at 17:44

## 2025-04-04 RX ADMIN — FORMOTEROL FUMARATE DIHYDRATE 20 MCG: 20 SOLUTION RESPIRATORY (INHALATION) at 20:25

## 2025-04-04 RX ADMIN — Medication 400 MG: at 10:33

## 2025-04-04 RX ADMIN — FLUTICASONE PROPIONATE 2 SPRAY: 50 SPRAY, METERED NASAL at 10:39

## 2025-04-04 RX ADMIN — CLOPIDOGREL 75 MG: 75 TABLET ORAL at 10:31

## 2025-04-04 RX ADMIN — Medication 400 MG: at 17:44

## 2025-04-04 NOTE — PROGRESS NOTES
Progress Note - Hospitalist   Name: Emily Berrios 79 y.o. female I MRN: [id number]  Unit/Bed#: [place] [address] -01 I Date of Admission: 4/1/2025   Date of Service: 4/4/2025 I Hospital Day: 3    Assessment & Plan  COPD with acute exacerbation (HCC)  Patient presented to hospital with complaint of shortness of breath and coughing, found to be wheezing with hypoxia  Admitted for COPD exacerbation  Continue scheduled DuoNebs, respiratory protocol  Started scheduled budesonide and formoterol  Appreciate pulmonology recommendations  Continue Mucinex and doxycycline-switch doxycycline to IV as she was refusing oral  CTA chest results noted, bronchiolitis without pneumonia  Required temporary BiPAP secondary to worsening hypoxia with confusion, suspected secondary to hypercarbia noted on ABG  Appears to be tolerating BiPAP at this time, continue, can wean to nasal cannula when more awake  Cultures growing haemophilus influenzae, procalcitonin downtrending on doxycycline, can continue.  Will discuss with pulmonology.  Acute on chronic respiratory failure with hypercapnia (HCC)  Patient hypoxic to 80% at time of presentation  Secondary to COPD saturation  Was stable on 3 L nasal cannula yesterday but developed worsening shortness of breath, confusion and hypoxia overnight and was placed on BiPAP  Continue BiPAP PRN  Appears more alert and awake on the BiPAP this morning, suspect we can wean back to nasal cannula later today  Appreciate pulmonology recommendations  Hypomagnesemia  Magnesium 1.0 at time of admission  Repleted orally and IV, continue oral twice daily  Stable  Chronic coronary artery disease  Continue Plavix, Crestor, bisoprolol  Glaucoma  Continue eyedrops  Hyperlipidemia  Continue statin  Cognitive decline  Fall/aspiration precautions  Abnormal EKG  Likely from profound hypomagnesemia  Concern for V. tach overnight, reviewed telemetry today, V. tach not noted in all leads, likely artifact  Repeat EKG with  improvement  Monitored on telemetry, has since been discontinued  Acute metabolic encephalopathy  Patient developed acute onset hallucinations, paranoia and psychosis overnight  Does have prior history of hospital dosed delirium  Attempt to limit steroids, will dose in the morning rather than the evening  Continue antibiotic treatment  Will start low-dose at bedtime Seroquel tonight as needed and monitor response  Likely component of hypercapnia, monitor with BiPAP  Family also notes patient continues to drink alcohol daily, placed on CIWA protocol  Hyponatremia  Sodium continues to fall to 128 today  Urine studies reviewed  Did require IV diuretics overnight for hypoxia  Continue to encourage oral intake  Monitor daily weights  Check labs daily  Acute hypoxic respiratory failure (HCC)  Secondary to H. influenzae pneumonia  Currently on 2 L, continue to wean    VTE Pharmacologic Prophylaxis:   Moderate Risk (Score 3-4) - Pharmacological DVT Prophylaxis Ordered: enoxaparin (Lovenox).    Mobility:   Basic Mobility Inpatient Raw Score: 20  -HLM Goal: 6: Walk 10 steps or more  JH-HLM Achieved: 1: Laying in bed  JH-HLM Goal NOT achieved. Continue with multidisciplinary rounding and encourage appropriate mobility to improve upon JH-HLM goals.    Patient Centered Rounds: I performed bedside rounds with nursing staff today.   Discussions with Specialists or Other Care Team Provider: None    Education and Discussions with Family / Patient: Patient declined call to .     Current Length of Stay: 3 day(s)  Current Patient Status: Inpatient   Certification Statement: The patient will continue to require additional inpatient hospital stay due to IV antibiotics  Discharge Plan: Anticipate discharge in 48-72 hrs to home.    Code Status: Level 1 - Full Code    Subjective   No acute events overnight.  Patient was examined at bedside in the morning.  She reports feeling a little bit better.  Still complains of cough  and malaise.    Objective :  Temp:  [97.8 °F (36.6 °C)-98.2 °F (36.8 °C)] 97.8 °F (36.6 °C)  HR:  [] 101  BP: (137-155)/(76-91) 155/81  Resp:  [20] 20  SpO2:  [84 %-100 %] 93 %  O2 Device: Nasal cannula  Nasal Cannula O2 Flow Rate (L/min):  [2 L/min-4 L/min] 2 L/min    Body mass index is 26.66 kg/m².     Input and Output Summary (last 24 hours):     Intake/Output Summary (Last 24 hours) at 4/4/2025 1207  Last data filed at 4/3/2025 1738  Gross per 24 hour   Intake --   Output 750 ml   Net -750 ml       Physical Exam  Vitals and nursing note reviewed.   Constitutional:       Appearance: Normal appearance.   HENT:      Head: Normocephalic and atraumatic.      Mouth/Throat:      Mouth: Mucous membranes are moist.      Pharynx: Oropharynx is clear. No oropharyngeal exudate.   Eyes:      Extraocular Movements: Extraocular movements intact.   Cardiovascular:      Rate and Rhythm: Normal rate and regular rhythm.      Pulses: Normal pulses.      Heart sounds: Normal heart sounds. No murmur heard.     No friction rub. No gallop.   Pulmonary:      Effort: Pulmonary effort is normal. No respiratory distress.      Breath sounds: No stridor. Rhonchi and rales present. No wheezing.   Abdominal:      General: Abdomen is flat. Bowel sounds are normal. There is no distension.      Palpations: Abdomen is soft.      Tenderness: There is no abdominal tenderness.   Musculoskeletal:      Right lower leg: No edema.      Left lower leg: No edema.   Skin:     General: Skin is warm and dry.   Neurological:      General: No focal deficit present.      Mental Status: She is alert and oriented to person, place, and time.           Lines/Drains:  Lines/Drains/Airways       Active Status       Name Placement date Placement time Site Days    External Urinary Catheter 04/03/25  0500  -- 1                            Lab Results: I have reviewed the following results:   Results from last 7 days   Lab Units 04/03/25  0243 04/02/25  6022  04/01/25  0152   WBC Thousand/uL 14.09*   < > 15.03*   HEMOGLOBIN g/dL 10.9*   < > 12.0   HEMATOCRIT % 36.3   < > 38.2   PLATELETS Thousands/uL 417*   < > 311   SEGS PCT %  --   --  74   LYMPHO PCT %  --   --  15   MONO PCT %  --   --  10   EOS PCT %  --   --  1    < > = values in this interval not displayed.     Results from last 7 days   Lab Units 04/03/25  0243 04/02/25  0236 04/01/25  0152   SODIUM mmol/L 128*   < > 134*   POTASSIUM mmol/L 4.9   < > 3.5   CHLORIDE mmol/L 91*   < > 94*   CO2 mmol/L 31   < > 29   BUN mg/dL 24   < > 9   CREATININE mg/dL 0.86   < > 0.80   ANION GAP mmol/L 6   < > 11   CALCIUM mg/dL 9.5   < > 9.1   ALBUMIN g/dL  --   --  4.3   TOTAL BILIRUBIN mg/dL  --   --  0.68   ALK PHOS U/L  --   --  61   ALT U/L  --   --  14   AST U/L  --   --  13   GLUCOSE RANDOM mg/dL 221*   < > 179*    < > = values in this interval not displayed.     Results from last 7 days   Lab Units 04/01/25  0149   INR  1.10             Results from last 7 days   Lab Units 04/03/25  0243 04/01/25  1159 04/01/25  0149   LACTIC ACID mmol/L  --   --  1.0   PROCALCITONIN ng/ml 0.16 0.37* 0.33*       Recent Cultures (last 7 days):   Results from last 7 days   Lab Units 04/02/25  1230 04/02/25  1129 04/01/25  0216 04/01/25  0203   BLOOD CULTURE   --   --  No Growth at 72 hrs. No Growth at 72 hrs.   SPUTUM CULTURE   --  4+ Growth of Haemophilus influenzae*  --   --    GRAM STAIN RESULT   --  3+ Polys*  3+ Gram positive cocci in pairs*  4+ Gram negative coccobacilli*  1+ Gram negative diplococci*  2+ Epithelial cells per low power field*  --   --    LEGIONELLA URINARY ANTIGEN  Negative  --   --   --        Imaging Results Review: No pertinent imaging studies reviewed.  Other Study Results Review: No additional pertinent studies reviewed.    Last 24 Hours Medication List:     Current Facility-Administered Medications:     acetaminophen (TYLENOL) tablet 650 mg, Q6H PRN    albuterol (PROVENTIL HFA,VENTOLIN HFA) inhaler 2 puff,  Q4H PRN    ALPRAZolam (XANAX) tablet 0.5 mg, HS PRN    amLODIPine (NORVASC) tablet 5 mg, BID    bisoprolol (ZEBETA) tablet 10 mg, Daily    budesonide (PULMICORT) inhalation solution 0.5 mg, Q12H    clopidogrel (PLAVIX) tablet 75 mg, Daily    cyanocobalamin (VITAMIN B-12) tablet 1,000 mcg, Daily    doxycycline (VIBRAMYCIN) 100 mg in sodium chloride 0.9 % 100 mL IVPB, Q12H, Last Rate: 100 mg (04/04/25 0427)    enoxaparin (LOVENOX) subcutaneous injection 40 mg, Daily    estrogens, conjugated (Premarin) vaginal cream 0.5 g, Once per day on Monday Wednesday Friday    fluticasone (FLONASE) 50 mcg/act nasal spray 2 spray, BID    formoterol (PERFOROMIST) nebulizer solution 20 mcg, Q12H    guaiFENesin (MUCINEX) 12 hr tablet 600 mg, Q12H [place]    hydrOXYzine HCL (ATARAX) tablet 25 mg, Q6H PRN    ipratropium-albuterol (DUO-NEB) 0.5-2.5 mg/3 mL inhalation solution 3 mL, TID    ipratropium-albuterol (DUO-NEB) 0.5-2.5 mg/3 mL inhalation solution 3 mL, Q6H PRN    loratadine (CLARITIN) tablet 10 mg, Daily    magnesium Oxide (MAG-OX) tablet 400 mg, BID    methylPREDNISolone sodium succinate (Solu-MEDROL) injection 40 mg, Daily    neomycin-polymyxin-dexamethasone (MAXITROL) ophthalmic suspension 1 drop, TID    pantoprazole (PROTONIX) EC tablet 40 mg, Daily Before Breakfast    pravastatin (PRAVACHOL) tablet 80 mg, Daily With Dinner    QUEtiapine (SEROquel) tablet 12.5 mg, HS PRN    Administrative Statements   Today, Patient Was Seen By: Eros Bates PA-C      **Please Note: This note may have been constructed using a voice recognition system.**

## 2025-04-04 NOTE — PROGRESS NOTES
04/04/25 1000   Clinical Encounter Type   Visited With Patient   Routine Visit Introduction

## 2025-04-04 NOTE — PLAN OF CARE
Problem: Potential for Falls  Goal: Patient will remain free of falls  Description: INTERVENTIONS:- Educate patient/family on patient safety including physical limitations- Instruct patient to call for assistance with activity - Consult OT/PT to assist with strengthening/mobility - Keep Call bell within reach- Keep bed low and locked with side rails adjusted as appropriate- Keep care items and personal belongings within reach- Initiate and maintain comfort rounds- Make Fall Risk Sign visible to staff- Offer Toileting every 2 Hours, in advance of need- Initiate/Maintain bed alarm- Obtain necessary fall risk management equipment: walker- Apply yellow socks and bracelet for high fall risk patients- Consider moving patient to room near nurses station  Outcome: Progressing     Problem: PAIN - ADULT  Goal: Verbalizes/displays adequate comfort level or baseline comfort level  Description: Interventions:- Encourage patient to monitor pain and request assistance- Assess pain using appropriate pain scale- Administer analgesics based on type and severity of pain and evaluate response- Implement non-pharmacological measures as appropriate and evaluate response- Consider cultural and social influences on pain and pain management- Notify physician/advanced practitioner if interventions unsuccessful or patient reports new pain  Outcome: Progressing     Problem: INFECTION - ADULT  Goal: Absence or prevention of progression during hospitalization  Description: INTERVENTIONS:- Assess and monitor for signs and symptoms of infection- Monitor lab/diagnostic results- Monitor all insertion sites, i.e. indwelling lines, tubes, and drains- Monitor endotracheal if appropriate and nasal secretions for changes in amount and color- [place] appropriate cooling/warming therapies per order- Administer medications as ordered- Instruct and encourage patient and family to use good hand hygiene technique- Identify and instruct in appropriate  isolation precautions for identified infection/condition  Outcome: Progressing  Goal: Absence of fever/infection during neutropenic period  Description: INTERVENTIONS:- Monitor WBC  Outcome: Progressing     Problem: SAFETY ADULT  Goal: Maintain or return to baseline ADL function  Description: INTERVENTIONS:-  Assess patient's ability to carry out ADLs; assess patient's baseline for ADL function and identify physical deficits which impact ability to perform ADLs (bathing, care of mouth/teeth, toileting, grooming, dressing, etc.)- Assess/evaluate cause of self-care deficits - Assess range of motion- Assess patient's mobility; develop plan if impaired- Assess patient's need for assistive devices and provide as appropriate- Encourage maximum independence but intervene and supervise when necessary- Involve family in performance of ADLs- Assess for home care needs following discharge - Consider OT consult to assist with ADL evaluation and planning for discharge- Provide patient education as appropriate  Outcome: Progressing  Goal: Maintains/Returns to pre admission functional level  Description: INTERVENTIONS:- Perform AM-PAC 6 Click Basic Mobility/ Daily Activity assessment daily.- Set and communicate daily mobility goal to care team and patient/family/caregiver. - Collaborate with rehabilitation services on mobility goals if consulted- Perform Range of Motion 3 times a day.- Reposition patient every 2 hours.- Dangle patient 3 times a day- Stand patient 3 times a day- Ambulate patient 3 times a day- Out of bed to chair 3 times a day - Out of bed for meals 3 times a day- Out of bed for toileting- Record patient progress and toleration of activity level   Outcome: Progressing     Problem: DISCHARGE PLANNING  Goal: Discharge to home or other facility with appropriate resources  Description: INTERVENTIONS:- Identify barriers to discharge w/patient and caregiver- Arrange for needed discharge resources and transportation as  appropriate- Identify discharge learning needs (meds, wound care, etc.)- Arrange for interpretive services to assist at discharge as needed- Refer to Case Management Department for coordinating discharge planning if the patient needs post-hospital services based on physician/advanced practitioner order or complex needs related to functional status, cognitive ability, or social support system  Outcome: Progressing     Problem: Knowledge Deficit  Goal: Patient/family/caregiver demonstrates understanding of disease process, treatment plan, medications, and discharge instructions  Description: Complete learning assessment and assess knowledge base.Interventions:- Provide teaching at level of understanding- Provide teaching via preferred learning methods  Outcome: Progressing     Problem: CARDIOVASCULAR - ADULT  Goal: Maintains optimal cardiac output and hemodynamic stability  Description: INTERVENTIONS:- Monitor I/O, vital signs and rhythm- Monitor for S/S and trends of decreased cardiac output- Administer and titrate ordered vasoactive medications to optimize hemodynamic stability- Assess quality of pulses, skin color and temperature- Assess for signs of decreased coronary artery perfusion- Instruct patient to report change in severity of symptoms  Outcome: Progressing  Goal: Absence of cardiac dysrhythmias or at baseline rhythm  Description: INTERVENTIONS:- Continuous cardiac monitoring, vital signs, obtain 12 lead EKG if ordered- Administer antiarrhythmic and heart rate control medications as ordered- Monitor electrolytes and administer replacement therapy as ordered  Outcome: Progressing     Problem: RESPIRATORY - ADULT  Goal: Achieves optimal ventilation and oxygenation  Description: INTERVENTIONS:- Assess for changes in respiratory status- Assess for changes in mentation and behavior- Position to facilitate oxygenation and minimize respiratory effort- Oxygen administered by appropriate delivery if ordered-  Initiate smoking cessation education as indicated- Encourage broncho-pulmonary hygiene including cough, deep breathe, Incentive Spirometry- Assess the need for suctioning and aspirate as needed- Assess and instruct to report SOB or any respiratory difficulty- Respiratory Therapy support as indicated  Outcome: Progressing     Problem: CONFUSION/THOUGHT DISTURBANCE  Goal: Thought disturbances (confusion, delirium, depression, dementia or psychosis) are managed to maintain or return to baseline mental status and functional level  Description: INTERVENTIONS:- Assess for possible contributors to  thought disturbance, including but not limited to medications, infection, impaired vision or hearing, underlying metabolic abnormalities, dehydration, respiratory compromise,  psychiatric diagnoses and notify attending PHYSICAN/AP- Monitor and intervene to maintain adequate nutrition, hydration, elimination, sleep and activity- Decrease environmental stimuli, including noise as appropriate.- Provide frequent contacts to provide refocusing, direction and reassurance as needed. Approach patient calmly with eye contact and at their level.- [place] high risk fall precautions, aspiration precautions and other safety measures, as indicated- If delirium suspected, notify physician/AP of change in condition and request immediate in-person evaluation- Pursue consults as appropriate including Geriatric (campus dependent), OT for cognitive evaluation/activity planning, psychiatric, pastoral care, etc.  Outcome: Progressing     Problem: SAFETY,RESTRAINT: NV/NON-SELF DESTRUCTIVE BEHAVIOR  Goal: Remains free of harm/injury (restraint for non violent/non self-detsructive behavior)  Description: INTERVENTIONS:- Instruct patient/family regarding restraint use - Assess and monitor physiologic and psychological status - Provide interventions and comfort measures to meet assessed patient needs - Identify and implement measures to help patient  regain control- Assess readiness for release of restraint   Outcome: Progressing  Goal: Returns to optimal restraint-free functioning  Description: INTERVENTIONS:- Assess the patient's behavior and symptoms that indicate continued need for restraint- Identify and implement measures to help patient regain control- Assess readiness for release of restraint   Outcome: Progressing

## 2025-04-04 NOTE — OCCUPATIONAL THERAPY NOTE
Occupational Therapy Progress Note     Patient Name: Emily Berrios  Today's Date: 4/4/2025  Problem List  Principal Problem:    COPD with acute exacerbation (HCC)  Active Problems:    Chronic coronary artery disease    Glaucoma    Hiatal hernia    Hyperlipidemia    Hyponatremia    Hypomagnesemia    Cognitive decline    Acute on chronic respiratory failure with hypercapnia (HCC)    Abnormal EKG    Gastroesophageal reflux disease without esophagitis    Acute metabolic encephalopathy    Abnormal CT of the chest    Acute hypoxic respiratory failure (HCC)       04/04/25 1435   OT Last Visit   OT Visit Date 04/04/25   Note Type   Note Type Treatment   Pain Assessment   Pain Assessment Tool 0-10   Pain Score No Pain   Restrictions/Precautions   Weight Bearing Precautions Per Order No   Other Precautions Chair Alarm;Bed Alarm;Multiple lines;Telemetry;O2;Fall Risk  (4L O2)   ADL   Toileting Assistance  5  Supervision/Setup   Toileting Deficit Supervison/safety;Verbal cueing;Increased time to complete;Bedside commode   Toileting Comments Close S for toileting tasks.   Bed Mobility   Supine to Sit 6  Modified independent   Additional items HOB elevated;Bedrails;Increased time required   Sit to Supine 5  Supervision   Additional items Verbal cues;Increased time required   Transfers   Sit to Stand 5  Supervision   Additional items Assist x 1;Bedrails;Increased time required;Verbal cues;Other  (RW)   Stand to Sit 5  Supervision   Additional items Assist x 1;Bedrails;Increased time required;Verbal cues;Other  (RW)   Functional Mobility   Functional Mobility 5  Supervision   Additional Comments Limited distance. Pt reports SOB, fatigue.   Additional items Rolling walker   Cognition   Overall Cognitive Status Impaired   Arousal/Participation Alert;Responsive;Cooperative   Attention Attends with cues to redirect   Orientation Level Oriented to person;Oriented to place;Oriented to time   Memory Decreased recall of  precautions;Decreased short term memory   Following Commands Follows one step commands without difficulty   Comments delayed recall assessment, able to recall 1/3 words.   Activity Tolerance   Activity Tolerance Patient limited by fatigue;Other (Comment)  (effort; O2 95-99% on 4 L)   Medical Staff Made Aware Yes   Assessment   Assessment Pt seen for OT f/u treatment session focusing on functional activity tolerance, bed mobility, ADLs, functional transfers/mobility, Safe transfer techniques, and dynamic sitting balance activity. Upon OT arrival, pt was found supine in bed, agreeable to OT tx session. Presented with call light on for bathroom use. Pt with deficits related to strength, endurance and balance limiting ability to participate in ADLs at baseline. Pt with more fatigue today. She requests to stop therapy tx session following using the bathroom despite educ on importance of OOB mobility and ADL participation. Please refer to flowsheet for functional performance. Provided education on energy conservation techniques, deep breathing techniques, fall prevention strategies, and overall safety awareness.   Patient requiring verbal cues for safety and verbal cues for pacing through activity steps.  Patient continues to be functioning below baseline level, occupational performance remains limited secondary to factors listed above and increased risk for falls and injury. Pt lying supine with bed alarm activated at end of session. Call bell and phone within reach. All needs met and pt reports no further questions for OT at this time. Currently, pt is recommended for level 3 resource intensity at time of d/c. Will continue to see pt per POC to address deficits to facilitate return to PLOF.   Plan   Treatment Interventions ADL retraining;Functional transfer training;Endurance training;Patient/family training;Equipment evaluation/education;Compensatory technique education;Continued evaluation;Energy  conservation;Activityengagement   Goal Expiration Date 04/15/25   OT Treatment Day 1   OT Frequency 2-3x/wk   Discharge Recommendation   Rehab Resource Intensity Level, OT III (Minimum Resource Intensity)   AM-PAC Daily Activity Inpatient   Lower Body Dressing 3   Bathing 3   Toileting 3   Upper Body Dressing 3   Grooming 3   Eating 4   Daily Activity Raw Score 19   Daily Activity Standardized Score (Calc for Raw Score >=11) 40.22   AM-PAC Applied Cognition Inpatient   Following a Speech/Presentation 3   Understanding Ordinary Conversation 4   Taking Medications 2   Remembering Where Things Are Placed or Put Away 3   Remembering List of 4-5 Errands 2   Taking Care of Complicated Tasks 2   Applied Cognition Raw Score 16   Applied Cognition Standardized Score 35.03     Ghada Dyer

## 2025-04-04 NOTE — ASSESSMENT & PLAN NOTE
Patient presented to hospital with complaint of shortness of breath and coughing, found to be wheezing with hypoxia  Admitted for COPD exacerbation  Continue scheduled DuoNebs, respiratory protocol  Started scheduled budesonide and formoterol  Appreciate pulmonology recommendations  Continue Mucinex and doxycycline-switch doxycycline to IV as she was refusing oral  CTA chest results noted, bronchiolitis without pneumonia  Required temporary BiPAP secondary to worsening hypoxia with confusion, suspected secondary to hypercarbia noted on ABG  Appears to be tolerating BiPAP at this time, continue, can wean to nasal cannula when more awake  Cultures growing haemophilus influenzae, procalcitonin downtrending on doxycycline, can continue.  Will discuss with pulmonology.

## 2025-04-04 NOTE — PROGRESS NOTES
Progress Note - Pulmonology   Name: Emily Berrios 79 y.o. female I MRN: [id number]  Unit/Bed#: [place] [address] -01 I Date of Admission: 4/1/2025   Date of Service: 4/4/2025 I Hospital Day: 3     Assessment & Plan  Acute hypoxic respiratory failure (HCC)  Does not use home oxygen  Follows with Dr. Lama as outpatient.  CTA chest 4/2025: No pulmonary embolus, patchy nodular opacities which may reflect atypical/viral infection.  Paraseptal emphysema most prominent in the right upper lobe.  Diffuse air trapping mosaicism suggestive of small airways disease.  Small hiatal hernia.  Acute on chronic respiratory failure with hypercapnia (HCC)    COPD with acute exacerbation (HCC)  Severe COPD, Group E  Patient noncompliant with Trelegy as she has been having difficulty using this inhaler.   Spirometry in office with severe obstruction  She has had no difficulty with HFA such as albuterol.  Productive cough, increased sputum production, SOB  Has been using albuterol more than 3 times per day.  Likely in setting of acute bronchitis.  Sputum culture: 2+ epithelial cells likely indicating contamination    Abnormal CT of the chest    Hyponatremia  Hyponatremia plus relative bradycardia in the setting of possible infection may reflect Legionella infection.  Hiatal hernia  CT chest showing small hiatal hernia  Gastroesophageal reflux disease without esophagitis  On PPI    Plan:  Maintain SpO2 88-92%, wean off supplemental oxygen as tolerated  Patient will require ambulatory pulse oximetry testing 24-48 hours prior to discharge  Cw BIPAP qHS on settings 18/6 30%, RR16.   When close to discharge, will require overnight pulse oximetry testing and AM ABG to qualify for BiPAP.  BiPAP ordered to treat hypercapnia, CPAP will not treat hypercapnia.  Would favor Breztri over Trelegy prior to discharge as patient has difficulty with mechanism of Ellipta  Continue Solu-Medrol 40mg transition to prednisone 40 mg daily PO with taper,  decrease by 10 mg every 3 days until gone   Continue Pulmicort, Performist while inpatient.   Continue DuoNebs TID, will start flutter valve   Sputum cultures + Haemophilus influenzae, urine legionella/strep pneumoniae antigens negative   Transition doxycycline to cefdinir for coverage of Haemophilus influenzae   Continue with PPI in setting of GERD/hiatal hernia    24 Hour Events : No acute events overnight. Patient reports using BiPAP 18/6 overnight night. She reports worsened shortness of breath today and continued productive cough.        Objective :  Temp:  [97.8 °F (36.6 °C)-98.2 °F (36.8 °C)] 97.8 °F (36.6 °C)  HR:  [] 111  BP: (137-152)/(76-91) 148/86  Resp:  [20] 20  SpO2:  [84 %-100 %] 84 %  O2 Device: Nasal cannula  Nasal Cannula O2 Flow Rate (L/min):  [2 L/min-4 L/min] 2 L/min    Physical Exam:   General: No acute distress  HENT: Normocephalic, atraumatic.  PERRL, EOMI, Moist mucous membranes.  Neck: Supple  Lungs: Distant breath sounds bilaterally, minimal expiratory wheezing, no rhonchi. On 2L NC  Heart: Regular rate and rhythm, S1-S2 present, no murmurs rubs or gallops  Abdomen: Soft, nontender, nondistended, no organomegaly  Extremities: Warm and well perfused, no cyanosis, clubbing, or edema  Skin: Warm, dry, no rashes or lesions  Neurologic: No focal neurologic deficits      Lab Results: I have reviewed the following results:   No new results in last 24 hours.  ABG: No new results in last 24 hours.    Imaging Results Review: I personally reviewed the following image studies in PACS and associated radiology reports: CT chest. My interpretation of the radiology images/reports is: As above.  Other Study Results Review: Other studies reviewed include: EKG as above  PFT Results Reviewed:      Spirometry in office:  Severe airflow limitation per Dr Kelby Baca, DO  Pulmonary Critical Care, PGY V   [facility]

## 2025-04-04 NOTE — PLAN OF CARE
Problem: OCCUPATIONAL THERAPY ADULT  Goal: Performs self-care activities at highest level of function for planned discharge setting.  See evaluation for individualized goals.  Description: Treatment Interventions: ADL retraining, Functional transfer training, Endurance training, Patient/family training, Equipment evaluation/education, Compensatory technique education, Continued evaluation, Energy conservation, Activityengagement          See flowsheet documentation for full assessment, interventions and recommendations.   Outcome: Progressing  Note: Limitation: Decreased ADL status, Decreased endurance, Decreased self-care trans, Decreased high-level ADLs (decreased balance, decreased coordination, decreased functional reach)  Prognosis: Fair  Assessment: Pt seen for OT f/u treatment session focusing on functional activity tolerance, bed mobility, ADLs, functional transfers/mobility, Safe transfer techniques, and dynamic sitting balance activity. Upon OT arrival, pt was found supine in bed, agreeable to OT tx session. Presented with call light on for bathroom use. Pt with deficits related to strength, endurance and balance limiting ability to participate in ADLs at baseline. Pt with more fatigue today. She requests to stop therapy tx session following using the bathroom despite educ on importance of OOB mobility and ADL participation. Please refer to flowsheet for functional performance. Provided education on energy conservation techniques, deep breathing techniques, fall prevention strategies, and overall safety awareness.   Patient requiring verbal cues for safety and verbal cues for pacing through activity steps.  Patient continues to be functioning below baseline level, occupational performance remains limited secondary to factors listed above and increased risk for falls and injury. Pt lying supine with bed alarm activated at end of session. Call bell and phone within reach. All needs met and pt reports no  further questions for OT at this time. Currently, pt is recommended for level 3 resource intensity at time of d/c. Will continue to see pt per POC to address deficits to facilitate return to PLOF.     Rehab Resource Intensity Level, OT: III (Minimum Resource Intensity)

## 2025-04-04 NOTE — ASSESSMENT & PLAN NOTE
Patient hypoxic to 80% at time of presentation  Secondary to COPD saturation  Was stable on 3 L nasal cannula yesterday but developed worsening shortness of breath, confusion and hypoxia overnight and was placed on BiPAP  Continue BiPAP PRN  Appears more alert and awake on the BiPAP this morning, suspect we can wean back to nasal cannula later today  Appreciate pulmonology recommendations

## 2025-04-04 NOTE — PROGRESS NOTES
Pastoral Care Progress Note              Patient was visited by volunteer who speaks [place]ese, patient received listening support, and prayer.

## 2025-04-04 NOTE — CASE MANAGEMENT
Case Management Discharge Planning Note    Patient name Emily Berrios  Location [facility] M* MRN [id number]  : 1945 Date 2025       Current Admission Date: 2025  Current Admission Diagnosis:COPD with acute exacerbation (HCC)   Patient Active Problem List    Diagnosis Date Noted Date Diagnosed    Acute hypoxic respiratory failure (HCC) 2025     Gastroesophageal reflux disease without esophagitis 2025     Acute metabolic encephalopathy 2025     Abnormal CT of the chest 2025     Acute on chronic respiratory failure with hypercapnia (HCC) 2025     Abnormal EKG 2025     Pain and swelling of left knee 10/10/2024     Uterovaginal prolapse, incomplete 2024     Female stress incontinence 2024     Female cystocele 2024     Rectocele 2024     Acute encephalopathy 2024     Hypoxia 2024     Insomnia 2024     Alcohol use 2024     Closed compression fracture of L1 vertebra (HCC) 2024     Closed nondisplaced fracture of surgical neck of left humerus 2024     Complete uterovaginal prolapse 2023     Chronic rhinitis 10/04/2023     Ambulatory dysfunction 2023     Cognitive decline 03/10/2023     Hyponatremia 2023     Hypomagnesemia 2023     Hydronephrosis, bilateral 2023     Cystocele with prolapse 2023     COPD with acute exacerbation (HCC) 2022     Hx of CABG 2018     Hx stent of SVG to the RCA 2018     Asthma 2014     Chronic coronary artery disease 2014     Hiatal hernia 2014     Osteoarthritis 2014     Glaucoma 2013     Hyperlipidemia 2012     Primary hypertension 2012       LOS (days): 3  Geometric Mean LOS (GMLOS) (days):   Days to GMLOS:     OBJECTIVE:  Risk of Unplanned Readmission Score: 19.14         Current admission status: Inpatient   Preferred Pharmacy:   [id number] - [id number]  [address]  [place] [address]  Phone: [phone] Fax: [phone]    [facility] [place] - [address]  [address]  [address]  [place] [address]  Phone: [phone] Fax: [phone]    Primary Care Provider: Ramsey Gtz MD    Primary Insurance: [employer]ARE [facility]  Secondary Insurance: [facility]    DISCHARGE DETAILS:    IMM Given (Date):: 04/04/25  IMM Given to:: Family  Family notified:: Pt's daughter    Additional Comments: Patient reviewed during care coordination rounds, pt expected to be medically stable for discharge home in 48 hours. CM spoke with pt's daughter to review IMM who expressed understanding and agreement with discharge plan, IMM in scan bin to be entered into pt's EHR. CM department to follow.

## 2025-04-05 LAB
ANION GAP SERPL CALCULATED.3IONS-SCNC: 6 MMOL/L (ref 4–13)
BACTERIA SPT RESP CULT: ABNORMAL
BACTERIA SPT RESP CULT: ABNORMAL
BUN SERPL-MCNC: 13 MG/DL (ref 5–25)
CALCIUM SERPL-MCNC: 9.7 MG/DL (ref 8.4–10.2)
CHLORIDE SERPL-SCNC: 91 MMOL/L (ref 96–108)
CO2 SERPL-SCNC: 39 MMOL/L (ref 21–32)
CREAT SERPL-MCNC: 0.59 MG/DL (ref 0.6–1.3)
ERYTHROCYTE [DISTWIDTH] IN BLOOD BY AUTOMATED COUNT: 16.5 % (ref 11.6–15.1)
GFR SERPL CREATININE-BSD FRML MDRD: 87 ML/MIN/1.73SQ M
GLUCOSE SERPL-MCNC: 141 MG/DL (ref 65–140)
GRAM STN SPEC: ABNORMAL
HCT VFR BLD AUTO: 39.3 % (ref 34.8–46.1)
HGB BLD-MCNC: 11.8 G/DL (ref 11.5–15.4)
MCH RBC QN AUTO: 24.7 PG (ref 26.8–34.3)
MCHC RBC AUTO-ENTMCNC: 30 G/DL (ref 31.4–37.4)
MCV RBC AUTO: 82 FL (ref 82–98)
PLATELET # BLD AUTO: 402 THOUSANDS/UL (ref 149–390)
PMV BLD AUTO: 9.5 FL (ref 8.9–12.7)
POTASSIUM SERPL-SCNC: 3.9 MMOL/L (ref 3.5–5.3)
RBC # BLD AUTO: 4.78 MILLION/UL (ref 3.81–5.12)
SODIUM SERPL-SCNC: 136 MMOL/L (ref 135–147)
WBC # BLD AUTO: 16.96 THOUSAND/UL (ref 4.31–10.16)

## 2025-04-05 PROCEDURE — 99232 SBSQ HOSP IP/OBS MODERATE 35: CPT | Performed by: PHYSICIAN ASSISTANT

## 2025-04-05 PROCEDURE — 85027 COMPLETE CBC AUTOMATED: CPT | Performed by: PHYSICIAN ASSISTANT

## 2025-04-05 PROCEDURE — 94760 N-INVAS EAR/PLS OXIMETRY 1: CPT

## 2025-04-05 PROCEDURE — 99232 SBSQ HOSP IP/OBS MODERATE 35: CPT | Performed by: INTERNAL MEDICINE

## 2025-04-05 PROCEDURE — 80048 BASIC METABOLIC PNL TOTAL CA: CPT | Performed by: PHYSICIAN ASSISTANT

## 2025-04-05 PROCEDURE — 94640 AIRWAY INHALATION TREATMENT: CPT

## 2025-04-05 RX ORDER — PREDNISONE 20 MG/1
40 TABLET ORAL DAILY
Status: DISCONTINUED | OUTPATIENT
Start: 2025-04-06 | End: 2025-04-08

## 2025-04-05 RX ADMIN — IPRATROPIUM BROMIDE AND ALBUTEROL SULFATE 3 ML: 2.5; .5 SOLUTION RESPIRATORY (INHALATION) at 13:14

## 2025-04-05 RX ADMIN — BISOPROLOL FUMARATE 10 MG: 5 TABLET ORAL at 08:04

## 2025-04-05 RX ADMIN — ENOXAPARIN SODIUM 40 MG: 40 INJECTION SUBCUTANEOUS at 08:04

## 2025-04-05 RX ADMIN — IPRATROPIUM BROMIDE AND ALBUTEROL SULFATE 3 ML: 2.5; .5 SOLUTION RESPIRATORY (INHALATION) at 07:12

## 2025-04-05 RX ADMIN — IPRATROPIUM BROMIDE AND ALBUTEROL SULFATE 3 ML: 2.5; .5 SOLUTION RESPIRATORY (INHALATION) at 19:54

## 2025-04-05 RX ADMIN — ACETAMINOPHEN 650 MG: 325 TABLET, FILM COATED ORAL at 22:02

## 2025-04-05 RX ADMIN — Medication 400 MG: at 08:04

## 2025-04-05 RX ADMIN — Medication 400 MG: at 17:14

## 2025-04-05 RX ADMIN — BUDESONIDE 0.5 MG: 0.5 INHALANT RESPIRATORY (INHALATION) at 07:12

## 2025-04-05 RX ADMIN — AMLODIPINE BESYLATE 5 MG: 5 TABLET ORAL at 08:04

## 2025-04-05 RX ADMIN — CLOPIDOGREL 75 MG: 75 TABLET ORAL at 08:04

## 2025-04-05 RX ADMIN — PANTOPRAZOLE SODIUM 40 MG: 40 TABLET, DELAYED RELEASE ORAL at 08:11

## 2025-04-05 RX ADMIN — NEOMYCIN SULFATE, POLYMYXIN B SULFATE AND DEXAMETHASONE 1 DROP: 3.5; 10000; 1 SUSPENSION/ DROPS OPHTHALMIC at 22:00

## 2025-04-05 RX ADMIN — Medication 1000 MCG: at 08:04

## 2025-04-05 RX ADMIN — PRAVASTATIN SODIUM 80 MG: 80 TABLET ORAL at 17:14

## 2025-04-05 RX ADMIN — METHYLPREDNISOLONE SODIUM SUCCINATE 40 MG: 40 INJECTION, POWDER, FOR SOLUTION INTRAMUSCULAR; INTRAVENOUS at 08:04

## 2025-04-05 RX ADMIN — ALBUTEROL SULFATE 2 PUFF: 90 AEROSOL, METERED RESPIRATORY (INHALATION) at 22:13

## 2025-04-05 RX ADMIN — LORATADINE 10 MG: 10 TABLET ORAL at 08:04

## 2025-04-05 RX ADMIN — CEFDINIR 300 MG: 300 CAPSULE ORAL at 22:00

## 2025-04-05 RX ADMIN — AMLODIPINE BESYLATE 5 MG: 5 TABLET ORAL at 17:14

## 2025-04-05 RX ADMIN — FORMOTEROL FUMARATE DIHYDRATE 20 MCG: 20 SOLUTION RESPIRATORY (INHALATION) at 07:16

## 2025-04-05 RX ADMIN — CEFDINIR 300 MG: 300 CAPSULE ORAL at 08:04

## 2025-04-05 NOTE — ASSESSMENT & PLAN NOTE
Patient hypoxic to 80% at time of presentation  Secondary to COPD saturation  Was stable on 3 L nasal cannula yesterday but developed worsening shortness of breath, confusion and hypoxia overnight and was placed on BiPAP  Continue BiPAP PRN  Appears more alert and awake on the BiPAP this morning, suspect we can wean back to nasal cannula later today  Appreciate pulmonology recommendations  Down to 2 L via nasal cannula

## 2025-04-05 NOTE — PLAN OF CARE
Problem: Potential for Falls  Goal: Patient will remain free of falls  Description: INTERVENTIONS:- Educate patient/family on patient safety including physical limitations- Instruct patient to call for assistance with activity - Consult OT/PT to assist with strengthening/mobility - Keep Call bell within reach- Keep bed low and locked with side rails adjusted as appropriate- Keep care items and personal belongings within reach- Initiate and maintain comfort rounds- Make Fall Risk Sign visible to staff- Offer Toileting every 2 Hours, in advance of need- Initiate/Maintain bed alarm- Obtain necessary fall risk management equipment: walker- Apply yellow socks and bracelet for high fall risk patients- Consider moving patient to room near nurses station  Outcome: Progressing     Problem: PAIN - ADULT  Goal: Verbalizes/displays adequate comfort level or baseline comfort level  Description: Interventions:- Encourage patient to monitor pain and request assistance- Assess pain using appropriate pain scale- Administer analgesics based on type and severity of pain and evaluate response- Implement non-pharmacological measures as appropriate and evaluate response- Consider cultural and social influences on pain and pain management- Notify physician/advanced practitioner if interventions unsuccessful or patient reports new pain  Outcome: Progressing     Problem: INFECTION - ADULT  Goal: Absence or prevention of progression during hospitalization  Description: INTERVENTIONS:- Assess and monitor for signs and symptoms of infection- Monitor lab/diagnostic results- Monitor all insertion sites, i.e. indwelling lines, tubes, and drains- Monitor endotracheal if appropriate and nasal secretions for changes in amount and color- [place] appropriate cooling/warming therapies per order- Administer medications as ordered- Instruct and encourage patient and family to use good hand hygiene technique- Identify and instruct in appropriate  isolation precautions for identified infection/condition  Outcome: Progressing  Goal: Absence of fever/infection during neutropenic period  Description: INTERVENTIONS:- Monitor WBC  Outcome: Progressing     Problem: SAFETY ADULT  Goal: Maintain or return to baseline ADL function  Description: INTERVENTIONS:-  Assess patient's ability to carry out ADLs; assess patient's baseline for ADL function and identify physical deficits which impact ability to perform ADLs (bathing, care of mouth/teeth, toileting, grooming, dressing, etc.)- Assess/evaluate cause of self-care deficits - Assess range of motion- Assess patient's mobility; develop plan if impaired- Assess patient's need for assistive devices and provide as appropriate- Encourage maximum independence but intervene and supervise when necessary- Involve family in performance of ADLs- Assess for home care needs following discharge - Consider OT consult to assist with ADL evaluation and planning for discharge- Provide patient education as appropriate  Outcome: Progressing  Goal: Maintains/Returns to pre admission functional level  Description: INTERVENTIONS:- Perform AM-PAC 6 Click Basic Mobility/ Daily Activity assessment daily.- Set and communicate daily mobility goal to care team and patient/family/caregiver. - Collaborate with rehabilitation services on mobility goals if consulted- Perform Range of Motion 3 times a day.- Reposition patient every 2 hours.- Dangle patient 3 times a day- Stand patient 3 times a day- Ambulate patient 3 times a day- Out of bed to chair 3 times a day - Out of bed for meals 3 times a day- Out of bed for toileting- Record patient progress and toleration of activity level   Outcome: Progressing     Problem: DISCHARGE PLANNING  Goal: Discharge to home or other facility with appropriate resources  Description: INTERVENTIONS:- Identify barriers to discharge w/patient and caregiver- Arrange for needed discharge resources and transportation as  appropriate- Identify discharge learning needs (meds, wound care, etc.)- Arrange for interpretive services to assist at discharge as needed- Refer to Case Management Department for coordinating discharge planning if the patient needs post-hospital services based on physician/advanced practitioner order or complex needs related to functional status, cognitive ability, or social support system  Outcome: Progressing     Problem: Knowledge Deficit  Goal: Patient/family/caregiver demonstrates understanding of disease process, treatment plan, medications, and discharge instructions  Description: Complete learning assessment and assess knowledge base.Interventions:- Provide teaching at level of understanding- Provide teaching via preferred learning methods  Outcome: Progressing     Problem: CARDIOVASCULAR - ADULT  Goal: Maintains optimal cardiac output and hemodynamic stability  Description: INTERVENTIONS:- Monitor I/O, vital signs and rhythm- Monitor for S/S and trends of decreased cardiac output- Administer and titrate ordered vasoactive medications to optimize hemodynamic stability- Assess quality of pulses, skin color and temperature- Assess for signs of decreased coronary artery perfusion- Instruct patient to report change in severity of symptoms  Outcome: Progressing  Goal: Absence of cardiac dysrhythmias or at baseline rhythm  Description: INTERVENTIONS:- Continuous cardiac monitoring, vital signs, obtain 12 lead EKG if ordered- Administer antiarrhythmic and heart rate control medications as ordered- Monitor electrolytes and administer replacement therapy as ordered  Outcome: Progressing     Problem: RESPIRATORY - ADULT  Goal: Achieves optimal ventilation and oxygenation  Description: INTERVENTIONS:- Assess for changes in respiratory status- Assess for changes in mentation and behavior- Position to facilitate oxygenation and minimize respiratory effort- Oxygen administered by appropriate delivery if ordered-  Initiate smoking cessation education as indicated- Encourage broncho-pulmonary hygiene including cough, deep breathe, Incentive Spirometry- Assess the need for suctioning and aspirate as needed- Assess and instruct to report SOB or any respiratory difficulty- Respiratory Therapy support as indicated  Outcome: Progressing     Problem: CONFUSION/THOUGHT DISTURBANCE  Goal: Thought disturbances (confusion, delirium, depression, dementia or psychosis) are managed to maintain or return to baseline mental status and functional level  Description: INTERVENTIONS:- Assess for possible contributors to  thought disturbance, including but not limited to medications, infection, impaired vision or hearing, underlying metabolic abnormalities, dehydration, respiratory compromise,  psychiatric diagnoses and notify attending PHYSICAN/AP- Monitor and intervene to maintain adequate nutrition, hydration, elimination, sleep and activity- Decrease environmental stimuli, including noise as appropriate.- Provide frequent contacts to provide refocusing, direction and reassurance as needed. Approach patient calmly with eye contact and at their level.- [place] high risk fall precautions, aspiration precautions and other safety measures, as indicated- If delirium suspected, notify physician/AP of change in condition and request immediate in-person evaluation- Pursue consults as appropriate including Geriatric (campus dependent), OT for cognitive evaluation/activity planning, psychiatric, pastoral care, etc.  Outcome: Progressing     Problem: SAFETY,RESTRAINT: NV/NON-SELF DESTRUCTIVE BEHAVIOR  Goal: Remains free of harm/injury (restraint for non violent/non self-detsructive behavior)  Description: INTERVENTIONS:- Instruct patient/family regarding restraint use - Assess and monitor physiologic and psychological status - Provide interventions and comfort measures to meet assessed patient needs - Identify and implement measures to help patient  regain control- Assess readiness for release of restraint   Outcome: Progressing  Goal: Returns to optimal restraint-free functioning  Description: INTERVENTIONS:- Assess the patient's behavior and symptoms that indicate continued need for restraint- Identify and implement measures to help patient regain control- Assess readiness for release of restraint   Outcome: Progressing     Problem: Prexisting or High Potential for Compromised Skin Integrity  Goal: Skin integrity is maintained or improved  Description: INTERVENTIONS:- Identify patients at risk for skin breakdown- Assess and monitor skin integrity- Assess and monitor nutrition and hydration status- Monitor labs - Assess for incontinence - Turn and reposition patient- Assist with mobility/ambulation- Relieve pressure over bony prominences- Avoid friction and shearing- Provide appropriate hygiene as needed including keeping skin clean and dry- Evaluate need for skin moisturizer/barrier cream- Collaborate with interdisciplinary team - Patient/family teaching- Consider wound care consult   Outcome: Progressing

## 2025-04-05 NOTE — ASSESSMENT & PLAN NOTE
Patient developed acute onset hallucinations, paranoia and psychosis overnight  Does have prior history of hospital dosed delirium  Attempt to limit steroids, will dose in the morning rather than the evening  Continue antibiotic treatment  Will start low-dose at bedtime Seroquel tonight as needed and monitor response  Likely component of hypercapnia, monitor with BiPAP  Family also notes patient continues to drink alcohol daily, placed on CIWA protocol  Improved

## 2025-04-05 NOTE — ASSESSMENT & PLAN NOTE
Secondary to H. influenzae pneumonia  Currently on 2 L, continue to wean   Weeks 32 to 34 of Your Pregnancy: Care Instructions  Your Care Instructions    During the last few weeks of your pregnancy, you may have more aches and pains. It's important to rest when you can. Your growing baby is putting more pressure on your bladder. So you may need to urinate more often. Hemorrhoids are also common. These are painful, itchy veins in the rectal area. In the 36th week, most women have a test for group B streptococcus (GBS). GBS is a common bacteria that can live in the vagina and rectum. It can make your baby sick after birth. If you test positive, you will get antibiotics during labor. These will keep your baby from getting the bacteria. You may want to talk with your doctor about banking your baby's umbilical cord blood. This is the blood left in the cord after birth. If you want to save this blood, you must arrange it ahead of time. You can't decide at the last minute. If you haven't already had the Tdap shot during this pregnancy, talk to your doctor about getting it. It will help protect your  against pertussis infection. Follow-up care is a key part of your treatment and safety. Be sure to make and go to all appointments, and call your doctor if you are having problems. It's also a good idea to know your test results and keep a list of the medicines you take. How can you care for yourself at home? Ease hemorrhoids  · Get more liquids, fruits, vegetables, and fiber in your diet. This will help keep your stools soft. · Avoid sitting for too long. Lie on your left side several times a day. · Clean yourself with soft, moist toilet paper. Or you can use witch hazel pads or personal hygiene pads. · If you are uncomfortable, try ice packs. Or you can sit in a warm sitz bath. Do these for 20 minutes at a time, as needed. · Use hydrocortisone cream for pain and itching. Two examples are Anusol and Preparation H Hydrocortisone.   · Ask your doctor about taking an over-the-counter stool softener. Consider breastfeeding  · Experts recommend that women breastfeed for 1 year or longer. Breast milk is the perfect food for babies. · Breast milk is easier for babies to digest than formula. And it is always available, just the right temperature, and free. · Breast milk may help protect your child from some health problems.  babies are less likely than formula-fed babies to:  ¨ Get ear infections, colds, diarrhea, and pneumonia. ¨ Be obese or get diabetes later in life. · Women who breastfeed have less bleeding after the birth. Their uteruses also shrink back faster. · Some women who breastfeed lose weight faster. Making milk burns calories. · Breastfeeding can lower your risk of breast cancer, ovarian cancer, and osteoporosis. Decide about circumcision for boys  · As you make this decision, it may help to think about your personal, [employer], and family traditions. You get to decide if you will keep your son's penis natural or if he will be circumcised. · If you decide that you would like to have your baby circumcised, talk with your doctor. You can share your concerns about pain. And you can discuss your preferences for anesthesia. Where can you learn more? Go to http://bud-ousmane.info/. Enter N062 in the search box to learn more about \"Weeks 32 to 34 of Your Pregnancy: Care Instructions. \"  Current as of: November 21, 2017  Content Version: 11.7  © 1860-8690 [employer]. Care instructions adapted under license by [employer] (which disclaims liability or warranty for this information). If you have questions about a medical condition or this instruction, always ask your healthcare professional. [place] [address] any warranty or liability for your use of this information.

## 2025-04-05 NOTE — PROGRESS NOTES
Progress Note - Hospitalist   Name: Emily Berrios 79 y.o. female I MRN: [id number]  Unit/Bed#: [place] [address] -01 I Date of Admission: 4/1/2025   Date of Service: 4/5/2025 I Hospital Day: 4    Assessment & Plan  COPD with acute exacerbation (HCC)  Patient presented to hospital with complaint of shortness of breath and coughing, found to be wheezing with hypoxia  Admitted for COPD exacerbation  Continue scheduled DuoNebs, respiratory protocol  Started scheduled budesonide and formoterol  Appreciate pulmonology recommendations  Continue Mucinex and doxycycline-switch doxycycline to IV as she was refusing oral  CTA chest results noted, bronchiolitis without pneumonia  Required temporary BiPAP secondary to worsening hypoxia with confusion, suspected secondary to hypercarbia noted on ABG  Appears to be tolerating BiPAP at this time, continue, can wean to nasal cannula when more awake  Cultures growing haemophilus influenzae, transition to ceftriaxone, procalcitonin downtrending  Acute on chronic respiratory failure with hypercapnia (HCC)  Patient hypoxic to 80% at time of presentation  Secondary to COPD saturation  Was stable on 3 L nasal cannula yesterday but developed worsening shortness of breath, confusion and hypoxia overnight and was placed on BiPAP  Continue BiPAP PRN  Appears more alert and awake on the BiPAP this morning, suspect we can wean back to nasal cannula later today  Appreciate pulmonology recommendations  Down to 2 L via nasal cannula  Hypomagnesemia  Magnesium 1.0 at time of admission  Repleted orally and IV, continue oral twice daily  Stable  Chronic coronary artery disease  Continue Plavix, Crestor, bisoprolol  Glaucoma  Continue eyedrops  Hyperlipidemia  Continue statin  Cognitive decline  Fall/aspiration precautions  Abnormal EKG  Likely from profound hypomagnesemia  Concern for V. tach overnight, reviewed telemetry today, V. tach not noted in all leads, likely artifact  Repeat EKG with  improvement  Monitored on telemetry, has since been discontinued  Acute metabolic encephalopathy  Patient developed acute onset hallucinations, paranoia and psychosis overnight  Does have prior history of hospital dosed delirium  Attempt to limit steroids, will dose in the morning rather than the evening  Continue antibiotic treatment  Will start low-dose at bedtime Seroquel tonight as needed and monitor response  Likely component of hypercapnia, monitor with BiPAP  Family also notes patient continues to drink alcohol daily, placed on CIWA protocol  Improved  Hyponatremia  Sodium continues to fall to 128 today  Urine studies reviewed  Did require IV diuretics overnight for hypoxia  Continue to encourage oral intake  Monitor daily weights  Improved to 136  Acute hypoxic respiratory failure (HCC)  Secondary to H. influenzae pneumonia  Currently on 2 L, continue to wean    VTE Pharmacologic Prophylaxis:   Moderate Risk (Score 3-4) - Pharmacological DVT Prophylaxis Ordered: enoxaparin (Lovenox).    Mobility:   Basic Mobility Inpatient Raw Score: 17  JH-HLM Goal: 5: Stand one or more mins  JH-HLM Achieved: 6: Walk 10 steps or more  JH-HLM Goal NOT achieved. Continue with multidisciplinary rounding and encourage appropriate mobility to improve upon JH-HLM goals.    Patient Centered Rounds: I performed bedside rounds with nursing staff today.   Discussions with Specialists or Other Care Team Provider: None    Education and Discussions with Family / Patient: Updated  (family/friends) at bedside.    Current Length of Stay: 4 day(s)  Current Patient Status: Inpatient   Certification Statement: The patient will continue to require additional inpatient hospital stay due to pneumonia  Discharge Plan: Anticipate discharge in 24-48 hrs to home.    Code Status: Level 1 - Full Code    Subjective   No acute events overnight.  Patient reports feeling better, reports her breathing is also improved.  Cough is  lessened.    Objective :  Temp:  [97.7 °F (36.5 °C)-98.2 °F (36.8 °C)] 98.1 °F (36.7 °C)  HR:  [] 92  BP: (147-156)/(78-87) 156/87  Resp:  [16-20] 16  SpO2:  [91 %-100 %] 93 %  O2 Device: Nasal cannula  Nasal Cannula O2 Flow Rate (L/min):  [2 L/min-4 L/min] 2 L/min    Body mass index is 27.91 kg/m².     Input and Output Summary (last 24 hours):     Intake/Output Summary (Last 24 hours) at 4/5/2025 1430  Last data filed at 4/5/2025 0930  Gross per 24 hour   Intake 240 ml   Output 556 ml   Net -316 ml       Physical Exam  Vitals and nursing note reviewed.   Constitutional:       Appearance: Normal appearance.   HENT:      Head: Normocephalic and atraumatic.      Mouth/Throat:      Mouth: Mucous membranes are moist.      Pharynx: Oropharynx is clear. No oropharyngeal exudate.   Eyes:      Extraocular Movements: Extraocular movements intact.   Cardiovascular:      Rate and Rhythm: Normal rate and regular rhythm.      Pulses: Normal pulses.      Heart sounds: Normal heart sounds. No murmur heard.     No friction rub. No gallop.   Pulmonary:      Effort: Pulmonary effort is normal. No respiratory distress.      Breath sounds: No stridor. Rales present. No wheezing.      Comments: Improved from yesterday  Abdominal:      General: Abdomen is flat. Bowel sounds are normal. There is no distension.      Palpations: Abdomen is soft.      Tenderness: There is no abdominal tenderness.   Musculoskeletal:      Right lower leg: No edema.      Left lower leg: No edema.   Skin:     General: Skin is warm and dry.   Neurological:      General: No focal deficit present.      Mental Status: She is alert and oriented to person, place, and time.           Lines/Drains:              Lab Results: I have reviewed the following results:   Results from last 7 days   Lab Units 04/05/25  0757 04/02/25  0236 04/01/25  0152   WBC Thousand/uL 16.96*   < > 15.03*   HEMOGLOBIN g/dL 11.8   < > 12.0   HEMATOCRIT % 39.3   < > 38.2   PLATELETS  Thousands/uL 402*   < > 311   SEGS PCT %  --   --  74   LYMPHO PCT %  --   --  15   MONO PCT %  --   --  10   EOS PCT %  --   --  1    < > = values in this interval not displayed.     Results from last 7 days   Lab Units 04/05/25  0757 04/02/25  0236 04/01/25  0152   SODIUM mmol/L 136   < > 134*   POTASSIUM mmol/L 3.9   < > 3.5   CHLORIDE mmol/L 91*   < > 94*   CO2 mmol/L 39*   < > 29   BUN mg/dL 13   < > 9   CREATININE mg/dL 0.59*   < > 0.80   ANION GAP mmol/L 6   < > 11   CALCIUM mg/dL 9.7   < > 9.1   ALBUMIN g/dL  --   --  4.3   TOTAL BILIRUBIN mg/dL  --   --  0.68   ALK PHOS U/L  --   --  61   ALT U/L  --   --  14   AST U/L  --   --  13   GLUCOSE RANDOM mg/dL 141*   < > 179*    < > = values in this interval not displayed.     Results from last 7 days   Lab Units 04/01/25  0149   INR  1.10     Results from last 7 days   Lab Units 04/04/25  2118   POC GLUCOSE mg/dl 235*         Results from last 7 days   Lab Units 04/03/25  0243 04/01/25  1159 04/01/25  0149   LACTIC ACID mmol/L  --   --  1.0   PROCALCITONIN ng/ml 0.16 0.37* 0.33*       Recent Cultures (last 7 days):   Results from last 7 days   Lab Units 04/02/25  1230 04/02/25  1129 04/01/25  0216 04/01/25  0203   BLOOD CULTURE   --   --  No Growth After 4 Days. No Growth After 4 Days.   SPUTUM CULTURE   --  4+ Growth of Haemophilus influenzae*  4+ Growth of  --   --    GRAM STAIN RESULT   --  3+ Polys*  3+ Gram positive cocci in pairs*  4+ Gram negative coccobacilli*  1+ Gram negative diplococci*  2+ Epithelial cells per low power field*  --   --    LEGIONELLA URINARY ANTIGEN  Negative  --   --   --        Imaging Results Review: No pertinent imaging studies reviewed.  Other Study Results Review: No additional pertinent studies reviewed.    Last 24 Hours Medication List:     Current Facility-Administered Medications:     acetaminophen (TYLENOL) tablet 650 mg, Q6H PRN    albuterol (PROVENTIL HFA,VENTOLIN HFA) inhaler 2 puff, Q4H PRN    ALPRAZolam (XANAX)  tablet 0.5 mg, HS PRN    amLODIPine (NORVASC) tablet 5 mg, BID    bisoprolol (ZEBETA) tablet 10 mg, Daily    budesonide (PULMICORT) inhalation solution 0.5 mg, Q12H    cefdinir (OMNICEF) capsule 300 mg, Q12H KENIA    clopidogrel (PLAVIX) tablet 75 mg, Daily    cyanocobalamin (VITAMIN B-12) tablet 1,000 mcg, Daily    enoxaparin (LOVENOX) subcutaneous injection 40 mg, Daily    estrogens, conjugated (Premarin) vaginal cream 0.5 g, Once per day on Monday Wednesday Friday    fluticasone (FLONASE) 50 mcg/act nasal spray 2 spray, BID    formoterol (PERFOROMIST) nebulizer solution 20 mcg, Q12H    guaiFENesin (ROBITUSSIN) oral liquid 400 mg, Q6H PRN    hydrOXYzine HCL (ATARAX) tablet 25 mg, Q6H PRN    ipratropium-albuterol (DUO-NEB) 0.5-2.5 mg/3 mL inhalation solution 3 mL, TID    ipratropium-albuterol (DUO-NEB) 0.5-2.5 mg/3 mL inhalation solution 3 mL, Q6H PRN    loratadine (CLARITIN) tablet 10 mg, Daily    magnesium Oxide (MAG-OX) tablet 400 mg, BID    methylPREDNISolone sodium succinate (Solu-MEDROL) injection 40 mg, Daily    neomycin-polymyxin-dexamethasone (MAXITROL) ophthalmic suspension 1 drop, TID    pantoprazole (PROTONIX) EC tablet 40 mg, Daily Before Breakfast    pravastatin (PRAVACHOL) tablet 80 mg, Daily With Dinner    QUEtiapine (SEROquel) tablet 12.5 mg, HS PRN    Administrative Statements   Today, Patient Was Seen By: Eros Bates PA-C      **Please Note: This note may have been constructed using a voice recognition system.**

## 2025-04-05 NOTE — ASSESSMENT & PLAN NOTE
Patient presented to hospital with complaint of shortness of breath and coughing, found to be wheezing with hypoxia  Admitted for COPD exacerbation  Continue scheduled DuoNebs, respiratory protocol  Started scheduled budesonide and formoterol  Appreciate pulmonology recommendations  Continue Mucinex and doxycycline-switch doxycycline to IV as she was refusing oral  CTA chest results noted, bronchiolitis without pneumonia  Required temporary BiPAP secondary to worsening hypoxia with confusion, suspected secondary to hypercarbia noted on ABG  Appears to be tolerating BiPAP at this time, continue, can wean to nasal cannula when more awake  Cultures growing haemophilus influenzae, transition to ceftriaxone, procalcitonin downtrending

## 2025-04-05 NOTE — PLAN OF CARE
Problem: Potential for Falls  Goal: Patient will remain free of falls  Description: INTERVENTIONS:- Educate patient/family on patient safety including physical limitations- Instruct patient to call for assistance with activity - Consult OT/PT to assist with strengthening/mobility - Keep Call bell within reach- Keep bed low and locked with side rails adjusted as appropriate- Keep care items and personal belongings within reach- Initiate and maintain comfort rounds- Make Fall Risk Sign visible to staff- Offer Toileting every 2 Hours, in advance of need- Initiate/Maintain bed alarm- Obtain necessary fall risk management equipment: walker- Apply yellow socks and bracelet for high fall risk patients- Consider moving patient to room near nurses station  Outcome: Progressing     Problem: INFECTION - ADULT  Goal: Absence or prevention of progression during hospitalization  Description: INTERVENTIONS:- Assess and monitor for signs and symptoms of infection- Monitor lab/diagnostic results- Monitor all insertion sites, i.e. indwelling lines, tubes, and drains- Monitor endotracheal if appropriate and nasal secretions for changes in amount and color- [place] appropriate cooling/warming therapies per order- Administer medications as ordered- Instruct and encourage patient and family to use good hand hygiene technique- Identify and instruct in appropriate isolation precautions for identified infection/condition  Outcome: Progressing  Goal: Absence of fever/infection during neutropenic period  Description: INTERVENTIONS:- Monitor WBC  Outcome: Progressing     Problem: SAFETY ADULT  Goal: Maintain or return to baseline ADL function  Description: INTERVENTIONS:-  Assess patient's ability to carry out ADLs; assess patient's baseline for ADL function and identify physical deficits which impact ability to perform ADLs (bathing, care of mouth/teeth, toileting, grooming, dressing, etc.)- Assess/evaluate cause of self-care deficits -  Assess range of motion- Assess patient's mobility; develop plan if impaired- Assess patient's need for assistive devices and provide as appropriate- Encourage maximum independence but intervene and supervise when necessary- Involve family in performance of ADLs- Assess for home care needs following discharge - Consider OT consult to assist with ADL evaluation and planning for discharge- Provide patient education as appropriate  Outcome: Progressing  Goal: Maintains/Returns to pre admission functional level  Description: INTERVENTIONS:- Perform AM-PAC 6 Click Basic Mobility/ Daily Activity assessment daily.- Set and communicate daily mobility goal to care team and patient/family/caregiver. - Collaborate with rehabilitation services on mobility goals if consulted- Perform Range of Motion 3 times a day.- Reposition patient every 2 hours.- Dangle patient 3 times a day- Stand patient 3 times a day- Ambulate patient 3 times a day- Out of bed to chair 3 times a day - Out of bed for meals 3 times a day- Out of bed for toileting- Record patient progress and toleration of activity level   Outcome: Progressing     Problem: Knowledge Deficit  Goal: Patient/family/caregiver demonstrates understanding of disease process, treatment plan, medications, and discharge instructions  Description: Complete learning assessment and assess knowledge base.Interventions:- Provide teaching at level of understanding- Provide teaching via preferred learning methods  Outcome: Progressing     Problem: CARDIOVASCULAR - ADULT  Goal: Maintains optimal cardiac output and hemodynamic stability  Description: INTERVENTIONS:- Monitor I/O, vital signs and rhythm- Monitor for S/S and trends of decreased cardiac output- Administer and titrate ordered vasoactive medications to optimize hemodynamic stability- Assess quality of pulses, skin color and temperature- Assess for signs of decreased coronary artery perfusion- Instruct patient to report change in  severity of symptoms  Outcome: Progressing  Goal: Absence of cardiac dysrhythmias or at baseline rhythm  Description: INTERVENTIONS:- Continuous cardiac monitoring, vital signs, obtain 12 lead EKG if ordered- Administer antiarrhythmic and heart rate control medications as ordered- Monitor electrolytes and administer replacement therapy as ordered  Outcome: Progressing     Problem: RESPIRATORY - ADULT  Goal: Achieves optimal ventilation and oxygenation  Description: INTERVENTIONS:- Assess for changes in respiratory status- Assess for changes in mentation and behavior- Position to facilitate oxygenation and minimize respiratory effort- Oxygen administered by appropriate delivery if ordered- Initiate smoking cessation education as indicated- Encourage broncho-pulmonary hygiene including cough, deep breathe, Incentive Spirometry- Assess the need for suctioning and aspirate as needed- Assess and instruct to report SOB or any respiratory difficulty- Respiratory Therapy support as indicated  Outcome: Progressing     Problem: CONFUSION/THOUGHT DISTURBANCE  Goal: Thought disturbances (confusion, delirium, depression, dementia or psychosis) are managed to maintain or return to baseline mental status and functional level  Description: INTERVENTIONS:- Assess for possible contributors to  thought disturbance, including but not limited to medications, infection, impaired vision or hearing, underlying metabolic abnormalities, dehydration, respiratory compromise,  psychiatric diagnoses and notify attending PHYSICAN/AP- Monitor and intervene to maintain adequate nutrition, hydration, elimination, sleep and activity- Decrease environmental stimuli, including noise as appropriate.- Provide frequent contacts to provide refocusing, direction and reassurance as needed. Approach patient calmly with eye contact and at their level.- [place] high risk fall precautions, aspiration precautions and other safety measures, as indicated- If  delirium suspected, notify physician/AP of change in condition and request immediate in-person evaluation- Pursue consults as appropriate including Geriatric (campus dependent), OT for cognitive evaluation/activity planning, psychiatric, pastoral care, etc.  Outcome: Progressing     Problem: SAFETY,RESTRAINT: NV/NON-SELF DESTRUCTIVE BEHAVIOR  Goal: Remains free of harm/injury (restraint for non violent/non self-detsructive behavior)  Description: INTERVENTIONS:- Instruct patient/family regarding restraint use - Assess and monitor physiologic and psychological status - Provide interventions and comfort measures to meet assessed patient needs - Identify and implement measures to help patient regain control- Assess readiness for release of restraint   Outcome: Progressing  Goal: Returns to optimal restraint-free functioning  Description: INTERVENTIONS:- Assess the patient's behavior and symptoms that indicate continued need for restraint- Identify and implement measures to help patient regain control- Assess readiness for release of restraint   Outcome: Progressing

## 2025-04-05 NOTE — NURSING NOTE
Pt is alert but refused to take her medications last night. Provider on duty made aware. Pt subsequently refused labs this morning.

## 2025-04-05 NOTE — RESTORATIVE TECHNICIAN NOTE
Restorative Technician Note      Patient Name: Emily Berrios     Restorative Tech Visit Date: 04/05/25  Note Type: Mobility  Patient Position Upon Consult: Supine  Activity Performed: Ambulated  Assistive Device: Roller walker  Patient Position at End of Consult: Bedside chair; All needs within reach; Bed/Chair alarm activated            ns

## 2025-04-05 NOTE — PROGRESS NOTES
"Progress Note - Pulmonology   Name: Emily Berrios 79 y.o. female I MRN: [id number]  Unit/Bed#: [address] 220[address] I Date of Admission: 4/1/2025   Date of Service: 4/5/2025 I Hospital Day: 4           Assessment:  Acute hypoxemic and hypercapnic respiratory failure.  Chronic obstructive pulmonary disease with acute exacerbation.  Abnormal CT scan of the chest with multi focal nodular opacities.  Consistent with inflammatory or infectious.  History of tobacco use.    Plan:  Cefdinir 300 mg twice a day a day for total 5 days.  Discontinue Solu-Medrol.  Start prednisone 40 mg once a day in the morning.  Titrate oxygen to maintain O2 saturation above 88%.  Discontinue budesonide and Perforomist.  Continue ipratropium/albuterol nebulizer treatments 3 times a day.  Increase activities as tolerated.  Will do overnight pulse oximetry on oxygen only without the BiPAP tomorrow night and arterial blood gases the following morning to see if she qualifies for home BiPAP.    Subjective:   The patient is feeling better.  She still has occasional cough.  She tolerated the BiPAP last night.    Vitals: Blood pressure 169/78, pulse 95, temperature 98.1 °F (36.7 °C), resp. rate 20, height 5' 1\" (1.549 m), weight 67 kg (147 lb 11.3 oz), SpO2 96%., Body mass index is 27.91 kg/m².      Intake/Output Summary (Last 24 hours) at 4/5/2025 1644  Last data filed at 4/5/2025 1601  Gross per 24 hour   Intake 240 ml   Output 956 ml   Net -716 ml       Physical Exam  Gen: Awake, alert, oriented x 3, no acute distress  HEENT: Mucous membranes moist, no oral lesions, no thrush  NECK: No accessory muscle use, JVP not elevated  Cardiac: Regular, single S1, single S2, no murmurs, no rubs, no gallops  Lungs: Decreased breath sounds.  Faint expiratory wheezes.  Abdomen: normoactive bowel sounds, soft nontender, nondistended, no rebound or rigidity, no guarding  Extremities: no cyanosis, no clubbing, no edema    Labs: CBC:   Lab Results   Component Value " Date    WBC 16.96 (H) 04/05/2025    HGB 11.8 04/05/2025    HCT 39.3 04/05/2025    MCV 82 04/05/2025     (H) 04/05/2025    RBC 4.78 04/05/2025    MCH 24.7 (L) 04/05/2025    MCHC 30.0 (L) 04/05/2025    RDW 16.5 (H) 04/05/2025    MPV 9.5 04/05/2025   , CMP:   Lab Results   Component Value Date    SODIUM 136 04/05/2025    K 3.9 04/05/2025    CL 91 (L) 04/05/2025    CO2 39 (H) 04/05/2025    BUN 13 04/05/2025    CREATININE 0.59 (L) 04/05/2025    CALCIUM 9.7 04/05/2025    EGFR 87 04/05/2025           Brennan Lama MD

## 2025-04-05 NOTE — ASSESSMENT & PLAN NOTE
Sodium continues to fall to 128 today  Urine studies reviewed  Did require IV diuretics overnight for hypoxia  Continue to encourage oral intake  Monitor daily weights  Improved to 136

## 2025-04-06 LAB
ANION GAP SERPL CALCULATED.3IONS-SCNC: 6 MMOL/L (ref 4–13)
BACTERIA BLD CULT: NORMAL
BACTERIA BLD CULT: NORMAL
BUN SERPL-MCNC: 13 MG/DL (ref 5–25)
CALCIUM SERPL-MCNC: 9.4 MG/DL (ref 8.4–10.2)
CHLORIDE SERPL-SCNC: 90 MMOL/L (ref 96–108)
CO2 SERPL-SCNC: 38 MMOL/L (ref 21–32)
CREAT SERPL-MCNC: 0.62 MG/DL (ref 0.6–1.3)
ERYTHROCYTE [DISTWIDTH] IN BLOOD BY AUTOMATED COUNT: 16.3 % (ref 11.6–15.1)
GFR SERPL CREATININE-BSD FRML MDRD: 86 ML/MIN/1.73SQ M
GLUCOSE SERPL-MCNC: 164 MG/DL (ref 65–140)
HCT VFR BLD AUTO: 37.7 % (ref 34.8–46.1)
HGB BLD-MCNC: 11.7 G/DL (ref 11.5–15.4)
MCH RBC QN AUTO: 25.5 PG (ref 26.8–34.3)
MCHC RBC AUTO-ENTMCNC: 31 G/DL (ref 31.4–37.4)
MCV RBC AUTO: 82 FL (ref 82–98)
PLATELET # BLD AUTO: 357 THOUSANDS/UL (ref 149–390)
PMV BLD AUTO: 9.7 FL (ref 8.9–12.7)
POTASSIUM SERPL-SCNC: 4.1 MMOL/L (ref 3.5–5.3)
RBC # BLD AUTO: 4.59 MILLION/UL (ref 3.81–5.12)
SODIUM SERPL-SCNC: 134 MMOL/L (ref 135–147)
WBC # BLD AUTO: 14.48 THOUSAND/UL (ref 4.31–10.16)

## 2025-04-06 PROCEDURE — 80048 BASIC METABOLIC PNL TOTAL CA: CPT | Performed by: PHYSICIAN ASSISTANT

## 2025-04-06 PROCEDURE — 94640 AIRWAY INHALATION TREATMENT: CPT

## 2025-04-06 PROCEDURE — 94760 N-INVAS EAR/PLS OXIMETRY 1: CPT

## 2025-04-06 PROCEDURE — 99232 SBSQ HOSP IP/OBS MODERATE 35: CPT | Performed by: PHYSICIAN ASSISTANT

## 2025-04-06 PROCEDURE — 99232 SBSQ HOSP IP/OBS MODERATE 35: CPT | Performed by: INTERNAL MEDICINE

## 2025-04-06 PROCEDURE — 85027 COMPLETE CBC AUTOMATED: CPT | Performed by: PHYSICIAN ASSISTANT

## 2025-04-06 RX ADMIN — IPRATROPIUM BROMIDE AND ALBUTEROL SULFATE 3 ML: 2.5; .5 SOLUTION RESPIRATORY (INHALATION) at 20:13

## 2025-04-06 RX ADMIN — AMLODIPINE BESYLATE 5 MG: 5 TABLET ORAL at 08:20

## 2025-04-06 RX ADMIN — LORATADINE 10 MG: 10 TABLET ORAL at 08:20

## 2025-04-06 RX ADMIN — IPRATROPIUM BROMIDE AND ALBUTEROL SULFATE 3 ML: 2.5; .5 SOLUTION RESPIRATORY (INHALATION) at 07:10

## 2025-04-06 RX ADMIN — CEFDINIR 300 MG: 300 CAPSULE ORAL at 08:20

## 2025-04-06 RX ADMIN — PANTOPRAZOLE SODIUM 40 MG: 40 TABLET, DELAYED RELEASE ORAL at 07:20

## 2025-04-06 RX ADMIN — IPRATROPIUM BROMIDE AND ALBUTEROL SULFATE 3 ML: 2.5; .5 SOLUTION RESPIRATORY (INHALATION) at 13:49

## 2025-04-06 RX ADMIN — NEOMYCIN SULFATE, POLYMYXIN B SULFATE AND DEXAMETHASONE 1 DROP: 3.5; 10000; 1 SUSPENSION/ DROPS OPHTHALMIC at 23:02

## 2025-04-06 RX ADMIN — PREDNISONE 40 MG: 20 TABLET ORAL at 08:20

## 2025-04-06 RX ADMIN — BISOPROLOL FUMARATE 10 MG: 5 TABLET ORAL at 08:20

## 2025-04-06 RX ADMIN — CLOPIDOGREL 75 MG: 75 TABLET ORAL at 08:20

## 2025-04-06 RX ADMIN — Medication 1000 MCG: at 08:20

## 2025-04-06 RX ADMIN — Medication 400 MG: at 08:20

## 2025-04-06 RX ADMIN — ALPRAZOLAM 0.5 MG: 0.5 TABLET ORAL at 00:10

## 2025-04-06 NOTE — PLAN OF CARE
Problem: Potential for Falls  Goal: Patient will remain free of falls  Description: INTERVENTIONS:- Educate patient/family on patient safety including physical limitations- Instruct patient to call for assistance with activity - Consult OT/PT to assist with strengthening/mobility - Keep Call bell within reach- Keep bed low and locked with side rails adjusted as appropriate- Keep care items and personal belongings within reach- Initiate and maintain comfort rounds- Make Fall Risk Sign visible to staff- Offer Toileting every 2 Hours, in advance of need- Initiate/Maintain bed alarm- Obtain necessary fall risk management equipment: walker- Apply yellow socks and bracelet for high fall risk patients- Consider moving patient to room near nurses station  Outcome: Progressing     Problem: RESPIRATORY - ADULT  Goal: Achieves optimal ventilation and oxygenation  Description: INTERVENTIONS:- Assess for changes in respiratory status- Assess for changes in mentation and behavior- Position to facilitate oxygenation and minimize respiratory effort- Oxygen administered by appropriate delivery if ordered- Initiate smoking cessation education as indicated- Encourage broncho-pulmonary hygiene including cough, deep breathe, Incentive Spirometry- Assess the need for suctioning and aspirate as needed- Assess and instruct to report SOB or any respiratory difficulty- Respiratory Therapy support as indicated  Outcome: Progressing

## 2025-04-06 NOTE — PLAN OF CARE
Problem: Potential for Falls  Goal: Patient will remain free of falls  Description: INTERVENTIONS:- Educate patient/family on patient safety including physical limitations- Instruct patient to call for assistance with activity - Consult OT/PT to assist with strengthening/mobility - Keep Call bell within reach- Keep bed low and locked with side rails adjusted as appropriate- Keep care items and personal belongings within reach- Initiate and maintain comfort rounds- Make Fall Risk Sign visible to staff- Offer Toileting every 2 Hours, in advance of need- Initiate/Maintain bed alarm- Obtain necessary fall risk management equipment: walker- Apply yellow socks and bracelet for high fall risk patients- Consider moving patient to room near nurses station  Outcome: Progressing     Problem: PAIN - ADULT  Goal: Verbalizes/displays adequate comfort level or baseline comfort level  Description: Interventions:- Encourage patient to monitor pain and request assistance- Assess pain using appropriate pain scale- Administer analgesics based on type and severity of pain and evaluate response- Implement non-pharmacological measures as appropriate and evaluate response- Consider cultural and social influences on pain and pain management- Notify physician/advanced practitioner if interventions unsuccessful or patient reports new pain  Outcome: Progressing     Problem: INFECTION - ADULT  Goal: Absence or prevention of progression during hospitalization  Description: INTERVENTIONS:- Assess and monitor for signs and symptoms of infection- Monitor lab/diagnostic results- Monitor all insertion sites, i.e. indwelling lines, tubes, and drains- Monitor endotracheal if appropriate and nasal secretions for changes in amount and color- [place] appropriate cooling/warming therapies per order- Administer medications as ordered- Instruct and encourage patient and family to use good hand hygiene technique- Identify and instruct in appropriate  isolation precautions for identified infection/condition  Outcome: Progressing  Goal: Absence of fever/infection during neutropenic period  Description: INTERVENTIONS:- Monitor WBC  Outcome: Progressing     Problem: SAFETY ADULT  Goal: Maintain or return to baseline ADL function  Description: INTERVENTIONS:-  Assess patient's ability to carry out ADLs; assess patient's baseline for ADL function and identify physical deficits which impact ability to perform ADLs (bathing, care of mouth/teeth, toileting, grooming, dressing, etc.)- Assess/evaluate cause of self-care deficits - Assess range of motion- Assess patient's mobility; develop plan if impaired- Assess patient's need for assistive devices and provide as appropriate- Encourage maximum independence but intervene and supervise when necessary- Involve family in performance of ADLs- Assess for home care needs following discharge - Consider OT consult to assist with ADL evaluation and planning for discharge- Provide patient education as appropriate  Outcome: Progressing  Goal: Maintains/Returns to pre admission functional level  Description: INTERVENTIONS:- Perform AM-PAC 6 Click Basic Mobility/ Daily Activity assessment daily.- Set and communicate daily mobility goal to care team and patient/family/caregiver. - Collaborate with rehabilitation services on mobility goals if consulted- Perform Range of Motion 3 times a day.- Reposition patient every 2 hours.- Dangle patient 3 times a day- Stand patient 3 times a day- Ambulate patient 3 times a day- Out of bed to chair 3 times a day - Out of bed for meals 3 times a day- Out of bed for toileting- Record patient progress and toleration of activity level   Outcome: Progressing     Problem: DISCHARGE PLANNING  Goal: Discharge to home or other facility with appropriate resources  Description: INTERVENTIONS:- Identify barriers to discharge w/patient and caregiver- Arrange for needed discharge resources and transportation as  appropriate- Identify discharge learning needs (meds, wound care, etc.)- Arrange for interpretive services to assist at discharge as needed- Refer to Case Management Department for coordinating discharge planning if the patient needs post-hospital services based on physician/advanced practitioner order or complex needs related to functional status, cognitive ability, or social support system  Outcome: Progressing     Problem: Knowledge Deficit  Goal: Patient/family/caregiver demonstrates understanding of disease process, treatment plan, medications, and discharge instructions  Description: Complete learning assessment and assess knowledge base.Interventions:- Provide teaching at level of understanding- Provide teaching via preferred learning methods  Outcome: Progressing     Problem: CARDIOVASCULAR - ADULT  Goal: Maintains optimal cardiac output and hemodynamic stability  Description: INTERVENTIONS:- Monitor I/O, vital signs and rhythm- Monitor for S/S and trends of decreased cardiac output- Administer and titrate ordered vasoactive medications to optimize hemodynamic stability- Assess quality of pulses, skin color and temperature- Assess for signs of decreased coronary artery perfusion- Instruct patient to report change in severity of symptoms  Outcome: Progressing  Goal: Absence of cardiac dysrhythmias or at baseline rhythm  Description: INTERVENTIONS:- Continuous cardiac monitoring, vital signs, obtain 12 lead EKG if ordered- Administer antiarrhythmic and heart rate control medications as ordered- Monitor electrolytes and administer replacement therapy as ordered  Outcome: Progressing     Problem: RESPIRATORY - ADULT  Goal: Achieves optimal ventilation and oxygenation  Description: INTERVENTIONS:- Assess for changes in respiratory status- Assess for changes in mentation and behavior- Position to facilitate oxygenation and minimize respiratory effort- Oxygen administered by appropriate delivery if ordered-  Initiate smoking cessation education as indicated- Encourage broncho-pulmonary hygiene including cough, deep breathe, Incentive Spirometry- Assess the need for suctioning and aspirate as needed- Assess and instruct to report SOB or any respiratory difficulty- Respiratory Therapy support as indicated  Outcome: Progressing     Problem: CONFUSION/THOUGHT DISTURBANCE  Goal: Thought disturbances (confusion, delirium, depression, dementia or psychosis) are managed to maintain or return to baseline mental status and functional level  Description: INTERVENTIONS:- Assess for possible contributors to  thought disturbance, including but not limited to medications, infection, impaired vision or hearing, underlying metabolic abnormalities, dehydration, respiratory compromise,  psychiatric diagnoses and notify attending PHYSICAN/AP- Monitor and intervene to maintain adequate nutrition, hydration, elimination, sleep and activity- Decrease environmental stimuli, including noise as appropriate.- Provide frequent contacts to provide refocusing, direction and reassurance as needed. Approach patient calmly with eye contact and at their level.- [place] high risk fall precautions, aspiration precautions and other safety measures, as indicated- If delirium suspected, notify physician/AP of change in condition and request immediate in-person evaluation- Pursue consults as appropriate including Geriatric (campus dependent), OT for cognitive evaluation/activity planning, psychiatric, pastoral care, etc.  Outcome: Progressing     Problem: SAFETY,RESTRAINT: NV/NON-SELF DESTRUCTIVE BEHAVIOR  Goal: Remains free of harm/injury (restraint for non violent/non self-detsructive behavior)  Description: INTERVENTIONS:- Instruct patient/family regarding restraint use - Assess and monitor physiologic and psychological status - Provide interventions and comfort measures to meet assessed patient needs - Identify and implement measures to help patient  regain control- Assess readiness for release of restraint   Outcome: Progressing  Goal: Returns to optimal restraint-free functioning  Description: INTERVENTIONS:- Assess the patient's behavior and symptoms that indicate continued need for restraint- Identify and implement measures to help patient regain control- Assess readiness for release of restraint   Outcome: Progressing     Problem: Prexisting or High Potential for Compromised Skin Integrity  Goal: Skin integrity is maintained or improved  Description: INTERVENTIONS:- Identify patients at risk for skin breakdown- Assess and monitor skin integrity- Assess and monitor nutrition and hydration status- Monitor labs - Assess for incontinence - Turn and reposition patient- Assist with mobility/ambulation- Relieve pressure over bony prominences- Avoid friction and shearing- Provide appropriate hygiene as needed including keeping skin clean and dry- Evaluate need for skin moisturizer/barrier cream- Collaborate with interdisciplinary team - Patient/family teaching- Consider wound care consult   Outcome: Progressing

## 2025-04-06 NOTE — PROGRESS NOTES
"Progress Note - Pulmonology   Name: Emily Berrios 79 y.o. female I MRN: [id number]  Unit/Bed#: [address] 220[address]01 I Date of Admission: 4/1/2025   Date of Service: 4/6/2025 I Hospital Day: 5       Assessment:  Acute hypoxemic and hypercapnic respiratory failure.  Chronic obstructive pulmonary disease with acute exacerbation.  Abnormal CT scan of the chest with nodular opacities consistent with inflammatory/infectious.  History of tobacco use.    Plan:  Overnight pulse oximetry on 2 L oxygen without the BiPAP.  Arterial blood gas in the morning.  Prednisone 40 mg with a slow taper.  Bronchodilators.  Titrate oxygen to maintain O2 saturation above 88%.    Subjective:   The patient is feeling better.  She has occasional cough.  She is still on the oxygen.  Tolerated the BiPAP last night.    Vitals: Blood pressure 136/57, pulse 76, temperature 98.3 °F (36.8 °C), temperature source Oral, resp. rate 18, height 5' 1\" (1.549 m), weight 66.8 kg (147 lb 4.3 oz), SpO2 90%., Body mass index is 27.83 kg/m².      Intake/Output Summary (Last 24 hours) at 4/6/2025 1505  Last data filed at 4/6/2025 1230  Gross per 24 hour   Intake 720 ml   Output 750 ml   Net -30 ml       Physical Exam  Gen: Awake, alert, oriented x 3, no acute distress  HEENT: Mucous membranes moist, no oral lesions, no thrush  NECK: No accessory muscle use, JVP not elevated  Cardiac: Regular, single S1, single S2, no murmurs, no rubs, no gallops  Lungs: Decreased breath sounds.  No wheezing or rhonchi.  Abdomen: normoactive bowel sounds, soft nontender, nondistended, no rebound or rigidity, no guarding  Extremities: no cyanosis, no clubbing, no edema    Labs: CBC:   Lab Results   Component Value Date    WBC 14.48 (H) 04/06/2025    HGB 11.7 04/06/2025    HCT 37.7 04/06/2025    MCV 82 04/06/2025     04/06/2025    RBC 4.59 04/06/2025    MCH 25.5 (L) 04/06/2025    MCHC 31.0 (L) 04/06/2025    RDW 16.3 (H) 04/06/2025    MPV 9.7 04/06/2025   , CMP:   Lab Results "   Component Value Date    SODIUM 134 (L) 04/06/2025    K 4.1 04/06/2025    CL 90 (L) 04/06/2025    CO2 38 (H) 04/06/2025    BUN 13 04/06/2025    CREATININE 0.62 04/06/2025    CALCIUM 9.4 04/06/2025    EGFR 86 04/06/2025           Brennan Lama MD

## 2025-04-06 NOTE — PROGRESS NOTES
Progress Note - Hospitalist   Name: Emily Berrios 79 y.o. female I MRN: [id number]  Unit/Bed#: [place] [address] -01 I Date of Admission: 4/1/2025   Date of Service: 4/6/2025 I Hospital Day: 5    Assessment & Plan  COPD with acute exacerbation (HCC)  Patient presented to hospital with complaint of shortness of breath and coughing, found to be wheezing with hypoxia  Admitted for COPD exacerbation  Continue scheduled DuoNebs, respiratory protocol  Started scheduled budesonide and formoterol  Appreciate pulmonology recommendations  Continue Mucinex and doxycycline-switch doxycycline to IV as she was refusing oral  CTA chest results noted, bronchiolitis without pneumonia  Required temporary BiPAP secondary to worsening hypoxia with confusion, suspected secondary to hypercarbia noted on ABG  Appears to be tolerating BiPAP at this time, continue, can wean to nasal cannula when more awake  Cultures growing haemophilus influenzae, transition to ceftriaxone, procalcitonin downtrending  Transitoned to PO prednisone  Suspect d/c tomorrow  Overnight pulse ox on 2L for BIPAP eval  Acute on chronic respiratory failure with hypercapnia (HCC)  Patient hypoxic to 80% at time of presentation  Secondary to COPD saturation  Was stable on 3 L nasal cannula yesterday but developed worsening shortness of breath, confusion and hypoxia overnight and was placed on BiPAP  Continue BiPAP PRN  Appears more alert and awake on the BiPAP this morning, suspect we can wean back to nasal cannula later today  Appreciate pulmonology recommendations  Down to 2 L via nasal cannula  Overnight pulse ox  Hypomagnesemia  Magnesium 1.0 at time of admission  Repleted orally and IV, continue oral twice daily  Stable  Chronic coronary artery disease  Continue Plavix, Crestor, bisoprolol  Glaucoma  Continue eyedrops  Hyperlipidemia  Continue statin  Cognitive decline  Fall/aspiration precautions  Abnormal EKG  Likely from profound hypomagnesemia  Concern for V.  tach overnight, reviewed telemetry today, V. tach not noted in all leads, likely artifact  Repeat EKG with improvement  Monitored on telemetry, has since been discontinued  Acute metabolic encephalopathy  Patient developed acute onset hallucinations, paranoia and psychosis overnight  Does have prior history of hospital dosed delirium  Attempt to limit steroids, will dose in the morning rather than the evening  Continue antibiotic treatment  Will start low-dose at bedtime Seroquel tonight as needed and monitor response  Likely component of hypercapnia, monitor with BiPAP  Family also notes patient continues to drink alcohol daily, placed on CIWA protocol  Improved  Hyponatremia  Sodium continues to fall to 128 today  Urine studies reviewed  Did require IV diuretics overnight for hypoxia  Continue to encourage oral intake  Monitor daily weights  Improved to 136  Acute hypoxic respiratory failure (HCC)  Secondary to H. influenzae pneumonia  Currently on 2 L, continue to wean    VTE Pharmacologic Prophylaxis:   Moderate Risk (Score 3-4) - Pharmacological DVT Prophylaxis Ordered: enoxaparin (Lovenox).    Mobility:   Basic Mobility Inpatient Raw Score: 17  JH-HLM Goal: 5: Stand one or more mins  JH-HLM Achieved: 6: Walk 10 steps or more  JH-HLM Goal achieved. Continue to encourage appropriate mobility.    Patient Centered Rounds: I performed bedside rounds with nursing staff today.   Discussions with Specialists or Other Care Team Provider: Reviewed pulmonology note    Education and Discussions with Family / Patient: Updated  (daughter) via phone.    Current Length of Stay: 5 day(s)  Current Patient Status: Inpatient   Certification Statement: The patient will continue to require additional inpatient hospital stay due to oxygen evaluation  Discharge Plan: Anticipate discharge in 24-48 hrs to home.    Code Status: Level 1 - Full Code    Subjective   No acute events overnight.  Patient continues to report she  is feeling better.  However, she does have a chronic cough which is bothering her.    Objective :  Temp:  [98.1 °F (36.7 °C)-98.3 °F (36.8 °C)] 98.3 °F (36.8 °C)  HR:  [69-95] 76  BP: (136-169)/(57-78) 136/57  Resp:  [18-20] 18  SpO2:  [89 %-97 %] 89 %  O2 Device: Nasal cannula  Nasal Cannula O2 Flow Rate (L/min):  [2 L/min] 2 L/min    Body mass index is 27.83 kg/m².     Input and Output Summary (last 24 hours):     Intake/Output Summary (Last 24 hours) at 4/6/2025 1205  Last data filed at 4/6/2025 0300  Gross per 24 hour   Intake 440 ml   Output 750 ml   Net -310 ml       Physical Exam  Vitals and nursing note reviewed.   Constitutional:       Appearance: Normal appearance.   HENT:      Head: Normocephalic and atraumatic.      Mouth/Throat:      Mouth: Mucous membranes are moist.      Pharynx: Oropharynx is clear. No oropharyngeal exudate.   Eyes:      Extraocular Movements: Extraocular movements intact.   Cardiovascular:      Rate and Rhythm: Normal rate and regular rhythm.      Pulses: Normal pulses.      Heart sounds: Normal heart sounds. No murmur heard.     No friction rub. No gallop.   Pulmonary:      Effort: Pulmonary effort is normal. No respiratory distress.      Breath sounds: Normal breath sounds. No stridor. No wheezing or rales.      Comments: Still requiring 2 L, however breath sounds improved  Abdominal:      General: Abdomen is flat. Bowel sounds are normal. There is no distension.      Palpations: Abdomen is soft.      Tenderness: There is no abdominal tenderness.   Musculoskeletal:      Right lower leg: No edema.      Left lower leg: No edema.   Skin:     General: Skin is warm and dry.   Neurological:      General: No focal deficit present.      Mental Status: She is alert and oriented to person, place, and time.           Lines/Drains:              Lab Results: I have reviewed the following results:   Results from last 7 days   Lab Units 04/06/25  0345 04/02/25  0236 04/01/25  0152   WBC  Thousand/uL 14.48*   < > 15.03*   HEMOGLOBIN g/dL 11.7   < > 12.0   HEMATOCRIT % 37.7   < > 38.2   PLATELETS Thousands/uL 357   < > 311   SEGS PCT %  --   --  74   LYMPHO PCT %  --   --  15   MONO PCT %  --   --  10   EOS PCT %  --   --  1    < > = values in this interval not displayed.     Results from last 7 days   Lab Units 04/06/25  0345 04/02/25  0236 04/01/25  0152   SODIUM mmol/L 134*   < > 134*   POTASSIUM mmol/L 4.1   < > 3.5   CHLORIDE mmol/L 90*   < > 94*   CO2 mmol/L 38*   < > 29   BUN mg/dL 13   < > 9   CREATININE mg/dL 0.62   < > 0.80   ANION GAP mmol/L 6   < > 11   CALCIUM mg/dL 9.4   < > 9.1   ALBUMIN g/dL  --   --  4.3   TOTAL BILIRUBIN mg/dL  --   --  0.68   ALK PHOS U/L  --   --  61   ALT U/L  --   --  14   AST U/L  --   --  13   GLUCOSE RANDOM mg/dL 164*   < > 179*    < > = values in this interval not displayed.     Results from last 7 days   Lab Units 04/01/25  0149   INR  1.10     Results from last 7 days   Lab Units 04/04/25  2118   POC GLUCOSE mg/dl 235*         Results from last 7 days   Lab Units 04/03/25  0243 04/01/25  1159 04/01/25  0149   LACTIC ACID mmol/L  --   --  1.0   PROCALCITONIN ng/ml 0.16 0.37* 0.33*       Recent Cultures (last 7 days):   Results from last 7 days   Lab Units 04/02/25  1230 04/02/25  1129 04/01/25  0216 04/01/25  0203   BLOOD CULTURE   --   --  No Growth After 5 Days. No Growth After 5 Days.   SPUTUM CULTURE   --  4+ Growth of Haemophilus influenzae*  4+ Growth of  --   --    GRAM STAIN RESULT   --  3+ Polys*  3+ Gram positive cocci in pairs*  4+ Gram negative coccobacilli*  1+ Gram negative diplococci*  2+ Epithelial cells per low power field*  --   --    LEGIONELLA URINARY ANTIGEN  Negative  --   --   --        Imaging Results Review: No pertinent imaging studies reviewed.  Other Study Results Review: No additional pertinent studies reviewed.    Last 24 Hours Medication List:     Current Facility-Administered Medications:     acetaminophen (TYLENOL)  tablet 650 mg, Q6H PRN    albuterol (PROVENTIL HFA,VENTOLIN HFA) inhaler 2 puff, Q4H PRN    ALPRAZolam (XANAX) tablet 0.5 mg, HS PRN    amLODIPine (NORVASC) tablet 5 mg, BID    bisoprolol (ZEBETA) tablet 10 mg, Daily    cefdinir (OMNICEF) capsule 300 mg, Q12H KENIA    clopidogrel (PLAVIX) tablet 75 mg, Daily    cyanocobalamin (VITAMIN B-12) tablet 1,000 mcg, Daily    enoxaparin (LOVENOX) subcutaneous injection 40 mg, Daily    estrogens, conjugated (Premarin) vaginal cream 0.5 g, Once per day on Monday Wednesday Friday    fluticasone (FLONASE) 50 mcg/act nasal spray 2 spray, BID    guaiFENesin (ROBITUSSIN) oral liquid 400 mg, Q6H PRN    hydrOXYzine HCL (ATARAX) tablet 25 mg, Q6H PRN    ipratropium-albuterol (DUO-NEB) 0.5-2.5 mg/3 mL inhalation solution 3 mL, TID    ipratropium-albuterol (DUO-NEB) 0.5-2.5 mg/3 mL inhalation solution 3 mL, Q6H PRN    loratadine (CLARITIN) tablet 10 mg, Daily    magnesium Oxide (MAG-OX) tablet 400 mg, BID    neomycin-polymyxin-dexamethasone (MAXITROL) ophthalmic suspension 1 drop, TID    pantoprazole (PROTONIX) EC tablet 40 mg, Daily Before Breakfast    pravastatin (PRAVACHOL) tablet 80 mg, Daily With Dinner    predniSONE tablet 40 mg, Daily    QUEtiapine (SEROquel) tablet 12.5 mg, HS PRN    Administrative Statements   Today, Patient Was Seen By: Eros Bates PA-C      **Please Note: This note may have been constructed using a voice recognition system.**

## 2025-04-06 NOTE — ASSESSMENT & PLAN NOTE
Patient hypoxic to 80% at time of presentation  Secondary to COPD saturation  Was stable on 3 L nasal cannula yesterday but developed worsening shortness of breath, confusion and hypoxia overnight and was placed on BiPAP  Continue BiPAP PRN  Appears more alert and awake on the BiPAP this morning, suspect we can wean back to nasal cannula later today  Appreciate pulmonology recommendations  Down to 2 L via nasal cannula  Overnight pulse ox

## 2025-04-06 NOTE — ASSESSMENT & PLAN NOTE
Patient presented to hospital with complaint of shortness of breath and coughing, found to be wheezing with hypoxia  Admitted for COPD exacerbation  Continue scheduled DuoNebs, respiratory protocol  Started scheduled budesonide and formoterol  Appreciate pulmonology recommendations  Continue Mucinex and doxycycline-switch doxycycline to IV as she was refusing oral  CTA chest results noted, bronchiolitis without pneumonia  Required temporary BiPAP secondary to worsening hypoxia with confusion, suspected secondary to hypercarbia noted on ABG  Appears to be tolerating BiPAP at this time, continue, can wean to nasal cannula when more awake  Cultures growing haemophilus influenzae, transition to ceftriaxone, procalcitonin downtrending  Transitoned to PO prednisone  Suspect d/c tomorrow  Overnight pulse ox on 2L for BIPAP eval

## 2025-04-07 ENCOUNTER — APPOINTMENT (INPATIENT)
Dept: RADIOLOGY | Facility: HOSPITAL | Age: 80
DRG: 190 | End: 2025-04-07
Payer: MEDICARE

## 2025-04-07 LAB
ANION GAP SERPL CALCULATED.3IONS-SCNC: 7 MMOL/L (ref 4–13)
ARTERIAL PATENCY WRIST A: YES
BASE EXCESS BLDA CALC-SCNC: 11.3 MMOL/L
BUN SERPL-MCNC: 15 MG/DL (ref 5–25)
CALCIUM SERPL-MCNC: 9.8 MG/DL (ref 8.4–10.2)
CHLORIDE SERPL-SCNC: 91 MMOL/L (ref 96–108)
CO2 SERPL-SCNC: 37 MMOL/L (ref 21–32)
CREAT SERPL-MCNC: 0.63 MG/DL (ref 0.6–1.3)
ERYTHROCYTE [DISTWIDTH] IN BLOOD BY AUTOMATED COUNT: 16.5 % (ref 11.6–15.1)
GFR SERPL CREATININE-BSD FRML MDRD: 85 ML/MIN/1.73SQ M
GLUCOSE SERPL-MCNC: 106 MG/DL (ref 65–140)
HCO3 BLDA-SCNC: 36.6 MMOL/L (ref 22–28)
HCT VFR BLD AUTO: 40.9 % (ref 34.8–46.1)
HGB BLD-MCNC: 12.4 G/DL (ref 11.5–15.4)
MCH RBC QN AUTO: 24.7 PG (ref 26.8–34.3)
MCHC RBC AUTO-ENTMCNC: 30.3 G/DL (ref 31.4–37.4)
MCV RBC AUTO: 81 FL (ref 82–98)
NASAL CANNULA: 2
O2 CT BLDA-SCNC: 16.6 ML/DL (ref 16–23)
OXYHGB MFR BLDA: 90.9 % (ref 94–97)
PCO2 BLDA: 50.7 MM HG (ref 36–44)
PH BLDA: 7.48 [PH] (ref 7.35–7.45)
PLATELET # BLD AUTO: 418 THOUSANDS/UL (ref 149–390)
PMV BLD AUTO: 9.9 FL (ref 8.9–12.7)
PO2 BLDA: 60.2 MM HG (ref 75–129)
POTASSIUM SERPL-SCNC: 4.4 MMOL/L (ref 3.5–5.3)
RBC # BLD AUTO: 5.03 MILLION/UL (ref 3.81–5.12)
SODIUM SERPL-SCNC: 135 MMOL/L (ref 135–147)
SPECIMEN SOURCE: ABNORMAL
WBC # BLD AUTO: 18.28 THOUSAND/UL (ref 4.31–10.16)

## 2025-04-07 PROCEDURE — 99232 SBSQ HOSP IP/OBS MODERATE 35: CPT | Performed by: INTERNAL MEDICINE

## 2025-04-07 PROCEDURE — 94760 N-INVAS EAR/PLS OXIMETRY 1: CPT

## 2025-04-07 PROCEDURE — 97116 GAIT TRAINING THERAPY: CPT

## 2025-04-07 PROCEDURE — 82805 BLOOD GASES W/O2 SATURATION: CPT | Performed by: PHYSICIAN ASSISTANT

## 2025-04-07 PROCEDURE — 36600 WITHDRAWAL OF ARTERIAL BLOOD: CPT

## 2025-04-07 PROCEDURE — 85027 COMPLETE CBC AUTOMATED: CPT | Performed by: PHYSICIAN ASSISTANT

## 2025-04-07 PROCEDURE — 71045 X-RAY EXAM CHEST 1 VIEW: CPT

## 2025-04-07 PROCEDURE — 99232 SBSQ HOSP IP/OBS MODERATE 35: CPT | Performed by: PHYSICIAN ASSISTANT

## 2025-04-07 PROCEDURE — 94762 N-INVAS EAR/PLS OXIMTRY CONT: CPT

## 2025-04-07 PROCEDURE — 94640 AIRWAY INHALATION TREATMENT: CPT

## 2025-04-07 PROCEDURE — 80048 BASIC METABOLIC PNL TOTAL CA: CPT | Performed by: PHYSICIAN ASSISTANT

## 2025-04-07 PROCEDURE — 97530 THERAPEUTIC ACTIVITIES: CPT

## 2025-04-07 RX ADMIN — CEFDINIR 300 MG: 300 CAPSULE ORAL at 08:18

## 2025-04-07 RX ADMIN — CLOPIDOGREL 75 MG: 75 TABLET ORAL at 08:19

## 2025-04-07 RX ADMIN — PREDNISONE 40 MG: 20 TABLET ORAL at 08:18

## 2025-04-07 RX ADMIN — IPRATROPIUM BROMIDE AND ALBUTEROL SULFATE 3 ML: 2.5; .5 SOLUTION RESPIRATORY (INHALATION) at 19:41

## 2025-04-07 RX ADMIN — IPRATROPIUM BROMIDE AND ALBUTEROL SULFATE 3 ML: 2.5; .5 SOLUTION RESPIRATORY (INHALATION) at 07:22

## 2025-04-07 RX ADMIN — PANTOPRAZOLE SODIUM 40 MG: 40 TABLET, DELAYED RELEASE ORAL at 08:21

## 2025-04-07 RX ADMIN — CEFDINIR 300 MG: 300 CAPSULE ORAL at 19:59

## 2025-04-07 RX ADMIN — AMLODIPINE BESYLATE 5 MG: 5 TABLET ORAL at 08:19

## 2025-04-07 RX ADMIN — BISOPROLOL FUMARATE 10 MG: 5 TABLET ORAL at 08:18

## 2025-04-07 RX ADMIN — IPRATROPIUM BROMIDE AND ALBUTEROL SULFATE 3 ML: 2.5; .5 SOLUTION RESPIRATORY (INHALATION) at 13:38

## 2025-04-07 RX ADMIN — ENOXAPARIN SODIUM 40 MG: 40 INJECTION SUBCUTANEOUS at 08:18

## 2025-04-07 RX ADMIN — LORATADINE 10 MG: 10 TABLET ORAL at 08:18

## 2025-04-07 NOTE — PHYSICAL THERAPY NOTE
Physical Therapy Treatment Note     04/07/25 1556   PT Last Visit   PT Visit Date 04/07/25   Note Type   Note Type Treatment   Pain Assessment   Pain Assessment Tool 0-10   Pain Score No Pain   Restrictions/Precautions   Weight Bearing Precautions Per Order No   Other Precautions Bed Alarm;Cognitive;Fall Risk;O2;Telemetry;Fluid restriction;Impulsive   General   Chart Reviewed Yes   Subjective   Subjective Pt. agreeable to PT   Bed Mobility   Supine to Sit 6  Modified independent   Additional items HOB elevated   Sit to Supine 6  Modified independent   Additional items HOB elevated   Transfers   Sit to Stand 6  Modified independent   Stand to Sit 6  Modified independent   Stand pivot 5  Supervision   Ambulation/Elevation   Gait pattern Forward Flexion;Decreased foot clearance;Excessively slow;Decreased toe off;Decreased heel strike;Step through pattern;Short stride   Gait Assistance 5  Supervision   Additional items Assist x 1;Verbal cues   Assistive Device Rolling walker   Distance ~500ft with 3 brief stops   Balance   Static Sitting Normal   Dynamic Sitting Good   Static Standing Fair   Dynamic Standing Fair -   Ambulatory Fair -   Endurance Deficit   Endurance Deficit Yes   Endurance Deficit Description MARIE and drop in SpO2 stats   Activity Tolerance   Activity Tolerance Patient tolerated treatment well;Other (Comment)  (MARIE)   Nurse Made Aware Yes   Assessment   Prognosis Good   Problem List Decreased endurance;Decreased mobility;Impaired judgement   Assessment Pt. was on RA upon entry and per RN O2 was removed as pt.'s O2 stats were good in the 90s. however with this therapist Spo2 stats noted to be 79-83% for most of the resting time and RN and PA was informed and pt. was placed back on 2L o2 for the session and post session. SpO2 stats on 2L still noted to be at 89% soon after amb. Both PA and RN aware of it. pt. noted with no LOB however needed standing rests due to MARIE. Pt. back in bed post session with bed  alarm engaged and all needs within reach. Will continue to follow per PT POC. Therapist managed the O2 tank and lines during mobility. Will continue to follow epr PT POC. Cues for breathing given to pt. post amb.   Barriers to Discharge None   Goals   Patient Goals Go home   STG Expiration Date 04/11/25   PT Treatment Day 1   Plan   Treatment/Interventions Functional transfer training;Spoke to nursing;Spoke to MD;Gait training;Bed mobility;Equipment eval/education;Patient/family training;Endurance training   Progress Progressing toward goals   PT Frequency 2-3x/wk   Discharge Recommendation   Rehab Resource Intensity Level, PT III (Minimum Resource Intensity)   Equipment Recommended Walker   AM-PAC Basic Mobility Inpatient   Turning in Flat Bed Without Bedrails 4   Lying on Back to Sitting on Edge of Flat Bed Without Bedrails 4   Moving Bed to Chair 4   Standing Up From Chair Using Arms 4   Walk in Room 4   Climb 3-5 Stairs With Railing 3   Basic Mobility Inpatient Raw Score 23   Basic Mobility Standardized Score 50.88   [facility] Highest Level Of Mobility   -HLM Goal 7: Walk 25 feet or more   JH-HLM Achieved 8: Walk 250 feet ot more   End of Consult   Patient Position at End of Consult All needs within reach;Bed/Chair alarm activated;Supine           Indio Aragon PTA    An AM-PAC basic mobility standardized score less than 42.9 suggest the patient may benefit from discharge to post-acute rehab services.

## 2025-04-07 NOTE — ASSESSMENT & PLAN NOTE
"  Opioids can cause bad reactions. If you take more opioids than your body can handle (overdose), your breathing can slow too much or even stop. Naloxone blocks the effects of opioids on the brain, which can quickly reverse an overdose.   Naloxone cannot be used to get high and is not addictive. It is safe and effective. Emergency medical professionals have used it to save lives for decades.  Naloxone does not prevent deaths caused by other drugs, such as bath salts, cocaine, methamphetamine, alcohol and benzodiazepines: alprazolam, clonazepam, diazepam, lorazepam.  Who can carry or administer naloxone?  In [place], anyone at risk for having a drug overdose or witnessing one can get a prescription for naloxone. Users, family members and concerned friends can all carry naloxone in the same way people with allergies are allowed to carry an epinephrine syringe (\"epi-pen\").   For safety, store naloxone in a locked cabinet or other space that is out of the reach of children and pets.  When should naloxone be given?  If you suspect an opioid overdose, look for these symptoms:  Breathing slows or stops; or, person has pinpoint pupils and won't wake up  No response, even if you shake them or say  their name  Lips and fingernails turn blue, purple or gray  Skin gets painful or clammy  Slowed heartbeat and/or low blood pressure  Even if you have reversed an overdose with naloxone, always call 911. Naloxone usually wears off in 30 to 90 minutes. The person can stop breathing again unless more naloxone is available. An overdose victim will also need other care. For these reasons, it is safest to call 911 and get medical care right away.  How long does naloxone take to work?  Naloxone begins to work in 2 to 5 minutes.  If the person doesn't wake up in 3 minutes, bystanders should give a second dose.  Rescue breathing should be done while you wait for the naloxone to work. This will make sure the person gets enough oxygen to " Severe COPD, Group E  Patient noncompliant with Trelegy as she has been having difficulty using this inhaler.   Spirometry in office with severe obstruction  She has had no difficulty with HFA such as albuterol.  Productive cough, increased sputum production, SOB  Has been using albuterol more than 3 times per day.  Likely in setting of acute bronchitis.  Sputum culture: + Haemophilus influenzae     "the brain.  How do you give naloxone?  Bystanders can safely and legally spray naloxone into the nose (nasal spray) or inject it into the thigh.   Nasal spray (with assembly): Place the foam tip (atomizer) onto a syringe and put into the nostril. Spray one half of the capsule (1 mg) into each nostril.  Nasal spray (no assembly needed): Spray up one nostril by pushing the plunger.  Injection into the muscle (with assembly or by auto-injector): Inject into the outer thigh. In an emergency, it is safe to inject through clothing. The auto-injector contains a speaker that provides step-by-step instructions.  Can naloxone harm someone?  No. It is safe to give naloxone any time you suspect an overdose. (It will not hurt if you are wrong about it being an overdose.) People who receive naloxone for an overdose may wake up and go into withdrawal. Withdrawal can be miserable, but it is better than dying.   Symptoms of withdrawal include:  Feeling nervous, restless or irritable  Body aches  Dizziness or weakness  Diarrhea, stomach pain or feeling a little sick  Fever, chills or goose bumps  Sneezing or runny nose  Talk with your doctor about how to deal with these and other feelings of withdrawal.   Is naloxone just a \"safety net\" that allows users to use even more?  Research has found that having naloxone available does not encourage people to use opioids more. The goal of distributing naloxone and educating people about how to prevent, recognize and intervene in overdoses is to prevent deaths. Other goals, such as decreasing drug use, can only be met if the user is alive.  What are the side effects of naloxone?  Call 911 right away if you have:  An allergic reaction, such as large bumps on your skin (hives), trouble breathing, swelling of the face, lips, tongue or throat. (Don't drive or perform unsafe tasks until you know how you will react to naloxone.)  Chest pain or fast or irregular heart beat  Increase in blood " pressure  Muscle pain or cramping  Nasal dryness, congestion or inflammation  Shortness of breath  Sweating, feeling very sick to your stomach or throwing up  Bad headache, agitation, anxiety, confusion or ringing in your ears  Seizures (convulsions)  Dizziness, fainting or feeling like you might pass out  For informational purposes only. Not to replace the advice of your health care provider. Copyright   2015 [facility]. All rights reserved. Clinically reviewed by [facility]. BERD [id number] - REV 04/21.      Instructions:     To manage symptoms of withdrawal, please take your medicines as directed.     If symptoms are severe and the medicines don t help, return to the emergency room.  You will be evaluated and treated for withdrawal symptoms which may include additional buprenorphine (Suboxone, Subutex).

## 2025-04-07 NOTE — PROGRESS NOTES
Progress Note - Hospitalist   Name: Emily Berrios 79 y.o. female I MRN: [id number]  Unit/Bed#: [place] [address] -01 I Date of Admission: 4/1/2025   Date of Service: 4/7/2025 I Hospital Day: 6    Assessment & Plan  COPD with acute exacerbation (HCC)  Patient presented to hospital with complaint of shortness of breath and coughing, found to be wheezing with hypoxia  Admitted for COPD exacerbation  Continue scheduled DuoNebs, respiratory protocol  Started scheduled budesonide and formoterol  Appreciate pulmonology recommendations  Continue Mucinex and doxycycline-switch doxycycline to IV as she was refusing oral  CTA chest results noted, bronchiolitis without pneumonia  Required temporary BiPAP secondary to worsening hypoxia with confusion, suspected secondary to hypercarbia noted on ABG  Appears to be tolerating BiPAP at this time, continue, can wean to nasal cannula when more awake  Cultures growing haemophilus influenzae, now on cefdinir procalcitonin downtrending  Transitoned to PO prednisone  Reviewed overnight pulse ox and ABG.  Does not qualify for BiPAP  Repeat CXR, hopeful discharge tomorrow after home O2 evaluation  Acute on chronic respiratory failure with hypercapnia (HCC)  Patient hypoxic to 80% at time of presentation  Secondary to COPD saturation  Was stable on 3 L nasal cannula yesterday but developed worsening shortness of breath, confusion and hypoxia overnight and was placed on BiPAP  Continue BiPAP PRN  Appears more alert and awake on the BiPAP this morning, suspect we can wean back to nasal cannula later today  Appreciate pulmonology recommendations  Down to 2 L via nasal cannula  Overnight pulse ox  Hypomagnesemia  Magnesium 1.0 at time of admission  Repleted orally and IV, continue oral twice daily  Stable  Chronic coronary artery disease  Continue Plavix, Crestor, bisoprolol  Glaucoma  Continue eyedrops  Hyperlipidemia  Continue statin  Cognitive decline  Fall/aspiration precautions  Abnormal  EKG  Likely from profound hypomagnesemia  Concern for V. tach overnight, reviewed telemetry today, V. tach not noted in all leads, likely artifact  Repeat EKG with improvement  Monitored on telemetry, has since been discontinued  Acute metabolic encephalopathy  Patient developed acute onset hallucinations, paranoia and psychosis overnight  Does have prior history of hospital dosed delirium  Attempt to limit steroids, will dose in the morning rather than the evening  Continue antibiotic treatment  Will start low-dose at bedtime Seroquel tonight as needed and monitor response  Likely component of hypercapnia, monitor with BiPAP  Family also notes patient continues to drink alcohol daily, placed on CIWA protocol  Improved  Hyponatremia  Sodium continues to fall to 128 today  Urine studies reviewed  Did require IV diuretics overnight for hypoxia  Continue to encourage oral intake  Monitor daily weights  Improved to 136  Acute hypoxic respiratory failure (HCC)  Secondary to H. influenzae pneumonia  Currently on 2 L, continue to wean    VTE Pharmacologic Prophylaxis:   Moderate Risk (Score 3-4) - Pharmacological DVT Prophylaxis Contraindicated. Sequential Compression Devices Ordered.  Patient is refusing Lovenox injections    Mobility:   Basic Mobility Inpatient Raw Score: 17  JH-HLM Goal: 5: Stand one or more mins  JH-HLM Achieved: 5: Stand (1 or more minutes)  JH-HLM Goal achieved. Continue to encourage appropriate mobility.    Patient Centered Rounds: I performed bedside rounds with nursing staff today.   Discussions with Specialists or Other Care Team Provider: None, reviewed pulmonology note    Education and Discussions with Family / Patient: Updated  (daughter) via phone.    Current Length of Stay: 6 day(s)  Current Patient Status: Inpatient   Certification Statement: The patient will continue to require additional inpatient hospital stay due to continued hypoxia  Discharge Plan: Anticipate discharge  tomorrow to home.    Code Status: Level 1 - Full Code    Subjective   Patient reports that she would like to be discharged home, however upon entering the room she appeared to have some increased work of breathing.  Additionally, she stated that she did not feel well, but again stated that she did not want to stay in the hospital.  On reevaluation, patient agreed to remain in the hospital for additional night of monitoring.    Other than increased work of breathing, patient denies any chest pain, lightheadedness    Objective :  Temp:  [97.7 °F (36.5 °C)-99.1 °F (37.3 °C)] 97.7 °F (36.5 °C)  HR:  [82-87] 87  BP: (143-173)/(75-90) 155/78  Resp:  [18] 18  SpO2:  [93 %-96 %] 93 %  O2 Device: None (Room air)  Nasal Cannula O2 Flow Rate (L/min):  [2 L/min] 2 L/min    Body mass index is 27.99 kg/m².     Input and Output Summary (last 24 hours):     Intake/Output Summary (Last 24 hours) at 4/7/2025 1554  Last data filed at 4/7/2025 0624  Gross per 24 hour   Intake 360 ml   Output 336 ml   Net 24 ml       Physical Exam  Vitals and nursing note reviewed.   Constitutional:       Appearance: Normal appearance.   HENT:      Head: Normocephalic and atraumatic.      Mouth/Throat:      Mouth: Mucous membranes are moist.      Pharynx: Oropharynx is clear. No oropharyngeal exudate.   Eyes:      Extraocular Movements: Extraocular movements intact.   Cardiovascular:      Rate and Rhythm: Normal rate and regular rhythm.      Pulses: Normal pulses.      Heart sounds: Normal heart sounds. No murmur heard.     No friction rub. No gallop.   Pulmonary:      Effort: Pulmonary effort is normal. No respiratory distress.      Breath sounds: Normal breath sounds. No stridor. No wheezing or rales.   Abdominal:      General: Abdomen is flat. Bowel sounds are normal. There is no distension.      Palpations: Abdomen is soft.      Tenderness: There is no abdominal tenderness.   Musculoskeletal:      Right lower leg: No edema.      Left lower leg: No  edema.   Skin:     General: Skin is warm and dry.   Neurological:      General: No focal deficit present.      Mental Status: She is alert and oriented to person, place, and time.           Lines/Drains:              Lab Results: I have reviewed the following results:   Results from last 7 days   Lab Units 04/07/25  0406 04/02/25  0236 04/01/25  0152   WBC Thousand/uL 18.28*   < > 15.03*   HEMOGLOBIN g/dL 12.4   < > 12.0   HEMATOCRIT % 40.9   < > 38.2   PLATELETS Thousands/uL 418*   < > 311   SEGS PCT %  --   --  74   LYMPHO PCT %  --   --  15   MONO PCT %  --   --  10   EOS PCT %  --   --  1    < > = values in this interval not displayed.     Results from last 7 days   Lab Units 04/07/25 0406 04/02/25  0236 04/01/25  0152   SODIUM mmol/L 135   < > 134*   POTASSIUM mmol/L 4.4   < > 3.5   CHLORIDE mmol/L 91*   < > 94*   CO2 mmol/L 37*   < > 29   BUN mg/dL 15   < > 9   CREATININE mg/dL 0.63   < > 0.80   ANION GAP mmol/L 7   < > 11   CALCIUM mg/dL 9.8   < > 9.1   ALBUMIN g/dL  --   --  4.3   TOTAL BILIRUBIN mg/dL  --   --  0.68   ALK PHOS U/L  --   --  61   ALT U/L  --   --  14   AST U/L  --   --  13   GLUCOSE RANDOM mg/dL 106   < > 179*    < > = values in this interval not displayed.     Results from last 7 days   Lab Units 04/01/25  0149   INR  1.10     Results from last 7 days   Lab Units 04/04/25  2118   POC GLUCOSE mg/dl 235*         Results from last 7 days   Lab Units 04/03/25  0243 04/01/25  1159 04/01/25  0149   LACTIC ACID mmol/L  --   --  1.0   PROCALCITONIN ng/ml 0.16 0.37* 0.33*       Recent Cultures (last 7 days):   Results from last 7 days   Lab Units 04/02/25  1230 04/02/25  1129 04/01/25  0216 04/01/25  0203   BLOOD CULTURE   --   --  No Growth After 5 Days. No Growth After 5 Days.   SPUTUM CULTURE   --  4+ Growth of Haemophilus influenzae*  4+ Growth of  --   --    GRAM STAIN RESULT   --  3+ Polys*  3+ Gram positive cocci in pairs*  4+ Gram negative coccobacilli*  1+ Gram negative diplococci*   2+ Epithelial cells per low power field*  --   --    LEGIONELLA URINARY ANTIGEN  Negative  --   --   --        Imaging Results Review: No pertinent imaging studies reviewed.  Other Study Results Review: No additional pertinent studies reviewed.    Last 24 Hours Medication List:     Current Facility-Administered Medications:     acetaminophen (TYLENOL) tablet 650 mg, Q6H PRN    albuterol (PROVENTIL HFA,VENTOLIN HFA) inhaler 2 puff, Q4H PRN    ALPRAZolam (XANAX) tablet 0.5 mg, HS PRN    amLODIPine (NORVASC) tablet 5 mg, BID    bisoprolol (ZEBETA) tablet 10 mg, Daily    cefdinir (OMNICEF) capsule 300 mg, Q12H KENIA    clopidogrel (PLAVIX) tablet 75 mg, Daily    estrogens, conjugated (Premarin) vaginal cream 0.5 g, Once per day on Monday Wednesday Friday    fluticasone (FLONASE) 50 mcg/act nasal spray 2 spray, BID    guaiFENesin (ROBITUSSIN) oral liquid 400 mg, Q6H PRN    ipratropium-albuterol (DUO-NEB) 0.5-2.5 mg/3 mL inhalation solution 3 mL, TID    ipratropium-albuterol (DUO-NEB) 0.5-2.5 mg/3 mL inhalation solution 3 mL, Q6H PRN    magnesium Oxide (MAG-OX) tablet 400 mg, BID    neomycin-polymyxin-dexamethasone (MAXITROL) ophthalmic suspension 1 drop, TID    pantoprazole (PROTONIX) EC tablet 40 mg, Daily Before Breakfast    pravastatin (PRAVACHOL) tablet 80 mg, Daily With Dinner    predniSONE tablet 40 mg, Daily    QUEtiapine (SEROquel) tablet 12.5 mg, HS PRN    Administrative Statements   Today, Patient Was Seen By: Eros Bates PA-C      **Please Note: This note may have been constructed using a voice recognition system.**

## 2025-04-07 NOTE — ASSESSMENT & PLAN NOTE
Patient presented to hospital with complaint of shortness of breath and coughing, found to be wheezing with hypoxia  Admitted for COPD exacerbation  Continue scheduled DuoNebs, respiratory protocol  Started scheduled budesonide and formoterol  Appreciate pulmonology recommendations  Continue Mucinex and doxycycline-switch doxycycline to IV as she was refusing oral  CTA chest results noted, bronchiolitis without pneumonia  Required temporary BiPAP secondary to worsening hypoxia with confusion, suspected secondary to hypercarbia noted on ABG  Appears to be tolerating BiPAP at this time, continue, can wean to nasal cannula when more awake  Cultures growing haemophilus influenzae, now on cefdinir procalcitonin downtrending  Transitoned to PO prednisone  Reviewed overnight pulse ox and ABG.  Does not qualify for BiPAP  Repeat CXR, hopeful discharge tomorrow after home O2 evaluation

## 2025-04-07 NOTE — PLAN OF CARE
Problem: Potential for Falls  Goal: Patient will remain free of falls  Description: INTERVENTIONS:- Educate patient/family on patient safety including physical limitations- Instruct patient to call for assistance with activity - Consult OT/PT to assist with strengthening/mobility - Keep Call bell within reach- Keep bed low and locked with side rails adjusted as appropriate- Keep care items and personal belongings within reach- Initiate and maintain comfort rounds- Make Fall Risk Sign visible to staff- Offer Toileting every 2 Hours, in advance of need- Initiate/Maintain bed alarm- Obtain necessary fall risk management equipment: walker- Apply yellow socks and bracelet for high fall risk patients- Consider moving patient to room near nurses station  Outcome: Progressing     Problem: RESPIRATORY - ADULT  Goal: Achieves optimal ventilation and oxygenation  Description: INTERVENTIONS:- Assess for changes in respiratory status- Assess for changes in mentation and behavior- Position to facilitate oxygenation and minimize respiratory effort- Oxygen administered by appropriate delivery if ordered- Initiate smoking cessation education as indicated- Encourage broncho-pulmonary hygiene including cough, deep breathe, Incentive Spirometry- Assess the need for suctioning and aspirate as needed- Assess and instruct to report SOB or any respiratory difficulty- Respiratory Therapy support as indicated  Outcome: Progressing     Problem: CONFUSION/THOUGHT DISTURBANCE  Goal: Thought disturbances (confusion, delirium, depression, dementia or psychosis) are managed to maintain or return to baseline mental status and functional level  Description: INTERVENTIONS:- Assess for possible contributors to  thought disturbance, including but not limited to medications, infection, impaired vision or hearing, underlying metabolic abnormalities, dehydration, respiratory compromise,  psychiatric diagnoses and notify attending PHYSICAN/AP- Monitor  and intervene to maintain adequate nutrition, hydration, elimination, sleep and activity- Decrease environmental stimuli, including noise as appropriate.- Provide frequent contacts to provide refocusing, direction and reassurance as needed. Approach patient calmly with eye contact and at their level.- [place] high risk fall precautions, aspiration precautions and other safety measures, as indicated- If delirium suspected, notify physician/AP of change in condition and request immediate in-person evaluation- Pursue consults as appropriate including Geriatric (campus dependent), OT for cognitive evaluation/activity planning, psychiatric, pastoral care, etc.  Outcome: Progressing

## 2025-04-07 NOTE — PLAN OF CARE
Problem: Potential for Falls  Goal: Patient will remain free of falls  Description: INTERVENTIONS:- Educate patient/family on patient safety including physical limitations- Instruct patient to call for assistance with activity - Consult OT/PT to assist with strengthening/mobility - Keep Call bell within reach- Keep bed low and locked with side rails adjusted as appropriate- Keep care items and personal belongings within reach- Initiate and maintain comfort rounds- Make Fall Risk Sign visible to staff- Offer Toileting every 2 Hours, in advance of need- Initiate/Maintain bed alarm- Obtain necessary fall risk management equipment: walker- Apply yellow socks and bracelet for high fall risk patients- Consider moving patient to room near nurses station  Outcome: Progressing     Problem: PAIN - ADULT  Goal: Verbalizes/displays adequate comfort level or baseline comfort level  Description: Interventions:- Encourage patient to monitor pain and request assistance- Assess pain using appropriate pain scale- Administer analgesics based on type and severity of pain and evaluate response- Implement non-pharmacological measures as appropriate and evaluate response- Consider cultural and social influences on pain and pain management- Notify physician/advanced practitioner if interventions unsuccessful or patient reports new pain  Outcome: Progressing     Problem: INFECTION - ADULT  Goal: Absence or prevention of progression during hospitalization  Description: INTERVENTIONS:- Assess and monitor for signs and symptoms of infection- Monitor lab/diagnostic results- Monitor all insertion sites, i.e. indwelling lines, tubes, and drains- Monitor endotracheal if appropriate and nasal secretions for changes in amount and color- [place] appropriate cooling/warming therapies per order- Administer medications as ordered- Instruct and encourage patient and family to use good hand hygiene technique- Identify and instruct in appropriate  isolation precautions for identified infection/condition  Outcome: Progressing  Goal: Absence of fever/infection during neutropenic period  Description: INTERVENTIONS:- Monitor WBC  Outcome: Progressing     Problem: SAFETY ADULT  Goal: Maintain or return to baseline ADL function  Description: INTERVENTIONS:-  Assess patient's ability to carry out ADLs; assess patient's baseline for ADL function and identify physical deficits which impact ability to perform ADLs (bathing, care of mouth/teeth, toileting, grooming, dressing, etc.)- Assess/evaluate cause of self-care deficits - Assess range of motion- Assess patient's mobility; develop plan if impaired- Assess patient's need for assistive devices and provide as appropriate- Encourage maximum independence but intervene and supervise when necessary- Involve family in performance of ADLs- Assess for home care needs following discharge - Consider OT consult to assist with ADL evaluation and planning for discharge- Provide patient education as appropriate  Outcome: Progressing  Goal: Maintains/Returns to pre admission functional level  Description: INTERVENTIONS:- Perform AM-PAC 6 Click Basic Mobility/ Daily Activity assessment daily.- Set and communicate daily mobility goal to care team and patient/family/caregiver. - Collaborate with rehabilitation services on mobility goals if consulted- Perform Range of Motion 3 times a day.- Reposition patient every 2 hours.- Dangle patient 3 times a day- Stand patient 3 times a day- Ambulate patient 3 times a day- Out of bed to chair 3 times a day - Out of bed for meals 3 times a day- Out of bed for toileting- Record patient progress and toleration of activity level   Outcome: Progressing     Problem: DISCHARGE PLANNING  Goal: Discharge to home or other facility with appropriate resources  Description: INTERVENTIONS:- Identify barriers to discharge w/patient and caregiver- Arrange for needed discharge resources and transportation as  appropriate- Identify discharge learning needs (meds, wound care, etc.)- Arrange for interpretive services to assist at discharge as needed- Refer to Case Management Department for coordinating discharge planning if the patient needs post-hospital services based on physician/advanced practitioner order or complex needs related to functional status, cognitive ability, or social support system  Outcome: Progressing     Problem: Knowledge Deficit  Goal: Patient/family/caregiver demonstrates understanding of disease process, treatment plan, medications, and discharge instructions  Description: Complete learning assessment and assess knowledge base.Interventions:- Provide teaching at level of understanding- Provide teaching via preferred learning methods  Outcome: Progressing     Problem: CARDIOVASCULAR - ADULT  Goal: Maintains optimal cardiac output and hemodynamic stability  Description: INTERVENTIONS:- Monitor I/O, vital signs and rhythm- Monitor for S/S and trends of decreased cardiac output- Administer and titrate ordered vasoactive medications to optimize hemodynamic stability- Assess quality of pulses, skin color and temperature- Assess for signs of decreased coronary artery perfusion- Instruct patient to report change in severity of symptoms  Outcome: Progressing  Goal: Absence of cardiac dysrhythmias or at baseline rhythm  Description: INTERVENTIONS:- Continuous cardiac monitoring, vital signs, obtain 12 lead EKG if ordered- Administer antiarrhythmic and heart rate control medications as ordered- Monitor electrolytes and administer replacement therapy as ordered  Outcome: Progressing     Problem: RESPIRATORY - ADULT  Goal: Achieves optimal ventilation and oxygenation  Description: INTERVENTIONS:- Assess for changes in respiratory status- Assess for changes in mentation and behavior- Position to facilitate oxygenation and minimize respiratory effort- Oxygen administered by appropriate delivery if ordered-  Initiate smoking cessation education as indicated- Encourage broncho-pulmonary hygiene including cough, deep breathe, Incentive Spirometry- Assess the need for suctioning and aspirate as needed- Assess and instruct to report SOB or any respiratory difficulty- Respiratory Therapy support as indicated  Outcome: Progressing     Problem: CONFUSION/THOUGHT DISTURBANCE  Goal: Thought disturbances (confusion, delirium, depression, dementia or psychosis) are managed to maintain or return to baseline mental status and functional level  Description: INTERVENTIONS:- Assess for possible contributors to  thought disturbance, including but not limited to medications, infection, impaired vision or hearing, underlying metabolic abnormalities, dehydration, respiratory compromise,  psychiatric diagnoses and notify attending PHYSICAN/AP- Monitor and intervene to maintain adequate nutrition, hydration, elimination, sleep and activity- Decrease environmental stimuli, including noise as appropriate.- Provide frequent contacts to provide refocusing, direction and reassurance as needed. Approach patient calmly with eye contact and at their level.- [place] high risk fall precautions, aspiration precautions and other safety measures, as indicated- If delirium suspected, notify physician/AP of change in condition and request immediate in-person evaluation- Pursue consults as appropriate including Geriatric (campus dependent), OT for cognitive evaluation/activity planning, psychiatric, pastoral care, etc.  Outcome: Progressing     Problem: SAFETY,RESTRAINT: NV/NON-SELF DESTRUCTIVE BEHAVIOR  Goal: Remains free of harm/injury (restraint for non violent/non self-detsructive behavior)  Description: INTERVENTIONS:- Instruct patient/family regarding restraint use - Assess and monitor physiologic and psychological status - Provide interventions and comfort measures to meet assessed patient needs - Identify and implement measures to help patient  regain control- Assess readiness for release of restraint   Outcome: Progressing  Goal: Returns to optimal restraint-free functioning  Description: INTERVENTIONS:- Assess the patient's behavior and symptoms that indicate continued need for restraint- Identify and implement measures to help patient regain control- Assess readiness for release of restraint   Outcome: Progressing     Problem: Prexisting or High Potential for Compromised Skin Integrity  Goal: Skin integrity is maintained or improved  Description: INTERVENTIONS:- Identify patients at risk for skin breakdown- Assess and monitor skin integrity- Assess and monitor nutrition and hydration status- Monitor labs - Assess for incontinence - Turn and reposition patient- Assist with mobility/ambulation- Relieve pressure over bony prominences- Avoid friction and shearing- Provide appropriate hygiene as needed including keeping skin clean and dry- Evaluate need for skin moisturizer/barrier cream- Collaborate with interdisciplinary team - Patient/family teaching- Consider wound care consult   Outcome: Progressing

## 2025-04-07 NOTE — PLAN OF CARE
Problem: PHYSICAL THERAPY ADULT  Goal: Performs mobility at highest level of function for planned discharge setting.  See evaluation for individualized goals.  Description: Treatment/Interventions: Functional transfer training, LE strengthening/ROM, Elevations, Therapeutic exercise, Endurance training, Patient/family training, Bed mobility, Gait training, Spoke to nursing, OT, Family  Equipment Recommended: Walker (pt has)       See flowsheet documentation for full assessment, interventions and recommendations.  Outcome: Progressing  Note: Prognosis: Good  Problem List: Decreased endurance, Decreased mobility, Impaired judgement  Assessment: Pt. was on RA upon entry and per RN O2 was removed as pt.'s O2 stats were good in the 90s. however with this therapist Spo2 stats noted to be 79-83% for most of the resting time and RN and PA was informed and pt. was placed back on 2L o2 for the session and post session. SpO2 stats on 2L still noted to be at 89% soon after amb. Both PA and RN aware of it. pt. noted with no LOB however needed standing rests due to MARIE. Pt. back in bed post session with bed alarm engaged and all needs within reach. Will continue to follow per PT POC. Therapist managed the O2 tank and lines during mobility. Will continue to follow epr PT POC. Cues for breathing given to pt. post amb.  Barriers to Discharge: None     Rehab Resource Intensity Level, PT: III (Minimum Resource Intensity)    See flowsheet documentation for full assessment.

## 2025-04-07 NOTE — PROGRESS NOTES
Progress Note - Pulmonology   Name: Emily Berrios 79 y.o. female I MRN: [id number]  Unit/Bed#: [place] [address] -01 I Date of Admission: 4/1/2025   Date of Service: 4/7/2025 I Hospital Day: 6     Assessment & Plan  Acute hypoxic respiratory failure (HCC)  Does not use home oxygen  Follows with Dr. Lama as outpatient.  CTA chest 4/2025: No pulmonary embolus, patchy nodular opacities which may reflect atypical/viral infection.  Paraseptal emphysema most prominent in the right upper lobe.  Diffuse air trapping mosaicism suggestive of small airways disease.  Small hiatal hernia.  Acute on chronic respiratory failure with hypercapnia (HCC)  Does not use home oxygen  Follows with Dr. Lama as outpatient.  CTA chest 4/2025: No pulmonary embolus, patchy nodular opacities which may reflect atypical/viral infection.  Paraseptal emphysema most prominent in the right upper lobe.  Diffuse air trapping mosaicism suggestive of small airways disease.  Small hiatal hernia.  COPD with acute exacerbation (HCC)  Severe COPD, Group E  Patient noncompliant with Trelegy as she has been having difficulty using this inhaler.   Spirometry in office with severe obstruction  She has had no difficulty with HFA such as albuterol.  Productive cough, increased sputum production, SOB  Has been using albuterol more than 3 times per day.  Likely in setting of acute bronchitis.  Sputum culture: + Haemophilus influenzae    Abnormal CT of the chest    Hyponatremia  Hyponatremia plus relative bradycardia in the setting of possible infection may reflect Legionella infection.  Hiatal hernia  CT chest showing small hiatal hernia  Gastroesophageal reflux disease without esophagitis  On PPI    Plan:  Maintain SpO2 88-92%, wean off supplemental oxygen as tolerated  Patient will require ambulatory pulse oximetry testing 24-48 hours prior to discharge  Cw BIPAP qHS on settings 18/6 30%, RR16. - if patient agreeable  Overnight ABG completed with pCO2 50.7,  overnight pulse ox not obtained. Patient declining BiPAP therapy at this time   Would favor Breztri over Trelegy prior to discharge as patient has difficulty with mechanism of Ellipta  Continue prednisone 40 mg daily PO with taper, decrease by 10 mg every 3 days until gone   Continue Pulmicort, Performist while inpatient.   Continue DuoNebs TID, flutter valve   Sputum cultures + Haemophilus influenzae, urine legionella/strep pneumoniae antigens negative   Continue cefdinir for coverage of Haemophilus influenzae.  Recommend completing a total of 5 days of therapy.  Continue with PPI in setting of GERD/hiatal hernia    24 Hour Events : No acute events overnight. Patient declines to wear BiPAP at night, stating she never needed it before.     Objective :  Temp:  [97.7 °F (36.5 °C)-99.1 °F (37.3 °C)] 97.7 °F (36.5 °C)  HR:  [76-87] 87  BP: (136-173)/(57-90) 155/78  Resp:  [18] 18  SpO2:  [89 %-96 %] 95 %  O2 Device: Nasal cannula  Nasal Cannula O2 Flow Rate (L/min):  [2 L/min] 2 L/min    Physical Exam  Vitals reviewed.   Constitutional:       Appearance: She is well-developed.   HENT:      Head: Normocephalic and atraumatic.   Eyes:      Pupils: Pupils are equal, round, and reactive to light.   Cardiovascular:      Rate and Rhythm: Normal rate and regular rhythm.   Pulmonary:      Effort: Pulmonary effort is normal.      Breath sounds: Decreased breath sounds present.   Abdominal:      General: Bowel sounds are normal.      Palpations: Abdomen is soft.   Musculoskeletal:         General: Normal range of motion.      Cervical back: Normal range of motion and neck supple.      Right lower leg: No edema.   Skin:     General: Skin is warm and dry.   Neurological:      General: No focal deficit present.      Mental Status: She is alert.   Psychiatric:         Mood and Affect: Mood normal.         Behavior: Behavior normal.           Lab Results: I have reviewed the following results:   .     04/07/25  0406   WBC 18.28*   HGB  12.4   HCT 40.9   *   SODIUM 135   K 4.4   CL 91*   CO2 37*   BUN 15   CREATININE 0.63   GLUC 106     ABG:   .     04/07/25  0443   PHART 7.476*   ELS1SBV 50.7*   PO2ART 60.2*   GGB0SMA 36.6*   BEART 11.3       Imaging Results Review: I personally reviewed the following image studies in PACS and associated radiology reports: CT chest. My interpretation of the radiology images/reports is: As above.  Other Study Results Review: Other studies reviewed include: EKG as above  PFT Results Reviewed:      Spirometry in office:  Severe airflow limitation per Dr Kelyb Baca, DO  Pulmonary Critical Care, PGY V   [facility]

## 2025-04-07 NOTE — UTILIZATION REVIEW
Continued Stay Review    Date: 4/6                          Current Patient Class: Inpatient  Current Level of Care: MS    HPI:79 y.o. female initially admitted on 4/1   Current Diagnosis: acute exac COPD, Acute resp failure, low Mag, cog decline, acute metabolic encephalopathy, low NA    Assessment/Plan:   Pt with continued leukocytosis 14 but trending down. .  Remains on DuoNebs, Budesonide and Formoterol. Cultures growing haemophilus influenzae, transition to PO Cefdinir q 12 hr today.  IV Doxycycline d/c. Now on PO Prednisone with slow taper per Pulmonary.  Having overnight pulse ox on 2L for BIPAP eval.  Pt feels better. Tolerated BIPAP overnight.  Continues on oxygen 2L  and has occasional cough.  On exam decreased breath sounds but breath sounds improving.     Medications:   Scheduled Medications:  amLODIPine, 5 mg, Oral, BID  bisoprolol, 10 mg, Oral, Daily  cefdinir, 300 mg, Oral, Q12H KENIA  clopidogrel, 75 mg, Oral, Daily  vitamin B-12, 1,000 mcg, Oral, Daily  enoxaparin, 40 mg, Subcutaneous, Daily  estrogens, conjugated, 0.5 g, Vaginal, Once per day on Monday Wednesday Friday  fluticasone, 2 spray, Nasal, BID  ipratropium-albuterol, 3 mL, Nebulization, TID  loratadine, 10 mg, Oral, Daily  magnesium Oxide, 400 mg, Oral, BID  neomycin-polymyxin-dexamethasone, 1 drop, Ophthalmic, TID  pantoprazole, 40 mg, Oral, Daily Before Breakfast  pravastatin, 80 mg, Oral, Daily With Dinner  predniSONE, 40 mg, Oral, Daily      Continuous IV Infusions:     PRN Meds:  acetaminophen, 650 mg, Oral, Q6H PRN - x 1 4/5  albuterol, 2 puff, Inhalation, Q4H PRN -x 1 4/5  ALPRAZolam, 0.5 mg, Oral, HS PRN - x 1 4/6  guaiFENesin, 400 mg, Oral, Q6H PRN  hydrOXYzine HCL, 25 mg, Oral, Q6H PRN  ipratropium-albuterol, 3 mL, Nebulization, Q6H PRN  QUEtiapine, 12.5 mg, Oral, HS PRN      Discharge Plan: TBD    Vital Signs (last 3 days)       Date/Time Temp Pulse Resp BP MAP (mmHg) SpO2 Calculated FIO2 (%) - Nasal Cannula Nasal Cannula O2  Flow Rate (L/min) O2 Device Chancellor Coma Scale Score Pain    04/07/25 0724 -- -- -- -- -- -- 28 2 L/min Nasal cannula -- --    04/07/25 0723 -- -- -- -- -- 95 % -- -- -- -- --    04/07/25 0705 -- -- -- -- -- -- 28 2 L/min Nasal cannula 15 No Pain    04/07/25 0647 -- -- -- 155/78 -- -- -- -- -- -- --    04/07/25 06:24:41 97.7 °F (36.5 °C) 87 18 173/90 118 94 % -- -- Nasal cannula -- --    04/07/25 0300 -- -- -- -- -- 95 % 28 2 L/min Nasal cannula -- --    04/06/25 2300 -- -- -- -- -- -- -- -- -- 15 No Pain    04/06/25 21:24:26 97.9 °F (36.6 °C) 82 18 143/78 100 95 % -- -- Nasal cannula -- --    04/06/25 2013 -- -- -- -- -- 96 % -- -- -- -- --    04/06/25 16:01:47 99.1 °F (37.3 °C) 85 18 146/75 99 94 % -- -- -- -- --    04/06/25 1351 -- -- -- -- -- -- 28 2 L/min Nasal cannula -- --    04/06/25 1350 -- -- -- -- -- 90 % -- -- -- -- --    04/06/25 08:19:48 -- 76 -- 136/57 83 89 % -- -- -- -- --    04/06/25 0713 -- -- -- -- -- -- 28 2 L/min Nasal cannula 15 6    04/06/25 0712 -- -- -- -- -- 93 % -- -- -- -- --    04/06/25 05:51:50 98.3 °F (36.8 °C) 69 18 138/59 85 94 % -- -- -- -- --    04/05/25 2202 -- -- -- -- -- -- -- -- -- -- 8    04/05/25 21:28:08 98.3 °F (36.8 °C) 85 18 163/77 106 96 % -- -- -- -- --    04/05/25 2000 -- -- -- -- -- -- 28 2 L/min Nasal cannula 15 No Pain    04/05/25 1954 -- -- -- -- -- 97 % -- -- -- -- --    04/05/25 15:12:25 98.1 °F (36.7 °C) 95 20 169/78 108 96 % -- -- -- -- --    04/05/25 1317 -- -- -- -- -- -- 28 2 L/min Nasal cannula -- --    04/05/25 1316 -- -- -- -- -- 93 % -- -- -- -- --    04/05/25 0745 -- -- -- -- -- -- 28 2 L/min Nasal cannula 15 No Pain    04/05/25 07:28:26 98.1 °F (36.7 °C) 92 16 -- -- 100 % -- -- -- -- --    04/05/25 0716 -- -- -- -- -- 93 % 28 2 L/min Nasal cannula -- --    04/04/25 2310 -- -- -- -- -- 95 % -- -- -- -- --    04/04/25 21:23:42 97.7 °F (36.5 °C) 107 20 156/87 110 93 % -- -- -- -- --    04/04/25 2025 -- -- -- -- -- 91 % 36 4 L/min Nasal cannula 15 No Pain     04/04/25 17:44:08 98.2 °F (36.8 °C) 86 -- 147/78 101 96 % -- -- -- -- --    04/04/25 1435 -- -- -- -- -- -- -- -- -- -- No Pain    04/04/25 1402 -- -- -- -- -- 91 % 36 4 L/min Nasal cannula -- --    04/04/25 10:31:52 -- 101 -- 155/81 106 93 % -- -- -- -- --    04/04/25 0755 -- -- -- -- -- -- -- -- Nasal cannula 15 No Pain    04/04/25 07:31:21 97.8 °F (36.6 °C) 111 -- 148/86 107 84 % -- -- -- -- --    04/04/25 0718 -- -- -- -- -- 95 % 28 2 L/min Nasal cannula -- --    04/04/25 0400 -- -- -- -- -- 96 % -- -- -- -- --          Weight (last 2 days)       Date/Time Weight    04/07/25 0543 67.2 (148.15)    04/06/25 0531 66.8 (147.27)    04/05/25 0552 67 (147.71)            Pertinent Labs/Diagnostic Results:   Radiology:  XR chest portable   Final Interpretation by Eros Caicedo MD (04/03 1021)      Increasing pulmonary infiltrates most evident in the right lower lobes concerning for worsening pneumonia. Mild emphysema noted.            Workstation performed: [id number]         CTA chest pe study   Final Interpretation by Ashish Ferrell DO (04/01 0349)      No acute pulmonary embolus identified to the level of the segmental pulmonary arteries.      Patchy groundglass nodular opacities throughout the lungs suggestive of bronchiolitis.            The study was marked in EPIC for immediate notification.      Workstation performed: [id number]           Cardiology:  ECG 12 lead   Final Result by Castillo Browning MD (04/02 0756)   Normal sinus rhythm   Septal infarct (cited on or before 01-Apr-2025)   Abnormal ECG   When compared with ECG of 02-Apr-2025 07:16, (unconfirmed)   T wave inversion more evident in Inferior leads   Confirmed by Castillo Browning ([id number]) on 4/2/2025 7:56:49 AM      ECG 12 lead   Final Result by Castillo Browning MD (04/02 0756)   Normal sinus rhythm   Septal infarct (cited on or before 01-Apr-2025)   Abnormal ECG   When compared with ECG of 01-Apr-2025 09:05,   T wave inversion  less evident in Inferior leads   Nonspecific T wave abnormality, improved in Anterolateral leads   Confirmed by Castillo Browning ([id number]) on 4/2/2025 7:56:52 AM      ECG 12 lead   Final Result by Merrill Solano DO (04/01 1157)   Normal sinus rhythm   ST & T wave abnormality, consider inferior ischemia   Abnormal ECG   When compared with ECG of 01-Apr-2025 04:13,   No significant change was found      Confirmed by Merrill Solano ([id number]) on 4/1/2025 11:57:38 AM      ECG 12 lead   Final Result by Jerad Chris DO (04/01 0639)   Sinus tachycardia   Septal infarct , age undetermined   Marked ST abnormality, possible lateral subendocardial injury   Abnormal ECG   Confirmed by Jerad Chris ([id number]) on 4/1/2025 6:39:34 AM      ECG 12 lead   Final Result by Jerad Chris DO (04/01 0639)   Sinus tachycardia   ST & T wave abnormality, consider inferolateral ischemia   Abnormal ECG   Confirmed by Jerad Chris ([id number]) on 4/1/2025 6:39:10 AM        GI:  No orders to display       Results from last 7 days   Lab Units 04/01/25  0205   SARS-COV-2  Negative     Results from last 7 days   Lab Units 04/07/25  0406 04/06/25  0345 04/05/25 0757 04/03/25  0243 04/02/25  0236 04/01/25  0152   WBC Thousand/uL 18.28* 14.48* 16.96* 14.09* 11.43* 15.03*   HEMOGLOBIN g/dL 12.4 11.7 11.8 10.9* 10.6* 12.0   HEMATOCRIT % 40.9 37.7 39.3 36.3 33.8* 38.2   PLATELETS Thousands/uL 418* 357 402* 417* 307 311   TOTAL NEUT ABS Thousands/µL  --   --   --   --   --  10.95*         Results from last 7 days   Lab Units 04/07/25  0406 04/06/25  0345 04/05/25  0757 04/03/25  0243 04/02/25  0236 04/02/25  0236 04/01/25  1159 04/01/25  0152   SODIUM mmol/L 135 134* 136 128*  --  129*  --  134*   POTASSIUM mmol/L 4.4 4.1 3.9 4.9  --  4.3  --  3.5   CHLORIDE mmol/L 91* 90* 91* 91*  --  93*  --  94*   CO2 mmol/L 37* 38* 39* 31  --  28  --  29   ANION GAP mmol/L 7 6 6 6  --  8  --  11   BUN mg/dL 15 13 13 24  --  17  --  9   CREATININE mg/dL 0.63 0.62  0.59* 0.86  --  0.78  --  0.80   EGFR ml/min/1.73sq m 85 86 87 64  --  72  --  70   CALCIUM mg/dL 9.8 9.4 9.7 9.5  --  9.3  --  9.1   MAGNESIUM mg/dL  --   --   --   --  2.6  --  2.5 1.0*     Results from last 7 days   Lab Units 04/01/25  0152   AST U/L 13   ALT U/L 14   ALK PHOS U/L 61   TOTAL PROTEIN g/dL 8.0   ALBUMIN g/dL 4.3   TOTAL BILIRUBIN mg/dL 0.68     Results from last 7 days   Lab Units 04/04/25  2118   POC GLUCOSE mg/dl 235*     Results from last 7 days   Lab Units 04/07/25  0406 04/06/25  0345 04/05/25  0757 04/03/25  0243 04/02/25  0236 04/01/25  0152   GLUCOSE RANDOM mg/dL 106 164* 141* 221* 254* 179*         Results from last 7 days   Lab Units 04/07/25  0443 04/03/25  0514   PH ART  7.476* 7.275*   PCO2 ART mm Hg 50.7* 59.1*   PO2 ART mm Hg 60.2* 75.4   HCO3 ART mmol/L 36.6* 26.8   BASE EXC ART mmol/L 11.3 -0.8   O2 CONTENT ART mL/dL 16.6 14.8*   O2 HGB, ARTERIAL % 90.9* 93.4*   ABG SOURCE  Radial, Right Radial, Left     Results from last 7 days   Lab Units 04/03/25  0243 04/01/25  0152   PH YANDEL  7.225* 7.374   PCO2 YANDEL mm Hg 79.1* 51.2*   PO2 YANDEL mm Hg 127.2* 36.7   HCO3 YANDEL mmol/L 32.0* 29.2   BASE EXC YANDEL mmol/L 2.4 3.1   O2 CONTENT YANDEL ml/dL 16.6 12.3   O2 HGB, VENOUS % 97.4* 68.3             Results from last 7 days   Lab Units 04/01/25  0545 04/01/25  0412 04/01/25  0149   HS TNI 0HR ng/L  --   --  8   HS TNI 2HR ng/L  --  8  --    HSTNI D2 ng/L  --  0  --    HS TNI 4HR ng/L 10  --   --    HSTNI D4 ng/L 2  --   --          Results from last 7 days   Lab Units 04/01/25  0149   PROTIME seconds 14.4   INR  1.10   PTT seconds 37*         Results from last 7 days   Lab Units 04/03/25  0243 04/01/25  1159 04/01/25  0149   PROCALCITONIN ng/ml 0.16 0.37* 0.33*     Results from last 7 days   Lab Units 04/01/25  0149   LACTIC ACID mmol/L 1.0             Results from last 7 days   Lab Units 04/01/25  0149   BNP pg/mL 151*           Results from last 7 days   Lab Units 04/02/25  1230   OSMO UR mmol/KG  380     Results from last 7 days   Lab Units 04/02/25  1230   SODIUM UR mmol/L <10.0     Results from last 7 days   Lab Units 04/02/25  1230 04/01/25  0205   STREP PNEUMONIAE ANTIGEN, URINE  Negative  --    LEGIONELLA URINARY ANTIGEN  Negative  --    INFLUENZA A PCR   --  Negative   INFLUENZA B PCR   --  Negative   RSV PCR   --  Negative               Results from last 7 days   Lab Units 04/02/25  1129 04/01/25  0216 04/01/25  0203   BLOOD CULTURE   --  No Growth After 5 Days. No Growth After 5 Days.   SPUTUM CULTURE  4+ Growth of Haemophilus influenzae*  4+ Growth of  --   --    GRAM STAIN RESULT  3+ Polys*  3+ Gram positive cocci in pairs*  4+ Gram negative coccobacilli*  1+ Gram negative diplococci*  2+ Epithelial cells per low power field*  --   --          Network Utilization Review Department  ATTENTION: Please call with any questions or concerns to [phone] and carefully listen to the prompts so that you are directed to the right person. All voicemails are confidential.   For Discharge needs, contact Care Management DC Support Team at [phone] opt. 2  Send all requests for admission clinical reviews, approved or denied determinations and any other requests to dedicated fax number below belonging to the campus where the patient is receiving treatment. List of dedicated fax numbers for the Facilities:  FACILITY NAME UR FAX NUMBER   ADMISSION DENIALS (Administrative/Medical Necessity) [phone]   DISCHARGE SUPPORT TEAM (NETWORK) [phone]   PARENT CHILD HEALTH (Maternity/NICU/Pediatrics) [phone]   [facility] [phone]   [facility] [phone]   [facility] [phone]   [facility] [phone]   [facility] [phone]   [facility] [phone]   [facility] [phone]   [facility]  [facility] [phone]   [facility] [phone]   [facility] [phone]   [facility] [phone]   [facility] [phone]

## 2025-04-08 PROBLEM — R54 FRAILTY SYNDROME IN GERIATRIC PATIENT: Status: ACTIVE | Noted: 2025-04-08

## 2025-04-08 LAB
ANION GAP SERPL CALCULATED.3IONS-SCNC: 8 MMOL/L (ref 4–13)
BUN SERPL-MCNC: 20 MG/DL (ref 5–25)
CALCIUM SERPL-MCNC: 9.5 MG/DL (ref 8.4–10.2)
CHLORIDE SERPL-SCNC: 92 MMOL/L (ref 96–108)
CO2 SERPL-SCNC: 36 MMOL/L (ref 21–32)
CREAT SERPL-MCNC: 0.67 MG/DL (ref 0.6–1.3)
ERYTHROCYTE [DISTWIDTH] IN BLOOD BY AUTOMATED COUNT: 16.7 % (ref 11.6–15.1)
GFR SERPL CREATININE-BSD FRML MDRD: 83 ML/MIN/1.73SQ M
GLUCOSE SERPL-MCNC: 216 MG/DL (ref 65–140)
GLUCOSE SERPL-MCNC: 99 MG/DL (ref 65–140)
HCT VFR BLD AUTO: 38.9 % (ref 34.8–46.1)
HGB BLD-MCNC: 12.1 G/DL (ref 11.5–15.4)
MCH RBC QN AUTO: 25.2 PG (ref 26.8–34.3)
MCHC RBC AUTO-ENTMCNC: 31.1 G/DL (ref 31.4–37.4)
MCV RBC AUTO: 81 FL (ref 82–98)
PLATELET # BLD AUTO: 382 THOUSANDS/UL (ref 149–390)
PMV BLD AUTO: 9.6 FL (ref 8.9–12.7)
POTASSIUM SERPL-SCNC: 4 MMOL/L (ref 3.5–5.3)
RBC # BLD AUTO: 4.8 MILLION/UL (ref 3.81–5.12)
SODIUM SERPL-SCNC: 136 MMOL/L (ref 135–147)
WBC # BLD AUTO: 15.94 THOUSAND/UL (ref 4.31–10.16)

## 2025-04-08 PROCEDURE — 94760 N-INVAS EAR/PLS OXIMETRY 1: CPT

## 2025-04-08 PROCEDURE — 94640 AIRWAY INHALATION TREATMENT: CPT

## 2025-04-08 PROCEDURE — 99232 SBSQ HOSP IP/OBS MODERATE 35: CPT | Performed by: PHYSICIAN ASSISTANT

## 2025-04-08 PROCEDURE — 85027 COMPLETE CBC AUTOMATED: CPT | Performed by: PHYSICIAN ASSISTANT

## 2025-04-08 PROCEDURE — 80048 BASIC METABOLIC PNL TOTAL CA: CPT | Performed by: PHYSICIAN ASSISTANT

## 2025-04-08 PROCEDURE — 99223 1ST HOSP IP/OBS HIGH 75: CPT

## 2025-04-08 PROCEDURE — 82948 REAGENT STRIP/BLOOD GLUCOSE: CPT

## 2025-04-08 RX ADMIN — Medication 400 MG: at 08:16

## 2025-04-08 RX ADMIN — AMLODIPINE BESYLATE 5 MG: 5 TABLET ORAL at 16:11

## 2025-04-08 RX ADMIN — IPRATROPIUM BROMIDE AND ALBUTEROL SULFATE 3 ML: 2.5; .5 SOLUTION RESPIRATORY (INHALATION) at 13:23

## 2025-04-08 RX ADMIN — CEFDINIR 300 MG: 300 CAPSULE ORAL at 22:28

## 2025-04-08 RX ADMIN — CLOPIDOGREL 75 MG: 75 TABLET ORAL at 08:16

## 2025-04-08 RX ADMIN — IPRATROPIUM BROMIDE AND ALBUTEROL SULFATE 3 ML: 2.5; .5 SOLUTION RESPIRATORY (INHALATION) at 07:19

## 2025-04-08 RX ADMIN — IPRATROPIUM BROMIDE AND ALBUTEROL SULFATE 3 ML: 2.5; .5 SOLUTION RESPIRATORY (INHALATION) at 19:33

## 2025-04-08 RX ADMIN — PREDNISONE 40 MG: 20 TABLET ORAL at 08:16

## 2025-04-08 RX ADMIN — CEFDINIR 300 MG: 300 CAPSULE ORAL at 08:16

## 2025-04-08 RX ADMIN — AMLODIPINE BESYLATE 5 MG: 5 TABLET ORAL at 08:16

## 2025-04-08 RX ADMIN — PANTOPRAZOLE SODIUM 40 MG: 40 TABLET, DELAYED RELEASE ORAL at 08:16

## 2025-04-08 RX ADMIN — BISOPROLOL FUMARATE 10 MG: 5 TABLET ORAL at 08:16

## 2025-04-08 NOTE — ASSESSMENT & PLAN NOTE
History of prior memory issues or cognitive impairment   Possibly contributing factor to acute encephalopathy in combination with respiratory failure  [facility]   Last seen on 06/14/2023  Lives with daughter at home  Requires assistance with ADLs/IADLs, daughter helps with care at home  Most recent TSH on 03/25/2024 noted to be 2.043  Most recent vitamin B12 level on 07/21/2023 noted to be 163  Recommend checking vitamin b12 level with routine labs  Recommend vitamin b12 supplementation with goal level > 400  No current imaging to review  Patient scored 23/30 on most recent MOCA on 04/28/2023  Keep physically, mentally, and socially active   Recommend following up with [facility] outpatient

## 2025-04-08 NOTE — ASSESSMENT & PLAN NOTE
Patient presented to hospital with complaint of shortness of breath and coughing, found to be wheezing with hypoxia  Admitted for COPD exacerbation  Continue scheduled DuoNebs, respiratory protocol  Started scheduled budesonide and formoterol  Appreciate pulmonology recommendations  Continue Mucinex and doxycycline-switch doxycycline to IV as she was refusing oral  CTA chest results noted, bronchiolitis without pneumonia  Required temporary BiPAP secondary to worsening hypoxia with confusion, suspected secondary to hypercarbia noted on ABG  Appears to be tolerating BiPAP at this time, continue, can wean to nasal cannula when more awake  Cultures growing haemophilus influenzae, now on cefdinir procalcitonin downtrending  Transitoned to PO prednisone, will discontinue in setting of delirium  Reviewed overnight pulse ox and ABG.  Does not qualify for BiPAP  Repeat CXR w/ improvement  Hopeful discharge tomorrow after home O2 evaluation   see ambulance record

## 2025-04-08 NOTE — ASSESSMENT & PLAN NOTE
Patient notes difficulty sleeping related to anxiety   Prior to admission patient was taking xanax 0.5 mg for anxiety at bedtime to help with sleep  First line is behavioral therapy   Avoid sedative hypnotics including benzodiazepines and benadryl  Encourage staying awake during the day   Encourage daytime activities and morning exercise   Decrease or eliminate daytime naps   Avoid caffeine especially during late afternoon and evening hours  Establish a nighttime routine  Implement sleep hygiene and limit nighttime interruptions  Can consider melatonin 3 mg daily at bedtime for sleep if needed

## 2025-04-08 NOTE — PROGRESS NOTES
04/08/25 1300   Clinical Encounter Type   Visited With Patient   Routine Visit Introduction

## 2025-04-08 NOTE — PROGRESS NOTES
Pastoral Care Progress Note            Patient was visited by Indian  volunteer,  provided listening  support.

## 2025-04-08 NOTE — CONSULTS
Consultation - Geriatric Medicine   Name: Emily Berrios 79 y.o. female I MRN: [id number]  Unit/Bed#: [place] [address] -01 I Date of Admission: 4/1/2025   Date of Service: 4/8/2025 I Hospital Day: 7   Inpatient consult to Gerontology  Consult performed by: TRINIDAD Galeana  Consult ordered by: Eros Bates PA-C        Physician Requesting Evaluation: Nelson Vance DO   Reason for Evaluation / Principal Problem: delirium    Assessment & Plan  COPD with acute exacerbation (HCC)  Presented to hospital with complaints of shortness of breath and cough, has history of COPD  Follows with [facility] pulmonology   Last seen on 2/2/2024  Denies chronic supplemental oxygen  PTA medication: Trelegy Ellipta inhaler daily, nebulizer breathing treatments as needed  Cognitive decline  History of prior memory issues or cognitive impairment   Possibly contributing factor to acute encephalopathy in combination with respiratory failure  Previously seen by [facility]   Last seen on 06/14/2023  Lives with daughter at home  Requires assistance with ADLs/IADLs, daughter helps with care at home  Most recent TSH on 03/25/2024 noted to be 2.043  Most recent vitamin B12 level on 07/21/2023 noted to be 163  Recommend checking vitamin b12 level with routine labs  Recommend vitamin b12 supplementation with goal level > 400  No current imaging to review  Patient scored 23/30 on most recent MOCA on 04/28/2023  Keep physically, mentally, and socially active   Recommend following [facility] with [facility]   Acute metabolic encephalopathy  Baseline mentation: alert and oriented x 3  Current mentation: alert and oriented x 3  During current hospitalization patient developed hallucination, paranoia and psychosis  Possibly in setting of steroids, patient states this has happened before  Appears to have resolved as she is answering questions appropriately today  Patient is at high risk secondary for delirium in setting  of age, polypharmacy, xanax use, acute on chronic respiratory failure requiring BiPAP use, electrolyte imbalance, steroids, and hospitalization  Maintain delirium precautions   Provide redirection, reorientation, and distraction techniques  Maintain fall and safety precautions   Assist with ADLs/IADLs  Avoid deliriogenic medications such as tramadol, benzodiazepines, anticholinergics, benadryl  Treat pain using geriatric pain protocol   Encourage oral hydration and nutrition   Monitor for constipation and urinary retention   Implement sleep hygiene and limit night time interuptions   Maintain sleep-wake cycle   Encourage early and frequent mobilization   Encourage participation in group activities  Most recent EKG on 04/02/2025 revealed a QTc interval of 465  Would avoid benzodiazepines such as Ativan as these can worsen delirium   Patient with chronic use of xanax 0.5 mg daily at bedtime, would continue use at this time given risk for withdraw symptoms, would encourage follow up with PCP and trial taper down of medication    Acute on chronic respiratory failure with hypercapnia (HCC)  Presented with shortness of breath  Currently on 2L [place] supplemental oxygen  Denies chronic use    Acute hypoxic respiratory failure (HCC)  Currently on 2L [place] supplemental oxygen  Ambulatory dysfunction  Denies recent falls   Ambulates with no assistive device at baseline, has required the use of a walker during hospitalization    Assess patient frequently for physical needs, encourage use of assistant devices as needed and directed by PT/OT  Identify cognitive and physical deficits and behaviors that affect risk of falls  Consider moving patient closer to nursing station to monitor more closely for impulsive behavior which may increase risk of falls  [place] fall and safety precautions   Educate patient/family on patient safety including physical limitations and importance of using call bell for assistance   Modify environment to  reduce risk of injury including disconnecting from pole when not in use, ensuring adequate lighting in room and restroom, ensuring that path to restroom is clear and free of trip hazards  PT/OT consulted to assist with strengthening/mobility and assist with discharge planning to appropriate level of care  Out of bed as tolerated    Insomnia  Patient notes difficulty sleeping related to anxiety   Prior to admission patient was taking xanax 0.5 mg for anxiety at bedtime to help with sleep  First line is behavioral therapy   Avoid sedative hypnotics including benzodiazepines and benadryl  Encourage staying awake during the day   Encourage daytime activities and morning exercise   Decrease or eliminate daytime naps   Avoid caffeine especially during late afternoon and evening hours  Establish a nighttime routine  Implement sleep hygiene and limit nighttime interruptions  Can consider melatonin 3 mg daily at bedtime for sleep if needed     Frailty syndrome in geriatric patient  Clinical Frail Scale: 5- Mildly Frail  More evident slowing, needs help high order IADLs (transport, bills, medications)  Progressively impairs shopping and walking outside alone, meal prep and housework  Lives with daughter, other daughter helps to provide care during the day  Patient able to manage medications independently, family reports no issues with medications at home  Family reports patient having a decreased appetite  Albumin level on 04/01/2025 was 4.3      History of Present Illness   Hx and PE limited by: N/A  HPI: Emily Berrios is a 79 y.o. year old female who presents with medical problems including, not limited to,  CAD, hyperlipidemia, COPD, hyponatremia, cognitive decline, GERD, and hypertension.    Emily presented to the hospital with complaints of shortness of breath and a cough.  She has a known history of COPD.  She is being seen today for a geriatric consult concerning delirium.    Upon entering the room she is  sitting up out of bed in the chair.  Her family is present in the room.  She is alert and oriented x 3.  She states she lives at home with her daughter, the other daughter that is present today is one of her caregivers.  She states that she is able to manage her own medication at home.  Here she is on supplemental oxygen, at home she states that she does not require oxygen.  Currently denies pain.  States at home she sleeps well, she usually goes to bed around 11:00.  She denies difficulty falling asleep here at the hospital.  Her family states previous hospital admissions she has had issues with delirium symptoms.  Appears her mentation has improved.  She states sometimes she has an adverse reaction to steroids.    Review of Systems   Constitutional:  Negative for activity change, appetite change, fatigue, fever and unexpected weight change.   HENT:  Positive for dental problem and hearing loss. Negative for congestion.    Eyes:  Negative for pain, discharge and visual disturbance.   Respiratory:  Positive for cough. Negative for shortness of breath.    Cardiovascular:  Negative for chest pain and palpitations.   Gastrointestinal:  Negative for abdominal pain, constipation and diarrhea.   Genitourinary:  Negative for difficulty urinating, dysuria, hematuria and urgency.   Musculoskeletal:  Positive for gait problem. Negative for arthralgias.   Skin:  Negative for color change.   Neurological:  Negative for syncope and weakness.   Psychiatric/Behavioral:  Negative for confusion and sleep disturbance.    All other systems reviewed and are negative.          Medical History Review: I have reviewed the patient's PMH, PSH, Social History, Family History, Meds, and Allergies   Historical Information   Past Medical History:   Diagnosis Date    Ambulates with cane     Arthritis     Asthma     Chronic obstructive pulmonary disease, unspecified COPD type (HCC) 04/08/2022    Cognitive decline 03/10/2023    Community acquired  pneumonia 2019    COPD (chronic obstructive pulmonary disease) ([facility])     Coronary artery disease     DM (diabetes mellitus) ([facility])     Fall     2024    Gastroesophageal reflux disease without esophagitis 2025    HTN (hypertension)     Hx of CABG     Hydronephrosis, bilateral 2023    Hyperlipidemia     Hyponatremia     PONV (postoperative nausea and vomiting)     Urinary retention     Wears glasses      Past Surgical History:   Procedure Laterality Date    CARDIAC CATHETERIZATION      CATARACT EXTRACTION, BILATERAL      CORONARY ARTERY BYPASS GRAFT  2010    with subsequent stent to the saphenous veingraft to the RCA 3 months later    KNEE ARTHROSCOPY      POLYPECTOMY      laryngeal    MT CMBND ANTERPOST COLPORRAPHY W/CYSTO N/A 2024    Procedure: COLPORRHAPHY ANT/POST;  Surgeon: Ramon Fam MD;  [facility];  Service: Gynecology    MT COLPOPEXY VAGINAL EXTRAPERITONEAL APPROACH N/A 2024    Procedure: SUSPENSION VAGINAL VAULT,  EUA;  Surgeon: Ramon Fam MD;  [facility];  Service: Gynecology    MT CYSTOURETHROSCOPY N/A 2024    Procedure: CYSTOSCOPY;  Surgeon: Ramon Fam MD;  [facility];  Service: Gynecology    MT SLING OPERATION STRESS INCONTINENCE N/A 2024    Procedure: INSERTION PV SLING ALTIS;  Surgeon: Ramon Fam MD;  [facility];  Service: Gynecology    TOTAL HIP ARTHROPLASTY      TUBAL LIGATION       Social History     Tobacco Use    Smoking status: Former     Current packs/day: 0.00     Average packs/day: 0.5 packs/day for 32.0 years (16.0 ttl pk-yrs)     Types: Cigarettes     Start date:      Quit date: 2000     Years since quittin.2    Smokeless tobacco: Never    Tobacco comments:     no passive smoke exposure   Vaping Use    Vaping status: Never Used   Substance and Sexual Activity    Alcohol use: Yes     Alcohol/week: 12.0 standard drinks of alcohol     Types: 12 Cans of beer per week     Comment:  socially    Drug use: Never    Sexual activity: Not Currently     E-Cigarette/Vaping    E-Cigarette Use Never User      E-Cigarette/Vaping Substances    Nicotine No     THC No     CBD No     Flavoring No     Other No     Unknown No      Family History   Problem Relation Age of Onset    Stroke Mother     Hypertension Mother     Heart disease Mother     Colon cancer Father     No Known Problems Sister     No Known Problems Sister     No Known Problems Sister     No Known Problems Daughter     No Known Problems Daughter     No Known Problems Daughter     No Known Problems Maternal Grandmother     No Known Problems Maternal Grandfather     No Known Problems Paternal Grandmother     No Known Problems Paternal Grandfather     Heart attack Brother     Heart attack Brother     No Known Problems Maternal Aunt     No Known Problems Maternal Aunt     No Known Problems Paternal Aunt     No Known Problems Paternal Aunt     No Known Problems Other      Social History     Tobacco Use    Smoking status: Former     Current packs/day: 0.00     Average packs/day: 0.5 packs/day for 32.0 years (16.0 ttl pk-yrs)     Types: Cigarettes     Start date:      Quit date:      Years since quittin.2    Smokeless tobacco: Never    Tobacco comments:     no passive smoke exposure   Vaping Use    Vaping status: Never Used   Substance and Sexual Activity    Alcohol use: Yes     Alcohol/week: 12.0 standard drinks of alcohol     Types: 12 Cans of beer per week     Comment: socially    Drug use: Never    Sexual activity: Not Currently       Current Facility-Administered Medications:     acetaminophen (TYLENOL) tablet 650 mg, Q6H PRN    albuterol (PROVENTIL HFA,VENTOLIN HFA) inhaler 2 puff, Q4H PRN    ALPRAZolam (XANAX) tablet 0.5 mg, HS PRN    amLODIPine (NORVASC) tablet 5 mg, BID    bisoprolol (ZEBETA) tablet 10 mg, Daily    cefdinir (OMNICEF) capsule 300 mg, Q12H KENIA    clopidogrel (PLAVIX) tablet 75 mg, Daily    estrogens, conjugated  (Premarin) vaginal cream 0.5 g, Once per day on Monday Wednesday Friday    fluticasone (FLONASE) 50 mcg/act nasal spray 2 spray, BID    guaiFENesin (ROBITUSSIN) oral liquid 400 mg, Q6H PRN    ipratropium-albuterol (DUO-NEB) 0.5-2.5 mg/3 mL inhalation solution 3 mL, TID    ipratropium-albuterol (DUO-NEB) 0.5-2.5 mg/3 mL inhalation solution 3 mL, Q6H PRN    magnesium Oxide (MAG-OX) tablet 400 mg, BID    neomycin-polymyxin-dexamethasone (MAXITROL) ophthalmic suspension 1 drop, TID    pantoprazole (PROTONIX) EC tablet 40 mg, Daily Before Breakfast    pravastatin (PRAVACHOL) tablet 80 mg, Daily With Dinner    QUEtiapine (SEROquel) tablet 12.5 mg, HS PRN  Ace inhibitors, Angiotensin receptor blockers, Ezetimibe, Morphine, and Statins    Meds/Allergies   Home medication review  Plavix 75 mg tablet, take 1 tablet daily  Xanax 0.5 mg tablet, take 1 tablet daily at bedtime as needed  Rosuvastatin 10 mg tablet, take 1 tablet daily  Pantoprazole 40 mg tablet, take 1 tablet daily before breakfast  Loratadine   Amlodipine 5 mg tablet, take 1 tablet BID, takes 10 mg once daily in the morning   Bisoprolol 10 mg tablet, take 1 tablet daily  Magnesium oxide 400 mg tablet, take 1 tablet BID  Vitamin b12 1000 mcg tablet, take 1 tablet daily  Red yeast rice 600 mg capsule, take 1 capsule BID with meals  Co-enzyme Q-10 30 mg capsule, take 1 capsule BID    Personally confirmed with patient, Emily and family      Objective :  Temp:  [97.9 °F (36.6 °C)-98 °F (36.7 °C)] 98 °F (36.7 °C)  HR:  [72-99] 80  BP: (155-175)/(65-88) 155/65  Resp:  [18-20] 20  SpO2:  [91 %-96 %] 93 %  O2 Device: Nasal cannula  Nasal Cannula O2 Flow Rate (L/min):  [2 L/min-3 L/min] 2 L/min    Physical Exam  Vitals and nursing note reviewed.   Constitutional:       General: She is not in acute distress.     Appearance: Normal appearance. She is well-developed.   HENT:      Head: Normocephalic and atraumatic.   Eyes:      Conjunctiva/sclera: Conjunctivae normal.  "  Cardiovascular:      Rate and Rhythm: Normal rate and regular rhythm.      Pulses: Normal pulses.      Heart sounds: Normal heart sounds. No murmur heard.  Pulmonary:      Effort: Pulmonary effort is normal. No respiratory distress.      Breath sounds: Normal breath sounds.   Abdominal:      Palpations: Abdomen is soft.      Tenderness: There is no abdominal tenderness.   Musculoskeletal:         General: No swelling.      Cervical back: Neck supple.   Skin:     General: Skin is warm and dry.      Capillary Refill: Capillary refill takes less than 2 seconds.   Neurological:      Mental Status: She is alert and oriented to person, place, and time. Mental status is at baseline.   Psychiatric:         Mood and Affect: Mood normal.         Behavior: Behavior normal.         Thought Content: Thought content normal.         Judgment: Judgment normal.           Lab Results: I have reviewed the following results:CBC/BMP:   .     04/08/25  0551   WBC 15.94*   HGB 12.1   HCT 38.9      SODIUM 136   K 4.0   CL 92*   CO2 36*   BUN 20   CREATININE 0.67   GLUC 99    , Vitamins B1, B6, B12, A, and D: No results found for: \"B1\", \"THYROGLB\", \"WYUCWHST11\", \"GIXO26CRZUVY\", TSH:       Imaging Results Review: I reviewed radiology reports from this admission including: chest xray.  Other Study Results Review: EKG was reviewed.     Therapies:   Basic Mobility Inpatient Raw Score: 23  JH-HLM Goal: 7: Walk 25 feet or more  JH-HLM Achieved: 7: Walk 25 feet or more      VTE Prophylaxis: VTE covered by:    None     and Sequential compression device (Venodyne)     Code Status: Level 1 - Full Code      Family and Social Support: Daughter: Ivanna, Son: Albino  Support Systems: Children  IMM Given (Date):: 04/08/25  IMM Given to:: Patient      Goals of Care: TBD    I have spent a total time of 75 minutes in caring for this patient on the day of the visit/encounter including Diagnostic results, Prognosis, Risks and benefits of tx options, " Instructions for management, Patient and family education, Importance of tx compliance, Risk factor reductions, Impressions, Counseling / Coordination of care, Documenting in the medical record, Reviewing/placing orders in the medical record (including tests, medications, and/or procedures), Obtaining or reviewing history  , and Communicating with other healthcare professionals .

## 2025-04-08 NOTE — ASSESSMENT & PLAN NOTE
Denies recent falls   Ambulates with no assistive device at baseline, has required the use of a walker during hospitalization    Assess patient frequently for physical needs, encourage use of assistant devices as needed and directed by PT/OT  Identify cognitive and physical deficits and behaviors that affect risk of falls  Consider moving patient closer to nursing station to monitor more closely for impulsive behavior which may increase risk of falls  [place] fall and safety precautions   Educate patient/family on patient safety including physical limitations and importance of using call bell for assistance   Modify environment to reduce risk of injury including disconnecting from pole when not in use, ensuring adequate lighting in room and restroom, ensuring that path to restroom is clear and free of trip hazards  PT/OT consulted to assist with strengthening/mobility and assist with discharge planning to appropriate level of care  Out of bed as tolerated

## 2025-04-08 NOTE — PLAN OF CARE
Problem: Potential for Falls  Goal: Patient will remain free of falls  Description: INTERVENTIONS:- Educate patient/family on patient safety including physical limitations- Instruct patient to call for assistance with activity - Consult OT/PT to assist with strengthening/mobility - Keep Call bell within reach- Keep bed low and locked with side rails adjusted as appropriate- Keep care items and personal belongings within reach- Initiate and maintain comfort rounds- Make Fall Risk Sign visible to staff- Offer Toileting every 2 Hours, in advance of need- Initiate/Maintain bed alarm- Obtain necessary fall risk management equipment: walker- Apply yellow socks and bracelet for high fall risk patients- Consider moving patient to room near nurses station  Outcome: Progressing     Problem: CARDIOVASCULAR - ADULT  Goal: Maintains optimal cardiac output and hemodynamic stability  Description: INTERVENTIONS:- Monitor I/O, vital signs and rhythm- Monitor for S/S and trends of decreased cardiac output- Administer and titrate ordered vasoactive medications to optimize hemodynamic stability- Assess quality of pulses, skin color and temperature- Assess for signs of decreased coronary artery perfusion- Instruct patient to report change in severity of symptoms  Outcome: Progressing  Goal: Absence of cardiac dysrhythmias or at baseline rhythm  Description: INTERVENTIONS:- Continuous cardiac monitoring, vital signs, obtain 12 lead EKG if ordered- Administer antiarrhythmic and heart rate control medications as ordered- Monitor electrolytes and administer replacement therapy as ordered  Outcome: Progressing     Problem: RESPIRATORY - ADULT  Goal: Achieves optimal ventilation and oxygenation  Description: INTERVENTIONS:- Assess for changes in respiratory status- Assess for changes in mentation and behavior- Position to facilitate oxygenation and minimize respiratory effort- Oxygen administered by appropriate delivery if ordered- Initiate  smoking cessation education as indicated- Encourage broncho-pulmonary hygiene including cough, deep breathe, Incentive Spirometry- Assess the need for suctioning and aspirate as needed- Assess and instruct to report SOB or any respiratory difficulty- Respiratory Therapy support as indicated  Outcome: Progressing

## 2025-04-08 NOTE — ASSESSMENT & PLAN NOTE
Clinical Frail Scale: 5- Mildly Frail  More evident slowing, needs help high order IADLs (transport, bills, medications)  Progressively impairs shopping and walking outside alone, meal prep and housework  Lives with daughter, other daughter helps to provide care during the day  Patient able to manage medications independently, family reports no issues with medications at home  Family reports patient having a decreased appetite  Albumin level on 04/01/2025 was 4.3

## 2025-04-08 NOTE — PROGRESS NOTES
Progress Note - Hospitalist   Name: Emily Berrios 79 y.o. female I MRN: [id number]  Unit/Bed#: [place] [address] -01 I Date of Admission: 4/1/2025   Date of Service: 4/8/2025 I Hospital Day: 7    Assessment & Plan  COPD with acute exacerbation (HCC)  Patient presented to hospital with complaint of shortness of breath and coughing, found to be wheezing with hypoxia  Admitted for COPD exacerbation  Continue scheduled DuoNebs, respiratory protocol  Started scheduled budesonide and formoterol  Appreciate pulmonology recommendations  Continue Mucinex and doxycycline-switch doxycycline to IV as she was refusing oral  CTA chest results noted, bronchiolitis without pneumonia  Required temporary BiPAP secondary to worsening hypoxia with confusion, suspected secondary to hypercarbia noted on ABG  Appears to be tolerating BiPAP at this time, continue, can wean to nasal cannula when more awake  Cultures growing haemophilus influenzae, now on cefdinir procalcitonin downtrending  Transitoned to PO prednisone, will discontinue in setting of delirium  Reviewed overnight pulse ox and ABG.  Does not qualify for BiPAP  Repeat CXR w/ improvement  Hopeful discharge tomorrow after home O2 evaluation  Acute on chronic respiratory failure with hypercapnia (HCC)  Patient hypoxic to 80% at time of presentation  Secondary to COPD saturation  Was stable on 3 L nasal cannula yesterday but developed worsening shortness of breath, confusion and hypoxia overnight and was placed on BiPAP  Continue BiPAP PRN  Appears more alert and awake on the BiPAP this morning, suspect we can wean back to nasal cannula later today  Appreciate pulmonology recommendations  Down to 2 L via nasal cannula  Overnight pulse ox  Hypomagnesemia  Magnesium 1.0 at time of admission  Repleted orally and IV, continue oral twice daily  Stable  Chronic coronary artery disease  Continue Plavix, Crestor, bisoprolol  Glaucoma  Continue eyedrops  Hyperlipidemia  Continue  statin  Cognitive decline  Fall/aspiration precautions  Abnormal EKG  Likely from profound hypomagnesemia  Concern for V. tach overnight, reviewed telemetry today, V. tach not noted in all leads, likely artifact  Repeat EKG with improvement  Monitored on telemetry, has since been discontinued  Acute metabolic encephalopathy  Patient developed acute onset hallucinations, paranoia and psychosis overnight  Does have prior history of hospital dosed delirium  Attempt to limit steroids, will dose in the morning rather than the evening  Continue antibiotic treatment  Will start low-dose at bedtime Seroquel tonight as needed and monitor response  Likely component of hypercapnia, monitor with BiPAP  Family also notes patient continues to drink alcohol daily, placed on CIWA protocol  Improved  Hyponatremia  Sodium continues to fall to 128 today  Urine studies reviewed  Did require IV diuretics overnight for hypoxia  Continue to encourage oral intake  Monitor daily weights  Improved to 136  Acute hypoxic respiratory failure (HCC)  Secondary to H. influenzae pneumonia  Currently on 2 L, continue to wean    VTE Pharmacologic Prophylaxis:    Refusing Lovenox injections    Mobility:   Basic Mobility Inpatient Raw Score: 23  JH-HLM Goal: 7: Walk 25 feet or more  JH-HLM Achieved: 7: Walk 25 feet or more  JH-HLM Goal achieved. Continue to encourage appropriate mobility.    Patient Centered Rounds: I performed bedside rounds with nursing staff today.   Discussions with Specialists or Other Care Team Provider: None    Education and Discussions with Family / Patient: Attempted to update  (daughter) via phone. Left voicemail.     Current Length of Stay: 7 day(s)  Current Patient Status: Inpatient   Certification Statement: The patient will continue to require additional inpatient hospital stay due to hypoxia, delirium  Discharge Plan: Anticipate discharge tomorrow to home.    Code Status: Level 1 - Full Code    Subjective    No significant events overnight, however patient was noted to have increased work of breathing this morning.  Additionally, she does have some delirium which are likely in the setting of steroid use and being in the hospital.    Objective :  Temp:  [97.9 °F (36.6 °C)-98 °F (36.7 °C)] 97.9 °F (36.6 °C)  HR:  [72-99] 72  BP: (161-175)/(69-88) 161/69  Resp:  [18-20] 20  SpO2:  [91 %-96 %] 94 %  O2 Device: Nasal cannula  Nasal Cannula O2 Flow Rate (L/min):  [2 L/min-3 L/min] 2 L/min    Body mass index is 25.83 kg/m².     Input and Output Summary (last 24 hours):     Intake/Output Summary (Last 24 hours) at 4/8/2025 1415  Last data filed at 4/8/2025 1304  Gross per 24 hour   Intake 478 ml   Output --   Net 478 ml       Physical Exam  Vitals and nursing note reviewed.   Constitutional:       Appearance: Normal appearance.   HENT:      Head: Normocephalic and atraumatic.      Mouth/Throat:      Mouth: Mucous membranes are moist.      Pharynx: Oropharynx is clear. No oropharyngeal exudate.   Eyes:      Extraocular Movements: Extraocular movements intact.   Cardiovascular:      Rate and Rhythm: Normal rate and regular rhythm.      Pulses: Normal pulses.      Heart sounds: Normal heart sounds. No murmur heard.     No friction rub. No gallop.   Pulmonary:      Effort: Pulmonary effort is normal. No respiratory distress.      Breath sounds: Normal breath sounds. No stridor. No wheezing or rales.   Abdominal:      General: Abdomen is flat. Bowel sounds are normal. There is no distension.      Palpations: Abdomen is soft.      Tenderness: There is no abdominal tenderness.   Musculoskeletal:      Right lower leg: No edema.      Left lower leg: No edema.   Skin:     General: Skin is warm and dry.   Neurological:      General: No focal deficit present.      Mental Status: She is alert and oriented to person, place, and time.           Lines/Drains:              Lab Results: I have reviewed the following results:   Results from  last 7 days   Lab Units 04/08/25  0551   WBC Thousand/uL 15.94*   HEMOGLOBIN g/dL 12.1   HEMATOCRIT % 38.9   PLATELETS Thousands/uL 382     Results from last 7 days   Lab Units 04/08/25  0551   SODIUM mmol/L 136   POTASSIUM mmol/L 4.0   CHLORIDE mmol/L 92*   CO2 mmol/L 36*   BUN mg/dL 20   CREATININE mg/dL 0.67   ANION GAP mmol/L 8   CALCIUM mg/dL 9.5   GLUCOSE RANDOM mg/dL 99         Results from last 7 days   Lab Units 04/08/25  1201 04/04/25  2118   POC GLUCOSE mg/dl 216* 235*         Results from last 7 days   Lab Units 04/03/25  0243   PROCALCITONIN ng/ml 0.16       Recent Cultures (last 7 days):   Results from last 7 days   Lab Units 04/02/25  1230 04/02/25  1129   SPUTUM CULTURE   --  4+ Growth of Haemophilus influenzae*  4+ Growth of   GRAM STAIN RESULT   --  3+ Polys*  3+ Gram positive cocci in pairs*  4+ Gram negative coccobacilli*  1+ Gram negative diplococci*  2+ Epithelial cells per low power field*   LEGIONELLA URINARY ANTIGEN  Negative  --        Imaging Results Review: No pertinent imaging studies reviewed.  Other Study Results Review: No additional pertinent studies reviewed.    Last 24 Hours Medication List:     Current Facility-Administered Medications:     acetaminophen (TYLENOL) tablet 650 mg, Q6H PRN    albuterol (PROVENTIL HFA,VENTOLIN HFA) inhaler 2 puff, Q4H PRN    ALPRAZolam (XANAX) tablet 0.5 mg, HS PRN    amLODIPine (NORVASC) tablet 5 mg, BID    bisoprolol (ZEBETA) tablet 10 mg, Daily    cefdinir (OMNICEF) capsule 300 mg, Q12H KENIA    clopidogrel (PLAVIX) tablet 75 mg, Daily    estrogens, conjugated (Premarin) vaginal cream 0.5 g, Once per day on Monday Wednesday Friday    fluticasone (FLONASE) 50 mcg/act nasal spray 2 spray, BID    guaiFENesin (ROBITUSSIN) oral liquid 400 mg, Q6H PRN    ipratropium-albuterol (DUO-NEB) 0.5-2.5 mg/3 mL inhalation solution 3 mL, TID    ipratropium-albuterol (DUO-NEB) 0.5-2.5 mg/3 mL inhalation solution 3 mL, Q6H PRN    magnesium Oxide (MAG-OX) tablet  400 mg, BID    neomycin-polymyxin-dexamethasone (MAXITROL) ophthalmic suspension 1 drop, TID    pantoprazole (PROTONIX) EC tablet 40 mg, Daily Before Breakfast    pravastatin (PRAVACHOL) tablet 80 mg, Daily With Dinner    QUEtiapine (SEROquel) tablet 12.5 mg, HS PRN    Administrative Statements   Today, Patient Was Seen By: Eros Bates PA-C      **Please Note: This note may have been constructed using a voice recognition system.**

## 2025-04-08 NOTE — ASSESSMENT & PLAN NOTE
Baseline mentation: alert and oriented x 3  Current mentation: alert and oriented x 3  During current hospitalization patient developed hallucination, paranoia and psychosis  Possibly in setting of steroids, patient states this has happened before  Appears to have resolved as she is answering questions appropriately today  Patient is at high risk secondary for delirium in setting of age, polypharmacy, xanax use, acute on chronic respiratory failure requiring BiPAP use, electrolyte imbalance, steroids, and hospitalization  Maintain delirium precautions   Provide redirection, reorientation, and distraction techniques  Maintain fall and safety precautions   Assist with ADLs/IADLs  Avoid deliriogenic medications such as tramadol, benzodiazepines, anticholinergics, benadryl  Treat pain using geriatric pain protocol   Encourage oral hydration and nutrition   Monitor for constipation and urinary retention   Implement sleep hygiene and limit night time interuptions   Maintain sleep-wake cycle   Encourage early and frequent mobilization   Encourage participation in group activities  Most recent EKG on 04/02/2025 revealed a QTc interval of 465  Would avoid benzodiazepines such as Ativan as these can worsen delirium   Patient with chronic use of xanax 0.5 mg daily at bedtime, would continue use at this time given risk for withdraw symptoms, would encourage follow up with PCP and trial taper down of medication

## 2025-04-08 NOTE — ASSESSMENT & PLAN NOTE
Presented to hospital with complaints of shortness of breath and cough, has history of COPD  Follows with [facility] pulmonology   Last seen on 2/2/2024  Denies chronic supplemental oxygen  PTA medication: Trelegy Ellipta inhaler daily, nebulizer breathing treatments as needed

## 2025-04-08 NOTE — CASE MANAGEMENT
Case Management Discharge Planning Note    Patient name Emily Berrios  [facility] M* MRN [id number]  : 1945 Date 2025       Current Admission Date: 2025  Current Admission Diagnosis:COPD with acute exacerbation (HCC)   Patient Active Problem List    Diagnosis Date Noted Date Diagnosed    Acute hypoxic respiratory failure (HCC) 2025     Gastroesophageal reflux disease without esophagitis 2025     Acute metabolic encephalopathy 2025     Abnormal CT of the chest 2025     Acute on chronic respiratory failure with hypercapnia (HCC) 2025     Abnormal EKG 2025     Pain and swelling of left knee 10/10/2024     Uterovaginal prolapse, incomplete 2024     Female stress incontinence 2024     Female cystocele 2024     Rectocele 2024     Acute encephalopathy 2024     Hypoxia 2024     Insomnia 2024     Alcohol use 2024     Closed compression fracture of L1 vertebra (HCC) 2024     Closed nondisplaced fracture of surgical neck of left humerus 2024     Complete uterovaginal prolapse 2023     Chronic rhinitis 10/04/2023     Ambulatory dysfunction 2023     Cognitive decline 03/10/2023     Hyponatremia 2023     Hypomagnesemia 2023     Hydronephrosis, bilateral 2023     Cystocele with prolapse 2023     COPD with acute exacerbation (HCC) 2022     Hx of CABG 2018     Hx stent of SVG to the RCA 2018     Asthma 2014     Chronic coronary artery disease 2014     Hiatal hernia 2014     Osteoarthritis 2014     Glaucoma 2013     Hyperlipidemia 2012     Primary hypertension 2012       LOS (days): 7  Geometric Mean LOS (GMLOS) (days):   Days to GMLOS:     OBJECTIVE:  Risk of Unplanned Readmission Score: 16.32         Current admission status: Inpatient   Preferred Pharmacy:   [id number] - [address]  [address]  [place] [address]  Phone: [phone] Fax: [phone]    [facility] [place] - [address]  [address]  [address]  [address]  [place] [address]  Phone: [phone] Fax: [phone]    Primary Care Provider: Ramsey Gtz MD    Primary Insurance: [employer]ICARE [facility]  Secondary Insurance: [facility] AND [facility] [facility]    DISCHARGE DETAILS:                           Contacts  Patient Contacts: Ivanna Mazariegos (Daughter)  Relationship to Patient:: Family  Contact Method: Phone  Phone Number: [phone] (M)  Reason/Outcome: Discharge Planning                   Would you like to participate in our [facility] service program?  : No - Declined    Treatment Team Recommendation: Home (W/ OPPT)  Discharge Destination Plan:: Home (W/ OPPT)  Transport at Discharge : Auto with designated , Family        Transported by (Company and Unit #): [employer]                    IMM Given (Date):: 04/08/25  IMM Given to:: Patient

## 2025-04-08 NOTE — PLAN OF CARE
Problem: Potential for Falls  Goal: Patient will remain free of falls  Description: INTERVENTIONS:- Educate patient/family on patient safety including physical limitations- Instruct patient to call for assistance with activity - Consult OT/PT to assist with strengthening/mobility - Keep Call bell within reach- Keep bed low and locked with side rails adjusted as appropriate- Keep care items and personal belongings within reach- Initiate and maintain comfort rounds- Make Fall Risk Sign visible to staff- Offer Toileting every 2 Hours, in advance of need- Initiate/Maintain bed alarm- Obtain necessary fall risk management equipment: walker- Apply yellow socks and bracelet for high fall risk patients- Consider moving patient to room near nurses station  Outcome: Progressing     Problem: PAIN - ADULT  Goal: Verbalizes/displays adequate comfort level or baseline comfort level  Description: Interventions:- Encourage patient to monitor pain and request assistance- Assess pain using appropriate pain scale- Administer analgesics based on type and severity of pain and evaluate response- Implement non-pharmacological measures as appropriate and evaluate response- Consider cultural and social influences on pain and pain management- Notify physician/advanced practitioner if interventions unsuccessful or patient reports new pain  Outcome: Progressing     Problem: INFECTION - ADULT  Goal: Absence or prevention of progression during hospitalization  Description: INTERVENTIONS:- Assess and monitor for signs and symptoms of infection- Monitor lab/diagnostic results- Monitor all insertion sites, i.e. indwelling lines, tubes, and drains- Monitor endotracheal if appropriate and nasal secretions for changes in amount and color- [place] appropriate cooling/warming therapies per order- Administer medications as ordered- Instruct and encourage patient and family to use good hand hygiene technique- Identify and instruct in appropriate  isolation precautions for identified infection/condition  Outcome: Progressing  Goal: Absence of fever/infection during neutropenic period  Description: INTERVENTIONS:- Monitor WBC  Outcome: Progressing     Problem: SAFETY ADULT  Goal: Maintain or return to baseline ADL function  Description: INTERVENTIONS:-  Assess patient's ability to carry out ADLs; assess patient's baseline for ADL function and identify physical deficits which impact ability to perform ADLs (bathing, care of mouth/teeth, toileting, grooming, dressing, etc.)- Assess/evaluate cause of self-care deficits - Assess range of motion- Assess patient's mobility; develop plan if impaired- Assess patient's need for assistive devices and provide as appropriate- Encourage maximum independence but intervene and supervise when necessary- Involve family in performance of ADLs- Assess for home care needs following discharge - Consider OT consult to assist with ADL evaluation and planning for discharge- Provide patient education as appropriate  Outcome: Progressing  Goal: Maintains/Returns to pre admission functional level  Description: INTERVENTIONS:- Perform AM-PAC 6 Click Basic Mobility/ Daily Activity assessment daily.- Set and communicate daily mobility goal to care team and patient/family/caregiver. - Collaborate with rehabilitation services on mobility goals if consulted- Perform Range of Motion 3 times a day.- Reposition patient every 2 hours.- Dangle patient 3 times a day- Stand patient 3 times a day- Ambulate patient 3 times a day- Out of bed to chair 3 times a day - Out of bed for meals 3 times a day- Out of bed for toileting- Record patient progress and toleration of activity level   Outcome: Progressing     Problem: DISCHARGE PLANNING  Goal: Discharge to home or other facility with appropriate resources  Description: INTERVENTIONS:- Identify barriers to discharge w/patient and caregiver- Arrange for needed discharge resources and transportation as  appropriate- Identify discharge learning needs (meds, wound care, etc.)- Arrange for interpretive services to assist at discharge as needed- Refer to Case Management Department for coordinating discharge planning if the patient needs post-hospital services based on physician/advanced practitioner order or complex needs related to functional status, cognitive ability, or social support system  Outcome: Progressing     Problem: Knowledge Deficit  Goal: Patient/family/caregiver demonstrates understanding of disease process, treatment plan, medications, and discharge instructions  Description: Complete learning assessment and assess knowledge base.Interventions:- Provide teaching at level of understanding- Provide teaching via preferred learning methods  Outcome: Progressing     Problem: CARDIOVASCULAR - ADULT  Goal: Maintains optimal cardiac output and hemodynamic stability  Description: INTERVENTIONS:- Monitor I/O, vital signs and rhythm- Monitor for S/S and trends of decreased cardiac output- Administer and titrate ordered vasoactive medications to optimize hemodynamic stability- Assess quality of pulses, skin color and temperature- Assess for signs of decreased coronary artery perfusion- Instruct patient to report change in severity of symptoms  Outcome: Progressing  Goal: Absence of cardiac dysrhythmias or at baseline rhythm  Description: INTERVENTIONS:- Continuous cardiac monitoring, vital signs, obtain 12 lead EKG if ordered- Administer antiarrhythmic and heart rate control medications as ordered- Monitor electrolytes and administer replacement therapy as ordered  Outcome: Progressing     Problem: RESPIRATORY - ADULT  Goal: Achieves optimal ventilation and oxygenation  Description: INTERVENTIONS:- Assess for changes in respiratory status- Assess for changes in mentation and behavior- Position to facilitate oxygenation and minimize respiratory effort- Oxygen administered by appropriate delivery if ordered-  Initiate smoking cessation education as indicated- Encourage broncho-pulmonary hygiene including cough, deep breathe, Incentive Spirometry- Assess the need for suctioning and aspirate as needed- Assess and instruct to report SOB or any respiratory difficulty- Respiratory Therapy support as indicated  Outcome: Progressing     Problem: CONFUSION/THOUGHT DISTURBANCE  Goal: Thought disturbances (confusion, delirium, depression, dementia or psychosis) are managed to maintain or return to baseline mental status and functional level  Description: INTERVENTIONS:- Assess for possible contributors to  thought disturbance, including but not limited to medications, infection, impaired vision or hearing, underlying metabolic abnormalities, dehydration, respiratory compromise,  psychiatric diagnoses and notify attending PHYSICAN/AP- Monitor and intervene to maintain adequate nutrition, hydration, elimination, sleep and activity- Decrease environmental stimuli, including noise as appropriate.- Provide frequent contacts to provide refocusing, direction and reassurance as needed. Approach patient calmly with eye contact and at their level.- [place] high risk fall precautions, aspiration precautions and other safety measures, as indicated- If delirium suspected, notify physician/AP of change in condition and request immediate in-person evaluation- Pursue consults as appropriate including Geriatric (campus dependent), OT for cognitive evaluation/activity planning, psychiatric, pastoral care, etc.  Outcome: Progressing     Problem: SAFETY,RESTRAINT: NV/NON-SELF DESTRUCTIVE BEHAVIOR  Goal: Remains free of harm/injury (restraint for non violent/non self-detsructive behavior)  Description: INTERVENTIONS:- Instruct patient/family regarding restraint use - Assess and monitor physiologic and psychological status - Provide interventions and comfort measures to meet assessed patient needs - Identify and implement measures to help patient  regain control- Assess readiness for release of restraint   Outcome: Progressing  Goal: Returns to optimal restraint-free functioning  Description: INTERVENTIONS:- Assess the patient's behavior and symptoms that indicate continued need for restraint- Identify and implement measures to help patient regain control- Assess readiness for release of restraint   Outcome: Progressing     Problem: Prexisting or High Potential for Compromised Skin Integrity  Goal: Skin integrity is maintained or improved  Description: INTERVENTIONS:- Identify patients at risk for skin breakdown- Assess and monitor skin integrity- Assess and monitor nutrition and hydration status- Monitor labs - Assess for incontinence - Turn and reposition patient- Assist with mobility/ambulation- Relieve pressure over bony prominences- Avoid friction and shearing- Provide appropriate hygiene as needed including keeping skin clean and dry- Evaluate need for skin moisturizer/barrier cream- Collaborate with interdisciplinary team - Patient/family teaching- Consider wound care consult   Outcome: Progressing

## 2025-04-08 NOTE — DISCHARGE INSTR - AVS FIRST PAGE
Pulmonary outpatient follow: 4/30/2025 at 4:00 PM    Continue inhalers/nebulizers at home as previously ordered    Go through a course of steroid taper    You have 1 more day of antibiotic (1 pill tonight, 1 tomorrow morning and 1 tomorrow night). Antibiotic is: Cefdinir (Omnicef) 300mg.    Continue to use 2L of oxygen at rest, and 3L with exertion. Pulmonology will attempt to wean this after course of steroids.

## 2025-04-09 VITALS
HEIGHT: 61 IN | OXYGEN SATURATION: 92 % | DIASTOLIC BLOOD PRESSURE: 76 MMHG | HEART RATE: 80 BPM | TEMPERATURE: 98 F | SYSTOLIC BLOOD PRESSURE: 149 MMHG | WEIGHT: 136.69 LBS | RESPIRATION RATE: 18 BRPM | BODY MASS INDEX: 25.81 KG/M2

## 2025-04-09 LAB
ANION GAP SERPL CALCULATED.3IONS-SCNC: 9 MMOL/L (ref 4–13)
BUN SERPL-MCNC: 30 MG/DL (ref 5–25)
CALCIUM SERPL-MCNC: 9.2 MG/DL (ref 8.4–10.2)
CHLORIDE SERPL-SCNC: 93 MMOL/L (ref 96–108)
CO2 SERPL-SCNC: 33 MMOL/L (ref 21–32)
CREAT SERPL-MCNC: 0.97 MG/DL (ref 0.6–1.3)
ERYTHROCYTE [DISTWIDTH] IN BLOOD BY AUTOMATED COUNT: 16.5 % (ref 11.6–15.1)
GFR SERPL CREATININE-BSD FRML MDRD: 55 ML/MIN/1.73SQ M
GLUCOSE SERPL-MCNC: 131 MG/DL (ref 65–140)
HCT VFR BLD AUTO: 38.3 % (ref 34.8–46.1)
HGB BLD-MCNC: 12 G/DL (ref 11.5–15.4)
MCH RBC QN AUTO: 25.1 PG (ref 26.8–34.3)
MCHC RBC AUTO-ENTMCNC: 31.3 G/DL (ref 31.4–37.4)
MCV RBC AUTO: 80 FL (ref 82–98)
PLATELET # BLD AUTO: 406 THOUSANDS/UL (ref 149–390)
PMV BLD AUTO: 10 FL (ref 8.9–12.7)
POTASSIUM SERPL-SCNC: 3.9 MMOL/L (ref 3.5–5.3)
RBC # BLD AUTO: 4.79 MILLION/UL (ref 3.81–5.12)
SODIUM SERPL-SCNC: 135 MMOL/L (ref 135–147)
WBC # BLD AUTO: 17.45 THOUSAND/UL (ref 4.31–10.16)

## 2025-04-09 PROCEDURE — 97116 GAIT TRAINING THERAPY: CPT

## 2025-04-09 PROCEDURE — 97530 THERAPEUTIC ACTIVITIES: CPT

## 2025-04-09 PROCEDURE — 94668 MNPJ CHEST WALL SBSQ: CPT

## 2025-04-09 PROCEDURE — 94664 DEMO&/EVAL PT USE INHALER: CPT

## 2025-04-09 PROCEDURE — 80048 BASIC METABOLIC PNL TOTAL CA: CPT | Performed by: PHYSICIAN ASSISTANT

## 2025-04-09 PROCEDURE — 85027 COMPLETE CBC AUTOMATED: CPT | Performed by: PHYSICIAN ASSISTANT

## 2025-04-09 PROCEDURE — 97535 SELF CARE MNGMENT TRAINING: CPT

## 2025-04-09 PROCEDURE — 99239 HOSP IP/OBS DSCHRG MGMT >30: CPT | Performed by: PHYSICIAN ASSISTANT

## 2025-04-09 PROCEDURE — 94760 N-INVAS EAR/PLS OXIMETRY 1: CPT

## 2025-04-09 PROCEDURE — 94640 AIRWAY INHALATION TREATMENT: CPT

## 2025-04-09 PROCEDURE — 94761 N-INVAS EAR/PLS OXIMETRY MLT: CPT

## 2025-04-09 RX ORDER — CEFDINIR 300 MG/1
300 CAPSULE ORAL EVERY 12 HOURS SCHEDULED
Qty: 3 CAPSULE | Refills: 0 | Status: SHIPPED | OUTPATIENT
Start: 2025-04-09 | End: 2025-04-11

## 2025-04-09 RX ORDER — PREDNISONE 20 MG/1
TABLET ORAL
Qty: 9 TABLET | Refills: 0 | Status: SHIPPED | OUTPATIENT
Start: 2025-04-09 | End: 2025-04-18

## 2025-04-09 RX ADMIN — CEFDINIR 300 MG: 300 CAPSULE ORAL at 08:58

## 2025-04-09 RX ADMIN — NEOMYCIN SULFATE, POLYMYXIN B SULFATE AND DEXAMETHASONE 1 DROP: 3.5; 10000; 1 SUSPENSION/ DROPS OPHTHALMIC at 08:58

## 2025-04-09 RX ADMIN — IPRATROPIUM BROMIDE AND ALBUTEROL SULFATE 3 ML: 2.5; .5 SOLUTION RESPIRATORY (INHALATION) at 13:20

## 2025-04-09 RX ADMIN — AMLODIPINE BESYLATE 5 MG: 5 TABLET ORAL at 08:58

## 2025-04-09 RX ADMIN — CLOPIDOGREL 75 MG: 75 TABLET ORAL at 08:58

## 2025-04-09 RX ADMIN — IPRATROPIUM BROMIDE AND ALBUTEROL SULFATE 3 ML: 2.5; .5 SOLUTION RESPIRATORY (INHALATION) at 07:36

## 2025-04-09 RX ADMIN — PANTOPRAZOLE SODIUM 40 MG: 40 TABLET, DELAYED RELEASE ORAL at 04:46

## 2025-04-09 RX ADMIN — BISOPROLOL FUMARATE 10 MG: 5 TABLET ORAL at 08:58

## 2025-04-09 RX ADMIN — FLUTICASONE PROPIONATE 2 SPRAY: 50 SPRAY, METERED NASAL at 08:58

## 2025-04-09 RX ADMIN — Medication 400 MG: at 08:58

## 2025-04-09 NOTE — ASSESSMENT & PLAN NOTE
Patient presented to hospital with complaint of shortness of breath and coughing, found to be wheezing with hypoxia  Admitted for COPD exacerbation  Continue scheduled DuoNebs, respiratory protocol  Started scheduled budesonide and formoterol  Appreciate pulmonology recommendations  Continue Mucinex and doxycycline-switch doxycycline to IV as she was refusing oral  CTA chest results noted, bronchiolitis without pneumonia  Required temporary BiPAP secondary to worsening hypoxia with confusion, suspected secondary to hypercarbia noted on ABG  Appears to be tolerating BiPAP at this time, continue, can wean to nasal cannula when more awake  Cultures growing haemophilus influenzae, complete 5 day course of cefdinir upon d/c  Will give shortened prednisone course as she has had some delirium on 40m prednisone. D/c on 30mg x 3 days, 20mg x 3 days, 10mg x 3 days  Reviewed overnight pulse ox and ABG.  Does not qualify for BiPAP  Repeat CXR w/ improvement  Home O2 eval - 2L at rest, 3L on exertion

## 2025-04-09 NOTE — DISCHARGE SUMMARY
Discharge Summary - Hospitalist   Name: Emily Berrios 79 y.o. female I MRN: [id number]  Unit/Bed#: [place] [address] -01 I Date of Admission: 4/1/2025   Date of Service: 4/9/2025 I Hospital Day: 8     Assessment & Plan  COPD with acute exacerbation (HCC)  Patient presented to hospital with complaint of shortness of breath and coughing, found to be wheezing with hypoxia  Admitted for COPD exacerbation  Continue scheduled DuoNebs, respiratory protocol  Started scheduled budesonide and formoterol  Appreciate pulmonology recommendations  Continue Mucinex and doxycycline-switch doxycycline to IV as she was refusing oral  CTA chest results noted, bronchiolitis without pneumonia  Required temporary BiPAP secondary to worsening hypoxia with confusion, suspected secondary to hypercarbia noted on ABG  Appears to be tolerating BiPAP at this time, continue, can wean to nasal cannula when more awake  Cultures growing haemophilus influenzae, complete 5 day course of cefdinir upon d/c  Will give shortened prednisone course as she has had some delirium on 40m prednisone. D/c on 30mg x 3 days, 20mg x 3 days, 10mg x 3 days  Reviewed overnight pulse ox and ABG.  Does not qualify for BiPAP  Repeat CXR w/ improvement  Home O2 eval - 2L at rest, 3L on exertion  Acute on chronic respiratory failure with hypercapnia (HCC)  Patient hypoxic to 80% at time of presentation  Secondary to COPD saturation  Was stable on 3 L nasal cannula yesterday but developed worsening shortness of breath, confusion and hypoxia overnight and was placed on BiPAP  Continue BiPAP PRN  Appears more alert and awake on the BiPAP this morning, suspect we can wean back to nasal cannula later today  Appreciate pulmonology recommendations  Down to 2 L via nasal cannula  Home O2 eval - 2L at rest, 3L on exertion  Hypomagnesemia  Magnesium 1.0 at time of admission  Repleted orally and IV, continue oral twice daily  Stable  Chronic coronary artery disease  Continue Plavix,  Crestor, bisoprolol  Glaucoma  Continue eyedrops  Hyperlipidemia  Continue statin  Cognitive decline  Fall/aspiration precautions  Abnormal EKG  Likely from profound hypomagnesemia  Concern for V. tach overnight, reviewed telemetry today, V. tach not noted in all leads, likely artifact  Repeat EKG with improvement  Monitored on telemetry, has since been discontinued  Acute metabolic encephalopathy  Patient developed acute onset hallucinations, paranoia and psychosis overnight  Does have prior history of hospital dosed delirium  Attempt to limit steroids, will dose in the morning rather than the evening  Continue antibiotic treatment  Will start low-dose at bedtime Seroquel tonight as needed and monitor response  Likely component of hypercapnia, monitor with BiPAP  Family also notes patient continues to drink alcohol daily, placed on CIWA protocol  Improved  Hyponatremia  Sodium continues to fall to 128 today  Urine studies reviewed  Did require IV diuretics overnight for hypoxia  Continue to encourage oral intake  Monitor daily weights  Improved to 136  Acute hypoxic respiratory failure (HCC)  Secondary to H. influenzae pneumonia  Currently on 2 L, continue to wean  Ambulatory dysfunction  Chronic.  Patient ambulates with a cane     Medical Problems       Resolved Problems  Date Reviewed: 3/12/2025   None       Discharging Physician / Practitioner: Eros Bates PA-C  PCP: Ramsey Gtz MD  Admission Date:   Admission Orders (From admission, onward)       Ordered        04/01/25 0406  INPATIENT ADMISSION  Once                          Discharge Date: 04/09/25    Consultations During Hospital Stay:  Pulmonology    Procedures Performed:   BiPAP at night    Significant Findings / Test Results:   XR chest portable  Result Date: 4/3/2025  Impression: Increasing pulmonary infiltrates most evident in the right lower lobes concerning for worsening pneumonia. Mild emphysema noted. Workstation performed: [id number]      CTA chest pe study  Result Date: 4/1/2025  Impression: No acute pulmonary embolus identified to the level of the segmental pulmonary arteries. Patchy groundglass nodular opacities throughout the lungs suggestive of bronchiolitis. The study was marked in EPIC for immediate notification. Workstation performed: [id number]         Incidental Findings:   None    Test Results Pending at Discharge (will require follow up):   None     Outpatient Tests Requested:  None    Complications: None    Reason for Admission: Shortness of breath    Hospital Course:   Emily Berrios is a 79 y.o. female patient with PRN COPD, tobacco use, CAD, CABG, HTN who originally presented to the hospital on 4/1/2025 due to shortness of breath and productive cough with green sputum.  She was found to be satting 80% on room air and have expiratory wheeze.  Diagnosed with COPD exacerbation admitted for IV steroids and nebulizer treatment.  CTA chest was negative for PE, did show bronchiolitis.  Sputum culture grew haemophilus influenza and she was also treated for pneumonia with IV antibiotics and will be discharged on cefdinir to complete a 5-day course of antibiotic.  She continued to improve on steroids and nebulizer treatment, however was unable to be weaned from oxygen entirely.  Home O2 eval prior to discharge: Recommending 2 L at rest and 3 L on exertion.  Will be discharged on short prednisone taper.  Outpatient follow-up with pulmonology on 4/30/2025.    Lastly, she was noted to be hypomagnesemic with magnesium of 1.0 which was repleted.    Please see above list of diagnoses and related plan for additional information.     Condition at Discharge: stable    Discharge Day Visit / Exam:   Subjective: No acute events overnight.  Patient again states that she would like to be discharged today and go home.  Evaluated for home O2 eval and plan for discharge today.  Vitals: Blood Pressure: 149/76 (04/09/25 0728)  Pulse: 80 (04/09/25  "0728)  Temperature: 98 °F (36.7 °C) (04/09/25 0728)  Temp Source: Oral (04/09/25 0728)  Respirations: 18 (04/09/25 0728)  Height: 5' 1\" (154.9 cm) (04/01/25 0452)  Weight - Scale: 62 kg (136 lb 11 oz) (04/08/25 0557)  SpO2: 92 % (04/09/25 1320)  Physical Exam  Vitals and nursing note reviewed.   Constitutional:       Appearance: Normal appearance.   HENT:      Head: Normocephalic and atraumatic.      Mouth/Throat:      Mouth: Mucous membranes are moist.      Pharynx: Oropharynx is clear. No oropharyngeal exudate.   Eyes:      Extraocular Movements: Extraocular movements intact.   Cardiovascular:      Rate and Rhythm: Normal rate and regular rhythm.      Pulses: Normal pulses.      Heart sounds: Normal heart sounds. No murmur heard.     No friction rub. No gallop.   Pulmonary:      Effort: Pulmonary effort is normal. No respiratory distress.      Breath sounds: Normal breath sounds. No stridor. No wheezing or rales.   Abdominal:      General: Abdomen is flat. Bowel sounds are normal. There is no distension.      Palpations: Abdomen is soft.      Tenderness: There is no abdominal tenderness.   Musculoskeletal:      Right lower leg: No edema.      Left lower leg: No edema.   Skin:     General: Skin is warm and dry.   Neurological:      General: No focal deficit present.      Mental Status: She is alert and oriented to person, place, and time.          Discussion with Family: Updated  (daughter) via phone.    Discharge instructions/Information to patient and family:   See after visit summary for information provided to patient and family.      Provisions for Follow-Up Care:  See after visit summary for information related to follow-up care and any pertinent home health orders.      Mobility at time of Discharge:   Basic Mobility Inpatient Raw Score: 23  JH-HLM Goal: 7: Walk 25 feet or more  JH-HLM Achieved: 7: Walk 25 feet or more  HLM Goal achieved. Continue to encourage appropriate mobility.   "   Disposition:   Home    Planned Readmission: None    Discharge Medications:  See after visit summary for reconciled discharge medications provided to patient and/or family.      Administrative Statements   Discharge Statement:  I have spent a total time of 45 minutes in caring for this patient on the day of the visit/encounter. .    **Please Note: This note may have been constructed using a voice recognition system**

## 2025-04-09 NOTE — PHYSICAL THERAPY NOTE
Physical Therapy Treatment Note       04/09/25 1224   PT Last Visit   PT Visit Date 04/09/25   Note Type   Note Type Treatment   Pain Assessment   Pain Assessment Tool 0-10   Pain Score No Pain   Restrictions/Precautions   Weight Bearing Precautions Per Order No   Other Precautions Chair Alarm;Cognitive;Fall Risk;O2;Telemetry  (3L for amb. at rest 2L)   General   Chart Reviewed Yes   Family/Caregiver Present No   Subjective   Subjective Pt. agreeable to PT   Transfers   Sit to Stand 6  Modified independent   Additional items Armrests   Stand to Sit 6  Modified independent   Additional items Armrests   Stand pivot 5  Supervision   Additional items Increased time required;Assist x 1   Ambulation/Elevation   Gait pattern Improper Weight shift;Decreased foot clearance;Decreased toe off;Decreased heel strike   Gait Assistance 5  Supervision   Additional items Assist x 1;Verbal cues   Assistive Device Rolling walker   Distance ~500 ft with 1 brief standing rest   Balance   Static Sitting Normal   Dynamic Sitting Good   Static Standing Fair   Dynamic Standing Fair -   Ambulatory Fair -   Endurance Deficit   Endurance Deficit Yes   Endurance Deficit Description fatigue   Activity Tolerance   Activity Tolerance Patient tolerated treatment well   Nurse Made Aware Yes   Assessment   Prognosis Good   Problem List Decreased endurance;Impaired balance;Decreased mobility;Decreased cognition;Impaired judgement   Assessment Pt. noted with no LOB t/o session. pt. on 3L for amb. trial and on 2L at rest. SpO2 stats during amb 90-91% and at rest 96-97%. Pt. given cues to slow her pace down and to stay within RW during amb. Pt. does fatigue with exertion. Pt. seated on chair post session with all needs within reach and chair alarm engaged. Will continue to follow per PT POC   Barriers to Discharge None   Goals   Patient Goals None reported   STG Expiration Date 04/11/25   PT Treatment Day 2   Plan   Treatment/Interventions Functional  transfer training;Spoke to nursing;Gait training;Patient/family training;Equipment eval/education;Cognitive reorientation   Progress Progressing toward goals   PT Frequency 2-3x/wk   Discharge Recommendation   Rehab Resource Intensity Level, PT III (Minimum Resource Intensity)   Equipment Recommended Walker   AM-PAC Basic Mobility Inpatient   Turning in Flat Bed Without Bedrails 4   Lying on Back to Sitting on Edge of Flat Bed Without Bedrails 4   Moving Bed to Chair 4   Standing Up From Chair Using Arms 4   Walk in Room 4   Climb 3-5 Stairs With Railing 3   Basic Mobility Inpatient Raw Score 23   Basic Mobility Standardized Score 50.88   [facility] Highest Level Of Mobility   -HL Goal 7: Walk 25 feet or more   -HLM Achieved 8: Walk 250 feet ot more   End of Consult   Patient Position at End of Consult All needs within reach;Bed/Chair alarm activated;Bedside chair         Indio Aragon PTA    An AM-PAC basic mobility standardized score less than 42.9 suggest the patient may benefit from discharge to post-acute rehab services.

## 2025-04-09 NOTE — CASE MANAGEMENT
Case Management Discharge Planning Note    Patient name Emily Berrios  [facility] M* MRN [id number]  : 1945 Date 2025       Current Admission Date: 2025  Current Admission Diagnosis:COPD with acute exacerbation (HCC)   Patient Active Problem List    Diagnosis Date Noted Date Diagnosed    Frailty syndrome in geriatric patient 2025     Acute hypoxic respiratory failure (HCC) 2025     Gastroesophageal reflux disease without esophagitis 2025     Acute metabolic encephalopathy 2025     Abnormal CT of the chest 2025     Acute on chronic respiratory failure with hypercapnia (HCC) 2025     Abnormal EKG 2025     Pain and swelling of left knee 10/10/2024     Uterovaginal prolapse, incomplete 2024     Female stress incontinence 2024     Female cystocele 2024     Rectocele 2024     Acute encephalopathy 2024     Hypoxia 2024     Insomnia 2024     Alcohol use 2024     Closed compression fracture of L1 vertebra (HCC) 2024     Closed nondisplaced fracture of surgical neck of left humerus 2024     Complete uterovaginal prolapse 2023     Chronic rhinitis 10/04/2023     Ambulatory dysfunction 2023     Cognitive decline 03/10/2023     Hyponatremia 2023     Hypomagnesemia 2023     Hydronephrosis, bilateral 2023     Cystocele with prolapse 2023     COPD with acute exacerbation (HCC) 2022     Hx of CABG 2018     Hx stent of SVG to the RCA 2018     Asthma 2014     Chronic coronary artery disease 2014     Hiatal hernia 2014     Osteoarthritis 2014     Glaucoma 2013     Hyperlipidemia 2012     Primary hypertension 2012       LOS (days): 8  Geometric Mean LOS (GMLOS) (days):   Days to GMLOS:     OBJECTIVE:  Risk of Unplanned Readmission Score: 17.29         Current admission status: Inpatient   Preferred  Pharmacy:   [id number] - [place] - [address]  [address] [address]  Phone: [phone] Fax: [phone]    [facility] [place] - [id number]  [address]  [place] [id number]  Phone: [phone] Fax: [phone]    Primary Care Provider: Ramsey Gtz MD    Primary Insurance: [employer] [facility]  Secondary Insurance: [facility]    DISCHARGE DETAILS:                                     DME Referral Provided  Referral made for DME?: Yes  DME referral completed for the following items:: Home Oxygen concentrator, Portable Oxygen concentrator (2L at rest and 3Lnc w/ activity)  DME Supplier Name:: [employer]              Treatment Team Recommendation: Home (w/ OPPT)  Discharge Destination Plan:: Home (w/ OPPT)  Transport at Discharge : Auto with designated , Family        Transported by (Company and Unit #): [facility]

## 2025-04-09 NOTE — PLAN OF CARE
Problem: OCCUPATIONAL THERAPY ADULT  Goal: Performs self-care activities at highest level of function for planned discharge setting.  See evaluation for individualized goals.  Description: Treatment Interventions: ADL retraining, Functional transfer training, UE strengthening/ROM, Endurance training, Cognitive reorientation, Patient/family training, Equipment evaluation/education, Compensatory technique education, Continued evaluation          See flowsheet documentation for full assessment, interventions and recommendations.   Outcome: Progressing  Note: Limitation: Decreased ADL status, Decreased endurance, Decreased self-care trans, Decreased high-level ADLs (decreased balance, decreased coordination, decreased functional reach)  Prognosis: Fair  Assessment: Pt seen for 23min tx session with focus on functional balance, functional mobility, ADL status, and cognition. Pt able to tolerate OOB mobility; sitting balance=g/g-, standing balance=f+/f. Pt required occasional verbal/physical cues to maintain safety with transfers. Pt demonstrated supervision with UE and LE ADL status; dtr states pt with 47hrs of HHA/wk. Cognitive deficits noted--i.e.memory, problem-solving, judgement/safety. Pt demonstrate good b/l UE AROM. Tx tolerated well.   The patient's raw score on the AM-PAC Daily Activity Inpatient Short Form is 21. A raw score of greater than or equal to 19 suggests the patient may benefit from discharge to home. Please refer to the recommendation of the Occupational Therapist for safe discharge planning.     Rehab Resource Intensity Level, OT: III (Minimum Resource Intensity)

## 2025-04-09 NOTE — PLAN OF CARE
Problem: PHYSICAL THERAPY ADULT  Goal: Performs mobility at highest level of function for planned discharge setting.  See evaluation for individualized goals.  Description: Treatment/Interventions: Functional transfer training, LE strengthening/ROM, Elevations, Therapeutic exercise, Endurance training, Patient/family training, Bed mobility, Gait training, Spoke to nursing, OT, Family  Equipment Recommended: Walker (pt has)       See flowsheet documentation for full assessment, interventions and recommendations.  Outcome: Progressing  Note: Prognosis: Good  Problem List: Decreased endurance, Impaired balance, Decreased mobility, Decreased cognition, Impaired judgement  Assessment: Pt. noted with no LOB t/o session. pt. on 3L for amb. trial and on 2L at rest. SpO2 stats during amb 90-91% and at rest 96-97%. Pt. given cues to slow her pace down and to stay within RW during amb. Pt. does fatigue with exertion. Pt. seated on chair post session with all needs within reach and chair alarm engaged. Will continue to follow per PT POC  Barriers to Discharge: None     Rehab Resource Intensity Level, PT: III (Minimum Resource Intensity)    See flowsheet documentation for full assessment.

## 2025-04-09 NOTE — PLAN OF CARE
Problem: Potential for Falls  Goal: Patient will remain free of falls  Description: INTERVENTIONS:- Educate patient/family on patient safety including physical limitations- Instruct patient to call for assistance with activity - Consult OT/PT to assist with strengthening/mobility - Keep Call bell within reach- Keep bed low and locked with side rails adjusted as appropriate- Keep care items and personal belongings within reach- Initiate and maintain comfort rounds- Make Fall Risk Sign visible to staff- Offer Toileting every 2 Hours, in advance of need- Initiate/Maintain bed alarm- Obtain necessary fall risk management equipment: walker- Apply yellow socks and bracelet for high fall risk patients- Consider moving patient to room near nurses station  Outcome: Progressing     Problem: PAIN - ADULT  Goal: Verbalizes/displays adequate comfort level or baseline comfort level  Description: Interventions:- Encourage patient to monitor pain and request assistance- Assess pain using appropriate pain scale- Administer analgesics based on type and severity of pain and evaluate response- Implement non-pharmacological measures as appropriate and evaluate response- Consider cultural and social influences on pain and pain management- Notify physician/advanced practitioner if interventions unsuccessful or patient reports new pain  Outcome: Progressing     Problem: INFECTION - ADULT  Goal: Absence or prevention of progression during hospitalization  Description: INTERVENTIONS:- Assess and monitor for signs and symptoms of infection- Monitor lab/diagnostic results- Monitor all insertion sites, i.e. indwelling lines, tubes, and drains- Monitor endotracheal if appropriate and nasal secretions for changes in amount and color- [place] appropriate cooling/warming therapies per order- Administer medications as ordered- Instruct and encourage patient and family to use good hand hygiene technique- Identify and instruct in appropriate  isolation precautions for identified infection/condition  Outcome: Progressing  Goal: Absence of fever/infection during neutropenic period  Description: INTERVENTIONS:- Monitor WBC  Outcome: Progressing     Problem: SAFETY ADULT  Goal: Maintain or return to baseline ADL function  Description: INTERVENTIONS:-  Assess patient's ability to carry out ADLs; assess patient's baseline for ADL function and identify physical deficits which impact ability to perform ADLs (bathing, care of mouth/teeth, toileting, grooming, dressing, etc.)- Assess/evaluate cause of self-care deficits - Assess range of motion- Assess patient's mobility; develop plan if impaired- Assess patient's need for assistive devices and provide as appropriate- Encourage maximum independence but intervene and supervise when necessary- Involve family in performance of ADLs- Assess for home care needs following discharge - Consider OT consult to assist with ADL evaluation and planning for discharge- Provide patient education as appropriate  Outcome: Progressing  Goal: Maintains/Returns to pre admission functional level  Description: INTERVENTIONS:- Perform AM-PAC 6 Click Basic Mobility/ Daily Activity assessment daily.- Set and communicate daily mobility goal to care team and patient/family/caregiver. - Collaborate with rehabilitation services on mobility goals if consulted- Perform Range of Motion 3 times a day.- Reposition patient every 2 hours.- Dangle patient 3 times a day- Stand patient 3 times a day- Ambulate patient 3 times a day- Out of bed to chair 3 times a day - Out of bed for meals 3 times a day- Out of bed for toileting- Record patient progress and toleration of activity level   Outcome: Progressing     Problem: DISCHARGE PLANNING  Goal: Discharge to home or other facility with appropriate resources  Description: INTERVENTIONS:- Identify barriers to discharge w/patient and caregiver- Arrange for needed discharge resources and transportation as  appropriate- Identify discharge learning needs (meds, wound care, etc.)- Arrange for interpretive services to assist at discharge as needed- Refer to Case Management Department for coordinating discharge planning if the patient needs post-hospital services based on physician/advanced practitioner order or complex needs related to functional status, cognitive ability, or social support system  Outcome: Progressing     Problem: Knowledge Deficit  Goal: Patient/family/caregiver demonstrates understanding of disease process, treatment plan, medications, and discharge instructions  Description: Complete learning assessment and assess knowledge base.Interventions:- Provide teaching at level of understanding- Provide teaching via preferred learning methods  Outcome: Progressing     Problem: CARDIOVASCULAR - ADULT  Goal: Maintains optimal cardiac output and hemodynamic stability  Description: INTERVENTIONS:- Monitor I/O, vital signs and rhythm- Monitor for S/S and trends of decreased cardiac output- Administer and titrate ordered vasoactive medications to optimize hemodynamic stability- Assess quality of pulses, skin color and temperature- Assess for signs of decreased coronary artery perfusion- Instruct patient to report change in severity of symptoms  Outcome: Progressing  Goal: Absence of cardiac dysrhythmias or at baseline rhythm  Description: INTERVENTIONS:- Continuous cardiac monitoring, vital signs, obtain 12 lead EKG if ordered- Administer antiarrhythmic and heart rate control medications as ordered- Monitor electrolytes and administer replacement therapy as ordered  Outcome: Progressing     Problem: RESPIRATORY - ADULT  Goal: Achieves optimal ventilation and oxygenation  Description: INTERVENTIONS:- Assess for changes in respiratory status- Assess for changes in mentation and behavior- Position to facilitate oxygenation and minimize respiratory effort- Oxygen administered by appropriate delivery if ordered-  Initiate smoking cessation education as indicated- Encourage broncho-pulmonary hygiene including cough, deep breathe, Incentive Spirometry- Assess the need for suctioning and aspirate as needed- Assess and instruct to report SOB or any respiratory difficulty- Respiratory Therapy support as indicated  Outcome: Progressing     Problem: CONFUSION/THOUGHT DISTURBANCE  Goal: Thought disturbances (confusion, delirium, depression, dementia or psychosis) are managed to maintain or return to baseline mental status and functional level  Description: INTERVENTIONS:- Assess for possible contributors to  thought disturbance, including but not limited to medications, infection, impaired vision or hearing, underlying metabolic abnormalities, dehydration, respiratory compromise,  psychiatric diagnoses and notify attending PHYSICAN/AP- Monitor and intervene to maintain adequate nutrition, hydration, elimination, sleep and activity- Decrease environmental stimuli, including noise as appropriate.- Provide frequent contacts to provide refocusing, direction and reassurance as needed. Approach patient calmly with eye contact and at their level.- [place] high risk fall precautions, aspiration precautions and other safety measures, as indicated- If delirium suspected, notify physician/AP of change in condition and request immediate in-person evaluation- Pursue consults as appropriate including Geriatric (campus dependent), OT for cognitive evaluation/activity planning, psychiatric, pastoral care, etc.  Outcome: Progressing     Problem: SAFETY,RESTRAINT: NV/NON-SELF DESTRUCTIVE BEHAVIOR  Goal: Remains free of harm/injury (restraint for non violent/non self-detsructive behavior)  Description: INTERVENTIONS:- Instruct patient/family regarding restraint use - Assess and monitor physiologic and psychological status - Provide interventions and comfort measures to meet assessed patient needs - Identify and implement measures to help patient  regain control- Assess readiness for release of restraint   Outcome: Progressing  Goal: Returns to optimal restraint-free functioning  Description: INTERVENTIONS:- Assess the patient's behavior and symptoms that indicate continued need for restraint- Identify and implement measures to help patient regain control- Assess readiness for release of restraint   Outcome: Progressing     Problem: Prexisting or High Potential for Compromised Skin Integrity  Goal: Skin integrity is maintained or improved  Description: INTERVENTIONS:- Identify patients at risk for skin breakdown- Assess and monitor skin integrity- Assess and monitor nutrition and hydration status- Monitor labs - Assess for incontinence - Turn and reposition patient- Assist with mobility/ambulation- Relieve pressure over bony prominences- Avoid friction and shearing- Provide appropriate hygiene as needed including keeping skin clean and dry- Evaluate need for skin moisturizer/barrier cream- Collaborate with interdisciplinary team - Patient/family teaching- Consider wound care consult   Outcome: Progressing     Problem: Nutrition/Hydration-ADULT  Goal: Nutrient/Hydration intake appropriate for improving, restoring or maintaining nutritional needs  Description: Monitor and assess patient's nutrition/hydration status for malnutrition. Collaborate with interdisciplinary team and initiate plan and interventions as ordered.  Monitor patient's weight and dietary intake as ordered or per policy. Utilize nutrition screening tool and intervene as necessary. Determine patient's food preferences and provide high-protein, high-caloric foods as appropriate. INTERVENTIONS:- Monitor oral intake, urinary output, labs, and treatment plans- Assess nutrition and hydration status and recommend course of action- Evaluate amount of meals eaten- Assist patient with eating if necessary - Allow adequate time for meals- Recommend/ encourage appropriate diets, oral nutritional  supplements, and vitamin/mineral supplements- Order, calculate, and assess calorie counts as needed- Recommend, monitor, and adjust tube feedings and TPN/PPN based on assessed needs- Assess need for intravenous fluids- Provide specific nutrition/hydration education as appropriate- Include patient/family/caregiver in decisions related to nutrition  Outcome: Progressing

## 2025-04-09 NOTE — RESPIRATORY THERAPY NOTE
Home Oxygen Qualifying Test     Patient name: Emily Berrios        : 1945   Date of Test:  2025  Diagnosis:    Home Oxygen Test:    **Medicare Guidelines require item(s) 1-5 on all ambulatory patients or 1 and 2 on non-ambulatory patients.    1. Baseline SPO2 on Room Air at rest 87 %   If <= 88% on Room Air add O2 via NC to obtain SpO2 >=88%. If LPM needed, document LPM  needed to reach =>88%    SPO2 during exertion on Room Air N/A   During exertion monitor SPO2. If SPO2 increases >=89%, do not add supplemental oxygen    SPO2 on Oxygen at Rest 93 % at 2 LPM    SPO2 during exertion on Oxygen 93 % at 3 LPM    Test performed during exertion activity. Patient ambulated for approximately 6 minutes.      [x]  Supplemental Home Oxygen is indicated.    []  Client does not qualify for home oxygen.    Respiratory Additional Notes- Patient ambulated in hallway with assistance of walker. Requires 2 LPM at rest and 3 LPM with ambulation.     Rachel Rivers, RT

## 2025-04-09 NOTE — OCCUPATIONAL THERAPY NOTE
"  Occupational Therapy Progress Note     Patient Name: Emily Berrios  Today's Date: 4/9/2025  Problem List  Principal Problem:    COPD with acute exacerbation (HCC)  Active Problems:    Chronic coronary artery disease    Glaucoma    Hiatal hernia    Hyperlipidemia    Hyponatremia    Hypomagnesemia    Cognitive decline    Ambulatory dysfunction    Insomnia    Acute on chronic respiratory failure with hypercapnia (HCC)    Abnormal EKG    Gastroesophageal reflux disease without esophagitis    Acute metabolic encephalopathy    Abnormal CT of the chest    Acute hypoxic respiratory failure (HCC)    Frailty syndrome in geriatric patient            04/09/25 1500   Note Type   Note Type Treatment   Pain Assessment   Pain Assessment Tool 0-10   Pain Score No Pain   Restrictions/Precautions   Weight Bearing Precautions Per Order No   Other Precautions O2;Fall Risk;Cognitive;Chair Alarm;Bed Alarm  (SPO2=93-95% with 2 liters of O2, 83-92% on RA(with activity--at rest))   ADL   Where Assessed Chair   Eating Assistance 6  Modified independent   Grooming Assistance 6  Modified Independent   UB Bathing Assistance 5  Supervision/Setup   LB Bathing Assistance 5  Supervision/Setup   UB Dressing Assistance 5  Supervision/Setup   LB Dressing Assistance 5  Supervision/Setup   Functional Standing Tolerance   Time 2-3mins   Transfers   Sit to Stand 6  Modified independent   Additional items Verbal cues   Stand to Sit 6  Modified independent   Additional items Increased time required;Verbal cues   Functional Mobility   Functional Mobility 5  Supervision   Additional items Rolling walker   Therapeutic Exercise - ROM   UE-ROM Yes   Subjective   Subjective \"I don't like too many questions.\"   Cognition   Overall Cognitive Status Impaired   Arousal/Participation Alert   Attention Attends with cues to redirect   Orientation Level Oriented to person;Oriented to place;Oriented to time;Disoriented to situation   Memory Decreased short term " memory;Decreased recall of precautions  (STM=1/3 recall after 5mins)   Following Commands Follows one step commands with increased time or repetition   Comments hx cognitive deficits ?   Activity Tolerance   Activity Tolerance Patient limited by fatigue;Other (Comment)  (cognition)   Medical Staff Made Aware nsg   Assessment   Assessment Pt seen for 23min tx session with focus on functional balance, functional mobility, ADL status, and cognition. Pt able to tolerate OOB mobility; sitting balance=g/g-, standing balance=f+/f. Pt required occasional verbal/physical cues to maintain safety with transfers. Pt demonstrated supervision with UE and LE ADL status; dtr states pt with 47hrs of HHA/wk. Cognitive deficits noted--i.e.memory, problem-solving, judgement/safety. Pt demonstrate good b/l UE AROM. Tx tolerated well.   The patient's raw score on the AM-PAC Daily Activity Inpatient Short Form is 21. A raw score of greater than or equal to 19 suggests the patient may benefit from discharge to home. Please refer to the recommendation of the Occupational Therapist for safe discharge planning.   Plan   Treatment Interventions ADL retraining;Functional transfer training;UE strengthening/ROM;Endurance training;Cognitive reorientation;Patient/family training;Equipment evaluation/education;Compensatory technique education;Continued evaluation   Goal Expiration Date 04/15/25   OT Treatment Day 2   OT Frequency 2-3x/wk   Discharge Recommendation   Rehab Resource Intensity Level, OT III (Minimum Resource Intensity)   AM-PAC Daily Activity Inpatient   Lower Body Dressing 3   Bathing 3   Toileting 3   Upper Body Dressing 4   Grooming 4   Eating 4   Daily Activity Raw Score 21   Daily Activity Standardized Score (Calc for Raw Score >=11) 44.27   AM-PAC Applied Cognition Inpatient   Following a Speech/Presentation 3   Understanding Ordinary Conversation 3   Taking Medications 2   Remembering Where Things Are Placed or Put Away 2    Remembering List of 4-5 Errands 2   Taking Care of Complicated Tasks 2   Applied Cognition Raw Score 14   Applied Cognition Standardized Score 32.02         Raymond Dahl

## 2025-04-09 NOTE — PLAN OF CARE
Problem: Potential for Falls  Goal: Patient will remain free of falls  Description: INTERVENTIONS:- Educate patient/family on patient safety including physical limitations- Instruct patient to call for assistance with activity - Consult OT/PT to assist with strengthening/mobility - Keep Call bell within reach- Keep bed low and locked with side rails adjusted as appropriate- Keep care items and personal belongings within reach- Initiate and maintain comfort rounds- Make Fall Risk Sign visible to staff- Offer Toileting every 2 Hours, in advance of need- Initiate/Maintain bed alarm- Obtain necessary fall risk management equipment: walker- Apply yellow socks and bracelet for high fall risk patients- Consider moving patient to room near nurses station  Outcome: Progressing     Problem: PAIN - ADULT  Goal: Verbalizes/displays adequate comfort level or baseline comfort level  Description: Interventions:- Encourage patient to monitor pain and request assistance- Assess pain using appropriate pain scale- Administer analgesics based on type and severity of pain and evaluate response- Implement non-pharmacological measures as appropriate and evaluate response- Consider cultural and social influences on pain and pain management- Notify physician/advanced practitioner if interventions unsuccessful or patient reports new pain  Outcome: Progressing     Problem: INFECTION - ADULT  Goal: Absence or prevention of progression during hospitalization  Description: INTERVENTIONS:- Assess and monitor for signs and symptoms of infection- Monitor lab/diagnostic results- Monitor all insertion sites, i.e. indwelling lines, tubes, and drains- Monitor endotracheal if appropriate and nasal secretions for changes in amount and color- [place] appropriate cooling/warming therapies per order- Administer medications as ordered- Instruct and encourage patient and family to use good hand hygiene technique- Identify and instruct in appropriate  isolation precautions for identified infection/condition  Outcome: Progressing  Goal: Absence of fever/infection during neutropenic period  Description: INTERVENTIONS:- Monitor WBC  Outcome: Progressing     Problem: SAFETY ADULT  Goal: Maintain or return to baseline ADL function  Description: INTERVENTIONS:-  Assess patient's ability to carry out ADLs; assess patient's baseline for ADL function and identify physical deficits which impact ability to perform ADLs (bathing, care of mouth/teeth, toileting, grooming, dressing, etc.)- Assess/evaluate cause of self-care deficits - Assess range of motion- Assess patient's mobility; develop plan if impaired- Assess patient's need for assistive devices and provide as appropriate- Encourage maximum independence but intervene and supervise when necessary- Involve family in performance of ADLs- Assess for home care needs following discharge - Consider OT consult to assist with ADL evaluation and planning for discharge- Provide patient education as appropriate  Outcome: Progressing  Goal: Maintains/Returns to pre admission functional level  Description: INTERVENTIONS:- Perform AM-PAC 6 Click Basic Mobility/ Daily Activity assessment daily.- Set and communicate daily mobility goal to care team and patient/family/caregiver. - Collaborate with rehabilitation services on mobility goals if consulted- Perform Range of Motion 3 times a day.- Reposition patient every 2 hours.- Dangle patient 3 times a day- Stand patient 3 times a day- Ambulate patient 3 times a day- Out of bed to chair 3 times a day - Out of bed for meals 3 times a day- Out of bed for toileting- Record patient progress and toleration of activity level   Outcome: Progressing     Problem: DISCHARGE PLANNING  Goal: Discharge to home or other facility with appropriate resources  Description: INTERVENTIONS:- Identify barriers to discharge w/patient and caregiver- Arrange for needed discharge resources and transportation as  appropriate- Identify discharge learning needs (meds, wound care, etc.)- Arrange for interpretive services to assist at discharge as needed- Refer to Case Management Department for coordinating discharge planning if the patient needs post-hospital services based on physician/advanced practitioner order or complex needs related to functional status, cognitive ability, or social support system  Outcome: Progressing     Problem: Knowledge Deficit  Goal: Patient/family/caregiver demonstrates understanding of disease process, treatment plan, medications, and discharge instructions  Description: Complete learning assessment and assess knowledge base.Interventions:- Provide teaching at level of understanding- Provide teaching via preferred learning methods  Outcome: Progressing     Problem: CARDIOVASCULAR - ADULT  Goal: Maintains optimal cardiac output and hemodynamic stability  Description: INTERVENTIONS:- Monitor I/O, vital signs and rhythm- Monitor for S/S and trends of decreased cardiac output- Administer and titrate ordered vasoactive medications to optimize hemodynamic stability- Assess quality of pulses, skin color and temperature- Assess for signs of decreased coronary artery perfusion- Instruct patient to report change in severity of symptoms  Outcome: Progressing  Goal: Absence of cardiac dysrhythmias or at baseline rhythm  Description: INTERVENTIONS:- Continuous cardiac monitoring, vital signs, obtain 12 lead EKG if ordered- Administer antiarrhythmic and heart rate control medications as ordered- Monitor electrolytes and administer replacement therapy as ordered  Outcome: Progressing     Problem: RESPIRATORY - ADULT  Goal: Achieves optimal ventilation and oxygenation  Description: INTERVENTIONS:- Assess for changes in respiratory status- Assess for changes in mentation and behavior- Position to facilitate oxygenation and minimize respiratory effort- Oxygen administered by appropriate delivery if ordered-  Initiate smoking cessation education as indicated- Encourage broncho-pulmonary hygiene including cough, deep breathe, Incentive Spirometry- Assess the need for suctioning and aspirate as needed- Assess and instruct to report SOB or any respiratory difficulty- Respiratory Therapy support as indicated  Outcome: Progressing     Problem: CONFUSION/THOUGHT DISTURBANCE  Goal: Thought disturbances (confusion, delirium, depression, dementia or psychosis) are managed to maintain or return to baseline mental status and functional level  Description: INTERVENTIONS:- Assess for possible contributors to  thought disturbance, including but not limited to medications, infection, impaired vision or hearing, underlying metabolic abnormalities, dehydration, respiratory compromise,  psychiatric diagnoses and notify attending PHYSICAN/AP- Monitor and intervene to maintain adequate nutrition, hydration, elimination, sleep and activity- Decrease environmental stimuli, including noise as appropriate.- Provide frequent contacts to provide refocusing, direction and reassurance as needed. Approach patient calmly with eye contact and at their level.- [place] high risk fall precautions, aspiration precautions and other safety measures, as indicated- If delirium suspected, notify physician/AP of change in condition and request immediate in-person evaluation- Pursue consults as appropriate including Geriatric (campus dependent), OT for cognitive evaluation/activity planning, psychiatric, pastoral care, etc.  Outcome: Progressing     Problem: SAFETY,RESTRAINT: NV/NON-SELF DESTRUCTIVE BEHAVIOR  Goal: Remains free of harm/injury (restraint for non violent/non self-detsructive behavior)  Description: INTERVENTIONS:- Instruct patient/family regarding restraint use - Assess and monitor physiologic and psychological status - Provide interventions and comfort measures to meet assessed patient needs - Identify and implement measures to help patient  regain control- Assess readiness for release of restraint   Outcome: Progressing  Goal: Returns to optimal restraint-free functioning  Description: INTERVENTIONS:- Assess the patient's behavior and symptoms that indicate continued need for restraint- Identify and implement measures to help patient regain control- Assess readiness for release of restraint   Outcome: Progressing     Problem: Prexisting or High Potential for Compromised Skin Integrity  Goal: Skin integrity is maintained or improved  Description: INTERVENTIONS:- Identify patients at risk for skin breakdown- Assess and monitor skin integrity- Assess and monitor nutrition and hydration status- Monitor labs - Assess for incontinence - Turn and reposition patient- Assist with mobility/ambulation- Relieve pressure over bony prominences- Avoid friction and shearing- Provide appropriate hygiene as needed including keeping skin clean and dry- Evaluate need for skin moisturizer/barrier cream- Collaborate with interdisciplinary team - Patient/family teaching- Consider wound care consult   Outcome: Progressing     Problem: Nutrition/Hydration-ADULT  Goal: Nutrient/Hydration intake appropriate for improving, restoring or maintaining nutritional needs  Description: Monitor and assess patient's nutrition/hydration status for malnutrition. Collaborate with interdisciplinary team and initiate plan and interventions as ordered.  Monitor patient's weight and dietary intake as ordered or per policy. Utilize nutrition screening tool and intervene as necessary. Determine patient's food preferences and provide high-protein, high-caloric foods as appropriate. INTERVENTIONS:- Monitor oral intake, urinary output, labs, and treatment plans- Assess nutrition and hydration status and recommend course of action- Evaluate amount of meals eaten- Assist patient with eating if necessary - Allow adequate time for meals- Recommend/ encourage appropriate diets, oral nutritional  supplements, and vitamin/mineral supplements- Order, calculate, and assess calorie counts as needed- Recommend, monitor, and adjust tube feedings and TPN/PPN based on assessed needs- Assess need for intravenous fluids- Provide specific nutrition/hydration education as appropriate- Include patient/family/caregiver in decisions related to nutrition  Outcome: Progressing

## 2025-04-09 NOTE — ASSESSMENT & PLAN NOTE
Patient hypoxic to 80% at time of presentation  Secondary to COPD saturation  Was stable on 3 L nasal cannula yesterday but developed worsening shortness of breath, confusion and hypoxia overnight and was placed on BiPAP  Continue BiPAP PRN  Appears more alert and awake on the BiPAP this morning, suspect we can wean back to nasal cannula later today  Appreciate pulmonology recommendations  Down to 2 L via nasal cannula  Home O2 eval - 2L at rest, 3L on exertion

## 2025-04-10 ENCOUNTER — PATIENT OUTREACH (OUTPATIENT)
Dept: CASE MANAGEMENT | Facility: OTHER | Age: 80
End: 2025-04-10

## 2025-04-10 ENCOUNTER — PATIENT OUTREACH (OUTPATIENT)
Dept: CASE MANAGEMENT | Facility: HOSPITAL | Age: 80
End: 2025-04-10

## 2025-04-10 ENCOUNTER — TRANSITIONAL CARE MANAGEMENT (OUTPATIENT)
Dept: FAMILY MEDICINE CLINIC | Facility: CLINIC | Age: 80
End: 2025-04-10

## 2025-04-10 LAB

## 2025-04-10 NOTE — PROGRESS NOTES
HRR referral received    Chart reviewed. Patient admitted to IP at [facility] 4/1-4/9 with COPD exacerbation. Pt presented to ER with c/o sob and coughing. Pt found to be wheezing and hypoxic. Patient treated with DuoNebs, Budesonide and Formoterol. Pulmonary consulted. Mucinex and IV Doxycycline-refused oral. CTA: bronchiolitis without PNA. Pt required temporary Bipap. Cx's: + haemophilus influenzae    PMH: COPD, CAD and s/p CABG, Glaucoma, HLD, cognitive decline, arthritis, T2DM, HTN     Patients daughter is patients paid care giver 47 hours/week    RNCM to follow.

## 2025-04-10 NOTE — UTILIZATION REVIEW
NOTIFICATION OF ADMISSION DISCHARGE   This is a Notification of Discharge from [facility]. Please be advised that this patient has been discharge from our facility. Below you will find the admission and discharge date and time including the patient’s disposition.   UTILIZATION REVIEW CONTACT:  Utilization Review Assistants  Network Utilization Review Department  Phone: [phone] x carefully listen to the prompts. All voicemails are confidential.  Email: [email or web address]     ADMISSION INFORMATION  PRESENTATION DATE: 4/1/2025  1:27 AM  OBERVATION ADMISSION DATE: N/A  INPATIENT ADMISSION DATE: 4/1/25  4:06 AM   DISCHARGE DATE: 4/9/2025  4:41 PM   DISPOSITION:Home/Self Care    Network Utilization Review Department  ATTENTION: Please call with any questions or concerns to [phone] and carefully listen to the prompts so that you are directed to the right person. All voicemails are confidential.   For Discharge needs, contact Care Management DC Support Team at [phone] opt. [phone]  Send all requests for admission clinical reviews, approved or denied determinations and any other requests to dedicated fax number below belonging to the campus where the patient is receiving treatment. List of dedicated fax numbers for the Facilities:  FACILITY NAME UR FAX NUMBER   ADMISSION DENIALS (Administrative/Medical Necessity) [phone]   DISCHARGE SUPPORT TEAM ([facility]) [phone]   PARENT CHILD HEALTH (Maternity/NICU/Pediatrics) [phone]   [facility] [phone]   [facility] [phone]   Critical access hospital [phone]   [facility] [phone]   [facility] [phone]   [facility] [phone]   [facility] [phone]   [facility] [phone]   [facility]  [facility] [phone]   [facility] [phone]   [facility] [phone]   [facility] [phone]

## 2025-04-11 ENCOUNTER — PATIENT OUTREACH (OUTPATIENT)
Dept: CASE MANAGEMENT | Facility: HOSPITAL | Age: 80
End: 2025-04-11

## 2025-04-11 NOTE — PROGRESS NOTES
Complex Care Management Note:  HRR referral follow up     Chart reviewed. Patient admitted to IP at [facility] 4[facility] with COPD exacerbation. Pt presented to ER with c/o sob and coughing. Pt found to be wheezing and hypoxic. Patient treated with DuoNebs, Budesonide and Formoterol. Pulmonary consulted. Mucinex and IV Doxycycline-refused oral. CTA: bronchiolitis without PNA. Pt required temporary Bipap. Cx's: + haemophilus influenzae     PMH: COPD, CAD and s/p CABG, Glaucoma, HLD, cognitive decline, arthritis, T2DM, HTN   Per IPCM notes: Patients daughter is patients paid care giver 47 hours/week    Call placed to patients daughter/care giver. There was no answer and a VM left with request for CB.    Home O2?     RNCM to follow.    Apt's:  4/15: PCP  4/30: Pulmonary

## 2025-04-11 NOTE — PROGRESS NOTES
This RNCM received a return call from patients daughter Ivanna. She says patient is doing a lot better. She says her breathing is good and she is using the O2-2 L at rest and 3 on exertion. She says this is new and it was obtained through [employer]. She says they were out today to  the portable concentrator she was discharged with and delivered the big concentrator and hooked it up. She says she is patients paid care giver through [employer] and patient is allotted 47 hours/week. She says the Care Coordinator is scheduled to come out next week and they will evaluate if patient qualifies for additional hours. She states patient was living by herself but since she has been sick she has been staying with her. She says patient ambulates with a cane and needs assistance with ADL's. She provides patient with transportation to apt's. Patients upcoming apt's were reviewed and she is aware of all and will take her. Ivanna says she has been helping patient with her medications and she has a list. We reviewed AVS and Medications. She states patient is done with her antibiotic-Cefdinir. She took her last dose this am. She says that patient is taking the Prednisone Taper as directed. Patient has all other medications except for some of the Vitamin/Supplements and she says she will speak with the PCP at visit next week. She denies the need for any refills on prescribed medications at this time. I advised her to bring her list and all of patients medications with her to her PCP apt next week. She states understanding and says she will.    Ivanna declined the need for CCM services or follow up. She has my contact information and encouraged her to call with any questions or concerns.

## 2025-04-15 DIAGNOSIS — F41.9 ANXIETY: ICD-10-CM

## 2025-04-15 DIAGNOSIS — J44.9 CHRONIC OBSTRUCTIVE PULMONARY DISEASE, UNSPECIFIED COPD TYPE (HCC): ICD-10-CM

## 2025-04-15 RX ORDER — ALPRAZOLAM 0.5 MG
0.5 TABLET ORAL
Qty: 30 TABLET | Refills: 3 | Status: SHIPPED | OUTPATIENT
Start: 2025-04-15

## 2025-04-15 RX ORDER — FLUTICASONE FUROATE, UMECLIDINIUM BROMIDE AND VILANTEROL TRIFENATATE 100; 62.5; 25 UG/1; UG/1; UG/1
1 POWDER RESPIRATORY (INHALATION) DAILY
Qty: 3 EACH | Refills: 3 | Status: SHIPPED | OUTPATIENT
Start: 2025-04-15 | End: 2025-07-14

## 2025-04-23 ENCOUNTER — OFFICE VISIT (OUTPATIENT)
Dept: FAMILY MEDICINE CLINIC | Facility: CLINIC | Age: 80
End: 2025-04-23
Payer: MEDICARE

## 2025-04-23 VITALS
DIASTOLIC BLOOD PRESSURE: 80 MMHG | RESPIRATION RATE: 16 BRPM | HEIGHT: 61 IN | SYSTOLIC BLOOD PRESSURE: 140 MMHG | WEIGHT: 134 LBS | TEMPERATURE: 98.2 F | HEART RATE: 80 BPM | BODY MASS INDEX: 25.3 KG/M2 | OXYGEN SATURATION: 98 %

## 2025-04-23 DIAGNOSIS — I25.10 CORONARY ARTERY DISEASE DUE TO LIPID RICH PLAQUE: ICD-10-CM

## 2025-04-23 DIAGNOSIS — I10 ESSENTIAL HYPERTENSION: ICD-10-CM

## 2025-04-23 DIAGNOSIS — I25.83 CORONARY ARTERY DISEASE DUE TO LIPID RICH PLAQUE: ICD-10-CM

## 2025-04-23 DIAGNOSIS — J18.9 PNEUMONIA DUE TO INFECTIOUS ORGANISM, UNSPECIFIED LATERALITY, UNSPECIFIED PART OF LUNG: Primary | ICD-10-CM

## 2025-04-23 DIAGNOSIS — R01.1 HEART MURMUR: ICD-10-CM

## 2025-04-23 DIAGNOSIS — R55 SYNCOPE, UNSPECIFIED SYNCOPE TYPE: ICD-10-CM

## 2025-04-23 DIAGNOSIS — E53.8 VITAMIN B12 DEFICIENCY: ICD-10-CM

## 2025-04-23 DIAGNOSIS — E83.42 HYPOMAGNESEMIA: ICD-10-CM

## 2025-04-23 DIAGNOSIS — K21.00 GASTROESOPHAGEAL REFLUX DISEASE WITH ESOPHAGITIS WITHOUT HEMORRHAGE: ICD-10-CM

## 2025-04-23 DIAGNOSIS — M81.8 IDIOPATHIC OSTEOPOROSIS: ICD-10-CM

## 2025-04-23 PROCEDURE — 96372 THER/PROPH/DIAG INJ SC/IM: CPT

## 2025-04-23 PROCEDURE — 99496 TRANSJ CARE MGMT HIGH F2F 7D: CPT | Performed by: INTERNAL MEDICINE

## 2025-04-23 RX ORDER — AMLODIPINE BESYLATE 5 MG/1
5 TABLET ORAL 2 TIMES DAILY
Qty: 180 TABLET | Refills: 3 | Status: SHIPPED | OUTPATIENT
Start: 2025-04-23

## 2025-04-23 RX ORDER — BISOPROLOL FUMARATE 10 MG/1
10 TABLET, FILM COATED ORAL DAILY
Qty: 90 TABLET | Refills: 3 | Status: SHIPPED | OUTPATIENT
Start: 2025-04-23

## 2025-04-23 RX ORDER — PANTOPRAZOLE SODIUM 40 MG/1
40 TABLET, DELAYED RELEASE ORAL
Qty: 90 TABLET | Refills: 3 | Status: SHIPPED | OUTPATIENT
Start: 2025-04-23 | End: 2026-04-18

## 2025-04-23 RX ORDER — CLOPIDOGREL BISULFATE 75 MG
75 TABLET ORAL DAILY
Qty: 90 TABLET | Refills: 1 | Status: SHIPPED | OUTPATIENT
Start: 2025-04-23

## 2025-04-23 RX ORDER — LANOLIN ALCOHOL/MO/W.PET/CERES
1000 CREAM (GRAM) TOPICAL DAILY
Qty: 90 TABLET | Refills: 3 | Status: SHIPPED | OUTPATIENT
Start: 2025-04-23

## 2025-04-23 RX ORDER — CYANOCOBALAMIN 1000 UG/ML
1000 INJECTION, SOLUTION INTRAMUSCULAR; SUBCUTANEOUS
Status: SHIPPED | OUTPATIENT
Start: 2025-04-23

## 2025-04-23 RX ORDER — ROSUVASTATIN CALCIUM 10 MG/1
10 TABLET, COATED ORAL DAILY
Qty: 90 TABLET | Refills: 3 | Status: SHIPPED | OUTPATIENT
Start: 2025-04-23

## 2025-04-23 RX ORDER — LANOLIN ALCOHOL/MO/W.PET/CERES
400 CREAM (GRAM) TOPICAL 2 TIMES DAILY
Qty: 200 TABLET | Refills: 3 | Status: SHIPPED | OUTPATIENT
Start: 2025-04-23

## 2025-04-23 RX ADMIN — CYANOCOBALAMIN 1000 MCG: 1000 INJECTION, SOLUTION INTRAMUSCULAR; SUBCUTANEOUS at 15:46

## 2025-04-23 NOTE — PROGRESS NOTES
Transition of Care Visit:  Name: Emily Berrios      : 1945      MRN: [id number]  Encounter Provider: Ramsey Gtz MD  Encounter Date: 2025   Encounter department: [facility]    Assessment & Plan  BMI 25.0-25.9,adult    Orders:    rosuvastatin (CRESTOR) 10 MG tablet; Take 1 tablet (10 mg total) by mouth daily    Coronary artery disease due to lipid rich plaque    Orders:    Plavix 75 MG tablet; Take 1 tablet (75 mg total) by mouth daily    Gastroesophageal reflux disease with esophagitis without hemorrhage    Orders:    pantoprazole (PROTONIX) 40 mg tablet; Take 1 tablet (40 mg total) by mouth daily before breakfast    Vitamin B12 deficiency    Orders:    vitamin B-12 (VITAMIN B-12) 1,000 mcg tablet; Take 1 tablet (1,000 mcg total) by mouth daily    Hypomagnesemia    Orders:    Echo complete w/ contrast if indicated; Future    cyanocobalamin injection 1,000 mcg    magnesium Oxide (MAG-OX) 400 mg TABS; Take 1 tablet (400 mg total) by mouth 2 (two) times a day    DXA bone density spine hip and pelvis; Future    Quantiferon TB Gold Plus Assay; Future    Cologuard    H. pylori antigen, stool; Future    UA (URINE) with reflex to Scope; Future    Magnesium; Future    Vitamin B12; Future    Vitamin D 25 hydroxy; Future    TSH, 3rd generation; Future    T4, free; Future    CBC and differential; Future    Comprehensive metabolic panel; Future    Lipid panel; Future    Syncope, unspecified syncope type    Orders:    bisoprolol (ZEBETA) 10 MG tablet; Take 1 tablet (10 mg total) by mouth daily    Essential hypertension    Orders:    Echo complete w/ contrast if indicated; Future    cyanocobalamin injection 1,000 mcg    amLODIPine (NORVASC) 5 mg tablet; Take 1 tablet (5 mg total) by mouth 2 (two) times a day    DXA bone density spine hip and pelvis; Future    Quantiferon TB Gold Plus Assay; Future    Cologuard    H. pylori antigen, stool; Future    UA (URINE) with reflex to  Scope; Future    Magnesium; Future    Vitamin B12; Future    Vitamin D 25 hydroxy; Future    TSH, 3rd generation; Future    T4, free; Future    CBC and differential; Future    Comprehensive metabolic panel; Future    Lipid panel; Future    Pneumonia due to infectious organism, unspecified laterality, unspecified part of lung    Orders:    Echo complete w/ contrast if indicated; Future    cyanocobalamin injection 1,000 mcg    DXA bone density spine hip and pelvis; Future    Quantiferon TB Gold Plus Assay; Future    Cologuard    H. pylori antigen, stool; Future    UA (URINE) with reflex to Scope; Future    Magnesium; Future    Vitamin B12; Future    Vitamin D 25 hydroxy; Future    TSH, 3rd generation; Future    T4, free; Future    CBC and differential; Future    Comprehensive metabolic panel; Future    Lipid panel; Future    Idiopathic osteoporosis    Orders:    Echo complete w/ contrast if indicated; Future    cyanocobalamin injection 1,000 mcg    DXA bone density spine hip and pelvis; Future    Quantiferon TB Gold Plus Assay; Future    Cologuard    H. pylori antigen, stool; Future    UA (URINE) with reflex to Scope; Future    Magnesium; Future    Vitamin B12; Future    Vitamin D 25 hydroxy; Future    TSH, 3rd generation; Future    T4, free; Future    CBC and differential; Future    Comprehensive metabolic panel; Future    Lipid panel; Future    Heart murmur    Orders:    Echo complete w/ contrast if indicated; Future    cyanocobalamin injection 1,000 mcg    DXA bone density spine hip and pelvis; Future    Quantiferon TB Gold Plus Assay; Future    Cologuard    H. pylori antigen, stool; Future    UA (URINE) with reflex to Scope; Future    Magnesium; Future    Vitamin B12; Future    Vitamin D 25 hydroxy; Future    TSH, 3rd generation; Future    T4, free; Future    CBC and differential; Future    Comprehensive metabolic panel; Future    Lipid panel; Future  Pt is doing better  RTC in 3 mos w Blood work       History of  "Present Illness     Transitional Care Management Review:   Emily Berrios is a 79 y.o. female here for TCM follow up.     During the TCM phone call patient stated:  TCM Call (since 4/9/2025)       Date and time call was made  4/10/2025  9:29 AM    Patient was hospitialized at  [facility]    Date of Admission  04/01/25    Date of discharge  04/09/25    Disposition  Home          TCM Call (since 4/9/2025)       Scheduled for follow up?  Yes    I have advised the patient to call PCP with any new or worsening symptoms  ANISH Hernandez          79 Y O Lady is here for Post Hospital /TCM Visit , she feels a lot better, D/C Summary and med  list reviewed,...      Review of Systems   Constitutional:  Negative for chills, fatigue and fever.   HENT:  Negative for congestion, ear pain, facial swelling, sore throat, trouble swallowing and voice change.    Eyes:  Negative for pain, discharge and visual disturbance.   Respiratory:  Negative for cough, shortness of breath and wheezing.    Cardiovascular:  Negative for chest pain, palpitations and leg swelling.   Gastrointestinal:  Negative for abdominal pain, blood in stool, constipation, diarrhea, nausea and vomiting.   Endocrine: Negative for polydipsia, polyphagia and polyuria.   Genitourinary:  Negative for difficulty urinating, dysuria, hematuria and urgency.   Musculoskeletal:  Negative for arthralgias, back pain and myalgias.   Skin:  Negative for color change and rash.   Neurological:  Negative for dizziness, tremors, seizures, syncope, weakness and headaches.   Hematological:  Negative for adenopathy. Does not bruise/bleed easily.   Psychiatric/Behavioral:  Negative for dysphoric mood, sleep disturbance and suicidal ideas.    All other systems reviewed and are negative.    Objective   /80 (BP Location: Left arm, Patient Position: Sitting, Cuff Size: Standard)   Pulse 80   Temp 98.2 °F (36.8 °C) (Skin)   Resp 16   Ht 5' 1\" (1.549 m)   Wt 60.8 kg " (134 lb)   LMP  (LMP Unknown)   SpO2 98%   BMI 25.32 kg/m²     Physical Exam  Vitals and nursing note reviewed.   Constitutional:       General: She is not in acute distress.     Appearance: She is well-developed. She is not diaphoretic.   HENT:      Head: Normocephalic and atraumatic.      Right Ear: External ear normal.      Left Ear: External ear normal.      Nose: Nose normal.   Eyes:      General:         Right eye: No discharge.         Left eye: No discharge.      Conjunctiva/sclera: Conjunctivae normal.      Pupils: Pupils are equal, round, and reactive to light.   Neck:      Thyroid: No thyromegaly.      Trachea: No tracheal deviation.   Cardiovascular:      Rate and Rhythm: Normal rate and regular rhythm.      Heart sounds: Murmur heard.      No friction rub.   Pulmonary:      Effort: Pulmonary effort is normal. No respiratory distress.      Breath sounds: Normal breath sounds. No stridor. No wheezing or rales.   Abdominal:      General: Bowel sounds are normal. There is no distension.      Palpations: Abdomen is soft.      Tenderness: There is no abdominal tenderness. There is no guarding.   Musculoskeletal:         General: No swelling, tenderness or deformity. Normal range of motion.      Cervical back: Normal range of motion and neck supple.   Lymphadenopathy:      Cervical: No cervical adenopathy.   Skin:     General: Skin is warm and dry.      Capillary Refill: Capillary refill takes less than 2 seconds.      Coloration: Skin is not pale.      Findings: No erythema or rash.   Neurological:      Mental Status: She is alert and oriented to person, place, and time.      Cranial Nerves: No cranial nerve deficit.      Coordination: Coordination normal.   Psychiatric:         Mood and Affect: Mood normal.         Behavior: Behavior normal.       Medications have been reviewed by provider in current encounter

## 2025-04-23 NOTE — ASSESSMENT & PLAN NOTE
Orders:    Echo complete w/ contrast if indicated; Future    cyanocobalamin injection 1,000 mcg    magnesium Oxide (MAG-OX) 400 mg TABS; Take 1 tablet (400 mg total) by mouth 2 (two) times a day    DXA bone density spine hip and pelvis; Future    Quantiferon TB Gold Plus Assay; Future    Cologuard    H. pylori antigen, stool; Future    UA (URINE) with reflex to Scope; Future    Magnesium; Future    Vitamin B12; Future    Vitamin D 25 hydroxy; Future    TSH, 3rd generation; Future    T4, free; Future    CBC and differential; Future    Comprehensive metabolic panel; Future    Lipid panel; Future

## 2025-04-23 NOTE — PATIENT INSTRUCTIONS

## 2025-04-23 NOTE — PROGRESS NOTES
TCM Call (since 4/9/2025)       Date and time call was made  4/10/2025  9:29 AM    Patient was hospitialized at  [facility]    Date of Admission  04/01/25    Date of discharge  04/09/25    Disposition  Home          TCM Call (since 4/9/2025)       Scheduled for follow up?  Yes    I have advised the patient to call PCP with any new or worsening symptoms  ANISH Hernandez          Done in detail....

## 2025-05-05 ENCOUNTER — TELEPHONE (OUTPATIENT)
Age: 80
End: 2025-05-05

## 2025-05-05 NOTE — TELEPHONE ENCOUNTER
[employer] calls to ask for the proper coding for colon cancer screening for Cologuard testing. I was able to report that the code is Z12.11. Test kit will be mailed out to the patient.

## 2025-05-12 ENCOUNTER — TELEPHONE (OUTPATIENT)
Dept: FAMILY MEDICINE CLINIC | Facility: CLINIC | Age: 80
End: 2025-05-12

## 2025-05-12 NOTE — TELEPHONE ENCOUNTER
Patient's daughter dropped off note regarding her mother:    She wants Albino to be called first, because Ivanna lives with her.    Emily refuses to wear the oxygen, uses it sometimes when she really feels that she can't breath, but feels she uses too much of the asthma machine and inhaler.    She doesn't sleep well, and wakes up in the middle of the nigh disoriented. Sometimes she talks incoherently, and she mentions that there are ghosts that talk to her and she knows who they are.      She also has been throwing up a lot, not solid food, but she's not eating well.    She also gets dizzy and irritated over nothing.

## 2025-06-11 ENCOUNTER — HOSPITAL ENCOUNTER (OUTPATIENT)
Dept: NON INVASIVE DIAGNOSTICS | Facility: HOSPITAL | Age: 80
Discharge: HOME | End: 2025-06-11
Attending: INTERNAL MEDICINE
Payer: MEDICARE

## 2025-06-11 VITALS
WEIGHT: 134 LBS | HEART RATE: 70 BPM | HEIGHT: 61 IN | BODY MASS INDEX: 25.3 KG/M2 | DIASTOLIC BLOOD PRESSURE: 80 MMHG | SYSTOLIC BLOOD PRESSURE: 140 MMHG

## 2025-06-11 DIAGNOSIS — J18.9 PNEUMONIA DUE TO INFECTIOUS ORGANISM, UNSPECIFIED LATERALITY, UNSPECIFIED PART OF LUNG: ICD-10-CM

## 2025-06-11 DIAGNOSIS — R01.1 HEART MURMUR: ICD-10-CM

## 2025-06-11 DIAGNOSIS — E83.42 HYPOMAGNESEMIA: ICD-10-CM

## 2025-06-11 DIAGNOSIS — I10 ESSENTIAL HYPERTENSION: ICD-10-CM

## 2025-06-11 DIAGNOSIS — M81.8 IDIOPATHIC OSTEOPOROSIS: ICD-10-CM

## 2025-06-11 LAB
AORTIC ROOT: 2.9 CM
BSA FOR ECHO PROCEDURE: 1.59 M2
E WAVE DECELERATION TIME: 244 MS
E/A RATIO: 0.74
FRACTIONAL SHORTENING: 34 (ref 28–44)
INTERVENTRICULAR SEPTUM IN DIASTOLE (PARASTERNAL SHORT AXIS VIEW): 1 CM
INTERVENTRICULAR SEPTUM: 1 CM (ref 0.6–1.1)
LAAS-AP2: 15.8 CM2
LAAS-AP4: 18.7 CM2
LEFT ATRIUM SIZE: 4.6 CM
LEFT ATRIUM VOLUME (MOD BIPLANE): 46 ML
LEFT ATRIUM VOLUME INDEX (MOD BIPLANE): 28.9 ML/M2
LEFT INTERNAL DIMENSION IN SYSTOLE: 2.7 CM (ref 2.1–4)
LEFT VENTRICULAR INTERNAL DIMENSION IN DIASTOLE: 4.1 CM (ref 3.5–6)
LEFT VENTRICULAR POSTERIOR WALL IN END DIASTOLE: 1 CM
LEFT VENTRICULAR STROKE VOLUME: 49 ML
LV EF US.2D.A4C+ESTIMATED: 63 %
LVSV (TEICH): 49 ML
MV E'TISSUE VEL-SEP: 5 CM/S
MV PEAK A VEL: 1.17 M/S
MV PEAK E VEL: 86 CM/S
MV STENOSIS PRESSURE HALF TIME: 71 MS
MV VALVE AREA P 1/2 METHOD: 3.1
RIGHT ATRIUM AREA SYSTOLE A4C: 12.1 CM2
RIGHT VENTRICLE ID DIMENSION: 3.1 CM
SL CV LEFT ATRIUM LENGTH A2C: 5.4 CM
SL CV LV EF: 60
SL CV PED ECHO LEFT VENTRICLE DIASTOLIC VOLUME (MOD BIPLANE) 2D: 75 ML
SL CV PED ECHO LEFT VENTRICLE SYSTOLIC VOLUME (MOD BIPLANE) 2D: 26 ML
TR MAX PG: 28 MMHG
TR PEAK VELOCITY: 2.6 M/S
TRICUSPID ANNULAR PLANE SYSTOLIC EXCURSION: 1.7 CM
TRICUSPID VALVE PEAK REGURGITATION VELOCITY: 2.64 M/S

## 2025-06-11 PROCEDURE — 93306 TTE W/DOPPLER COMPLETE: CPT | Performed by: INTERNAL MEDICINE

## 2025-06-11 PROCEDURE — 93306 TTE W/DOPPLER COMPLETE: CPT

## 2025-06-18 ENCOUNTER — TELEPHONE (OUTPATIENT)
Age: 80
End: 2025-06-18

## 2025-06-18 NOTE — TELEPHONE ENCOUNTER
Patient called in stating that she no longer uses or needs her oxygen anymore and asked if the doctor can send a script to [facility] to  the equipment.

## 2025-08-01 ENCOUNTER — HOSPITAL ENCOUNTER (OUTPATIENT)
Dept: NON INVASIVE DIAGNOSTICS | Facility: HOSPITAL | Age: 80
Discharge: HOME | End: 2025-08-01
Attending: INTERNAL MEDICINE
Payer: MEDICARE

## 2025-08-01 ENCOUNTER — OFFICE VISIT (OUTPATIENT)
Dept: FAMILY MEDICINE CLINIC | Facility: CLINIC | Age: 80
End: 2025-08-01
Payer: MEDICARE

## 2025-08-01 ENCOUNTER — HOSPITAL ENCOUNTER (OUTPATIENT)
Dept: CT IMAGING | Facility: HOSPITAL | Age: 80
Discharge: HOME | End: 2025-08-01
Attending: INTERNAL MEDICINE
Payer: MEDICARE

## 2025-08-01 VITALS
HEART RATE: 100 BPM | OXYGEN SATURATION: 100 % | RESPIRATION RATE: 14 BRPM | HEIGHT: 61 IN | SYSTOLIC BLOOD PRESSURE: 140 MMHG | TEMPERATURE: 97.5 F | DIASTOLIC BLOOD PRESSURE: 88 MMHG | WEIGHT: 117 LBS | BODY MASS INDEX: 22.09 KG/M2

## 2025-08-01 DIAGNOSIS — I10 ESSENTIAL HYPERTENSION: ICD-10-CM

## 2025-08-01 DIAGNOSIS — G45.9 TIA (TRANSIENT ISCHEMIC ATTACK): ICD-10-CM

## 2025-08-01 DIAGNOSIS — R41.3 MEMORY LOSS: ICD-10-CM

## 2025-08-01 DIAGNOSIS — J44.1 COPD WITH ACUTE EXACERBATION (HCC): ICD-10-CM

## 2025-08-01 DIAGNOSIS — R41.0 CONFUSION: ICD-10-CM

## 2025-08-01 DIAGNOSIS — E11.8 TYPE II DIABETES MELLITUS WITH MANIFESTATIONS (HCC): ICD-10-CM

## 2025-08-01 DIAGNOSIS — S32.010D CLOSED COMPRESSION FRACTURE OF L1 LUMBAR VERTEBRA WITH ROUTINE HEALING, SUBSEQUENT ENCOUNTER: ICD-10-CM

## 2025-08-01 DIAGNOSIS — I25.83 CORONARY ARTERY DISEASE DUE TO LIPID RICH PLAQUE: ICD-10-CM

## 2025-08-01 DIAGNOSIS — R55 SYNCOPE, UNSPECIFIED SYNCOPE TYPE: ICD-10-CM

## 2025-08-01 DIAGNOSIS — E83.42 HYPOMAGNESEMIA: ICD-10-CM

## 2025-08-01 DIAGNOSIS — E53.8 VITAMIN B12 DEFICIENCY: ICD-10-CM

## 2025-08-01 DIAGNOSIS — I25.10 CORONARY ARTERY DISEASE DUE TO LIPID RICH PLAQUE: ICD-10-CM

## 2025-08-01 DIAGNOSIS — R41.0 CONFUSION: Primary | ICD-10-CM

## 2025-08-01 PROBLEM — J96.22 ACUTE ON CHRONIC RESPIRATORY FAILURE WITH HYPERCAPNIA (HCC): Status: RESOLVED | Noted: 2025-04-01 | Resolved: 2025-08-01

## 2025-08-01 PROBLEM — J96.01 ACUTE HYPOXIC RESPIRATORY FAILURE (HCC): Status: RESOLVED | Noted: 2025-04-03 | Resolved: 2025-08-01

## 2025-08-01 PROCEDURE — 99214 OFFICE O/P EST MOD 30 MIN: CPT | Performed by: INTERNAL MEDICINE

## 2025-08-01 PROCEDURE — 70450 CT HEAD/BRAIN W/O DYE: CPT

## 2025-08-01 PROCEDURE — 93880 EXTRACRANIAL BILAT STUDY: CPT

## 2025-08-01 PROCEDURE — 96372 THER/PROPH/DIAG INJ SC/IM: CPT | Performed by: INTERNAL MEDICINE

## 2025-08-01 RX ORDER — AMLODIPINE BESYLATE 5 MG/1
5 TABLET ORAL 2 TIMES DAILY
Qty: 180 TABLET | Refills: 3 | Status: SHIPPED | OUTPATIENT
Start: 2025-08-01

## 2025-08-01 RX ORDER — BISOPROLOL FUMARATE 10 MG/1
10 TABLET, FILM COATED ORAL DAILY
Qty: 90 TABLET | Refills: 3 | Status: SHIPPED | OUTPATIENT
Start: 2025-08-01

## 2025-08-01 RX ORDER — ESCITALOPRAM OXALATE 5 MG/1
5 TABLET ORAL
Qty: 30 TABLET | Refills: 3 | Status: SHIPPED | OUTPATIENT
Start: 2025-08-01

## 2025-08-01 RX ORDER — CYANOCOBALAMIN 1000 UG/ML
1000 INJECTION, SOLUTION INTRAMUSCULAR; SUBCUTANEOUS ONCE
Status: COMPLETED | OUTPATIENT
Start: 2025-08-01 | End: 2025-08-01

## 2025-08-01 RX ORDER — ROSUVASTATIN CALCIUM 10 MG/1
10 TABLET, COATED ORAL DAILY
Qty: 90 TABLET | Refills: 3 | Status: SHIPPED | OUTPATIENT
Start: 2025-08-01

## 2025-08-01 RX ORDER — LANOLIN ALCOHOL/MO/W.PET/CERES
400 CREAM (GRAM) TOPICAL 2 TIMES DAILY
Qty: 200 TABLET | Refills: 3 | Status: SHIPPED | OUTPATIENT
Start: 2025-08-01

## 2025-08-01 RX ORDER — CLOPIDOGREL BISULFATE 75 MG
75 TABLET ORAL DAILY
Qty: 90 TABLET | Refills: 1 | Status: SHIPPED | OUTPATIENT
Start: 2025-08-01

## 2025-08-01 RX ORDER — LANOLIN ALCOHOL/MO/W.PET/CERES
1000 CREAM (GRAM) TOPICAL DAILY
Qty: 90 TABLET | Refills: 3 | Status: SHIPPED | OUTPATIENT
Start: 2025-08-01

## 2025-08-01 RX ADMIN — CYANOCOBALAMIN 1000 MCG: 1000 INJECTION, SOLUTION INTRAMUSCULAR; SUBCUTANEOUS at 11:58

## 2025-08-02 PROCEDURE — 93880 EXTRACRANIAL BILAT STUDY: CPT | Performed by: SURGERY

## 2025-08-04 ENCOUNTER — RESULTS FOLLOW-UP (OUTPATIENT)
Dept: FAMILY MEDICINE CLINIC | Facility: CLINIC | Age: 80
End: 2025-08-04

## 2025-08-15 ENCOUNTER — TELEPHONE (OUTPATIENT)
Age: 80
End: 2025-08-15

## (undated) DEVICE — MEDI-VAC YANKAUER SUCTION HANDLE W/BULBOUS AND CONTROL VENT: Brand: CARDINAL HEALTH

## (undated) DEVICE — TUBING SUCTION 5MM X 12 FT

## (undated) DEVICE — SUT VICRYL 2-0 SH 27 IN UNDYED J417H

## (undated) DEVICE — CATH URET 12FR RED RUBBER

## (undated) DEVICE — STANDARD SURGICAL GOWN, L: Brand: CONVERTORS

## (undated) DEVICE — SPONGE 4 X 4 XRAY 16 PLY STRL LF RFD

## (undated) DEVICE — TRAY FOLEY 16FR URIMETER SILICONE SURESTEP

## (undated) DEVICE — INTENDED FOR TISSUE SEPARATION, AND OTHER PROCEDURES THAT REQUIRE A SHARP SURGICAL BLADE TO PUNCTURE OR CUT.: Brand: BARD-PARKER SAFETY BLADES SIZE 15, STERILE

## (undated) DEVICE — GLOVE PI ULTRA TOUCH SZ.7.5

## (undated) DEVICE — EXOFIN PRECISION PEN HIGH VISCOSITY TOPICAL SKIN ADHESIVE: Brand: EXOFIN PRECISION PEN, 1G

## (undated) DEVICE — ELECTROSURGICAL DEVICE HOLSTER;FOR USE WITH MAXIMUM PEAK VOLTAGE OF 4000 V: Brand: FORCE TRIVERSE

## (undated) DEVICE — VIOLET BRAIDED (POLYGLACTIN 910), SYNTHETIC ABSORBABLE SUTURE: Brand: COATED VICRYL

## (undated) DEVICE — INTENT TO BE USED WITH SUTURE MATERIAL FOR TISSUE CLOSURE: Brand: RICHARD-ALLAN® NEEDLE 1/2 CIRCLE TAPER

## (undated) DEVICE — INTENDED FOR TISSUE SEPARATION, AND OTHER PROCEDURES THAT REQUIRE A SHARP SURGICAL BLADE TO PUNCTURE OR CUT.: Brand: BARD-PARKER SAFETY BLADES SIZE 11, STERILE

## (undated) DEVICE — SCD SEQUENTIAL COMPRESSION COMFORT SLEEVE MEDIUM KNEE LENGTH: Brand: KENDALL SCD

## (undated) DEVICE — UNDER BUTTOCKS DRAPE W/FLUID CONTROL POUCH: Brand: CONVERTORS

## (undated) DEVICE — DECANTER: Brand: UNBRANDED

## (undated) DEVICE — ALLENTOWN DR  LUCENTE S LAP PK: Brand: CARDINAL HEALTH

## (undated) DEVICE — IV FLUSH NSS 10ML POSIFLUSH

## (undated) DEVICE — GLOVE PI ULTRA TOUCH SZ.6.5

## (undated) DEVICE — SUT CAPIO SLIM SZ 0 48IN M0068332231

## (undated) DEVICE — ASTOUND STANDARD SURGICAL GOWN, XXL: Brand: CONVERTORS

## (undated) DEVICE — GLOVE PI ULTRA TOUCH SZ.8.0

## (undated) DEVICE — CAUTERY TIP POLISHER: Brand: DEVON

## (undated) DEVICE — BULB SYRINGE,IRRIGATION WITH PROTECTIVE CAP: Brand: DOVER

## (undated) DEVICE — NEEDLE HYPO 23G X 1-1/2 IN

## (undated) DEVICE — SYRINGE 50ML LL

## (undated) DEVICE — HEMOSTATIC MATRIX SURGIFLO 8ML W/THROMBIN

## (undated) DEVICE — SUTURE CAPTURING DEVICE: Brand: CAPIO SLIM

## (undated) DEVICE — SUT VICRYL 0 CT-1 36 IN J946H